# Patient Record
Sex: FEMALE | Race: BLACK OR AFRICAN AMERICAN | Employment: UNEMPLOYED | URBAN - METROPOLITAN AREA
[De-identification: names, ages, dates, MRNs, and addresses within clinical notes are randomized per-mention and may not be internally consistent; named-entity substitution may affect disease eponyms.]

---

## 2018-05-08 ENCOUNTER — HOSPITAL ENCOUNTER (OUTPATIENT)
Dept: GENERAL RADIOLOGY | Age: 58
Discharge: HOME | End: 2018-05-10
Payer: MEDICAID

## 2018-05-08 ENCOUNTER — HOSPITAL ENCOUNTER (OUTPATIENT)
Age: 58
Discharge: HOME | End: 2018-05-10
Payer: MEDICAID

## 2018-05-08 DIAGNOSIS — R52 PAIN: ICD-10-CM

## 2018-05-08 PROCEDURE — 72110 X-RAY EXAM L-2 SPINE 4/>VWS: CPT

## 2018-06-08 ENCOUNTER — HOSPITAL ENCOUNTER (OUTPATIENT)
Age: 58
Discharge: HOME | End: 2018-06-08
Payer: MEDICAID

## 2018-06-08 LAB
BUN BLDV-MCNC: 13 MG/DL (ref 6–20)
CREAT SERPL-MCNC: 0.5 MG/DL (ref 0.5–1)
GFR AFRICAN AMERICAN: >60
GFR NON-AFRICAN AMERICAN: >60 ML/MIN/1.73

## 2018-06-08 PROCEDURE — 36415 COLL VENOUS BLD VENIPUNCTURE: CPT

## 2018-06-08 PROCEDURE — 84520 ASSAY OF UREA NITROGEN: CPT

## 2018-06-08 PROCEDURE — 82565 ASSAY OF CREATININE: CPT

## 2018-06-09 ENCOUNTER — HOSPITAL ENCOUNTER (OUTPATIENT)
Dept: MRI IMAGING | Age: 58
Discharge: HOME | End: 2018-06-11
Payer: MEDICAID

## 2018-06-09 DIAGNOSIS — Q76.49 SPINAL DEFORMITY: ICD-10-CM

## 2018-06-09 DIAGNOSIS — M54.40 ACUTE RIGHT-SIDED LOW BACK PAIN WITH SCIATICA, SCIATICA LATERALITY UNSPECIFIED: ICD-10-CM

## 2018-06-09 DIAGNOSIS — M53.2X6 SPINAL INSTABILITY OF LUMBAR REGION: ICD-10-CM

## 2018-06-09 DIAGNOSIS — M40.209 KYPHOSIS, UNSPECIFIED KYPHOSIS TYPE, UNSPECIFIED SPINAL REGION: ICD-10-CM

## 2018-06-09 DIAGNOSIS — M48.062 NEUROGENIC CLAUDICATION DUE TO LUMBAR SPINAL STENOSIS: ICD-10-CM

## 2018-06-09 PROCEDURE — 72158 MRI LUMBAR SPINE W/O & W/DYE: CPT

## 2018-06-09 PROCEDURE — A9579 GAD-BASE MR CONTRAST NOS,1ML: HCPCS | Performed by: RADIOLOGY

## 2018-06-09 PROCEDURE — 6360000004 HC RX CONTRAST MEDICATION: Performed by: RADIOLOGY

## 2018-06-09 RX ADMIN — GADOTERIDOL 15 ML: 279.3 INJECTION, SOLUTION INTRAVENOUS at 14:38

## 2018-08-27 ENCOUNTER — HOSPITAL ENCOUNTER (OUTPATIENT)
Age: 58
Discharge: HOME | End: 2018-08-27
Payer: MEDICAID

## 2018-08-27 LAB
ALBUMIN SERPL-MCNC: 4.7 G/DL (ref 3.5–5.2)
ALP BLD-CCNC: 91 U/L (ref 35–104)
ALT SERPL-CCNC: 50 U/L (ref 0–32)
AST SERPL-CCNC: 142 U/L (ref 0–31)
BILIRUB SERPL-MCNC: 1.4 MG/DL (ref 0–1.2)
BILIRUBIN DIRECT: 0.4 MG/DL (ref 0–0.3)
BILIRUBIN, INDIRECT: 1 MG/DL (ref 0–1)
TOTAL PROTEIN: 7.6 G/DL (ref 6.4–8.3)

## 2018-08-27 PROCEDURE — 80076 HEPATIC FUNCTION PANEL: CPT

## 2018-08-27 PROCEDURE — 36415 COLL VENOUS BLD VENIPUNCTURE: CPT

## 2018-11-15 ENCOUNTER — HOSPITAL ENCOUNTER (OUTPATIENT)
Dept: GENERAL RADIOLOGY | Age: 58
Discharge: HOME | End: 2018-11-17
Payer: MEDICAID

## 2018-11-15 ENCOUNTER — OFFICE VISIT (OUTPATIENT)
Dept: NEUROSURGERY | Age: 58
End: 2018-11-15
Payer: MEDICAID

## 2018-11-15 ENCOUNTER — HOSPITAL ENCOUNTER (OUTPATIENT)
Age: 58
Discharge: HOME | End: 2018-11-17
Payer: MEDICAID

## 2018-11-15 VITALS
BODY MASS INDEX: 22.63 KG/M2 | HEART RATE: 81 BPM | DIASTOLIC BLOOD PRESSURE: 83 MMHG | WEIGHT: 123 LBS | SYSTOLIC BLOOD PRESSURE: 109 MMHG | HEIGHT: 62 IN

## 2018-11-15 DIAGNOSIS — M51.26 LUMBAR DISC DISPLACEMENT WITHOUT MYELOPATHY: ICD-10-CM

## 2018-11-15 DIAGNOSIS — M51.26 LUMBAR DISC HERNIATION: Primary | ICD-10-CM

## 2018-11-15 PROCEDURE — G8420 CALC BMI NORM PARAMETERS: HCPCS | Performed by: PHYSICIAN ASSISTANT

## 2018-11-15 PROCEDURE — G8427 DOCREV CUR MEDS BY ELIG CLIN: HCPCS | Performed by: PHYSICIAN ASSISTANT

## 2018-11-15 PROCEDURE — 3017F COLORECTAL CA SCREEN DOC REV: CPT | Performed by: PHYSICIAN ASSISTANT

## 2018-11-15 PROCEDURE — 99212 OFFICE O/P EST SF 10 MIN: CPT | Performed by: PHYSICIAN ASSISTANT

## 2018-11-15 PROCEDURE — 72100 X-RAY EXAM L-S SPINE 2/3 VWS: CPT

## 2018-11-15 PROCEDURE — G8484 FLU IMMUNIZE NO ADMIN: HCPCS | Performed by: PHYSICIAN ASSISTANT

## 2018-11-15 PROCEDURE — 1036F TOBACCO NON-USER: CPT | Performed by: PHYSICIAN ASSISTANT

## 2018-11-29 ENCOUNTER — HOSPITAL ENCOUNTER (OUTPATIENT)
Dept: PHYSICAL THERAPY | Age: 58
Setting detail: THERAPIES SERIES
Discharge: HOME | End: 2018-11-29
Payer: MEDICAID

## 2018-11-29 PROCEDURE — G8982 BODY POS GOAL STATUS: HCPCS | Performed by: PHYSICAL THERAPIST

## 2018-11-29 PROCEDURE — 97161 PT EVAL LOW COMPLEX 20 MIN: CPT | Performed by: PHYSICAL THERAPIST

## 2018-11-29 PROCEDURE — G8981 BODY POS CURRENT STATUS: HCPCS | Performed by: PHYSICAL THERAPIST

## 2018-11-29 ASSESSMENT — PAIN DESCRIPTION - LOCATION: LOCATION: BACK

## 2018-11-29 ASSESSMENT — PAIN DESCRIPTION - PAIN TYPE: TYPE: CHRONIC PAIN

## 2018-11-29 ASSESSMENT — PAIN DESCRIPTION - ORIENTATION: ORIENTATION: RIGHT;LEFT

## 2018-11-29 ASSESSMENT — PAIN SCALES - GENERAL: PAINLEVEL_OUTOF10: 6

## 2018-11-29 ASSESSMENT — PAIN DESCRIPTION - DESCRIPTORS: DESCRIPTORS: ACHING;CONSTANT

## 2018-12-04 ENCOUNTER — HOSPITAL ENCOUNTER (OUTPATIENT)
Dept: PHYSICAL THERAPY | Age: 58
Setting detail: THERAPIES SERIES
Discharge: HOME | End: 2018-12-04
Payer: MEDICAID

## 2018-12-04 PROCEDURE — G0283 ELEC STIM OTHER THAN WOUND: HCPCS | Performed by: PHYSICAL THERAPIST

## 2018-12-04 PROCEDURE — 97110 THERAPEUTIC EXERCISES: CPT | Performed by: PHYSICAL THERAPIST

## 2018-12-07 ENCOUNTER — HOSPITAL ENCOUNTER (OUTPATIENT)
Dept: PHYSICAL THERAPY | Age: 58
Setting detail: THERAPIES SERIES
Discharge: HOME | End: 2018-12-07
Payer: MEDICAID

## 2018-12-10 ENCOUNTER — HOSPITAL ENCOUNTER (OUTPATIENT)
Dept: PHYSICAL THERAPY | Age: 58
Setting detail: THERAPIES SERIES
Discharge: HOME | End: 2018-12-10
Payer: MEDICAID

## 2018-12-10 PROCEDURE — G0283 ELEC STIM OTHER THAN WOUND: HCPCS | Performed by: PHYSICAL THERAPIST

## 2018-12-10 PROCEDURE — 97110 THERAPEUTIC EXERCISES: CPT | Performed by: PHYSICAL THERAPIST

## 2018-12-12 ENCOUNTER — HOSPITAL ENCOUNTER (OUTPATIENT)
Dept: PHYSICAL THERAPY | Age: 58
Setting detail: THERAPIES SERIES
Discharge: HOME | End: 2018-12-12
Payer: MEDICAID

## 2018-12-12 PROCEDURE — 97110 THERAPEUTIC EXERCISES: CPT | Performed by: PHYSICAL THERAPIST

## 2018-12-12 PROCEDURE — G0283 ELEC STIM OTHER THAN WOUND: HCPCS | Performed by: PHYSICAL THERAPIST

## 2018-12-17 ENCOUNTER — APPOINTMENT (OUTPATIENT)
Dept: GENERAL RADIOLOGY | Age: 58
End: 2018-12-17
Payer: MEDICAID

## 2018-12-17 ENCOUNTER — HOSPITAL ENCOUNTER (EMERGENCY)
Age: 58
Discharge: HOME | End: 2018-12-17
Payer: MEDICAID

## 2018-12-17 ENCOUNTER — HOSPITAL ENCOUNTER (OUTPATIENT)
Dept: PHYSICAL THERAPY | Age: 58
Setting detail: THERAPIES SERIES
Discharge: HOME | End: 2018-12-17
Payer: MEDICAID

## 2018-12-17 VITALS
HEIGHT: 62 IN | DIASTOLIC BLOOD PRESSURE: 78 MMHG | WEIGHT: 122 LBS | RESPIRATION RATE: 17 BRPM | OXYGEN SATURATION: 98 % | SYSTOLIC BLOOD PRESSURE: 105 MMHG | HEART RATE: 87 BPM | TEMPERATURE: 97.8 F | BODY MASS INDEX: 22.45 KG/M2

## 2018-12-17 DIAGNOSIS — S42.294A OTHER CLOSED NONDISPLACED FRACTURE OF PROXIMAL END OF RIGHT HUMERUS, INITIAL ENCOUNTER: Primary | ICD-10-CM

## 2018-12-17 PROCEDURE — 73060 X-RAY EXAM OF HUMERUS: CPT

## 2018-12-17 PROCEDURE — 6370000000 HC RX 637 (ALT 250 FOR IP): Performed by: PHYSICIAN ASSISTANT

## 2018-12-17 PROCEDURE — 99283 EMERGENCY DEPT VISIT LOW MDM: CPT

## 2018-12-17 RX ORDER — OXYCODONE HYDROCHLORIDE AND ACETAMINOPHEN 5; 325 MG/1; MG/1
1 TABLET ORAL ONCE
Status: COMPLETED | OUTPATIENT
Start: 2018-12-17 | End: 2018-12-17

## 2018-12-17 RX ADMIN — OXYCODONE AND ACETAMINOPHEN 1 TABLET: 5; 325 TABLET ORAL at 22:59

## 2018-12-17 ASSESSMENT — PAIN SCALES - GENERAL
PAINLEVEL_OUTOF10: 10
PAINLEVEL_OUTOF10: 8

## 2018-12-17 ASSESSMENT — PAIN DESCRIPTION - DESCRIPTORS: DESCRIPTORS: SHOOTING

## 2018-12-17 ASSESSMENT — PAIN DESCRIPTION - ORIENTATION: ORIENTATION: RIGHT

## 2018-12-17 ASSESSMENT — PAIN DESCRIPTION - PAIN TYPE: TYPE: ACUTE PAIN

## 2018-12-17 ASSESSMENT — PAIN DESCRIPTION - LOCATION: LOCATION: ARM

## 2018-12-18 ENCOUNTER — TELEPHONE (OUTPATIENT)
Dept: ORTHOPEDIC SURGERY | Age: 58
End: 2018-12-18

## 2018-12-19 ENCOUNTER — HOSPITAL ENCOUNTER (OUTPATIENT)
Dept: PHYSICAL THERAPY | Age: 58
Setting detail: THERAPIES SERIES
Discharge: HOME | End: 2018-12-19
Payer: MEDICAID

## 2018-12-19 ENCOUNTER — TELEPHONE (OUTPATIENT)
Dept: ORTHOPEDIC SURGERY | Age: 58
End: 2018-12-19

## 2018-12-20 ENCOUNTER — TELEPHONE (OUTPATIENT)
Dept: ORTHOPEDIC SURGERY | Age: 58
End: 2018-12-20

## 2018-12-20 DIAGNOSIS — S42.291A OTHER CLOSED DISPLACED FRACTURE OF PROXIMAL END OF RIGHT HUMERUS, INITIAL ENCOUNTER: Primary | ICD-10-CM

## 2018-12-31 NOTE — PROGRESS NOTES
[address] [facility]                Phone: [phone]   Fax: [phone]    To:  KOBI Suárez       From: Glenn Ortega      Patient: Deloris Stuart       : 1960  Diagnosis:       Date: 2018  Treatment Diagnosis:  chronic LBP     Physical Therapy Discharge Note    Total Visits to date:   4 Cancels/No-shows to date:  3 cancellations     Plan of Care/Treatment to date:  [x] Therapeutic Exercise    [x] Modalities:  [x] Therapeutic Activity     [] Ultrasound  [x] Electrical Stimulation  [] Gait Training      [] Cervical Traction   [] Lumbar Traction  [] Neuromuscular Re-education  [x] Cold/hotpack [] Iontophoresis  [x] Instruction in HEP      Other:  [] Manual Therapy       []    [] Aquatic Therapy       []                    ? Progress towards goals:  pt did n ot return for treatment after 18  so formal reassessment not possible. At last visit she reported the following:      *  pain across LB persisted with all prolonged activities, 6/10  *  AROM LB improved to grossly 75% WNL for all ranges  *  strength across B LE's grossly 5/5 for all planes   *  endurance for all prolonged activities was FAIR+/GOOD-  *  pt I with HEP for home management of condition        Goal Status:  [] Achieved [x] Partially Achieved  [] Not Achieved     Patient Status: [] Continue per initial plan of Care     [x] Patient now discharged     [] Additional visits requested, Please re-certify for additional visits:            Electronically signed by: CHRIS Kenny     If you have any questions or concerns, please don't hesitate to call.   Thank you for your referral.

## 2019-01-02 ENCOUNTER — HOSPITAL ENCOUNTER (OUTPATIENT)
Dept: GENERAL RADIOLOGY | Age: 59
Discharge: HOME | End: 2019-01-04
Payer: MEDICAID

## 2019-01-02 ENCOUNTER — OFFICE VISIT (OUTPATIENT)
Dept: ORTHOPEDIC SURGERY | Age: 59
End: 2019-01-02
Payer: MEDICAID

## 2019-01-02 VITALS
DIASTOLIC BLOOD PRESSURE: 73 MMHG | WEIGHT: 122 LBS | HEART RATE: 83 BPM | SYSTOLIC BLOOD PRESSURE: 99 MMHG | BODY MASS INDEX: 21.62 KG/M2 | HEIGHT: 63 IN

## 2019-01-02 DIAGNOSIS — S42.291A OTHER CLOSED DISPLACED FRACTURE OF PROXIMAL END OF RIGHT HUMERUS, INITIAL ENCOUNTER: ICD-10-CM

## 2019-01-02 DIAGNOSIS — G89.11 ACUTE SHOULDER PAIN DUE TO TRAUMA, RIGHT: Primary | ICD-10-CM

## 2019-01-02 DIAGNOSIS — M25.511 ACUTE SHOULDER PAIN DUE TO TRAUMA, RIGHT: Primary | ICD-10-CM

## 2019-01-02 PROCEDURE — 73060 X-RAY EXAM OF HUMERUS: CPT

## 2019-01-02 PROCEDURE — 3017F COLORECTAL CA SCREEN DOC REV: CPT | Performed by: ORTHOPAEDIC SURGERY

## 2019-01-02 PROCEDURE — 4004F PT TOBACCO SCREEN RCVD TLK: CPT | Performed by: ORTHOPAEDIC SURGERY

## 2019-01-02 PROCEDURE — 99203 OFFICE O/P NEW LOW 30 MIN: CPT | Performed by: ORTHOPAEDIC SURGERY

## 2019-01-02 PROCEDURE — G8484 FLU IMMUNIZE NO ADMIN: HCPCS | Performed by: ORTHOPAEDIC SURGERY

## 2019-01-02 PROCEDURE — G8427 DOCREV CUR MEDS BY ELIG CLIN: HCPCS | Performed by: ORTHOPAEDIC SURGERY

## 2019-01-02 PROCEDURE — G8420 CALC BMI NORM PARAMETERS: HCPCS | Performed by: ORTHOPAEDIC SURGERY

## 2019-01-02 PROCEDURE — 99212 OFFICE O/P EST SF 10 MIN: CPT | Performed by: ORTHOPAEDIC SURGERY

## 2019-01-11 ENCOUNTER — HOSPITAL ENCOUNTER (OUTPATIENT)
Dept: MRI IMAGING | Age: 59
Discharge: HOME | End: 2019-01-13
Payer: MEDICAID

## 2019-01-11 DIAGNOSIS — G89.11 ACUTE SHOULDER PAIN DUE TO TRAUMA, RIGHT: ICD-10-CM

## 2019-01-11 DIAGNOSIS — M25.511 ACUTE SHOULDER PAIN DUE TO TRAUMA, RIGHT: ICD-10-CM

## 2019-01-11 PROCEDURE — 73221 MRI JOINT UPR EXTREM W/O DYE: CPT

## 2019-01-14 ENCOUNTER — HOSPITAL ENCOUNTER (OUTPATIENT)
Age: 59
Discharge: HOME | End: 2019-01-14
Payer: MEDICAID

## 2019-01-14 LAB
ALBUMIN SERPL-MCNC: 4.6 G/DL (ref 3.5–5.2)
ALP BLD-CCNC: 107 U/L (ref 35–104)
ALT SERPL-CCNC: 20 U/L (ref 0–32)
AST SERPL-CCNC: 28 U/L (ref 0–31)
BILIRUB SERPL-MCNC: 0.6 MG/DL (ref 0–1.2)
BILIRUBIN DIRECT: <0.2 MG/DL (ref 0–0.3)
BILIRUBIN, INDIRECT: ABNORMAL MG/DL (ref 0–1)
TOTAL PROTEIN: 7.5 G/DL (ref 6.4–8.3)

## 2019-01-14 PROCEDURE — 80076 HEPATIC FUNCTION PANEL: CPT

## 2019-01-14 PROCEDURE — 36415 COLL VENOUS BLD VENIPUNCTURE: CPT

## 2019-01-22 ENCOUNTER — OFFICE VISIT (OUTPATIENT)
Dept: ORTHOPEDIC SURGERY | Age: 59
End: 2019-01-22
Payer: MEDICAID

## 2019-01-22 VITALS
BODY MASS INDEX: 21.44 KG/M2 | WEIGHT: 121 LBS | HEART RATE: 91 BPM | DIASTOLIC BLOOD PRESSURE: 77 MMHG | SYSTOLIC BLOOD PRESSURE: 106 MMHG | HEIGHT: 63 IN

## 2019-01-22 DIAGNOSIS — S42.254A NONDISPLACED FRACTURE OF GREATER TUBEROSITY OF RIGHT HUMERUS, INITIAL ENCOUNTER FOR CLOSED FRACTURE: Primary | ICD-10-CM

## 2019-01-22 PROCEDURE — G8427 DOCREV CUR MEDS BY ELIG CLIN: HCPCS | Performed by: ORTHOPAEDIC SURGERY

## 2019-01-22 PROCEDURE — G8484 FLU IMMUNIZE NO ADMIN: HCPCS | Performed by: ORTHOPAEDIC SURGERY

## 2019-01-22 PROCEDURE — 99213 OFFICE O/P EST LOW 20 MIN: CPT | Performed by: ORTHOPAEDIC SURGERY

## 2019-01-22 PROCEDURE — G8420 CALC BMI NORM PARAMETERS: HCPCS | Performed by: ORTHOPAEDIC SURGERY

## 2019-01-22 PROCEDURE — 99212 OFFICE O/P EST SF 10 MIN: CPT

## 2019-01-22 PROCEDURE — 23600 CLTX PROX HUMRL FX W/O MNPJ: CPT | Performed by: ORTHOPAEDIC SURGERY

## 2019-01-22 PROCEDURE — 4004F PT TOBACCO SCREEN RCVD TLK: CPT | Performed by: ORTHOPAEDIC SURGERY

## 2019-01-22 PROCEDURE — 3017F COLORECTAL CA SCREEN DOC REV: CPT | Performed by: ORTHOPAEDIC SURGERY

## 2019-01-28 ENCOUNTER — HOSPITAL ENCOUNTER (OUTPATIENT)
Dept: OCCUPATIONAL THERAPY | Age: 59
Setting detail: THERAPIES SERIES
Discharge: HOME | End: 2019-01-28
Payer: MEDICAID

## 2019-01-28 PROCEDURE — 97110 THERAPEUTIC EXERCISES: CPT | Performed by: OCCUPATIONAL THERAPIST

## 2019-01-28 PROCEDURE — 97165 OT EVAL LOW COMPLEX 30 MIN: CPT | Performed by: OCCUPATIONAL THERAPIST

## 2019-01-28 PROCEDURE — 97140 MANUAL THERAPY 1/> REGIONS: CPT | Performed by: OCCUPATIONAL THERAPIST

## 2019-02-04 ENCOUNTER — HOSPITAL ENCOUNTER (OUTPATIENT)
Dept: OCCUPATIONAL THERAPY | Age: 59
Setting detail: THERAPIES SERIES
Discharge: HOME | End: 2019-02-04
Payer: MEDICAID

## 2019-02-04 PROCEDURE — 97140 MANUAL THERAPY 1/> REGIONS: CPT

## 2019-02-04 PROCEDURE — 97110 THERAPEUTIC EXERCISES: CPT

## 2019-02-04 PROCEDURE — 97530 THERAPEUTIC ACTIVITIES: CPT

## 2019-02-06 ENCOUNTER — HOSPITAL ENCOUNTER (OUTPATIENT)
Dept: OCCUPATIONAL THERAPY | Age: 59
Setting detail: THERAPIES SERIES
Discharge: HOME | End: 2019-02-06
Payer: MEDICAID

## 2019-02-06 PROCEDURE — 97530 THERAPEUTIC ACTIVITIES: CPT

## 2019-02-06 PROCEDURE — 97110 THERAPEUTIC EXERCISES: CPT

## 2019-02-06 PROCEDURE — 97124 MASSAGE THERAPY: CPT

## 2019-02-11 ENCOUNTER — HOSPITAL ENCOUNTER (OUTPATIENT)
Dept: OCCUPATIONAL THERAPY | Age: 59
Setting detail: THERAPIES SERIES
Discharge: HOME | End: 2019-02-11
Payer: MEDICAID

## 2019-02-11 PROCEDURE — 97530 THERAPEUTIC ACTIVITIES: CPT

## 2019-02-11 PROCEDURE — 97110 THERAPEUTIC EXERCISES: CPT

## 2019-02-13 ENCOUNTER — OFFICE VISIT (OUTPATIENT)
Dept: ORTHOPEDIC SURGERY | Age: 59
End: 2019-02-13
Payer: MEDICAID

## 2019-02-13 ENCOUNTER — HOSPITAL ENCOUNTER (OUTPATIENT)
Dept: OCCUPATIONAL THERAPY | Age: 59
Setting detail: THERAPIES SERIES
Discharge: HOME | End: 2019-02-13
Payer: MEDICAID

## 2019-02-13 ENCOUNTER — HOSPITAL ENCOUNTER (OUTPATIENT)
Dept: GENERAL RADIOLOGY | Age: 59
Discharge: HOME | End: 2019-02-15
Payer: MEDICAID

## 2019-02-13 VITALS
DIASTOLIC BLOOD PRESSURE: 79 MMHG | BODY MASS INDEX: 21.44 KG/M2 | WEIGHT: 121 LBS | SYSTOLIC BLOOD PRESSURE: 111 MMHG | HEIGHT: 63 IN | HEART RATE: 97 BPM | RESPIRATION RATE: 16 BRPM

## 2019-02-13 DIAGNOSIS — S42.254A NONDISPLACED FRACTURE OF GREATER TUBEROSITY OF RIGHT HUMERUS, INITIAL ENCOUNTER FOR CLOSED FRACTURE: Primary | ICD-10-CM

## 2019-02-13 DIAGNOSIS — S42.254A NONDISPLACED FRACTURE OF GREATER TUBEROSITY OF RIGHT HUMERUS, INITIAL ENCOUNTER FOR CLOSED FRACTURE: ICD-10-CM

## 2019-02-13 PROCEDURE — 73030 X-RAY EXAM OF SHOULDER: CPT

## 2019-02-13 PROCEDURE — 97110 THERAPEUTIC EXERCISES: CPT

## 2019-02-13 PROCEDURE — 97530 THERAPEUTIC ACTIVITIES: CPT

## 2019-02-13 PROCEDURE — 99212 OFFICE O/P EST SF 10 MIN: CPT

## 2019-02-13 PROCEDURE — 97140 MANUAL THERAPY 1/> REGIONS: CPT

## 2019-02-13 PROCEDURE — 99024 POSTOP FOLLOW-UP VISIT: CPT | Performed by: ORTHOPAEDIC SURGERY

## 2019-02-18 ENCOUNTER — HOSPITAL ENCOUNTER (OUTPATIENT)
Dept: OCCUPATIONAL THERAPY | Age: 59
Setting detail: THERAPIES SERIES
Discharge: HOME | End: 2019-02-18
Payer: MEDICAID

## 2019-02-18 PROCEDURE — 97530 THERAPEUTIC ACTIVITIES: CPT

## 2019-02-18 PROCEDURE — 97110 THERAPEUTIC EXERCISES: CPT

## 2019-02-20 ENCOUNTER — HOSPITAL ENCOUNTER (OUTPATIENT)
Dept: OCCUPATIONAL THERAPY | Age: 59
Setting detail: THERAPIES SERIES
End: 2019-02-20
Payer: MEDICAID

## 2019-02-25 ENCOUNTER — HOSPITAL ENCOUNTER (OUTPATIENT)
Dept: OCCUPATIONAL THERAPY | Age: 59
Setting detail: THERAPIES SERIES
Discharge: HOME | End: 2019-02-25
Payer: MEDICAID

## 2019-02-25 ENCOUNTER — HOSPITAL ENCOUNTER (OUTPATIENT)
Dept: PHYSICAL THERAPY | Age: 59
Setting detail: THERAPIES SERIES
Discharge: HOME | End: 2019-02-25
Payer: MEDICAID

## 2019-02-25 PROCEDURE — 97161 PT EVAL LOW COMPLEX 20 MIN: CPT | Performed by: PHYSICAL THERAPIST

## 2019-02-25 PROCEDURE — 97530 THERAPEUTIC ACTIVITIES: CPT

## 2019-02-25 PROCEDURE — 97110 THERAPEUTIC EXERCISES: CPT

## 2019-02-25 ASSESSMENT — PAIN DESCRIPTION - LOCATION: LOCATION: BACK

## 2019-02-25 ASSESSMENT — PAIN SCALES - GENERAL: PAINLEVEL_OUTOF10: 4

## 2019-02-25 ASSESSMENT — PAIN DESCRIPTION - DESCRIPTORS: DESCRIPTORS: ACHING;CONSTANT

## 2019-02-25 ASSESSMENT — PAIN DESCRIPTION - ORIENTATION: ORIENTATION: RIGHT;LEFT

## 2019-02-25 ASSESSMENT — PAIN DESCRIPTION - PAIN TYPE: TYPE: CHRONIC PAIN

## 2019-02-27 ENCOUNTER — HOSPITAL ENCOUNTER (OUTPATIENT)
Dept: PHYSICAL THERAPY | Age: 59
Setting detail: THERAPIES SERIES
Discharge: HOME | End: 2019-02-27
Payer: MEDICAID

## 2019-02-27 ENCOUNTER — HOSPITAL ENCOUNTER (OUTPATIENT)
Dept: OCCUPATIONAL THERAPY | Age: 59
Setting detail: THERAPIES SERIES
Discharge: HOME | End: 2019-02-27
Payer: MEDICAID

## 2019-02-27 PROCEDURE — 97110 THERAPEUTIC EXERCISES: CPT

## 2019-02-27 PROCEDURE — 97110 THERAPEUTIC EXERCISES: CPT | Performed by: PHYSICAL THERAPIST

## 2019-02-27 PROCEDURE — 97530 THERAPEUTIC ACTIVITIES: CPT

## 2019-02-27 PROCEDURE — G0283 ELEC STIM OTHER THAN WOUND: HCPCS | Performed by: PHYSICAL THERAPIST

## 2019-03-04 ENCOUNTER — APPOINTMENT (OUTPATIENT)
Dept: CT IMAGING | Age: 59
End: 2019-03-04
Payer: MEDICAID

## 2019-03-04 ENCOUNTER — APPOINTMENT (OUTPATIENT)
Dept: ULTRASOUND IMAGING | Age: 59
End: 2019-03-04
Payer: MEDICAID

## 2019-03-04 ENCOUNTER — HOSPITAL ENCOUNTER (OUTPATIENT)
Dept: PHYSICAL THERAPY | Age: 59
Setting detail: THERAPIES SERIES
Discharge: HOME | End: 2019-03-04
Payer: MEDICAID

## 2019-03-04 ENCOUNTER — HOSPITAL ENCOUNTER (EMERGENCY)
Age: 59
Discharge: HOME | End: 2019-03-05
Attending: EMERGENCY MEDICINE
Payer: MEDICAID

## 2019-03-04 VITALS
OXYGEN SATURATION: 96 % | DIASTOLIC BLOOD PRESSURE: 68 MMHG | BODY MASS INDEX: 21.44 KG/M2 | HEART RATE: 99 BPM | RESPIRATION RATE: 16 BRPM | SYSTOLIC BLOOD PRESSURE: 103 MMHG | WEIGHT: 121 LBS | TEMPERATURE: 98.6 F | HEIGHT: 63 IN

## 2019-03-04 DIAGNOSIS — R05.9 COUGH: ICD-10-CM

## 2019-03-04 DIAGNOSIS — R10.9 RIGHT FLANK PAIN: Primary | ICD-10-CM

## 2019-03-04 LAB
ALBUMIN SERPL-MCNC: 4.3 G/DL (ref 3.5–5.2)
ALP BLD-CCNC: 102 U/L (ref 35–104)
ALT SERPL-CCNC: 36 U/L (ref 0–32)
ANION GAP SERPL CALCULATED.3IONS-SCNC: 20 MMOL/L (ref 7–16)
AST SERPL-CCNC: 54 U/L (ref 0–31)
BASOPHILS ABSOLUTE: 0.1 E9/L (ref 0–0.2)
BASOPHILS RELATIVE PERCENT: 1.1 % (ref 0–2)
BILIRUB SERPL-MCNC: 0.5 MG/DL (ref 0–1.2)
BUN BLDV-MCNC: 11 MG/DL (ref 6–20)
CALCIUM SERPL-MCNC: 9 MG/DL (ref 8.6–10.2)
CHLORIDE BLD-SCNC: 98 MMOL/L (ref 98–107)
CO2: 23 MMOL/L (ref 22–29)
CREAT SERPL-MCNC: 0.5 MG/DL (ref 0.5–1)
EOSINOPHILS ABSOLUTE: 0.04 E9/L (ref 0.05–0.5)
EOSINOPHILS RELATIVE PERCENT: 0.4 % (ref 0–6)
GFR AFRICAN AMERICAN: >60
GFR NON-AFRICAN AMERICAN: >60 ML/MIN/1.73
GLUCOSE BLD-MCNC: 79 MG/DL (ref 74–99)
HCT VFR BLD CALC: 38 % (ref 34–48)
HEMOGLOBIN: 13 G/DL (ref 11.5–15.5)
IMMATURE GRANULOCYTES #: 0.04 E9/L
IMMATURE GRANULOCYTES %: 0.4 % (ref 0–5)
LACTIC ACID: 4.1 MMOL/L (ref 0.5–2.2)
LACTIC ACID: 4.1 MMOL/L (ref 0.5–2.2)
LIPASE: 76 U/L (ref 13–60)
LYMPHOCYTES ABSOLUTE: 3.19 E9/L (ref 1.5–4)
LYMPHOCYTES RELATIVE PERCENT: 34.7 % (ref 20–42)
MCH RBC QN AUTO: 33.2 PG (ref 26–35)
MCHC RBC AUTO-ENTMCNC: 34.2 % (ref 32–34.5)
MCV RBC AUTO: 96.9 FL (ref 80–99.9)
MONOCYTES ABSOLUTE: 0.73 E9/L (ref 0.1–0.95)
MONOCYTES RELATIVE PERCENT: 7.9 % (ref 2–12)
NEUTROPHILS ABSOLUTE: 5.1 E9/L (ref 1.8–7.3)
NEUTROPHILS RELATIVE PERCENT: 55.5 % (ref 43–80)
PDW BLD-RTO: 13.3 FL (ref 11.5–15)
PLATELET # BLD: 167 E9/L (ref 130–450)
PMV BLD AUTO: 10.3 FL (ref 7–12)
POTASSIUM SERPL-SCNC: 3.9 MMOL/L (ref 3.5–5)
RBC # BLD: 3.92 E12/L (ref 3.5–5.5)
SODIUM BLD-SCNC: 141 MMOL/L (ref 132–146)
TOTAL PROTEIN: 7.1 G/DL (ref 6.4–8.3)
TROPONIN: <0.01 NG/ML (ref 0–0.03)
WBC # BLD: 9.2 E9/L (ref 4.5–11.5)

## 2019-03-04 PROCEDURE — 2580000003 HC RX 258: Performed by: RADIOLOGY

## 2019-03-04 PROCEDURE — 96374 THER/PROPH/DIAG INJ IV PUSH: CPT

## 2019-03-04 PROCEDURE — 6360000004 HC RX CONTRAST MEDICATION: Performed by: RADIOLOGY

## 2019-03-04 PROCEDURE — 6360000002 HC RX W HCPCS: Performed by: EMERGENCY MEDICINE

## 2019-03-04 PROCEDURE — 74177 CT ABD & PELVIS W/CONTRAST: CPT

## 2019-03-04 PROCEDURE — 85025 COMPLETE CBC W/AUTO DIFF WBC: CPT

## 2019-03-04 PROCEDURE — 6370000000 HC RX 637 (ALT 250 FOR IP): Performed by: EMERGENCY MEDICINE

## 2019-03-04 PROCEDURE — 76705 ECHO EXAM OF ABDOMEN: CPT

## 2019-03-04 PROCEDURE — 2580000003 HC RX 258: Performed by: EMERGENCY MEDICINE

## 2019-03-04 PROCEDURE — 83690 ASSAY OF LIPASE: CPT

## 2019-03-04 PROCEDURE — 71275 CT ANGIOGRAPHY CHEST: CPT

## 2019-03-04 PROCEDURE — 84484 ASSAY OF TROPONIN QUANT: CPT

## 2019-03-04 PROCEDURE — 93005 ELECTROCARDIOGRAM TRACING: CPT

## 2019-03-04 PROCEDURE — 80053 COMPREHEN METABOLIC PANEL: CPT

## 2019-03-04 PROCEDURE — 99284 EMERGENCY DEPT VISIT MOD MDM: CPT

## 2019-03-04 PROCEDURE — 36415 COLL VENOUS BLD VENIPUNCTURE: CPT

## 2019-03-04 PROCEDURE — 83605 ASSAY OF LACTIC ACID: CPT

## 2019-03-04 RX ORDER — OXYCODONE HYDROCHLORIDE AND ACETAMINOPHEN 5; 325 MG/1; MG/1
1 TABLET ORAL ONCE
Status: COMPLETED | OUTPATIENT
Start: 2019-03-04 | End: 2019-03-04

## 2019-03-04 RX ORDER — KETOROLAC TROMETHAMINE 30 MG/ML
15 INJECTION, SOLUTION INTRAMUSCULAR; INTRAVENOUS ONCE
Status: COMPLETED | OUTPATIENT
Start: 2019-03-04 | End: 2019-03-04

## 2019-03-04 RX ORDER — HYDROCODONE BITARTRATE AND ACETAMINOPHEN 5; 325 MG/1; MG/1
1 TABLET ORAL EVERY 6 HOURS PRN
Qty: 12 TABLET | Refills: 0 | Status: SHIPPED | OUTPATIENT
Start: 2019-03-04 | End: 2019-03-04 | Stop reason: ALTCHOICE

## 2019-03-04 RX ORDER — SODIUM CHLORIDE 0.9 % (FLUSH) 0.9 %
10 SYRINGE (ML) INJECTION ONCE
Status: COMPLETED | OUTPATIENT
Start: 2019-03-04 | End: 2019-03-04

## 2019-03-04 RX ORDER — 0.9 % SODIUM CHLORIDE 0.9 %
1000 INTRAVENOUS SOLUTION INTRAVENOUS ONCE
Status: COMPLETED | OUTPATIENT
Start: 2019-03-04 | End: 2019-03-05

## 2019-03-04 RX ORDER — IBUPROFEN 800 MG/1
800 TABLET ORAL EVERY 6 HOURS PRN
Qty: 21 TABLET | Refills: 0 | Status: ON HOLD | OUTPATIENT
Start: 2019-03-04 | End: 2019-07-19 | Stop reason: ALTCHOICE

## 2019-03-04 RX ADMIN — Medication 10 ML: at 20:56

## 2019-03-04 RX ADMIN — IOPAMIDOL 110 ML: 755 INJECTION, SOLUTION INTRAVENOUS at 20:55

## 2019-03-04 RX ADMIN — SODIUM CHLORIDE 1000 ML: 9 INJECTION, SOLUTION INTRAVENOUS at 18:40

## 2019-03-04 RX ADMIN — KETOROLAC TROMETHAMINE 15 MG: 30 INJECTION, SOLUTION INTRAMUSCULAR; INTRAVENOUS at 18:40

## 2019-03-04 RX ADMIN — OXYCODONE AND ACETAMINOPHEN 1 TABLET: 5; 325 TABLET ORAL at 23:17

## 2019-03-04 ASSESSMENT — PAIN DESCRIPTION - PAIN TYPE: TYPE: ACUTE PAIN

## 2019-03-04 ASSESSMENT — ENCOUNTER SYMPTOMS
COLOR CHANGE: 0
SINUS PRESSURE: 0
SHORTNESS OF BREATH: 1
STRIDOR: 0
VOMITING: 0
EYE ITCHING: 0
EYE REDNESS: 0
ABDOMINAL PAIN: 1
DIARRHEA: 0
NAUSEA: 0
RHINORRHEA: 0
ORTHOPNEA: 0
COUGH: 1
CHEST TIGHTNESS: 0
CHOKING: 0
EYE DISCHARGE: 0
WHEEZING: 0
VOICE CHANGE: 0
BACK PAIN: 0
SORE THROAT: 0
ABDOMINAL DISTENTION: 0

## 2019-03-04 ASSESSMENT — PAIN DESCRIPTION - LOCATION: LOCATION: ABDOMEN

## 2019-03-04 ASSESSMENT — PAIN DESCRIPTION - ORIENTATION: ORIENTATION: RIGHT;UPPER

## 2019-03-04 ASSESSMENT — PAIN SCALES - GENERAL
PAINLEVEL_OUTOF10: 8
PAINLEVEL_OUTOF10: 8
PAINLEVEL_OUTOF10: 7

## 2019-03-06 ENCOUNTER — HOSPITAL ENCOUNTER (OUTPATIENT)
Dept: OCCUPATIONAL THERAPY | Age: 59
Setting detail: THERAPIES SERIES
Discharge: HOME | End: 2019-03-06
Payer: MEDICAID

## 2019-03-06 ENCOUNTER — HOSPITAL ENCOUNTER (OUTPATIENT)
Dept: PHYSICAL THERAPY | Age: 59
Setting detail: THERAPIES SERIES
Discharge: HOME | End: 2019-03-06
Payer: MEDICAID

## 2019-03-06 PROCEDURE — 97140 MANUAL THERAPY 1/> REGIONS: CPT

## 2019-03-06 PROCEDURE — 97110 THERAPEUTIC EXERCISES: CPT

## 2019-03-06 PROCEDURE — 97530 THERAPEUTIC ACTIVITIES: CPT

## 2019-03-08 LAB
EKG ATRIAL RATE: 87 BPM
EKG P AXIS: 65 DEGREES
EKG P-R INTERVAL: 148 MS
EKG Q-T INTERVAL: 382 MS
EKG QRS DURATION: 80 MS
EKG QTC CALCULATION (BAZETT): 459 MS
EKG R AXIS: 61 DEGREES
EKG T AXIS: 62 DEGREES
EKG VENTRICULAR RATE: 87 BPM

## 2019-03-09 ENCOUNTER — HOSPITAL ENCOUNTER (EMERGENCY)
Age: 59
Discharge: HOME | End: 2019-03-09
Attending: EMERGENCY MEDICINE
Payer: MEDICAID

## 2019-03-09 ENCOUNTER — APPOINTMENT (OUTPATIENT)
Dept: GENERAL RADIOLOGY | Age: 59
End: 2019-03-09
Payer: MEDICAID

## 2019-03-09 VITALS
BODY MASS INDEX: 23.53 KG/M2 | HEIGHT: 62 IN | DIASTOLIC BLOOD PRESSURE: 78 MMHG | WEIGHT: 127.9 LBS | OXYGEN SATURATION: 98 % | RESPIRATION RATE: 18 BRPM | HEART RATE: 82 BPM | SYSTOLIC BLOOD PRESSURE: 110 MMHG | TEMPERATURE: 97.5 F
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
## 2019-03-09 DIAGNOSIS — R07.9 RIGHT-SIDED CHEST PAIN: Primary | ICD-10-CM

## 2019-03-09 LAB
ACETAMINOPHEN LEVEL: <5 MCG/ML (ref 10–30)
ALBUMIN SERPL-MCNC: 3.9 G/DL (ref 3.5–5.2)
ALP BLD-CCNC: 76 U/L (ref 35–104)
ALT SERPL-CCNC: 23 U/L (ref 0–32)
AMPHETAMINE SCREEN, URINE: NOT DETECTED
ANION GAP SERPL CALCULATED.3IONS-SCNC: 16 MMOL/L (ref 7–16)
AST SERPL-CCNC: 29 U/L (ref 0–31)
BARBITURATE SCREEN URINE: NOT DETECTED
BENZODIAZEPINE SCREEN, URINE: NOT DETECTED
BILIRUB SERPL-MCNC: 0.2 MG/DL (ref 0–1.2)
BUN BLDV-MCNC: 7 MG/DL (ref 6–20)
CALCIUM SERPL-MCNC: 8.9 MG/DL (ref 8.6–10.2)
CANNABINOID SCREEN URINE: NOT DETECTED
CHLORIDE BLD-SCNC: 102 MMOL/L (ref 98–107)
CO2: 25 MMOL/L (ref 22–29)
COCAINE METABOLITE SCREEN URINE: NOT DETECTED
CREAT SERPL-MCNC: 0.5 MG/DL (ref 0.5–1)
EKG ATRIAL RATE: 80 BPM
EKG P AXIS: 60 DEGREES
EKG P-R INTERVAL: 160 MS
EKG Q-T INTERVAL: 410 MS
EKG QRS DURATION: 82 MS
EKG QTC CALCULATION (BAZETT): 472 MS
EKG R AXIS: 48 DEGREES
EKG T AXIS: 64 DEGREES
EKG VENTRICULAR RATE: 80 BPM
ETHANOL: 365 MG/DL (ref 0–0.08)
GFR AFRICAN AMERICAN: >60
GFR NON-AFRICAN AMERICAN: >60 ML/MIN/1.73
GLUCOSE BLD-MCNC: 75 MG/DL (ref 74–99)
HCT VFR BLD CALC: 41.8 % (ref 34–48)
HEMOGLOBIN: 14.1 G/DL (ref 11.5–15.5)
INR BLD: 1
LIPASE: 39 U/L (ref 13–60)
MCH RBC QN AUTO: 33.7 PG (ref 26–35)
MCHC RBC AUTO-ENTMCNC: 33.7 % (ref 32–34.5)
MCV RBC AUTO: 99.8 FL (ref 80–99.9)
METHADONE SCREEN, URINE: NOT DETECTED
OPIATE SCREEN URINE: NOT DETECTED
PDW BLD-RTO: 13.7 FL (ref 11.5–15)
PHENCYCLIDINE SCREEN URINE: NOT DETECTED
PLATELET # BLD: 199 E9/L (ref 130–450)
PMV BLD AUTO: 10.6 FL (ref 7–12)
POTASSIUM SERPL-SCNC: 3.1 MMOL/L (ref 3.5–5)
PROPOXYPHENE SCREEN: NOT DETECTED
PROTHROMBIN TIME: 10.8 SEC (ref 9.3–12.4)
RBC # BLD: 4.19 E12/L (ref 3.5–5.5)
SALICYLATE, SERUM: 1.4 MG/DL (ref 0–30)
SODIUM BLD-SCNC: 143 MMOL/L (ref 132–146)
TOTAL PROTEIN: 6.3 G/DL (ref 6.4–8.3)
TRICYCLIC ANTIDEPRESSANTS SCREEN SERUM: NEGATIVE NG/ML
TROPONIN: <0.01 NG/ML (ref 0–0.03)
WBC # BLD: 6.3 E9/L (ref 4.5–11.5)

## 2019-03-09 PROCEDURE — 71045 X-RAY EXAM CHEST 1 VIEW: CPT

## 2019-03-09 PROCEDURE — 6370000000 HC RX 637 (ALT 250 FOR IP): Performed by: EMERGENCY MEDICINE

## 2019-03-09 PROCEDURE — 85610 PROTHROMBIN TIME: CPT

## 2019-03-09 PROCEDURE — 96375 TX/PRO/DX INJ NEW DRUG ADDON: CPT

## 2019-03-09 PROCEDURE — 6360000002 HC RX W HCPCS: Performed by: EMERGENCY MEDICINE

## 2019-03-09 PROCEDURE — 99284 EMERGENCY DEPT VISIT MOD MDM: CPT

## 2019-03-09 PROCEDURE — 84484 ASSAY OF TROPONIN QUANT: CPT

## 2019-03-09 PROCEDURE — 83690 ASSAY OF LIPASE: CPT

## 2019-03-09 PROCEDURE — 80053 COMPREHEN METABOLIC PANEL: CPT

## 2019-03-09 PROCEDURE — 80307 DRUG TEST PRSMV CHEM ANLYZR: CPT

## 2019-03-09 PROCEDURE — G0480 DRUG TEST DEF 1-7 CLASSES: HCPCS

## 2019-03-09 PROCEDURE — 93005 ELECTROCARDIOGRAM TRACING: CPT | Performed by: EMERGENCY MEDICINE

## 2019-03-09 PROCEDURE — 96372 THER/PROPH/DIAG INJ SC/IM: CPT

## 2019-03-09 PROCEDURE — 36415 COLL VENOUS BLD VENIPUNCTURE: CPT

## 2019-03-09 PROCEDURE — 85027 COMPLETE CBC AUTOMATED: CPT

## 2019-03-09 PROCEDURE — 96374 THER/PROPH/DIAG INJ IV PUSH: CPT

## 2019-03-09 RX ORDER — ORPHENADRINE CITRATE 30 MG/ML
60 INJECTION INTRAMUSCULAR; INTRAVENOUS ONCE
Status: COMPLETED | OUTPATIENT
Start: 2019-03-09 | End: 2019-03-09

## 2019-03-09 RX ORDER — FENTANYL CITRATE 50 UG/ML
25 INJECTION, SOLUTION INTRAMUSCULAR; INTRAVENOUS ONCE
Status: COMPLETED | OUTPATIENT
Start: 2019-03-09 | End: 2019-03-09

## 2019-03-09 RX ORDER — NAPROXEN 500 MG/1
500 TABLET ORAL 2 TIMES DAILY
Qty: 14 TABLET | Refills: 0 | Status: ON HOLD | OUTPATIENT
Start: 2019-03-09 | End: 2019-07-19 | Stop reason: ALTCHOICE

## 2019-03-09 RX ORDER — KETOROLAC TROMETHAMINE 30 MG/ML
30 INJECTION, SOLUTION INTRAMUSCULAR; INTRAVENOUS ONCE
Status: COMPLETED | OUTPATIENT
Start: 2019-03-09 | End: 2019-03-09

## 2019-03-09 RX ORDER — POTASSIUM CHLORIDE 20 MEQ/1
40 TABLET, EXTENDED RELEASE ORAL ONCE
Status: COMPLETED | OUTPATIENT
Start: 2019-03-09 | End: 2019-03-09

## 2019-03-09 RX ADMIN — FENTANYL CITRATE 25 MCG: 50 INJECTION INTRAMUSCULAR; INTRAVENOUS at 10:14

## 2019-03-09 RX ADMIN — POTASSIUM CHLORIDE 40 MEQ: 20 TABLET, EXTENDED RELEASE ORAL at 09:53

## 2019-03-09 RX ADMIN — KETOROLAC TROMETHAMINE 30 MG: 30 INJECTION, SOLUTION INTRAMUSCULAR; INTRAVENOUS at 06:58

## 2019-03-09 RX ADMIN — ORPHENADRINE CITRATE 60 MG: 30 INJECTION INTRAMUSCULAR; INTRAVENOUS at 07:17

## 2019-03-09 ASSESSMENT — PAIN DESCRIPTION - LOCATION: LOCATION: CHEST

## 2019-03-09 ASSESSMENT — PAIN SCALES - GENERAL
PAINLEVEL_OUTOF10: 5
PAINLEVEL_OUTOF10: 8
PAINLEVEL_OUTOF10: 10

## 2019-03-09 ASSESSMENT — PAIN DESCRIPTION - PAIN TYPE: TYPE: ACUTE PAIN

## 2019-03-09 ASSESSMENT — PAIN DESCRIPTION - DESCRIPTORS: DESCRIPTORS: SHOOTING

## 2019-03-09 ASSESSMENT — PAIN DESCRIPTION - ORIENTATION: ORIENTATION: LOWER;RIGHT

## 2019-03-11 ENCOUNTER — HOSPITAL ENCOUNTER (OUTPATIENT)
Dept: PHYSICAL THERAPY | Age: 59
Setting detail: THERAPIES SERIES
Discharge: HOME | End: 2019-03-11
Payer: MEDICAID

## 2019-03-11 ENCOUNTER — HOSPITAL ENCOUNTER (OUTPATIENT)
Dept: OCCUPATIONAL THERAPY | Age: 59
Setting detail: THERAPIES SERIES
Discharge: HOME | End: 2019-03-11
Payer: MEDICAID

## 2019-03-11 PROCEDURE — G0283 ELEC STIM OTHER THAN WOUND: HCPCS | Performed by: PHYSICAL THERAPIST

## 2019-03-11 PROCEDURE — 97110 THERAPEUTIC EXERCISES: CPT

## 2019-03-11 PROCEDURE — 97110 THERAPEUTIC EXERCISES: CPT | Performed by: PHYSICAL THERAPIST

## 2019-03-13 ENCOUNTER — HOSPITAL ENCOUNTER (OUTPATIENT)
Dept: PHYSICAL THERAPY | Age: 59
Setting detail: THERAPIES SERIES
Discharge: HOME | End: 2019-03-13
Payer: MEDICAID

## 2019-03-13 ENCOUNTER — HOSPITAL ENCOUNTER (OUTPATIENT)
Dept: OCCUPATIONAL THERAPY | Age: 59
Setting detail: THERAPIES SERIES
Discharge: HOME | End: 2019-03-13
Payer: MEDICAID

## 2019-03-13 PROCEDURE — G0283 ELEC STIM OTHER THAN WOUND: HCPCS | Performed by: PHYSICAL THERAPIST

## 2019-03-13 PROCEDURE — 97110 THERAPEUTIC EXERCISES: CPT | Performed by: PHYSICAL THERAPIST

## 2019-03-18 ENCOUNTER — HOSPITAL ENCOUNTER (OUTPATIENT)
Dept: PHYSICAL THERAPY | Age: 59
Setting detail: THERAPIES SERIES
Discharge: HOME | End: 2019-03-18
Payer: MEDICAID

## 2019-03-18 ENCOUNTER — HOSPITAL ENCOUNTER (OUTPATIENT)
Dept: OCCUPATIONAL THERAPY | Age: 59
Setting detail: THERAPIES SERIES
Discharge: HOME | End: 2019-03-18
Payer: MEDICAID

## 2019-03-18 PROCEDURE — 97530 THERAPEUTIC ACTIVITIES: CPT

## 2019-03-18 PROCEDURE — 97110 THERAPEUTIC EXERCISES: CPT

## 2019-03-18 PROCEDURE — 97110 THERAPEUTIC EXERCISES: CPT | Performed by: PHYSICAL THERAPIST

## 2019-03-18 PROCEDURE — G0283 ELEC STIM OTHER THAN WOUND: HCPCS | Performed by: PHYSICAL THERAPIST

## 2019-03-20 ENCOUNTER — HOSPITAL ENCOUNTER (OUTPATIENT)
Dept: OCCUPATIONAL THERAPY | Age: 59
Setting detail: THERAPIES SERIES
Discharge: HOME | End: 2019-03-20
Payer: MEDICAID

## 2019-03-20 ENCOUNTER — HOSPITAL ENCOUNTER (OUTPATIENT)
Dept: PHYSICAL THERAPY | Age: 59
Setting detail: THERAPIES SERIES
Discharge: HOME | End: 2019-03-20
Payer: MEDICAID

## 2019-03-20 PROCEDURE — G0283 ELEC STIM OTHER THAN WOUND: HCPCS | Performed by: PHYSICAL THERAPIST

## 2019-03-20 PROCEDURE — 97110 THERAPEUTIC EXERCISES: CPT

## 2019-03-20 PROCEDURE — 97530 THERAPEUTIC ACTIVITIES: CPT

## 2019-03-20 PROCEDURE — 97110 THERAPEUTIC EXERCISES: CPT | Performed by: PHYSICAL THERAPIST

## 2019-03-27 ENCOUNTER — HOSPITAL ENCOUNTER (OUTPATIENT)
Dept: GENERAL RADIOLOGY | Age: 59
Discharge: HOME | End: 2019-03-29
Payer: MEDICAID

## 2019-03-27 ENCOUNTER — OFFICE VISIT (OUTPATIENT)
Dept: ORTHOPEDIC SURGERY | Age: 59
End: 2019-03-27
Payer: MEDICAID

## 2019-03-27 VITALS
BODY MASS INDEX: 21.97 KG/M2 | HEART RATE: 76 BPM | SYSTOLIC BLOOD PRESSURE: 98 MMHG | HEIGHT: 63 IN | WEIGHT: 124 LBS | DIASTOLIC BLOOD PRESSURE: 71 MMHG

## 2019-03-27 DIAGNOSIS — S42.254A NONDISPLACED FRACTURE OF GREATER TUBEROSITY OF RIGHT HUMERUS, INITIAL ENCOUNTER FOR CLOSED FRACTURE: ICD-10-CM

## 2019-03-27 DIAGNOSIS — S42.254D CLOSED NONDISPLACED FRACTURE OF GREATER TUBEROSITY OF RIGHT HUMERUS WITH ROUTINE HEALING: Primary | ICD-10-CM

## 2019-03-27 PROCEDURE — 73030 X-RAY EXAM OF SHOULDER: CPT

## 2019-03-27 PROCEDURE — 99212 OFFICE O/P EST SF 10 MIN: CPT

## 2019-03-27 PROCEDURE — 99024 POSTOP FOLLOW-UP VISIT: CPT | Performed by: PHYSICIAN ASSISTANT

## 2019-03-27 RX ORDER — HYDROCODONE BITARTRATE AND ACETAMINOPHEN 7.5; 325 MG/1; MG/1
TABLET ORAL
Refills: 0 | COMMUNITY
Start: 2019-01-15 | End: 2019-05-24

## 2019-04-01 ENCOUNTER — HOSPITAL ENCOUNTER (OUTPATIENT)
Dept: OCCUPATIONAL THERAPY | Age: 59
Setting detail: THERAPIES SERIES
End: 2019-04-01
Payer: MEDICAID

## 2019-04-03 ENCOUNTER — HOSPITAL ENCOUNTER (OUTPATIENT)
Dept: OCCUPATIONAL THERAPY | Age: 59
Setting detail: THERAPIES SERIES
Discharge: HOME | End: 2019-04-03
Payer: MEDICAID

## 2019-04-03 PROCEDURE — 97110 THERAPEUTIC EXERCISES: CPT

## 2019-04-03 PROCEDURE — 97530 THERAPEUTIC ACTIVITIES: CPT

## 2019-04-03 NOTE — PROGRESS NOTES
OCCUPATIONAL THERAPY PROGRESS NOTE    [facility] [address]   Phone: [phone]   Fax: [phone]     Date:  4/3/2019  Initial Evaluation Date:  2019    Patient Name:  Anna Garcia    :  1960  Restrictions/Precautions:  Per protocol, to begin at 3 week pierre form order date 19/ low fall week   NWB of ARM    Diagnosis:  Non displaced fx greater tuberosity of R humerous                                                        Insurance/Certification information:  [address] [facility]  Referring Physician:  Dr. Kathi Jorge  Date of Surgery/Injury: 19, 6 weeks from injury date. Plan of care signed (Y/N):  no  Visit# / total visits:     Pain Level: min, 1-3/10 weakness, tightness in upper shoulder. Subjective:  Seen 1 /  2 scheduled visits.  on Monday. CC is weakness and pain in scap area now. Focus will be strengthening with overhead movements. HEP upgraded for wall washes and overhead strengthening tasks. Objective:  Engaged patient in the following with focus on ROM for flexion, adb, IR/Er >20' . Strengthening in seated and standing now incorporated with good results. Progressing as tolerated.   INTERVENTION: COMPLETED REPS/TIME: SPECIFICS/COMMENTS:   Modality:      Fluidotherapy      Paraffin      MHP  10 min With shoulder/scapula depression and holds in supine   Estim      Manual Therapy:      Scar Massage      Soft Tissue:  10 min    Therapeutic Ex/Activities:            Pendulums  7 min          Wall Ladder x 10 reps each Forward, horiz abd   Wall Jose  5 min Warmup and stretch forward flexion, horiz abd/add, scapular retraction, deltoid    UBE x 7 min Warm up and reconditioning   Tbands x 20 x 3 Mod/heavy band  Scapular retraction/depression   Wall Wash x 3# arm wt 10x 2         Free Wt x 1#/2#  10 x 3 Overhead flexion, horiz adb/add, extension, kickbacks   Gripper x 35#    flexbar x red 30 x 2 both directions Other:                    Assessment: Patient shows potential to progress with stated goals and will be educated on HEP and provided written handouts as needed throughout their care. Pt is making Fair progress toward stated plan of care. -Rehab Potential: Good  -Requires OT Follow Up: Yes  -Time In: 3  -Time Out: 4   Timed Treatment Minutes: 60 Minutes  Goals: 18 sessions  1) Patient will demonstrate good understanding of home program with good accuracy. Progressing  2) Patient will demonstrate increased active/passive range of motion of their right shoulder to [facility] for ADL/IADL completion. Initial: Shoulder: full passive ext rotation, shoulder flex/abd to 130 degrees without tightness. 3) Patient will demonstrate increased /pinch strength of at least 10 / 5 pinch pounds of their R hand. Initial:  :  Right: 20#   Pinch:    Right= 10#   4) Patient to report decreased pain in their affected R upper extremity from 5-7/10 to 0-2/10 or less with resistive functional use. Progressing:  3-4/10  5)Increase R shoulder strength to G/G- range. Progressing   6) Patient will report ADL functions same as prior to diagnosis of shouder fx. Progressing     Plan:   [x]  Continues Plan of care: Treatment delivered based on POC and graduated to patient's progress. Patient education continues at each visit to obtain maximum benefit from skilled OT intervention.   []  Alter Plan of care:   []  Discharge:      HUMBERTO Sosa, [id number]

## 2019-04-05 ENCOUNTER — APPOINTMENT (OUTPATIENT)
Dept: OCCUPATIONAL THERAPY | Age: 59
End: 2019-04-05
Payer: MEDICAID

## 2019-04-08 ENCOUNTER — HOSPITAL ENCOUNTER (OUTPATIENT)
Dept: PHYSICAL THERAPY | Age: 59
Setting detail: THERAPIES SERIES
Discharge: HOME | End: 2019-04-08
Payer: MEDICAID

## 2019-04-08 ENCOUNTER — HOSPITAL ENCOUNTER (OUTPATIENT)
Dept: OCCUPATIONAL THERAPY | Age: 59
Setting detail: THERAPIES SERIES
Discharge: HOME | End: 2019-04-08
Payer: MEDICAID

## 2019-04-08 PROCEDURE — 97110 THERAPEUTIC EXERCISES: CPT | Performed by: PHYSICAL THERAPIST

## 2019-04-08 PROCEDURE — 97110 THERAPEUTIC EXERCISES: CPT

## 2019-04-08 PROCEDURE — 97530 THERAPEUTIC ACTIVITIES: CPT

## 2019-04-08 PROCEDURE — G0283 ELEC STIM OTHER THAN WOUND: HCPCS | Performed by: PHYSICAL THERAPIST

## 2019-04-08 NOTE — PROGRESS NOTES
[address] [facility]                Phone: [phone]   Fax: [phone]    Physical Therapy Daily Treatment Note  Date:  2019    Patient Name:  Bernarda Currie    :  1960  MRN: [id number]    Evaluating therapist:  CHRIS Kaminski            (19)  Restrictions/Precautions:    Diagnosis:  lumbar disk displacement    Treatment Diagnosis:    Insurance/Certification information:  [address]   Referring Practitioner:  Sharee Feldman. NP-ANA  Plan of care signed (Y/N):    Visit# / total visits:    Pain level: 10   Time In:  1500  Time Out:  1544    Subjective:      Exercises:  Exercise/Equipment Resistance/Repetitions Other comments   StepOne  10 min            tball flex/rot  10x10s           rot st 3x20s    SKTC 3x20s    piriformis st 3x20s           pelvic tilts 15x3s              bridges  15x3s                                                                      Other Therapeutic Activities:      Home Exercise Program:  provided 19  Manual Treatments:      Modalities:  IFC/MH to LB x 15 min     Timed Code Treatment Minutes: Total Treatment Minutes:      Treatment/Activity Tolerance:  [] Patient tolerated treatment well [] Patient limited by fatique  [] Patient limited by pain  [] Patient limited by other medical complications  [] Other:     Prognosis: [] Good [] Fair  [] Poor    Patient Requires Follow-up: [] Yes  [] No    Plan:   [] Continue per plan of care [] Alter current plan (see comments)  [] Plan of care initiated [] Hold pending MD visit [] Discharge  Plan for Next Session:      See Weekly Progress Note: []  Yes  []  No  Next due:        Electronically signed by:   Miky Ko

## 2019-04-08 NOTE — PROGRESS NOTES
OCCUPATIONAL THERAPY PROGRESS NOTE    [facility] [address]   Phone: [phone]   Fax: [phone]     Date:  2019  Initial Evaluation Date:  2019    Patient Name:  Michael Valdes    :  1960  Restrictions/Precautions:  Per protocol, to begin at 3 week pierre form order date 19/ low fall week   NWB of ARM    Diagnosis:  Non displaced fx greater tuberosity of R humerous                                                        Insurance/Certification information:  [address] [facility]  Referring Physician:  Dr. Iza Mcclain  Date of Surgery/Injury: 19, 6 weeks from injury date. Plan of care signed (Y/N):  no  Visit# / total visits:     Pain Level: min, 1-3/10 weakness, tightness in upper shoulder. Subjective:  Seen 1 /  2 scheduled visits. No new complaints. Objective:  Engaged patient in the following with focus on ROM for flexion, adb, IR/Er >20' . Strengthening in seated and standing now incorporated with good results. Progressing as tolerated.   INTERVENTION: COMPLETED REPS/TIME: SPECIFICS/COMMENTS:   Modality:      Fluidotherapy      Paraffin      MHP  10 min With shoulder/scapula depression and holds in supine   Estim      Manual Therapy:      Scar Massage      Soft Tissue:  10 min    Therapeutic Ex/Activities:            Pendulums  7 min          Wall Ladder x 10 reps each Forward, horiz abd   Wall Jose  5 min Warmup and stretch forward flexion, horiz abd/add, scapular retraction, deltoid    UBE x 7 min Warm up and reconditioning   Tbands x 20 x 3 Mod/heavy band  Scapular retraction/depression   Wall Wash x 3# arm wt 10x 2         Free Wt x 1#/2#  10 x 3 Overhead flexion, horiz adb/add, extension, kickbacks   Gripper  35#    flexbar x red 30 x 2 both directions   Free Wt x 2# Forearm  Flexion/exten  15 x 3               Other:                    Assessment: Patient shows potential to progress with stated goals and will be

## 2019-04-10 ENCOUNTER — HOSPITAL ENCOUNTER (OUTPATIENT)
Dept: OCCUPATIONAL THERAPY | Age: 59
Setting detail: THERAPIES SERIES
Discharge: HOME | End: 2019-04-10
Payer: MEDICAID

## 2019-04-10 ENCOUNTER — HOSPITAL ENCOUNTER (OUTPATIENT)
Dept: PHYSICAL THERAPY | Age: 59
Setting detail: THERAPIES SERIES
Discharge: HOME | End: 2019-04-10
Payer: MEDICAID

## 2019-04-10 PROCEDURE — 97110 THERAPEUTIC EXERCISES: CPT

## 2019-04-10 PROCEDURE — 97530 THERAPEUTIC ACTIVITIES: CPT

## 2019-04-10 NOTE — PROGRESS NOTES
OCCUPATIONAL THERAPY PROGRESS NOTE    [facility] [address]   Phone: [phone]   Fax: [phone]     Date:  4/10/2019  Initial Evaluation Date:  2019    Patient Name:  Anna Garcia    :  1960  Restrictions/Precautions:  Per protocol, to begin at 3 week pierre form order date 19/ low fall week   NWB of ARM    Diagnosis:  Non displaced fx greater tuberosity of R humerous                                                        Insurance/Certification information:  [address] [facility]  Referring Physician:  Dr. Kathi Jorge  Date of Surgery/Injury: 19, 6 weeks from injury date. Plan of care signed (Y/N):  no  Visit# / total visits:     Pain Level: min, 1-3/10 weakness, tightness in upper shoulder. Subjective:  Seen 1 /  2 scheduled visits. Reporting weakness when hanging clothing. Objective:  Engaged patient in the following with focus on ROM for flexion, adb, IR/Er >20' . Strengthening in seated and standing now incorporated with good results. Progressing as tolerated.  Focus on horiz abd this date for functional strengthening   INTERVENTION: COMPLETED REPS/TIME: SPECIFICS/COMMENTS:   Modality:      Fluidotherapy      Paraffin      MHP  10 min With shoulder/scapula depression and holds in supine   Estim      Manual Therapy:      Scar Massage      Soft Tissue:  10 min    Therapeutic Ex/Activities:            Pendulums  7 min          Wall Ladder x 10 reps each Forward, horiz abd   Wall Jose  5 min Warmup and stretch forward flexion, horiz abd/add, scapular retraction, deltoid    UBE x 7 min Warm up and reconditioning   Tbands x 20 x 3 Mod/heavy band  Scapular retraction/depression   Wall Wash/side x 3# arm wt 10x 4         Free Wt x 3# / 2#  15 x 3 Overhead flexion, horiz adb/add, extension, kickbacks   Gripper  35#    flexbar x red 30 x 2 both directions   Free Wt x 3# Forearm  Flexion/exten  15 x 3               Other: Assessment: Patient shows potential to progress with stated goals and will be educated on HEP and provided written handouts as needed throughout their care. Pt is making Fair progress toward stated plan of care. -Rehab Potential: Good  -Requires OT Follow Up: Yes  -Time In: 3  -Time Out: 4   Timed Treatment Minutes: 60 Minutes  Goals: 18 sessions  1) Patient will demonstrate good understanding of home program with good accuracy. Progressing  2) Patient will demonstrate increased active/passive range of motion of their right shoulder to [facility] for ADL/IADL completion. Initial: Shoulder: full passive ext rotation, shoulder flex/abd to 130 degrees without tightness. 3) Patient will demonstrate increased /pinch strength of at least 10 / 5 pinch pounds of their R hand. Initial:  :  Right: 20#   Pinch:    Right= 10#   4) Patient to report decreased pain in their affected R upper extremity from 5-7/10 to 0-2/10 or less with resistive functional use. Progressing:  3-4/10  5)Increase R shoulder strength to G/G- range. Progressing   6) Patient will report ADL functions same as prior to diagnosis of shouder fx. Progressing     Plan:   [x]  Continues Plan of care: Treatment delivered based on POC and graduated to patient's progress. Patient education continues at each visit to obtain maximum benefit from skilled OT intervention.   []  Alter Plan of care:   []  Discharge:      HUMBERTO Whiting, [id number]

## 2019-04-15 ENCOUNTER — HOSPITAL ENCOUNTER (OUTPATIENT)
Dept: OCCUPATIONAL THERAPY | Age: 59
Setting detail: THERAPIES SERIES
Discharge: HOME | End: 2019-04-15
Payer: MEDICAID

## 2019-04-15 PROCEDURE — 97530 THERAPEUTIC ACTIVITIES: CPT

## 2019-04-15 PROCEDURE — 97110 THERAPEUTIC EXERCISES: CPT

## 2019-04-15 NOTE — PROGRESS NOTES
OCCUPATIONAL THERAPY PROGRESS NOTE    [facility] [address]   Phone: [phone]   Fax: [phone]     Date:  4/15/2019  Initial Evaluation Date:  2019    Patient Name:  Praneeth Fowler    :  1960  Restrictions/Precautions:  Per protocol, to begin at 3 week pierre form order date 19/ low fall week   NWB of ARM    Diagnosis:  Non displaced fx greater tuberosity of R humerous                                                        Insurance/Certification information:  [address] [facility]  Referring Physician:  Dr. Roque Orozco  Date of Surgery/Injury: 19, 6 weeks from injury date. Plan of care signed (Y/N):  no  Visit# / total visits: 15 / 18    Pain Level: min, 1-3/10 weakness, tightness in upper shoulder. Subjective:  Seen 1 /  2 scheduled visits. No new complaints. HEP reeducation completed for continued involvement after discharge. Objective:  Engaged patient in the following with focus on ROM for flexion, adb, IR/Er >20' . Strengthening in seated and standing now incorporated with good results. Progressing as tolerated.  Focus on horiz abd this date for functional strengthening   INTERVENTION: COMPLETED REPS/TIME: SPECIFICS/COMMENTS:   Modality:      Fluidotherapy      Paraffin      MHP  10 min With shoulder/scapula depression and holds in supine   Estim      Manual Therapy:      Scar Massage      Soft Tissue:  10 min    Therapeutic Ex/Activities:            Pendulums  7 min          Wall Ladder x 10 reps each Forward, horiz abd 2# wrist wt   Wall Pulley  5 min Warmup and stretch forward flexion, horiz abd/add, scapular retraction, deltoid    UBE x 7 min Warm up and reconditioning   Tbands x 20 x 3 Mod/heavy band  Scapular retraction/depression   Wall Wash/side x 3# arm wt 15 x 3         Free Wt x 3# / 2#  15 x 3 Overhead flexion, horiz adb/add, extension, kickbacks   Gripper  35#    flexbar x red 30 x 2 both directions   Free Wt x 3#/2# Forearm  Flexion/exten  15 x 3               Other:                    Assessment: Patient shows potential to progress with stated goals and will be educated on HEP and provided written handouts as needed throughout their care. Pt is making Fair progress toward stated plan of care. -Rehab Potential: Good  -Requires OT Follow Up: Yes  -Time In: 3  -Time Out: 4   Timed Treatment Minutes: 60 Minutes  Goals: 18 sessions  1) Patient will demonstrate good understanding of home program with good accuracy. Progressing  2) Patient will demonstrate increased active/passive range of motion of their right shoulder to [facility] for ADL/IADL completion. Initial: Shoulder: full passive ext rotation, shoulder flex/abd to 130 degrees without tightness. 3) Patient will demonstrate increased /pinch strength of at least 10 / 5 pinch pounds of their R hand. Initial:  :  Right: 20#   Pinch:    Right= 10#   4) Patient to report decreased pain in their affected R upper extremity from 5-7/10 to 0-2/10 or less with resistive functional use. Progressing:  3-4/10  5)Increase R shoulder strength to G/G- range. Progressing   6) Patient will report ADL functions same as prior to diagnosis of shouder fx. Progressing     Plan:   [x]  Continues Plan of care: Treatment delivered based on POC and graduated to patient's progress. Patient education continues at each visit to obtain maximum benefit from skilled OT intervention.   []  Alter Plan of care:   []  Discharge:      HUMBERTO Ferreira, [id number]

## 2019-04-17 ENCOUNTER — HOSPITAL ENCOUNTER (OUTPATIENT)
Dept: OCCUPATIONAL THERAPY | Age: 59
Setting detail: THERAPIES SERIES
Discharge: HOME | End: 2019-04-17
Payer: MEDICAID

## 2019-04-17 PROCEDURE — 97530 THERAPEUTIC ACTIVITIES: CPT

## 2019-04-17 PROCEDURE — 97110 THERAPEUTIC EXERCISES: CPT

## 2019-04-17 NOTE — PROGRESS NOTES
OCCUPATIONAL THERAPY PROGRESS NOTE    [facility] [facility] [address]   Phone: [phone]   Fax: [phone]     Date:  2019  Initial Evaluation Date:  2019    Patient Name:  Jayme Daniels    :  1960  Restrictions/Precautions:  Per protocol, to begin at 3 week pierre form order date 19/ low fall week   NWB of ARM    Diagnosis:  Non displaced fx greater tuberosity of R humerous                                                        Insurance/Certification information:  [address] [facility]  Referring Physician:  Dr. Maira Andre  Date of Surgery/Injury: 19, 6 weeks from injury date. Plan of care signed (Y/N):  no  Visit# / total visits:     Pain Level: min, 1-3/10 weakness, tightness in upper shoulder. Subjective:  Seen 2 /  2 scheduled visits. No new complaints. HEP reeducation completed for continued involvement after discharge. Reassessment at next visit for possible discharge. Objective:  Engaged patient in the following with focus on ROM for flexion, adb, IR/Er >20' . Strengthening in seated and standing now incorporated with good results. Progressing as tolerated.  Focus on horiz abd this date for functional strengthening   INTERVENTION: COMPLETED REPS/TIME: SPECIFICS/COMMENTS:   Modality:      Fluidotherapy      Paraffin      MHP  10 min With shoulder/scapula depression and holds in supine   Estim      Manual Therapy:      Scar Massage      Soft Tissue:  10 min    Therapeutic Ex/Activities:            Pendulums  7 min          Wall Ladder x 10 reps each Forward, horiz abd 2# wrist wt   Wall Pulley  5 min Warmup and stretch forward flexion, horiz abd/add, scapular retraction, deltoid    UBE x 7 min Warm up and reconditioning   Tbands x 20 x 3 Mod/heavy band  Scapular retraction/depression  Provided for HEP inclusion   Wall Wash/side x 3# arm wt 15 x 3         Free Wt x 3# / 2#  15 x 3 Overhead flexion, horiz adb/add, extension, kickbacks   Gripper  35#    flexbar x red 30 x 2 both directions   Free Wt x 3#/2# Forearm  Flexion/exten  15 x 3               Other:                    Assessment: Patient shows potential to progress with stated goals and will be educated on HEP and provided written handouts as needed throughout their care. Pt is making Fair progress toward stated plan of care. -Rehab Potential: Good  -Requires OT Follow Up: Yes  -Time In: 3  -Time Out: 4   Timed Treatment Minutes: 60 Minutes  Goals: 18 sessions  1) Patient will demonstrate good understanding of home program with good accuracy. Progressing  2) Patient will demonstrate increased active/passive range of motion of their right shoulder to [facility] for ADL/IADL completion. Initial: Shoulder: full passive ext rotation, shoulder flex/abd to 130 degrees without tightness. 3) Patient will demonstrate increased /pinch strength of at least 10 / 5 pinch pounds of their R hand. Initial:  :  Right: 20#   Pinch:    Right= 10#   4) Patient to report decreased pain in their affected R upper extremity from 5-7/10 to 0-2/10 or less with resistive functional use. Progressing:  3-4/10  5)Increase R shoulder strength to G/G- range. Progressing   6) Patient will report ADL functions same as prior to diagnosis of shouder fx. Progressing     Plan:   [x]  Continues Plan of care: Treatment delivered based on POC and graduated to patient's progress. Patient education continues at each visit to obtain maximum benefit from skilled OT intervention.   []  Alter Plan of care:   []  Discharge:      HUMBERTO Morales, [id number]

## 2019-04-19 ENCOUNTER — APPOINTMENT (OUTPATIENT)
Dept: CT IMAGING | Age: 59
End: 2019-04-19
Payer: MEDICAID

## 2019-04-19 ENCOUNTER — HOSPITAL ENCOUNTER (EMERGENCY)
Age: 59
Discharge: HOME | End: 2019-04-20
Attending: EMERGENCY MEDICINE
Payer: MEDICAID

## 2019-04-19 DIAGNOSIS — K62.5 RECTAL BLEEDING: Primary | ICD-10-CM

## 2019-04-19 LAB
ABO/RH: NORMAL
ALBUMIN SERPL-MCNC: 5 G/DL (ref 3.5–5.2)
ALP BLD-CCNC: 101 U/L (ref 35–104)
ALT SERPL-CCNC: 34 U/L (ref 0–32)
ANION GAP SERPL CALCULATED.3IONS-SCNC: 15 MMOL/L (ref 7–16)
ANTIBODY SCREEN: NORMAL
APTT: 30 SEC (ref 24.5–35.1)
AST SERPL-CCNC: 56 U/L (ref 0–31)
BASOPHILS ABSOLUTE: 0.09 E9/L (ref 0–0.2)
BASOPHILS RELATIVE PERCENT: 0.6 % (ref 0–2)
BILIRUB SERPL-MCNC: 0.6 MG/DL (ref 0–1.2)
BUN BLDV-MCNC: 6 MG/DL (ref 6–20)
CALCIUM SERPL-MCNC: 9.9 MG/DL (ref 8.6–10.2)
CHLORIDE BLD-SCNC: 100 MMOL/L (ref 98–107)
CO2: 25 MMOL/L (ref 22–29)
CREAT SERPL-MCNC: 0.5 MG/DL (ref 0.5–1)
EOSINOPHILS ABSOLUTE: 0.05 E9/L (ref 0.05–0.5)
EOSINOPHILS RELATIVE PERCENT: 0.3 % (ref 0–6)
GFR AFRICAN AMERICAN: >60
GFR NON-AFRICAN AMERICAN: >60 ML/MIN/1.73
GLUCOSE BLD-MCNC: 93 MG/DL (ref 74–99)
HCT VFR BLD CALC: 42.6 % (ref 34–48)
HEMOGLOBIN: 14.3 G/DL (ref 11.5–15.5)
IMMATURE GRANULOCYTES #: 0.06 E9/L
IMMATURE GRANULOCYTES %: 0.4 % (ref 0–5)
INR BLD: 1
LACTIC ACID: 3.4 MMOL/L (ref 0.5–2.2)
LIPASE: 22 U/L (ref 13–60)
LYMPHOCYTES ABSOLUTE: 2.37 E9/L (ref 1.5–4)
LYMPHOCYTES RELATIVE PERCENT: 16.5 % (ref 20–42)
MCH RBC QN AUTO: 33.6 PG (ref 26–35)
MCHC RBC AUTO-ENTMCNC: 33.6 % (ref 32–34.5)
MCV RBC AUTO: 100 FL (ref 80–99.9)
MONOCYTES ABSOLUTE: 0.8 E9/L (ref 0.1–0.95)
MONOCYTES RELATIVE PERCENT: 5.6 % (ref 2–12)
NEUTROPHILS ABSOLUTE: 10.96 E9/L (ref 1.8–7.3)
NEUTROPHILS RELATIVE PERCENT: 76.6 % (ref 43–80)
PDW BLD-RTO: 13.2 FL (ref 11.5–15)
PLATELET # BLD: 201 E9/L (ref 130–450)
PMV BLD AUTO: 10.3 FL (ref 7–12)
POTASSIUM SERPL-SCNC: 3.9 MMOL/L (ref 3.5–5)
PROTHROMBIN TIME: 11.8 SEC (ref 9.3–12.4)
RBC # BLD: 4.26 E12/L (ref 3.5–5.5)
SODIUM BLD-SCNC: 140 MMOL/L (ref 132–146)
TOTAL PROTEIN: 8.3 G/DL (ref 6.4–8.3)
TROPONIN: <0.01 NG/ML (ref 0–0.03)
WBC # BLD: 14.3 E9/L (ref 4.5–11.5)

## 2019-04-19 PROCEDURE — 86901 BLOOD TYPING SEROLOGIC RH(D): CPT

## 2019-04-19 PROCEDURE — 86850 RBC ANTIBODY SCREEN: CPT

## 2019-04-19 PROCEDURE — 74177 CT ABD & PELVIS W/CONTRAST: CPT

## 2019-04-19 PROCEDURE — 96374 THER/PROPH/DIAG INJ IV PUSH: CPT

## 2019-04-19 PROCEDURE — 36415 COLL VENOUS BLD VENIPUNCTURE: CPT

## 2019-04-19 PROCEDURE — 6360000002 HC RX W HCPCS: Performed by: EMERGENCY MEDICINE

## 2019-04-19 PROCEDURE — 84484 ASSAY OF TROPONIN QUANT: CPT

## 2019-04-19 PROCEDURE — 86900 BLOOD TYPING SEROLOGIC ABO: CPT

## 2019-04-19 PROCEDURE — 80053 COMPREHEN METABOLIC PANEL: CPT

## 2019-04-19 PROCEDURE — 85025 COMPLETE CBC W/AUTO DIFF WBC: CPT

## 2019-04-19 PROCEDURE — 85610 PROTHROMBIN TIME: CPT

## 2019-04-19 PROCEDURE — 83605 ASSAY OF LACTIC ACID: CPT

## 2019-04-19 PROCEDURE — 6360000004 HC RX CONTRAST MEDICATION: Performed by: RADIOLOGY

## 2019-04-19 PROCEDURE — 83690 ASSAY OF LIPASE: CPT

## 2019-04-19 PROCEDURE — 99284 EMERGENCY DEPT VISIT MOD MDM: CPT

## 2019-04-19 PROCEDURE — 85730 THROMBOPLASTIN TIME PARTIAL: CPT

## 2019-04-19 PROCEDURE — 81001 URINALYSIS AUTO W/SCOPE: CPT

## 2019-04-19 RX ORDER — 0.9 % SODIUM CHLORIDE 0.9 %
1000 INTRAVENOUS SOLUTION INTRAVENOUS ONCE
Status: COMPLETED | OUTPATIENT
Start: 2019-04-19 | End: 2019-04-20

## 2019-04-19 RX ORDER — FENTANYL CITRATE 50 UG/ML
100 INJECTION, SOLUTION INTRAMUSCULAR; INTRAVENOUS ONCE
Status: COMPLETED | OUTPATIENT
Start: 2019-04-19 | End: 2019-04-19

## 2019-04-19 RX ADMIN — IOPAMIDOL 110 ML: 755 INJECTION, SOLUTION INTRAVENOUS at 23:11

## 2019-04-19 RX ADMIN — FENTANYL CITRATE 100 MCG: 50 INJECTION, SOLUTION INTRAMUSCULAR; INTRAVENOUS at 22:34

## 2019-04-19 ASSESSMENT — PAIN DESCRIPTION - FREQUENCY: FREQUENCY: INTERMITTENT

## 2019-04-19 ASSESSMENT — PAIN SCALES - GENERAL
PAINLEVEL_OUTOF10: 8
PAINLEVEL_OUTOF10: 6

## 2019-04-19 ASSESSMENT — PAIN DESCRIPTION - LOCATION: LOCATION: ABDOMEN

## 2019-04-20 VITALS
HEART RATE: 83 BPM | DIASTOLIC BLOOD PRESSURE: 81 MMHG | RESPIRATION RATE: 18 BRPM | WEIGHT: 124 LBS | SYSTOLIC BLOOD PRESSURE: 122 MMHG | TEMPERATURE: 98.8 F | HEIGHT: 62 IN | BODY MASS INDEX: 22.82 KG/M2 | OXYGEN SATURATION: 100 %

## 2019-04-20 LAB
BACTERIA: ABNORMAL /HPF
BILIRUBIN URINE: NEGATIVE
BLOOD, URINE: ABNORMAL
CLARITY: CLEAR
COLOR: YELLOW
EPITHELIAL CELLS, UA: ABNORMAL /HPF
GLUCOSE URINE: NEGATIVE MG/DL
KETONES, URINE: NEGATIVE MG/DL
LEUKOCYTE ESTERASE, URINE: ABNORMAL
NITRITE, URINE: NEGATIVE
PH UA: 5.5 (ref 5–9)
PROTEIN UA: NEGATIVE MG/DL
RBC UA: ABNORMAL /HPF (ref 0–2)
SPECIFIC GRAVITY UA: <=1.005 (ref 1–1.03)
UROBILINOGEN, URINE: 0.2 E.U./DL
WBC UA: ABNORMAL /HPF (ref 0–5)

## 2019-04-20 PROCEDURE — 6360000002 HC RX W HCPCS: Performed by: EMERGENCY MEDICINE

## 2019-04-20 PROCEDURE — 2580000003 HC RX 258: Performed by: EMERGENCY MEDICINE

## 2019-04-20 PROCEDURE — 96376 TX/PRO/DX INJ SAME DRUG ADON: CPT

## 2019-04-20 RX ORDER — FENTANYL CITRATE 50 UG/ML
100 INJECTION, SOLUTION INTRAMUSCULAR; INTRAVENOUS ONCE
Status: COMPLETED | OUTPATIENT
Start: 2019-04-20 | End: 2019-04-20

## 2019-04-20 RX ADMIN — FENTANYL CITRATE 100 MCG: 50 INJECTION, SOLUTION INTRAMUSCULAR; INTRAVENOUS at 00:41

## 2019-04-20 RX ADMIN — SODIUM CHLORIDE 1000 ML: 9 INJECTION, SOLUTION INTRAVENOUS at 00:00

## 2019-04-20 ASSESSMENT — PAIN SCALES - GENERAL
PAINLEVEL_OUTOF10: 0
PAINLEVEL_OUTOF10: 8
PAINLEVEL_OUTOF10: 0
PAINLEVEL_OUTOF10: 5

## 2019-04-20 ASSESSMENT — PAIN DESCRIPTION - PAIN TYPE: TYPE: ACUTE PAIN

## 2019-04-20 ASSESSMENT — PAIN DESCRIPTION - LOCATION: LOCATION: ABDOMEN

## 2019-04-20 NOTE — CONSULTS
GENERAL SURGERY  CONSULT NOTE  4/20/2019    Physician Consulted: Dr. Rebekah Guerra  Reason for Consult: RUQ Pain  Referring Physician: Dr. Kiersten Babcock    HPI  Nehemias Ling is a 62 y.o. female who presents for evaluation of BRBPR and chronic RUQ pain. States had small amount of blood in stool earlier today. No hx of rectal bleeding. Denies any nausea, vomiting, fever, chills, diarrhea. Has been tolerating a diet at home. Has been seen multiple times recently for RUQ, RUQ US negative last month. Pain has been unchanged. Patient has hx of EtOH use and drank prior to arrival today. Scheduled to have Colonscopy in 3 weeks - unable to tell me who it is scheduled with, other than it is at [facility]. Afebrile. Hgb is 14. LFTs mildly elevated AST > ALT. Past Medical History:   Diagnosis Date    Asthma     CAD (coronary artery disease)     Degeneration of lumbar or lumbosacral intervertebral disc     Fall against sharp object 1960    chest tube in hospital    History of blood transfusion 1997    after vaginal delivery of baby hemmorhage    Hypertension     Hypokalemia     Insomnia     Kidney stone     Nondisplaced fracture of greater tuberosity of right humerus, initial encounter for closed fracture 1/22/2019    Orthostatic hypotension     Severe back pain 2/3/2014       Past Surgical History:   Procedure Laterality Date    BUNIONECTOMY      Left foot    CHEST TUBE INSERTION  1990    several    COLONOSCOPY  3/26/09    LITHOTRIPSY  2012    LUMBAR FUSION  11/09/2017    L3-L4 ,L4-L5  interbody fusion with Dajuan Tahmina Limb     OTHER SURGICAL HISTORY      electro ablation for rectal and sigmoid polyps    TONSILLECTOMY      UPPER GASTROINTESTINAL ENDOSCOPY  3/30/15    UPPER GASTROINTESTINAL ENDOSCOPY  4/21/08       Medications Prior to Admission    Prior to Admission medications    Medication Sig Start Date End Date Taking?  Authorizing Provider   HYDROcodone-acetaminophen (NORCO) 7.5-325 MG per tablet  1/15/19 Every Day Smoker     Packs/day: 0.50     Years: 2.00     Pack years: 1.00    Smokeless tobacco: Never Used    Tobacco comment: stop sept 19 2017   Substance Use Topics    Alcohol use: Yes     Alcohol/week: 3.6 oz     Types: 6 Cans of beer per week     Comment: weekends    Drug use: No     Comment: quit 2000 cocaine in past         Review of Systems: pertinent ROS listed in HPI, all others negative       PHYSICAL EXAM:    Vitals:    04/20/19 0150   BP: 122/81   Pulse: 83   Resp: 18   Temp:    SpO2: 100%       GENERAL:  NAD. A&Ox3. EYES:   No scleral icterus. LUNGS:  No increased work of breathing. CARDIOVASCULAR: RR  ABDOMEN:  Soft, non-distended, minimally tender RUQ. No guarding, rigidity, rebound. RECTAL: No hemorrhoids. Trace FOBT +. LABS:    CBC  Recent Labs     04/19/19 2141   WBC 14.3*   HGB 14.3   HCT 42.6        BMP  Recent Labs     04/19/19 2141      K 3.9      CO2 25   BUN 6   CREATININE 0.5   CALCIUM 9.9     Liver Function  Recent Labs     04/19/19 2141   LIPASE 22   BILITOT 0.6   AST 56*   ALT 34*   ALKPHOS 101   PROT 8.3   LABALBU 5.0     No results for input(s): LACTATE in the last 72 hours. Recent Labs     04/19/19 2141   INR 1.0       RADIOLOGY:    Ct Abdomen Pelvis W Iv Contrast Additional Contrast? None    Result Date: 4/19/2019  EXAM:  CT Abdomen and Pelvis With Contrast EXAM DATE/TIME:  4/19/2019 10:30 PM CLINICAL HISTORY:  62years old, female; Abdominal pain; Localized; Left TECHNIQUE:  Imaging protocol: Axial computed tomography images of the abdomen and pelvis with intravenous contrast. Coronal and sagittal reformatted images were created and reviewed. Radiation optimization: All CT scans at this facility use at least one of these dose optimization techniques: automated exposure control; mA and/or kV adjustment per patient size (includes targeted exams where dose is matched to clinical indication); or iterative reconstruction.   Contrast material: Cee Katos 370; Contrast volume: 110 ml; Contrast route: IV; COMPARISON:  CT ABDOMEN PELVIS W CONTRAST 3/4/2019 8:45 PM. Report of the prior study is not available for review. FINDINGS:    Lower thorax:  Heart size is normal. There is atelectasis and scarring at the lung bases. ABDOMEN:  Liver: There is fatty infiltration of the liver. Gallbladder and bile ducts: Gallbladder is distended. There is mild gallbladder wall prominence. Common duct is prominent  Pancreas: Pancreas is mildly atrophic. Spleen: Spleen is unremarkable. Adrenals: Adrenals are unremarkable. Kidneys and ureters: Kidneys and ureters are unremarkable. Stomach and bowel: Stomach is incompletely distended. Rotation is normal. There is fluid and air throughout the small bowel. Appendix and terminal ileum are unremarkable. Colon is incompletely distended which limits evaluation. There is descending and proximal sigmoid colon wall thickening and enhancement. No field  Appendix: See above PELVIS:  Bladder: Bladder is distended. Reproductive: Uterus and adnexal structures are unremarkable. ABDOMEN and PELVIS:  Intraperitoneal space: There is no free air. There is no free fluid. Bones/joints: There are postsurgical changes of spinal fusion L3/L4/L5. Compression deformities of the L4 and L5 vertebral bodies are unchanged. Inferior endplate deformity L3 is unchanged. Soft tissues: There is a small fat containing umbilical hernia. There is a small fat containing ventral hernia. Vasculature: There are vascular calcifications. Lymph nodes: There is no pathologic adenopathy. Colitis; fatty liver, no acute solid visceral abnormality; distended gallbladder with prominent common duct; prior spinal fusion Additional nonemergent findings as described above.  This report has been electronically signed by Rubi Russell MD.        ASSESSMENT/PLAN:  62 y.o. female with chronic RUQ pain, doubt acute cholecystitis    CT shows dilated GB - not unchanged from previous studies  Can have outpatient workup of this chronic RUQ pain - HIDA, etc  Keep scheduled appointment with Surgeon/GI for Colonoscopy 5/3    Plan discussed with Dr. Ze Arora.     Benito Mobley DO  Resident, PGY-1  4/20/2019  3:05 AM

## 2019-04-20 NOTE — ED PROVIDER NOTES
Department of Emergency Medicine   ED  Provider Note  Admit Date/RoomTime: 4/19/2019  9:25 PM  ED Room: 23/23      History of Present Illness:  4/19/19, Time: 10:17 PM         Selam Tidwell is a 62 y.o. female presenting to the ED for rectal bleeding, beginning this morning. The complaint has been persistent, mild in severity, and worsened by nothing. Pt with history of CAD, HTN, and asthma presents with 3 episodes of hematochezia that began this morning. She states the blood is bright red. She also complains of abdominal cramps, nausea, and diarrhea. Pt takes ASA. She has a colonoscopy scheduled on May 3rd. Pt denies fever, lightheadedness, vomiting, diarrhea, dysuria, constipation, vaginal bleeding or discharge, fever, headache, cough. Review of Systems:   Pertinent positives and negatives are stated within HPI, all other systems reviewed and are negative.    --------------------------------------------- PAST HISTORY ---------------------------------------------  Past Medical History:  has a past medical history of Asthma, CAD (coronary artery disease), Degeneration of lumbar or lumbosacral intervertebral disc, Fall against sharp object, History of blood transfusion, Hypertension, Hypokalemia, Insomnia, Kidney stone, Nondisplaced fracture of greater tuberosity of right humerus, initial encounter for closed fracture, Orthostatic hypotension, and Severe back pain. Past Surgical History:  has a past surgical history that includes Tonsillectomy; Bunionectomy; chest tube insertion (1990); Lithotripsy (2012); Colonoscopy (3/26/09); other surgical history; Upper gastrointestinal endoscopy (3/30/15); Upper gastrointestinal endoscopy (4/21/08); and lumbar fusion (11/09/2017). Social History:  reports that she has been smoking. She has a 1.00 pack-year smoking history. She has never used smokeless tobacco. She reports that she drinks about 3.6 oz of alcohol per week.  She reports that she does not use drugs. Family History: family history includes Cancer in her brother; Diabetes in her mother; Heart Disease in her maternal grandfather, maternal grandmother, and sister; Hypertension in her mother; Stroke in her sister. The patients home medications have been reviewed. Allergies: Morphine    ---------------------------------------------------PHYSICAL EXAM--------------------------------------    Constitutional/General: Alert and oriented x3, well appearing, non toxic in NAD  Head: Normocephalic and atraumatic  Eyes: PERRL, EOMI, conjunctiva normal, sclera non icteric  Mouth: Oropharynx clear  Neck: Supple  Respiratory: Lungs clear to auscultation bilaterally, no wheezes, rales, or rhonchi. Not in respiratory distress  Cardiovascular:  Regular rate. Regular rhythm. No murmurs, gallops, or rubs. 2+ distal pulses  Chest: No chest wall tenderness  GI:  Abdomen Soft, Diffuse abdominal tenderness. Non distended. +BS. No rebound, guarding, or rigidity. No pulsatile masses. Rectal exam: Positive hemoccult stool. No mass, lesions or tenderness. Musculoskeletal: Moves all extremities x 4. Warm and well perfused, no clubbing, cyanosis, or edema. Integument: Skin warm and dry. No rashes. Neurologic: GCS 15, no focal deficits, symmetric strength 5/5 in the upper and lower extremities bilaterally  Psychiatric: Normal Affect    -------------------------------------------------- RESULTS -------------------------------------------------  I have personally reviewed all laboratory and imaging results for this patient. Results are listed below.      LABS:  Results for orders placed or performed during the hospital encounter of 04/19/19   Comprehensive Metabolic Panel   Result Value Ref Range    Sodium 140 132 - 146 mmol/L    Potassium 3.9 3.5 - 5.0 mmol/L    Chloride 100 98 - 107 mmol/L    CO2 25 22 - 29 mmol/L    Anion Gap 15 7 - 16 mmol/L    Glucose 93 74 - 99 mg/dL    BUN 6 6 - 20 mg/dL    CREATININE 0.5 0.5 - 1.0 100 mcg (100 mcg Intravenous Given 4/20/19 0041)       Medical Decision Making: Jose D Campos presents for rectal bleeding. Stool is Hemoccult positive. Vital signs are normal. H&H is normal with no anemia. Initial lactate elevated at 3.4 for which the patient received IV fluids. CT the abdomen and pelvis shows colitis as well as a fatty liver with no acute solid visceral abnormality. There was a distended gallbladder with prominent, bile duct. Given the patient's mild leukocytosis and mild elevation of her LFTs, surgery was consulted for possible cholecystitis. Formal ultrasound imaging not available at this time. General surgery , came to the emergency department and an evaluated the patient. They note that this is unlikely to be acute cholecystitis at this time and attributed her symptoms likely to her EtOH use. Patient is scheduled to have a colonoscopy in 3 weeks at [facility]. We both encouraged the patient to follow up with this appointment as she will likely benefit from endoscopy evaluation given her painless rectal bleeding. All questions answered. Return indications and follow up discussed. Patient and SO agreeable with the plan and discharge. Tex Esparza  4:22 AM         This patient's ED course included: a personal history and physicial examination, re-evaluation prior to disposition and multiple bedside re-evaluations    This patient has remained hemodynamically stable and been closely monitored during their ED course. Re-Evaluations:           Re-evaluation. Patients symptoms show no change    Consultations:            General surgery. Counseling: The emergency provider has spoken with the patient and discussed todays results, in addition to providing specific details for the plan of care and counseling regarding the diagnosis and prognosis.   Questions are answered at this time and they are agreeable with the plan.     --------------------------------- IMPRESSION AND DISPOSITION ---------------------------------    IMPRESSION  1. Rectal bleeding        DISPOSITION  Disposition: Discharge to home  Patient condition is fair    4/19/19, 10:17 PM.    This note is prepared by Birt Goldberg, acting as Scribe for Sruthi Zarco MD.    Sruthi Zarco MD:  The scribe's documentation has been prepared under my direction and personally reviewed by me in its entirety. I confirm that the note above accurately reflects all work, treatment, procedures, and medical decision making performed by me.         Clarissa Luque MD  04/20/19 1551

## 2019-04-22 ENCOUNTER — HOSPITAL ENCOUNTER (OUTPATIENT)
Dept: OCCUPATIONAL THERAPY | Age: 59
Setting detail: THERAPIES SERIES
Discharge: HOME | End: 2019-04-22
Payer: MEDICAID

## 2019-04-22 PROCEDURE — 97110 THERAPEUTIC EXERCISES: CPT

## 2019-04-22 PROCEDURE — 97530 THERAPEUTIC ACTIVITIES: CPT

## 2019-04-22 NOTE — PROGRESS NOTES
OCCUPATIONAL THERAPY PROGRESS NOTE  DISCHARGE SUMMARY    [facility]   Phone: [phone]   Fax: [phone]     Date:  2019  Initial Evaluation Date:  2019    Patient Name:  Kenny Miranda    :  1960  Restrictions/Precautions:  Per protocol, to begin at 3 week pierre form order date 19/ low fall week   NWB of ARM    Diagnosis:  Non displaced fx greater tuberosity of R humerous                                                        Insurance/Certification information:  [id number] [facility]  Referring Physician:  Dr. Jessica Keyes  Date of Surgery/Injury: 19, 6 weeks from injury date. Plan of care signed (Y/N):  no  Visit# / total visits:     Pain Level: min, 1-3/10 weakness, tightness in upper shoulder. Subjective:  Seen for final visits. No new complaints. HEP reeducation completed for continued involvement after discharge. Reassessment completed. Goal MET. Objective:  Engaged patient in the following with focus on ROM for flexion, adb, IR/Er >20' . Strengthening in seated and standing now incorporated with good results. Progressing as tolerated.  Focus on horiz abd this date for functional strengthening   INTERVENTION: COMPLETED REPS/TIME: SPECIFICS/COMMENTS:   Modality:      Fluidotherapy      Paraffin      MHP  10 min With shoulder/scapula depression and holds in supine   Estim      Manual Therapy:      Scar Massage      Soft Tissue:  10 min    Therapeutic Ex/Activities:            Pendulums  7 min          Wall Ladder x 10 reps each Forward, horiz abd 2# wrist wt   Wall Pulley  5 min Warmup and stretch forward flexion, horiz abd/add, scapular retraction, deltoid    UBE x 7 min Warm up and reconditioning   Tbands x 20 x 3 Mod/heavy band  Scapular retraction/depression  Provided for HEP inclusion   Wall Wash/side x 3# arm wt 15 x 3         Free Wt x 3# / 2#  15 x 3 Overhead flexion, horiz adb/add, extension, kickbacks   Gripper  35#    flexbar x red 30 x 2 both directions   Free Wt x 3#/2# Forearm  Flexion/exten  15 x 3               Other:                    Assessment: Patient shows potential to progress with stated goals and will be educated on HEP and provided written handouts as needed throughout their care. Pt is making Fair progress toward stated plan of care. -Rehab Potential: Good  -Requires OT Follow Up: Yes  -Time In: 3  -Time Out: 4   Timed Treatment Minutes: 60 Minutes    Goals: 18 sessions  1) Patient will demonstrate good understanding of home program with good accuracy. Goal Met  2) Patient will demonstrate increased active/passive range of motion of their right shoulder to [facility] for ADL/IADL completion. Initial: Shoulder: full passive ext rotation, shoulder flex/abd to 130 degrees without tightness. Goal Met:  WFL  3) Patient will demonstrate increased /pinch strength of at least 10 / 5 pinch pounds of their R hand. Initial:  :  Right: 20# to 46#   Pinch: 12#        Goal Met  4) Patient to report decreased pain in their affected R upper extremity from 5-7/10 to 0-2/10 or less with resistive functional use. Goal Met   1-2/10  5)Increase R shoulder strength to G/G- range. Goal Met  6) Patient will report ADL functions same as prior to diagnosis of shouder fx. Goal Met     Plan:   []  Continues Plan of care: Treatment delivered based on POC and graduated to patient's progress. Patient education continues at each visit to obtain maximum benefit from skilled OT intervention.   []  Alter Plan of care:   [x]  Discharge:      HUMBERTO Hebert, [id number]

## 2019-04-27 LAB
EKG ATRIAL RATE: 85 BPM
EKG P AXIS: 53 DEGREES
EKG P-R INTERVAL: 152 MS
EKG Q-T INTERVAL: 394 MS
EKG QRS DURATION: 78 MS
EKG QTC CALCULATION (BAZETT): 468 MS
EKG R AXIS: 19 DEGREES
EKG T AXIS: 52 DEGREES
EKG VENTRICULAR RATE: 85 BPM

## 2019-05-21 DIAGNOSIS — S42.254D CLOSED NONDISPLACED FRACTURE OF GREATER TUBEROSITY OF RIGHT HUMERUS WITH ROUTINE HEALING: Primary | ICD-10-CM

## 2019-05-22 ENCOUNTER — HOSPITAL ENCOUNTER (OUTPATIENT)
Dept: GENERAL RADIOLOGY | Age: 59
Discharge: HOME | End: 2019-05-24
Admitting: ORTHOPAEDIC SURGERY
Payer: MEDICAID

## 2019-05-22 ENCOUNTER — OFFICE VISIT (OUTPATIENT)
Dept: ORTHOPEDIC SURGERY | Age: 59
End: 2019-05-22
Payer: MEDICAID

## 2019-05-22 VITALS
SYSTOLIC BLOOD PRESSURE: 123 MMHG | BODY MASS INDEX: 21.97 KG/M2 | HEART RATE: 89 BPM | WEIGHT: 124 LBS | HEIGHT: 63 IN | DIASTOLIC BLOOD PRESSURE: 86 MMHG

## 2019-05-22 DIAGNOSIS — S42.254D CLOSED NONDISPLACED FRACTURE OF GREATER TUBEROSITY OF RIGHT HUMERUS WITH ROUTINE HEALING: ICD-10-CM

## 2019-05-22 PROCEDURE — 3017F COLORECTAL CA SCREEN DOC REV: CPT | Performed by: PHYSICIAN ASSISTANT

## 2019-05-22 PROCEDURE — G8427 DOCREV CUR MEDS BY ELIG CLIN: HCPCS | Performed by: PHYSICIAN ASSISTANT

## 2019-05-22 PROCEDURE — 99212 OFFICE O/P EST SF 10 MIN: CPT | Performed by: PHYSICIAN ASSISTANT

## 2019-05-22 PROCEDURE — G8420 CALC BMI NORM PARAMETERS: HCPCS | Performed by: PHYSICIAN ASSISTANT

## 2019-05-22 PROCEDURE — 73030 X-RAY EXAM OF SHOULDER: CPT

## 2019-05-22 PROCEDURE — 4004F PT TOBACCO SCREEN RCVD TLK: CPT | Performed by: PHYSICIAN ASSISTANT

## 2019-05-22 NOTE — PATIENT INSTRUCTIONS
Follow-up as needed  Call if any issues or concerns  Continue to work on home exercises    Call patient regarding x-rays taken marked right shoulder films, were taken of the left shoulder, will not charge the patient for these x-rays were obtained today were negative for any acute fracture, pathology, or bony deformity. I did review her x-rays from March at the time 12 weeks out from injury at the time, reviewed at her visit today which demonstrated a well-healed proximal humerus fracture, no change from previous x-rays from previous films. Discussed this with the patient do not feel she needs additional films today of the right shoulder. Has no complaints on exam, full range of motion of the right shoulder. Discussed that if she has further issues with the shoulder or new complaints of pain that she is to follow-up with us and call right away. She is in agreement with this treatment plan.

## 2019-05-24 ENCOUNTER — APPOINTMENT (OUTPATIENT)
Dept: CT IMAGING | Age: 59
End: 2019-05-24
Payer: MEDICAID

## 2019-05-24 ENCOUNTER — APPOINTMENT (OUTPATIENT)
Dept: GENERAL RADIOLOGY | Age: 59
End: 2019-05-24
Payer: MEDICAID

## 2019-05-24 ENCOUNTER — HOSPITAL ENCOUNTER (EMERGENCY)
Age: 59
Discharge: HOME | End: 2019-05-24
Attending: EMERGENCY MEDICINE
Payer: MEDICAID

## 2019-05-24 VITALS
HEART RATE: 88 BPM | RESPIRATION RATE: 16 BRPM | BODY MASS INDEX: 21.79 KG/M2 | SYSTOLIC BLOOD PRESSURE: 106 MMHG | WEIGHT: 123 LBS | TEMPERATURE: 97.8 F | OXYGEN SATURATION: 95 % | HEIGHT: 63 IN | DIASTOLIC BLOOD PRESSURE: 72 MMHG

## 2019-05-24 DIAGNOSIS — S42.202A CLOSED FRACTURE OF PROXIMAL END OF LEFT HUMERUS, UNSPECIFIED FRACTURE MORPHOLOGY, INITIAL ENCOUNTER: Primary | ICD-10-CM

## 2019-05-24 DIAGNOSIS — F10.920 ACUTE ALCOHOLIC INTOXICATION WITHOUT COMPLICATION (HCC): ICD-10-CM

## 2019-05-24 LAB
ACETAMINOPHEN LEVEL: <5 MCG/ML (ref 10–30)
ALBUMIN SERPL-MCNC: 4.5 G/DL (ref 3.5–5.2)
ALP BLD-CCNC: 83 U/L (ref 35–104)
ALT SERPL-CCNC: 28 U/L (ref 0–32)
ANION GAP SERPL CALCULATED.3IONS-SCNC: 16 MMOL/L (ref 7–16)
AST SERPL-CCNC: 42 U/L (ref 0–31)
BASOPHILS ABSOLUTE: 0.06 E9/L (ref 0–0.2)
BASOPHILS RELATIVE PERCENT: 1.5 % (ref 0–2)
BILIRUB SERPL-MCNC: 0.5 MG/DL (ref 0–1.2)
BUN BLDV-MCNC: 5 MG/DL (ref 6–20)
CALCIUM SERPL-MCNC: 9.4 MG/DL (ref 8.6–10.2)
CHLORIDE BLD-SCNC: 102 MMOL/L (ref 98–107)
CO2: 23 MMOL/L (ref 22–29)
CREAT SERPL-MCNC: 0.6 MG/DL (ref 0.5–1)
EKG ATRIAL RATE: 72 BPM
EKG P AXIS: 50 DEGREES
EKG P-R INTERVAL: 154 MS
EKG Q-T INTERVAL: 438 MS
EKG QRS DURATION: 80 MS
EKG QTC CALCULATION (BAZETT): 479 MS
EKG R AXIS: 38 DEGREES
EKG T AXIS: 62 DEGREES
EKG VENTRICULAR RATE: 72 BPM
EOSINOPHILS ABSOLUTE: 0.07 E9/L (ref 0.05–0.5)
EOSINOPHILS RELATIVE PERCENT: 1.7 % (ref 0–6)
ETHANOL: 234 MG/DL (ref 0–0.08)
GFR AFRICAN AMERICAN: >60
GFR NON-AFRICAN AMERICAN: >60 ML/MIN/1.73
GLUCOSE BLD-MCNC: 109 MG/DL (ref 74–99)
HCT VFR BLD CALC: 37.2 % (ref 34–48)
HEMOGLOBIN: 12.5 G/DL (ref 11.5–15.5)
IMMATURE GRANULOCYTES #: 0.02 E9/L
IMMATURE GRANULOCYTES %: 0.5 % (ref 0–5)
LYMPHOCYTES ABSOLUTE: 1.85 E9/L (ref 1.5–4)
LYMPHOCYTES RELATIVE PERCENT: 44.9 % (ref 20–42)
MCH RBC QN AUTO: 32.4 PG (ref 26–35)
MCHC RBC AUTO-ENTMCNC: 33.6 % (ref 32–34.5)
MCV RBC AUTO: 96.4 FL (ref 80–99.9)
MONOCYTES ABSOLUTE: 0.46 E9/L (ref 0.1–0.95)
MONOCYTES RELATIVE PERCENT: 11.2 % (ref 2–12)
NEUTROPHILS ABSOLUTE: 1.66 E9/L (ref 1.8–7.3)
NEUTROPHILS RELATIVE PERCENT: 40.2 % (ref 43–80)
PDW BLD-RTO: 12.9 FL (ref 11.5–15)
PLATELET # BLD: 161 E9/L (ref 130–450)
PMV BLD AUTO: 10.6 FL (ref 7–12)
POTASSIUM SERPL-SCNC: 3.4 MMOL/L (ref 3.5–5)
RBC # BLD: 3.86 E12/L (ref 3.5–5.5)
SALICYLATE, SERUM: <0.3 MG/DL (ref 0–30)
SODIUM BLD-SCNC: 141 MMOL/L (ref 132–146)
TOTAL PROTEIN: 7 G/DL (ref 6.4–8.3)
TRICYCLIC ANTIDEPRESSANTS SCREEN SERUM: POSITIVE NG/ML
TROPONIN: <0.01 NG/ML (ref 0–0.03)
WBC # BLD: 4.1 E9/L (ref 4.5–11.5)

## 2019-05-24 PROCEDURE — 85025 COMPLETE CBC W/AUTO DIFF WBC: CPT

## 2019-05-24 PROCEDURE — 36415 COLL VENOUS BLD VENIPUNCTURE: CPT

## 2019-05-24 PROCEDURE — 73060 X-RAY EXAM OF HUMERUS: CPT

## 2019-05-24 PROCEDURE — 93005 ELECTROCARDIOGRAM TRACING: CPT | Performed by: EMERGENCY MEDICINE

## 2019-05-24 PROCEDURE — 96374 THER/PROPH/DIAG INJ IV PUSH: CPT

## 2019-05-24 PROCEDURE — 99285 EMERGENCY DEPT VISIT HI MDM: CPT

## 2019-05-24 PROCEDURE — 72125 CT NECK SPINE W/O DYE: CPT

## 2019-05-24 PROCEDURE — 96376 TX/PRO/DX INJ SAME DRUG ADON: CPT

## 2019-05-24 PROCEDURE — 80307 DRUG TEST PRSMV CHEM ANLYZR: CPT

## 2019-05-24 PROCEDURE — 80053 COMPREHEN METABOLIC PANEL: CPT

## 2019-05-24 PROCEDURE — 71101 X-RAY EXAM UNILAT RIBS/CHEST: CPT

## 2019-05-24 PROCEDURE — 93010 ELECTROCARDIOGRAM REPORT: CPT | Performed by: INTERNAL MEDICINE

## 2019-05-24 PROCEDURE — 84484 ASSAY OF TROPONIN QUANT: CPT

## 2019-05-24 PROCEDURE — G0480 DRUG TEST DEF 1-7 CLASSES: HCPCS

## 2019-05-24 PROCEDURE — 73030 X-RAY EXAM OF SHOULDER: CPT

## 2019-05-24 PROCEDURE — 6360000002 HC RX W HCPCS

## 2019-05-24 PROCEDURE — 70450 CT HEAD/BRAIN W/O DYE: CPT

## 2019-05-24 PROCEDURE — 6360000002 HC RX W HCPCS: Performed by: EMERGENCY MEDICINE

## 2019-05-24 PROCEDURE — 73070 X-RAY EXAM OF ELBOW: CPT

## 2019-05-24 RX ORDER — FENTANYL CITRATE 50 UG/ML
50 INJECTION, SOLUTION INTRAMUSCULAR; INTRAVENOUS ONCE
Status: COMPLETED | OUTPATIENT
Start: 2019-05-24 | End: 2019-05-24

## 2019-05-24 RX ORDER — FENTANYL CITRATE 50 UG/ML
25 INJECTION, SOLUTION INTRAMUSCULAR; INTRAVENOUS ONCE
Status: COMPLETED | OUTPATIENT
Start: 2019-05-24 | End: 2019-05-24

## 2019-05-24 RX ORDER — FENTANYL CITRATE 50 UG/ML
INJECTION, SOLUTION INTRAMUSCULAR; INTRAVENOUS
Status: COMPLETED
Start: 2019-05-24 | End: 2019-05-24

## 2019-05-24 RX ORDER — HYDROCODONE BITARTRATE AND ACETAMINOPHEN 5; 325 MG/1; MG/1
1 TABLET ORAL EVERY 6 HOURS PRN
Qty: 12 TABLET | Refills: 0 | Status: SHIPPED | OUTPATIENT
Start: 2019-05-24 | End: 2019-05-27

## 2019-05-24 RX ADMIN — FENTANYL CITRATE 50 MCG: 50 INJECTION, SOLUTION INTRAMUSCULAR; INTRAVENOUS at 09:38

## 2019-05-24 RX ADMIN — FENTANYL CITRATE 25 MCG: 50 INJECTION, SOLUTION INTRAMUSCULAR; INTRAVENOUS at 07:00

## 2019-05-24 ASSESSMENT — PAIN SCALES - GENERAL: PAINLEVEL_OUTOF10: 10

## 2019-05-24 ASSESSMENT — ENCOUNTER SYMPTOMS
EYE REDNESS: 0
SHORTNESS OF BREATH: 0
EYE DISCHARGE: 0
BACK PAIN: 0
NAUSEA: 0
WHEEZING: 0
DIARRHEA: 0
SORE THROAT: 0
EYE PAIN: 0
ABDOMINAL DISTENTION: 0
VOMITING: 0
COUGH: 0
SINUS PRESSURE: 0

## 2019-05-24 ASSESSMENT — PAIN DESCRIPTION - LOCATION: LOCATION: SHOULDER

## 2019-05-24 ASSESSMENT — PAIN DESCRIPTION - DESCRIPTORS: DESCRIPTORS: DISCOMFORT

## 2019-05-24 ASSESSMENT — PAIN DESCRIPTION - ORIENTATION: ORIENTATION: LEFT

## 2019-05-24 ASSESSMENT — PAIN DESCRIPTION - PAIN TYPE: TYPE: ACUTE PAIN

## 2019-05-24 NOTE — ED PROVIDER NOTES
51-year-old female presenting with left arm shoulder and chest pain status post fall sustained just prior to arrival. Patient states she was going to the bathroom when she fell over and landed on her left side. She is unsure whether she passed out or not. She denies any drug or alcohol use, chart review does show a history of alcohol intoxication past. Chart review also shows significant cardiac dysrhythmias, including torsades and ventricular tachycardia. Patient denies any recent dizziness, lightheadedness, or syncopal events. Denies any recent illnesses. The history is provided by the patient. Fall   Pertinent negatives include no fever, no nausea, no vomiting and no headaches. Review of Systems   Constitutional: Negative for chills and fever. HENT: Negative for ear pain, sinus pressure and sore throat. Eyes: Negative for pain, discharge and redness. Respiratory: Negative for cough, shortness of breath and wheezing. Cardiovascular: Negative for chest pain. Gastrointestinal: Negative for abdominal distention, diarrhea, nausea and vomiting. Genitourinary: Negative for dysuria and frequency. Musculoskeletal: Negative for arthralgias and back pain. Left arm, shoulder, chest wall pain   Skin: Negative for rash and wound. Neurological: Negative for weakness and headaches. Unsure if syncopal   Hematological: Negative for adenopathy. All other systems reviewed and are negative. Physical Exam   Constitutional: She is oriented to person, place, and time. She appears well-developed and well-nourished. HENT:   Head: Normocephalic and atraumatic. Eyes: Conjunctivae are normal.   Neck: Normal range of motion. Neck supple. Cardiovascular: Normal rate, regular rhythm and normal heart sounds. No murmur heard. Pulmonary/Chest: Effort normal and breath sounds normal. No respiratory distress. She has no wheezes. She has no rales. Abdominal: Soft.  Bowel sounds are normal. There is no tenderness. There is no rebound and no guarding. Musculoskeletal: She exhibits tenderness and deformity. Tenderness to palpation along the left humeral neck with tactile edema. No obvious step-offs are noted on exam. No ecchymosis. Tenderness along the humeral shaft distally towards the elbow. Patient does have good passive range of motion of the forearm and wrist. No tenderness to palpation in these areas. Distal pulses 2+ cap refill less than 2 seconds on the left. Pulses equal bilaterally. Patient has tenderness to palpation along the left chest wall as well. No SubQ emphysema or crepitance is noted. Neurological: She is alert and oriented to person, place, and time. No cranial nerve deficit. Coordination normal.   Skin: Skin is warm and dry. Nursing note and vitals reviewed. Procedures    MDM  Number of Diagnoses or Management Options  Acute alcoholic intoxication without complication [facility]):   Closed fracture of proximal end of left humerus, unspecified fracture morphology, initial encounter:   Diagnosis management comments: 58-year-old female presenting with left arm pain after fall sustained just prior to arrival to the emergency department. She appeared clinically intoxicated and had point tenderness over the proximal humerus. X-rays were obtained and showed evidence of acute proximal humerus fracture as well as an elevated ethanol level consistent with clinical suspicion for alcohol intoxication. Patient's pain was controlled with IV medications and a sling was applied. Patient was instructed to follow up with orthopedic surgery within the next week and call PCP. She does have a sober  at bedside, who is agreeable to take patient home.  Patient remains symptomatically stable during her emergency department stay and will be discharged home in stable condition with recommendations to return if her symptoms worsen or she develops any new or concerning symptoms related to her acute proximal humerus fracture.        --------------------------------------------- PAST HISTORY ---------------------------------------------  Past Medical History:  has a past medical history of Asthma, CAD (coronary artery disease), Degeneration of lumbar or lumbosacral intervertebral disc, Fall against sharp object, History of blood transfusion, Hypertension, Hypokalemia, Insomnia, Kidney stone, Nondisplaced fracture of greater tuberosity of right humerus, initial encounter for closed fracture, Orthostatic hypotension, and Severe back pain. Past Surgical History:  has a past surgical history that includes Tonsillectomy; Bunionectomy; chest tube insertion (1990); Lithotripsy (2012); Colonoscopy (3/26/09); other surgical history; Upper gastrointestinal endoscopy (3/30/15); Upper gastrointestinal endoscopy (4/21/08); and lumbar fusion (11/09/2017). Social History:  reports that she has been smoking. She has a 1.00 pack-year smoking history. She has never used smokeless tobacco. She reports that she drinks about 3.6 oz of alcohol per week. She reports that she does not use drugs. Family History: family history includes Cancer in her brother; Diabetes in her mother; Heart Disease in her maternal grandfather, maternal grandmother, and sister; Hypertension in her mother; Stroke in her sister. The patients home medications have been reviewed.     Allergies: Morphine    -------------------------------------------------- RESULTS -------------------------------------------------  Labs:  Results for orders placed or performed during the hospital encounter of 05/24/19   CBC Auto Differential   Result Value Ref Range    WBC 4.1 (L) 4.5 - 11.5 E9/L    RBC 3.86 3.50 - 5.50 E12/L    Hemoglobin 12.5 11.5 - 15.5 g/dL    Hematocrit 37.2 34.0 - 48.0 %    MCV 96.4 80.0 - 99.9 fL    MCH 32.4 26.0 - 35.0 pg    MCHC 33.6 32.0 - 34.5 %    RDW 12.9 11.5 - 15.0 fL    Platelets 680 044 - 231 E9/L    MPV 10.6 7.0 - 12.0 fL Neutrophils % 40.2 (L) 43.0 - 80.0 %    Immature Granulocytes % 0.5 0.0 - 5.0 %    Lymphocytes % 44.9 (H) 20.0 - 42.0 %    Monocytes % 11.2 2.0 - 12.0 %    Eosinophils % 1.7 0.0 - 6.0 %    Basophils % 1.5 0.0 - 2.0 %    Neutrophils # 1.66 (L) 1.80 - 7.30 E9/L    Immature Granulocytes # 0.02 E9/L    Lymphocytes # 1.85 1.50 - 4.00 E9/L    Monocytes # 0.46 0.10 - 0.95 E9/L    Eosinophils # 0.07 0.05 - 0.50 E9/L    Basophils # 0.06 0.00 - 0.20 E9/L   Comprehensive Metabolic Panel   Result Value Ref Range    Sodium 141 132 - 146 mmol/L    Potassium 3.4 (L) 3.5 - 5.0 mmol/L    Chloride 102 98 - 107 mmol/L    CO2 23 22 - 29 mmol/L    Anion Gap 16 7 - 16 mmol/L    Glucose 109 (H) 74 - 99 mg/dL    BUN 5 (L) 6 - 20 mg/dL    CREATININE 0.6 0.5 - 1.0 mg/dL    GFR Non-African American >60 >=60 mL/min/1.73    GFR African American >60     Calcium 9.4 8.6 - 10.2 mg/dL    Total Protein 7.0 6.4 - 8.3 g/dL    Alb 4.5 3.5 - 5.2 g/dL    Total Bilirubin 0.5 0.0 - 1.2 mg/dL    Alkaline Phosphatase 83 35 - 104 U/L    ALT 28 0 - 32 U/L    AST 42 (H) 0 - 31 U/L   Troponin   Result Value Ref Range    Troponin <0.01 0.00 - 0.03 ng/mL   Serum Drug Screen   Result Value Ref Range    Ethanol Lvl 234 mg/dL    Acetaminophen Level <5.0 (L) 10.0 - 70.3 mcg/mL    Salicylate, Serum <4.9 0.0 - 30.0 mg/dL    TCA Scrn POSITIVE Cutoff:300 ng/mL   EKG 12 Lead   Result Value Ref Range    Ventricular Rate 72 BPM    Atrial Rate 72 BPM    P-R Interval 154 ms    QRS Duration 80 ms    Q-T Interval 438 ms    QTc Calculation (Bazett) 479 ms    P Axis 50 degrees    R Axis 38 degrees    T Axis 62 degrees       Radiology:  CT Head WO Contrast   Final Result   1. No CT evidence of acute intracranial abnormality, as questioned. If   there is clinical concern for potential underlying acute   cerebrovascular infarction/accident or other potential abnormalities   of underlying brain parenchyma, MRI of the brain would be recommended   for more detailed evaluation. .   2.. Vascular calcifications within the bilateral internal carotid   artery cavernous segments. .   3. Mild cerebral volume loss/atrophy. 4. Spinal dysraphism posterior arch C1      CT Cervical Spine WO Contrast   Final Result   No fractures are identified         XR SHOULDER LEFT (MIN 2 VIEWS)   Final Result   Fracture of the proximal humerus               XR RIBS LEFT INCLUDE CHEST (MIN 3 VIEWS)   Final Result   No acute intrathoracic process. No definite acute rib fractures on the left               XR ELBOW LEFT (2 VIEWS)   Final Result   No evidence of elbow fracture, joint effusion, or misalignment. Possible dorsal soft tissue swelling over the elbow, which could   indicate soft tissue trauma. XR HUMERUS LEFT (MIN 2 VIEWS)   Final Result   Nondisplaced anatomic humeral neck fracture on the left.          ------------------------- NURSING NOTES AND VITALS REVIEWED ---------------------------  Date / Time Roomed:  5/24/2019  6:05 AM  ED Bed Assignment:  NOBLE/NOBLE    The nursing notes within the ED encounter and vital signs as below have been reviewed. /72   Pulse 88   Temp 97.8 °F (36.6 °C)   Resp 16   Ht 5' 2.5\" (1.588 m)   Wt 123 lb (55.8 kg)   SpO2 95%   BMI 22.14 kg/m²   Oxygen Saturation Interpretation: Normal      ------------------------------------------ PROGRESS NOTES ------------------------------------------  ED Course as of May 24 1102   Fri May 24, 2019   0749 Toxicology results noted. Patient blood alcohol level is 234. CT scan of the head and neck are pending      [CS]      ED Course User Index  [CS] Karen Gallardo DO         11:02 AM  I have spoken with the patient and discussed todays results, in addition to providing specific details for the plan of care and counseling regarding the diagnosis and prognosis. Their questions are answered at this time and they are agreeable with the plan.  I discussed at length with them reasons for immediate return here for re evaluation. They will followup with their primary care physician and orthopedic physician by calling their office on Monday.      --------------------------------- ADDITIONAL PROVIDER NOTES ---------------------------------  At this time the patient is without objective evidence of an acute process requiring hospitalization or inpatient management. They have remained hemodynamically stable throughout their entire ED visit and are stable for discharge with outpatient follow-up. The plan has been discussed in detail and they are aware of the specific conditions for emergent return, as well as the importance of follow-up. Discharge Medication List as of 5/24/2019 10:39 AM      START taking these medications    Details   HYDROcodone-acetaminophen (NORCO) 5-325 MG per tablet Take 1 tablet by mouth every 6 hours as needed for Pain for up to 3 days. , Disp-12 tablet, R-0Print             Diagnosis:  1. Closed fracture of proximal end of left humerus, unspecified fracture morphology, initial encounter    2. [facility]        Disposition:  Patient's disposition: Discharge to home  Patient's condition is stable. NOTE: This report was transcribed using voice recognition software. Every effort was made to ensure accuracy; however, inadvertent computerized transcription errors may be present.          Christy Figueredo DO  Resident  05/24/19 8550

## 2019-05-24 NOTE — PROGRESS NOTES
DOI 12/17/18  Right Proximal Humerus Fracture- Nonop    Subjective:  Naima Stuart is approximately 6 months out from the above-mentioned. Denies any pain whatsoever of the right shoulder, feel she has full range motion. No limitations of the shoulder. Denies new complaints or paresthesias. Denies shortness breath, chest pain, or calf pain. Denies any constitutional symptoms. Review of Systems -    General ROS: negative for - chills, fatigue, fever or night sweats  Respiratory ROS: no cough, shortness of breath, or wheezing  Cardiovascular ROS: no chest pain or dyspnea on exertion  Gastrointestinal ROS: no abdominal pain, nausea, vomiting, diarrhea, constipation,or black or bloody stools  Genitourinary: no hematuria, dysuria, or incontinence   Musculoskeletal ROS: negative for -back or neck pain or stiffness, also see HPI  Neurological ROS: no TIA or stroke symptoms       Objective:    General: Alert and oriented X 3, normocephalic atraumatic, external ears and eye normal, sclera clear, no acute distress, respirations easy and unlabored with no audible wheezes, skin warm and dry, speech and dress appropriate for noted age, affect euthymic. Extremity:  Right Upper Extremity  Skin is clean dry and intact  No edema noted  Radial pulse palpable, fingers warm with BCR  Flex/extension intact to wrist, thumb and fingers  Finger opposition intact  Finger adduction/abduction intact  Finger crossover intact  Subjectively states sensation intact to radial/medial/ulnar distribution  No TTP over proximal humerus   Full range motion when compared to the contralateral side, no compensatory hiking of the shoulder. No pain with shoulder range of motion.       /86 (Site: Left Upper Arm, Position: Sitting, Cuff Size: Medium Adult)   Pulse 89   Ht 5' 3\" (1.6 m)   Wt 124 lb (56.2 kg)   BMI 21.97 kg/m²     XR:  X-rays were not obtained today reviewed from March demonstrating a well-healed proximal humerus

## 2019-05-24 NOTE — PROGRESS NOTES
Patient's jewelry removed and placed in an envelope. Envelope given to patient. Patient left CT department with jewelry.  1 necklace and charm

## 2019-05-24 NOTE — ED NOTES
Bed: 22  Expected date:   Expected time:   Means of arrival:   Comments:  ems     Tracey Mitchell RN  05/24/19 2624

## 2019-05-28 ENCOUNTER — HOSPITAL ENCOUNTER (EMERGENCY)
Age: 59
Discharge: HOME | End: 2019-05-28
Attending: EMERGENCY MEDICINE
Payer: MEDICAID

## 2019-05-28 VITALS
WEIGHT: 125 LBS | OXYGEN SATURATION: 96 % | BODY MASS INDEX: 23 KG/M2 | HEIGHT: 62 IN | HEART RATE: 88 BPM | RESPIRATION RATE: 16 BRPM | TEMPERATURE: 97 F | SYSTOLIC BLOOD PRESSURE: 106 MMHG | DIASTOLIC BLOOD PRESSURE: 76 MMHG

## 2019-05-28 DIAGNOSIS — S42.202A CLOSED FRACTURE OF PROXIMAL END OF LEFT HUMERUS, UNSPECIFIED FRACTURE MORPHOLOGY, INITIAL ENCOUNTER: Primary | ICD-10-CM

## 2019-05-28 PROCEDURE — 99282 EMERGENCY DEPT VISIT SF MDM: CPT

## 2019-05-28 RX ORDER — DICLOFENAC SODIUM 25 MG/1
25 TABLET, DELAYED RELEASE ORAL 2 TIMES DAILY
Qty: 10 TABLET | Refills: 0 | Status: ON HOLD | OUTPATIENT
Start: 2019-05-28 | End: 2019-07-19 | Stop reason: ALTCHOICE

## 2019-05-28 ASSESSMENT — PAIN DESCRIPTION - FREQUENCY: FREQUENCY: CONTINUOUS

## 2019-05-28 ASSESSMENT — PAIN DESCRIPTION - PAIN TYPE: TYPE: ACUTE PAIN

## 2019-05-28 ASSESSMENT — PAIN DESCRIPTION - LOCATION: LOCATION: ARM

## 2019-05-28 ASSESSMENT — PAIN DESCRIPTION - DESCRIPTORS: DESCRIPTORS: SHARP

## 2019-05-28 ASSESSMENT — PAIN DESCRIPTION - ORIENTATION: ORIENTATION: LEFT

## 2019-05-28 ASSESSMENT — PAIN SCALES - GENERAL: PAINLEVEL_OUTOF10: 8

## 2019-05-29 ENCOUNTER — TELEPHONE (OUTPATIENT)
Dept: ADMINISTRATIVE | Age: 59
End: 2019-05-29

## 2019-05-29 NOTE — ED PROVIDER NOTES
ED Attending  CC: Dian       Department of Emergency Medicine   ED  Provider Note  Admit Date/RoomTime: 5/28/2019  3:59 PM  ED Room: [address] [place] [address]  MRN: [id number]  Chief Complaint: Arm Injury (fell 3 days ago, seen here and in sling, swollen now)       History of Present Illness   Source of history provided by:  patient. History/Exam Limitations: none. Sarkis Paulson is a 62 y.o. female who has a past medical history of:   Past Medical History:   Diagnosis Date    Asthma     CAD (coronary artery disease)     Degeneration of lumbar or lumbosacral intervertebral disc     Fall against sharp object 1960    chest tube in hospital    History of blood transfusion 1997    after vaginal delivery of baby hemmorhage    Hypertension     Hypokalemia     Insomnia     Kidney stone     Nondisplaced fracture of greater tuberosity of right humerus, initial encounter for closed fracture 1/22/2019    Orthostatic hypotension     Severe back pain 2/3/2014    presents to the ED by private car and is alone for left arm pain, beginning several days ago and are unchanged since that time. The complaint has been persistent, moderate in severity. States she was diagnosed with humeral fracture. States she has been using the sling and taking pain mediation as prescribed. Denies any new injury or trauma. Has not followed with orthopedics due to holiday weekend. States pain, swelling and bruising to the area. Denies fever, chills, nausea, vomiting, chest pain or shortness of breath. ROS    Pertinent positives and negatives are stated within HPI, all other systems reviewed and are negative.     Past Surgical History:   Procedure Laterality Date    BUNIONECTOMY      Left foot    CHEST TUBE INSERTION  1990    several    COLONOSCOPY  3/26/09    LITHOTRIPSY  2012    LUMBAR FUSION  11/09/2017    L3-L4 ,L4-L5  interbody fusion with Dre Evonnie Siemens     OTHER SURGICAL HISTORY      electro ablation for rectal and sigmoid polyps    TONSILLECTOMY      UPPER GASTROINTESTINAL ENDOSCOPY  3/30/15    UPPER GASTROINTESTINAL ENDOSCOPY  4/21/08   Social History:  reports that she has been smoking. She has a 1.00 pack-year smoking history. She has never used smokeless tobacco. She reports that she drinks about 3.6 oz of alcohol per week. She reports that she does not use drugs. Family History: family history includes Cancer in her brother; Diabetes in her mother; Heart Disease in her maternal grandfather, maternal grandmother, and sister; Hypertension in her mother; Stroke in her sister. Allergies: Morphine    Physical Exam   Oxygen Saturation Interpretation: Normal.   ED Triage Vitals   BP Temp Temp src Pulse Resp SpO2 Height Weight   05/28/19 1555 05/28/19 1555 -- 05/28/19 1514 05/28/19 1514 05/28/19 1514 05/28/19 1555 05/28/19 1555   96/71 97 °F (36.1 °C)  100 16 96 % 5' 2\" (1.575 m) 125 lb (56.7 kg)       Physical Exam  · Constitutional/General: Alert and oriented x3, well appearing, non toxic  · HEENT:  NC/NT. PERRLA,  Airway patent. · Neck: Supple, full ROM, non tender to palpation in the midline, no stridor, no crepitus, no meningeal signs  · Respiratory: Lungs clear to auscultation bilaterally, no wheezes, rales, or rhonchi. Not in respiratory distress  · CV:  Regular rate. Regular rhythm. No murmurs, gallops, or rubs. 2+ distal pulses  · Musculoskeletal: Left mid-distal humeral tenderness. Localized swelling to the area. Compartments compressible. Distal pulses intact. In sling. Moves all extremities x 4. Warm and well perfused, no clubbing, cyanosis, or edema. Capillary refill <3 seconds  · Integument: skin warm and dry. No rashes.    · Lymphatic: no lymphadenopathy noted  · Neurologic: GCS 15, no focal deficits, symmetric strength 5/5 in the upper and lower extremities bilaterally  · Psychiatric: Normal Affect    Lab / Imaging Results   (All laboratory and radiology results have been personally reviewed by myself)  Labs:  No results

## 2019-05-30 ENCOUNTER — TELEPHONE (OUTPATIENT)
Dept: ORTHOPEDIC SURGERY | Age: 59
End: 2019-05-30

## 2019-05-30 NOTE — TELEPHONE ENCOUNTER
X-rays reviewed of the left shoulder demonstrating an acute minimally displaced proximal humerus fracture, was reviewed by Dr. Norma Wharton. We wish to see the patient back in a week and a half time for continued close observation of this fracture following her injury.   Please call patient make an appointment for 1 week and a half for xray and evaluation Electronically signed by Hanane Kothari PA-C on 5/30/2019 at 12:39 PM

## 2019-06-10 DIAGNOSIS — M25.512 ACUTE PAIN OF LEFT SHOULDER: Primary | ICD-10-CM

## 2019-06-11 ENCOUNTER — HOSPITAL ENCOUNTER (OUTPATIENT)
Dept: GENERAL RADIOLOGY | Age: 59
Discharge: HOME | End: 2019-06-13
Payer: MEDICAID

## 2019-06-11 ENCOUNTER — OFFICE VISIT (OUTPATIENT)
Dept: ORTHOPEDIC SURGERY | Age: 59
End: 2019-06-11
Payer: MEDICAID

## 2019-06-11 VITALS
WEIGHT: 124 LBS | BODY MASS INDEX: 22.82 KG/M2 | SYSTOLIC BLOOD PRESSURE: 122 MMHG | HEIGHT: 62 IN | DIASTOLIC BLOOD PRESSURE: 85 MMHG | HEART RATE: 81 BPM

## 2019-06-11 DIAGNOSIS — S42.202D CLOSED FRACTURE OF PROXIMAL END OF LEFT HUMERUS WITH ROUTINE HEALING, UNSPECIFIED FRACTURE MORPHOLOGY, SUBSEQUENT ENCOUNTER: ICD-10-CM

## 2019-06-11 DIAGNOSIS — M25.512 ACUTE PAIN OF LEFT SHOULDER: ICD-10-CM

## 2019-06-11 PROCEDURE — 99213 OFFICE O/P EST LOW 20 MIN: CPT | Performed by: ORTHOPAEDIC SURGERY

## 2019-06-11 PROCEDURE — 3017F COLORECTAL CA SCREEN DOC REV: CPT | Performed by: ORTHOPAEDIC SURGERY

## 2019-06-11 PROCEDURE — G8420 CALC BMI NORM PARAMETERS: HCPCS | Performed by: ORTHOPAEDIC SURGERY

## 2019-06-11 PROCEDURE — 99202 OFFICE O/P NEW SF 15 MIN: CPT

## 2019-06-11 PROCEDURE — G8427 DOCREV CUR MEDS BY ELIG CLIN: HCPCS | Performed by: ORTHOPAEDIC SURGERY

## 2019-06-11 PROCEDURE — 73030 X-RAY EXAM OF SHOULDER: CPT

## 2019-06-11 PROCEDURE — 4004F PT TOBACCO SCREEN RCVD TLK: CPT | Performed by: ORTHOPAEDIC SURGERY

## 2019-06-11 PROCEDURE — 23600 CLTX PROX HUMRL FX W/O MNPJ: CPT | Performed by: ORTHOPAEDIC SURGERY

## 2019-06-11 RX ORDER — HYDROCODONE BITARTRATE AND ACETAMINOPHEN 5; 325 MG/1; MG/1
1 TABLET ORAL EVERY 6 HOURS PRN
Status: ON HOLD | COMMUNITY
End: 2019-10-29 | Stop reason: HOSPADM

## 2019-06-11 NOTE — PROGRESS NOTES
30 tablet 0    amitriptyline (ELAVIL) 25 MG tablet Take 25 mg by mouth nightly as needed       cetirizine (ZYRTEC) 10 MG tablet Take 10 mg by mouth daily as needed for Allergies      hydrOXYzine (ATARAX) 25 MG tablet Take 25 mg by mouth 3 times daily as needed for Itching      omeprazole (PRILOSEC) 40 MG capsule Take 40 mg by mouth daily as needed       albuterol sulfate  (90 BASE) MCG/ACT inhaler Inhale 2 puffs into the lungs every 6 hours as needed for Wheezing      fluticasone (FLONASE) 50 MCG/ACT nasal spray 1 spray by Nasal route every evening (Patient taking differently: 1 spray by Nasal route nightly as needed ) 1 Bottle 0    diclofenac (VOLTAREN) 25 MG EC tablet Take 1 tablet by mouth 2 times daily for 5 days 10 tablet 0    naproxen (NAPROSYN) 500 MG tablet Take 1 tablet by mouth 2 times daily for 7 days 14 tablet 0     No current facility-administered medications for this visit.       Allergies: Morphine  Past Medical History:   Diagnosis Date    Asthma     CAD (coronary artery disease)     Closed fracture of proximal end of left humerus with routine healing 6/11/2019    Degeneration of lumbar or lumbosacral intervertebral disc     Fall against sharp object 1960    chest tube in hospital    History of blood transfusion 1997    after vaginal delivery of baby hemmorhage    Hypertension     Hypokalemia     Insomnia     Kidney stone     Nondisplaced fracture of greater tuberosity of right humerus, initial encounter for closed fracture 1/22/2019    Orthostatic hypotension     Severe back pain 2/3/2014     Past Surgical History:   Procedure Laterality Date    BUNIONECTOMY      Left foot   [id number]    several    COLONOSCOPY  3/26/09    LITHOTRIPSY  2012    LUMBAR FUSION  11/09/2017    L3-L4 ,L4-L5  interbody fusion with Dajuan Gilbert     OTHER SURGICAL HISTORY      electro ablation for rectal and sigmoid polyps    TONSILLECTOMY      UPPER GASTROINTESTINAL ENDOSCOPY  3/30/15    UPPER GASTROINTESTINAL ENDOSCOPY  4/21/08     Family History   Problem Relation Age of Onset    Diabetes Mother     Hypertension Mother     Heart Disease Sister     Stroke Sister     Heart Disease Maternal Grandmother     Heart Disease Maternal Grandfather     Cancer Brother      Social History     Tobacco Use    Smoking status: Current Every Day Smoker     Packs/day: 0.50     Years: 2.00     Pack years: 1.00    Smokeless tobacco: Never Used    Tobacco comment: stop sept 19 2017   Substance Use Topics    Alcohol use:  Yes     Alcohol/week: 3.6 oz     Types: 6 Cans of beer per week     Comment: weekends       Review of Systems     Review of Systems - General ROS: negative for - chills, fatigue, fever, malaise or night sweats  Ophthalmic ROS: negative for - blurry vision, decreased vision, double vision, dry eyes, excessive tearing, eye pain or loss of vision  ENT ROS: negative for - headaches, hearing change, nasal congestion, sinus pain, sore throat, tinnitus or vertigo  Allergy and Immunology ROS: negative for - itchy/watery eyes, nasal congestion, postnasal drip or seasonal allergies  Hematological and Lymphatic ROS: negative for - bleeding problems, blood clots, bruising, fatigue, jaundice, night sweats or swollen lymph nodes  Endocrine ROS: negative for - mood swings, palpitations, polydipsia/polyuria, skin changes or temperature intolerance  Respiratory ROS: no cough, shortness of breath, or wheezing  Cardiovascular ROS: no chest pain or dyspnea on exertion  Gastrointestinal ROS: no abdominal pain, change in bowel habits, or black or bloody stools  Genito-Urinary ROS: no dysuria, trouble voiding, or hematuria  Musculoskeletal ROS: Left shoulder pain  Neurological ROS: no TIA or stroke symptoms    Physical Examination:      Vitals:    06/11/19 1051   BP: 122/85   Pulse: 81       Physical Examination: General appearance - alert, well appearing, and in no distress, oriented to person, place, and time and overweight  Mental status - alert, oriented to person, place, and time, normal mood, behavior, speech, dress, motor activity, and thought processes  Musculoskeletal -   Left shoulder  Skin is intact  Mild swelling present  Ecchymosis present  Crepitus present on palpation  Tenderness palpation of the fracture site is present  Patient fires her biceps triceps wrist flexors and extensors. Patient has no paresthesias in her digits  Patient has 2/4 radial pulse  Cap refill less than 3 seconds        Extremities - peripheral pulses normal, no pedal edema, no clubbing or cyanosis, no pedal edema noted, no edema, redness or tenderness in the calves or thighs, Liz's sign negative bilaterally, no ulcers, gangrene or atrophic changes  Skin - normal coloration and turgor, no rashes, no suspicious skin lesions noted      XR: Minimally displaced proximal humerus fracture    DISCUSSION: The patient detail about nonoperative treatment. I explained she is to call our sling at least twice a day to work on range of motion of her elbow wrist and fingers. Placement that her pain should resolve over time. She does state that is gotten better since the original fracture. We will see her back in 2 weeks time for x-rays to be sure she is still lined up well we will have her work with occupational therapy in the office. She is out of therapy visits.     IMP: Left proximal humerus fracture      PLAN:    Out of the sling twice a day for range of motion elbow wrist and fingers  Nonoperative treatment  Follow-up in 2 weeks for x-rays  Call questions or concerns

## 2019-06-21 DIAGNOSIS — S42.254D CLOSED NONDISPLACED FRACTURE OF GREATER TUBEROSITY OF RIGHT HUMERUS WITH ROUTINE HEALING: Primary | ICD-10-CM

## 2019-06-25 ENCOUNTER — HOSPITAL ENCOUNTER (OUTPATIENT)
Dept: GENERAL RADIOLOGY | Age: 59
Discharge: HOME | End: 2019-06-27
Payer: MEDICAID

## 2019-06-25 ENCOUNTER — OFFICE VISIT (OUTPATIENT)
Dept: ORTHOPEDIC SURGERY | Age: 59
End: 2019-06-25
Payer: MEDICAID

## 2019-06-25 VITALS
SYSTOLIC BLOOD PRESSURE: 118 MMHG | WEIGHT: 124 LBS | HEART RATE: 82 BPM | HEIGHT: 63 IN | BODY MASS INDEX: 21.97 KG/M2 | DIASTOLIC BLOOD PRESSURE: 82 MMHG

## 2019-06-25 DIAGNOSIS — S42.254D CLOSED NONDISPLACED FRACTURE OF GREATER TUBEROSITY OF RIGHT HUMERUS WITH ROUTINE HEALING: ICD-10-CM

## 2019-06-25 DIAGNOSIS — S42.202D CLOSED FRACTURE OF PROXIMAL END OF LEFT HUMERUS WITH ROUTINE HEALING, UNSPECIFIED FRACTURE MORPHOLOGY, SUBSEQUENT ENCOUNTER: Primary | ICD-10-CM

## 2019-06-25 PROCEDURE — 99024 POSTOP FOLLOW-UP VISIT: CPT | Performed by: ORTHOPAEDIC SURGERY

## 2019-06-25 PROCEDURE — 99212 OFFICE O/P EST SF 10 MIN: CPT

## 2019-06-25 PROCEDURE — 73030 X-RAY EXAM OF SHOULDER: CPT

## 2019-07-08 ENCOUNTER — TELEPHONE (OUTPATIENT)
Dept: ORTHOPEDIC SURGERY | Age: 59
End: 2019-07-08

## 2019-07-09 ENCOUNTER — OFFICE VISIT (OUTPATIENT)
Dept: ORTHOPEDIC SURGERY | Age: 59
End: 2019-07-09
Payer: MEDICAID

## 2019-07-09 ENCOUNTER — HOSPITAL ENCOUNTER (OUTPATIENT)
Dept: GENERAL RADIOLOGY | Age: 59
Discharge: HOME | End: 2019-07-11
Payer: MEDICAID

## 2019-07-09 VITALS
DIASTOLIC BLOOD PRESSURE: 82 MMHG | HEART RATE: 82 BPM | SYSTOLIC BLOOD PRESSURE: 115 MMHG | BODY MASS INDEX: 21.97 KG/M2 | HEIGHT: 63 IN | WEIGHT: 124 LBS

## 2019-07-09 DIAGNOSIS — S42.202D CLOSED FRACTURE OF PROXIMAL END OF LEFT HUMERUS WITH ROUTINE HEALING, UNSPECIFIED FRACTURE MORPHOLOGY, SUBSEQUENT ENCOUNTER: ICD-10-CM

## 2019-07-09 DIAGNOSIS — S42.254D CLOSED NONDISPLACED FRACTURE OF GREATER TUBEROSITY OF RIGHT HUMERUS WITH ROUTINE HEALING: ICD-10-CM

## 2019-07-09 DIAGNOSIS — S42.202D CLOSED FRACTURE OF PROXIMAL END OF LEFT HUMERUS WITH ROUTINE HEALING, UNSPECIFIED FRACTURE MORPHOLOGY, SUBSEQUENT ENCOUNTER: Primary | ICD-10-CM

## 2019-07-09 PROCEDURE — 99024 POSTOP FOLLOW-UP VISIT: CPT | Performed by: NURSE PRACTITIONER

## 2019-07-09 PROCEDURE — 99212 OFFICE O/P EST SF 10 MIN: CPT | Performed by: NURSE PRACTITIONER

## 2019-07-09 PROCEDURE — 73030 X-RAY EXAM OF SHOULDER: CPT

## 2019-07-09 NOTE — PROGRESS NOTES
OP:      Subjective:  Melita Stuart is approximately 6 week follow-up from [facility] at the end of May. She fell at home and landed on her left side. Patient is NWB on that extremity. She ambulates with no assistive device, none. She is wearing her sling for comfort and is requesting a new one today since her current one is bulky and hot. She has noticed an increase in her pain and a decrease in her ROM over the last 4 days. While she was sleeping her left arm went above her head and has had symptoms since. Pain to extremity is moderate and is  taking pain medication, Norco from a pain management clinic. They denies numbness, tingling, weakness. Denies Calf pain. Patient continues to use/has finished DVT prophylaxis, Lovenox. Patient is not participating in therapy. Stating that she is out of visits until January. She would like to try home therapy if it's available. Patient is left hand dominant. Review of Systems -    General ROS: negative for - chills, fatigue, fever or night sweats  Respiratory ROS: no cough, shortness of breath, or wheezing  Cardiovascular ROS: no chest pain or dyspnea on exertion  Gastrointestinal ROS: no abdominal pain, nausea, vomiting, diarrhea, constipation,or black or bloody stools  Genitourinary: no hematuria, dysuria, or incontinence   Musculoskeletal ROS: negative for -back or neck pain or stiffness, also see HPI  Neurological ROS: no TIA or stroke symptoms     Objective:    General: Alert and oriented X 3, normocephalic atraumatic, external ears and eye normal, sclera clear, no acute distress, respirations easy and unlabored with no audible wheezes, skin warm and dry, speech and dress appropriate for noted age, affect euthymic.     Extremity:  Left Upper Extremity  Skin is clean dry and intact  No edema noted  Radial pulse palpable, fingers warm with BCR  Flex/extension intact to wrist, thumb and fingers  Finger opposition intact  Finger adduction/abduction intact  Finger crossover

## 2019-07-16 ENCOUNTER — TELEPHONE (OUTPATIENT)
Dept: ORTHOPEDIC SURGERY | Age: 59
End: 2019-07-16

## 2019-07-17 ENCOUNTER — HOSPITAL ENCOUNTER (OUTPATIENT)
Dept: GENERAL RADIOLOGY | Age: 59
Discharge: HOME | End: 2019-07-19
Payer: MEDICAID

## 2019-07-17 DIAGNOSIS — R11.2 NAUSEA AND VOMITING, INTRACTABILITY OF VOMITING NOT SPECIFIED, UNSPECIFIED VOMITING TYPE: ICD-10-CM

## 2019-07-17 PROCEDURE — 2500000003 HC RX 250 WO HCPCS: Performed by: PHYSICIAN ASSISTANT

## 2019-07-17 PROCEDURE — 74245 FL UGI W SMALL BOWEL: CPT

## 2019-07-17 PROCEDURE — 6370000000 HC RX 637 (ALT 250 FOR IP): Performed by: PHYSICIAN ASSISTANT

## 2019-07-17 RX ADMIN — BARIUM SULFATE 140 ML: 980 POWDER, FOR SUSPENSION ORAL at 11:17

## 2019-07-17 RX ADMIN — BARIUM SULFATE 176 G: 960 POWDER, FOR SUSPENSION ORAL at 11:16

## 2019-07-17 RX ADMIN — ANTACID/ANTIFLATULENT 1 EACH: 380; 550; 10; 10 GRANULE, EFFERVESCENT ORAL at 11:17

## 2019-07-18 DIAGNOSIS — S42.254D CLOSED NONDISPLACED FRACTURE OF GREATER TUBEROSITY OF RIGHT HUMERUS WITH ROUTINE HEALING: ICD-10-CM

## 2019-07-18 DIAGNOSIS — S42.202D CLOSED FRACTURE OF PROXIMAL END OF LEFT HUMERUS WITH ROUTINE HEALING, UNSPECIFIED FRACTURE MORPHOLOGY, SUBSEQUENT ENCOUNTER: Primary | ICD-10-CM

## 2019-07-19 ENCOUNTER — HOSPITAL ENCOUNTER (OUTPATIENT)
Age: 59
Setting detail: OBSERVATION
Discharge: OTHER INSTITUTION | End: 2019-07-20
Attending: EMERGENCY MEDICINE | Admitting: FAMILY MEDICINE
Payer: MEDICAID

## 2019-07-19 ENCOUNTER — APPOINTMENT (OUTPATIENT)
Dept: GENERAL RADIOLOGY | Age: 59
End: 2019-07-19
Payer: MEDICAID

## 2019-07-19 DIAGNOSIS — S92.302A CLOSED FRACTURE OF METATARSAL BONE OF LEFT FOOT, PHYSEAL INVOLVEMENT UNSPECIFIED, UNSPECIFIED METATARSAL, INITIAL ENCOUNTER: ICD-10-CM

## 2019-07-19 DIAGNOSIS — S92.302A MULTIPLE CLOSED FRACTURES OF METATARSAL BONE OF LEFT FOOT, INITIAL ENCOUNTER: Primary | ICD-10-CM

## 2019-07-19 PROBLEM — R26.9 GAIT ABNORMALITY: Status: ACTIVE | Noted: 2019-07-19

## 2019-07-19 PROCEDURE — 6360000002 HC RX W HCPCS: Performed by: FAMILY MEDICINE

## 2019-07-19 PROCEDURE — G0378 HOSPITAL OBSERVATION PER HR: HCPCS

## 2019-07-19 PROCEDURE — 99284 EMERGENCY DEPT VISIT MOD MDM: CPT

## 2019-07-19 PROCEDURE — 6370000000 HC RX 637 (ALT 250 FOR IP): Performed by: FAMILY MEDICINE

## 2019-07-19 PROCEDURE — 6370000000 HC RX 637 (ALT 250 FOR IP): Performed by: NURSE PRACTITIONER

## 2019-07-19 PROCEDURE — 6370000000 HC RX 637 (ALT 250 FOR IP): Performed by: PHYSICIAN ASSISTANT

## 2019-07-19 PROCEDURE — 29515 APPLICATION SHORT LEG SPLINT: CPT

## 2019-07-19 PROCEDURE — 73610 X-RAY EXAM OF ANKLE: CPT

## 2019-07-19 PROCEDURE — 73630 X-RAY EXAM OF FOOT: CPT

## 2019-07-19 PROCEDURE — 96372 THER/PROPH/DIAG INJ SC/IM: CPT

## 2019-07-19 RX ORDER — DIPHENHYDRAMINE HCL 25 MG
25 TABLET ORAL EVERY 6 HOURS PRN
Status: DISCONTINUED | OUTPATIENT
Start: 2019-07-19 | End: 2019-07-19 | Stop reason: SDUPTHER

## 2019-07-19 RX ORDER — PANTOPRAZOLE SODIUM 40 MG/1
40 TABLET, DELAYED RELEASE ORAL
Status: DISCONTINUED | OUTPATIENT
Start: 2019-07-20 | End: 2019-07-20 | Stop reason: HOSPADM

## 2019-07-19 RX ORDER — IBUPROFEN 400 MG/1
800 TABLET ORAL EVERY 6 HOURS PRN
Status: DISCONTINUED | OUTPATIENT
Start: 2019-07-19 | End: 2019-07-20 | Stop reason: HOSPADM

## 2019-07-19 RX ORDER — FLUTICASONE PROPIONATE 50 MCG
1 SPRAY, SUSPENSION (ML) NASAL NIGHTLY PRN
Status: DISCONTINUED | OUTPATIENT
Start: 2019-07-19 | End: 2019-07-20 | Stop reason: HOSPADM

## 2019-07-19 RX ORDER — CETIRIZINE HYDROCHLORIDE 10 MG/1
10 TABLET ORAL DAILY PRN
Status: DISCONTINUED | OUTPATIENT
Start: 2019-07-19 | End: 2019-07-19 | Stop reason: SDUPTHER

## 2019-07-19 RX ORDER — CETIRIZINE HYDROCHLORIDE 10 MG/1
10 TABLET ORAL DAILY PRN
Status: DISCONTINUED | OUTPATIENT
Start: 2019-07-19 | End: 2019-07-20 | Stop reason: HOSPADM

## 2019-07-19 RX ORDER — DIPHENHYDRAMINE HCL 25 MG
25 TABLET ORAL EVERY 6 HOURS PRN
Status: DISCONTINUED | OUTPATIENT
Start: 2019-07-19 | End: 2019-07-20 | Stop reason: HOSPADM

## 2019-07-19 RX ORDER — AMITRIPTYLINE HYDROCHLORIDE 10 MG/1
25 TABLET, FILM COATED ORAL NIGHTLY PRN
Status: DISCONTINUED | OUTPATIENT
Start: 2019-07-19 | End: 2019-07-19 | Stop reason: SDUPTHER

## 2019-07-19 RX ORDER — OXYCODONE HYDROCHLORIDE AND ACETAMINOPHEN 5; 325 MG/1; MG/1
1 TABLET ORAL ONCE
Status: COMPLETED | OUTPATIENT
Start: 2019-07-19 | End: 2019-07-19

## 2019-07-19 RX ORDER — OXYCODONE HYDROCHLORIDE 10 MG/1
10 TABLET ORAL
Status: DISCONTINUED | OUTPATIENT
Start: 2019-07-19 | End: 2019-07-20 | Stop reason: HOSPADM

## 2019-07-19 RX ORDER — ALBUTEROL SULFATE 90 UG/1
2 AEROSOL, METERED RESPIRATORY (INHALATION) EVERY 6 HOURS PRN
Status: DISCONTINUED | OUTPATIENT
Start: 2019-07-19 | End: 2019-07-19 | Stop reason: SDUPTHER

## 2019-07-19 RX ORDER — HYDROCODONE BITARTRATE AND ACETAMINOPHEN 5; 325 MG/1; MG/1
1 TABLET ORAL EVERY 6 HOURS PRN
Status: DISCONTINUED | OUTPATIENT
Start: 2019-07-19 | End: 2019-07-20 | Stop reason: HOSPADM

## 2019-07-19 RX ORDER — AMITRIPTYLINE HYDROCHLORIDE 10 MG/1
25 TABLET, FILM COATED ORAL NIGHTLY PRN
Status: DISCONTINUED | OUTPATIENT
Start: 2019-07-19 | End: 2019-07-20 | Stop reason: HOSPADM

## 2019-07-19 RX ORDER — PANTOPRAZOLE SODIUM 40 MG/1
40 TABLET, DELAYED RELEASE ORAL DAILY PRN
Status: DISCONTINUED | OUTPATIENT
Start: 2019-07-19 | End: 2019-07-20 | Stop reason: HOSPADM

## 2019-07-19 RX ORDER — ALBUTEROL SULFATE 90 UG/1
2 AEROSOL, METERED RESPIRATORY (INHALATION) EVERY 6 HOURS PRN
Status: DISCONTINUED | OUTPATIENT
Start: 2019-07-19 | End: 2019-07-20 | Stop reason: HOSPADM

## 2019-07-19 RX ORDER — BISACODYL 10 MG
10 SUPPOSITORY, RECTAL RECTAL DAILY PRN
Status: DISCONTINUED | OUTPATIENT
Start: 2019-07-19 | End: 2019-07-20 | Stop reason: HOSPADM

## 2019-07-19 RX ORDER — AMLODIPINE BESYLATE 10 MG/1
10 TABLET ORAL DAILY
Status: DISCONTINUED | OUTPATIENT
Start: 2019-07-19 | End: 2019-07-20 | Stop reason: HOSPADM

## 2019-07-19 RX ORDER — MAGNESIUM SULFATE 1 G/100ML
1 INJECTION INTRAVENOUS PRN
Status: DISCONTINUED | OUTPATIENT
Start: 2019-07-19 | End: 2019-07-20 | Stop reason: HOSPADM

## 2019-07-19 RX ORDER — AMLODIPINE BESYLATE 5 MG/1
5 TABLET ORAL DAILY
Status: DISCONTINUED | OUTPATIENT
Start: 2019-07-19 | End: 2019-07-19 | Stop reason: ALTCHOICE

## 2019-07-19 RX ORDER — HYDROXYZINE HYDROCHLORIDE 10 MG/1
25 TABLET, FILM COATED ORAL 3 TIMES DAILY PRN
Status: DISCONTINUED | OUTPATIENT
Start: 2019-07-19 | End: 2019-07-19 | Stop reason: SDUPTHER

## 2019-07-19 RX ORDER — FLUTICASONE PROPIONATE 50 MCG
1 SPRAY, SUSPENSION (ML) NASAL EVERY EVENING
Status: DISCONTINUED | OUTPATIENT
Start: 2019-07-19 | End: 2019-07-19 | Stop reason: SDUPTHER

## 2019-07-19 RX ORDER — HYDROXYZINE HYDROCHLORIDE 10 MG/1
25 TABLET, FILM COATED ORAL 3 TIMES DAILY PRN
Status: DISCONTINUED | OUTPATIENT
Start: 2019-07-19 | End: 2019-07-20 | Stop reason: HOSPADM

## 2019-07-19 RX ADMIN — OXYCODONE HYDROCHLORIDE 10 MG: 10 TABLET ORAL at 23:19

## 2019-07-19 RX ADMIN — DIPHENHYDRAMINE HCL 25 MG: 25 TABLET ORAL at 23:19

## 2019-07-19 RX ADMIN — AMLODIPINE BESYLATE 10 MG: 10 TABLET ORAL at 22:24

## 2019-07-19 RX ADMIN — ENOXAPARIN SODIUM 40 MG: 40 INJECTION SUBCUTANEOUS at 22:23

## 2019-07-19 RX ADMIN — OXYCODONE HYDROCHLORIDE AND ACETAMINOPHEN 1 TABLET: 5; 325 TABLET ORAL at 16:52

## 2019-07-19 RX ADMIN — HYDROCODONE BITARTRATE AND ACETAMINOPHEN 1 TABLET: 5; 325 TABLET ORAL at 20:23

## 2019-07-19 ASSESSMENT — PAIN SCALES - GENERAL
PAINLEVEL_OUTOF10: 7
PAINLEVEL_OUTOF10: 8
PAINLEVEL_OUTOF10: 3
PAINLEVEL_OUTOF10: 7
PAINLEVEL_OUTOF10: 5

## 2019-07-19 ASSESSMENT — PAIN DESCRIPTION - LOCATION
LOCATION: ANKLE;FOOT
LOCATION: FOOT;ANKLE
LOCATION: FOOT;ANKLE

## 2019-07-19 ASSESSMENT — PAIN DESCRIPTION - ORIENTATION
ORIENTATION: LEFT

## 2019-07-19 ASSESSMENT — PAIN DESCRIPTION - ONSET
ONSET: ON-GOING
ONSET: ON-GOING

## 2019-07-19 ASSESSMENT — PAIN DESCRIPTION - FREQUENCY
FREQUENCY: CONTINUOUS
FREQUENCY: CONTINUOUS

## 2019-07-19 ASSESSMENT — PAIN DESCRIPTION - PAIN TYPE
TYPE: ACUTE PAIN

## 2019-07-19 ASSESSMENT — PAIN DESCRIPTION - PROGRESSION
CLINICAL_PROGRESSION: NOT CHANGED
CLINICAL_PROGRESSION: NOT CHANGED

## 2019-07-19 ASSESSMENT — PAIN - FUNCTIONAL ASSESSMENT
PAIN_FUNCTIONAL_ASSESSMENT: PREVENTS OR INTERFERES SOME ACTIVE ACTIVITIES AND ADLS
PAIN_FUNCTIONAL_ASSESSMENT: PREVENTS OR INTERFERES SOME ACTIVE ACTIVITIES AND ADLS

## 2019-07-19 ASSESSMENT — PAIN DESCRIPTION - DESCRIPTORS
DESCRIPTORS: ACHING;CONSTANT;DISCOMFORT
DESCRIPTORS: ACHING;CONSTANT;SORE

## 2019-07-20 VITALS
HEIGHT: 63 IN | SYSTOLIC BLOOD PRESSURE: 110 MMHG | TEMPERATURE: 97.1 F | OXYGEN SATURATION: 92 % | HEART RATE: 78 BPM | BODY MASS INDEX: 21.97 KG/M2 | RESPIRATION RATE: 18 BRPM | DIASTOLIC BLOOD PRESSURE: 63 MMHG | WEIGHT: 124 LBS

## 2019-07-20 PROCEDURE — 97161 PT EVAL LOW COMPLEX 20 MIN: CPT | Performed by: PHYSICAL THERAPIST

## 2019-07-20 PROCEDURE — G0378 HOSPITAL OBSERVATION PER HR: HCPCS

## 2019-07-20 PROCEDURE — 6370000000 HC RX 637 (ALT 250 FOR IP): Performed by: PHYSICIAN ASSISTANT

## 2019-07-20 PROCEDURE — 96372 THER/PROPH/DIAG INJ SC/IM: CPT

## 2019-07-20 PROCEDURE — 6360000002 HC RX W HCPCS: Performed by: FAMILY MEDICINE

## 2019-07-20 PROCEDURE — 6370000000 HC RX 637 (ALT 250 FOR IP): Performed by: FAMILY MEDICINE

## 2019-07-20 RX ADMIN — OXYCODONE HYDROCHLORIDE 10 MG: 10 TABLET ORAL at 09:29

## 2019-07-20 RX ADMIN — ENOXAPARIN SODIUM 40 MG: 40 INJECTION SUBCUTANEOUS at 09:28

## 2019-07-20 RX ADMIN — DIPHENHYDRAMINE HCL 25 MG: 25 TABLET ORAL at 12:01

## 2019-07-20 RX ADMIN — OXYCODONE HYDROCHLORIDE 10 MG: 10 TABLET ORAL at 02:22

## 2019-07-20 RX ADMIN — AMLODIPINE BESYLATE 10 MG: 10 TABLET ORAL at 09:29

## 2019-07-20 RX ADMIN — DIPHENHYDRAMINE HCL 25 MG: 25 TABLET ORAL at 06:38

## 2019-07-20 RX ADMIN — OXYCODONE HYDROCHLORIDE 10 MG: 10 TABLET ORAL at 17:11

## 2019-07-20 RX ADMIN — DIPHENHYDRAMINE HCL 25 MG: 25 TABLET ORAL at 18:37

## 2019-07-20 RX ADMIN — OXYCODONE HYDROCHLORIDE 10 MG: 10 TABLET ORAL at 05:40

## 2019-07-20 RX ADMIN — PANTOPRAZOLE SODIUM 40 MG: 40 TABLET, DELAYED RELEASE ORAL at 05:40

## 2019-07-20 RX ADMIN — OXYCODONE HYDROCHLORIDE 10 MG: 10 TABLET ORAL at 20:17

## 2019-07-20 ASSESSMENT — PAIN DESCRIPTION - ORIENTATION
ORIENTATION: LEFT
ORIENTATION: LEFT

## 2019-07-20 ASSESSMENT — PAIN SCALES - GENERAL
PAINLEVEL_OUTOF10: 6
PAINLEVEL_OUTOF10: 10
PAINLEVEL_OUTOF10: 7
PAINLEVEL_OUTOF10: 3
PAINLEVEL_OUTOF10: 7
PAINLEVEL_OUTOF10: 8

## 2019-07-20 ASSESSMENT — PAIN DESCRIPTION - DESCRIPTORS
DESCRIPTORS: ACHING;CONSTANT;DISCOMFORT
DESCRIPTORS: ACHING;BURNING;CONSTANT

## 2019-07-20 ASSESSMENT — ENCOUNTER SYMPTOMS
ABDOMINAL PAIN: 0
EYE DISCHARGE: 0
BACK PAIN: 1
COLOR CHANGE: 0
SHORTNESS OF BREATH: 0

## 2019-07-20 ASSESSMENT — PAIN DESCRIPTION - ONSET
ONSET: ON-GOING
ONSET: ON-GOING

## 2019-07-20 ASSESSMENT — PAIN DESCRIPTION - FREQUENCY
FREQUENCY: CONTINUOUS
FREQUENCY: CONTINUOUS

## 2019-07-20 ASSESSMENT — PAIN DESCRIPTION - LOCATION
LOCATION: ANKLE;FOOT
LOCATION: FOOT;ANKLE

## 2019-07-20 ASSESSMENT — PAIN DESCRIPTION - PAIN TYPE
TYPE: ACUTE PAIN
TYPE: ACUTE PAIN

## 2019-07-20 ASSESSMENT — PAIN DESCRIPTION - PROGRESSION
CLINICAL_PROGRESSION: NOT CHANGED
CLINICAL_PROGRESSION: GRADUALLY IMPROVING

## 2019-07-20 NOTE — DISCHARGE SUMMARY
cetirizine (ZYRTEC) 10 MG tablet Comments:   Reason for Stopping:             Activity: activity as tolerated  Diet: cardiac diet  Wound Care: as directed    Follow-up with PCP and Ortho in 1-2 weeks    Signed:  Maye Chen MD  7/20/2019  2:36 PM

## 2019-07-20 NOTE — DISCHARGE INSTR - COC
Syncope and collapse R55    Prolonged QT interval R94.31    Torsades de pointes ([facility]) I47.2    Hypokalemia E87.6    Hypomagnesemia E83.42    V-tach ([facility]) I47.2    Spondylolisthesis of lumbar region, L4 on L5 M43.16    Acute alcoholic intoxication ([facility] [address] F10.929    Ileus ([facility]) K56.7    Delirium tremens ([facility]) F10.231    Closed nondisplaced fracture of greater tuberosity of right humerus with routine healing S42.254D    Closed fracture of proximal end of left humerus with routine healing S42.202D    Multiple closed fractures of metatarsal bone of left foot S92.302A    Gait abnormality R26.9       Isolation/Infection:   Isolation          No Isolation            Nurse Assessment:  Last Vital Signs: /63   Pulse 78   Temp 97.1 °F (36.2 °C) (Temporal)   Resp 18   Ht 5' 3\" (1.6 m)   Wt 124 lb (56.2 kg)   SpO2 92%   BMI 21.97 kg/m²     Last documented pain score (0-10 scale): Pain Level: 8  Last Weight:   Wt Readings from Last 1 Encounters:   07/19/19 124 lb (56.2 kg)     Mental Status:  alert    IV Access:  - None    Nursing Mobility/ADLs:  Walking   Independent  Transfer  Independent  Bathing  Independent  Dressing  Independent  Toileting  Assisted  Feeding  Assisted  Med Admin  Assisted  Med Delivery   none    Wound Care Documentation and Therapy:  Incision 11/09/17 Back Mid;Lower (Active)   Number of days: 618        Elimination:  Continence:   · Bowel: No  · Bladder: No  Urinary Catheter: None   Colostomy/Ileostomy/Ileal Conduit: No       Date of Last BM: ***    Intake/Output Summary (Last 24 hours) at 7/20/2019 1712  Last data filed at 7/20/2019 0955  Gross per 24 hour   Intake 980 ml   Output --   Net 980 ml     I/O last 3 completed shifts: In: 200 [P.O.:980]  Out: -     Safety Concerns:      At Risk for Falls    Impairments/Disabilities:      None    Nutrition Therapy:  Current Nutrition Therapy:   - Oral Diet:  General    Routes of Feeding: Oral  Liquids: No Restrictions  Daily Fluid

## 2019-07-20 NOTE — CONSULTS
Department of Orthopedic Surgery  Resident Consult Note          Reason for Consult: Left foot pain       HISTORY OF PRESENT ILLNESS:       Patient is a 61 y.o. female who presents with left foot pain after a fall in her kitchen this afternoon. She states she does not recall how she landed, but when she attempted to stand and start walking, she was unable to bear weight on her left foot due to pain. As a result, she decided to call for an ambulance. She denies previous injury to the left foot. Patient however has a recent history of a left proximal humerus fracture for which she is wearing a sling, being treated by Dr. Dirk Puckett. Currently, the patient admits to pain on the dorsum of her foot, only with movement. Denies numbness/tingling/paresthesias. Denies any other orthopedic complaints at this time.       Past Medical History:        Diagnosis Date    Asthma     CAD (coronary artery disease)     Closed fracture of proximal end of left humerus with routine healing 6/11/2019    Degeneration of lumbar or lumbosacral intervertebral disc     Fall against sharp object 1960    chest tube in hospital    History of blood transfusion 1997    after vaginal delivery of baby hemmorhage    Hypertension     Hypokalemia     Insomnia     Kidney stone     Nondisplaced fracture of greater tuberosity of right humerus, initial encounter for closed fracture 1/22/2019    Orthostatic hypotension     Severe back pain 2/3/2014     Past Surgical History:        Procedure Laterality Date    BUNIONECTOMY      Left foot   [address]    several    COLONOSCOPY  3/26/09    LITHOTRIPSY  2012    LUMBAR FUSION  11/09/2017    L3-L4 ,L4-L5  interbody fusion with Dajuan Elaine Pryor     OTHER SURGICAL HISTORY      electro ablation for rectal and sigmoid polyps    TONSILLECTOMY      UPPER GASTROINTESTINAL ENDOSCOPY  3/30/15    UPPER GASTROINTESTINAL ENDOSCOPY  4/21/08     Current Medications:   Current

## 2019-07-20 NOTE — CARE COORDINATION
Patient approved for a bedside commode. They will deliver to room prior to discharge, Deven Cline RN notified.

## 2019-07-20 NOTE — PROGRESS NOTES
stated    Patient and or family understand(s) diagnosis, prognosis, and plan of care. PLAN  PT care will be provided in accordance with the objectives noted above. Whenever appropriate, clear delegation orders will be provided for nursing staff. Exercises and functional mobility practice will be used as well as appropriate assistive devices or modalities to obtain goals. Patient and family education will also be administered as needed. Frequency of treatments will be 2-5x/week x 3-5 days.     Time in: 1009  Time out: [address] number:  PT [id number]

## 2019-07-22 ENCOUNTER — TELEPHONE (OUTPATIENT)
Dept: ADMINISTRATIVE | Age: 59
End: 2019-07-22

## 2019-07-25 NOTE — TELEPHONE ENCOUNTER
Ret pts call and left message letting her know that she can f/u on Wednesday 7/31 as scheduled with  and he will check her foot at that time also

## 2019-07-26 DIAGNOSIS — S42.202D CLOSED FRACTURE OF PROXIMAL END OF LEFT HUMERUS WITH ROUTINE HEALING, UNSPECIFIED FRACTURE MORPHOLOGY, SUBSEQUENT ENCOUNTER: Primary | ICD-10-CM

## 2019-07-26 DIAGNOSIS — S92.302D MULTIPLE CLOSED FRACTURES OF METATARSAL BONE OF LEFT FOOT WITH ROUTINE HEALING, SUBSEQUENT ENCOUNTER: ICD-10-CM

## 2019-07-31 ENCOUNTER — HOSPITAL ENCOUNTER (OUTPATIENT)
Dept: GENERAL RADIOLOGY | Age: 59
Discharge: HOME | End: 2019-08-02
Payer: MEDICAID

## 2019-07-31 ENCOUNTER — OFFICE VISIT (OUTPATIENT)
Dept: ORTHOPEDIC SURGERY | Age: 59
End: 2019-07-31
Payer: MEDICAID

## 2019-07-31 VITALS — BODY MASS INDEX: 22.08 KG/M2 | OXYGEN SATURATION: 96 % | HEART RATE: 74 BPM | WEIGHT: 120 LBS | HEIGHT: 62 IN

## 2019-07-31 DIAGNOSIS — S92.302D MULTIPLE CLOSED FRACTURES OF METATARSAL BONE OF LEFT FOOT WITH ROUTINE HEALING, SUBSEQUENT ENCOUNTER: ICD-10-CM

## 2019-07-31 DIAGNOSIS — S42.272D CLOSED TORUS FRACTURE OF PROXIMAL END OF LEFT HUMERUS WITH ROUTINE HEALING, SUBSEQUENT ENCOUNTER: ICD-10-CM

## 2019-07-31 DIAGNOSIS — S92.302D MULTIPLE CLOSED FRACTURES OF METATARSAL BONE OF LEFT FOOT WITH ROUTINE HEALING, SUBSEQUENT ENCOUNTER: Primary | ICD-10-CM

## 2019-07-31 DIAGNOSIS — S42.202D CLOSED FRACTURE OF PROXIMAL END OF LEFT HUMERUS WITH ROUTINE HEALING, UNSPECIFIED FRACTURE MORPHOLOGY, SUBSEQUENT ENCOUNTER: ICD-10-CM

## 2019-07-31 PROCEDURE — 73030 X-RAY EXAM OF SHOULDER: CPT

## 2019-07-31 PROCEDURE — 28470 CLTX METATARSAL FX WO MNP EA: CPT | Performed by: ORTHOPAEDIC SURGERY

## 2019-07-31 PROCEDURE — G8427 DOCREV CUR MEDS BY ELIG CLIN: HCPCS | Performed by: ORTHOPAEDIC SURGERY

## 2019-07-31 PROCEDURE — 3017F COLORECTAL CA SCREEN DOC REV: CPT | Performed by: ORTHOPAEDIC SURGERY

## 2019-07-31 PROCEDURE — 4004F PT TOBACCO SCREEN RCVD TLK: CPT | Performed by: ORTHOPAEDIC SURGERY

## 2019-07-31 PROCEDURE — 73630 X-RAY EXAM OF FOOT: CPT

## 2019-07-31 PROCEDURE — G8420 CALC BMI NORM PARAMETERS: HCPCS | Performed by: ORTHOPAEDIC SURGERY

## 2019-07-31 PROCEDURE — 99024 POSTOP FOLLOW-UP VISIT: CPT | Performed by: ORTHOPAEDIC SURGERY

## 2019-07-31 PROCEDURE — 99214 OFFICE O/P EST MOD 30 MIN: CPT

## 2019-07-31 PROCEDURE — 99213 OFFICE O/P EST LOW 20 MIN: CPT | Performed by: ORTHOPAEDIC SURGERY

## 2019-08-08 ENCOUNTER — TELEPHONE (OUTPATIENT)
Dept: ORTHOPEDIC SURGERY | Age: 59
End: 2019-08-08

## 2019-08-16 ENCOUNTER — HOSPITAL ENCOUNTER (OUTPATIENT)
Dept: PHYSICAL THERAPY | Age: 59
Setting detail: THERAPIES SERIES
Discharge: HOME | End: 2019-08-16
Payer: MEDICAID

## 2019-08-16 DIAGNOSIS — S92.302D MULTIPLE CLOSED FRACTURES OF METATARSAL BONE OF LEFT FOOT WITH ROUTINE HEALING, SUBSEQUENT ENCOUNTER: Primary | ICD-10-CM

## 2019-08-16 PROCEDURE — 97161 PT EVAL LOW COMPLEX 20 MIN: CPT | Performed by: PHYSICAL THERAPIST

## 2019-08-16 ASSESSMENT — PAIN SCALES - GENERAL: PAINLEVEL_OUTOF10: 6

## 2019-08-16 ASSESSMENT — PAIN DESCRIPTION - PAIN TYPE: TYPE: ACUTE PAIN

## 2019-08-16 ASSESSMENT — PAIN DESCRIPTION - LOCATION: LOCATION: SHOULDER

## 2019-08-16 ASSESSMENT — PAIN - FUNCTIONAL ASSESSMENT: PAIN_FUNCTIONAL_ASSESSMENT: PREVENTS OR INTERFERES SOME ACTIVE ACTIVITIES AND ADLS

## 2019-08-16 ASSESSMENT — PAIN DESCRIPTION - DESCRIPTORS: DESCRIPTORS: ACHING;CONSTANT

## 2019-08-16 ASSESSMENT — PAIN DESCRIPTION - ORIENTATION: ORIENTATION: LEFT

## 2019-08-19 ENCOUNTER — HOSPITAL ENCOUNTER (OUTPATIENT)
Dept: PHYSICAL THERAPY | Age: 59
Setting detail: THERAPIES SERIES
Discharge: HOME | End: 2019-08-19
Payer: MEDICAID

## 2019-08-19 PROCEDURE — 97110 THERAPEUTIC EXERCISES: CPT | Performed by: PHYSICAL THERAPIST

## 2019-08-19 PROCEDURE — G0283 ELEC STIM OTHER THAN WOUND: HCPCS | Performed by: PHYSICAL THERAPIST

## 2019-08-23 ENCOUNTER — HOSPITAL ENCOUNTER (OUTPATIENT)
Dept: PHYSICAL THERAPY | Age: 59
Setting detail: THERAPIES SERIES
Discharge: HOME | End: 2019-08-23
Payer: MEDICAID

## 2019-08-23 PROCEDURE — 97110 THERAPEUTIC EXERCISES: CPT | Performed by: PHYSICAL THERAPIST

## 2019-08-23 PROCEDURE — G0283 ELEC STIM OTHER THAN WOUND: HCPCS | Performed by: PHYSICAL THERAPIST

## 2019-08-23 PROCEDURE — 97161 PT EVAL LOW COMPLEX 20 MIN: CPT | Performed by: PHYSICAL THERAPIST

## 2019-08-23 ASSESSMENT — PAIN DESCRIPTION - ORIENTATION: ORIENTATION: LEFT

## 2019-08-23 ASSESSMENT — PAIN SCALES - GENERAL: PAINLEVEL_OUTOF10: 6

## 2019-08-23 ASSESSMENT — PAIN DESCRIPTION - LOCATION: LOCATION: FOOT

## 2019-08-23 ASSESSMENT — PAIN - FUNCTIONAL ASSESSMENT: PAIN_FUNCTIONAL_ASSESSMENT: PREVENTS OR INTERFERES SOME ACTIVE ACTIVITIES AND ADLS

## 2019-08-23 ASSESSMENT — PAIN DESCRIPTION - PAIN TYPE: TYPE: ACUTE PAIN

## 2019-08-23 ASSESSMENT — PAIN DESCRIPTION - DESCRIPTORS: DESCRIPTORS: ACHING;CONSTANT

## 2019-08-23 NOTE — PROGRESS NOTES
S:  pt presents to therapy for two of two scheduled visits this week; at week's end she reports that her shoulder feels about the same; pain level given as /10; sleep is hampered at times; no c/o clicking or popping in her shoulder; HEP going well per pt     O:  performed the exercises/treatments as written in the flowsheet for the week ending 8/23/19; initiated HEP for home management of condition; AROM L shoulder WNL for all ranges; strength across L shoulder grossly 3/5 for all ranges    A:  alfonso tx well; pt able to perform all requested tasks with good form and pacing noted; strength/AROM across L shoulder grossly stable since eval; movement guarded with aching noted at all endranges; endurance for all prolonged activities is FAIR+/GOOD-    P:  cont with POC of stretching/strengthening for L shoulder with modalities as needed
2x/week/4-6 weeks   Current Treatment Recommendations: ROM, Strengthening, Balance Training, Gait Training, Endurance Training, Modalities, Home Exercise Program    Goals  Time Frame for  goals: 4-6 weeks   goal 1: decrease pain across L ankler with all prolonged activities, 0-3/10  goal 2: increase strength across L ankle to grossly, 4, 4+/5 for all ranges, improving static/dynamic balance to GOOD/GOOD+   goal 3: restore normal/independent gait mechanics across L LE   goal 4: improve endurance for all prolonged activities to GOOD/GOOD+  goal 5: assure I with HEP for home management of condition        Kiki Le, PT

## 2019-08-27 ENCOUNTER — HOSPITAL ENCOUNTER (OUTPATIENT)
Dept: PHYSICAL THERAPY | Age: 59
Setting detail: THERAPIES SERIES
Discharge: HOME | End: 2019-08-27
Payer: MEDICAID

## 2019-08-27 PROCEDURE — 97110 THERAPEUTIC EXERCISES: CPT | Performed by: PHYSICAL THERAPIST

## 2019-08-27 PROCEDURE — 97530 THERAPEUTIC ACTIVITIES: CPT | Performed by: PHYSICAL THERAPIST

## 2019-08-28 ENCOUNTER — HOSPITAL ENCOUNTER (OUTPATIENT)
Dept: PHYSICAL THERAPY | Age: 59
Setting detail: THERAPIES SERIES
Discharge: HOME | End: 2019-08-28
Payer: MEDICAID

## 2019-08-28 PROCEDURE — 97530 THERAPEUTIC ACTIVITIES: CPT | Performed by: PHYSICAL THERAPIST

## 2019-08-28 PROCEDURE — 97110 THERAPEUTIC EXERCISES: CPT | Performed by: PHYSICAL THERAPIST

## 2019-08-28 NOTE — PROGRESS NOTES
Physical Therapy  Initial Assessment  Date: 2019  Patient Name: Paco Ceja  MRN: [id number]  : 1960     Subjective   General  Chart Reviewed: Yes  Referring Practitioner: Eric De Jesus Referral Date : 19  Diagnosis: s/p L humeral fx  PT Visit Information  Onset Date: 19  PT Insurance Information: [address]  Total # of Visits Approved: 12  Total # of Visits to Date: 1  Subjective  Subjective: pt presents to therapy with c/o L shoulder weakness/aching since falling on her L side and fx her L arm and foot; no sx needed; no PMH for L UE; pt presents with no sling; no c/o N/T or clicking in the shoulder with movement; MEDS help; sleep is hampered due to pain; RTD for follow-up 19  Pain Screening  Patient Currently in Pain: Yes  Pain Assessment  Pain Assessment: 0-10  Pain Level: 6  Pain Type: Acute pain  Pain Location: Shoulder  Pain Orientation: Left  Pain Descriptors: Aching;Constant  Functional Pain Assessment: Prevents or interferes some active activities and ADLs  Vital Signs  Patient Currently in Pain: Yes     Objective  AROM RLE (degrees)  RLE AROM: WNL  AROM LLE (degrees)  LLE AROM : WNL  AROM RUE (degrees)  RUE AROM : WFL  PROM LUE (degrees)  LUE PROM: WNL  AROM LUE (degrees)  LUE General AROM: WFL for all ranges L shoulder;  WNL for all ranges L elbow/wrist  Spine  Cervical: WNL for all ranges with no c/o radiculopathy noted      Strength Other  Other: grossly 3-, 3/5 for all ranges L shoulder/RTC; 5/5 across L elbow/wrist  Additional Measures  Other: endurance for all prolonged activities is FAIR/FAIR+  Sensation  Overall Sensation Status: WNL  Assessment   Conditions Requiring Skilled Therapeutic Intervention  Body structures, Functions, Activity limitations: Decreased strength;Decreased endurance  Assessment: pain noted across L shoulder/UE with all prolonged activities, 6/10   Prognosis: Fair;Good  Decision Making: Low Complexity  Activity Tolerance  Activity Tolerance: Patient Tolerated treatment well       Plan   This eval/plan of care is being sent to inform you that your pt will need new authorization under the new [address] regulations in order to continue treatment after 9/1/19. As per the new regulation, the referring physician needs to send in a new request for authorization to continue treatment. We will not be able to provide services until this authorization is received by our office. If treatment is provided after 9/1/19 without new authorization, the entire claim will/may be denied.   You may fax the new authorization to us at [phone] and  we will contact your pt to set up further treatment.          Zach Estrada, MPT

## 2019-09-11 ENCOUNTER — HOSPITAL ENCOUNTER (OUTPATIENT)
Dept: GENERAL RADIOLOGY | Age: 59
Discharge: HOME | End: 2019-09-13
Payer: MEDICAID

## 2019-09-11 ENCOUNTER — OFFICE VISIT (OUTPATIENT)
Dept: ORTHOPEDIC SURGERY | Age: 59
End: 2019-09-11
Payer: MEDICAID

## 2019-09-11 VITALS
SYSTOLIC BLOOD PRESSURE: 135 MMHG | HEART RATE: 74 BPM | DIASTOLIC BLOOD PRESSURE: 92 MMHG | WEIGHT: 132 LBS | BODY MASS INDEX: 24.14 KG/M2

## 2019-09-11 DIAGNOSIS — S92.302D MULTIPLE CLOSED FRACTURES OF METATARSAL BONE OF LEFT FOOT WITH ROUTINE HEALING, SUBSEQUENT ENCOUNTER: ICD-10-CM

## 2019-09-11 DIAGNOSIS — S92.302D MULTIPLE CLOSED FRACTURES OF METATARSAL BONE OF LEFT FOOT WITH ROUTINE HEALING, SUBSEQUENT ENCOUNTER: Primary | ICD-10-CM

## 2019-09-11 DIAGNOSIS — S42.272D CLOSED TORUS FRACTURE OF PROXIMAL END OF LEFT HUMERUS WITH ROUTINE HEALING, SUBSEQUENT ENCOUNTER: ICD-10-CM

## 2019-09-11 DIAGNOSIS — R29.6 MULTIPLE FALLS: ICD-10-CM

## 2019-09-11 PROCEDURE — 99212 OFFICE O/P EST SF 10 MIN: CPT

## 2019-09-11 PROCEDURE — 73630 X-RAY EXAM OF FOOT: CPT

## 2019-09-11 PROCEDURE — 99024 POSTOP FOLLOW-UP VISIT: CPT | Performed by: PHYSICIAN ASSISTANT

## 2019-09-11 NOTE — PROGRESS NOTES
wound.   Neurological: Negative for dizziness, tremors, seizures, weakness, light-headedness, no TIA or stroke symptoms. No numbness and headaches. Psychiatric/Behavioral: Negative. OBJECTIVE:      Physical Examination:   General appearance: alert, well appearing, and in no distress,  normal appearing weight. No visible signs of trauma   Mental status: alert, oriented to person, place, and time, normal mood, behavior, speech, dress, motor activity, and thought processes  Abdomen: soft, nondistended  Resp:   resp easy and unlabored, no audible wheezes note, normal symmetrical expansion of both hemithoraces  Cardiac: distal pulses palpable, skin and extremities well perfused  Neurological: alert, oriented X3, normal speech, no focal findings or movement disorder noted, motor and sensory grossly normal bilaterally, normal muscle tone, no tremors, strength 5/5, normal gait and station  HEENT: normochephalic atraumatic, external ears and eyes normal, sclera normal, neck supple, no nasal discharge. Extremities:   peripheral pulses normal, no edema, redness or tenderness in the calves   Skin: normal coloration, no rashes or open wounds, no suspicious skin lesions noted  Psych: Affect euthymic   Musculoskeletal:   Extremity:  Left Lower Extremity  Skin clean dry and intact, without signs of infection  Mild edema noted to the dorsum of the left foot. TTP to the 3rd and 4th metatarsals distally, very mild, no other TTP  No palpable creptius  Compartments supple throughout thigh and leg  Calf supple and nontender  Demonstrates active knee flexion/extension, ankle plantar/dorsiflexion/great toe extension. States sensation intact to touch in sural/deep peroneal/superficial peroneal/saphenous/posterior tibial nerve distributions to foot/ankle. Palpable dorsalis pedis and posterior tibialis pulses, cap refill brisk in toes, foot warm/perfused.      Left Upper Extremity  Skin is clean dry and intact  No edema

## 2019-09-16 ENCOUNTER — HOSPITAL ENCOUNTER (OUTPATIENT)
Dept: PHYSICAL THERAPY | Age: 59
Setting detail: THERAPIES SERIES
Discharge: HOME | End: 2019-09-16
Payer: MEDICAID

## 2019-09-16 PROCEDURE — 97530 THERAPEUTIC ACTIVITIES: CPT | Performed by: PHYSICAL THERAPIST

## 2019-09-16 PROCEDURE — 97110 THERAPEUTIC EXERCISES: CPT | Performed by: PHYSICAL THERAPIST

## 2019-09-16 PROCEDURE — G0283 ELEC STIM OTHER THAN WOUND: HCPCS | Performed by: PHYSICAL THERAPIST

## 2019-09-20 ENCOUNTER — HOSPITAL ENCOUNTER (OUTPATIENT)
Dept: PHYSICAL THERAPY | Age: 59
Setting detail: THERAPIES SERIES
Discharge: HOME | End: 2019-09-20
Payer: MEDICAID

## 2019-09-20 PROCEDURE — 97530 THERAPEUTIC ACTIVITIES: CPT | Performed by: PHYSICAL THERAPIST

## 2019-09-20 PROCEDURE — 97110 THERAPEUTIC EXERCISES: CPT | Performed by: PHYSICAL THERAPIST

## 2019-09-20 NOTE — PROGRESS NOTES
[facility]                Phone: [phone]   Fax: [phone]    Physical Therapy Daily Treatment Note  Date:  2019    Patient Name:  Dorcas Landis    :  1960  MRN: [id number]    Evaluating therapist:  CHRIS Reagan                        (19)  Restrictions/Precautions:    Diagnosis:  s/p L humeral fx  Treatment Diagnosis:    Insurance/Certification information: [address]   Referring Physician:  Sofy Aranda of care signed (Y/N):    Visit# / total visits:    Pain level: 6/10   Time In:  1305   Time Out:  1423    Subjective:      Exercises:  Exercise/Equipment Resistance/Repetitions Other comments   UBE   3 min F/B           shrugs [id number]    scap ret [id number]           H/M pulls 15xgr    rows   15xgr    hor abd 15xyellow           standing rings [id number]                   L shoulder:  flex= WNL 19                        abd= WNL                          IR= WNL                          ER= WNL                            Other Therapeutic Activities:      Home Exercise Program:  provided 19     Manual Treatments:      Modalities:  IFC/MH to L shoulder x 15 min     Timed Code Treatment Minutes: Total Treatment Minutes:      Treatment/Activity Tolerance:  [] Patient tolerated treatment well [] Patient limited by fatique  [] Patient limited by pain  [] Patient limited by other medical complications  [] Other:     Prognosis: [] Good [] Fair  [] Poor    Patient Requires Follow-up: [] Yes  [] No    Plan:   [] Continue per plan of care [] Alter current plan (see comments)  [] Plan of care initiated [] Hold pending MD visit [] Discharge  Plan for Next Session:      See Weekly Progress Note: []  Yes  []  No  Next due:        Electronically signed by:   Vaishali Lindo

## 2019-09-30 ENCOUNTER — HOSPITAL ENCOUNTER (OUTPATIENT)
Dept: PHYSICAL THERAPY | Age: 59
Setting detail: THERAPIES SERIES
Discharge: HOME | End: 2019-09-30
Payer: MEDICAID

## 2019-09-30 PROCEDURE — G0283 ELEC STIM OTHER THAN WOUND: HCPCS | Performed by: PHYSICAL THERAPIST

## 2019-09-30 PROCEDURE — 97110 THERAPEUTIC EXERCISES: CPT | Performed by: PHYSICAL THERAPIST

## 2019-09-30 PROCEDURE — 97530 THERAPEUTIC ACTIVITIES: CPT | Performed by: PHYSICAL THERAPIST

## 2019-10-03 ENCOUNTER — HOSPITAL ENCOUNTER (OUTPATIENT)
Dept: PHYSICAL THERAPY | Age: 59
Setting detail: THERAPIES SERIES
Discharge: HOME | End: 2019-10-03
Payer: MEDICAID

## 2019-10-04 ENCOUNTER — HOSPITAL ENCOUNTER (OUTPATIENT)
Dept: PHYSICAL THERAPY | Age: 59
Setting detail: THERAPIES SERIES
Discharge: HOME | End: 2019-10-04
Payer: MEDICAID

## 2019-10-04 PROCEDURE — 97530 THERAPEUTIC ACTIVITIES: CPT | Performed by: PHYSICAL THERAPIST

## 2019-10-04 PROCEDURE — G0283 ELEC STIM OTHER THAN WOUND: HCPCS | Performed by: PHYSICAL THERAPIST

## 2019-10-04 PROCEDURE — 97110 THERAPEUTIC EXERCISES: CPT | Performed by: PHYSICAL THERAPIST

## 2019-10-07 ENCOUNTER — HOSPITAL ENCOUNTER (OUTPATIENT)
Dept: PHYSICAL THERAPY | Age: 59
Setting detail: THERAPIES SERIES
Discharge: HOME | End: 2019-10-07
Payer: MEDICAID

## 2019-10-07 PROCEDURE — G0283 ELEC STIM OTHER THAN WOUND: HCPCS | Performed by: PHYSICAL THERAPIST

## 2019-10-07 PROCEDURE — 97110 THERAPEUTIC EXERCISES: CPT | Performed by: PHYSICAL THERAPIST

## 2019-10-07 PROCEDURE — 97530 THERAPEUTIC ACTIVITIES: CPT | Performed by: PHYSICAL THERAPIST

## 2019-10-10 ENCOUNTER — HOSPITAL ENCOUNTER (OUTPATIENT)
Dept: PHYSICAL THERAPY | Age: 59
Setting detail: THERAPIES SERIES
Discharge: HOME | End: 2019-10-10
Payer: MEDICAID

## 2019-10-10 PROCEDURE — G0283 ELEC STIM OTHER THAN WOUND: HCPCS | Performed by: PHYSICAL THERAPIST

## 2019-10-10 PROCEDURE — 97530 THERAPEUTIC ACTIVITIES: CPT | Performed by: PHYSICAL THERAPIST

## 2019-10-10 PROCEDURE — 97110 THERAPEUTIC EXERCISES: CPT | Performed by: PHYSICAL THERAPIST

## 2019-10-14 ENCOUNTER — HOSPITAL ENCOUNTER (OUTPATIENT)
Dept: PHYSICAL THERAPY | Age: 59
Setting detail: THERAPIES SERIES
Discharge: HOME | End: 2019-10-14
Payer: MEDICAID

## 2019-10-15 ENCOUNTER — HOSPITAL ENCOUNTER (OUTPATIENT)
Dept: PHYSICAL THERAPY | Age: 59
Setting detail: THERAPIES SERIES
Discharge: HOME | End: 2019-10-15
Payer: MEDICAID

## 2019-10-15 PROCEDURE — G0283 ELEC STIM OTHER THAN WOUND: HCPCS | Performed by: PHYSICAL THERAPIST

## 2019-10-15 PROCEDURE — 97530 THERAPEUTIC ACTIVITIES: CPT | Performed by: PHYSICAL THERAPIST

## 2019-10-15 PROCEDURE — 97110 THERAPEUTIC EXERCISES: CPT | Performed by: PHYSICAL THERAPIST

## 2019-10-17 ENCOUNTER — HOSPITAL ENCOUNTER (OUTPATIENT)
Dept: PHYSICAL THERAPY | Age: 59
Setting detail: THERAPIES SERIES
Discharge: HOME | End: 2019-10-17
Payer: MEDICAID

## 2019-10-17 PROCEDURE — 97530 THERAPEUTIC ACTIVITIES: CPT | Performed by: PHYSICAL THERAPIST

## 2019-10-17 PROCEDURE — G0283 ELEC STIM OTHER THAN WOUND: HCPCS | Performed by: PHYSICAL THERAPIST

## 2019-10-17 PROCEDURE — 97110 THERAPEUTIC EXERCISES: CPT | Performed by: PHYSICAL THERAPIST

## 2019-10-27 ENCOUNTER — HOSPITAL ENCOUNTER (OUTPATIENT)
Age: 59
Setting detail: OBSERVATION
Discharge: HOME | End: 2019-10-29
Attending: EMERGENCY MEDICINE | Admitting: INTERNAL MEDICINE
Payer: MEDICAID

## 2019-10-27 ENCOUNTER — APPOINTMENT (OUTPATIENT)
Dept: CT IMAGING | Age: 59
End: 2019-10-27
Payer: MEDICAID

## 2019-10-27 DIAGNOSIS — S39.012A STRAIN OF LUMBAR REGION, INITIAL ENCOUNTER: ICD-10-CM

## 2019-10-27 DIAGNOSIS — S32.010A COMPRESSION FRACTURE OF L1 VERTEBRA, INITIAL ENCOUNTER (HCC): Primary | ICD-10-CM

## 2019-10-27 PROCEDURE — 99285 EMERGENCY DEPT VISIT HI MDM: CPT

## 2019-10-27 PROCEDURE — 93005 ELECTROCARDIOGRAM TRACING: CPT | Performed by: NURSE PRACTITIONER

## 2019-10-27 PROCEDURE — 96374 THER/PROPH/DIAG INJ IV PUSH: CPT

## 2019-10-27 PROCEDURE — 6360000002 HC RX W HCPCS: Performed by: NURSE PRACTITIONER

## 2019-10-27 PROCEDURE — 72131 CT LUMBAR SPINE W/O DYE: CPT

## 2019-10-27 PROCEDURE — 6370000000 HC RX 637 (ALT 250 FOR IP): Performed by: NURSE PRACTITIONER

## 2019-10-27 RX ORDER — METHYLPREDNISOLONE 4 MG/1
TABLET ORAL
Qty: 1 KIT | Refills: 0 | Status: ON HOLD | OUTPATIENT
Start: 2019-10-27 | End: 2019-10-28 | Stop reason: ALTCHOICE

## 2019-10-27 RX ORDER — HYDROCODONE BITARTRATE AND ACETAMINOPHEN 5; 325 MG/1; MG/1
1 TABLET ORAL ONCE
Status: COMPLETED | OUTPATIENT
Start: 2019-10-27 | End: 2019-10-27

## 2019-10-27 RX ORDER — FENTANYL CITRATE 50 UG/ML
25 INJECTION, SOLUTION INTRAMUSCULAR; INTRAVENOUS ONCE
Status: COMPLETED | OUTPATIENT
Start: 2019-10-27 | End: 2019-10-28

## 2019-10-27 RX ORDER — ORPHENADRINE CITRATE 30 MG/ML
60 INJECTION INTRAMUSCULAR; INTRAVENOUS ONCE
Status: COMPLETED | OUTPATIENT
Start: 2019-10-27 | End: 2019-10-27

## 2019-10-27 RX ORDER — ORPHENADRINE CITRATE 100 MG/1
100 TABLET, EXTENDED RELEASE ORAL 2 TIMES DAILY
Qty: 20 TABLET | Refills: 0 | Status: ON HOLD | OUTPATIENT
Start: 2019-10-27 | End: 2019-10-28 | Stop reason: ALTCHOICE

## 2019-10-27 RX ADMIN — HYDROCODONE BITARTRATE AND ACETAMINOPHEN 1 TABLET: 5; 325 TABLET ORAL at 19:26

## 2019-10-27 RX ADMIN — ORPHENADRINE CITRATE 60 MG: 30 INJECTION INTRAMUSCULAR; INTRAVENOUS at 18:35

## 2019-10-27 ASSESSMENT — PAIN SCALES - GENERAL
PAINLEVEL_OUTOF10: 9
PAINLEVEL_OUTOF10: 9

## 2019-10-28 ENCOUNTER — APPOINTMENT (OUTPATIENT)
Dept: GENERAL RADIOLOGY | Age: 59
End: 2019-10-28
Payer: MEDICAID

## 2019-10-28 ENCOUNTER — ANESTHESIA EVENT (OUTPATIENT)
Dept: OPERATING ROOM | Age: 59
End: 2019-10-28
Payer: MEDICAID

## 2019-10-28 ENCOUNTER — ANESTHESIA (OUTPATIENT)
Dept: OPERATING ROOM | Age: 59
End: 2019-10-28
Payer: MEDICAID

## 2019-10-28 ENCOUNTER — APPOINTMENT (OUTPATIENT)
Dept: MRI IMAGING | Age: 59
End: 2019-10-28
Payer: MEDICAID

## 2019-10-28 VITALS — TEMPERATURE: 96.6 F | DIASTOLIC BLOOD PRESSURE: 78 MMHG | OXYGEN SATURATION: 89 % | SYSTOLIC BLOOD PRESSURE: 105 MMHG

## 2019-10-28 PROBLEM — S32.000A LUMBAR COMPRESSION FRACTURE, CLOSED, INITIAL ENCOUNTER (HCC): Status: ACTIVE | Noted: 2019-10-28

## 2019-10-28 LAB
ABO/RH: NORMAL
ALBUMIN SERPL-MCNC: 5.1 G/DL (ref 3.5–5.2)
ALP BLD-CCNC: 134 U/L (ref 35–104)
ALT SERPL-CCNC: 36 U/L (ref 0–32)
ANION GAP SERPL CALCULATED.3IONS-SCNC: 18 MMOL/L (ref 7–16)
ANTIBODY SCREEN: NORMAL
APTT: 31.9 SEC (ref 24.5–35.1)
AST SERPL-CCNC: 76 U/L (ref 0–31)
BASOPHILS ABSOLUTE: 0.08 E9/L (ref 0–0.2)
BASOPHILS RELATIVE PERCENT: 1.1 % (ref 0–2)
BILIRUB SERPL-MCNC: 1 MG/DL (ref 0–1.2)
BUN BLDV-MCNC: 13 MG/DL (ref 6–20)
CALCIUM SERPL-MCNC: 10 MG/DL (ref 8.6–10.2)
CHLORIDE BLD-SCNC: 96 MMOL/L (ref 98–107)
CO2: 23 MMOL/L (ref 22–29)
CREAT SERPL-MCNC: 0.6 MG/DL (ref 0.5–1)
EKG ATRIAL RATE: 70 BPM
EKG P AXIS: 69 DEGREES
EKG P-R INTERVAL: 158 MS
EKG Q-T INTERVAL: 460 MS
EKG QRS DURATION: 82 MS
EKG QTC CALCULATION (BAZETT): 496 MS
EKG R AXIS: 52 DEGREES
EKG T AXIS: 66 DEGREES
EKG VENTRICULAR RATE: 70 BPM
EOSINOPHILS ABSOLUTE: 0.06 E9/L (ref 0.05–0.5)
EOSINOPHILS RELATIVE PERCENT: 0.9 % (ref 0–6)
GFR AFRICAN AMERICAN: >60
GFR NON-AFRICAN AMERICAN: >60 ML/MIN/1.73
GLUCOSE BLD-MCNC: 72 MG/DL (ref 74–99)
HCT VFR BLD CALC: 44.5 % (ref 34–48)
HEMOGLOBIN: 14.8 G/DL (ref 11.5–15.5)
IMMATURE GRANULOCYTES #: 0.03 E9/L
IMMATURE GRANULOCYTES %: 0.4 % (ref 0–5)
INR BLD: 0.9
LYMPHOCYTES ABSOLUTE: 2.6 E9/L (ref 1.5–4)
LYMPHOCYTES RELATIVE PERCENT: 37.1 % (ref 20–42)
MCH RBC QN AUTO: 32.7 PG (ref 26–35)
MCHC RBC AUTO-ENTMCNC: 33.3 % (ref 32–34.5)
MCV RBC AUTO: 98.2 FL (ref 80–99.9)
MONOCYTES ABSOLUTE: 0.56 E9/L (ref 0.1–0.95)
MONOCYTES RELATIVE PERCENT: 8 % (ref 2–12)
NEUTROPHILS ABSOLUTE: 3.67 E9/L (ref 1.8–7.3)
NEUTROPHILS RELATIVE PERCENT: 52.5 % (ref 43–80)
PDW BLD-RTO: 14.6 FL (ref 11.5–15)
PLATELET # BLD: 145 E9/L (ref 130–450)
PMV BLD AUTO: 10.4 FL (ref 7–12)
POTASSIUM SERPL-SCNC: 4.1 MMOL/L (ref 3.5–5)
POTASSIUM SERPL-SCNC: 6 MMOL/L (ref 3.5–5)
PROTHROMBIN TIME: 10.3 SEC (ref 9.3–12.4)
RBC # BLD: 4.53 E12/L (ref 3.5–5.5)
SODIUM BLD-SCNC: 137 MMOL/L (ref 132–146)
TOTAL PROTEIN: 8.8 G/DL (ref 6.4–8.3)
WBC # BLD: 7 E9/L (ref 4.5–11.5)

## 2019-10-28 PROCEDURE — 80053 COMPREHEN METABOLIC PANEL: CPT

## 2019-10-28 PROCEDURE — 86901 BLOOD TYPING SEROLOGIC RH(D): CPT

## 2019-10-28 PROCEDURE — 96375 TX/PRO/DX INJ NEW DRUG ADDON: CPT

## 2019-10-28 PROCEDURE — 6370000000 HC RX 637 (ALT 250 FOR IP): Performed by: INTERNAL MEDICINE

## 2019-10-28 PROCEDURE — 7100000001 HC PACU RECOVERY - ADDTL 15 MIN: Performed by: NEUROLOGICAL SURGERY

## 2019-10-28 PROCEDURE — 99218 PR INITIAL OBSERVATION CARE/DAY 30 MINUTES: CPT | Performed by: NEUROLOGICAL SURGERY

## 2019-10-28 PROCEDURE — 85610 PROTHROMBIN TIME: CPT

## 2019-10-28 PROCEDURE — 88307 TISSUE EXAM BY PATHOLOGIST: CPT

## 2019-10-28 PROCEDURE — 3600000014 HC SURGERY LEVEL 4 ADDTL 15MIN: Performed by: NEUROLOGICAL SURGERY

## 2019-10-28 PROCEDURE — 2580000003 HC RX 258: Performed by: INTERNAL MEDICINE

## 2019-10-28 PROCEDURE — G0378 HOSPITAL OBSERVATION PER HR: HCPCS

## 2019-10-28 PROCEDURE — 3700000000 HC ANESTHESIA ATTENDED CARE: Performed by: NEUROLOGICAL SURGERY

## 2019-10-28 PROCEDURE — 3700000001 HC ADD 15 MINUTES (ANESTHESIA): Performed by: NEUROLOGICAL SURGERY

## 2019-10-28 PROCEDURE — 3600000004 HC SURGERY LEVEL 4 BASE: Performed by: NEUROLOGICAL SURGERY

## 2019-10-28 PROCEDURE — 2500000003 HC RX 250 WO HCPCS: Performed by: NEUROLOGICAL SURGERY

## 2019-10-28 PROCEDURE — 7100000000 HC PACU RECOVERY - FIRST 15 MIN: Performed by: NEUROLOGICAL SURGERY

## 2019-10-28 PROCEDURE — 84132 ASSAY OF SERUM POTASSIUM: CPT

## 2019-10-28 PROCEDURE — 93010 ELECTROCARDIOGRAM REPORT: CPT | Performed by: INTERNAL MEDICINE

## 2019-10-28 PROCEDURE — 6360000002 HC RX W HCPCS: Performed by: INTERNAL MEDICINE

## 2019-10-28 PROCEDURE — 86850 RBC ANTIBODY SCREEN: CPT

## 2019-10-28 PROCEDURE — 85025 COMPLETE CBC W/AUTO DIFF WBC: CPT

## 2019-10-28 PROCEDURE — 2709999900 HC NON-CHARGEABLE SUPPLY: Performed by: NEUROLOGICAL SURGERY

## 2019-10-28 PROCEDURE — 2500000003 HC RX 250 WO HCPCS

## 2019-10-28 PROCEDURE — C1894 INTRO/SHEATH, NON-LASER: HCPCS | Performed by: NEUROLOGICAL SURGERY

## 2019-10-28 PROCEDURE — 2500000003 HC RX 250 WO HCPCS: Performed by: EMERGENCY MEDICINE

## 2019-10-28 PROCEDURE — 36415 COLL VENOUS BLD VENIPUNCTURE: CPT

## 2019-10-28 PROCEDURE — 3209999900 FLUORO FOR SURGICAL PROCEDURES

## 2019-10-28 PROCEDURE — 86900 BLOOD TYPING SEROLOGIC ABO: CPT

## 2019-10-28 PROCEDURE — 6370000000 HC RX 637 (ALT 250 FOR IP): Performed by: PHYSICIAN ASSISTANT

## 2019-10-28 PROCEDURE — 85730 THROMBOPLASTIN TIME PARTIAL: CPT

## 2019-10-28 PROCEDURE — 6360000002 HC RX W HCPCS: Performed by: NURSE PRACTITIONER

## 2019-10-28 PROCEDURE — 6360000002 HC RX W HCPCS

## 2019-10-28 PROCEDURE — 72148 MRI LUMBAR SPINE W/O DYE: CPT

## 2019-10-28 PROCEDURE — 2580000003 HC RX 258

## 2019-10-28 RX ORDER — HYDRALAZINE HYDROCHLORIDE 20 MG/ML
5 INJECTION INTRAMUSCULAR; INTRAVENOUS EVERY 10 MIN PRN
Status: DISCONTINUED | OUTPATIENT
Start: 2019-10-28 | End: 2019-10-28 | Stop reason: HOSPADM

## 2019-10-28 RX ORDER — PROPOFOL 10 MG/ML
INJECTION, EMULSION INTRAVENOUS PRN
Status: DISCONTINUED | OUTPATIENT
Start: 2019-10-28 | End: 2019-10-28 | Stop reason: SDUPTHER

## 2019-10-28 RX ORDER — DEXAMETHASONE SODIUM PHOSPHATE 10 MG/ML
INJECTION INTRAMUSCULAR; INTRAVENOUS PRN
Status: DISCONTINUED | OUTPATIENT
Start: 2019-10-28 | End: 2019-10-28 | Stop reason: SDUPTHER

## 2019-10-28 RX ORDER — FENTANYL CITRATE 50 UG/ML
25 INJECTION, SOLUTION INTRAMUSCULAR; INTRAVENOUS EVERY 5 MIN PRN
Status: DISCONTINUED | OUTPATIENT
Start: 2019-10-28 | End: 2019-10-28 | Stop reason: HOSPADM

## 2019-10-28 RX ORDER — SODIUM CHLORIDE 0.9 % (FLUSH) 0.9 %
10 SYRINGE (ML) INJECTION EVERY 12 HOURS SCHEDULED
Status: DISCONTINUED | OUTPATIENT
Start: 2019-10-28 | End: 2019-10-29 | Stop reason: HOSPADM

## 2019-10-28 RX ORDER — PHENYLEPHRINE HYDROCHLORIDE 10 MG/ML
INJECTION INTRAVENOUS PRN
Status: DISCONTINUED | OUTPATIENT
Start: 2019-10-28 | End: 2019-10-28 | Stop reason: SDUPTHER

## 2019-10-28 RX ORDER — ONDANSETRON 2 MG/ML
4 INJECTION INTRAMUSCULAR; INTRAVENOUS EVERY 6 HOURS PRN
Status: DISCONTINUED | OUTPATIENT
Start: 2019-10-28 | End: 2019-10-29 | Stop reason: HOSPADM

## 2019-10-28 RX ORDER — PROMETHAZINE HYDROCHLORIDE 25 MG/ML
6.25 INJECTION, SOLUTION INTRAMUSCULAR; INTRAVENOUS
Status: DISCONTINUED | OUTPATIENT
Start: 2019-10-28 | End: 2019-10-28 | Stop reason: HOSPADM

## 2019-10-28 RX ORDER — FENTANYL CITRATE 50 UG/ML
INJECTION, SOLUTION INTRAMUSCULAR; INTRAVENOUS PRN
Status: DISCONTINUED | OUTPATIENT
Start: 2019-10-28 | End: 2019-10-28 | Stop reason: SDUPTHER

## 2019-10-28 RX ORDER — LIDOCAINE HYDROCHLORIDE AND EPINEPHRINE 5; 5 MG/ML; UG/ML
INJECTION, SOLUTION INFILTRATION; PERINEURAL PRN
Status: DISCONTINUED | OUTPATIENT
Start: 2019-10-28 | End: 2019-10-28 | Stop reason: HOSPADM

## 2019-10-28 RX ORDER — SODIUM CHLORIDE 0.9 % (FLUSH) 0.9 %
10 SYRINGE (ML) INJECTION PRN
Status: DISCONTINUED | OUTPATIENT
Start: 2019-10-28 | End: 2019-10-29 | Stop reason: HOSPADM

## 2019-10-28 RX ORDER — MIDAZOLAM HYDROCHLORIDE 1 MG/ML
INJECTION INTRAMUSCULAR; INTRAVENOUS PRN
Status: DISCONTINUED | OUTPATIENT
Start: 2019-10-28 | End: 2019-10-28 | Stop reason: SDUPTHER

## 2019-10-28 RX ORDER — LIDOCAINE HYDROCHLORIDE 20 MG/ML
INJECTION, SOLUTION INTRAVENOUS PRN
Status: DISCONTINUED | OUTPATIENT
Start: 2019-10-28 | End: 2019-10-28 | Stop reason: SDUPTHER

## 2019-10-28 RX ORDER — LABETALOL HYDROCHLORIDE 5 MG/ML
5 INJECTION, SOLUTION INTRAVENOUS ONCE
Status: COMPLETED | OUTPATIENT
Start: 2019-10-28 | End: 2019-10-28

## 2019-10-28 RX ORDER — DIPHENHYDRAMINE HYDROCHLORIDE 50 MG/ML
25 INJECTION INTRAMUSCULAR; INTRAVENOUS EVERY 6 HOURS PRN
Status: DISCONTINUED | OUTPATIENT
Start: 2019-10-28 | End: 2019-10-29 | Stop reason: HOSPADM

## 2019-10-28 RX ORDER — DIPHENHYDRAMINE HCL 25 MG
25 TABLET ORAL ONCE
Status: COMPLETED | OUTPATIENT
Start: 2019-10-28 | End: 2019-10-28

## 2019-10-28 RX ORDER — HYDROCODONE BITARTRATE AND ACETAMINOPHEN 5; 325 MG/1; MG/1
1 TABLET ORAL EVERY 6 HOURS PRN
Status: DISCONTINUED | OUTPATIENT
Start: 2019-10-28 | End: 2019-10-28

## 2019-10-28 RX ORDER — FENTANYL CITRATE 50 UG/ML
50 INJECTION, SOLUTION INTRAMUSCULAR; INTRAVENOUS EVERY 5 MIN PRN
Status: DISCONTINUED | OUTPATIENT
Start: 2019-10-28 | End: 2019-10-28 | Stop reason: HOSPADM

## 2019-10-28 RX ORDER — SODIUM CHLORIDE 9 MG/ML
INJECTION, SOLUTION INTRAVENOUS CONTINUOUS PRN
Status: DISCONTINUED | OUTPATIENT
Start: 2019-10-28 | End: 2019-10-28 | Stop reason: SDUPTHER

## 2019-10-28 RX ORDER — CYCLOBENZAPRINE HCL 10 MG
10 TABLET ORAL 3 TIMES DAILY PRN
Status: DISCONTINUED | OUTPATIENT
Start: 2019-10-28 | End: 2019-10-29 | Stop reason: HOSPADM

## 2019-10-28 RX ORDER — GLYCOPYRROLATE 1 MG/5 ML
SYRINGE (ML) INTRAVENOUS PRN
Status: DISCONTINUED | OUTPATIENT
Start: 2019-10-28 | End: 2019-10-28 | Stop reason: SDUPTHER

## 2019-10-28 RX ORDER — PANTOPRAZOLE SODIUM 40 MG/1
40 TABLET, DELAYED RELEASE ORAL
Status: DISCONTINUED | OUTPATIENT
Start: 2019-10-28 | End: 2019-10-29 | Stop reason: HOSPADM

## 2019-10-28 RX ORDER — ROCURONIUM BROMIDE 10 MG/ML
INJECTION, SOLUTION INTRAVENOUS PRN
Status: DISCONTINUED | OUTPATIENT
Start: 2019-10-28 | End: 2019-10-28 | Stop reason: SDUPTHER

## 2019-10-28 RX ORDER — NEOSTIGMINE METHYLSULFATE 1 MG/ML
INJECTION, SOLUTION INTRAVENOUS PRN
Status: DISCONTINUED | OUTPATIENT
Start: 2019-10-28 | End: 2019-10-28 | Stop reason: SDUPTHER

## 2019-10-28 RX ORDER — LABETALOL HYDROCHLORIDE 5 MG/ML
5 INJECTION, SOLUTION INTRAVENOUS EVERY 10 MIN PRN
Status: DISCONTINUED | OUTPATIENT
Start: 2019-10-28 | End: 2019-10-28 | Stop reason: HOSPADM

## 2019-10-28 RX ORDER — HYDROCODONE BITARTRATE AND ACETAMINOPHEN 5; 325 MG/1; MG/1
2 TABLET ORAL EVERY 6 HOURS PRN
Status: DISCONTINUED | OUTPATIENT
Start: 2019-10-28 | End: 2019-10-29 | Stop reason: HOSPADM

## 2019-10-28 RX ORDER — MEPERIDINE HYDROCHLORIDE 50 MG/ML
12.5 INJECTION INTRAMUSCULAR; INTRAVENOUS; SUBCUTANEOUS EVERY 5 MIN PRN
Status: DISCONTINUED | OUTPATIENT
Start: 2019-10-28 | End: 2019-10-28 | Stop reason: HOSPADM

## 2019-10-28 RX ORDER — CEFAZOLIN SODIUM 1 G/3ML
INJECTION, POWDER, FOR SOLUTION INTRAMUSCULAR; INTRAVENOUS PRN
Status: DISCONTINUED | OUTPATIENT
Start: 2019-10-28 | End: 2019-10-28 | Stop reason: SDUPTHER

## 2019-10-28 RX ORDER — ONDANSETRON 2 MG/ML
INJECTION INTRAMUSCULAR; INTRAVENOUS PRN
Status: DISCONTINUED | OUTPATIENT
Start: 2019-10-28 | End: 2019-10-28 | Stop reason: SDUPTHER

## 2019-10-28 RX ORDER — ALBUTEROL SULFATE 90 UG/1
2 AEROSOL, METERED RESPIRATORY (INHALATION) EVERY 6 HOURS PRN
Status: DISCONTINUED | OUTPATIENT
Start: 2019-10-28 | End: 2019-10-29 | Stop reason: HOSPADM

## 2019-10-28 RX ADMIN — MIDAZOLAM 2 MG: 1 INJECTION INTRAMUSCULAR; INTRAVENOUS at 16:40

## 2019-10-28 RX ADMIN — SODIUM CHLORIDE: 0.9 INJECTION, SOLUTION INTRAVENOUS at 16:40

## 2019-10-28 RX ADMIN — Medication 10 ML: at 19:55

## 2019-10-28 RX ADMIN — HYDROMORPHONE HYDROCHLORIDE 0.5 MG: 1 INJECTION, SOLUTION INTRAMUSCULAR; INTRAVENOUS; SUBCUTANEOUS at 02:22

## 2019-10-28 RX ADMIN — Medication 10 ML: at 09:04

## 2019-10-28 RX ADMIN — Medication 10 ML: at 02:22

## 2019-10-28 RX ADMIN — Medication 3 MG: at 17:34

## 2019-10-28 RX ADMIN — DIPHENHYDRAMINE HYDROCHLORIDE 25 MG: 50 INJECTION, SOLUTION INTRAMUSCULAR; INTRAVENOUS at 13:50

## 2019-10-28 RX ADMIN — DEXAMETHASONE SODIUM PHOSPHATE 10 MG: 10 INJECTION INTRAMUSCULAR; INTRAVENOUS at 16:52

## 2019-10-28 RX ADMIN — HYDROCODONE BITARTRATE AND ACETAMINOPHEN 1 TABLET: 5; 325 TABLET ORAL at 05:47

## 2019-10-28 RX ADMIN — ROCURONIUM BROMIDE 40 MG: 10 INJECTION, SOLUTION INTRAVENOUS at 16:46

## 2019-10-28 RX ADMIN — HYDROCODONE BITARTRATE AND ACETAMINOPHEN 2 TABLET: 5; 325 TABLET ORAL at 19:08

## 2019-10-28 RX ADMIN — PANTOPRAZOLE SODIUM 40 MG: 40 TABLET, DELAYED RELEASE ORAL at 05:47

## 2019-10-28 RX ADMIN — HYDROCODONE BITARTRATE AND ACETAMINOPHEN 2 TABLET: 5; 325 TABLET ORAL at 13:08

## 2019-10-28 RX ADMIN — PROPOFOL 150 MG: 10 INJECTION, EMULSION INTRAVENOUS at 16:46

## 2019-10-28 RX ADMIN — FENTANYL CITRATE 100 MCG: 50 INJECTION, SOLUTION INTRAMUSCULAR; INTRAVENOUS at 16:46

## 2019-10-28 RX ADMIN — ONDANSETRON HYDROCHLORIDE 4 MG: 2 INJECTION, SOLUTION INTRAMUSCULAR; INTRAVENOUS at 17:28

## 2019-10-28 RX ADMIN — CEFAZOLIN 1000 MG: 1 INJECTION, POWDER, FOR SOLUTION INTRAMUSCULAR; INTRAVENOUS at 16:51

## 2019-10-28 RX ADMIN — CYCLOBENZAPRINE 10 MG: 10 TABLET, FILM COATED ORAL at 18:59

## 2019-10-28 RX ADMIN — LIDOCAINE HYDROCHLORIDE 100 MG: 20 INJECTION, SOLUTION INTRAVENOUS at 16:46

## 2019-10-28 RX ADMIN — FENTANYL CITRATE 50 MCG: 50 INJECTION, SOLUTION INTRAMUSCULAR; INTRAVENOUS at 17:19

## 2019-10-28 RX ADMIN — DIPHENHYDRAMINE HYDROCHLORIDE 25 MG: 50 INJECTION, SOLUTION INTRAMUSCULAR; INTRAVENOUS at 19:55

## 2019-10-28 RX ADMIN — DIPHENHYDRAMINE HCL 25 MG: 25 TABLET ORAL at 03:44

## 2019-10-28 RX ADMIN — LABETALOL HYDROCHLORIDE 5 MG: 5 INJECTION INTRAVENOUS at 00:39

## 2019-10-28 RX ADMIN — Medication 0.6 MG: at 17:34

## 2019-10-28 RX ADMIN — FENTANYL CITRATE 25 MCG: 50 INJECTION, SOLUTION INTRAMUSCULAR; INTRAVENOUS at 00:10

## 2019-10-28 RX ADMIN — CYCLOBENZAPRINE 10 MG: 10 TABLET, FILM COATED ORAL at 11:14

## 2019-10-28 RX ADMIN — PHENYLEPHRINE HYDROCHLORIDE 100 MCG: 10 INJECTION INTRAVENOUS at 16:51

## 2019-10-28 ASSESSMENT — PULMONARY FUNCTION TESTS
PIF_VALUE: 19
PIF_VALUE: 0
PIF_VALUE: 0
PIF_VALUE: 22
PIF_VALUE: 0
PIF_VALUE: 20
PIF_VALUE: 21
PIF_VALUE: 22
PIF_VALUE: 4
PIF_VALUE: 1
PIF_VALUE: 19
PIF_VALUE: 22
PIF_VALUE: 20
PIF_VALUE: 21
PIF_VALUE: 22
PIF_VALUE: 21
PIF_VALUE: 20
PIF_VALUE: 23
PIF_VALUE: 21
PIF_VALUE: 22
PIF_VALUE: 21
PIF_VALUE: 21
PIF_VALUE: 4
PIF_VALUE: 21
PIF_VALUE: 1
PIF_VALUE: 11
PIF_VALUE: 21
PIF_VALUE: 3
PIF_VALUE: 15
PIF_VALUE: 20
PIF_VALUE: 21
PIF_VALUE: 20
PIF_VALUE: 5
PIF_VALUE: 21
PIF_VALUE: 19
PIF_VALUE: 34
PIF_VALUE: 22
PIF_VALUE: 19
PIF_VALUE: 21
PIF_VALUE: 23
PIF_VALUE: 20
PIF_VALUE: 22
PIF_VALUE: 22
PIF_VALUE: 23
PIF_VALUE: 15
PIF_VALUE: 20
PIF_VALUE: 20
PIF_VALUE: 21
PIF_VALUE: 21
PIF_VALUE: 1
PIF_VALUE: 0
PIF_VALUE: 20
PIF_VALUE: 21
PIF_VALUE: 20
PIF_VALUE: 0
PIF_VALUE: 19

## 2019-10-28 ASSESSMENT — PAIN SCALES - GENERAL
PAINLEVEL_OUTOF10: 10
PAINLEVEL_OUTOF10: 6
PAINLEVEL_OUTOF10: 4
PAINLEVEL_OUTOF10: 3
PAINLEVEL_OUTOF10: 0
PAINLEVEL_OUTOF10: 10
PAINLEVEL_OUTOF10: 5
PAINLEVEL_OUTOF10: 0
PAINLEVEL_OUTOF10: 8
PAINLEVEL_OUTOF10: 8
PAINLEVEL_OUTOF10: 10
PAINLEVEL_OUTOF10: 0
PAINLEVEL_OUTOF10: 6
PAINLEVEL_OUTOF10: 0

## 2019-10-28 ASSESSMENT — PAIN DESCRIPTION - FREQUENCY
FREQUENCY: CONTINUOUS

## 2019-10-28 ASSESSMENT — ENCOUNTER SYMPTOMS
SHORTNESS OF BREATH: 0
PHOTOPHOBIA: 0
ABDOMINAL PAIN: 0
DYSPNEA ACTIVITY LEVEL: AFTER AMBULATING 1 FLIGHT OF STAIRS
BACK PAIN: 1
TROUBLE SWALLOWING: 0

## 2019-10-28 ASSESSMENT — PAIN DESCRIPTION - ORIENTATION
ORIENTATION: LOWER

## 2019-10-28 ASSESSMENT — PAIN DESCRIPTION - DESCRIPTORS
DESCRIPTORS: CONSTANT;DISCOMFORT;SHOOTING
DESCRIPTORS: CONSTANT;SHARP;SHOOTING;SPASM
DESCRIPTORS: CONSTANT;DISCOMFORT;SHOOTING
DESCRIPTORS: CONSTANT;DISCOMFORT;SHOOTING
DESCRIPTORS: CONSTANT;DISCOMFORT;SHARP;SHOOTING

## 2019-10-28 ASSESSMENT — PAIN DESCRIPTION - ONSET
ONSET: ON-GOING

## 2019-10-28 ASSESSMENT — LIFESTYLE VARIABLES: SMOKING_STATUS: 1

## 2019-10-28 ASSESSMENT — PAIN DESCRIPTION - LOCATION
LOCATION: BACK

## 2019-10-28 ASSESSMENT — PAIN DESCRIPTION - PROGRESSION
CLINICAL_PROGRESSION: GRADUALLY IMPROVING
CLINICAL_PROGRESSION: NOT CHANGED

## 2019-10-28 ASSESSMENT — PAIN DESCRIPTION - PAIN TYPE
TYPE: ACUTE PAIN

## 2019-10-29 VITALS
HEIGHT: 62 IN | RESPIRATION RATE: 16 BRPM | SYSTOLIC BLOOD PRESSURE: 143 MMHG | BODY MASS INDEX: 22.26 KG/M2 | OXYGEN SATURATION: 93 % | WEIGHT: 121 LBS | TEMPERATURE: 98.4 F | HEART RATE: 78 BPM | DIASTOLIC BLOOD PRESSURE: 87 MMHG

## 2019-10-29 PROCEDURE — 6360000002 HC RX W HCPCS: Performed by: INTERNAL MEDICINE

## 2019-10-29 PROCEDURE — 2580000003 HC RX 258: Performed by: INTERNAL MEDICINE

## 2019-10-29 PROCEDURE — 97535 SELF CARE MNGMENT TRAINING: CPT

## 2019-10-29 PROCEDURE — 6370000000 HC RX 637 (ALT 250 FOR IP): Performed by: INTERNAL MEDICINE

## 2019-10-29 PROCEDURE — 97161 PT EVAL LOW COMPLEX 20 MIN: CPT

## 2019-10-29 PROCEDURE — 97530 THERAPEUTIC ACTIVITIES: CPT

## 2019-10-29 PROCEDURE — G0378 HOSPITAL OBSERVATION PER HR: HCPCS

## 2019-10-29 PROCEDURE — 6370000000 HC RX 637 (ALT 250 FOR IP): Performed by: PHYSICIAN ASSISTANT

## 2019-10-29 PROCEDURE — 96375 TX/PRO/DX INJ NEW DRUG ADDON: CPT

## 2019-10-29 PROCEDURE — 96376 TX/PRO/DX INJ SAME DRUG ADON: CPT

## 2019-10-29 PROCEDURE — 97165 OT EVAL LOW COMPLEX 30 MIN: CPT

## 2019-10-29 RX ORDER — HYDROCODONE BITARTRATE AND ACETAMINOPHEN 5; 325 MG/1; MG/1
1 TABLET ORAL EVERY 4 HOURS PRN
Qty: 42 TABLET | Refills: 0 | Status: SHIPPED | OUTPATIENT
Start: 2019-10-29 | End: 2019-11-05

## 2019-10-29 RX ORDER — DOCUSATE SODIUM 100 MG/1
100 CAPSULE, LIQUID FILLED ORAL 2 TIMES DAILY PRN
Qty: 60 CAPSULE | Refills: 0 | Status: SHIPPED | OUTPATIENT
Start: 2019-10-29 | End: 2019-11-28

## 2019-10-29 RX ADMIN — PANTOPRAZOLE SODIUM 40 MG: 40 TABLET, DELAYED RELEASE ORAL at 04:38

## 2019-10-29 RX ADMIN — DIPHENHYDRAMINE HYDROCHLORIDE 25 MG: 50 INJECTION, SOLUTION INTRAMUSCULAR; INTRAVENOUS at 08:06

## 2019-10-29 RX ADMIN — Medication 10 ML: at 01:47

## 2019-10-29 RX ADMIN — HYDROCODONE BITARTRATE AND ACETAMINOPHEN 2 TABLET: 5; 325 TABLET ORAL at 01:46

## 2019-10-29 RX ADMIN — DIPHENHYDRAMINE HYDROCHLORIDE 25 MG: 50 INJECTION, SOLUTION INTRAMUSCULAR; INTRAVENOUS at 01:47

## 2019-10-29 RX ADMIN — CYCLOBENZAPRINE 10 MG: 10 TABLET, FILM COATED ORAL at 01:46

## 2019-10-29 RX ADMIN — HYDROCODONE BITARTRATE AND ACETAMINOPHEN 2 TABLET: 5; 325 TABLET ORAL at 14:18

## 2019-10-29 RX ADMIN — HYDROCODONE BITARTRATE AND ACETAMINOPHEN 2 TABLET: 5; 325 TABLET ORAL at 08:15

## 2019-10-29 RX ADMIN — DIPHENHYDRAMINE HYDROCHLORIDE 25 MG: 50 INJECTION, SOLUTION INTRAMUSCULAR; INTRAVENOUS at 14:27

## 2019-10-29 RX ADMIN — Medication 10 ML: at 08:07

## 2019-10-29 ASSESSMENT — PAIN SCALES - GENERAL
PAINLEVEL_OUTOF10: 0
PAINLEVEL_OUTOF10: 6
PAINLEVEL_OUTOF10: 7
PAINLEVEL_OUTOF10: 7
PAINLEVEL_OUTOF10: 6

## 2019-10-29 ASSESSMENT — PAIN DESCRIPTION - DESCRIPTORS
DESCRIPTORS: ACHING;DISCOMFORT;SORE
DESCRIPTORS: SHARP;SORE;STABBING

## 2019-10-29 ASSESSMENT — PAIN DESCRIPTION - ORIENTATION
ORIENTATION: LOWER
ORIENTATION: LOWER

## 2019-10-29 ASSESSMENT — PAIN DESCRIPTION - PAIN TYPE
TYPE: SURGICAL PAIN
TYPE: SURGICAL PAIN

## 2019-10-29 ASSESSMENT — PAIN DESCRIPTION - LOCATION
LOCATION: BACK
LOCATION: BACK

## 2019-10-29 ASSESSMENT — PAIN DESCRIPTION - ONSET
ONSET: ON-GOING
ONSET: ON-GOING

## 2019-10-29 ASSESSMENT — PAIN DESCRIPTION - FREQUENCY
FREQUENCY: CONTINUOUS
FREQUENCY: CONTINUOUS

## 2019-10-29 ASSESSMENT — PAIN DESCRIPTION - PROGRESSION: CLINICAL_PROGRESSION: NOT CHANGED

## 2019-10-29 ASSESSMENT — PAIN - FUNCTIONAL ASSESSMENT: PAIN_FUNCTIONAL_ASSESSMENT: PREVENTS OR INTERFERES SOME ACTIVE ACTIVITIES AND ADLS

## 2019-10-30 ENCOUNTER — TELEPHONE (OUTPATIENT)
Dept: ADMINISTRATIVE | Age: 59
End: 2019-10-30

## 2019-11-04 ENCOUNTER — HOSPITAL ENCOUNTER (OUTPATIENT)
Dept: GENERAL RADIOLOGY | Age: 59
Discharge: HOME | End: 2019-11-06
Payer: MEDICAID

## 2019-11-04 ENCOUNTER — OFFICE VISIT (OUTPATIENT)
Dept: ORTHOPEDIC SURGERY | Age: 59
End: 2019-11-04
Payer: MEDICAID

## 2019-11-04 ENCOUNTER — HOSPITAL ENCOUNTER (OUTPATIENT)
Age: 59
Discharge: HOME | End: 2019-11-04
Payer: MEDICAID

## 2019-11-04 VITALS
SYSTOLIC BLOOD PRESSURE: 98 MMHG | HEART RATE: 80 BPM | HEIGHT: 63 IN | WEIGHT: 120 LBS | DIASTOLIC BLOOD PRESSURE: 73 MMHG | BODY MASS INDEX: 21.26 KG/M2

## 2019-11-04 DIAGNOSIS — S92.302D MULTIPLE CLOSED FRACTURES OF METATARSAL BONE OF LEFT FOOT WITH ROUTINE HEALING, SUBSEQUENT ENCOUNTER: ICD-10-CM

## 2019-11-04 DIAGNOSIS — S92.302D MULTIPLE CLOSED FRACTURES OF METATARSAL BONE OF LEFT FOOT WITH ROUTINE HEALING, SUBSEQUENT ENCOUNTER: Primary | ICD-10-CM

## 2019-11-04 LAB
SEDIMENTATION RATE, ERYTHROCYTE: 10 MM/HR (ref 0–20)
TSH SERPL DL<=0.05 MIU/L-ACNC: 0.6 UIU/ML (ref 0.27–4.2)

## 2019-11-04 PROCEDURE — 84443 ASSAY THYROID STIM HORMONE: CPT

## 2019-11-04 PROCEDURE — 85651 RBC SED RATE NONAUTOMATED: CPT

## 2019-11-04 PROCEDURE — 82785 ASSAY OF IGE: CPT

## 2019-11-04 PROCEDURE — 3017F COLORECTAL CA SCREEN DOC REV: CPT | Performed by: PHYSICIAN ASSISTANT

## 2019-11-04 PROCEDURE — 84165 PROTEIN E-PHORESIS SERUM: CPT

## 2019-11-04 PROCEDURE — G8420 CALC BMI NORM PARAMETERS: HCPCS | Performed by: PHYSICIAN ASSISTANT

## 2019-11-04 PROCEDURE — 86038 ANTINUCLEAR ANTIBODIES: CPT

## 2019-11-04 PROCEDURE — 4004F PT TOBACCO SCREEN RCVD TLK: CPT | Performed by: PHYSICIAN ASSISTANT

## 2019-11-04 PROCEDURE — 99212 OFFICE O/P EST SF 10 MIN: CPT | Performed by: PHYSICIAN ASSISTANT

## 2019-11-04 PROCEDURE — G8484 FLU IMMUNIZE NO ADMIN: HCPCS | Performed by: PHYSICIAN ASSISTANT

## 2019-11-04 PROCEDURE — G8427 DOCREV CUR MEDS BY ELIG CLIN: HCPCS | Performed by: PHYSICIAN ASSISTANT

## 2019-11-04 PROCEDURE — 36415 COLL VENOUS BLD VENIPUNCTURE: CPT

## 2019-11-04 PROCEDURE — 86334 IMMUNOFIX E-PHORESIS SERUM: CPT

## 2019-11-04 PROCEDURE — 73630 X-RAY EXAM OF FOOT: CPT

## 2019-11-05 LAB — ANTI-NUCLEAR ANTIBODY (ANA): NEGATIVE

## 2019-11-07 LAB
ALBUMIN SERPL-MCNC: 4.1 G/DL (ref 3.5–4.7)
ALPHA-1-GLOBULIN: 0.4 G/DL (ref 0.2–0.4)
ALPHA-2-GLOBULIN: 0.9 G/FL (ref 0.5–1)
BETA GLOBULIN: 1.3 G/DL (ref 0.8–1.3)
ELECTROPHORESIS: NORMAL
GAMMA GLOBULIN: 1.1 G/DL (ref 0.7–1.6)
IGE: 283 KU/L
IMMUNOFIXATION RESULT, SERUM: NORMAL
TOTAL PROTEIN: 7.7 G/DL (ref 6.4–8.3)

## 2019-11-19 DIAGNOSIS — R29.6 MULTIPLE FALLS: ICD-10-CM

## 2019-11-19 DIAGNOSIS — S92.302D MULTIPLE CLOSED FRACTURES OF METATARSAL BONE OF LEFT FOOT WITH ROUTINE HEALING, SUBSEQUENT ENCOUNTER: Primary | ICD-10-CM

## 2019-11-23 ENCOUNTER — HOSPITAL ENCOUNTER (EMERGENCY)
Age: 59
Discharge: HOME | End: 2019-11-23
Payer: MEDICAID

## 2019-11-23 ENCOUNTER — APPOINTMENT (OUTPATIENT)
Dept: CT IMAGING | Age: 59
End: 2019-11-23
Payer: MEDICAID

## 2019-11-23 VITALS
HEIGHT: 63 IN | HEART RATE: 84 BPM | BODY MASS INDEX: 21.44 KG/M2 | OXYGEN SATURATION: 98 % | DIASTOLIC BLOOD PRESSURE: 96 MMHG | TEMPERATURE: 97.3 F | SYSTOLIC BLOOD PRESSURE: 120 MMHG | WEIGHT: 121 LBS

## 2019-11-23 DIAGNOSIS — G89.18 POST-OP PAIN: Primary | ICD-10-CM

## 2019-11-23 PROCEDURE — 6360000002 HC RX W HCPCS: Performed by: EMERGENCY MEDICINE

## 2019-11-23 PROCEDURE — 6360000002 HC RX W HCPCS: Performed by: NURSE PRACTITIONER

## 2019-11-23 PROCEDURE — 96372 THER/PROPH/DIAG INJ SC/IM: CPT

## 2019-11-23 PROCEDURE — 6370000000 HC RX 637 (ALT 250 FOR IP): Performed by: NURSE PRACTITIONER

## 2019-11-23 PROCEDURE — 72131 CT LUMBAR SPINE W/O DYE: CPT

## 2019-11-23 PROCEDURE — 99283 EMERGENCY DEPT VISIT LOW MDM: CPT

## 2019-11-23 RX ORDER — FENTANYL CITRATE 50 UG/ML
25 INJECTION, SOLUTION INTRAMUSCULAR; INTRAVENOUS ONCE
Status: COMPLETED | OUTPATIENT
Start: 2019-11-23 | End: 2019-11-23

## 2019-11-23 RX ORDER — CYCLOBENZAPRINE HCL 10 MG
10 TABLET ORAL ONCE
Status: COMPLETED | OUTPATIENT
Start: 2019-11-23 | End: 2019-11-23

## 2019-11-23 RX ADMIN — CYCLOBENZAPRINE 10 MG: 10 TABLET, FILM COATED ORAL at 02:58

## 2019-11-23 RX ADMIN — FENTANYL CITRATE 25 MCG: 50 INJECTION, SOLUTION INTRAMUSCULAR; INTRAVENOUS at 05:02

## 2019-11-23 RX ADMIN — FENTANYL CITRATE 25 MCG: 50 INJECTION, SOLUTION INTRAMUSCULAR; INTRAVENOUS at 02:59

## 2019-11-23 ASSESSMENT — PAIN SCALES - GENERAL
PAINLEVEL_OUTOF10: 9
PAINLEVEL_OUTOF10: 6

## 2019-11-23 ASSESSMENT — PAIN DESCRIPTION - LOCATION: LOCATION: BACK

## 2019-11-25 ENCOUNTER — OFFICE VISIT (OUTPATIENT)
Dept: NEUROSURGERY | Age: 59
End: 2019-11-25

## 2019-11-25 VITALS
SYSTOLIC BLOOD PRESSURE: 129 MMHG | HEART RATE: 87 BPM | DIASTOLIC BLOOD PRESSURE: 94 MMHG | HEIGHT: 63 IN | WEIGHT: 127 LBS | BODY MASS INDEX: 22.5 KG/M2

## 2019-11-25 DIAGNOSIS — S32.010A CLOSED COMPRESSION FRACTURE OF BODY OF L1 VERTEBRA (HCC): Primary | ICD-10-CM

## 2019-11-25 DIAGNOSIS — Z98.890 S/P KYPHOPLASTY: ICD-10-CM

## 2019-11-25 PROCEDURE — 99024 POSTOP FOLLOW-UP VISIT: CPT | Performed by: PHYSICIAN ASSISTANT

## 2019-11-25 RX ORDER — HYDROCODONE BITARTRATE AND ACETAMINOPHEN 5; 325 MG/1; MG/1
1 TABLET ORAL EVERY 6 HOURS PRN
COMMUNITY
End: 2021-04-13 | Stop reason: ALTCHOICE

## 2020-01-04 ENCOUNTER — HOSPITAL ENCOUNTER (EMERGENCY)
Age: 60
Discharge: HOME | End: 2020-01-04
Attending: EMERGENCY MEDICINE
Payer: MEDICAID

## 2020-01-04 ENCOUNTER — APPOINTMENT (OUTPATIENT)
Dept: GENERAL RADIOLOGY | Age: 60
End: 2020-01-04
Payer: MEDICAID

## 2020-01-04 VITALS
WEIGHT: 120 LBS | SYSTOLIC BLOOD PRESSURE: 120 MMHG | DIASTOLIC BLOOD PRESSURE: 90 MMHG | HEIGHT: 63 IN | BODY MASS INDEX: 21.26 KG/M2 | HEART RATE: 74 BPM | RESPIRATION RATE: 16 BRPM | TEMPERATURE: 97.9 F | OXYGEN SATURATION: 99 %

## 2020-01-04 PROCEDURE — 6360000002 HC RX W HCPCS: Performed by: EMERGENCY MEDICINE

## 2020-01-04 PROCEDURE — 96372 THER/PROPH/DIAG INJ SC/IM: CPT

## 2020-01-04 PROCEDURE — 72100 X-RAY EXAM L-S SPINE 2/3 VWS: CPT

## 2020-01-04 PROCEDURE — 99283 EMERGENCY DEPT VISIT LOW MDM: CPT

## 2020-01-04 PROCEDURE — 73502 X-RAY EXAM HIP UNI 2-3 VIEWS: CPT

## 2020-01-04 PROCEDURE — 6370000000 HC RX 637 (ALT 250 FOR IP): Performed by: EMERGENCY MEDICINE

## 2020-01-04 RX ORDER — KETOROLAC TROMETHAMINE 30 MG/ML
15 INJECTION, SOLUTION INTRAMUSCULAR; INTRAVENOUS ONCE
Status: COMPLETED | OUTPATIENT
Start: 2020-01-04 | End: 2020-01-04

## 2020-01-04 RX ORDER — DIAZEPAM 5 MG/1
5 TABLET ORAL ONCE
Status: COMPLETED | OUTPATIENT
Start: 2020-01-04 | End: 2020-01-04

## 2020-01-04 RX ORDER — KETOROLAC TROMETHAMINE 30 MG/ML
15 INJECTION, SOLUTION INTRAMUSCULAR; INTRAVENOUS ONCE
Status: DISCONTINUED | OUTPATIENT
Start: 2020-01-04 | End: 2020-01-04

## 2020-01-04 RX ORDER — NAPROXEN 500 MG/1
500 TABLET ORAL 2 TIMES DAILY
Qty: 10 TABLET | Refills: 0 | Status: SHIPPED | OUTPATIENT
Start: 2020-01-04 | End: 2020-01-06

## 2020-01-04 RX ORDER — DIAZEPAM 5 MG/1
5 TABLET ORAL EVERY 6 HOURS PRN
Qty: 10 TABLET | Refills: 0 | Status: SHIPPED | OUTPATIENT
Start: 2020-01-04 | End: 2020-01-14

## 2020-01-04 RX ADMIN — KETOROLAC TROMETHAMINE 15 MG: 30 INJECTION, SOLUTION INTRAMUSCULAR at 04:32

## 2020-01-04 RX ADMIN — DIAZEPAM 5 MG: 5 TABLET ORAL at 04:32

## 2020-01-04 ASSESSMENT — PAIN SCALES - GENERAL
PAINLEVEL_OUTOF10: 7
PAINLEVEL_OUTOF10: 7

## 2020-01-04 ASSESSMENT — PAIN DESCRIPTION - LOCATION: LOCATION: HIP

## 2020-01-04 ASSESSMENT — PAIN DESCRIPTION - DESCRIPTORS: DESCRIPTORS: STABBING

## 2020-01-04 ASSESSMENT — PAIN DESCRIPTION - ORIENTATION: ORIENTATION: RIGHT

## 2020-01-04 NOTE — ED PROVIDER NOTES
HPI:  1/4/20,   Time: 4:09 AM       Марина Underwood is a 61 y.o. female presenting to the ED for back pain and hip pain, beginning 2 days ago. The complaint has been persistent, mild in severity, and worsened by nothing. Patient states that she fell approximately 5 days ago forward. She states that starting 2 days ago she has had increasing lower back pain radiating to her right hip and down her leg. She denies any incontinence. Denies any urinary retention. No numbness or tingling. Review of Systems:   Pertinent positives and negatives are stated within HPI, all other systems reviewed and are negative.          --------------------------------------------- PAST HISTORY ---------------------------------------------  Past Medical History:  has a past medical history of Asthma, CAD (coronary artery disease), Closed fracture of proximal end of left humerus with routine healing, Degeneration of lumbar or lumbosacral intervertebral disc, Fall against sharp object, History of blood transfusion, Hypertension, Hypokalemia, Insomnia, Kidney stone, Nondisplaced fracture of greater tuberosity of right humerus, initial encounter for closed fracture, Orthostatic hypotension, and Severe back pain. Past Surgical History:  has a past surgical history that includes Tonsillectomy; Bunionectomy; chest tube insertion (1990); Lithotripsy (2012); Colonoscopy (3/26/09); other surgical history; Upper gastrointestinal endoscopy (3/30/15); Upper gastrointestinal endoscopy (4/21/08); lumbar fusion (11/09/2017); and Kyphosis surgery (N/A, 10/28/2019). Social History:  reports that she has been smoking. She has a 1.00 pack-year smoking history. She has never used smokeless tobacco. She reports current alcohol use of about 6.0 standard drinks of alcohol per week. She reports that she does not use drugs.     Family History: family history includes Cancer in her brother; Diabetes in her mother; Heart Disease in her maternal (Results Pending)         ------------------------- NURSING NOTES AND VITALS REVIEWED ---------------------------   The nursing notes within the ED encounter and vital signs as below have been reviewed by myself. BP (!) 120/90   Pulse 74   Temp 97.9 °F (36.6 °C)   Resp 16   Ht 5' 3\" (1.6 m)   Wt 120 lb (54.4 kg)   SpO2 99%   BMI 21.26 kg/m²   Oxygen Saturation Interpretation: Normal    The patients available past medical records and past encounters were reviewed. ------------------------------ ED COURSE/MEDICAL DECISION MAKING----------------------  Medications   diazepam (VALIUM) tablet 5 mg (5 mg Oral Given 1/4/20 0432)   ketorolac (TORADOL) injection 15 mg (15 mg Intramuscular Given 1/4/20 0432)         ED COURSE:       Medical Decision Making: This is a 68-year-old female presented to the ED for lower back pain and hip pain. He underwent imaging of her right hip which revealed no acute fracture dislocation. Patient also had imaging of her lower lumbar spine. Chronic hardware noted, kyphoplasty noted, chronic osteoarthritis noted, no obvious acute injuries. Patient was given Toradol and Valium with improvement of her pain. Patient ambulates without difficulty. Patient has no signs of cauda equina at this time. Patient is afebrile with no history of IV drug use therefore less likely to have an epidural abscess. The patient be treated conservatively for her acute on chronic back pain. She will follow-up with her back surgeon in 1 week. Return precautions given. Patient agrees with plan. This patient's ED course included: a personal history and physicial examination and re-evaluation prior to disposition    This patient has remained hemodynamically stable during their ED course. Re-Evaluations:             Re-evaluation. Patients symptoms are improving        Counseling:    The emergency provider has spoken with the patient and discussed todays results, in addition to providing specific details for the plan of care and counseling regarding the diagnosis and prognosis. Questions are answered at this time and they are agreeable with the plan.       --------------------------------- IMPRESSION AND DISPOSITION ---------------------------------    IMPRESSION  1. Right hip pain    2. Acute right-sided low back pain with sciatica, sciatica laterality unspecified        DISPOSITION  Disposition: Discharge to home  Patient condition is stable    NOTE: This report was transcribed using voice recognition software.  Every effort was made to ensure accuracy; however, inadvertent computerized transcription errors may be present        Tess Emerson DO  01/04/20 3142

## 2020-01-04 NOTE — ED NOTES
Bed: 24  Expected date:   Expected time:   Means of arrival:   Comments:  triage     Alisa Degroot RN  01/04/20 4644

## 2020-01-06 ENCOUNTER — HOSPITAL ENCOUNTER (OUTPATIENT)
Dept: GENERAL RADIOLOGY | Age: 60
Discharge: HOME | End: 2020-01-08
Payer: MEDICAID

## 2020-01-06 ENCOUNTER — OFFICE VISIT (OUTPATIENT)
Dept: ORTHOPEDIC SURGERY | Age: 60
End: 2020-01-06
Payer: MEDICAID

## 2020-01-06 VITALS
BODY MASS INDEX: 22.08 KG/M2 | HEART RATE: 75 BPM | SYSTOLIC BLOOD PRESSURE: 107 MMHG | HEIGHT: 62 IN | RESPIRATION RATE: 18 BRPM | TEMPERATURE: 96.8 F | DIASTOLIC BLOOD PRESSURE: 83 MMHG | WEIGHT: 120 LBS

## 2020-01-06 PROCEDURE — G8427 DOCREV CUR MEDS BY ELIG CLIN: HCPCS | Performed by: PHYSICIAN ASSISTANT

## 2020-01-06 PROCEDURE — G8484 FLU IMMUNIZE NO ADMIN: HCPCS | Performed by: PHYSICIAN ASSISTANT

## 2020-01-06 PROCEDURE — 4004F PT TOBACCO SCREEN RCVD TLK: CPT | Performed by: PHYSICIAN ASSISTANT

## 2020-01-06 PROCEDURE — G8420 CALC BMI NORM PARAMETERS: HCPCS | Performed by: PHYSICIAN ASSISTANT

## 2020-01-06 PROCEDURE — 3017F COLORECTAL CA SCREEN DOC REV: CPT | Performed by: PHYSICIAN ASSISTANT

## 2020-01-06 PROCEDURE — 99213 OFFICE O/P EST LOW 20 MIN: CPT | Performed by: PHYSICIAN ASSISTANT

## 2020-01-06 PROCEDURE — 73630 X-RAY EXAM OF FOOT: CPT

## 2020-01-06 NOTE — PROGRESS NOTES
exam.    Patient reports tenderness to palpation over the plantar aspect of 1st and 2nd MTP with TTP over the sesamoid bones. Demonstrates active knee flexion/extension, ankle plantar/dorsiflexion/great toe extension. Sensation intact to light touch in sural/deep peroneal/superficial peroneal/saphenous/posterior tibial nerve distributions to foot/ankle. Palpable dorsalis pedis and posterior tibialis pulses, cap refill brisk in toes, foot warm/perfused. Compartments supple throughout thigh and leg. Calves soft non tender  Ambulating without assistive devices    XR: 1/6/20 3V of the left foot are obtained, demonstrated healed fractures at the distal aspects of the 3rd and 4th metatarsals. Previous fracture at the base of the 3rd metatarsal not well visualized. No other acute osseous abnormality noted    /83   Pulse 75   Temp 96.8 °F (36 °C)   Resp 18   Ht 5' 2\" (1.575 m)   Wt 120 lb (54.4 kg)   BMI 21.95 kg/m²     ASSESSMENT:     Diagnosis Orders   1. Multiple closed fractures of metatarsal bone of left foot with routine healing, subsequent encounter  Compression Stocking       PLAN:  Order sent to [address] [place] [address] for an orthotist consult. Order for compression stockings placed at patient's request  Recommend following up with Dr. Beckie Edge regarding recent kyphoplasty and reported fall  Patient can follow up with orthopedics on an as needed basis    Electronically signed by Abby Beyer PA-C on 1/6/2020 at 4:42 PM  Note: This report was completed using [employer] voiced recognition software. Every effort has been made to ensure accuracy; however, inadvertent computerized transcription errors may be present.

## 2020-01-10 ENCOUNTER — OFFICE VISIT (OUTPATIENT)
Dept: NEUROSURGERY | Age: 60
End: 2020-01-10
Payer: MEDICAID

## 2020-01-10 VITALS
HEART RATE: 74 BPM | DIASTOLIC BLOOD PRESSURE: 89 MMHG | SYSTOLIC BLOOD PRESSURE: 121 MMHG | BODY MASS INDEX: 23.19 KG/M2 | HEIGHT: 62 IN | WEIGHT: 126 LBS

## 2020-01-10 PROCEDURE — 4004F PT TOBACCO SCREEN RCVD TLK: CPT | Performed by: NEUROLOGICAL SURGERY

## 2020-01-10 PROCEDURE — G8420 CALC BMI NORM PARAMETERS: HCPCS | Performed by: NEUROLOGICAL SURGERY

## 2020-01-10 PROCEDURE — G8427 DOCREV CUR MEDS BY ELIG CLIN: HCPCS | Performed by: NEUROLOGICAL SURGERY

## 2020-01-10 PROCEDURE — G8484 FLU IMMUNIZE NO ADMIN: HCPCS | Performed by: NEUROLOGICAL SURGERY

## 2020-01-10 PROCEDURE — 99213 OFFICE O/P EST LOW 20 MIN: CPT | Performed by: NEUROLOGICAL SURGERY

## 2020-01-10 PROCEDURE — 3017F COLORECTAL CA SCREEN DOC REV: CPT | Performed by: NEUROLOGICAL SURGERY

## 2020-01-10 NOTE — PROGRESS NOTES
Pan Tucker is 61years old. She has history of hypertension, coronary artery disease, asthma, and history of  previous L3 through L5 posterior instrumentation and decompression. This was performed on 11/09/2017. He presented with lower back pain in October 2019 after a fall. She was found to have an acute L1 compression fracture. She underwent L1 kyphoplasty on October 28, 2019. This helped to reduce her back pain. She has chronic back pain. She has occasional pain rating down her legs. She has numbness in her feet. She has been working with pain management. She had follow-up lumbar spine x-rays on January 4, 2020. The alignment is stable. The hardware is stable. There is no evidence of any new fracture. Examination:  Face symmetric  Motor strength full in the upper extremities  Lower extremity strength 4/5  Baseline decreased sensation light touch in the feet. Otherwise sensation intact light touch    Assessment:  History of L3-5 posterior spinal instrumentation and decompression on November 9, 2017  History of L1 kyphoplasty on October 28, 2019  Chronic back pain    Plan:  -No additional surgical intervention needed at this time.  -Continue with pain management and physical therapy.  -I have placed a new referral for physical therapy.  -She will follow-up as needed in neurosurgery clinic. I spent 15 minutes with Vadim Mcmullen of which greater than 50% of the time was spent counseling her.      Electronically signed by Henny Sanches MD on 1/10/2020 at 1:44 PM

## 2020-01-24 ENCOUNTER — HOSPITAL ENCOUNTER (OUTPATIENT)
Dept: MRI IMAGING | Age: 60
Discharge: HOME | End: 2020-01-26
Payer: MEDICAID

## 2020-01-24 PROCEDURE — 72158 MRI LUMBAR SPINE W/O & W/DYE: CPT

## 2020-01-24 PROCEDURE — A9579 GAD-BASE MR CONTRAST NOS,1ML: HCPCS | Performed by: RADIOLOGY

## 2020-01-24 PROCEDURE — 6360000004 HC RX CONTRAST MEDICATION: Performed by: RADIOLOGY

## 2020-01-24 RX ADMIN — GADOTERIDOL 11 ML: 279.3 INJECTION, SOLUTION INTRAVENOUS at 16:59

## 2020-02-05 ENCOUNTER — APPOINTMENT (OUTPATIENT)
Dept: CT IMAGING | Age: 60
End: 2020-02-05
Payer: MEDICAID

## 2020-02-05 ENCOUNTER — APPOINTMENT (OUTPATIENT)
Dept: GENERAL RADIOLOGY | Age: 60
End: 2020-02-05
Payer: MEDICAID

## 2020-02-05 ENCOUNTER — HOSPITAL ENCOUNTER (EMERGENCY)
Age: 60
Discharge: HOME | End: 2020-02-05
Attending: EMERGENCY MEDICINE
Payer: MEDICAID

## 2020-02-05 VITALS
DIASTOLIC BLOOD PRESSURE: 83 MMHG | SYSTOLIC BLOOD PRESSURE: 116 MMHG | TEMPERATURE: 97.3 F | WEIGHT: 122 LBS | RESPIRATION RATE: 16 BRPM | HEIGHT: 63 IN | OXYGEN SATURATION: 97 % | HEART RATE: 76 BPM | BODY MASS INDEX: 21.62 KG/M2

## 2020-02-05 LAB
ANION GAP SERPL CALCULATED.3IONS-SCNC: 21 MMOL/L (ref 7–16)
BASOPHILS ABSOLUTE: 0.11 E9/L (ref 0–0.2)
BASOPHILS RELATIVE PERCENT: 1.9 % (ref 0–2)
BUN BLDV-MCNC: 9 MG/DL (ref 6–20)
CALCIUM SERPL-MCNC: 9.4 MG/DL (ref 8.6–10.2)
CHLORIDE BLD-SCNC: 104 MMOL/L (ref 98–107)
CO2: 19 MMOL/L (ref 22–29)
CREAT SERPL-MCNC: 0.6 MG/DL (ref 0.5–1)
EKG ATRIAL RATE: 63 BPM
EKG P AXIS: 63 DEGREES
EKG P-R INTERVAL: 148 MS
EKG Q-T INTERVAL: 460 MS
EKG QRS DURATION: 80 MS
EKG QTC CALCULATION (BAZETT): 470 MS
EKG R AXIS: 42 DEGREES
EKG T AXIS: 55 DEGREES
EKG VENTRICULAR RATE: 63 BPM
EOSINOPHILS ABSOLUTE: 0.09 E9/L (ref 0.05–0.5)
EOSINOPHILS RELATIVE PERCENT: 1.5 % (ref 0–6)
GFR AFRICAN AMERICAN: >60
GFR NON-AFRICAN AMERICAN: >60 ML/MIN/1.73
GLUCOSE BLD-MCNC: 85 MG/DL (ref 74–99)
HCT VFR BLD CALC: 41.4 % (ref 34–48)
HEMOGLOBIN: 13.5 G/DL (ref 11.5–15.5)
IMMATURE GRANULOCYTES #: 0.01 E9/L
IMMATURE GRANULOCYTES %: 0.2 % (ref 0–5)
LYMPHOCYTES ABSOLUTE: 3.7 E9/L (ref 1.5–4)
LYMPHOCYTES RELATIVE PERCENT: 62.4 % (ref 20–42)
MCH RBC QN AUTO: 32.4 PG (ref 26–35)
MCHC RBC AUTO-ENTMCNC: 32.6 % (ref 32–34.5)
MCV RBC AUTO: 99.3 FL (ref 80–99.9)
MONOCYTES ABSOLUTE: 0.37 E9/L (ref 0.1–0.95)
MONOCYTES RELATIVE PERCENT: 6.2 % (ref 2–12)
NEUTROPHILS ABSOLUTE: 1.65 E9/L (ref 1.8–7.3)
NEUTROPHILS RELATIVE PERCENT: 27.8 % (ref 43–80)
PDW BLD-RTO: 14.2 FL (ref 11.5–15)
PLATELET # BLD: 144 E9/L (ref 130–450)
PMV BLD AUTO: 11.4 FL (ref 7–12)
POTASSIUM REFLEX MAGNESIUM: 4 MMOL/L (ref 3.5–5)
RBC # BLD: 4.17 E12/L (ref 3.5–5.5)
SODIUM BLD-SCNC: 144 MMOL/L (ref 132–146)
TROPONIN: <0.01 NG/ML (ref 0–0.03)
WBC # BLD: 5.9 E9/L (ref 4.5–11.5)

## 2020-02-05 PROCEDURE — 36415 COLL VENOUS BLD VENIPUNCTURE: CPT

## 2020-02-05 PROCEDURE — 96374 THER/PROPH/DIAG INJ IV PUSH: CPT

## 2020-02-05 PROCEDURE — 80048 BASIC METABOLIC PNL TOTAL CA: CPT

## 2020-02-05 PROCEDURE — 73562 X-RAY EXAM OF KNEE 3: CPT

## 2020-02-05 PROCEDURE — 99284 EMERGENCY DEPT VISIT MOD MDM: CPT

## 2020-02-05 PROCEDURE — 85025 COMPLETE CBC W/AUTO DIFF WBC: CPT

## 2020-02-05 PROCEDURE — 2580000003 HC RX 258: Performed by: EMERGENCY MEDICINE

## 2020-02-05 PROCEDURE — 71045 X-RAY EXAM CHEST 1 VIEW: CPT

## 2020-02-05 PROCEDURE — 73700 CT LOWER EXTREMITY W/O DYE: CPT

## 2020-02-05 PROCEDURE — 93010 ELECTROCARDIOGRAM REPORT: CPT | Performed by: INTERNAL MEDICINE

## 2020-02-05 PROCEDURE — 93005 ELECTROCARDIOGRAM TRACING: CPT | Performed by: EMERGENCY MEDICINE

## 2020-02-05 PROCEDURE — 84484 ASSAY OF TROPONIN QUANT: CPT

## 2020-02-05 PROCEDURE — 6360000002 HC RX W HCPCS: Performed by: EMERGENCY MEDICINE

## 2020-02-05 RX ORDER — KETOROLAC TROMETHAMINE 30 MG/ML
15 INJECTION, SOLUTION INTRAMUSCULAR; INTRAVENOUS ONCE
Status: COMPLETED | OUTPATIENT
Start: 2020-02-05 | End: 2020-02-05

## 2020-02-05 RX ORDER — 0.9 % SODIUM CHLORIDE 0.9 %
1000 INTRAVENOUS SOLUTION INTRAVENOUS ONCE
Status: COMPLETED | OUTPATIENT
Start: 2020-02-05 | End: 2020-02-05

## 2020-02-05 RX ADMIN — KETOROLAC TROMETHAMINE 15 MG: 30 INJECTION, SOLUTION INTRAMUSCULAR at 02:49

## 2020-02-05 RX ADMIN — SODIUM CHLORIDE 1000 ML: 9 INJECTION, SOLUTION INTRAVENOUS at 02:44

## 2020-02-05 ASSESSMENT — PAIN DESCRIPTION - PAIN TYPE
TYPE: ACUTE PAIN
TYPE: ACUTE PAIN

## 2020-02-05 ASSESSMENT — ENCOUNTER SYMPTOMS
EYE DISCHARGE: 0
EYE PAIN: 0
SORE THROAT: 0
SHORTNESS OF BREATH: 0
VOMITING: 0
WHEEZING: 0
SINUS PRESSURE: 0
DIARRHEA: 0
ABDOMINAL DISTENTION: 0
NAUSEA: 0
BACK PAIN: 0
COUGH: 0
EYE REDNESS: 0

## 2020-02-05 ASSESSMENT — PAIN DESCRIPTION - DESCRIPTORS
DESCRIPTORS: ACHING
DESCRIPTORS: STABBING;SHOOTING

## 2020-02-05 ASSESSMENT — PAIN SCALES - GENERAL
PAINLEVEL_OUTOF10: 7
PAINLEVEL_OUTOF10: 8
PAINLEVEL_OUTOF10: 8

## 2020-02-05 ASSESSMENT — PAIN DESCRIPTION - ORIENTATION
ORIENTATION: RIGHT
ORIENTATION: RIGHT

## 2020-02-05 ASSESSMENT — PAIN DESCRIPTION - ONSET
ONSET: ON-GOING
ONSET: ON-GOING

## 2020-02-05 ASSESSMENT — PAIN DESCRIPTION - LOCATION
LOCATION: KNEE
LOCATION: LEG

## 2020-02-05 ASSESSMENT — PAIN DESCRIPTION - FREQUENCY
FREQUENCY: INTERMITTENT
FREQUENCY: CONTINUOUS

## 2020-02-05 ASSESSMENT — PAIN DESCRIPTION - PROGRESSION
CLINICAL_PROGRESSION: NOT CHANGED
CLINICAL_PROGRESSION: NOT CHANGED

## 2020-02-05 NOTE — ED NOTES
Patient states \"My pain was fine until he came in here and tried to make me get up\". Patient reports she is unable to bear weight. Patient updated on plan for CT.       Manny Jurado RN  02/05/20 1934

## 2020-02-05 NOTE — ED PROVIDER NOTES
Chief Complaint   Patient presents with    Loss of Consciousness     pt was standing in the kitchen and had syncopal episode. pt fell into the refridgerator and onto floor. Eleonora Torres is a 78-year-old female presents to with a syncopal episode. Patient states earlier this evening around 11:00 she was sitting down, stood up to go check something in the other room, and after she took a couple steps she started to get very lightheaded and dizzy. She states she lost consciousness, and fell. Patient states she believes she was only out for a short period of time, before coming back to her normal baseline. She called her  to come help her up. When she stood up she she had pain in her right knee. Patient states has had symptoms like this before in the past, for which she has been evaluated before. Review of Systems   Constitutional: Negative for chills and fever. HENT: Negative for ear pain, sinus pressure and sore throat. Eyes: Negative for pain, discharge and redness. Respiratory: Negative for cough, shortness of breath and wheezing. Cardiovascular: Negative for chest pain, palpitations and leg swelling. Gastrointestinal: Negative for abdominal distention, diarrhea, nausea and vomiting. Genitourinary: Negative for dysuria and frequency. Musculoskeletal: Positive for arthralgias (right knee pain). Negative for back pain. Skin: Negative for rash and wound. Neurological: Positive for syncope. Negative for dizziness, seizures, weakness, light-headedness, numbness and headaches. Hematological: Negative for adenopathy. Psychiatric/Behavioral: Negative for behavioral problems and confusion. All other systems reviewed and are negative. Physical Exam  Vitals signs and nursing note reviewed. Constitutional:       Appearance: Normal appearance. She is well-developed and normal weight. She is not toxic-appearing or diaphoretic.    HENT:      Head: Normocephalic and atraumatic. Nose: Nose normal.      Mouth/Throat:      Mouth: Mucous membranes are moist.   Eyes:      Pupils: Pupils are equal, round, and reactive to light. Neck:      Musculoskeletal: Normal range of motion and neck supple. Cardiovascular:      Rate and Rhythm: Normal rate and regular rhythm. Pulses: Normal pulses. Heart sounds: Normal heart sounds. Pulmonary:      Effort: Pulmonary effort is normal. No respiratory distress. Breath sounds: Normal breath sounds. No wheezing or rales. Abdominal:      General: Bowel sounds are normal.      Palpations: Abdomen is soft. Tenderness: There is no abdominal tenderness. There is no guarding or rebound. Musculoskeletal:         General: Tenderness (right knee) present. No deformity. Right lower leg: No edema. Left lower leg: No edema. Skin:     General: Skin is warm and dry. Capillary Refill: Capillary refill takes less than 2 seconds. Findings: No lesion or rash. Neurological:      General: No focal deficit present. Mental Status: She is alert and oriented to person, place, and time. Cranial Nerves: No cranial nerve deficit. Coordination: Coordination normal.   Psychiatric:         Mood and Affect: Mood normal.         Behavior: Behavior normal.          Procedures     Labs Reviewed   CBC WITH AUTO DIFFERENTIAL - Abnormal; Notable for the following components:       Result Value    Neutrophils % 27.8 (*)     Lymphocytes % 62.4 (*)     Neutrophils Absolute 1.65 (*)     All other components within normal limits   BASIC METABOLIC PANEL W/ REFLEX TO MG FOR LOW K - Abnormal; Notable for the following components:    CO2 19 (*)     Anion Gap 21 (*)     All other components within normal limits   TROPONIN     CT KNEE RIGHT WO CONTRAST   Final Result   1. Small knee effusion. 2.  Tricompartmental degenerative changes right knee consistent with    osteoarthritis.       This report has been electronically signed by Liv Hensley MD.      XR CHEST PORTABLE    (Results Pending)   XR KNEE RIGHT (3 VIEWS)    (Results Pending)     EKG #1:  Interpreted by emergency department physician unless otherwise noted. Time:  0019    Rate: 63  Rhythm: Sinus. Interpretation: normal sinus rhythm, t inversion in V2, unchanged from previous      MDM  Number of Diagnoses or Management Options  Chronic pain of right knee:   Syncope and collapse:   Diagnosis management comments: Is a 51-year-old female who presents today after a syncopal episode and fall. Initial lab work and imaging was obtained. Lab work is within normal limits. Checks x-ray shows no acute process. Patient continued complaint of right knee pain, for which an x-ray was obtained. X-ray showed no evidence of fracture. When we attempted to ambulate the patient, she states she was unable to due to pain. For this reason CT of the right knee was obtained, which again shows no evidence of acute fracture. Patient was placed in a knee brace. Patient was instructed to take anti-inflammatories for pain. [place] syncope rule was used, which states patient is at low risk for negative outcomes. Patient will be discharged home, instructed follow-up with PCP. Return precautions were given. Patient understands and agrees with this plan.        Amount and/or Complexity of Data Reviewed  Decide to obtain previous medical records or to obtain history from someone other than the patient: yes                --------------------------------------------- PAST HISTORY ---------------------------------------------  Past Medical History:  has a past medical history of Asthma, CAD (coronary artery disease), Closed fracture of proximal end of left humerus with routine healing, Degeneration of lumbar or lumbosacral intervertebral disc, Fall against sharp object, History of blood transfusion, Hypertension, Hypokalemia, Insomnia, Kidney stone, Nondisplaced fracture of greater tuberosity of right humerus, initial encounter for closed fracture, Orthostatic hypotension, and Severe back pain. Past Surgical History:  has a past surgical history that includes Tonsillectomy; Bunionectomy; chest tube insertion (1990); Lithotripsy (2012); Colonoscopy (3/26/09); other surgical history; Upper gastrointestinal endoscopy (3/30/15); Upper gastrointestinal endoscopy (4/21/08); lumbar fusion (11/09/2017); and Kyphosis surgery (N/A, 10/28/2019). Social History:  reports that she has been smoking. She has a 1.00 pack-year smoking history. She has never used smokeless tobacco. She reports current alcohol use of about 6.0 standard drinks of alcohol per week. She reports that she does not use drugs. Family History: family history includes Cancer in her brother; Diabetes in her mother; Heart Disease in her maternal grandfather, maternal grandmother, and sister; Hypertension in her mother; Stroke in her sister. The patients home medications have been reviewed.     Allergies: Dilaudid [hydromorphone hcl] and Morphine    -------------------------------------------------- RESULTS -------------------------------------------------  Labs:  Results for orders placed or performed during the hospital encounter of 02/05/20   CBC Auto Differential   Result Value Ref Range    WBC 5.9 4.5 - 11.5 E9/L    RBC 4.17 3.50 - 5.50 E12/L    Hemoglobin 13.5 11.5 - 15.5 g/dL    Hematocrit 41.4 34.0 - 48.0 %    MCV 99.3 80.0 - 99.9 fL    MCH 32.4 26.0 - 35.0 pg    MCHC 32.6 32.0 - 34.5 %    RDW 14.2 11.5 - 15.0 fL    Platelets 061 936 - 199 E9/L    MPV 11.4 7.0 - 12.0 fL    Neutrophils % 27.8 (L) 43.0 - 80.0 %    Immature Granulocytes % 0.2 0.0 - 5.0 %    Lymphocytes % 62.4 (H) 20.0 - 42.0 %    Monocytes % 6.2 2.0 - 12.0 %    Eosinophils % 1.5 0.0 - 6.0 %    Basophils % 1.9 0.0 - 2.0 %    Neutrophils Absolute 1.65 (L) 1.80 - 7.30 E9/L    Immature Granulocytes # 0.01 E9/L    Lymphocytes Absolute 3.70 1.50 - 4.00 E9/L Monocytes Absolute 0.37 0.10 - 0.95 E9/L    Eosinophils Absolute 0.09 0.05 - 0.50 E9/L    Basophils Absolute 0.11 0.00 - 0.20 P9/J   Basic Metabolic Panel w/ Reflex to MG   Result Value Ref Range    Sodium 144 132 - 146 mmol/L    Potassium reflex Magnesium 4.0 3.5 - 5.0 mmol/L    Chloride 104 98 - 107 mmol/L    CO2 19 (L) 22 - 29 mmol/L    Anion Gap 21 (H) 7 - 16 mmol/L    Glucose 85 74 - 99 mg/dL    BUN 9 6 - 20 mg/dL    CREATININE 0.6 0.5 - 1.0 mg/dL    GFR Non-African American >60 >=60 mL/min/1.73    GFR African American >60     Calcium 9.4 8.6 - 10.2 mg/dL   Troponin   Result Value Ref Range    Troponin <0.01 0.00 - 0.03 ng/mL   EKG 12 Lead   Result Value Ref Range    Ventricular Rate 63 BPM    Atrial Rate 63 BPM    P-R Interval 148 ms    QRS Duration 80 ms    Q-T Interval 460 ms    QTc Calculation (Bazett) 470 ms    P Axis 63 degrees    R Axis 42 degrees    T Axis 55 degrees       Radiology:  CT KNEE RIGHT WO CONTRAST   Final Result   1. Small knee effusion. 2.  Tricompartmental degenerative changes right knee consistent with    osteoarthritis. This report has been electronically signed by Evelia Boas MD.      XR CHEST PORTABLE    (Results Pending)   XR KNEE RIGHT (3 VIEWS)    (Results Pending)       ------------------------- NURSING NOTES AND VITALS REVIEWED ---------------------------  Date / Time Roomed:  2/5/2020  1:48 AM  ED Bed Assignment:  01/01    The nursing notes within the ED encounter and vital signs as below have been reviewed. /69   Pulse 82   Temp 98.2 °F (36.8 °C)   Resp 16   Ht 5' 3\" (1.6 m)   Wt 122 lb (55.3 kg)   SpO2 98%   BMI 21.61 kg/m²   Oxygen Saturation Interpretation: Normal      ------------------------------------------ PROGRESS NOTES ------------------------------------------  I have spoken with the patient and discussed todays results, in addition to providing specific details for the plan of care and counseling regarding the diagnosis and prognosis.   Their

## 2020-04-02 ENCOUNTER — HOSPITAL ENCOUNTER (EMERGENCY)
Age: 60
Discharge: HOME | End: 2020-04-02
Payer: MEDICAID

## 2020-04-02 VITALS
HEIGHT: 63 IN | SYSTOLIC BLOOD PRESSURE: 126 MMHG | TEMPERATURE: 96.5 F | WEIGHT: 160 LBS | DIASTOLIC BLOOD PRESSURE: 88 MMHG | HEART RATE: 98 BPM | RESPIRATION RATE: 16 BRPM | OXYGEN SATURATION: 92 % | BODY MASS INDEX: 28.35 KG/M2

## 2020-04-02 PROCEDURE — 96372 THER/PROPH/DIAG INJ SC/IM: CPT

## 2020-04-02 PROCEDURE — 6370000000 HC RX 637 (ALT 250 FOR IP): Performed by: NURSE PRACTITIONER

## 2020-04-02 PROCEDURE — 6360000002 HC RX W HCPCS: Performed by: NURSE PRACTITIONER

## 2020-04-02 PROCEDURE — 99282 EMERGENCY DEPT VISIT SF MDM: CPT

## 2020-04-02 RX ORDER — KETOROLAC TROMETHAMINE 30 MG/ML
60 INJECTION, SOLUTION INTRAMUSCULAR; INTRAVENOUS ONCE
Status: COMPLETED | OUTPATIENT
Start: 2020-04-02 | End: 2020-04-02

## 2020-04-02 RX ORDER — CYCLOBENZAPRINE HCL 10 MG
10 TABLET ORAL ONCE
Status: COMPLETED | OUTPATIENT
Start: 2020-04-02 | End: 2020-04-02

## 2020-04-02 RX ADMIN — CYCLOBENZAPRINE HYDROCHLORIDE 10 MG: 10 TABLET, FILM COATED ORAL at 17:41

## 2020-04-02 RX ADMIN — KETOROLAC TROMETHAMINE 60 MG: 30 INJECTION, SOLUTION INTRAMUSCULAR at 17:41

## 2020-04-02 ASSESSMENT — PAIN SCALES - GENERAL
PAINLEVEL_OUTOF10: 9
PAINLEVEL_OUTOF10: 9

## 2020-04-02 ASSESSMENT — PAIN DESCRIPTION - LOCATION: LOCATION: BACK

## 2020-04-02 NOTE — ED PROVIDER NOTES
[facility]     Department of Emergency Medicine   ED  Provider Note  Admit Date/RoomTime: 4/2/2020  5:15 PM  ED Room: 31/31  Chief Complaint   Back Pain (chronic lower back pain, denies any new injury/trauma)    History of Present Illness   Source of history provided by:  patient. History/Exam Limitations: none. Joie Ruvalcaba is a 61 y.o. old female with a prior history of previous osteoarthritis of lumbar spine and previous herniated disc, presents to the emergency department by private vehicle, for acute-on-chronic, aching bilateral lower lumbar spine pain without radiation, for a few year(s) prior to arrival.  The original pain was caused by remote injury. There has been no history of recent injury. Since onset the symptoms have been constant and mild-moderate in severity. She denies any of the following: bladder incontinence, bladder urgency, bowel incontinence, bowel urgency or sacral numbness. Associated Signs & Symptoms: none. The pain is aggraveated by walking, movement and  and relieved by nothing. There has been no abdominal pain, cramping, vomiting, diarrhea, fever or chills. She is enrolled in pain management program and has been taking her medications. ROS    Pertinent positives and negatives are stated within HPI, all other systems reviewed and are negative. Past Surgical History:  has a past surgical history that includes Tonsillectomy; Bunionectomy; chest tube insertion (1990); Lithotripsy (2012); Colonoscopy (3/26/09); other surgical history; Upper gastrointestinal endoscopy (3/30/15); Upper gastrointestinal endoscopy (4/21/08); lumbar fusion (11/09/2017); and Kyphosis surgery (N/A, 10/28/2019). Social History:  reports that she has been smoking. She has a 1.00 pack-year smoking history. She has never used smokeless tobacco. She reports current alcohol use of about 6.0 standard drinks of alcohol per week. She reports that she does not use drugs.   Family History: family Course / Medical Decision Making     Medications   ketorolac (TORADOL) injection 60 mg (60 mg Intramuscular Given 4/2/20 1741)   cyclobenzaprine (FLEXERIL) tablet 10 mg (10 mg Oral Given 4/2/20 1741)        Re-examination:  4/2/20       Time: 1800 Patient able to ambulate without difficulty. Consult(s):   None    Procedure(s):   none    Medical Decision Making/Counseling:    *At this time the patient is without objective evidence of an acute process requiring inpatient management. Chronic pain, no new injury, no fever or neurovascular deficits. Plan is for symptom control and outpatient follow up with PCP or pain management. The emergency provider has spoken with the patient and discussed todays emergency visit, in addition to providing specific details for the plan of care and counseling regarding the diagnosis and prognosis. She was counseled on the role of the emergency department regarding prescribing medications for chronic conditions including Narcotic and other controlled substances. Based on the presenting complaint and nature of illness, the requested medications will not be provided today in prescription form and she is instructed to contact their provider for supplemental medications as soon as possible. Questions are answered at this time and they are agreeable with the plan. Assessment      1. Acute exacerbation of chronic low back pain      Plan   Discharge to home  Patient condition is good    New Medications     Discharge Medication List as of 4/2/2020  5:49 PM        Electronically signed by RONAN Ahuja CNP   DD: 4/2/20  **This report was transcribed using voice recognition software. Every effort was made to ensure accuracy; however, inadvertent computerized transcription errors may be present.   END OF ED PROVIDER NOTE      RONAN Boucher CNP  04/02/20 [id number]

## 2020-04-07 ENCOUNTER — OFFICE VISIT (OUTPATIENT)
Dept: NEUROSURGERY | Age: 60
End: 2020-04-07
Payer: MEDICAID

## 2020-04-07 VITALS — HEART RATE: 85 BPM | DIASTOLIC BLOOD PRESSURE: 119 MMHG | SYSTOLIC BLOOD PRESSURE: 164 MMHG | TEMPERATURE: 97.8 F

## 2020-04-07 PROCEDURE — 99213 OFFICE O/P EST LOW 20 MIN: CPT | Performed by: PHYSICIAN ASSISTANT

## 2020-04-07 PROCEDURE — G8427 DOCREV CUR MEDS BY ELIG CLIN: HCPCS | Performed by: PHYSICIAN ASSISTANT

## 2020-04-07 PROCEDURE — G8419 CALC BMI OUT NRM PARAM NOF/U: HCPCS | Performed by: PHYSICIAN ASSISTANT

## 2020-04-07 PROCEDURE — 4004F PT TOBACCO SCREEN RCVD TLK: CPT | Performed by: PHYSICIAN ASSISTANT

## 2020-04-07 PROCEDURE — 3017F COLORECTAL CA SCREEN DOC REV: CPT | Performed by: PHYSICIAN ASSISTANT

## 2020-04-07 RX ORDER — GABAPENTIN 300 MG/1
300 CAPSULE ORAL 3 TIMES DAILY
Qty: 90 CAPSULE | Refills: 3 | Status: SHIPPED
Start: 2020-04-07 | End: 2021-04-13 | Stop reason: ALTCHOICE

## 2020-04-07 ASSESSMENT — ENCOUNTER SYMPTOMS
ALLERGIC/IMMUNOLOGIC NEGATIVE: 1
GASTROINTESTINAL NEGATIVE: 1
RESPIRATORY NEGATIVE: 1
EYES NEGATIVE: 1
BACK PAIN: 1

## 2020-04-07 NOTE — PROGRESS NOTES
Subjective:      Patient ID: La Mcgowan is a 61 y.o. female. Back Pain   This is a chronic problem. The current episode started more than 1 year ago. The problem occurs constantly. The problem has been gradually worsening since onset. The pain is present in the lumbar spine (and left thigh pain to the knee). The quality of the pain is described as aching. The pain is severe. The symptoms are aggravated by twisting, standing, sitting and bending. Treatments tried: prior L3-5 fusion in 2017. prior L1 kyphoplasty in 2019. .  norco. The treatment provided mild relief. Review of Systems   Constitutional: Negative. HENT: Negative. Eyes: Negative. Respiratory: Negative. Cardiovascular: Negative. Gastrointestinal: Negative. Endocrine: Negative. Genitourinary: Negative. Musculoskeletal: Positive for back pain. Skin: Negative. Allergic/Immunologic: Negative. Neurological: Negative. Hematological: Negative. Psychiatric/Behavioral: Negative. Objective:   Physical Exam  Constitutional:       Appearance: Normal appearance. HENT:      Head: Normocephalic and atraumatic. Eyes:      Pupils: Pupils are equal, round, and reactive to light. Pulmonary:      Effort: Pulmonary effort is normal.      Breath sounds: Normal breath sounds. Abdominal:      General: There is no distension. Musculoskeletal:      Comments: Pain with ROM and palpation of the lumbar spine   Skin:     General: Skin is warm and dry. Neurological:      Mental Status: She is alert. GCS: GCS eye subscore is 4. GCS verbal subscore is 5. GCS motor subscore is 6. Cranial Nerves: Cranial nerves are intact. Sensory: Sensation is intact. Motor: Motor function is intact. Coordination: Coordination is intact. Gait: Gait abnormal.      Deep Tendon Reflexes:      Reflex Scores:       Patellar reflexes are 1+ on the right side and 1+ on the left side.        Achilles reflexes are 1+ on the right side and 1+ on the left side. Psychiatric:         Mood and Affect: Mood normal.         Assessment:      61year old female with acute exacerbation of chronic low back and right thigh pain. History of prior L3-5 fusion in 2017 and L1 kyphoplasty in 2019. Lumbar MRI and CT reveals haloing of L3 pedicle screws and probable L3-4 pseudoarthrosis. Mild L2-3 central stenosis. Plan: We will order lumbar x-rays to evaluate for new fracture. If no fracture, we will refer for COBY. Gabapentin script was given.         CRISTI Overton

## 2020-04-08 ENCOUNTER — HOSPITAL ENCOUNTER (OUTPATIENT)
Dept: GENERAL RADIOLOGY | Age: 60
Discharge: HOME | End: 2020-04-10
Payer: MEDICAID

## 2020-04-08 ENCOUNTER — HOSPITAL ENCOUNTER (OUTPATIENT)
Age: 60
Discharge: HOME | End: 2020-04-10
Payer: MEDICAID

## 2020-04-08 PROCEDURE — 72100 X-RAY EXAM L-S SPINE 2/3 VWS: CPT

## 2020-04-08 PROCEDURE — 72120 X-RAY BEND ONLY L-S SPINE: CPT

## 2020-04-09 ENCOUNTER — TELEPHONE (OUTPATIENT)
Dept: NEUROSURGERY | Age: 60
End: 2020-04-09

## 2020-04-13 ENCOUNTER — TELEPHONE (OUTPATIENT)
Dept: NEUROSURGERY | Age: 60
End: 2020-04-13

## 2020-04-13 NOTE — TELEPHONE ENCOUNTER
Called patient back. MRI L spine pending and scheduling put on hold due to COVID-19.  Order for brace faxed to [facility]

## 2020-04-27 ENCOUNTER — TELEPHONE (OUTPATIENT)
Dept: NEUROSURGERY | Age: 60
End: 2020-04-27

## 2020-04-27 NOTE — TELEPHONE ENCOUNTER
Called patient and discussed MRI. Subacute compression fracture at L2. She has been wearing her brace and would like to continue. Made an appointment in 4 weeks with x-rays for follow up.

## 2020-05-17 ENCOUNTER — HOSPITAL ENCOUNTER (OUTPATIENT)
Dept: CT IMAGING | Age: 60
Discharge: HOME | End: 2020-05-19
Payer: MEDICAID

## 2020-05-17 ENCOUNTER — HOSPITAL ENCOUNTER (OUTPATIENT)
Dept: ULTRASOUND IMAGING | Age: 60
Discharge: HOME | End: 2020-05-19
Payer: MEDICAID

## 2020-05-17 PROCEDURE — 70470 CT HEAD/BRAIN W/O & W/DYE: CPT

## 2020-05-17 PROCEDURE — 6360000004 HC RX CONTRAST MEDICATION: Performed by: RADIOLOGY

## 2020-05-17 PROCEDURE — 2580000003 HC RX 258: Performed by: RADIOLOGY

## 2020-05-17 PROCEDURE — 93880 EXTRACRANIAL BILAT STUDY: CPT

## 2020-05-17 RX ORDER — SODIUM CHLORIDE 0.9 % (FLUSH) 0.9 %
10 SYRINGE (ML) INJECTION PRN
Status: DISCONTINUED | OUTPATIENT
Start: 2020-05-17 | End: 2020-05-20 | Stop reason: HOSPADM

## 2020-05-17 RX ADMIN — Medication 10 ML: at 08:51

## 2020-05-17 RX ADMIN — IOPAMIDOL 90 ML: 755 INJECTION, SOLUTION INTRAVENOUS at 08:51

## 2020-06-02 ENCOUNTER — HOSPITAL ENCOUNTER (INPATIENT)
Age: 60
LOS: 1 days | Discharge: HOME | End: 2020-06-03
Attending: EMERGENCY MEDICINE | Admitting: FAMILY MEDICINE
Payer: MEDICAID

## 2020-06-02 ENCOUNTER — APPOINTMENT (OUTPATIENT)
Dept: CT IMAGING | Age: 60
End: 2020-06-02
Payer: MEDICAID

## 2020-06-02 LAB
ACETAMINOPHEN LEVEL: <5 MCG/ML (ref 10–30)
ALBUMIN SERPL-MCNC: 4.9 G/DL (ref 3.5–5.2)
ALP BLD-CCNC: 81 U/L (ref 35–104)
ALT SERPL-CCNC: 30 U/L (ref 0–32)
ANION GAP SERPL CALCULATED.3IONS-SCNC: 17 MMOL/L (ref 7–16)
AST SERPL-CCNC: 57 U/L (ref 0–31)
BASOPHILS ABSOLUTE: 0.07 E9/L (ref 0–0.2)
BASOPHILS RELATIVE PERCENT: 1.2 % (ref 0–2)
BILIRUB SERPL-MCNC: 1.2 MG/DL (ref 0–1.2)
BUN BLDV-MCNC: 16 MG/DL (ref 6–20)
CALCIUM SERPL-MCNC: 10.1 MG/DL (ref 8.6–10.2)
CHLORIDE BLD-SCNC: 98 MMOL/L (ref 98–107)
CO2: 22 MMOL/L (ref 22–29)
CREAT SERPL-MCNC: 0.7 MG/DL (ref 0.5–1)
EOSINOPHILS ABSOLUTE: 0.07 E9/L (ref 0.05–0.5)
EOSINOPHILS RELATIVE PERCENT: 1.2 % (ref 0–6)
ETHANOL: <10 MG/DL (ref 0–0.08)
GFR AFRICAN AMERICAN: >60
GFR NON-AFRICAN AMERICAN: >60 ML/MIN/1.73
GLUCOSE BLD-MCNC: 111 MG/DL (ref 74–99)
HCT VFR BLD CALC: 36.7 % (ref 34–48)
HEMOGLOBIN: 12.3 G/DL (ref 11.5–15.5)
IMMATURE GRANULOCYTES #: 0.01 E9/L
IMMATURE GRANULOCYTES %: 0.2 % (ref 0–5)
LYMPHOCYTES ABSOLUTE: 2.15 E9/L (ref 1.5–4)
LYMPHOCYTES RELATIVE PERCENT: 37.9 % (ref 20–42)
MCH RBC QN AUTO: 33.5 PG (ref 26–35)
MCHC RBC AUTO-ENTMCNC: 33.5 % (ref 32–34.5)
MCV RBC AUTO: 100 FL (ref 80–99.9)
MONOCYTES ABSOLUTE: 1.14 E9/L (ref 0.1–0.95)
MONOCYTES RELATIVE PERCENT: 20.1 % (ref 2–12)
NEUTROPHILS ABSOLUTE: 2.23 E9/L (ref 1.8–7.3)
NEUTROPHILS RELATIVE PERCENT: 39.4 % (ref 43–80)
PDW BLD-RTO: 12.8 FL (ref 11.5–15)
PLATELET # BLD: 124 E9/L (ref 130–450)
PMV BLD AUTO: 11.3 FL (ref 7–12)
POTASSIUM SERPL-SCNC: 3.8 MMOL/L (ref 3.5–5)
RBC # BLD: 3.67 E12/L (ref 3.5–5.5)
SALICYLATE, SERUM: <0.3 MG/DL (ref 0–30)
SODIUM BLD-SCNC: 137 MMOL/L (ref 132–146)
TOTAL PROTEIN: 7.8 G/DL (ref 6.4–8.3)
TRICYCLIC ANTIDEPRESSANTS SCREEN SERUM: NEGATIVE NG/ML
WBC # BLD: 5.7 E9/L (ref 4.5–11.5)

## 2020-06-02 PROCEDURE — 80307 DRUG TEST PRSMV CHEM ANLYZR: CPT

## 2020-06-02 PROCEDURE — 99285 EMERGENCY DEPT VISIT HI MDM: CPT

## 2020-06-02 PROCEDURE — 96374 THER/PROPH/DIAG INJ IV PUSH: CPT

## 2020-06-02 PROCEDURE — 70450 CT HEAD/BRAIN W/O DYE: CPT

## 2020-06-02 PROCEDURE — 93005 ELECTROCARDIOGRAM TRACING: CPT | Performed by: EMERGENCY MEDICINE

## 2020-06-02 PROCEDURE — 2580000003 HC RX 258: Performed by: EMERGENCY MEDICINE

## 2020-06-02 PROCEDURE — 6360000002 HC RX W HCPCS: Performed by: EMERGENCY MEDICINE

## 2020-06-02 PROCEDURE — 85025 COMPLETE CBC W/AUTO DIFF WBC: CPT

## 2020-06-02 PROCEDURE — G0480 DRUG TEST DEF 1-7 CLASSES: HCPCS

## 2020-06-02 PROCEDURE — 2500000003 HC RX 250 WO HCPCS: Performed by: EMERGENCY MEDICINE

## 2020-06-02 PROCEDURE — 80053 COMPREHEN METABOLIC PANEL: CPT

## 2020-06-02 RX ORDER — LORAZEPAM 1 MG/1
1 TABLET ORAL
Status: DISCONTINUED | OUTPATIENT
Start: 2020-06-02 | End: 2020-06-03 | Stop reason: HOSPADM

## 2020-06-02 RX ORDER — LORAZEPAM 1 MG/1
4 TABLET ORAL
Status: DISCONTINUED | OUTPATIENT
Start: 2020-06-02 | End: 2020-06-03 | Stop reason: HOSPADM

## 2020-06-02 RX ORDER — LORAZEPAM 2 MG/ML
4 INJECTION INTRAMUSCULAR
Status: DISCONTINUED | OUTPATIENT
Start: 2020-06-02 | End: 2020-06-03 | Stop reason: HOSPADM

## 2020-06-02 RX ORDER — LORAZEPAM 2 MG/ML
1 INJECTION INTRAMUSCULAR
Status: DISCONTINUED | OUTPATIENT
Start: 2020-06-02 | End: 2020-06-03 | Stop reason: HOSPADM

## 2020-06-02 RX ORDER — LORAZEPAM 1 MG/1
2 TABLET ORAL
Status: DISCONTINUED | OUTPATIENT
Start: 2020-06-02 | End: 2020-06-03 | Stop reason: HOSPADM

## 2020-06-02 RX ORDER — LORAZEPAM 2 MG/ML
3 INJECTION INTRAMUSCULAR
Status: DISCONTINUED | OUTPATIENT
Start: 2020-06-02 | End: 2020-06-03 | Stop reason: HOSPADM

## 2020-06-02 RX ORDER — LORAZEPAM 2 MG/ML
2 INJECTION INTRAMUSCULAR
Status: DISCONTINUED | OUTPATIENT
Start: 2020-06-02 | End: 2020-06-03 | Stop reason: HOSPADM

## 2020-06-02 RX ORDER — LORAZEPAM 1 MG/1
3 TABLET ORAL
Status: DISCONTINUED | OUTPATIENT
Start: 2020-06-02 | End: 2020-06-03 | Stop reason: HOSPADM

## 2020-06-02 RX ADMIN — THIAMINE HYDROCHLORIDE: 100 INJECTION, SOLUTION INTRAMUSCULAR; INTRAVENOUS at 23:34

## 2020-06-02 ASSESSMENT — PAIN SCALES - GENERAL: PAINLEVEL_OUTOF10: 7

## 2020-06-02 ASSESSMENT — PAIN DESCRIPTION - LOCATION: LOCATION: BACK

## 2020-06-03 VITALS
WEIGHT: 124 LBS | RESPIRATION RATE: 14 BRPM | HEART RATE: 63 BPM | SYSTOLIC BLOOD PRESSURE: 126 MMHG | DIASTOLIC BLOOD PRESSURE: 81 MMHG | TEMPERATURE: 96.8 F | HEIGHT: 63 IN | BODY MASS INDEX: 21.97 KG/M2 | OXYGEN SATURATION: 98 %

## 2020-06-03 PROBLEM — F10.931 ALCOHOL WITHDRAWAL DELIRIUM (HCC): Status: ACTIVE | Noted: 2020-06-03

## 2020-06-03 PROBLEM — F10.932 ALCOHOL WITHDRAWAL WITH PERCEPTUAL DISTURBANCES (HCC): Status: ACTIVE | Noted: 2020-06-03

## 2020-06-03 LAB
EKG ATRIAL RATE: 71 BPM
EKG P AXIS: 26 DEGREES
EKG P-R INTERVAL: 120 MS
EKG Q-T INTERVAL: 448 MS
EKG QRS DURATION: 72 MS
EKG QTC CALCULATION (BAZETT): 486 MS
EKG R AXIS: 30 DEGREES
EKG T AXIS: 45 DEGREES
EKG VENTRICULAR RATE: 71 BPM

## 2020-06-03 PROCEDURE — 96372 THER/PROPH/DIAG INJ SC/IM: CPT

## 2020-06-03 PROCEDURE — 2060000000 HC ICU INTERMEDIATE R&B

## 2020-06-03 PROCEDURE — 6370000000 HC RX 637 (ALT 250 FOR IP): Performed by: EMERGENCY MEDICINE

## 2020-06-03 PROCEDURE — 6360000002 HC RX W HCPCS: Performed by: EMERGENCY MEDICINE

## 2020-06-03 PROCEDURE — 93010 ELECTROCARDIOGRAM REPORT: CPT | Performed by: INTERNAL MEDICINE

## 2020-06-03 PROCEDURE — 6370000000 HC RX 637 (ALT 250 FOR IP): Performed by: FAMILY MEDICINE

## 2020-06-03 PROCEDURE — 2700000000 HC OXYGEN THERAPY PER DAY

## 2020-06-03 PROCEDURE — G0378 HOSPITAL OBSERVATION PER HR: HCPCS

## 2020-06-03 PROCEDURE — 96376 TX/PRO/DX INJ SAME DRUG ADON: CPT

## 2020-06-03 PROCEDURE — 2500000003 HC RX 250 WO HCPCS: Performed by: EMERGENCY MEDICINE

## 2020-06-03 PROCEDURE — 2580000003 HC RX 258: Performed by: EMERGENCY MEDICINE

## 2020-06-03 PROCEDURE — 6360000002 HC RX W HCPCS: Performed by: FAMILY MEDICINE

## 2020-06-03 RX ORDER — DIPHENHYDRAMINE HCL 25 MG
50 TABLET ORAL ONCE
Status: COMPLETED | OUTPATIENT
Start: 2020-06-03 | End: 2020-06-03

## 2020-06-03 RX ORDER — IPRATROPIUM BROMIDE AND ALBUTEROL SULFATE 2.5; .5 MG/3ML; MG/3ML
1 SOLUTION RESPIRATORY (INHALATION) EVERY 4 HOURS PRN
Status: DISCONTINUED | OUTPATIENT
Start: 2020-06-03 | End: 2020-06-03 | Stop reason: HOSPADM

## 2020-06-03 RX ORDER — PANTOPRAZOLE SODIUM 40 MG/1
40 TABLET, DELAYED RELEASE ORAL
Status: DISCONTINUED | OUTPATIENT
Start: 2020-06-03 | End: 2020-06-03 | Stop reason: HOSPADM

## 2020-06-03 RX ORDER — GABAPENTIN 300 MG/1
300 CAPSULE ORAL 3 TIMES DAILY
Status: DISCONTINUED | OUTPATIENT
Start: 2020-06-03 | End: 2020-06-03 | Stop reason: HOSPADM

## 2020-06-03 RX ORDER — CHLORDIAZEPOXIDE HYDROCHLORIDE 25 MG/1
50 CAPSULE, GELATIN COATED ORAL ONCE
Status: COMPLETED | OUTPATIENT
Start: 2020-06-03 | End: 2020-06-03

## 2020-06-03 RX ORDER — HYDROCODONE BITARTRATE AND ACETAMINOPHEN 5; 325 MG/1; MG/1
1 TABLET ORAL EVERY 6 HOURS PRN
Status: DISCONTINUED | OUTPATIENT
Start: 2020-06-03 | End: 2020-06-03 | Stop reason: HOSPADM

## 2020-06-03 RX ADMIN — DIPHENHYDRAMINE HYDROCHLORIDE 50 MG: 25 TABLET ORAL at 01:07

## 2020-06-03 RX ADMIN — THIAMINE HYDROCHLORIDE: 100 INJECTION, SOLUTION INTRAMUSCULAR; INTRAVENOUS at 08:07

## 2020-06-03 RX ADMIN — GABAPENTIN 300 MG: 300 CAPSULE ORAL at 08:07

## 2020-06-03 RX ADMIN — LORAZEPAM 1 MG: 1 TABLET ORAL at 01:07

## 2020-06-03 RX ADMIN — PANTOPRAZOLE SODIUM 40 MG: 40 TABLET, DELAYED RELEASE ORAL at 08:07

## 2020-06-03 RX ADMIN — CHLORDIAZEPOXIDE HYDROCHLORIDE 50 MG: 25 CAPSULE ORAL at 00:28

## 2020-06-03 RX ADMIN — GABAPENTIN 300 MG: 300 CAPSULE ORAL at 14:42

## 2020-06-03 RX ADMIN — ENOXAPARIN SODIUM 40 MG: 40 INJECTION SUBCUTANEOUS at 08:07

## 2020-06-03 ASSESSMENT — PAIN SCALES - GENERAL
PAINLEVEL_OUTOF10: 0
PAINLEVEL_OUTOF10: 0

## 2020-06-03 NOTE — DISCHARGE SUMMARY
Physician Discharge Summary     Patient ID:  Elliott Davalos  [id number]  61 y.o.  1960    Admit date: 6/2/2020    Discharge date and time: 6/3/20    Admitting Physician: Maia King MD     Discharge Physician: Shilpa Reyes MD    Admission Diagnoses: Alcohol withdrawal with perceptual disturbances ([facility] [address] [F10.232]  Alcohol withdrawal with perceptual disturbances ([address] [F10.232]  Alcohol withdrawal delirium ([facility] [address] [F10.231]    Discharge Diagnoses: SAME    Admission Condition: fair    Discharged Condition: stable    Indication for Admission: Alcohol withdrawal    Hospital Course:Patient placed on [address] Remained stable. Social work gave info for rehab.     Consults: none    Significant Diagnostic Studies: none    Treatments: as above    Discharge Exam:  /81   Pulse 63   Temp 96.8 °F (36 °C) (Temporal)   Resp 14   Ht 5' 3\" (1.6 m)   Wt 124 lb (56.2 kg)   SpO2 98%   BMI 21.97 kg/m²     General Appearance:    Alert, cooperative, no distress, appears stated age   Head:    Normocephalic, without obvious abnormality, atraumatic   Eyes:    PERRL, conjunctiva/corneas clear, EOM's intact, fundi     benign, both eyes   Ears:    Normal TM's and external ear canals, both ears   Nose:   Nares normal, septum midline, mucosa normal, no drainage    or sinus tenderness   Throat:   Lips, mucosa, and tongue normal; teeth and gums normal   Neck:   Supple, symmetrical, trachea midline, no adenopathy;     thyroid:  no enlargement/tenderness/nodules; no carotid    bruit or JVD   Back:     Symmetric, no curvature, ROM normal, no CVA tenderness   Lungs:     Clear to auscultation bilaterally, respirations unlabored   Chest Wall:    No tenderness or deformity    Heart:    Regular rate and rhythm, S1 and S2 normal, no murmur, rub   or gallop   Breast Exam:    No tenderness, masses, or nipple abnormality   Abdomen:     Soft, non-tender, bowel sounds active all four quadrants,     no masses, no organomegaly   Genitalia: Normal female without lesion, discharge or tenderness   Rectal:    Normal tone ;guaiac negative stool   Extremities:   Extremities normal, atraumatic, no cyanosis or edema   Pulses:   2+ and symmetric all extremities   Skin:   Skin color, texture, turgor normal, no rashes or lesions   Lymph nodes:   Cervical, supraclavicular, and axillary nodes normal   Neurologic:   CNII-XII intact, normal strength, sensation and reflexes     throughout       Disposition: home    In process/preliminary results:  Outstanding Order Results     Date and Time Order Name Status Description    6/2/2020 2219 EKG 12 Lead Preliminary           Patient Instructions:   Current Discharge Medication List      CONTINUE these medications which have NOT CHANGED    Details   gabapentin (NEURONTIN) 300 MG capsule Take 1 capsule by mouth 3 times daily for 30 days. Qty: 90 capsule, Refills: 3      HYDROcodone-acetaminophen (NORCO) 5-325 MG per tablet Take 1 tablet by mouth every 6 hours as needed for Pain. Compression Stockings MISC by Does not apply route  Qty: 1 each, Refills: 0    Associated Diagnoses: Multiple closed fractures of metatarsal bone of left foot with routine healing, subsequent encounter; Multiple falls      hydrOXYzine (ATARAX) 25 MG tablet Take 25 mg by mouth 3 times daily as needed for Itching      omeprazole (PRILOSEC) 40 MG capsule Take 40 mg by mouth daily as needed       albuterol sulfate  (90 BASE) MCG/ACT inhaler Inhale 2 puffs into the lungs every 6 hours as needed for Wheezing         STOP taking these medications       amitriptyline (ELAVIL) 25 MG tablet Comments:   Reason for Stopping:             Activity: activity as tolerated  Diet: regular diet  Wound Care: none needed    Follow-up with PCP in 1-2 weeks.     Signed:  Rhett Runner MD  6/3/2020  2:59 PM

## 2020-06-03 NOTE — H&P
Brandon Melton is an 61 y.o.  female. Elizabeth came to the ER because she says she stopped drinking yesterday cold turkey and needs help. She reportedly had some confusion. She is unable to wake up at this time. Past Medical History:   Diagnosis Date    Asthma     CAD (coronary artery disease)     Closed fracture of proximal end of left humerus with routine healing 6/11/2019    Degeneration of lumbar or lumbosacral intervertebral disc     Fall against sharp object 1960    chest tube in hospital    History of blood transfusion 1997    after vaginal delivery of baby hemmorhage    Hypertension     Hypokalemia     Insomnia     Kidney stone     Nondisplaced fracture of greater tuberosity of right humerus, initial encounter for closed fracture 1/22/2019    Orthostatic hypotension     Severe back pain 2/3/2014     Past Surgical History:   Procedure Laterality Date    BUNIONECTOMY      Left foot    CHEST TUBE INSERTION  1990    several    COLONOSCOPY  3/26/09    KYPHOSIS SURGERY N/A 10/28/2019    KYPHOPLASTY L1 WITH VERTEBRAL BONE BIOPSY performed by Oli Lara MD at [place] [address] LITHOTRIPSY  2012   [address] [facility]  11/09/2017    L3-L4 ,L4-L5  interbody fusion with Dajuan Valerilie Searing     OTHER SURGICAL HISTORY      electro ablation for rectal and sigmoid polyps    TONSILLECTOMY      UPPER GASTROINTESTINAL ENDOSCOPY  3/30/15    UPPER GASTROINTESTINAL ENDOSCOPY  4/21/08       Family History   Problem Relation Age of Onset    Diabetes Mother     Hypertension Mother     Heart Disease Sister     Stroke Sister     Heart Disease Maternal Grandmother     Heart Disease Maternal Grandfather     Cancer Brother        Social History     Tobacco Use    Smoking status: Former Smoker     Packs/day: 0.50     Years: 2.00     Pack years: 1.00    Smokeless tobacco: Never Used   Substance Use Topics    Alcohol use:  Yes     Alcohol/week: 6.0 standard drinks     Types: 6 Cans of beer per

## 2020-06-03 NOTE — ED PROVIDER NOTES
Department of Emergency Medicine   ED  Provider Note  Admit Date/RoomTime: 6/2/2020  9:54 PM  ED Room: 7403/7403-A   Chief Complaint   Drug / Alcohol Assessment (alcohol withdrawal, last drink was yesterday, emesis, hallucinations, tremors, weakness)    History of Present Illness   Source of history provided by:  patient and relative(s). History/Exam Limitations: none. Elliott Davalos is a 61 y.o. old female with a past medical history of:   Past Medical History:   Diagnosis Date    Asthma     CAD (coronary artery disease)     Closed fracture of proximal end of left humerus with routine healing 6/11/2019    Degeneration of lumbar or lumbosacral intervertebral disc     Fall against sharp object 1960    chest tube in hospital    History of blood transfusion 1997    after vaginal delivery of baby hemmorhage    Hypertension     Hypokalemia     Insomnia     Kidney stone     Nondisplaced fracture of greater tuberosity of right humerus, initial encounter for closed fracture 1/22/2019    Orthostatic hypotension     Severe back pain 2/3/2014     Patient presents for evaluation of alcohol withdrawal symptoms. She states that she normally drinks several beers and a few shots per day. She last drank yesterday and states that she tried to quit cold turkey. She states that she feels very shaky and anxious. She reportedly has had some hallucinations but currently is completely awake, alert and oriented here in the ED. She does not appear tremulous and has no auditory or visual hallucinations currently. He denies any suicidal or homicidal ideation. She did have a recent fall last week and was diagnosed with a left fifth finger fracture. No head injury since that time. Current Substance(s) of Abuse:    ETOH:   yes. Daily. Amphetamines:   no. Never. Benzodiazepines:   no. Never. Barbiturates:   no. Never. Cocaine:   no. Never. Marijuana:   no. Never. Opiates:   no. Never. her for further management. Counseling:   I have spoken with the patient and discussed todays results, in addition to providing specific details for the plan of care and counseling regarding the diagnosis and prognosis and are agreeable with the plan. Assessment      1. Alcohol withdrawal syndrome with perceptual disturbance [facility])      This patient's ED course included: a personal history and physicial examination    This patient has remained hemodynamically stable during their ED course. Plan   Admit to telemetry. Patient condition is stable    New Medications     Discharge Medication List as of 6/3/2020  3:07 PM        Electronically signed by Corwin Little DO   DD: 6/2/20  **This report was transcribed using voice recognition software. Every effort was made to ensure accuracy; however, inadvertent computerized transcription errors may be present.   END OF PROVIDER NOTE        Corwin Little DO  06/04/20 1531

## 2020-06-03 NOTE — CARE COORDINATION
Met with pt who stated she met with Christian with [facility], she would like outpatient rehab. Christian to bring information for pt. Pt lives with her boyfriend, independent prior to admission. Plan is home at discharge, boyfriend to transport home. Dr. Mimi Sarkar is family doctor and [facility] is pharmacy. [place] [facility]

## 2020-06-05 NOTE — CARE COORDINATION
The following forms have been acknowledged and consented to by this client:     [x] Verbally    [] In person     [x] Consent for telehealth services   [x] Consent for treatment, payment, and health care operations   [x] DORA - Receive/disclosure information authorization    - Insurance Agency = [facility]   - Emergency Contact = [facility]   - Referral Source =     Medication Assisted Treatment (if applicable)  [] Agreement for treatment with buprenorphine/naloxone  [] Consent for treatment with buprenorphine/naloxone  [] Medication assisted treatment process       6/5/20 - Luis Varela and she verbally consented to TeleHealth, Registration and ROIs for insurance and emergency contact.   JOSH

## 2020-06-10 ENCOUNTER — HOSPITAL ENCOUNTER (OUTPATIENT)
Dept: PSYCHIATRY | Age: 60
Setting detail: THERAPIES SERIES
Discharge: HOME | End: 2020-06-10
Payer: MEDICAID

## 2020-06-10 PROCEDURE — H0001 ALCOHOL AND/OR DRUG ASSESS: HCPCS | Performed by: COUNSELOR

## 2020-06-10 ASSESSMENT — LIFESTYLE VARIABLES: HISTORY_ALCOHOL_USE: YES

## 2020-06-10 NOTE — PLAN OF CARE
cope [x] Recovery environment is not supportive  but with structure and support, the client can cope [] Recovery environment is not supportive, but with structure and support and relief from the home environment, client can cope [] Environment    is dangerous, but recovery is achievable if Level 3.1 24-hour structure is available  [] Environment is dangerous and  client needs 24-hour structure to learn to cope [] Environment is dangerous, and the client lacks skills to cope outside of a  highly structured 24-hour setting   [] Environment is dangerous, and the client lacks skills to cope outside of a  highly structured 24-hour setting [] Problems in this dimension do not qualify the client for Level 4 services    [] If the client's only severity is in Dimension 4,5, and/or 6 without high severity in Dimensions 1,2, and/or 3, then client is not qualified for Level 4   COMMENTS:               Staff: Electronically signed by Nidia Eason LPC on 6/10/2020 at 10:42 AM

## 2020-06-12 ENCOUNTER — HOSPITAL ENCOUNTER (OUTPATIENT)
Dept: PSYCHIATRY | Age: 60
Setting detail: THERAPIES SERIES
Discharge: HOME | End: 2020-06-12
Payer: MEDICAID

## 2020-06-12 PROCEDURE — 90832 PSYTX W PT 30 MINUTES: CPT | Performed by: COUNSELOR

## 2020-06-12 PROCEDURE — 99213 OFFICE O/P EST LOW 20 MIN: CPT | Performed by: PSYCHIATRY & NEUROLOGY

## 2020-06-13 NOTE — H&P
of right humerus, initial encounter for closed fracture 1/22/2019    Orthostatic hypotension     Severe back pain 2/3/2014     ALLERGIES: Dilaudid [hydromorphone hcl] and Morphine    FAMILY HISTORY: Noncontributory. SOCIAL HISTORY: Patient lives alone and has a fiance that stays over. She is apparently on SSDI although cannot tell me what for. She has been  twice and has five children with three different men. She reports she used to work in . MENTAL STATUS EXAM: Cooperative and mostly forthcoming. Mood euthymic. Speech clear. Thoughts organized. Content future-oriented. No SI, HI, paranoia, delusions, hallucinations. Orientation, concentration, recent and remote memory are grossly intact. Fund of knowledge fair. Language use fair. Insight and judgment questionable. MEDICATIONS:  None     ASSESSMENT:  Alcohol Use Disorder moderate     Adjustment Disorder     PLAN: Admit to [facility]. Treatment will include group therapy, relapse prevention, case management and pharmacotherapy. Patient inquired about medication to help with alcohol cravings - discussed she needs to be sober first and we will discuss this again next week. Discuss with treatment team. Speak with patient next week.     Total time spent 13 min    Signed:  Electronically signed by Yocasta Cotton MD on 6/13/2020 at 11:59 AM

## 2020-06-16 ENCOUNTER — HOSPITAL ENCOUNTER (OUTPATIENT)
Dept: GENERAL RADIOLOGY | Age: 60
Discharge: HOME | End: 2020-06-18
Payer: MEDICAID

## 2020-06-16 ENCOUNTER — HOSPITAL ENCOUNTER (OUTPATIENT)
Age: 60
Discharge: HOME | End: 2020-06-18
Payer: MEDICAID

## 2020-06-16 PROCEDURE — 72100 X-RAY EXAM L-S SPINE 2/3 VWS: CPT

## 2020-06-17 ENCOUNTER — OFFICE VISIT (OUTPATIENT)
Dept: NEUROSURGERY | Age: 60
End: 2020-06-17
Payer: MEDICAID

## 2020-06-17 VITALS
BODY MASS INDEX: 21.26 KG/M2 | TEMPERATURE: 98.1 F | WEIGHT: 120 LBS | HEIGHT: 63 IN | DIASTOLIC BLOOD PRESSURE: 96 MMHG | HEART RATE: 81 BPM | SYSTOLIC BLOOD PRESSURE: 133 MMHG

## 2020-06-17 PROCEDURE — 3017F COLORECTAL CA SCREEN DOC REV: CPT | Performed by: PHYSICIAN ASSISTANT

## 2020-06-17 PROCEDURE — G8427 DOCREV CUR MEDS BY ELIG CLIN: HCPCS | Performed by: PHYSICIAN ASSISTANT

## 2020-06-17 PROCEDURE — 1111F DSCHRG MED/CURRENT MED MERGE: CPT | Performed by: PHYSICIAN ASSISTANT

## 2020-06-17 PROCEDURE — 99213 OFFICE O/P EST LOW 20 MIN: CPT | Performed by: PHYSICIAN ASSISTANT

## 2020-06-17 PROCEDURE — G8420 CALC BMI NORM PARAMETERS: HCPCS | Performed by: PHYSICIAN ASSISTANT

## 2020-06-17 PROCEDURE — 1036F TOBACCO NON-USER: CPT | Performed by: PHYSICIAN ASSISTANT

## 2020-06-18 ENCOUNTER — HOSPITAL ENCOUNTER (OUTPATIENT)
Dept: PSYCHIATRY | Age: 60
Setting detail: THERAPIES SERIES
Discharge: HOME | End: 2020-06-18
Payer: MEDICAID

## 2020-06-18 PROCEDURE — 90832 PSYTX W PT 30 MINUTES: CPT | Performed by: COUNSELOR

## 2020-06-19 ENCOUNTER — HOSPITAL ENCOUNTER (OUTPATIENT)
Dept: PSYCHIATRY | Age: 60
Setting detail: THERAPIES SERIES
Discharge: HOME | End: 2020-06-19
Payer: MEDICAID

## 2020-06-19 PROCEDURE — 99212 OFFICE O/P EST SF 10 MIN: CPT | Performed by: PSYCHIATRY & NEUROLOGY

## 2020-06-25 ENCOUNTER — HOSPITAL ENCOUNTER (OUTPATIENT)
Dept: PSYCHIATRY | Age: 60
Setting detail: THERAPIES SERIES
End: 2020-06-25
Payer: MEDICAID

## 2020-06-25 ENCOUNTER — HOSPITAL ENCOUNTER (OUTPATIENT)
Dept: PSYCHIATRY | Age: 60
Setting detail: THERAPIES SERIES
Discharge: HOME | End: 2020-06-25
Payer: MEDICAID

## 2020-06-25 PROCEDURE — H2012 BEHAV HLTH DAY TREAT, PER HR: HCPCS | Performed by: COUNSELOR

## 2020-06-25 NOTE — VIRTUAL HEALTH
Consults  Patient Location: at her home    Provider Location ([facility]/State): [facility]  [facility]                                                                        Group Therapy Note    Date: 6/25/2020  Start Time: 8:30 AM  End Time:  9:00 AM  Number of Participants: 3    Type of Group: Psychotherapy    Patient's Goal:  \"Get this under control. \"    Notes:  Pt reports she is \"sick\" during check-in. Pt reports that today is her birthday and she is hoping her stomach virus passes. Pt was unable to remain in group the full time and asked to reschedule her individual session which was scheduled later today. Status After Intervention:  Decompensated - felt sick and needed to disconnect from group    Participation Level: Active Listener and Interactive    Participation Quality: Appropriate and Attentive      Speech:  normal      Thought Process/Content: Logical      Affective Functioning: Flat      Mood: euthymic      Level of consciousness:  Alert, Oriented x4 and Preoccupied      Response to Learning: Able to retain information      Endings: None Reported    Modes of Intervention: Education, Support, Socialization, Exploration, Clarifying and Problem-solving      Discipline Responsible: /Counselor      Signature:  Electronically signed by Abeba North LPC on 6/25/2020 at 10:12 AM        This virtual visit was conducted via interactive/real-time audio/video.

## 2020-06-26 ENCOUNTER — HOSPITAL ENCOUNTER (OUTPATIENT)
Dept: PSYCHIATRY | Age: 60
Setting detail: THERAPIES SERIES
Discharge: HOME | End: 2020-06-26
Payer: MEDICAID

## 2020-06-29 ENCOUNTER — HOSPITAL ENCOUNTER (OUTPATIENT)
Dept: PSYCHIATRY | Age: 60
Setting detail: THERAPIES SERIES
Discharge: HOME | End: 2020-06-29
Payer: MEDICAID

## 2020-06-29 PROCEDURE — 99212 OFFICE O/P EST SF 10 MIN: CPT | Performed by: PSYCHIATRY & NEUROLOGY

## 2020-06-29 NOTE — BH NOTE
PSYCHIATRY ATTENDING NOTE    CC: \"I'm pretty good. \"    S: Patient being seen at [facility] in follow-up for alcohol use disorder. Spoke with patient on phone due to coronavirus precautions which she consented to. Patient location was home. Provider location was office. Cleo Roa reports she has been slowly cutting back her alcohol consumption. At this point she is drinking 2-3 beers a day and avoiding liquor. She denies cravings at this point but we discussed there are medications to help with this should it become an issue as she continues to taper down. Reports mother called her this morning to tell her that Diane's brother is being taken to the hospital for an overdose which patient seems to be handling fine. No issues otherwise. MSE: Cooperative and mostly forthcoming. Mood euthymic. Speech clear. Thoughts organized. Content future-oriented. No SI, HI, paranoia, delusions, hallucinations. Orientation, concentration, recent and remote memory are grossly intact. Fund of knowledge fair. Language use fair. Insight and judgment questionable. MEDICATIONS:  None    ASSESSMENT:  Alcohol Use Disorder moderate                                      Adjustment Disorder     PLAN: Continue [facility]. Discuss with team. Speak with patient one week.     Total time spent 8 min                          Electronically signed by Maricarmen Izquierdo MD on 6/29/2020 at 11:36 AM

## 2020-07-06 ENCOUNTER — HOSPITAL ENCOUNTER (OUTPATIENT)
Dept: PSYCHIATRY | Age: 60
Setting detail: THERAPIES SERIES
Discharge: HOME | End: 2020-07-06
Payer: MEDICAID

## 2020-07-06 PROCEDURE — 99212 OFFICE O/P EST SF 10 MIN: CPT | Performed by: PSYCHIATRY & NEUROLOGY

## 2020-07-08 ENCOUNTER — HOSPITAL ENCOUNTER (OUTPATIENT)
Dept: PSYCHIATRY | Age: 60
Setting detail: THERAPIES SERIES
Discharge: HOME | End: 2020-07-08
Payer: MEDICAID

## 2020-07-08 PROCEDURE — 90832 PSYTX W PT 30 MINUTES: CPT | Performed by: COUNSELOR

## 2020-07-08 NOTE — VIRTUAL HEALTH
/Counselor    This virtual visit was conducted via interactive/real-time audio/video.   Electronically signed by Selwyn Burrell LPC on 7/8/2020 at 11:28 AM

## 2020-07-09 ENCOUNTER — HOSPITAL ENCOUNTER (EMERGENCY)
Age: 60
Discharge: HOME | End: 2020-07-09
Payer: MEDICAID

## 2020-07-09 ENCOUNTER — APPOINTMENT (OUTPATIENT)
Dept: GENERAL RADIOLOGY | Age: 60
End: 2020-07-09
Payer: MEDICAID

## 2020-07-09 VITALS
WEIGHT: 120 LBS | HEART RATE: 97 BPM | BODY MASS INDEX: 21.26 KG/M2 | HEIGHT: 63 IN | TEMPERATURE: 97.9 F | DIASTOLIC BLOOD PRESSURE: 78 MMHG | SYSTOLIC BLOOD PRESSURE: 103 MMHG | RESPIRATION RATE: 18 BRPM | OXYGEN SATURATION: 98 %

## 2020-07-09 PROCEDURE — 6360000002 HC RX W HCPCS: Performed by: PHYSICIAN ASSISTANT

## 2020-07-09 PROCEDURE — 99283 EMERGENCY DEPT VISIT LOW MDM: CPT

## 2020-07-09 PROCEDURE — 6370000000 HC RX 637 (ALT 250 FOR IP): Performed by: PHYSICIAN ASSISTANT

## 2020-07-09 PROCEDURE — 73630 X-RAY EXAM OF FOOT: CPT

## 2020-07-09 RX ORDER — HYDROXYZINE PAMOATE 25 MG/1
50 CAPSULE ORAL ONCE
Status: COMPLETED | OUTPATIENT
Start: 2020-07-09 | End: 2020-07-09

## 2020-07-09 RX ORDER — FAMOTIDINE 20 MG/1
20 TABLET, FILM COATED ORAL 2 TIMES DAILY
Qty: 14 TABLET | Refills: 0 | Status: SHIPPED | OUTPATIENT
Start: 2020-07-09 | End: 2021-04-13 | Stop reason: ALTCHOICE

## 2020-07-09 RX ORDER — HYDROXYZINE PAMOATE 25 MG/1
25 CAPSULE ORAL 3 TIMES DAILY PRN
Qty: 21 CAPSULE | Refills: 0 | Status: SHIPPED | OUTPATIENT
Start: 2020-07-09 | End: 2020-07-16

## 2020-07-09 RX ORDER — PREDNISONE 20 MG/1
TABLET ORAL
Qty: 18 TABLET | Refills: 0 | Status: SHIPPED | OUTPATIENT
Start: 2020-07-09 | End: 2020-07-19

## 2020-07-09 RX ORDER — DEXAMETHASONE SODIUM PHOSPHATE 10 MG/ML
10 INJECTION INTRAMUSCULAR; INTRAVENOUS ONCE
Status: COMPLETED | OUTPATIENT
Start: 2020-07-09 | End: 2020-07-09

## 2020-07-09 RX ORDER — FAMOTIDINE 20 MG/1
20 TABLET, FILM COATED ORAL ONCE
Status: COMPLETED | OUTPATIENT
Start: 2020-07-09 | End: 2020-07-09

## 2020-07-09 RX ADMIN — DEXAMETHASONE SODIUM PHOSPHATE 10 MG: 10 INJECTION INTRAMUSCULAR; INTRAVENOUS at 16:19

## 2020-07-09 RX ADMIN — HYDROXYZINE PAMOATE 50 MG: 25 CAPSULE ORAL at 16:19

## 2020-07-09 RX ADMIN — FAMOTIDINE 20 MG: 20 TABLET, FILM COATED ORAL at 16:19

## 2020-07-09 NOTE — ED PROVIDER NOTES
[facility]     Department of Emergency Medicine   ED  Provider Note  Admit Date/RoomTime: 7/9/2020  3:38 PM  ED Room: [address] [place] [address]  Chief Complaint   Pruritis (per pt feels like itching everywehere. Ongoing since Tuesday. Says skin is \"getting dark in colors\" Pt says took benedryl but symptoms still persist.) and Foot Pain (Patient now c/o left foot pain and wants an xray )    History of Present Illness   Source of history provided by:  patient. History/Exam Limitations: none. Nadia Dodd is a 61 y.o. old female with has a past medical history of:   Past Medical History:   Diagnosis Date    Asthma     CAD (coronary artery disease)     Closed fracture of proximal end of left humerus with routine healing 6/11/2019    Degeneration of lumbar or lumbosacral intervertebral disc     Fall against sharp object 1960    chest tube in hospital    History of blood transfusion 1997    after vaginal delivery of baby hemmorhage    Hypertension     Hypokalemia     Insomnia     Kidney stone     Nondisplaced fracture of greater tuberosity of right humerus, initial encounter for closed fracture 1/22/2019    Orthostatic hypotension     Severe back pain 2/3/2014      presents to the emergency department by private vehicle, for complaint of gradual onset, still present, constant itchy, flat and \"dark\" area on entire body which began 2 day(s) prior to arrival.  The symptoms were caused by unknown cause. Since onset the symptoms have been persistent and stable. Prior history of similar episodes: No.   Her symptoms are associated with darkening of her skin and relieved by nothing. She denies any difficulty breathing, difficulty swallowing, wheezing, throat tightness, hoarseness, stridor, lightheadedness, dizziness, facial swelling, lip swelling or tongue swelling. ROS    Pertinent positives and negatives are stated within HPI, all other systems reviewed and are negative.     Past Surgical History:   Procedure nontender, +BS. Integument:  Skin turgor: Normal.              Possible hyperpigmentation noted body wide, although this is extremely difficult to observe. There is no raised areas or other rash. Neurological:  Orientation age-appropriate unless noted elseware. Motor functions intact. Lab / Imaging Results   (All laboratory and radiology results have been personally reviewed by myself)  Labs:  No results found for this visit on 07/09/20. Imaging: All Radiology results interpreted by Radiologist unless otherwise noted. XR FOOT LEFT (MIN 3 VIEWS)   Final Result      No acute fracture is identified. ED Course / Medical Decision Making     Medications   dexamethasone (DECADRON) injection 10 mg (10 mg Oral Given 7/9/20 1619)   hydrOXYzine (VISTARIL) capsule 50 mg (50 mg Oral Given 7/9/20 1619)   famotidine (PEPCID) tablet 20 mg (20 mg Oral Given 7/9/20 1619)        Consults:   None    Procedures:   none    MDM:   Patient presents to the emergency department for a rash. Original plan was to give the patient's medications above and prescriptions below and have her follow-up with dermatology, however approximately 1 hour into her stay she wanted to have x-rays done of her foot. Gait normal, physical exam is completely normal with the exception of possibly some minimal hyperpigmentation noted body wide. Again, the patient should follow-up with dermatology. Vital signs stable. She is very well-appearing appropriate discharge and outpatient follow-up. Specific conditions for emergent return have been discussed and the patient verbalized understanding to return immediately for new or worsening symptoms. Counseling: The emergency provider has spoken with the patient and discussed todays results, in addition to providing specific details for the plan of care and counseling regarding the diagnosis and prognosis. Questions are answered at this time and they are agreeable with the plan.   Assessment 1. Pruritic disorder    2. Left foot pain      Plan   Discharge to home  Patient condition is good    New Medications     New Prescriptions    FAMOTIDINE (PEPCID) 20 MG TABLET    Take 1 tablet by mouth 2 times daily for 7 days    HYDROXYZINE (VISTARIL) 25 MG CAPSULE    Take 1 capsule by mouth 3 times daily as needed for Itching    PREDNISONE (DELTASONE) 20 MG TABLET    Sig.: Take 60mg  Po qd x 3 days, then 40mg po qd x3 days, then 20mg po qd x 3 days. QS x 9 days     Electronically signed by Eva Damon PA-C   DD: 7/9/20  **This report was transcribed using voice recognition software. Every effort was made to ensure accuracy; however, inadvertent computerized transcription errors may be present.   END OF ED PROVIDER NOTE         Eva Damon PA-C  07/09/20 4422

## 2020-07-14 ENCOUNTER — HOSPITAL ENCOUNTER (OUTPATIENT)
Age: 60
Discharge: HOME | End: 2020-07-16
Payer: MEDICAID

## 2020-07-14 ENCOUNTER — HOSPITAL ENCOUNTER (OUTPATIENT)
Dept: GENERAL RADIOLOGY | Age: 60
Discharge: HOME | End: 2020-07-16
Payer: MEDICAID

## 2020-07-14 PROCEDURE — 72100 X-RAY EXAM L-S SPINE 2/3 VWS: CPT

## 2020-07-15 ENCOUNTER — OFFICE VISIT (OUTPATIENT)
Dept: NEUROSURGERY | Age: 60
End: 2020-07-15
Payer: MEDICAID

## 2020-07-15 VITALS
DIASTOLIC BLOOD PRESSURE: 107 MMHG | HEIGHT: 63 IN | WEIGHT: 120 LBS | HEART RATE: 68 BPM | BODY MASS INDEX: 21.26 KG/M2 | SYSTOLIC BLOOD PRESSURE: 146 MMHG | TEMPERATURE: 98.8 F

## 2020-07-15 VITALS
HEIGHT: 62 IN | WEIGHT: 120 LBS | TEMPERATURE: 95.4 F | BODY MASS INDEX: 22.08 KG/M2 | OXYGEN SATURATION: 97 % | SYSTOLIC BLOOD PRESSURE: 149 MMHG | DIASTOLIC BLOOD PRESSURE: 83 MMHG | HEART RATE: 86 BPM | RESPIRATION RATE: 18 BRPM

## 2020-07-15 PROCEDURE — G8420 CALC BMI NORM PARAMETERS: HCPCS | Performed by: PHYSICIAN ASSISTANT

## 2020-07-15 PROCEDURE — 99213 OFFICE O/P EST LOW 20 MIN: CPT | Performed by: PHYSICIAN ASSISTANT

## 2020-07-15 PROCEDURE — 1036F TOBACCO NON-USER: CPT | Performed by: PHYSICIAN ASSISTANT

## 2020-07-15 PROCEDURE — 3017F COLORECTAL CA SCREEN DOC REV: CPT | Performed by: PHYSICIAN ASSISTANT

## 2020-07-15 PROCEDURE — G8427 DOCREV CUR MEDS BY ELIG CLIN: HCPCS | Performed by: PHYSICIAN ASSISTANT

## 2020-07-16 ENCOUNTER — APPOINTMENT (OUTPATIENT)
Dept: GENERAL RADIOLOGY | Age: 60
End: 2020-07-16
Payer: MEDICAID

## 2020-07-16 ENCOUNTER — HOSPITAL ENCOUNTER (EMERGENCY)
Age: 60
Discharge: HOME | End: 2020-07-16
Payer: MEDICAID

## 2020-07-16 ENCOUNTER — APPOINTMENT (OUTPATIENT)
Dept: CT IMAGING | Age: 60
End: 2020-07-16
Payer: MEDICAID

## 2020-07-16 PROCEDURE — 73630 X-RAY EXAM OF FOOT: CPT

## 2020-07-16 PROCEDURE — 99284 EMERGENCY DEPT VISIT MOD MDM: CPT

## 2020-07-16 PROCEDURE — 70450 CT HEAD/BRAIN W/O DYE: CPT

## 2020-07-16 PROCEDURE — 72125 CT NECK SPINE W/O DYE: CPT

## 2020-07-16 PROCEDURE — 72131 CT LUMBAR SPINE W/O DYE: CPT

## 2020-07-16 NOTE — ED NOTES
Pt has multiple complaints. But c/o bilateral foot pain, worse on the right. Pt states she had fallen and has seen her doctor who told her to come to the ED for xrays. Ice pack provided. Pt awaiting xray/CT. Visitor x 1 at bedside.       Sophia Argueta RN  07/16/20 2063

## 2020-07-16 NOTE — ED NOTES
Bed: 14B-14  Expected date:   Expected time:   Means of arrival:   Comments:  triage     Sidney Lopez RN  07/16/20 1911

## 2020-07-16 NOTE — ED PROVIDER NOTES
HPI:independant     7/16/20, Time: 12:47 AM EDT         Dominga  is a 61 y.o. female presenting to the ED for fall that occurred 3 to 4 days ago. Patient reports that she fell landing primarily on her butt but also somehow striking the top of her feet bilaterally. She also reports that she did strike her head. She denies loss of consciousness with this. States that she has been able to ambulate but does express increasing pain primarily to her lower back as well as right foot. Patient does have notable soft tissue swelling to the dorsum of the right foot with bruising noted to the lateral aspect. She denies any pain to her upper extremities, chest or abdomen. She also denies any shortness of breath. States that she is taking her Norco that she is prescribed but is not getting any relief. Patient is not on any anticoagulant therapy she denied loss of consciousness with this. She reports that she did notify her primary care physician who advised her to come here for x-rays. Review of Systems:   Pertinent positives and negatives are stated within HPI, all other systems reviewed and are negative.          --------------------------------------------- PAST HISTORY ---------------------------------------------  Past Medical History:  has a past medical history of Asthma, CAD (coronary artery disease), Closed fracture of proximal end of left humerus with routine healing, Degeneration of lumbar or lumbosacral intervertebral disc, Fall against sharp object, History of blood transfusion, Hypertension, Hypokalemia, Insomnia, Kidney stone, Nondisplaced fracture of greater tuberosity of right humerus, initial encounter for closed fracture, Orthostatic hypotension, and Severe back pain. Past Surgical History:  has a past surgical history that includes Tonsillectomy; Bunionectomy; chest tube insertion (1990); Lithotripsy (2012); Colonoscopy (3/26/09); other surgical history;  Upper gastrointestinal endoscopy (3/30/15); Upper gastrointestinal endoscopy (4/21/08); lumbar fusion (11/09/2017); and Kyphosis surgery (N/A, 10/28/2019). Social History:  reports that she has quit smoking. She has a 1.00 pack-year smoking history. She has never used smokeless tobacco. She reports current alcohol use of about 6.0 standard drinks of alcohol per week. She reports that she does not use drugs. Family History: family history includes Cancer in her brother; Diabetes in her mother; Heart Disease in her maternal grandfather, maternal grandmother, and sister; Hypertension in her mother; Stroke in her sister. The patients home medications have been reviewed. Allergies: Dilaudid [hydromorphone hcl] and Morphine    -------------------------------------------------- RESULTS -------------------------------------------------  All laboratory and radiology results have been personally reviewed by myself   LABS:  No results found for this visit on 07/16/20. RADIOLOGY:  Interpreted by Radiologist.  CT Head WO Contrast   Final Result   1. No acute intracranial hemorrhage identified. 2. Additional nonacute/incidental findings as described above. This report has been electronically signed by Saumya Williamson MD.      CT Lumbar Spine WO Contrast   Final Result   Mild compression fracture of the superior endplate of L2 is new when compared    to prior CT scan. If indicated clinically recommend correlation with MRI in the    morning. Old compression fractures of L1, L4 and L5 again noted. No spinal canal stenosis. This report has been electronically signed by Kya Mitchell MD.      CT Cervical Spine WO Contrast   Final Result   1. No acute fracture of the cervical spine. 2. Additional nonacute/incidental findings as described above. This report has been electronically signed by Saumya Williamson MD.      XR FOOT LEFT (MIN 3 VIEWS)   Final Result      1.  Healed fractures are seen involving the distal aspect of third and   fourth metatarsal bones. 2. No evidence of acute fracture involving the left foot. XR FOOT RIGHT (MIN 3 VIEWS)   Final Result      Fractures are seen involving distal aspect of third and fourth   metatarsal bones with moderate displacement.          ------------------------- NURSING NOTES AND VITALS REVIEWED ---------------------------   The nursing notes within the ED encounter and vital signs as below have been reviewed. BP (!) 149/83   Pulse 86   Temp 95.4 °F (35.2 °C)   Resp 18   Ht 5' 2\" (1.575 m)   Wt 120 lb (54.4 kg)   SpO2 97%   BMI 21.95 kg/m²   Oxygen Saturation Interpretation: Normal      ---------------------------------------------------PHYSICAL EXAM--------------------------------------      Constitutional/General: Alert and oriented x3, well appearing, non toxic in NAD  Head: Normocephalic and atraumatic  Eyes: PERRL, EOMI  Mouth: Oropharynx clear, handling secretions, no trismus  Neck: Supple, full ROM,   Pulmonary: Lungs clear to auscultation bilaterally, no wheezes, rales, or rhonchi. Not in respiratory distress  Cardiovascular:  Regular rate and rhythm, no murmurs, gallops, or rubs. 2+ distal pulses  Abdomen: Soft, non tender, non distended,   Extremities: Moves all extremities x 4. Warm and well perfused, patient with point tenderness to dorsum of right foot primarily the third and fourth metatarsal bones. Patient does have soft tissue swelling as well as ecchymosis across the entire right foot. No point tenderness to the dorsal aspect of the left foot, 2+ dorsal pedal pulses. Sensations are intact bilaterally, compartments are all soft. No point tenderness noted to the ankles knees or hips bilaterally. Full range of motion noted. Cervical, thoracic, lumbar sacral spine are midline. Patient expressed pain at the right lower lumbar aspect more laterally does have bruising and expresses pain to the buttock. No pain midline.   Strength 5 out of 5  Skin: warm and dry without rash  Neurologic: GCS 15, cranial nerves II through XII grossly intact. No acute neurovascular deficit noted. Speech clear and coherent strength strong and equal bilaterally  Psych: Normal Affect      ------------------------------ ED COURSE/MEDICAL DECISION MAKING----------------------  Medications - No data to display      ED COURSE:       Medical Decision Making: Plan will be for imaging, patient reports not needing anything for pain relief as she took her night time dose of Norco.  Imaging resulted CT scan of the brain unremarkable, CT cervical spine negative for any acute fractures or dislocations. CT lumbar spine did show a mild compression fracture of the superior endplate of L2 which appears to be new when compared to prior CAT scans. Patient on reevaluation does not have any point tenderness to that area. She does have old compression fractures noted to L1, L4 and L5. She has also had a previous fusion by Dr. Robb Turner. Patient was provided with referral to new neurosurgeon she was made aware of these results. X-rays also obtained and x-ray of the left foot showing healed fractures involving the distal aspect of the third and fourth metatarsal bones no evidence of any acute fracture, x-ray of the right foot showing fractures involving the distal aspect of the third and fourth metatarsal bones with moderate displacement. I did consult orthopedic surgery, orthopedic resident was in to see patient and splint was applied by him, please refer to his note. Patient was provided with prescription for a walker, she was educated on nonweightbearing status and following up with both her orthopedic physician as well as neurosurgeon. She has been able to walk on this since the fall.   Patient will not be sent home with any additional pain meds that she is already on Norco.  Patient expressed understanding of when to return back to the emergency department and the importance of good follow-up care. Patient overall nontoxic. Patient neurovascular intact. Patient expressed understanding and safely discharged home       Counseling: The emergency provider has spoken with the patient and discussed todays results, in addition to providing specific details for the plan of care and counseling regarding the diagnosis and prognosis. Questions are answered at this time and they are agreeable with the plan.      --------------------------------- IMPRESSION AND DISPOSITION ---------------------------------    IMPRESSION  1. Fall, initial encounter    2. Foot fracture, right, closed, initial encounter    3. Compression fracture of L2 vertebra, initial encounter ([facility] [address]        DISPOSITION  Disposition: Discharge to home  Patient condition is good      NOTE: This report was transcribed using voice recognition software.  Every effort was made to ensure accuracy; however, inadvertent computerized transcription errors may be present     RONAN Segura - CNP  07/16/20 0418    ATTENDING PROVIDER ATTESTATION:     Supervising Physician, on-site, available for consultation, non-participatory in the evaluation or care of this patient         Allie Lanza MD  07/16/20 0343

## 2020-07-17 ENCOUNTER — HOSPITAL ENCOUNTER (OUTPATIENT)
Dept: PSYCHIATRY | Age: 60
Setting detail: THERAPIES SERIES
Discharge: HOME | End: 2020-07-17
Payer: MEDICAID

## 2020-07-17 PROCEDURE — 90832 PSYTX W PT 30 MINUTES: CPT | Performed by: COUNSELOR

## 2020-07-17 NOTE — VIRTUAL HEALTH
Consults  Patient Location: pt is at home    Provider Location ([facility]): [address] [facility] [address] [place]    Individual Therapy note    Date: 07/17/2020  Start time: 1:00 PM  End time: 1:20 PM    Patient's goal: \"Get this under control. \"    Notes: Pt reports that she is doing \"eh, so-so\" at check in. Pt reports that she has really had a hard time having broken her foot earlier this week. Pt states that with her on-going back issues and broken finger, she feels very overwhelmed and in a lot of discomfort. Pt processed her feelings with LPC. Pt states she has been trying to cut back on her drinking, as suggested by Dr. Hernan Ceballos, but has not been successful so far. Pt stated with everything going on, she \"can't even reason\" with herself and is drinking before she realizes. Pt reminded to turn to her coping skills such as reading the Bible and to try to stay busy and keep her mind occupied as best she can given her injuries, to prevent urges to drink. Pt reports family supplies her with alcohol and they bring it whether she wants it or not. Status After Intervention:  Improved    Participation Level: Active Listener and Interactive    Participation Quality: Appropriate, Attentive and Sharing      Speech:  normal      Thought Process/Content: Logical      Affective Functioning: Congruent      Mood: anxious      Level of consciousness:  Alert, Oriented x4 and Attentive      Response to Learning: Able to verbalize current knowledge/experience      Endings: None Reported    Discipline Responsible: /Counselor      Electronically signed by Fidel Charles LPC on 7/17/2020 at 1:28 PM    This virtual visit was conducted via interactive/real-time audio/video.

## 2020-07-20 ENCOUNTER — HOSPITAL ENCOUNTER (OUTPATIENT)
Dept: PSYCHIATRY | Age: 60
Setting detail: THERAPIES SERIES
Discharge: HOME | End: 2020-07-20
Payer: MEDICAID

## 2020-07-20 PROCEDURE — 99212 OFFICE O/P EST SF 10 MIN: CPT | Performed by: PSYCHIATRY & NEUROLOGY

## 2020-07-21 ENCOUNTER — HOSPITAL ENCOUNTER (OUTPATIENT)
Dept: PSYCHIATRY | Age: 60
Setting detail: THERAPIES SERIES
Discharge: HOME | End: 2020-07-21
Payer: MEDICAID

## 2020-07-21 ENCOUNTER — HOSPITAL ENCOUNTER (EMERGENCY)
Age: 60
Discharge: HOME | End: 2020-07-21
Payer: MEDICAID

## 2020-07-21 ENCOUNTER — APPOINTMENT (OUTPATIENT)
Dept: GENERAL RADIOLOGY | Age: 60
End: 2020-07-21
Payer: MEDICAID

## 2020-07-21 VITALS
RESPIRATION RATE: 16 BRPM | OXYGEN SATURATION: 100 % | HEART RATE: 92 BPM | SYSTOLIC BLOOD PRESSURE: 142 MMHG | DIASTOLIC BLOOD PRESSURE: 98 MMHG | TEMPERATURE: 97.5 F

## 2020-07-21 PROCEDURE — 96372 THER/PROPH/DIAG INJ SC/IM: CPT

## 2020-07-21 PROCEDURE — 99283 EMERGENCY DEPT VISIT LOW MDM: CPT

## 2020-07-21 PROCEDURE — 6360000002 HC RX W HCPCS: Performed by: PHYSICIAN ASSISTANT

## 2020-07-21 PROCEDURE — 72040 X-RAY EXAM NECK SPINE 2-3 VW: CPT

## 2020-07-21 PROCEDURE — H2012 BEHAV HLTH DAY TREAT, PER HR: HCPCS | Performed by: COUNSELOR

## 2020-07-21 RX ORDER — ORPHENADRINE CITRATE 30 MG/ML
60 INJECTION INTRAMUSCULAR; INTRAVENOUS ONCE
Status: COMPLETED | OUTPATIENT
Start: 2020-07-21 | End: 2020-07-21

## 2020-07-21 RX ORDER — KETOROLAC TROMETHAMINE 30 MG/ML
30 INJECTION, SOLUTION INTRAMUSCULAR; INTRAVENOUS ONCE
Status: COMPLETED | OUTPATIENT
Start: 2020-07-21 | End: 2020-07-21

## 2020-07-21 RX ORDER — DEXAMETHASONE SODIUM PHOSPHATE 10 MG/ML
10 INJECTION INTRAMUSCULAR; INTRAVENOUS ONCE
Status: COMPLETED | OUTPATIENT
Start: 2020-07-21 | End: 2020-07-21

## 2020-07-21 RX ORDER — NAPROXEN 500 MG/1
500 TABLET ORAL 2 TIMES DAILY
Qty: 20 TABLET | Refills: 0 | Status: ON HOLD | OUTPATIENT
Start: 2020-07-21 | End: 2020-08-30 | Stop reason: HOSPADM

## 2020-07-21 RX ORDER — METHYLPREDNISOLONE 4 MG/1
TABLET ORAL
Qty: 1 KIT | Refills: 0 | Status: SHIPPED | OUTPATIENT
Start: 2020-07-21 | End: 2020-07-27

## 2020-07-21 RX ORDER — CYCLOBENZAPRINE HCL 10 MG
10 TABLET ORAL NIGHTLY PRN
Qty: 10 TABLET | Refills: 0 | Status: SHIPPED | OUTPATIENT
Start: 2020-07-21 | End: 2020-07-31

## 2020-07-21 RX ADMIN — KETOROLAC TROMETHAMINE 30 MG: 30 INJECTION, SOLUTION INTRAMUSCULAR at 12:23

## 2020-07-21 RX ADMIN — DEXAMETHASONE SODIUM PHOSPHATE 10 MG: 10 INJECTION INTRAMUSCULAR; INTRAVENOUS at 12:23

## 2020-07-21 RX ADMIN — ORPHENADRINE CITRATE 60 MG: 30 INJECTION INTRAMUSCULAR; INTRAVENOUS at 12:23

## 2020-07-21 ASSESSMENT — PAIN SCALES - GENERAL: PAINLEVEL_OUTOF10: 9

## 2020-07-21 NOTE — ED PROVIDER NOTES
Spinal Tenderness Present? []   Altered Level of Consciousness Present? []   Intoxication Present? []   Distracting Injury Present? ROS    Pertinent positives and negatives are stated within HPI, all other systems reviewed and are negative. Past Surgical History:  has a past surgical history that includes Tonsillectomy; Bunionectomy; chest tube insertion (1990); Lithotripsy (2012); Colonoscopy (3/26/09); other surgical history; Upper gastrointestinal endoscopy (3/30/15); Upper gastrointestinal endoscopy (4/21/08); lumbar fusion (11/09/2017); and Kyphosis surgery (N/A, 10/28/2019). Social History:  reports that she has quit smoking. She has a 1.00 pack-year smoking history. She has never used smokeless tobacco. She reports current alcohol use of about 6.0 standard drinks of alcohol per week. She reports that she does not use drugs. Family History: family history includes Cancer in her brother; Diabetes in her mother; Heart Disease in her maternal grandfather, maternal grandmother, and sister; Hypertension in her mother; Stroke in her sister. Allergies: Dilaudid [hydromorphone hcl] and Morphine    Physical Exam            ED Triage Vitals   BP Temp Temp src Pulse Resp SpO2 Height Weight   07/21/20 1133 07/21/20 1124 -- 07/21/20 1124 07/21/20 1132 07/21/20 1124 -- --   (!) 142/98 97.5 °F (36.4 °C)  110 16 95 %        Oxygen Saturation Interpretation: Normal.    Constitutional:  Alert, development consistent with age. HEENT:  NC/NT. Airway patent. Neck:  In collar: No.          Bony tenderness:  none. Paraspinal tenderness:  none bilateral.        Trapezius Tenderness:  moderate to the left. Crepitance: none. Step off: No.        Tracheal deviation: none. JVD: none. ROM: diminished range with pain. Skin:  no erythema, rash or wounds noted. Chest:  Symmetrical without visible rash or tenderness.   Respiratory:  Clear to auscultation and breath sounds equal.  CV:  Regular rate and rhythm, normal heart sounds, without pathological murmurs. GI:  Abdomen Soft, nontender. No abrasions, ecchymoses, or lacerations. Back:  No costovertebral, paravertebral, intervertebral, or vertebral tenderness or spasm. Pelvis: non tender and stable to palpation. Integument:  No abrasions, ecchymoses, or lacerations unless noted elsewhere. Musculoskeletal:  No extremity tenderness or swelling. Normal, painless range of motion of extremities. No neurovascular deficit. Lymphatics: No lymphangitis or adenopathy noted. Neurological:  Orientation age-appropriate. Motor functions intact. Lab / Imaging Results   (All laboratory and radiology results have been personally reviewed by myself)  Labs:  No results found for this visit on 07/21/20. Imaging: All Radiology results interpreted by Radiologist unless otherwise noted. XR CERVICAL SPINE (2-3 VIEWS)   Final Result   Degenerative changes of the cervical spine with a mild   right lateral flexion actually beginning in the upper thoracic spine. There is no individual vertebral displacement or disruption, and no   evidence of a rotary component at the cervical/cranial junction to   confirm torticollis. There appears to be depression of the upper articular endplate of T7,   which is incompletely characterized at the lower margin of the study. ED Course / Medical Decision Making     Medications   dexamethasone (DECADRON) injection 10 mg (10 mg Oral Given 7/21/20 1223)   orphenadrine (NORFLEX) injection 60 mg (60 mg Intramuscular Given 7/21/20 1223)   ketorolac (TORADOL) injection 30 mg (30 mg Intramuscular Given 7/21/20 1223)       Consult(s):   none. Procedure(s):   none. MDM:   Patient is a 72-year-old female that presented to the emergency department with a left trap into the left cervical area pain after waking up. Patient denies any trauma or acute falls.   In examination patient has some difficulty

## 2020-07-21 NOTE — VIRTUAL HEALTH
Consults  Patient Location: pt is at home    Provider Location ([facility]/State): [facility] [address]  [place] [address] [place]                                                                        Group Therapy Note    Date: 7/21/2020  Start Time: 8:30 AM  End Time:  10:05 AM  Number of Participants: 5    Type of Group: Psychotherapy    Patient's Goal:  To increase socialization and improve interpersonal relationships while addressing personal mental health and addiction concerns. Notes: Today was the pt's first time participating in the group session. Pt reported that she is doing [place] but uncomfortable,\" referring to recent injuries, at check-in. Pt was attentive and responsive when engaged, but was otherwise quiet during this session. Pt's goal for today is to \"get out and about and do something. \"    Status After Intervention:  Unchanged    Participation Level: Active Listener and Minimal    Participation Quality: Appropriate and Attentive      Speech:  normal      Thought Process/Content: Logical      Affective Functioning: Congruent      Mood: euthymic      Level of consciousness:  Alert, Oriented x4 and Attentive      Response to Learning: Able to verbalize current knowledge/experience      Endings: None Reported    Modes of Intervention: Education, Support, Socialization, Exploration and Clarifying      Discipline Responsible: /Counselor      Electronically signed by Bowen Merrill LPC on 7/22/2020 at 11:26 AM        This virtual visit was conducted via interactive/real-time audio/video.

## 2020-07-27 ENCOUNTER — APPOINTMENT (OUTPATIENT)
Dept: PSYCHIATRY | Age: 60
End: 2020-07-27
Payer: MEDICAID

## 2020-07-28 ENCOUNTER — APPOINTMENT (OUTPATIENT)
Dept: PSYCHIATRY | Age: 60
End: 2020-07-28
Payer: MEDICAID

## 2020-07-30 ENCOUNTER — APPOINTMENT (OUTPATIENT)
Dept: PSYCHIATRY | Age: 60
End: 2020-07-30
Payer: MEDICAID

## 2020-08-20 ENCOUNTER — HOSPITAL ENCOUNTER (INPATIENT)
Age: 60
LOS: 9 days | Discharge: HOME | DRG: 201 | End: 2020-08-30
Attending: EMERGENCY MEDICINE | Admitting: FAMILY MEDICINE
Payer: MEDICAID

## 2020-08-20 LAB
BASOPHILS ABSOLUTE: 0.03 E9/L (ref 0–0.2)
BASOPHILS RELATIVE PERCENT: 0.4 % (ref 0–2)
EOSINOPHILS ABSOLUTE: 0.02 E9/L (ref 0.05–0.5)
EOSINOPHILS RELATIVE PERCENT: 0.3 % (ref 0–6)
HCT VFR BLD CALC: 44.1 % (ref 34–48)
HEMOGLOBIN: 15.2 G/DL (ref 11.5–15.5)
IMMATURE GRANULOCYTES #: 0.05 E9/L
IMMATURE GRANULOCYTES %: 0.7 % (ref 0–5)
LYMPHOCYTES ABSOLUTE: 1.81 E9/L (ref 1.5–4)
LYMPHOCYTES RELATIVE PERCENT: 25.9 % (ref 20–42)
MCH RBC QN AUTO: 33.8 PG (ref 26–35)
MCHC RBC AUTO-ENTMCNC: 34.5 % (ref 32–34.5)
MCV RBC AUTO: 98 FL (ref 80–99.9)
MONOCYTES ABSOLUTE: 0.63 E9/L (ref 0.1–0.95)
MONOCYTES RELATIVE PERCENT: 9 % (ref 2–12)
NEUTROPHILS ABSOLUTE: 4.45 E9/L (ref 1.8–7.3)
NEUTROPHILS RELATIVE PERCENT: 63.7 % (ref 43–80)
PDW BLD-RTO: 13.2 FL (ref 11.5–15)
PLATELET # BLD: 190 E9/L (ref 130–450)
PMV BLD AUTO: 11.4 FL (ref 7–12)
RBC # BLD: 4.5 E12/L (ref 3.5–5.5)
WBC # BLD: 7 E9/L (ref 4.5–11.5)

## 2020-08-20 PROCEDURE — 83605 ASSAY OF LACTIC ACID: CPT

## 2020-08-20 PROCEDURE — 93005 ELECTROCARDIOGRAM TRACING: CPT | Performed by: EMERGENCY MEDICINE

## 2020-08-20 PROCEDURE — 80307 DRUG TEST PRSMV CHEM ANLYZR: CPT

## 2020-08-20 PROCEDURE — G0480 DRUG TEST DEF 1-7 CLASSES: HCPCS

## 2020-08-20 PROCEDURE — 2580000003 HC RX 258: Performed by: EMERGENCY MEDICINE

## 2020-08-20 PROCEDURE — 85025 COMPLETE CBC W/AUTO DIFF WBC: CPT

## 2020-08-20 PROCEDURE — 99284 EMERGENCY DEPT VISIT MOD MDM: CPT

## 2020-08-20 PROCEDURE — 99285 EMERGENCY DEPT VISIT HI MDM: CPT

## 2020-08-20 PROCEDURE — 96365 THER/PROPH/DIAG IV INF INIT: CPT

## 2020-08-20 RX ORDER — SODIUM CHLORIDE 9 MG/ML
INJECTION, SOLUTION INTRAVENOUS CONTINUOUS
Status: DISCONTINUED | OUTPATIENT
Start: 2020-08-20 | End: 2020-08-22

## 2020-08-20 RX ADMIN — SODIUM CHLORIDE: 9 INJECTION, SOLUTION INTRAVENOUS at 23:35

## 2020-08-21 ENCOUNTER — APPOINTMENT (OUTPATIENT)
Dept: CT IMAGING | Age: 60
DRG: 201 | End: 2020-08-21
Payer: MEDICAID

## 2020-08-21 ENCOUNTER — APPOINTMENT (OUTPATIENT)
Dept: GENERAL RADIOLOGY | Age: 60
DRG: 201 | End: 2020-08-21
Payer: MEDICAID

## 2020-08-21 LAB
ACETAMINOPHEN LEVEL: <5 MCG/ML (ref 10–30)
ALBUMIN SERPL-MCNC: 3.9 G/DL (ref 3.5–5.2)
ALP BLD-CCNC: 86 U/L (ref 35–104)
ALT SERPL-CCNC: 25 U/L (ref 0–32)
AMPHETAMINE SCREEN, URINE: NOT DETECTED
ANION GAP SERPL CALCULATED.3IONS-SCNC: 19 MMOL/L (ref 7–16)
ANION GAP SERPL CALCULATED.3IONS-SCNC: 20 MMOL/L (ref 7–16)
AST SERPL-CCNC: 58 U/L (ref 0–31)
BARBITURATE SCREEN URINE: NOT DETECTED
BENZODIAZEPINE SCREEN, URINE: NOT DETECTED
BILIRUB SERPL-MCNC: 1.9 MG/DL (ref 0–1.2)
BUN BLDV-MCNC: 4 MG/DL (ref 8–23)
BUN BLDV-MCNC: 5 MG/DL (ref 8–23)
CALCIUM SERPL-MCNC: 7.7 MG/DL (ref 8.6–10.2)
CALCIUM SERPL-MCNC: 8.3 MG/DL (ref 8.6–10.2)
CANNABINOID SCREEN URINE: NOT DETECTED
CHLORIDE BLD-SCNC: 95 MMOL/L (ref 98–107)
CHLORIDE BLD-SCNC: 97 MMOL/L (ref 98–107)
CO2: 21 MMOL/L (ref 22–29)
CO2: 23 MMOL/L (ref 22–29)
COCAINE METABOLITE SCREEN URINE: NOT DETECTED
CREAT SERPL-MCNC: 0.5 MG/DL (ref 0.5–1)
CREAT SERPL-MCNC: 0.6 MG/DL (ref 0.5–1)
EKG ATRIAL RATE: 80 BPM
EKG ATRIAL RATE: 84 BPM
EKG P AXIS: 51 DEGREES
EKG P AXIS: 64 DEGREES
EKG P-R INTERVAL: 132 MS
EKG P-R INTERVAL: 132 MS
EKG Q-T INTERVAL: 528 MS
EKG Q-T INTERVAL: 636 MS
EKG QRS DURATION: 80 MS
EKG QRS DURATION: 82 MS
EKG QTC CALCULATION (BAZETT): 623 MS
EKG QTC CALCULATION (BAZETT): 769 MS
EKG R AXIS: 19 DEGREES
EKG R AXIS: 44 DEGREES
EKG T AXIS: 58 DEGREES
EKG T AXIS: 9 DEGREES
EKG VENTRICULAR RATE: 84 BPM
EKG VENTRICULAR RATE: 88 BPM
ETHANOL: <10 MG/DL (ref 0–0.08)
FENTANYL SCREEN, URINE: NOT DETECTED
GFR AFRICAN AMERICAN: >60
GFR AFRICAN AMERICAN: >60
GFR NON-AFRICAN AMERICAN: >60 ML/MIN/1.73
GFR NON-AFRICAN AMERICAN: >60 ML/MIN/1.73
GLUCOSE BLD-MCNC: 76 MG/DL (ref 74–99)
GLUCOSE BLD-MCNC: 90 MG/DL (ref 74–99)
HCT VFR BLD CALC: 39.5 % (ref 34–48)
HEMOGLOBIN: 13.5 G/DL (ref 11.5–15.5)
LACTIC ACID, SEPSIS: 3.2 MMOL/L (ref 0.5–1.9)
LACTIC ACID, SEPSIS: 5 MMOL/L (ref 0.5–1.9)
LACTIC ACID: 6.8 MMOL/L (ref 0.5–2.2)
Lab: NORMAL
MAGNESIUM: 0.7 MG/DL (ref 1.6–2.6)
MAGNESIUM: 2.4 MG/DL (ref 1.6–2.6)
MCH RBC QN AUTO: 33.9 PG (ref 26–35)
MCHC RBC AUTO-ENTMCNC: 34.2 % (ref 32–34.5)
MCV RBC AUTO: 99.2 FL (ref 80–99.9)
METHADONE SCREEN, URINE: NOT DETECTED
OPIATE SCREEN URINE: NOT DETECTED
OXYCODONE URINE: NOT DETECTED
PDW BLD-RTO: 13.2 FL (ref 11.5–15)
PHENCYCLIDINE SCREEN URINE: NOT DETECTED
PLATELET # BLD: 159 E9/L (ref 130–450)
PMV BLD AUTO: 11.8 FL (ref 7–12)
POTASSIUM SERPL-SCNC: 2.9 MMOL/L (ref 3.5–5)
POTASSIUM SERPL-SCNC: 3.8 MMOL/L (ref 3.5–5)
RBC # BLD: 3.98 E12/L (ref 3.5–5.5)
REASON FOR REJECTION: NORMAL
REJECTED TEST: NORMAL
SALICYLATE, SERUM: <0.3 MG/DL (ref 0–30)
SODIUM BLD-SCNC: 136 MMOL/L (ref 132–146)
SODIUM BLD-SCNC: 139 MMOL/L (ref 132–146)
TOTAL PROTEIN: 6.2 G/DL (ref 6.4–8.3)
TRICYCLIC ANTIDEPRESSANTS SCREEN SERUM: NEGATIVE NG/ML
TROPONIN: <0.01 NG/ML (ref 0–0.03)
WBC # BLD: 7.6 E9/L (ref 4.5–11.5)

## 2020-08-21 PROCEDURE — 6370000000 HC RX 637 (ALT 250 FOR IP): Performed by: NURSE PRACTITIONER

## 2020-08-21 PROCEDURE — APPSS60 APP SPLIT SHARED TIME 46-60 MINUTES: Performed by: CLINICAL NURSE SPECIALIST

## 2020-08-21 PROCEDURE — 6370000000 HC RX 637 (ALT 250 FOR IP): Performed by: FAMILY MEDICINE

## 2020-08-21 PROCEDURE — 93010 ELECTROCARDIOGRAM REPORT: CPT | Performed by: INTERNAL MEDICINE

## 2020-08-21 PROCEDURE — 2140000000 HC CCU INTERMEDIATE R&B

## 2020-08-21 PROCEDURE — 80048 BASIC METABOLIC PNL TOTAL CA: CPT

## 2020-08-21 PROCEDURE — 36415 COLL VENOUS BLD VENIPUNCTURE: CPT

## 2020-08-21 PROCEDURE — 2580000003 HC RX 258: Performed by: EMERGENCY MEDICINE

## 2020-08-21 PROCEDURE — 94640 AIRWAY INHALATION TREATMENT: CPT

## 2020-08-21 PROCEDURE — 83605 ASSAY OF LACTIC ACID: CPT

## 2020-08-21 PROCEDURE — 85027 COMPLETE CBC AUTOMATED: CPT

## 2020-08-21 PROCEDURE — 6370000000 HC RX 637 (ALT 250 FOR IP): Performed by: INTERNAL MEDICINE

## 2020-08-21 PROCEDURE — 80053 COMPREHEN METABOLIC PANEL: CPT

## 2020-08-21 PROCEDURE — 93005 ELECTROCARDIOGRAM TRACING: CPT | Performed by: NURSE PRACTITIONER

## 2020-08-21 PROCEDURE — 71045 X-RAY EXAM CHEST 1 VIEW: CPT

## 2020-08-21 PROCEDURE — 80307 DRUG TEST PRSMV CHEM ANLYZR: CPT

## 2020-08-21 PROCEDURE — 6360000002 HC RX W HCPCS

## 2020-08-21 PROCEDURE — 99222 1ST HOSP IP/OBS MODERATE 55: CPT | Performed by: INTERNAL MEDICINE

## 2020-08-21 PROCEDURE — 83735 ASSAY OF MAGNESIUM: CPT

## 2020-08-21 PROCEDURE — 84484 ASSAY OF TROPONIN QUANT: CPT

## 2020-08-21 PROCEDURE — 6360000002 HC RX W HCPCS: Performed by: EMERGENCY MEDICINE

## 2020-08-21 PROCEDURE — 6360000002 HC RX W HCPCS: Performed by: FAMILY MEDICINE

## 2020-08-21 PROCEDURE — 70450 CT HEAD/BRAIN W/O DYE: CPT

## 2020-08-21 PROCEDURE — 6370000000 HC RX 637 (ALT 250 FOR IP): Performed by: EMERGENCY MEDICINE

## 2020-08-21 RX ORDER — HYDROCODONE BITARTRATE AND ACETAMINOPHEN 5; 325 MG/1; MG/1
1 TABLET ORAL EVERY 6 HOURS PRN
Status: DISCONTINUED | OUTPATIENT
Start: 2020-08-21 | End: 2020-08-30 | Stop reason: HOSPADM

## 2020-08-21 RX ORDER — IPRATROPIUM BROMIDE AND ALBUTEROL SULFATE 2.5; .5 MG/3ML; MG/3ML
1 SOLUTION RESPIRATORY (INHALATION)
Status: DISCONTINUED | OUTPATIENT
Start: 2020-08-21 | End: 2020-08-30 | Stop reason: HOSPADM

## 2020-08-21 RX ORDER — NITROGLYCERIN 0.4 MG/1
0.4 TABLET SUBLINGUAL EVERY 5 MIN PRN
Status: DISCONTINUED | OUTPATIENT
Start: 2020-08-21 | End: 2020-08-30 | Stop reason: HOSPADM

## 2020-08-21 RX ORDER — MAGNESIUM SULFATE IN WATER 40 MG/ML
2 INJECTION, SOLUTION INTRAVENOUS ONCE
Status: COMPLETED | OUTPATIENT
Start: 2020-08-21 | End: 2020-08-21

## 2020-08-21 RX ORDER — PANTOPRAZOLE SODIUM 40 MG/1
40 TABLET, DELAYED RELEASE ORAL
Status: DISCONTINUED | OUTPATIENT
Start: 2020-08-21 | End: 2020-08-21

## 2020-08-21 RX ORDER — METOPROLOL SUCCINATE 25 MG/1
25 TABLET, EXTENDED RELEASE ORAL 2 TIMES DAILY
Status: DISCONTINUED | OUTPATIENT
Start: 2020-08-21 | End: 2020-08-22

## 2020-08-21 RX ORDER — POTASSIUM CHLORIDE 7.45 MG/ML
INJECTION INTRAVENOUS
Status: COMPLETED
Start: 2020-08-21 | End: 2020-08-21

## 2020-08-21 RX ORDER — POTASSIUM CHLORIDE 7.45 MG/ML
10 INJECTION INTRAVENOUS ONCE
Status: COMPLETED | OUTPATIENT
Start: 2020-08-21 | End: 2020-08-21

## 2020-08-21 RX ORDER — SODIUM CHLORIDE 9 MG/ML
INJECTION, SOLUTION INTRAVENOUS CONTINUOUS
Status: DISCONTINUED | OUTPATIENT
Start: 2020-08-21 | End: 2020-08-23

## 2020-08-21 RX ORDER — DIPHENHYDRAMINE HCL 25 MG
25 TABLET ORAL EVERY 6 HOURS PRN
Status: DISCONTINUED | OUTPATIENT
Start: 2020-08-21 | End: 2020-08-21

## 2020-08-21 RX ORDER — NAPROXEN 250 MG/1
250 TABLET ORAL 2 TIMES DAILY WITH MEALS
Status: ON HOLD | COMMUNITY
End: 2020-08-30 | Stop reason: HOSPADM

## 2020-08-21 RX ORDER — ACETAMINOPHEN 325 MG/1
650 TABLET ORAL EVERY 4 HOURS PRN
Status: DISCONTINUED | OUTPATIENT
Start: 2020-08-21 | End: 2020-08-30 | Stop reason: HOSPADM

## 2020-08-21 RX ORDER — POTASSIUM CHLORIDE 20 MEQ/1
40 TABLET, EXTENDED RELEASE ORAL ONCE
Status: COMPLETED | OUTPATIENT
Start: 2020-08-21 | End: 2020-08-21

## 2020-08-21 RX ADMIN — HYDROCODONE BITARTRATE AND ACETAMINOPHEN 1 TABLET: 5; 325 TABLET ORAL at 06:38

## 2020-08-21 RX ADMIN — SODIUM CHLORIDE: 9 INJECTION, SOLUTION INTRAVENOUS at 05:07

## 2020-08-21 RX ADMIN — IPRATROPIUM BROMIDE AND ALBUTEROL SULFATE 1 AMPULE: .5; 3 SOLUTION RESPIRATORY (INHALATION) at 16:34

## 2020-08-21 RX ADMIN — DIPHENHYDRAMINE HYDROCHLORIDE 25 MG: 25 TABLET ORAL at 06:38

## 2020-08-21 RX ADMIN — IPRATROPIUM BROMIDE AND ALBUTEROL SULFATE 1 AMPULE: .5; 3 SOLUTION RESPIRATORY (INHALATION) at 09:28

## 2020-08-21 RX ADMIN — ENOXAPARIN SODIUM 40 MG: 40 INJECTION SUBCUTANEOUS at 09:57

## 2020-08-21 RX ADMIN — NITROGLYCERIN 0.4 MG: 0.4 TABLET, ORALLY DISINTEGRATING SUBLINGUAL at 17:22

## 2020-08-21 RX ADMIN — IPRATROPIUM BROMIDE AND ALBUTEROL SULFATE 1 AMPULE: .5; 3 SOLUTION RESPIRATORY (INHALATION) at 12:57

## 2020-08-21 RX ADMIN — IPRATROPIUM BROMIDE AND ALBUTEROL SULFATE 1 AMPULE: .5; 3 SOLUTION RESPIRATORY (INHALATION) at 20:44

## 2020-08-21 RX ADMIN — MAGNESIUM SULFATE 2 G: 2 INJECTION INTRAVENOUS at 01:42

## 2020-08-21 RX ADMIN — POTASSIUM CHLORIDE 40 MEQ: 1500 TABLET, EXTENDED RELEASE ORAL at 01:35

## 2020-08-21 RX ADMIN — PANTOPRAZOLE SODIUM 40 MG: 40 TABLET, DELAYED RELEASE ORAL at 06:53

## 2020-08-21 RX ADMIN — POTASSIUM CHLORIDE 10 MEQ: 7.45 INJECTION INTRAVENOUS at 01:38

## 2020-08-21 RX ADMIN — ACETAMINOPHEN 650 MG: 325 TABLET, FILM COATED ORAL at 20:41

## 2020-08-21 RX ADMIN — POTASSIUM CHLORIDE 10 MEQ: 10 INJECTION, SOLUTION INTRAVENOUS at 01:38

## 2020-08-21 RX ADMIN — MAGNESIUM SULFATE 2 G: 2 INJECTION INTRAVENOUS at 00:59

## 2020-08-21 RX ADMIN — METOPROLOL SUCCINATE 25 MG: 25 TABLET, EXTENDED RELEASE ORAL at 20:41

## 2020-08-21 ASSESSMENT — PAIN SCALES - GENERAL
PAINLEVEL_OUTOF10: 8
PAINLEVEL_OUTOF10: 7
PAINLEVEL_OUTOF10: 0
PAINLEVEL_OUTOF10: 7
PAINLEVEL_OUTOF10: 8

## 2020-08-21 ASSESSMENT — PAIN DESCRIPTION - FREQUENCY: FREQUENCY: CONTINUOUS

## 2020-08-21 ASSESSMENT — PAIN DESCRIPTION - LOCATION: LOCATION: CHEST

## 2020-08-21 ASSESSMENT — PAIN DESCRIPTION - PROGRESSION: CLINICAL_PROGRESSION: NOT CHANGED

## 2020-08-21 ASSESSMENT — ENCOUNTER SYMPTOMS
COLOR CHANGE: 0
ABDOMINAL PAIN: 0
SHORTNESS OF BREATH: 0

## 2020-08-21 ASSESSMENT — PAIN DESCRIPTION - DESCRIPTORS: DESCRIPTORS: SHARP;SHOOTING

## 2020-08-21 ASSESSMENT — PAIN DESCRIPTION - PAIN TYPE: TYPE: ACUTE PAIN

## 2020-08-21 ASSESSMENT — PAIN DESCRIPTION - ONSET: ONSET: ON-GOING

## 2020-08-21 ASSESSMENT — PAIN DESCRIPTION - ORIENTATION: ORIENTATION: RIGHT;LEFT;MID

## 2020-08-21 NOTE — PROGRESS NOTES
Dr. Georges Habermann notified via perfect serve that pt has arrived to unit and med rec rec is complete. Also notified that pt was having Vtach.  Will await orders

## 2020-08-21 NOTE — ED PROVIDER NOTES
that includes Tonsillectomy; Bunionectomy; chest tube insertion (1990); Lithotripsy (2012); Colonoscopy (3/26/09); other surgical history; Upper gastrointestinal endoscopy (3/30/15); Upper gastrointestinal endoscopy (4/21/08); lumbar fusion (11/09/2017); and Kyphosis surgery (N/A, 10/28/2019). Social History:  reports that she has quit smoking. She has a 1.00 pack-year smoking history. She has never used smokeless tobacco. She reports current alcohol use of about 6.0 standard drinks of alcohol per week. She reports that she does not use drugs. Family History: family history includes Cancer in her brother; Diabetes in her mother; Heart Disease in her maternal grandfather, maternal grandmother, and sister; Hypertension in her mother; Stroke in her sister. . Unless otherwise noted, family history is non contributory    The patients home medications have been reviewed. Allergies: Dilaudid [hydromorphone hcl] and Morphine        ---------------------------------------------------PHYSICAL EXAM--------------------------------------    Constitutional/General: Alert and oriented x3  Head: Normocephalic and atraumatic  Eyes: PERRL, EOMI, sclera non icteric  Mouth: Oropharynx clear, handling secretions, no trismus, no asymmetry of the posterior oropharynx or uvular edema  Neck: Supple, full ROM, no stridor, no meningeal signs  Respiratory: Lungs clear to auscultation bilaterally, no wheezes, rales, or rhonchi. Not in respiratory distress  Cardiovascular:  Regular rate. Regular rhythm. 2+ distal pulses. Equal extremity pulses. Chest: No chest wall tenderness  GI:  Abdomen Soft, Non tender, Non distended. No rebound, guarding, or rigidity. No pulsatile masses. Musculoskeletal: Moves all extremities x 4. Warm and well perfused, no clubbing, cyanosis, or edema. Capillary refill <3 seconds  Integument: skin warm and dry. No rashes.    Neurologic: GCS 15, no focal deficits, symmetric strength 5/5 in the upper and lower American >60 >=60 mL/min/1.73    GFR African American >60     Calcium 7.7 (L) 8.6 - 10.2 mg/dL    Total Protein 6.2 (L) 6.4 - 8.3 g/dL    Alb 3.9 3.5 - 5.2 g/dL    Total Bilirubin 1.9 (H) 0.0 - 1.2 mg/dL    Alkaline Phosphatase 86 35 - 104 U/L    ALT 25 0 - 32 U/L    AST 58 (H) 0 - 31 U/L   Troponin   Result Value Ref Range    Troponin <0.01 0.00 - 0.03 ng/mL   Magnesium   Result Value Ref Range    Magnesium 0.7 (LL) 1.6 - 2.6 mg/dL   EKG 12 Lead   Result Value Ref Range    Ventricular Rate 84 BPM    Atrial Rate 84 BPM    P-R Interval 132 ms    QRS Duration 82 ms    Q-T Interval 528 ms    QTc Calculation (Bazett) 623 ms    P Axis 64 degrees    R Axis 44 degrees    T Axis 58 degrees   ,       RADIOLOGY:  Interpreted by Radiologist unless otherwise specified  XR CHEST PORTABLE   Final Result   No acute cardiopulmonary process. CT HEAD WO CONTRAST   Final Result   No indication for an acute intracranial process. EKG Interpretation  Interpreted by emergency department physician, Dr. Dmitriy Steinberg     Sinus rhythm, rate 84, QTc 623, no ischemic change        ------------------------- NURSING NOTES AND VITALS REVIEWED ---------------------------   The nursing notes within the ED encounter and vital signs as below have been reviewed by myself  /77   Pulse 112   Temp 97.3 °F (36.3 °C)   Resp 20   Ht 5' 2\" (1.575 m)   Wt 135 lb (61.2 kg)   SpO2 97%   BMI 24.69 kg/m²     Oxygen Saturation Interpretation: Normal    The patients available past medical records and past encounters were reviewed.         ------------------------------ ED COURSE/MEDICAL DECISION MAKING----------------------  Medications   0.9 % sodium chloride infusion ( Intravenous New Bag 8/20/20 5822)   magnesium sulfate 2 g in 50 mL IVPB premix (2 g Intravenous New Bag 8/21/20 0142)   magnesium sulfate 2 g in 50 mL IVPB premix (0 g Intravenous Stopped 8/21/20 0141)   potassium chloride (KLOR-CON M) extended release tablet 40 mEq (40 mEq Oral Given 8/21/20 0135)   potassium chloride 10 mEq/100 mL IVPB (Peripheral Line) (10 mEq Intravenous New Bag 8/21/20 0138)           The cardiac monitor revealed sinus with a heart rate in the 80s as interpreted by me. The cardiac monitor was ordered secondary to the patient's syncopal and to monitor the patient for dysrhythmia. CPT [id number]         Medical Decision Making: On arrival, patient's EKG showed a prolonged QT interval.  Chart review shows she does have history of alcoholism and torsades in the past.  She was given magnesium. Labs and imaging reviewed. Patient also received additional potassium along with another round of magnesium supplementation as her magnesium is critically low. Reevaluation, she is resting comfortably. No symptoms or complaints. Rhythm strip improving. Discussed with the hospitalist, patient will be admitted. Critical care time: 33 minutes    Counseling: The emergency provider has spoken with the patient and discussed todays results, in addition to providing specific details for the plan of care and counseling regarding the diagnosis and prognosis. Questions are answered at this time and they are agreeable with the plan.       --------------------------------- IMPRESSION AND DISPOSITION ---------------------------------    IMPRESSION  1. Syncope and collapse    2. Hypokalemia    3. Hypomagnesemia    4. Prolonged Q-T interval on ECG    5. Lactic acidosis        DISPOSITION  Disposition: Admit to telemetry  Patient condition is stable        NOTE: This report was transcribed using voice recognition software.  Every effort was made to ensure accuracy; however, inadvertent computerized transcription errors may be present       Kimberly Miller MD  08/21/20 6744

## 2020-08-21 NOTE — H&P
Marguerite Resendiz is an 61 y.o.  female. Patient was reported to have passed out on the couch. No seizure activity was noted. She says she has some mild intermittent chest pain at times, no shortness of breath, fevers, or chills. Past Medical History:   Diagnosis Date    Asthma     CAD (coronary artery disease)     Closed fracture of proximal end of left humerus with routine healing 6/11/2019    Degeneration of lumbar or lumbosacral intervertebral disc     Fall against sharp object 1960    chest tube in hospital    History of blood transfusion 1997    after vaginal delivery of baby hemmorhage    Hypertension     Hypokalemia     Insomnia     Kidney stone     Nondisplaced fracture of greater tuberosity of right humerus, initial encounter for closed fracture 1/22/2019    Orthostatic hypotension     Severe back pain 2/3/2014     Past Surgical History:   Procedure Laterality Date    BUNIONECTOMY      Left foot    CHEST TUBE INSERTION  1990    several    COLONOSCOPY  3/26/09    KYPHOSIS SURGERY N/A 10/28/2019    KYPHOPLASTY L1 WITH VERTEBRAL BONE BIOPSY performed by Flor Liu MD at [facility] LITHOTRIPSY  2012   [address] [facility]  11/09/2017    L3-L4 ,L4-L5  interbody fusion with Dajuan Mcguire     OTHER SURGICAL HISTORY      electro ablation for rectal and sigmoid polyps    TONSILLECTOMY      UPPER GASTROINTESTINAL ENDOSCOPY  3/30/15    UPPER GASTROINTESTINAL ENDOSCOPY  4/21/08       Family History   Problem Relation Age of Onset    Diabetes Mother     Hypertension Mother     Heart Disease Sister     Stroke Sister     Heart Disease Maternal Grandmother     Heart Disease Maternal Grandfather     Cancer Brother        Social History     Tobacco Use    Smoking status: Former Smoker     Packs/day: 0.50     Years: 2.00     Pack years: 1.00    Smokeless tobacco: Never Used   Substance Use Topics    Alcohol use:  Yes     Alcohol/week: 6.0 standard drinks     Types: 6 Cans of beer per week     Comment: daily    Drug use: No     Comment: quit 2000 cocaine in past       Current Facility-Administered Medications   Medication Dose Route Frequency Provider Last Rate Last Dose    ipratropium-albuterol (DUONEB) nebulizer solution 1 ampule  1 ampule Inhalation Q4H WA Jose Feliciano MD        HYDROcodone-acetaminophen [facility]) 5-325 MG per tablet 1 tablet  1 tablet Oral Q6H PRN Jose Feliciano MD        pantoprazole (PROTONIX) tablet 40 mg  40 mg Oral QAM AC Jose Feliciano MD        0.9 % sodium chloride infusion   Intravenous Continuous Jose Feliciano MD        enoxaparin (LOVENOX) injection 40 mg  40 mg Subcutaneous Daily Jose Feliciano MD        diphenhydrAMINE (BENADRYL) tablet 25 mg  25 mg Oral Q6H PRN Jose Feliciano MD        0.9 % sodium chloride infusion   Intravenous Continuous Waqar Merrill  mL/hr at 08/21/20 0507         Allergies: Allergies   Allergen Reactions    Dilaudid [Hydromorphone Hcl] Itching    Morphine Other (See Comments)     Hallucinations per daughter       Active Problems:    Syncope and collapse  Resolved Problems:    * No resolved hospital problems. *    Blood pressure 128/89, pulse 81, temperature 98.7 °F (37.1 °C), temperature source Oral, resp. rate 18, height 5' 2\" (1.575 m), weight 113 lb 12.8 oz (51.6 kg), SpO2 98 %, not currently breastfeeding. Review of Systems   Constitutional: Positive for activity change. HENT: Negative for congestion. Respiratory: Negative for shortness of breath. Cardiovascular: Positive for chest pain. Gastrointestinal: Negative for abdominal pain. Genitourinary: Negative for difficulty urinating. Musculoskeletal: Negative for arthralgias. Skin: Negative for color change. Neurological: Negative for dizziness. Hematological: Negative for adenopathy. Psychiatric/Behavioral: Negative for agitation.        Physical Exam  HENT:      Mouth/Throat: Mouth: Mucous membranes are moist.   Eyes:      Pupils: Pupils are equal, round, and reactive to light. Cardiovascular:      Rate and Rhythm: Normal rate and regular rhythm. Pulses: Normal pulses. Heart sounds: Normal heart sounds. Pulmonary:      Effort: Pulmonary effort is normal. No respiratory distress. Breath sounds: Normal breath sounds. No wheezing. Abdominal:      General: Abdomen is flat. Bowel sounds are normal. There is no distension. Tenderness: There is no abdominal tenderness. Skin:     General: Skin is warm and dry. Capillary Refill: Capillary refill takes less than 2 seconds. Neurological:      General: No focal deficit present. Mental Status: She is alert and oriented to person, place, and time. Psychiatric:         Mood and Affect: Mood normal.         Behavior: Behavior normal.         Assessment:    Syncope  Ventricular tachycardia  Lactic acidosis  Hypokalemia  Hypomagnesemia    Plan:  IV fluids. Cardiology to see. Continue meds.   Monitor labs and exam.    Kapil Harding MD  8/21/2020

## 2020-08-21 NOTE — CONSULTS
Inpatient Cardiology Consultation      Reason for Consult: Syncope, ventricular tachycardia    Consulting Physician: Dr. Carlton Méndez    Requesting Physician:  Dr. Elaine Dickinson    Date of Consultation: 8/21/2020    History of Present Illness:    Ms. Yu Rosa is a 61year old lady with a history of syncope and torsades de pointe secondary to QT prolongation in the setting of severe hypokalemia and hypomagnesemia. She was seen in hospital consultation for this by Dr. Rafita Boyce in 2016, and later evaluated by Dr. Nathan Sapp both during that admission and later as an outpatient. She is somewhat of a vague historian, but reports having loss of appetite for the past few weeks, as well as at least one or two days of vomiting. She is amnesic to yesterday's events, but does remember lying on her couch, and then waking in an ambulance. Her friend who was at her home reported having found the patient on the couch, unconscious and shaking \"like she was having a seizure\"; when she regained consciousness she was confused and disoriented. On arrival to the ER, lactic acid was 6.8, magnesium 0.7, and potassium 2.9. She was treated with 40 meq of Kdur, 10 meq of IV potassium, and 4 grams of magnesium sulfate. EKG showed SR with prolonged QT interval, and she had runs of PMVT on telemetry. She has been admitted to telemetry and electrolytes corrected, but she continues to have PVCs including bigeminy. She does report taking Benadryl most days due to itching caused by her pain medication, and also takes [place] stomach pill\" at home. Past Medical History:     1. Hospitalized in 2016 with syncope and PMVT in setting of hypokalemia and hypomagnesemia, and prolonged QTc interval    2. History of hypertension per EMR: she denies this   3. Asthma    4. Chronic lower back pain with history of kyphoplasty and lumbar fusion    5. History of GERD   6. Alcohol abuse   7. Tobacco abuse    8. Left foot and humerus fracture    9.     Right third and fourth metatarsal neck fracture  10. Osteoarthritis   11. History of tonsillectomy  12. History of left bunionectomy  13. History of kidney stones, status post lithotripsy    Previous Cardiac Testin. Jess Kinds nuclear stress test 2016:   Normal examination.  In particular, there is no evidence of left ventricular myocardium at risk for stress-induced ischemia. EF 70%. 2. TTE 2016:   Summary   Normal left ventricle size and systolic function   Stage I diastolic dysfunction   Trace mitral regurgitation   Trace aortic regurgitation      Medications Prior to Admit:  Prior to Admission medications    Medication Sig Start Date End Date Taking? Authorizing Provider   naproxen (NAPROSYN) 250 MG tablet Take 250 mg by mouth 2 times daily (with meals) Pt unsure of dose   Yes Historical Provider, MD   HYDROcodone-acetaminophen (NORCO) 5-325 MG per tablet Take 1 tablet by mouth every 6 hours as needed for Pain. Yes Historical Provider, MD   naproxen (NAPROSYN) 500 MG tablet Take 1 tablet by mouth 2 times daily for 10 days 20  Neela Pearl PA-C   Misc. Devices [facility]) MISC 1 each by Does not apply route daily 20   RONAN Singletary CNP   famotidine (PEPCID) 20 MG tablet Take 1 tablet by mouth 2 times daily for 7 days 20  Erendira Hernandez PA-C   gabapentin (NEURONTIN) 300 MG capsule Take 1 capsule by mouth 3 times daily for 30 days.  20  CRISTI Luna   Compression Stockings MISC by Does not apply route 19   RONAN Regan CNP   omeprazole (PRILOSEC) 40 MG capsule Take 40 mg by mouth daily as needed     Historical Provider, MD   albuterol sulfate  (90 BASE) MCG/ACT inhaler Inhale 2 puffs into the lungs every 6 hours as needed for Wheezing    Historical Provider, MD       Current Medications:    Current Facility-Administered Medications: ipratropium-albuterol (DUONEB) nebulizer solution 1 ampule, 1 ampule, Inhalation, Q4H WA  HYDROcodone-acetaminophen (NORCO) 5-325 MG per tablet 1 tablet, 1 tablet, Oral, Q6H PRN  0.9 % sodium chloride infusion, , Intravenous, Continuous  enoxaparin (LOVENOX) injection 40 mg, 40 mg, Subcutaneous, Daily  perflutren lipid microspheres (DEFINITY) injection 1.65 mg, 1.5 mL, Intravenous, ONCE PRN  0.9 % sodium chloride infusion, , Intravenous, Continuous    Allergies:  Dilaudid [hydromorphone hcl] and Morphine    Social History: She has been smoking \"a few\" cigarettes daily, but used to smoke up to 1/2 PPD for many years. She has been in treatment for alcohol abuse, and states she doesn't drink every day, but did have a beer yesterday. She denies illicit drug use. Family History:  One sister who  at 35 and had heart surgery, although she does not know the specifics   No other family history of sudden death  She is unaware of family history of arrhythmia     Review of Systems:     · Constitutional: Positive for fatigue and night sweats. She has lost about 25 lbs within the last month, which she attributes to poor appetite. · ENT: Denies headaches, bleeding gums, or epistaxis   · Cardiovascular: She reports chest pain which occurs with movement to the  left side or when she tries to sit up; this began after admission. She denies recent exertional chest pain, palpitations, or lower extremity swelling. · Respiratory: Denies dyspnea, cough, orthopnea or PND. · Gastrointestinal: Positive for nausea, vomiting, and anorexia. Denies vomiting or dark/bloody stools. · Genitourinary: Denies urgency, dysuria or hematuria. · Musculoskeletal: Denies gait disturbance or falls. Positive for neck and back pain, and recent right foot pain due to fracture. · Integumentary: Denies rash, hives or ulcerations. Positive for pruritis which occurs when she takes Norco   · Neurological: Syncope as above. She is unsure of the last time she had a syncopal event.  Denies dizziness, numbness or 05/08/2017    HDL 78 05/08/2017    LDLCALC 33 05/08/2017    TRIG 131 05/08/2017     LIVER PROFILE:  Recent Labs     08/21/20  0047   AST 58*   ALT 25   LABALBU 3.9       CXR 8/21/2020: Impression:    No acute cardiopulmonary process    CT head without contrast 8/21/2020: Impression:  No indication for an acute intracranial process. Assessment:  1. Ventricular ectopy: NSVT and multifocal PVCs  · In setting of significant electrolyte abnormalities: however she continues to have some PVCs even after correction of potassium and magnesium  · No history of structural heart disease     2. Syncope: ? Secondary to #1    3. Unintentional weight loss with anorexia, nausea, vomiting     4. Chronic pain     Recommendations:  1. Supplement electrolytes: monitor BMP and magnesium level     2. Monitor EKG and telemetry    3. Echocardiogram to assess for structural heart disease     4. Consider ambulatory monitor for further assessment of arrhythmia. 5. Pending results of echo and her clinical course, may also consider nuclear stress to evaluate for ischemia. The above assessment and treatment plan was made in collaboration with Dr. Andrew Easton, and further recommendations will follow by him. Electronically signed by RONAN Oscar on 8/21/2020 at 9:05 AM     ____________________________________________________________________________________  I independently interviewed and examined the patient. I have reviewed the above documentation completed by the JOSE. Please see my additional contributions to the HPI, physical exam, and assessment / medical decision making. HPI, ROS, PMH, PSH, [address], SH, and medications independently reviewed (agree; see above documentation)    History of Present Illness:  Currently with no chest pain, respiratory distress, palpitations. SR with PVC's on telemetry.     Review of Systems:   Cardiac: As per HPI  General: No fever, chills  Pulmonary: As per HPI  HEENT: No visual

## 2020-08-21 NOTE — ED NOTES
Bed: 08  Expected date: 8/20/20  Expected time:   Means of arrival: [facility]  Comments:  BURTON Guy RN  08/20/20 [id number]

## 2020-08-21 NOTE — CONSULTS
Department of Psychiatry  Behavioral Health Consult    REASON FOR CONSULT: \"Grief- loss of of significant other\"    CONSULTING PHYSICIAN: Hans Callahan MD    History obtained from: Scott Gosselin, Medical Faculty    HISTORY OF PRESENT ILLNESS:      The patient is a 61 y.o. -American female, previously  and now , has 5 kids, lives with a friend with no significant past psychiatric history who presents with loss of consciousness to the ED. The friend called EMS when she awoke confused and disoriented, EMS then brought her to the ED. In the ED, her urine drug screen was for all. She was sent to the medical floor for further treatment and evaluation. Psychiatry was consulted due to the patient's recent grief and assessment of any needed psychiatric treatment. Upon assessment today, patient reports that she is \"hurting\" physically in her heart and neck. She repeated that she was \"hurting\" multiple times throughout the interview with no clear explanation of \"hurting\" when asked each time she stated the word. States that she does not remember the events of last night, 8/20/20. She does not remember getting in the ambulance to the ED. She has no psychiatric history, no psychiatric medications, no family psychiatry history, and no suicidal history with her or her family. Substance abuse history consists of cocaine abuse which stopped after attending a treatment program decades ago. Cigarette use started in 30s, about 2 cigarettes per day, and smoked one cigarette yesterday, 8/20/20. Alcohol use started in 20s, about 2 beers per day, and one beer yesterday. Was born and raised in [facility]. Her support system is her family. 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 Previously  but currently . Met her significant other 6 years ago. Significant other passed away on August 1st. He passed away from a heart attack in front of her.  Family and friend have been caring for her. She has not been able to sleep or eat at a normal capacity for about a month. Negative for other major depressive symptoms. She goes about her daily life such as cleaning the house and seeing friends and family. She has no suicidal idea, intention, or plan. She also has no homicidal idea, intention, or plan. Denies paranoia, visions, and hallucinations. Thought content and process is fair. Was able to perform serial 7s and repeat 3/3 words after three minutes. Mood was \" hurting\". She was stable but had a flat affect. Acute grief reaction was observed.              Psychiatric Review of Systems       Depression: no     Shani or Hypomania:  no     Panic Attacks:  no     Phobias:  no     Obsessions and Compulsions:  no     PTSD : no     Hallucinations:  no     Delusions:  no      Substance Abuse History:  Previous history of cocaine abuse, alcohol, and tobacco.       Past Psychiatric History:  No previous history    Past Medical History:        Diagnosis Date    Asthma     CAD (coronary artery disease)     Closed fracture of proximal end of left humerus with routine healing 6/11/2019    Degeneration of lumbar or lumbosacral intervertebral disc     Fall against sharp object 1960    chest tube in hospital    History of blood transfusion 1997    after vaginal delivery of baby hemmorhage    Hypertension     Hypokalemia     Insomnia     Kidney stone     Nondisplaced fracture of greater tuberosity of right humerus, initial encounter for closed fracture 1/22/2019    Orthostatic hypotension     Severe back pain 2/3/2014       Past Surgical History:        Procedure Laterality Date    BUNIONECTOMY      Left foot    CHEST TUBE INSERTION  1990    several    COLONOSCOPY  3/26/09    KYPHOSIS SURGERY N/A 10/28/2019    KYPHOPLASTY L1 WITH VERTEBRAL BONE BIOPSY performed by Flor Liu MD at [place] [address] LITHOTRIPSY  2012   [address] [place] [address]  11/09/2017    L3-L4 ,L4-L5  interbody 2.00     Pack years: 1.00    Smokeless tobacco: Never Used   Substance and Sexual Activity    Alcohol use: Yes     Alcohol/week: 6.0 standard drinks     Types: 6 Cans of beer per week     Comment: daily    Drug use: No     Comment: quit 2000 cocaine in past    Sexual activity: Yes   Lifestyle    Physical activity     Days per week: Not on file     Minutes per session: Not on file    Stress: Not on file   Relationships    Social connections     Talks on phone: Not on file     Gets together: Not on file     Attends Christian service: Not on file     Active member of club or organization: Not on file     Attends meetings of clubs or organizations: Not on file     Relationship status: Not on file    Intimate partner violence     Fear of current or ex partner: Not on file     Emotionally abused: Not on file     Physically abused: Not on file     Forced sexual activity: Not on file   Other Topics Concern    Not on file   Social History Narrative    Not on file       REVIEW OF SYSTEMS    Constitutional: [] fever  [] chills  [] weight loss  []weakness [] Other:  Eyes:  [] photophobia  [] discharge [] acuity change   [] Diplopia   [] Other:  HENT:  [] sore throat  [] ear pain [] Tinnitus   [] Other  Respiratory:  [] Cough  [] Shortness of breath   [] Sputum   [] Other:   Cardiac: []Chest pain   []Palpitations []Edema  []PND  [] Other:  GI:  []Abdominal pain   []Nausea  []Vomiting  []Diarrhea  [] Other:  :  [] Dysuria   []Frequency  []Hematuria  []Discharge  [] Other:  Possible Pregnancy: []Yes   []No   LMP:   Musculoskeletal:  []Back pain  []Neck pain  []Recent Injury   Skin:  []Rash  [] Itching  [] Other:  Neurologic:  [] Headache  [] Focal weakness  [] Sensory changes []Other:  Endocrine:  [] Polyuria  [] Polydipsia  [] Hair Loss  [] Other:  Lymphatic:   [] Swollen glands   Psychiatric:  As per HPI      All other systems negative except as marked or mentioned/indicated in the HPI. Thais Aguilar      PHYSICAL EXAM:  Vitals:  BP (!) 112/90   Pulse 75   Temp 97.3 °F (36.3 °C)   Resp 16   Ht 5' 2\" (1.575 m)   Wt 113 lb 12.8 oz (51.6 kg)   SpO2 100%   BMI 20.81 kg/m²      Neuro Exam:   Muscle Strength & Tone: full ROM  Gait: normal gait   Involuntary Movements: No    Mental Status Examination:    Level of consciousness:  within normal limits   Appearance:  hospital attire, with appropriate hygiene and grooming  Behavior/Motor: No psychomotor agitation or retardation noted  Attitude toward examiner: Calm cooperative   Speech: coherent, normal rate rhythm, and tone  Mood: \"Hurting\"   Affect:  Flat, mood congruent  Thought processes:  coherent, linear, goal-directed  Thought content: denies paranoia, visions, or hallucinations  Cognition:  oriented to person, place, and time   Concentration poor  Memory intact  Insight fair   Judgement fair   Fund of Knowledge adequate      DIAGNOSIS:    Acute grief reaction        RISK ASSESSMENT:  Suicide: low  Homicide: low  Agitation/violence: low   Elopement: low      LABS: REVIEWED TODAY:  Recent Labs     08/20/20  2332 08/21/20  0651   WBC 7.0 7.6   HGB 15.2 13.5    159     Recent Labs     08/21/20  0047 08/21/20  0651    136   K 2.9* 3.8   CL 97* 95*   CO2 23 21*   BUN 4* 5*   CREATININE 0.6 0.5   GLUCOSE 90 76     Recent Labs     08/21/20  0047   BILITOT 1.9*   ALKPHOS 86   AST 58*   ALT 25     Lab Results   Component Value Date    LABAMPH NOT DETECTED 08/21/2020    BARBSCNU NOT DETECTED 08/21/2020    LABBENZ NOT DETECTED 08/21/2020    LABMETH NOT DETECTED 08/21/2020    OPIATESCREENURINE NOT DETECTED 08/21/2020    PHENCYCLIDINESCREENURINE NOT DETECTED 08/21/2020    PPXUR NEG 05/06/2015    ETOH <10 08/20/2020     Lab Results   Component Value Date    TSH 0.600 11/04/2019     No results found for: LITHIUM  No results found for: VALPROATE, CBMZ  No results found for: LITHIUM, VALPROATE    FURTHER LABS ORDERED :      Radiology   Ct Head Wo Contrast    Result Date: 2020  Patient MRN:  [id number] : 1960 Age: 61 years Gender: Female Order Date:  2020 12:08 AM TECHNIQUE/NUMBER OF IMAGES/COMPARISON/CLINICAL HISTORY: CT brain Axial images obtained sagittal and coronal reconstructions 141 images Comparison  54-year-old female patient with a history of altered mental status. FINDINGS: No interval change is observed since the previous examination. There are unremarkable appearance for peripheral CSF space and ventricular system. There is no focal mass effect or midline shift. There is no evidence for a sizable area of an acute or recent insult in progression to the brain parenchyma. Midline structures have unremarkable appearance. Images with bone window settings demonstrate no significant findings. There is no indication for an acute intracranial hemorrhagic event. No indication for an acute intracranial process. Xr Chest Portable    Result Date: 2020  Patient MRN:  [id number] : 1960 Age: 61 years Gender: Female Order Date:  2020 12:24 AM TECHNIQUE/NUMBER OF IMAGES/COMPARISON/CLINICAL HISTORY: Chest AP 1 image one view Comparison 2020. The History difficult breathing. FINDINGS: Lungs are clear. No infiltrates, consolidations or pleural effusions. Heart has normal size, mediastinum unremarkable. There is no perihilar vascular congestion. No acute cardiopulmonary process. EKG: TRACING REVIEWED    RECOMMENDATIONS: Patient should have referral by the  for outpatient counseling services who specializes in grief. The patient would highly benefit from grief counseling and if later determined by her outpatient provider that she needed psychotropic medication then they could initiate medication at their discretion. The patient is not suicidal homicidal.  The patient does not appear to be overtly or covertly psychotic. The patient is not going through any acute psychiatric issue or process at this time.   The patient does not pose an acute risk to self or others. The patient does not meet the criteria for inpatient psychiatric hospitalization. Risk Management:  routine:  no special precautions necessary    Medications: No medications are required currently. Discussed with the treating physician/ team about the patient and treatment plan  Reviewed the chart    Discussed with the patient risk, benefit, alternative and common side effects for the  proposed medication treatment. Patient is consenting to the treatment. Thanks for the consult. Please call me if needed. Electronically signed by Filemon Rangel on 8/21/2020 at 1:04 PM     Patient personally seen and examined by me. Mental status examination performed. I agree it with assessment performed by the medical student. Note: This report was completed using computerAlminder voiced recognition software. Every effort has been made to ensure accuracy; however, inadvertent computerized transcription errors may be present.

## 2020-08-21 NOTE — CARE COORDINATION
Transition of care: Met with pt in room. Pt lives alone in a 2 story home. Pt stated her girlfriend has been staying with her since the loss of her significant other. Independent with ADLs and drives. DME- [employer], cane and a BSC. Pt said her significant other  last month right in front of her of a heart attack. I provided her with a list of outpatient counseling services and list of grief counselors. Psych has been consulted to see pt. Pt stated she is set up to see a new pcp next month, but is unsure of the dr's name. She said she has physician's name written down at home. She said it is a doctor that took over for Dr Arminda Amezquita. I called Dr. Rafia Torres office and had to leave voicemail requesting return call. Pharmacy is [facility] [place]. Discharge plan is to remain home. Voiced no needs for home at present. Sw/cm will follow     1300  Per Naty Mckay at Dr. Rafia Torres office, pt is set up to see Dr. Jarret Goode on  at [facility]. Pt notified.

## 2020-08-22 LAB
ANION GAP SERPL CALCULATED.3IONS-SCNC: 16 MMOL/L (ref 7–16)
BUN BLDV-MCNC: 3 MG/DL (ref 8–23)
CALCIUM SERPL-MCNC: 8.7 MG/DL (ref 8.6–10.2)
CHLORIDE BLD-SCNC: 98 MMOL/L (ref 98–107)
CO2: 24 MMOL/L (ref 22–29)
CREAT SERPL-MCNC: 0.5 MG/DL (ref 0.5–1)
EKG ATRIAL RATE: 57 BPM
EKG P AXIS: 59 DEGREES
EKG P-R INTERVAL: 140 MS
EKG Q-T INTERVAL: 498 MS
EKG QRS DURATION: 78 MS
EKG QTC CALCULATION (BAZETT): 484 MS
EKG R AXIS: 26 DEGREES
EKG T AXIS: 39 DEGREES
EKG VENTRICULAR RATE: 57 BPM
GFR AFRICAN AMERICAN: >60
GFR NON-AFRICAN AMERICAN: >60 ML/MIN/1.73
GLUCOSE BLD-MCNC: 67 MG/DL (ref 74–99)
HCT VFR BLD CALC: 39 % (ref 34–48)
HEMOGLOBIN: 13 G/DL (ref 11.5–15.5)
LV EF: 60 %
LVEF MODALITY: NORMAL
MAGNESIUM: 1.3 MG/DL (ref 1.6–2.6)
MCH RBC QN AUTO: 33.9 PG (ref 26–35)
MCHC RBC AUTO-ENTMCNC: 33.3 % (ref 32–34.5)
MCV RBC AUTO: 101.6 FL (ref 80–99.9)
PDW BLD-RTO: 13.3 FL (ref 11.5–15)
PLATELET # BLD: 129 E9/L (ref 130–450)
PMV BLD AUTO: 12.2 FL (ref 7–12)
POTASSIUM SERPL-SCNC: 3.2 MMOL/L (ref 3.5–5)
RBC # BLD: 3.84 E12/L (ref 3.5–5.5)
SODIUM BLD-SCNC: 138 MMOL/L (ref 132–146)
WBC # BLD: 7.2 E9/L (ref 4.5–11.5)

## 2020-08-22 PROCEDURE — 6370000000 HC RX 637 (ALT 250 FOR IP): Performed by: INTERNAL MEDICINE

## 2020-08-22 PROCEDURE — 6370000000 HC RX 637 (ALT 250 FOR IP): Performed by: FAMILY MEDICINE

## 2020-08-22 PROCEDURE — 94640 AIRWAY INHALATION TREATMENT: CPT

## 2020-08-22 PROCEDURE — 83735 ASSAY OF MAGNESIUM: CPT

## 2020-08-22 PROCEDURE — 36415 COLL VENOUS BLD VENIPUNCTURE: CPT

## 2020-08-22 PROCEDURE — B246ZZ4 ULTRASONOGRAPHY OF RIGHT AND LEFT HEART, TRANSESOPHAGEAL: ICD-10-PCS | Performed by: INTERNAL MEDICINE

## 2020-08-22 PROCEDURE — 99233 SBSQ HOSP IP/OBS HIGH 50: CPT | Performed by: INTERNAL MEDICINE

## 2020-08-22 PROCEDURE — 85027 COMPLETE CBC AUTOMATED: CPT

## 2020-08-22 PROCEDURE — 93005 ELECTROCARDIOGRAM TRACING: CPT | Performed by: INTERNAL MEDICINE

## 2020-08-22 PROCEDURE — 6360000002 HC RX W HCPCS: Performed by: INTERNAL MEDICINE

## 2020-08-22 PROCEDURE — 2580000003 HC RX 258: Performed by: FAMILY MEDICINE

## 2020-08-22 PROCEDURE — 6360000002 HC RX W HCPCS: Performed by: FAMILY MEDICINE

## 2020-08-22 PROCEDURE — 2140000000 HC CCU INTERMEDIATE R&B

## 2020-08-22 PROCEDURE — 2580000003 HC RX 258: Performed by: EMERGENCY MEDICINE

## 2020-08-22 PROCEDURE — 80048 BASIC METABOLIC PNL TOTAL CA: CPT

## 2020-08-22 PROCEDURE — 93306 TTE W/DOPPLER COMPLETE: CPT

## 2020-08-22 PROCEDURE — 93010 ELECTROCARDIOGRAM REPORT: CPT | Performed by: INTERNAL MEDICINE

## 2020-08-22 RX ORDER — LORAZEPAM 2 MG/ML
2 INJECTION INTRAMUSCULAR
Status: DISCONTINUED | OUTPATIENT
Start: 2020-08-22 | End: 2020-08-30 | Stop reason: HOSPADM

## 2020-08-22 RX ORDER — LORAZEPAM 1 MG/1
2 TABLET ORAL
Status: DISCONTINUED | OUTPATIENT
Start: 2020-08-22 | End: 2020-08-30 | Stop reason: HOSPADM

## 2020-08-22 RX ORDER — HALOPERIDOL 5 MG/ML
5 INJECTION INTRAMUSCULAR ONCE
Status: COMPLETED | OUTPATIENT
Start: 2020-08-22 | End: 2020-08-22

## 2020-08-22 RX ORDER — LORAZEPAM 2 MG/ML
4 INJECTION INTRAMUSCULAR
Status: DISCONTINUED | OUTPATIENT
Start: 2020-08-22 | End: 2020-08-22 | Stop reason: SDUPTHER

## 2020-08-22 RX ORDER — LORAZEPAM 1 MG/1
2 TABLET ORAL
Status: DISCONTINUED | OUTPATIENT
Start: 2020-08-22 | End: 2020-08-22 | Stop reason: SDUPTHER

## 2020-08-22 RX ORDER — LORAZEPAM 1 MG/1
3 TABLET ORAL
Status: DISCONTINUED | OUTPATIENT
Start: 2020-08-22 | End: 2020-08-30 | Stop reason: HOSPADM

## 2020-08-22 RX ORDER — LORAZEPAM 2 MG/ML
1 INJECTION INTRAMUSCULAR
Status: DISCONTINUED | OUTPATIENT
Start: 2020-08-22 | End: 2020-08-30 | Stop reason: HOSPADM

## 2020-08-22 RX ORDER — POTASSIUM CHLORIDE 750 MG/1
40 TABLET, EXTENDED RELEASE ORAL ONCE
Status: DISCONTINUED | OUTPATIENT
Start: 2020-08-22 | End: 2020-08-22

## 2020-08-22 RX ORDER — LORAZEPAM 1 MG/1
1 TABLET ORAL
Status: DISCONTINUED | OUTPATIENT
Start: 2020-08-22 | End: 2020-08-30 | Stop reason: HOSPADM

## 2020-08-22 RX ORDER — LORAZEPAM 2 MG/ML
4 INJECTION INTRAMUSCULAR
Status: DISCONTINUED | OUTPATIENT
Start: 2020-08-22 | End: 2020-08-30 | Stop reason: HOSPADM

## 2020-08-22 RX ORDER — LORAZEPAM 1 MG/1
4 TABLET ORAL
Status: DISCONTINUED | OUTPATIENT
Start: 2020-08-22 | End: 2020-08-30 | Stop reason: HOSPADM

## 2020-08-22 RX ORDER — MAGNESIUM SULFATE IN WATER 40 MG/ML
2 INJECTION, SOLUTION INTRAVENOUS ONCE
Status: DISCONTINUED | OUTPATIENT
Start: 2020-08-22 | End: 2020-08-22

## 2020-08-22 RX ORDER — LORAZEPAM 2 MG/ML
2 INJECTION INTRAMUSCULAR
Status: DISCONTINUED | OUTPATIENT
Start: 2020-08-22 | End: 2020-08-22 | Stop reason: SDUPTHER

## 2020-08-22 RX ORDER — LORAZEPAM 1 MG/1
3 TABLET ORAL
Status: DISCONTINUED | OUTPATIENT
Start: 2020-08-22 | End: 2020-08-22 | Stop reason: SDUPTHER

## 2020-08-22 RX ORDER — POTASSIUM CHLORIDE 20 MEQ/1
40 TABLET, EXTENDED RELEASE ORAL 2 TIMES DAILY
Status: DISCONTINUED | OUTPATIENT
Start: 2020-08-22 | End: 2020-08-29

## 2020-08-22 RX ORDER — LORAZEPAM 2 MG/ML
3 INJECTION INTRAMUSCULAR
Status: DISCONTINUED | OUTPATIENT
Start: 2020-08-22 | End: 2020-08-22 | Stop reason: SDUPTHER

## 2020-08-22 RX ORDER — LORAZEPAM 1 MG/1
4 TABLET ORAL
Status: DISCONTINUED | OUTPATIENT
Start: 2020-08-22 | End: 2020-08-22 | Stop reason: SDUPTHER

## 2020-08-22 RX ORDER — LORAZEPAM 1 MG/1
1 TABLET ORAL
Status: DISCONTINUED | OUTPATIENT
Start: 2020-08-22 | End: 2020-08-22 | Stop reason: SDUPTHER

## 2020-08-22 RX ORDER — MAGNESIUM SULFATE IN WATER 40 MG/ML
4 INJECTION, SOLUTION INTRAVENOUS ONCE
Status: COMPLETED | OUTPATIENT
Start: 2020-08-22 | End: 2020-08-22

## 2020-08-22 RX ORDER — LORAZEPAM 2 MG/ML
3 INJECTION INTRAMUSCULAR
Status: DISCONTINUED | OUTPATIENT
Start: 2020-08-22 | End: 2020-08-30 | Stop reason: HOSPADM

## 2020-08-22 RX ORDER — LORAZEPAM 2 MG/ML
1 INJECTION INTRAMUSCULAR
Status: DISCONTINUED | OUTPATIENT
Start: 2020-08-22 | End: 2020-08-22 | Stop reason: SDUPTHER

## 2020-08-22 RX ORDER — HALOPERIDOL 5 MG/ML
1 INJECTION INTRAMUSCULAR ONCE
Status: COMPLETED | OUTPATIENT
Start: 2020-08-22 | End: 2020-08-22

## 2020-08-22 RX ADMIN — HALOPERIDOL LACTATE 5 MG: 5 INJECTION, SOLUTION INTRAMUSCULAR at 15:24

## 2020-08-22 RX ADMIN — SODIUM CHLORIDE: 9 INJECTION, SOLUTION INTRAVENOUS at 21:11

## 2020-08-22 RX ADMIN — ACETAMINOPHEN 650 MG: 325 TABLET, FILM COATED ORAL at 09:13

## 2020-08-22 RX ADMIN — POTASSIUM CHLORIDE 40 MEQ: 20 TABLET, EXTENDED RELEASE ORAL at 21:10

## 2020-08-22 RX ADMIN — HALOPERIDOL LACTATE 1 MG: 5 INJECTION, SOLUTION INTRAMUSCULAR at 13:48

## 2020-08-22 RX ADMIN — LORAZEPAM 2 MG: 2 INJECTION INTRAMUSCULAR; INTRAVENOUS at 17:58

## 2020-08-22 RX ADMIN — METOPROLOL SUCCINATE 25 MG: 25 TABLET, EXTENDED RELEASE ORAL at 09:13

## 2020-08-22 RX ADMIN — LORAZEPAM 4 MG: 2 INJECTION INTRAMUSCULAR; INTRAVENOUS at 21:10

## 2020-08-22 RX ADMIN — MAGNESIUM SULFATE HEPTAHYDRATE 4 G: 40 INJECTION, SOLUTION INTRAVENOUS at 12:13

## 2020-08-22 RX ADMIN — ACETAMINOPHEN 650 MG: 325 TABLET, FILM COATED ORAL at 00:47

## 2020-08-22 RX ADMIN — IPRATROPIUM BROMIDE AND ALBUTEROL SULFATE 1 AMPULE: .5; 3 SOLUTION RESPIRATORY (INHALATION) at 09:25

## 2020-08-22 RX ADMIN — POTASSIUM CHLORIDE 40 MEQ: 20 TABLET, EXTENDED RELEASE ORAL at 12:13

## 2020-08-22 RX ADMIN — SODIUM CHLORIDE: 9 INJECTION, SOLUTION INTRAVENOUS at 00:51

## 2020-08-22 RX ADMIN — ENOXAPARIN SODIUM 40 MG: 40 INJECTION SUBCUTANEOUS at 09:13

## 2020-08-22 RX ADMIN — NITROGLYCERIN 0.4 MG: 0.4 TABLET, ORALLY DISINTEGRATING SUBLINGUAL at 00:47

## 2020-08-22 ASSESSMENT — PAIN DESCRIPTION - PROGRESSION: CLINICAL_PROGRESSION: NOT CHANGED

## 2020-08-22 ASSESSMENT — PAIN DESCRIPTION - PAIN TYPE: TYPE: ACUTE PAIN

## 2020-08-22 ASSESSMENT — PAIN DESCRIPTION - LOCATION: LOCATION: SHOULDER

## 2020-08-22 ASSESSMENT — PAIN DESCRIPTION - FREQUENCY: FREQUENCY: CONTINUOUS

## 2020-08-22 ASSESSMENT — PAIN SCALES - GENERAL
PAINLEVEL_OUTOF10: 8
PAINLEVEL_OUTOF10: 0
PAINLEVEL_OUTOF10: 0
PAINLEVEL_OUTOF10: 4
PAINLEVEL_OUTOF10: 0
PAINLEVEL_OUTOF10: 0

## 2020-08-22 ASSESSMENT — PAIN DESCRIPTION - ORIENTATION: ORIENTATION: RIGHT

## 2020-08-22 ASSESSMENT — PAIN DESCRIPTION - ONSET: ONSET: ON-GOING

## 2020-08-22 ASSESSMENT — PAIN - FUNCTIONAL ASSESSMENT: PAIN_FUNCTIONAL_ASSESSMENT: ACTIVITIES ARE NOT PREVENTED

## 2020-08-22 ASSESSMENT — ENCOUNTER SYMPTOMS: SHORTNESS OF BREATH: 0

## 2020-08-22 ASSESSMENT — PAIN DESCRIPTION - DESCRIPTORS: DESCRIPTORS: SHARP;ACHING;DISCOMFORT

## 2020-08-22 NOTE — PROGRESS NOTES
Updated daughter Theron Maecdo ([phone]) on patient. Patient's daughter also advised this RN that: patient was in the hospital a couple months ago for surgery and she went through withdraw. She also advised that patient typically drinks from the time she gets up in the morning; \"several small bottles of whiskey a day\".

## 2020-08-22 NOTE — PROGRESS NOTES
Dr Aravind Jo on for Dr Anastasia Barnard, updated via perfect serve re: pt with continued to asymptomatic VT throughout the night, with the longest being 61bts. Will await return call or new orders. Ivonne Yoder RN.

## 2020-08-22 NOTE — PROGRESS NOTES
Assisted patient with eating dinner. Patient having hallucinations and stated she sees her daughter in the room with us. I redirected patient but no evidence of learning. Will continue to monitor.

## 2020-08-22 NOTE — PROGRESS NOTES
INPATIENT CARDIOLOGY FOLLOW-UP    Name: Edna Hoff    Age: 61 y.o. Date of Admission: 8/20/2020 10:48 PM    Date of Service: 8/22/2020    Chief Complaint: Follow-up for VT    Interim History:  No new overnight cardiac complaints per patient (suboptimal historian). Currently with no complaints of CP, SOB, or palpitations. SR with PVC's / multiple runs of VT on telemetry. Review of Systems:   Cardiac: As per HPI  General: No fever, chills  Pulmonary: As per HPI  HEENT: No visual disturbances, difficult swallowing  GI: No nausea, vomiting  Musculoskeletal: QUINTANILLA x 4, no focal motor deficits  Skin: Intact, no rashes  Neuro/Psych: No headache or seizures    Problem List:  Patient Active Problem List   Diagnosis    Osteoarthritis of left knee    Osteoarthritis of right knee    Low back pain    Lumbar disc displacement at L3-4, L4-5    Acute sinusitis    Syncope and collapse    Prolonged QT interval    Torsades de pointes (HCC)    Hypokalemia    Hypomagnesemia    NSVT (nonsustained ventricular tachycardia) (HCC)    Spondylolisthesis of lumbar region, L4 on L5    Acute alcoholic intoxication ([address]    Ileus ([address]    Delirium tremens ([address]    Closed nondisplaced fracture of greater tuberosity of right humerus with routine healing    Closed fracture of proximal end of left humerus with routine healing    Multiple closed fractures of metatarsal bone of left foot    Gait abnormality    Lumbar compression fracture, closed, initial encounter ([address]    Alcohol withdrawal with perceptual disturbances ([address]    Alcohol withdrawal delirium ([address]       Allergies:   Allergies   Allergen Reactions    Dilaudid [Hydromorphone Hcl] Itching    Morphine Other (See Comments)     Hallucinations per daughter       Current Medications:  Current Facility-Administered Medications   Medication Dose Route Frequency Provider Last Rate Last Dose    potassium chloride (KLOR-CON M) extended release tablet 40 mEq  40 mEq lb (53.1 kg)   SpO2 92%   BMI 21.40 kg/m²   Wt Readings from Last 3 Encounters:   08/22/20 117 lb (53.1 kg)   07/15/20 120 lb (54.4 kg)   07/15/20 120 lb (54.4 kg)     Appearance: Awake, alert, no acute respiratory distress  Skin: Intact, no rash  Head: Normocephalic, atraumatic  Eyes: EOMI, no conjunctival erythema  ENMT: No pharyngeal erythema, MMM, no rhinorrhea  Neck: Supple, no elevated JVP, no carotid bruits  Lungs: Clear to auscultation bilaterally. No wheezes, rales, or rhonchi. Cardiac: Regular rate and rhythm, +S1S2, no murmurs apparent  Abdomen: Soft, nontender, +bowel sounds  Extremities: Moves all extremities x 4, no lower extremity edema  Neurologic: No focal motor deficits apparent, normal mood and affect    Intake/Output:    Intake/Output Summary (Last 24 hours) at 8/22/2020 1651  Last data filed at 8/22/2020 1447  Gross per 24 hour   Intake 2817.91 ml   Output 550 ml   Net 2267.91 ml     No intake/output data recorded.     Laboratory Tests:  Recent Labs     08/21/20  0047 08/21/20  0651 08/22/20  0737    136 138   K 2.9* 3.8 3.2*   CL 97* 95* 98   CO2 23 21* 24   BUN 4* 5* 3*   CREATININE 0.6 0.5 0.5   GLUCOSE 90 76 67*   CALCIUM 7.7* 8.3* 8.7     Lab Results   Component Value Date    MG 1.3 08/22/2020     Recent Labs     08/21/20  0047   ALKPHOS 86   ALT 25   AST 58*   PROT 6.2*   BILITOT 1.9*   LABALBU 3.9     Recent Labs     08/20/20  2332 08/21/20  0651 08/22/20  0737   WBC 7.0 7.6 7.2   RBC 4.50 3.98 3.84   HGB 15.2 13.5 13.0   HCT 44.1 39.5 39.0   MCV 98.0 99.2 101.6*   MCH 33.8 33.9 33.9   MCHC 34.5 34.2 33.3   RDW 13.2 13.2 13.3    159 129*   MPV 11.4 11.8 12.2*     Lab Results   Component Value Date    CKTOTAL 50 05/07/2017    CKMB 1.5 05/08/2017    TROPONINI <0.01 08/21/2020    TROPONINI <0.01 02/05/2020    TROPONINI <0.01 05/24/2019     Lab Results   Component Value Date    INR 0.9 10/28/2019    INR 1.0 04/19/2019    INR 1.0 03/09/2019    PROTIME 10.3 10/28/2019    PROTIME 11.8 04/19/2019    PROTIME 10.8 03/09/2019     Lab Results   Component Value Date    TSH 0.600 11/04/2019     No results found for: LABA1C  No results found for: EAG  Lab Results   Component Value Date    CHOL 137 05/08/2017    CHOL 185 11/28/2011    CHOL 164 11/15/2010     Lab Results   Component Value Date    TRIG 131 05/08/2017    TRIG 139 11/28/2011    TRIG 527 (H) 11/15/2010     Lab Results   Component Value Date    HDL 78 05/08/2017    HDL 64.0 11/28/2011    .0 11/15/2010     Lab Results   Component Value Date    LDLCALC 33 05/08/2017    LDLCALC 93 11/28/2011    LDLCALC -  (AA) 11/15/2010     Lab Results   Component Value Date    LABVLDL 26 05/08/2017     No results found for: CHOLHDLRATIO  No results for input(s): PROBNP in the last 72 hours. Cardiac Tests:  Telemetry findings reviewed: SR, multiple runs of VT    Echocardiogram: 8/22/2020 ([place])   Normal left ventricular systolic function. Ejection fraction is visually estimated at 60%. Normal right ventricular size and function (TAPSE 2.5 cm). There is doppler evidence of stage I diastolic dysfunction. Mild mitral regurgitation. The aortic valve is trileaflet. Mild aortic regurgitation. Moderate tricuspid regurgitation. PASP is estimated at 53 mmHg. ASSESSMENT / PLAN:  1. Ventricular ectopy: PMVT, prolonged QTc  · In setting of significant electrolyte abnormalities --> brief NSVT on admission --> multiple episodes of VT last night  · Normal EF this admission  · Similar presentation in 6/2016 (electrolyte abnormalities) / negative stress test and normal at that time (was evaluated by EP)     2. Syncope: ? Secondary to #1     3. Unintentional weight loss     4. Chronic pain    5.  Mild/moderate valve disease / PHTN / diastolic dysfunction    - Monitor telemetry  - Echocardiogram results outlined above  - Will replace both Mg and K again (K 3.2 and Mg 1.3)  - Avoid QT prolonging agents  - EKG in AM  - Reassess for ischemic work-up    Jennifer Perry MD  [facility] ([facility]) Cardiology

## 2020-08-22 NOTE — PLAN OF CARE
Problem: Pain:  Description: Pain management should include both nonpharmacologic and pharmacologic interventions.   Goal: Pain level will decrease  Description: Pain level will decrease  8/22/2020 1848 by Branden James RN  Outcome: Met This Shift  8/22/2020 1646 by Branden James RN  Outcome: Met This Shift  8/22/2020 0631 by Kehinde Xiong RN  Outcome: Met This Shift  Goal: Control of acute pain  Description: Control of acute pain  8/22/2020 1848 by Branden James RN  Outcome: Met This Shift  8/22/2020 1646 by Branden James RN  Outcome: Met This Shift  Goal: Control of chronic pain  Description: Control of chronic pain  8/22/2020 1848 by Branden James RN  Outcome: Met This Shift  8/22/2020 1646 by Branden James RN  Outcome: Ongoing     Problem: Skin Integrity:  Goal: Will show no infection signs and symptoms  Description: Will show no infection signs and symptoms  8/22/2020 1848 by Branden James RN  Outcome: Met This Shift  8/22/2020 1646 by Branden James RN  Outcome: Met This Shift  8/22/2020 0631 by Kehinde Xiong RN  Outcome: Met This Shift  Goal: Absence of new skin breakdown  Description: Absence of new skin breakdown  8/22/2020 1848 by Branden James RN  Outcome: Met This Shift  8/22/2020 1646 by Branden James RN  Outcome: Met This Shift     Problem: Falls - Risk of:  Goal: Will remain free from falls  Description: Will remain free from falls  8/22/2020 1848 by Branden James RN  Outcome: Met This Shift  8/22/2020 1646 by Branden James RN  Outcome: Met This Shift  Goal: Absence of physical injury  Description: Absence of physical injury  8/22/2020 1848 by Branden James RN  Outcome: Met This Shift  8/22/2020 1646 by Branden James RN  Outcome: Met This Shift     Problem: Restraint Use - Nonviolent/Non-Self-Destructive Behavior:  Goal: Absence of restraint-related injury  Description: Absence of restraint-related

## 2020-08-22 NOTE — PROGRESS NOTES
Rudy Vanegas is a 61 y.o. female patient. Patient says she feels improved today. Current Facility-Administered Medications   Medication Dose Route Frequency Provider Last Rate Last Dose    ipratropium-albuterol (DUONEB) nebulizer solution 1 ampule  1 ampule Inhalation Q4H MULUGETA Reyes MD   1 ampule at 08/21/20 2044    HYDROcodone-acetaminophen (NORCO) 5-325 MG per tablet 1 tablet  1 tablet Oral Q6H PRN Alexandrea Reyes MD   1 tablet at 08/21/20 9684    0.9 % sodium chloride infusion   Intravenous Continuous Alexandrea Reyes MD        enoxaparin (LOVENOX) injection 40 mg  40 mg Subcutaneous Daily Alexandrea Reyes MD   40 mg at 08/21/20 0957    perflutren lipid microspheres (DEFINITY) injection 1.65 mg  1.5 mL Intravenous ONCE PRN RONAN Tubbs - CNS        nitroGLYCERIN (NITROSTAT) SL tablet 0.4 mg  0.4 mg Sublingual Q5 Min PRN RONAN Negrete - CNP   0.4 mg at 08/22/20 0047    acetaminophen (TYLENOL) tablet 650 mg  650 mg Oral Q4H PRN Alexandrea Reyes MD   650 mg at 08/22/20 0047    metoprolol succinate (TOPROL XL) extended release tablet 25 mg  25 mg Oral BID Rachel Duran MD   25 mg at 08/21/20 2041    0.9 % sodium chloride infusion   Intravenous Continuous Hiro Caballero  mL/hr at 08/22/20 0051       Allergies   Allergen Reactions    Dilaudid [Hydromorphone Hcl] Itching    Morphine Other (See Comments)     Hallucinations per daughter     Active Problems:    Syncope and collapse    NSVT (nonsustained ventricular tachycardia) ([facility] [address]  Resolved Problems:    * No resolved hospital problems. *    Blood pressure 137/87, pulse 67, temperature 98.8 °F (37.1 °C), temperature source Temporal, resp. rate 16, height 5' 2\" (1.575 m), weight 117 lb (53.1 kg), SpO2 99 %, not currently breastfeeding. Subjective:  Symptoms:  Improved. No shortness of breath or chest pain. Diet:  Adequate intake.     Activity level: Normal.    Pain:  She reports no pain.      Objective:  General Appearance:  Comfortable. Vital signs: (most recent): Blood pressure 137/87, pulse 67, temperature 98.8 °F (37.1 °C), temperature source Temporal, resp. rate 16, height 5' 2\" (1.575 m), weight 117 lb (53.1 kg), SpO2 99 %, not currently breastfeeding. No fever. Lungs:  Normal effort and normal respiratory rate. Breath sounds clear to auscultation. Heart: Normal rate. Regular rhythm. S1 normal and S2 normal.      Assessment:  (Syncope  Ventricular tachycardia  Lactic acidosis  Hypokalemia  Hypomagnesemia   ). Plan:   (Cardiology to do ECHO. Considering stress test.  Continue meds. Monitor exam.).        Kapil Harding MD  8/22/2020

## 2020-08-22 NOTE — PROGRESS NOTES
This RN was told by Dietary personnel that she just [phone] fellow RN Sanjana Glass. We followed patient up to 8th floor where patient became verbally aggressive regarding trying to get her to return to PCCU. After finally coercing her to ride the elevator back to the 6th floor, we were met by Dr. Anastasia Barnard who was waiting to see the patient. This RN then received a return phone call from patient's sister Sharee Enriquez, ([phone]) who was calling from work. This RN updated sister on patient's behavior and asked whether this was common? She said that \"the patient's boyfriend (who recently ) had kind of babysat\" the patient keeping her safe. She also said, Guillaume Trejo is why her friend has been staying with her at her residence. \" The sister the responded with \"did the psych doctor see her? We thought this was a psych thing? \" This RN responded with \"yes they did but their recommendations were for grief counseling. \" The sister was surprised that they didn't recommend anything else like rehab.

## 2020-08-22 NOTE — PROGRESS NOTES
Upon entering room blood droplets covering floor from white board all the way into bathroom where patient found standing in bathroom covered in blood and poop (which also was on the floor by the bed) asking if Yvonne Pean? \"is out in the hallway\" and \"no I don't have any other towels here. \" This RN continues to try and orient patient while holding pressure on the bleeding IV site. Patient was becoming increasingly agitated and began screaming at all the staff. She was refusing to come out of the bathroom, nor open the door of the bathroom. We then called a code violent. Once the police arrived they assisted in getting patient in bed and applying 4 point restraints.

## 2020-08-22 NOTE — PROGRESS NOTES
alarming. Upon entry into the room patient was walking around the room with her IV pole/tubing wrapped around her. Patient verbally aggressive with staff. Myself and another RN attempted to assist the patient back into bed and she swung at me. Patient educated on safety and sat back in bed. Patient's bed alarm reset. ATILIO at bedside. Bedside RN updated.

## 2020-08-23 PROBLEM — E43 SEVERE PROTEIN-CALORIE MALNUTRITION (HCC): Status: ACTIVE | Noted: 2020-08-23

## 2020-08-23 LAB
ANION GAP SERPL CALCULATED.3IONS-SCNC: 17 MMOL/L (ref 7–16)
BUN BLDV-MCNC: 2 MG/DL (ref 8–23)
CALCIUM SERPL-MCNC: 8.8 MG/DL (ref 8.6–10.2)
CHLORIDE BLD-SCNC: 101 MMOL/L (ref 98–107)
CO2: 22 MMOL/L (ref 22–29)
CREAT SERPL-MCNC: 0.4 MG/DL (ref 0.5–1)
EKG ATRIAL RATE: 80 BPM
EKG P AXIS: 58 DEGREES
EKG P-R INTERVAL: 132 MS
EKG Q-T INTERVAL: 420 MS
EKG QRS DURATION: 78 MS
EKG QTC CALCULATION (BAZETT): 484 MS
EKG R AXIS: 22 DEGREES
EKG T AXIS: 37 DEGREES
EKG VENTRICULAR RATE: 80 BPM
GFR AFRICAN AMERICAN: >60
GFR NON-AFRICAN AMERICAN: >60 ML/MIN/1.73
GLUCOSE BLD-MCNC: 81 MG/DL (ref 74–99)
HCT VFR BLD CALC: 37.6 % (ref 34–48)
HEMOGLOBIN: 12.7 G/DL (ref 11.5–15.5)
LACTIC ACID: 2.1 MMOL/L (ref 0.5–2.2)
MAGNESIUM: 1.4 MG/DL (ref 1.6–2.6)
MCH RBC QN AUTO: 33.8 PG (ref 26–35)
MCHC RBC AUTO-ENTMCNC: 33.8 % (ref 32–34.5)
MCV RBC AUTO: 100 FL (ref 80–99.9)
PDW BLD-RTO: 13.3 FL (ref 11.5–15)
PLATELET # BLD: 112 E9/L (ref 130–450)
PMV BLD AUTO: 12.6 FL (ref 7–12)
POTASSIUM SERPL-SCNC: 3.3 MMOL/L (ref 3.5–5)
RBC # BLD: 3.76 E12/L (ref 3.5–5.5)
SODIUM BLD-SCNC: 140 MMOL/L (ref 132–146)
WBC # BLD: 8.2 E9/L (ref 4.5–11.5)

## 2020-08-23 PROCEDURE — 93010 ELECTROCARDIOGRAM REPORT: CPT | Performed by: INTERNAL MEDICINE

## 2020-08-23 PROCEDURE — 6370000000 HC RX 637 (ALT 250 FOR IP): Performed by: FAMILY MEDICINE

## 2020-08-23 PROCEDURE — 83605 ASSAY OF LACTIC ACID: CPT

## 2020-08-23 PROCEDURE — 85027 COMPLETE CBC AUTOMATED: CPT

## 2020-08-23 PROCEDURE — 83735 ASSAY OF MAGNESIUM: CPT

## 2020-08-23 PROCEDURE — 80048 BASIC METABOLIC PNL TOTAL CA: CPT

## 2020-08-23 PROCEDURE — 36415 COLL VENOUS BLD VENIPUNCTURE: CPT

## 2020-08-23 PROCEDURE — 99233 SBSQ HOSP IP/OBS HIGH 50: CPT | Performed by: INTERNAL MEDICINE

## 2020-08-23 PROCEDURE — 2580000003 HC RX 258: Performed by: FAMILY MEDICINE

## 2020-08-23 PROCEDURE — 93005 ELECTROCARDIOGRAM TRACING: CPT | Performed by: INTERNAL MEDICINE

## 2020-08-23 PROCEDURE — 94640 AIRWAY INHALATION TREATMENT: CPT

## 2020-08-23 PROCEDURE — 2140000000 HC CCU INTERMEDIATE R&B

## 2020-08-23 PROCEDURE — 6360000002 HC RX W HCPCS: Performed by: FAMILY MEDICINE

## 2020-08-23 RX ORDER — MAGNESIUM SULFATE IN WATER 40 MG/ML
2 INJECTION, SOLUTION INTRAVENOUS ONCE
Status: COMPLETED | OUTPATIENT
Start: 2020-08-23 | End: 2020-08-23

## 2020-08-23 RX ORDER — HYDRALAZINE HYDROCHLORIDE 20 MG/ML
10 INJECTION INTRAMUSCULAR; INTRAVENOUS EVERY 4 HOURS PRN
Status: DISCONTINUED | OUTPATIENT
Start: 2020-08-23 | End: 2020-08-30 | Stop reason: HOSPADM

## 2020-08-23 RX ORDER — POTASSIUM CHLORIDE 7.45 MG/ML
10 INJECTION INTRAVENOUS
Status: COMPLETED | OUTPATIENT
Start: 2020-08-23 | End: 2020-08-23

## 2020-08-23 RX ORDER — SODIUM CHLORIDE 0.9 % (FLUSH) 0.9 %
10 SYRINGE (ML) INJECTION 2 TIMES DAILY
Status: DISCONTINUED | OUTPATIENT
Start: 2020-08-23 | End: 2020-08-30 | Stop reason: HOSPADM

## 2020-08-23 RX ADMIN — LORAZEPAM 4 MG: 2 INJECTION INTRAMUSCULAR; INTRAVENOUS at 14:22

## 2020-08-23 RX ADMIN — POTASSIUM CHLORIDE 10 MEQ: 10 INJECTION, SOLUTION INTRAVENOUS at 16:57

## 2020-08-23 RX ADMIN — LORAZEPAM 3 MG: 2 INJECTION INTRAMUSCULAR; INTRAVENOUS at 16:57

## 2020-08-23 RX ADMIN — POTASSIUM CHLORIDE 10 MEQ: 10 INJECTION, SOLUTION INTRAVENOUS at 19:51

## 2020-08-23 RX ADMIN — POTASSIUM CHLORIDE 10 MEQ: 10 INJECTION, SOLUTION INTRAVENOUS at 17:41

## 2020-08-23 RX ADMIN — HYDRALAZINE HYDROCHLORIDE 10 MG: 20 INJECTION INTRAMUSCULAR; INTRAVENOUS at 08:53

## 2020-08-23 RX ADMIN — SODIUM CHLORIDE: 9 INJECTION, SOLUTION INTRAVENOUS at 09:14

## 2020-08-23 RX ADMIN — MAGNESIUM SULFATE HEPTAHYDRATE 2 G: 40 INJECTION, SOLUTION INTRAVENOUS at 17:41

## 2020-08-23 RX ADMIN — LORAZEPAM 2 MG: 2 INJECTION INTRAMUSCULAR; INTRAVENOUS at 12:38

## 2020-08-23 RX ADMIN — LORAZEPAM 3 MG: 2 INJECTION INTRAMUSCULAR; INTRAVENOUS at 08:55

## 2020-08-23 RX ADMIN — LORAZEPAM 2 MG: 2 INJECTION INTRAMUSCULAR; INTRAVENOUS at 23:07

## 2020-08-23 RX ADMIN — LORAZEPAM 2 MG: 2 INJECTION INTRAMUSCULAR; INTRAVENOUS at 19:55

## 2020-08-23 RX ADMIN — LORAZEPAM 4 MG: 2 INJECTION INTRAMUSCULAR; INTRAVENOUS at 02:42

## 2020-08-23 RX ADMIN — LORAZEPAM 4 MG: 2 INJECTION INTRAMUSCULAR; INTRAVENOUS at 15:35

## 2020-08-23 RX ADMIN — ENOXAPARIN SODIUM 40 MG: 40 INJECTION SUBCUTANEOUS at 08:55

## 2020-08-23 RX ADMIN — LORAZEPAM 3 MG: 2 INJECTION INTRAMUSCULAR; INTRAVENOUS at 00:06

## 2020-08-23 RX ADMIN — POTASSIUM CHLORIDE 10 MEQ: 10 INJECTION, SOLUTION INTRAVENOUS at 21:18

## 2020-08-23 RX ADMIN — IPRATROPIUM BROMIDE AND ALBUTEROL SULFATE 1 AMPULE: .5; 3 SOLUTION RESPIRATORY (INHALATION) at 13:12

## 2020-08-23 ASSESSMENT — PAIN SCALES - GENERAL
PAINLEVEL_OUTOF10: 0

## 2020-08-23 ASSESSMENT — PAIN DESCRIPTION - PROGRESSION: CLINICAL_PROGRESSION: NOT CHANGED

## 2020-08-23 NOTE — PROGRESS NOTES
Dr Durand Amend notified via [employer] that patient's K and Mag are low, and of need for IV replacement, as patient is refusing oral meds at this time.

## 2020-08-23 NOTE — CONSULTS
Comprehensive Nutrition Assessment    Type and Reason for Visit:  Initial, Positive Nutrition Screen, Consult    Nutrition Recommendations/Plan: Continue current diet, Start Ensure TID    Nutrition Assessment:  Pt severely malnourished w/ 8.9% wt loss x2 months and poor PO intake 2/2 ETOH abuse compounded w/ recent death of significant other. Pt s/p syncope & collapse. Will provide ONS and monitor    Malnutrition Assessment:  Malnutrition Status:  Severe malnutrition    Context:  Chronic Illness     Findings of the 6 clinical characteristics of malnutrition:  Energy Intake:  7 - 75% or less estimated energy requirements for 1 month or longer  Weight Loss:  7 - 7.5% over 3 months(8.9% wt loss x2 months)     Body Fat Loss:  Unable to assess(d/t lack of proper PPE)     Muscle Mass Loss:  Unable to assess    Fluid Accumulation:  No significant fluid accumulation     Strength:  Not Performed    Estimated Daily Nutrient Needs:  Energy (kcal):  ; Weight Used for Energy Requirements:  Admission     Protein (g):  65-75; Weight Used for Protein Requirements:  Admission(1.3-1.5)        Fluid (ml/day):  ; Weight Used for Fluid Requirements:  Admission      Nutrition Related Findings:  pt disoriented/agitated in restraints, abd WDL, no noted edema, +I/Os, ETOH abuse      Wounds:  None       Current Nutrition Therapies:    DIET CARDIAC; Anthropometric Measures:  · Height: 5' 2\" (157.5 cm)  · Current Body Weight: 113 lb (51.3 kg)(standing scale 8/21, noted standing wt 117# 8/22 w/ +fluid balance at this time)   · Admission Body Weight: 113 lb (51.3 kg)(standing scale 8/21)    · Usual Body Weight: 124 lb (56.2 kg)(actual per EMR 06/2020)     · Ideal Body Weight: 110 lbs; % Ideal Body Weight 102.7 %   · BMI: 20.7  · BMI Categories: Normal Weight (BMI 18.5-24. 9)       Nutrition Diagnosis:   · Severe malnutrition, In context of chronic illness related to inadequate protein-energy intake(2/2 ETOH abuse) as evidenced by intake 0-25%, poor intake prior to admission, weight loss(8.9% wt loss x2 months)      Nutrition Interventions:   Food and/or Nutrient Delivery:  Continue Current Diet, Start Oral Nutrition Supplement(Ensure TID)  Nutrition Education/Counseling:  Education not indicated   Coordination of Nutrition Care:  Continued Inpatient Monitoring    Goals:  Consume >50% meals/ONS       Nutrition Monitoring and Evaluation:   Food/Nutrient Intake Outcomes:  Food and Nutrient Intake, Supplement Intake  Physical Signs/Symptoms Outcomes:  Biochemical Data, GI Status, Fluid Status or Edema, Nutrition Focused Physical Findings, Skin, Weight     Discharge Planning:     Too soon to determine     Electronically signed by Bryan Cuevas MS, RD, LD on 8/23/20 at 12:54 PM EDT    Contact: [phone]

## 2020-08-23 NOTE — PROGRESS NOTES
Patient continues to need restraints. Not redirectable, trying to pull IV out, still attempts to get out of bed with restraints. poor judgement refusing all oral meds and nutrition.

## 2020-08-23 NOTE — PLAN OF CARE
Problem: Pain:  Goal: Pain level will decrease  Description: Pain level will decrease  8/23/2020 0806 by Alexandre Gallegos RN  Outcome: Met This Shift     Problem: Pain:  Goal: Control of acute pain  Description: Control of acute pain  8/23/2020 0806 by Alexandre Gallegos RN  Outcome: Met This Shift     Problem: Skin Integrity:  Goal: Will show no infection signs and symptoms  Description: Will show no infection signs and symptoms  8/23/2020 0806 by Alexandre Gallegos RN  Outcome: Met This Shift     Problem: Skin Integrity:  Goal: Absence of new skin breakdown  Description: Absence of new skin breakdown  8/23/2020 0806 by Alexandre Gallegos RN  Outcome: Met This Shift     Problem: Falls - Risk of:  Goal: Will remain free from falls  Description: Will remain free from falls  8/23/2020 0806 by Alexandre Gallegos RN  Outcome: Met This Shift     Problem: Falls - Risk of:  Goal: Absence of physical injury  Description: Absence of physical injury  8/23/2020 0806 by Alexandre Gallegos RN  Outcome: Met This Shift     Problem: Restraint Use - Nonviolent/Non-Self-Destructive Behavior:  Goal: Absence of restraint-related injury  Description: Absence of restraint-related injury  8/23/2020 0806 by Alexandre Gallegos RN  Outcome: Met This Shift     Problem: Restraint Use - Nonviolent/Non-Self-Destructive Behavior:  Goal: Absence of restraint indications  Description: Absence of restraint indications  8/23/2020 0806 by Alexandre Gallegos RN  Outcome: Not Met This Shift

## 2020-08-23 NOTE — PROGRESS NOTES
[employer] message sent to Dr. Warden Man regarding patient being hypertensive over nights. Also requested a new order for restraints as need for 4 point restraints continues to maintain patient safety.

## 2020-08-23 NOTE — PROGRESS NOTES
Patient continues to display aggressive, uncooperative, and agitated behavior. Patient is disoriented and tries to pull at lines. Continued need of 4 point soft restraints is necessary to keep the patient safe.

## 2020-08-23 NOTE — PROGRESS NOTES
Patient confused and disoriented. Oriented only to person. Patient calling and speaking with people who are not present. Patient stated \"I'm going to the store\" when asked how she was feeling. Patient displayed agitated an restless behavior. Restraints continued, need for restraints still present. Will continue to assess and monitor.

## 2020-08-23 NOTE — PLAN OF CARE
Problem: Restraint Use - Nonviolent/Non-Self-Destructive Behavior:  Goal: Absence of restraint indications  Description: Absence of restraint indications  8/23/2020 9147 by Daphne Galan RN  Outcome: Met This Shift  8/23/2020 4227 by Daphne Galan RN  Outcome: Ongoing  8/23/2020 0806 by Danita Eller RN  Outcome: Not Met This Shift     Problem: Restraint Use - Nonviolent/Non-Self-Destructive Behavior:  Goal: Absence of restraint-related injury  Description: Absence of restraint-related injury  8/23/2020 4232 by Daphne Galan RN  Outcome: Met This Shift  8/23/2020 5029 by Daphne Galan RN  Outcome: Ongoing  8/23/2020 0806 by Danita Eller RN  Outcome: Met This Shift     Problem: Malnutrition  (NI-5.2)  Goal: Food and/or Nutrient Delivery  Description: Individualized approach for food/nutrient provision.   8/23/2020 1254 by Bryan Cuevas, MS, RD, LD  Outcome: Met This Shift

## 2020-08-23 NOTE — PLAN OF CARE
Problem: Restraint Use - Nonviolent/Non-Self-Destructive Behavior:  Goal: Absence of restraint indications  Description: Absence of restraint indications  8/23/2020 6336 by Loretta Jauregui RN  Outcome: Ongoing  8/23/2020 0806 by Brittaney Hurst RN  Outcome: Not Met This Shift  Goal: Absence of restraint-related injury  Description: Absence of restraint-related injury  8/23/2020 9143 by Loretta Jauregui RN  Outcome: Ongoing  8/23/2020 0806 by Brittaney Hurst RN  Outcome: Met This Shift     Problem: Falls - Risk of:  Goal: Will remain free from falls  Description: Will remain free from falls  8/23/2020 0806 by Brittaney Hurst RN  Outcome: Met This Shift     Problem: Falls - Risk of:  Goal: Absence of physical injury  Description: Absence of physical injury  8/23/2020 0806 by Brittaney Hurst RN  Outcome: Met This Shift

## 2020-08-23 NOTE — PROGRESS NOTES
INPATIENT CARDIOLOGY FOLLOW-UP    Name: Rosalino Harris    Age: 61 y.o. Date of Admission: 8/20/2020 10:48 PM    Date of Service: 8/23/2020    Chief Complaint: Follow-up for VT    Interim History:  No new overnight cardiac complaints per patient (suboptimal historian). Currently with ETOH withdrawal. Currently with no apparent CP, SOB, or palpitations. SR with occasional PVC on telemetry (multiple runs of VT on telemetry earlier this admission). Review of Systems:   Cardiac: As per HPI  General: No fever, chills  Pulmonary: As per HPI  HEENT: No visual disturbances, difficult swallowing  GI: No nausea, vomiting  Musculoskeletal: QUINTANILLA x 4, no focal motor deficits  Skin: Intact, no rashes  Neuro/Psych: No headache or seizures    Problem List:  Patient Active Problem List   Diagnosis    Osteoarthritis of left knee    Osteoarthritis of right knee    Low back pain    Lumbar disc displacement at L3-4, L4-5    Acute sinusitis    Syncope and collapse    Prolonged QT interval    Torsades de pointes (HCC)    Hypokalemia    Hypomagnesemia    NSVT (nonsustained ventricular tachycardia) (HCC)    Spondylolisthesis of lumbar region, L4 on L5    Acute alcoholic intoxication ([address]    Ileus ([address]    Delirium tremens ([address]    Closed nondisplaced fracture of greater tuberosity of right humerus with routine healing    Closed fracture of proximal end of left humerus with routine healing    Multiple closed fractures of metatarsal bone of left foot    Gait abnormality    Lumbar compression fracture, closed, initial encounter ([address]    Alcohol withdrawal with perceptual disturbances ([address]    Alcohol withdrawal delirium ([address]    Severe protein-calorie malnutrition ([address]       Allergies:   Allergies   Allergen Reactions    Dilaudid [Hydromorphone Hcl] Itching    Morphine Other (See Comments)     Hallucinations per daughter       Current Medications:  Current Facility-Administered Medications   Medication Dose Route Frequency Provider Last Rate Last Dose    hydrALAZINE (APRESOLINE) injection 10 mg  10 mg Intravenous Q4H PRN Latoya Abebe MD   10 mg at 08/23/20 8335    potassium chloride (KLOR-CON M) extended release tablet 40 mEq  40 mEq Oral BID Juan Carlos Borden MD   40 mEq at 08/22/20 2110    LORazepam (ATIVAN) tablet 1 mg  1 mg Oral Q1H PRN Latoya Abebe MD        Or    LORazepam (ATIVAN) injection 1 mg  1 mg Intravenous Q1H PRN Latoya Abebe MD        Or    LORazepam (ATIVAN) tablet 2 mg  2 mg Oral Q1H PRN Latoya Abebe MD        Or    LORazepam (ATIVAN) injection 2 mg  2 mg Intravenous Q1H PRN Latoya Abebe MD   2 mg at 08/23/20 1238    Or    LORazepam (ATIVAN) tablet 3 mg  3 mg Oral Q1H PRN Latoya Abebe MD        Or    LORazepam (ATIVAN) injection 3 mg  3 mg Intravenous Q1H PRN Latoya Abebe MD   3 mg at 08/23/20 5866    Or    LORazepam (ATIVAN) tablet 4 mg  4 mg Oral Q1H PRN Latoya Abebe MD        Or    LORazepam (ATIVAN) injection 4 mg  4 mg Intravenous Q1H PRN Latoya Abebe MD   4 mg at 08/23/20 0242    ipratropium-albuterol (DUONEB) nebulizer solution 1 ampule  1 ampule Inhalation Q4H WA Latoya Abebe MD   1 ampule at 08/23/20 1312    HYDROcodone-acetaminophen (NORCO) 5-325 MG per tablet 1 tablet  1 tablet Oral Q6H PRN Latoya Abebe MD   1 tablet at 08/21/20 8330    0.9 % sodium chloride infusion   Intravenous Continuous Latoya Abebe  mL/hr at 08/23/20 0914      enoxaparin (LOVENOX) injection 40 mg  40 mg Subcutaneous Daily Latoya Abebe MD   40 mg at 08/23/20 9350    perflutren lipid microspheres (DEFINITY) injection 1.65 mg  1.5 mL Intravenous ONCE PRN RONAN Dillard - CNS        nitroGLYCERIN (NITROSTAT) SL tablet 0.4 mg  0.4 mg Sublingual Q5 Min PRN RONAN Brar - CNP   0.4 mg at 08/22/20 0047    acetaminophen (TYLENOL) tablet 650 mg  650 mg Oral Q4H ARIADNAN Nikki Ayers MD   650 mg at 08/22/20 0913      sodium chloride 100 mL/hr at 08/23/20 8551       Physical Exam:  BP (!) 152/100   Pulse 87   Temp 98.2 °F (36.8 °C) (Temporal)   Resp 18   Ht 5' 2\" (1.575 m)   Wt 117 lb (53.1 kg)   SpO2 97%   BMI 21.40 kg/m²   Wt Readings from Last 3 Encounters:   08/22/20 117 lb (53.1 kg)   07/15/20 120 lb (54.4 kg)   07/15/20 120 lb (54.4 kg)     Appearance: Awake, alert, no acute respiratory distress  Skin: Intact, no rash  Head: Normocephalic, atraumatic  Eyes: EOMI, no conjunctival erythema  ENMT: No pharyngeal erythema, MMM, no rhinorrhea  Neck: Supple, no elevated JVP, no carotid bruits  Lungs: Clear to auscultation bilaterally. No wheezes, rales, or rhonchi. Cardiac: Regular rate and rhythm, +S1S2, no murmurs apparent  Abdomen: Soft, nontender, +bowel sounds  Extremities: Moves all extremities x 4, no lower extremity edema  Neurologic: No focal motor deficits apparent, normal mood and affect    Intake/Output:    Intake/Output Summary (Last 24 hours) at 8/23/2020 1346  Last data filed at 8/23/2020 0650  Gross per 24 hour   Intake 360 ml   Output 480 ml   Net -120 ml     No intake/output data recorded.     Laboratory Tests:  Recent Labs     08/21/20  0047 08/21/20  0651 08/22/20  0737    136 138   K 2.9* 3.8 3.2*   CL 97* 95* 98   CO2 23 21* 24   BUN 4* 5* 3*   CREATININE 0.6 0.5 0.5   GLUCOSE 90 76 67*   CALCIUM 7.7* 8.3* 8.7     Lab Results   Component Value Date    MG 1.3 08/22/2020     Recent Labs     08/21/20  0047   ALKPHOS 86   ALT 25   AST 58*   PROT 6.2*   BILITOT 1.9*   LABALBU 3.9     Recent Labs     08/20/20  2332 08/21/20  0651 08/22/20  0737   WBC 7.0 7.6 7.2   RBC 4.50 3.98 3.84   HGB 15.2 13.5 13.0   HCT 44.1 39.5 39.0   MCV 98.0 99.2 101.6*   MCH 33.8 33.9 33.9   MCHC 34.5 34.2 33.3   RDW 13.2 13.2 13.3    159 129*   MPV 11.4 11.8 12.2*     Lab Results   Component Value Date    CKTOTAL 50 05/07/2017    CKMB 1.5 05/08/2017 TROPONINI <0.01 08/21/2020    TROPONINI <0.01 02/05/2020    TROPONINI <0.01 05/24/2019     Lab Results   Component Value Date    INR 0.9 10/28/2019    INR 1.0 04/19/2019    INR 1.0 03/09/2019    PROTIME 10.3 10/28/2019    PROTIME 11.8 04/19/2019    PROTIME 10.8 03/09/2019     Lab Results   Component Value Date    TSH 0.600 11/04/2019     No results found for: LABA1C  No results found for: EAG  Lab Results   Component Value Date    CHOL 137 05/08/2017    CHOL 185 11/28/2011    CHOL 164 11/15/2010     Lab Results   Component Value Date    TRIG 131 05/08/2017    TRIG 139 11/28/2011    TRIG 527 (H) 11/15/2010     Lab Results   Component Value Date    HDL 78 05/08/2017    HDL 64.0 11/28/2011    .0 11/15/2010     Lab Results   Component Value Date    LDLCALC 33 05/08/2017    LDLCALC 93 11/28/2011    LDLCALC -  (AA) 11/15/2010     Lab Results   Component Value Date    LABVLDL 26 05/08/2017     No results found for: CHOLHDLRATIO  No results for input(s): PROBNP in the last 72 hours. Cardiac Tests:  Telemetry findings reviewed: SR, multiple runs of VT    Echocardiogram: 8/22/2020 ([place])   Normal left ventricular systolic function. Ejection fraction is visually estimated at 60%. Normal right ventricular size and function (TAPSE 2.5 cm). There is doppler evidence of stage I diastolic dysfunction. Mild mitral regurgitation. The aortic valve is trileaflet. Mild aortic regurgitation. Moderate tricuspid regurgitation. PASP is estimated at 53 mmHg. ASSESSMENT / PLAN:  1. Ventricular ectopy: PMVT, prolonged QTc  · In setting of significant electrolyte abnormalities --> brief NSVT on admission --> multiple episodes of VT on 8/22/2020 (+hypokalemia and low magnesium at that time)  · Normal EF this admission  · Similar presentation in 6/2016 (electrolyte abnormalities) / negative stress test and normal at that time (was evaluated by EP)     2. Syncope: ? Secondary to #1     3.  Unintentional weight loss     4. Chronic pain    5.  Mild/moderate valve disease / PHTN / diastolic dysfunction    - Monitor telemetry  - Echocardiogram results outlined above  - Replaced both Mg and K again on 8/22/2020 (K 3.2 and Mg 1.3) --> repeat labs pending --> keep K > 4 and Mg > 2  - Avoid QT prolonging agents  - Continue with serial EKG's  - Reassess for repeat ischemic work-up pending clinical course  - Treatment of ETOH withdrawal per primary service    Savannah Yates MD  [facility]) Cardiology

## 2020-08-23 NOTE — PROGRESS NOTES
Continued use of restraints needed. Patient continues to try to pull out IV, not redirectable. (0) independent

## 2020-08-23 NOTE — PROGRESS NOTES
Roaslino Harris is a 61 y.o. female patient. Patient confused this morning.     Current Facility-Administered Medications   Medication Dose Route Frequency Provider Last Rate Last Dose    potassium chloride (KLOR-CON M) extended release tablet 40 mEq  40 mEq Oral BID Yanelis Doe MD   40 mEq at 08/22/20 2110    LORazepam (ATIVAN) tablet 1 mg  1 mg Oral Q1H PRN Jose Feliciano MD        Or    LORazepam (ATIVAN) injection 1 mg  1 mg Intravenous Q1H PRN Jose Feliciano MD        Or    LORazepam (ATIVAN) tablet 2 mg  2 mg Oral Q1H PRN Jose Feliciano MD        Or    LORazepam (ATIVAN) injection 2 mg  2 mg Intravenous Q1H PRN Jose Feliciano MD   2 mg at 08/22/20 1758    Or    LORazepam (ATIVAN) tablet 3 mg  3 mg Oral Q1H PRN Jose Feliciano MD        Or    LORazepam (ATIVAN) injection 3 mg  3 mg Intravenous Q1H PRN Jose Feliciano MD   3 mg at 08/23/20 0006    Or    LORazepam (ATIVAN) tablet 4 mg  4 mg Oral Q1H PRN Jose Feliciano MD        Or    LORazepam (ATIVAN) injection 4 mg  4 mg Intravenous Q1H PRN Jose Feliciano MD   4 mg at 08/23/20 0242    ipratropium-albuterol (DUONEB) nebulizer solution 1 ampule  1 ampule Inhalation Q4H WA Jose Feliciano MD   1 ampule at 08/22/20 0925    HYDROcodone-acetaminophen (NORCO) 5-325 MG per tablet 1 tablet  1 tablet Oral Q6H PRN Jose Feliciano MD   1 tablet at 08/21/20 3984    0.9 % sodium chloride infusion   Intravenous Continuous Jose Feliciano  mL/hr at 08/22/20 2111      enoxaparin (LOVENOX) injection 40 mg  40 mg Subcutaneous Daily Jose Feliciano MD   40 mg at 08/22/20 0913    perflutren lipid microspheres (DEFINITY) injection 1.65 mg  1.5 mL Intravenous ONCE PRN RONAN Alas - CNS        nitroGLYCERIN (NITROSTAT) SL tablet 0.4 mg  0.4 mg Sublingual Q5 Min PRN RONAN Gutierrez - CNP   0.4 mg at 08/22/20 0047    acetaminophen (TYLENOL) tablet 650 mg 650 mg Oral Q4H PRN Angelo Braxton MD   650 mg at 08/22/20 1374     Allergies   Allergen Reactions    Dilaudid [Hydromorphone Hcl] Itching    Morphine Other (See Comments)     Hallucinations per daughter     Active Problems:    Syncope and collapse    NSVT (nonsustained ventricular tachycardia) ([facility])  Resolved Problems:    * No resolved hospital problems. *    Blood pressure (!) 168/87, pulse 80, temperature 98 °F (36.7 °C), temperature source Temporal, resp. rate 18, height 5' 2\" (1.575 m), weight 117 lb (53.1 kg), SpO2 99 %, not currently breastfeeding. Subjective:  Symptoms:  Improved. No shortness of breath or chest pain. Diet:  Adequate intake. Activity level: Normal.    Pain:  She reports no pain. Objective:  General Appearance:  Comfortable. Vital signs: (most recent): Blood pressure 137/87, pulse 67, temperature 98.8 °F (37.1 °C), temperature source Temporal, resp. rate 16, height 5' 2\" (1.575 m), weight 117 lb (53.1 kg), SpO2 99 %, not currently breastfeeding. No fever. Lungs:  Normal effort and normal respiratory rate. Breath sounds clear to auscultation. Heart: Normal rate. Regular rhythm. S1 normal and S2 normal.      Assessment:  (Syncope  Ventricular tachycardia  Lactic acidosis  Hypokalemia  Hypomagnesemia  Alcohol withdrawal    Plan:   CIWA  Psych following  Continue meds. Monitor exam.).        Angelo Braxton MD  8/23/2020

## 2020-08-24 LAB
ANION GAP SERPL CALCULATED.3IONS-SCNC: 18 MMOL/L (ref 7–16)
BUN BLDV-MCNC: 2 MG/DL (ref 8–23)
CALCIUM SERPL-MCNC: 8.8 MG/DL (ref 8.6–10.2)
CHLORIDE BLD-SCNC: 105 MMOL/L (ref 98–107)
CO2: 17 MMOL/L (ref 22–29)
CREAT SERPL-MCNC: 0.5 MG/DL (ref 0.5–1)
GFR AFRICAN AMERICAN: >60
GFR NON-AFRICAN AMERICAN: >60 ML/MIN/1.73
GLUCOSE BLD-MCNC: 82 MG/DL (ref 74–99)
HCT VFR BLD CALC: 34.7 % (ref 34–48)
HEMOGLOBIN: 12 G/DL (ref 11.5–15.5)
MAGNESIUM: 1.7 MG/DL (ref 1.6–2.6)
MCH RBC QN AUTO: 34.2 PG (ref 26–35)
MCHC RBC AUTO-ENTMCNC: 34.6 % (ref 32–34.5)
MCV RBC AUTO: 98.9 FL (ref 80–99.9)
PDW BLD-RTO: 13.3 FL (ref 11.5–15)
PLATELET # BLD: 111 E9/L (ref 130–450)
PMV BLD AUTO: 12.6 FL (ref 7–12)
POTASSIUM SERPL-SCNC: 4.3 MMOL/L (ref 3.5–5)
RBC # BLD: 3.51 E12/L (ref 3.5–5.5)
SODIUM BLD-SCNC: 140 MMOL/L (ref 132–146)
WBC # BLD: 8.3 E9/L (ref 4.5–11.5)

## 2020-08-24 PROCEDURE — 80048 BASIC METABOLIC PNL TOTAL CA: CPT

## 2020-08-24 PROCEDURE — 6360000002 HC RX W HCPCS: Performed by: NURSE PRACTITIONER

## 2020-08-24 PROCEDURE — 85027 COMPLETE CBC AUTOMATED: CPT

## 2020-08-24 PROCEDURE — 2580000003 HC RX 258: Performed by: FAMILY MEDICINE

## 2020-08-24 PROCEDURE — 6370000000 HC RX 637 (ALT 250 FOR IP): Performed by: INTERNAL MEDICINE

## 2020-08-24 PROCEDURE — 2140000000 HC CCU INTERMEDIATE R&B

## 2020-08-24 PROCEDURE — 83735 ASSAY OF MAGNESIUM: CPT

## 2020-08-24 PROCEDURE — APPSS60 APP SPLIT SHARED TIME 46-60 MINUTES: Performed by: NURSE PRACTITIONER

## 2020-08-24 PROCEDURE — 6360000002 HC RX W HCPCS: Performed by: FAMILY MEDICINE

## 2020-08-24 PROCEDURE — 36415 COLL VENOUS BLD VENIPUNCTURE: CPT

## 2020-08-24 PROCEDURE — 99232 SBSQ HOSP IP/OBS MODERATE 35: CPT | Performed by: INTERNAL MEDICINE

## 2020-08-24 RX ORDER — MAGNESIUM SULFATE IN WATER 40 MG/ML
2 INJECTION, SOLUTION INTRAVENOUS ONCE
Status: COMPLETED | OUTPATIENT
Start: 2020-08-24 | End: 2020-08-24

## 2020-08-24 RX ORDER — SODIUM CHLORIDE 9 MG/ML
INJECTION, SOLUTION INTRAVENOUS CONTINUOUS
Status: DISCONTINUED | OUTPATIENT
Start: 2020-08-24 | End: 2020-08-30 | Stop reason: HOSPADM

## 2020-08-24 RX ADMIN — LORAZEPAM 2 MG: 2 INJECTION INTRAMUSCULAR; INTRAVENOUS at 00:06

## 2020-08-24 RX ADMIN — LORAZEPAM 4 MG: 2 INJECTION INTRAMUSCULAR; INTRAVENOUS at 15:58

## 2020-08-24 RX ADMIN — SODIUM CHLORIDE: 9 INJECTION, SOLUTION INTRAVENOUS at 06:08

## 2020-08-24 RX ADMIN — POTASSIUM CHLORIDE 40 MEQ: 20 TABLET, EXTENDED RELEASE ORAL at 09:16

## 2020-08-24 RX ADMIN — MAGNESIUM SULFATE HEPTAHYDRATE 2 G: 40 INJECTION, SOLUTION INTRAVENOUS at 14:42

## 2020-08-24 RX ADMIN — ENOXAPARIN SODIUM 40 MG: 40 INJECTION SUBCUTANEOUS at 09:15

## 2020-08-24 RX ADMIN — LORAZEPAM 4 MG: 2 INJECTION INTRAMUSCULAR; INTRAVENOUS at 17:17

## 2020-08-24 RX ADMIN — LORAZEPAM 4 MG: 2 INJECTION INTRAMUSCULAR; INTRAVENOUS at 09:16

## 2020-08-24 RX ADMIN — LORAZEPAM 2 MG: 2 INJECTION INTRAMUSCULAR; INTRAVENOUS at 05:48

## 2020-08-24 ASSESSMENT — PAIN SCALES - GENERAL
PAINLEVEL_OUTOF10: 0

## 2020-08-24 NOTE — CARE COORDINATION
SOCIAL WORK/CASEMANAGEMENT TRANSITION OF CARE [id number] [place], [address], [place], [phone]): went in to see pt this a.m. but she is confused having hallucinations and in 4 point restraints. Pt was provided by [facility] grief counseling information on Friday. Psych saying for pt to follow up with outpatient counseling for grief. Sw/horacio to follow.  Brittanie Asnari  8/24/2020

## 2020-08-24 NOTE — PLAN OF CARE
Problem: Pain:  Goal: Pain level will decrease  Description: Pain level will decrease  Outcome: Met This Shift     Problem: Skin Integrity:  Goal: Will show no infection signs and symptoms  Description: Will show no infection signs and symptoms  8/24/2020 1140 by Ilda Cornejo RN  Outcome: Met This Shift     Problem: Falls - Risk of:  Goal: Will remain free from falls  Description: Will remain free from falls  8/24/2020 1140 by Ilda Cornejo RN  Outcome: Met This Shift

## 2020-08-24 NOTE — PROGRESS NOTES
Cardiology Progress Note  Dr. Shazia Dubois      Referring Physician: Contreras Herrera MD       Consulted for: Syncope, VT    Known to Dr. Martel/Dr. Antonieta Martinez. CHIEF COMPLAINT:   Chief Complaint   Patient presents with    Loss of Consciousness     witnessed syncopal episode while sitting on the couch. EMS called because patient woke up confused and disoriented. Improving over time as patient is now A&O x3. Family reports no seizure activity. HISTORY OF PRESENT ILLNESS:   Ms. Favio Ryan is a 61year old female. Hx of Syncope and torsades de pointe secondary to QT prolongation in the setting of severe hypokalemia / hypomagnesemia. [facility] 8/20/2020 with complaints of syncope. Vague historian. Patient is laying in bed in restraints. Disoriented. Hallucinations and appears agitated. Denies CP and SOB.      Past Medical History:   Diagnosis Date    Asthma     CAD (coronary artery disease)     Closed fracture of proximal end of left humerus with routine healing 6/11/2019    Degeneration of lumbar or lumbosacral intervertebral disc     Fall against sharp object 1960    chest tube in hospital    History of blood transfusion 1997    after vaginal delivery of baby hemmorhage    Hypertension     Hypokalemia     Insomnia     Kidney stone     Nondisplaced fracture of greater tuberosity of right humerus, initial encounter for closed fracture 1/22/2019    Orthostatic hypotension     Severe back pain 2/3/2014         Past Surgical History:   Procedure Laterality Date    BUNIONECTOMY      Left foot    CHEST TUBE INSERTION  1990    several    COLONOSCOPY  3/26/09    KYPHOSIS SURGERY N/A 10/28/2019    KYPHOPLASTY L1 WITH VERTEBRAL BONE BIOPSY performed by Gisella Pichardo MD at [facility] LITHOTRIPSY  2012   [facility]  11/09/2017    L3-L4 ,L4-L5  interbody fusion with Dajuan Bernard     OTHER SURGICAL HISTORY      electro ablation for rectal and sigmoid polyps    TONSILLECTOMY      UPPER GASTROINTESTINAL ENDOSCOPY  3/30/15    UPPER GASTROINTESTINAL ENDOSCOPY  4/21/08         Current Facility-Administered Medications   Medication Dose Route Frequency Provider Last Rate Last Dose    0.9 % sodium chloride infusion   Intravenous Continuous Neymar Carey  mL/hr at 08/24/20 7324      hydrALAZINE (APRESOLINE) injection 10 mg  10 mg Intravenous Q4H PRN Neymar Carey MD   10 mg at 08/23/20 0815    sodium chloride flush 0.9 % injection 10 mL  10 mL Intravenous BID Neymar Carey MD        potassium chloride Electa Mitts M) extended release tablet 40 mEq  40 mEq Oral BID Horacio Persaud MD   40 mEq at 08/24/20 8147    LORazepam (ATIVAN) tablet 1 mg  1 mg Oral Q1H PRESTEBAN Carey MD        Or    LORazepam (ATIVAN) injection 1 mg  1 mg Intravenous Q1H PRESTEBAN Carey MD        Or    LORazepam (ATIVAN) tablet 2 mg  2 mg Oral Q1H PRESTEBAN Carey MD        Or    LORazepam (ATIVAN) injection 2 mg  2 mg Intravenous Q1H PRESTEBAN Carey MD   2 mg at 08/24/20 9037    Or    LORazepam (ATIVAN) tablet 3 mg  3 mg Oral Q1H PRESTEBAN Carey MD        Or    LORazepam (ATIVAN) injection 3 mg  3 mg Intravenous Q1H PRESTEBAN Carey MD   3 mg at 08/23/20 1657    Or    LORazepam (ATIVAN) tablet 4 mg  4 mg Oral Q1H PRESTEBAN Carey MD        Or    LORazepam (ATIVAN) injection 4 mg  4 mg Intravenous Q1H WAYNE Carey MD   4 mg at 08/24/20 0916    ipratropium-albuterol (DUONEB) nebulizer solution 1 ampule  1 ampule Inhalation Q4H WA Neymar Carey MD   1 ampule at 08/23/20 1312    HYDROcodone-acetaminophen (NORCO) 5-325 MG per tablet 1 tablet  1 tablet Oral Q6H PRN Neymar Carey MD   1 tablet at 08/21/20 4021    enoxaparin (LOVENOX) injection 40 mg  40 mg Subcutaneous Daily Neymar Carey MD   40 mg at 08/24/20 0915    perflutren lipid microspheres (DEFINITY) injection 1.65 mg 1.5 mL Intravenous ONCE PRN RONAN Joyce - CNS        nitroGLYCERIN (NITROSTAT) SL tablet 0.4 mg  0.4 mg Sublingual Q5 Min PRN RONAN Mckenna - CNP   0.4 mg at 08/22/20 0047    acetaminophen (TYLENOL) tablet 650 mg  650 mg Oral Q4H PRN Fidel Singh MD   650 mg at 08/22/20 0913         Allergies as of 08/20/2020 - Review Complete 08/20/2020   Allergen Reaction Noted    Dilaudid [hydromorphone hcl] Itching 10/28/2019    Morphine Other (See Comments) 11/17/2017       Social History     Socioeconomic History    Marital status: Legally      Spouse name: Not on file    Number of children: Not on file    Years of education: Not on file    Highest education level: Not on file   Occupational History    Not on file   Social Needs    Financial resource strain: Not on file    Food insecurity     Worry: Not on file     Inability: Not on file   Ruby Ribbon needs     Medical: Not on file     Non-medical: Not on file   Tobacco Use    Smoking status: Former Smoker     Packs/day: 0.50     Years: 2.00     Pack years: 1.00    Smokeless tobacco: Never Used   Substance and Sexual Activity    Alcohol use:  Yes     Alcohol/week: 6.0 standard drinks     Types: 6 Cans of beer per week     Comment: daily    Drug use: No     Comment: quit 2000 cocaine in past    Sexual activity: Yes   Lifestyle    Physical activity     Days per week: Not on file     Minutes per session: Not on file    Stress: Not on file   Relationships    Social connections     Talks on phone: Not on file     Gets together: Not on file     Attends [employer] service: Not on file     Active member of club or organization: Not on file     Attends meetings of clubs or organizations: Not on file     Relationship status: Not on file    Intimate partner violence     Fear of current or ex partner: Not on file     Emotionally abused: Not on file     Physically abused: Not on file     Forced sexual activity: Not on file Other Topics Concern    Not on file   Social History Narrative    Not on file       Family History   Problem Relation Age of Onset    Diabetes Mother     Hypertension Mother     Heart Disease Sister     Stroke Sister     Heart Disease Maternal Grandmother     Heart Disease Maternal Grandfather     Cancer Brother        REVIEW OF SYSTEMS:     CONSTITUTIONAL:  negative for  fevers, chills, sweats and fatigue  HEENT:  negative for  tinnitus, earaches, nasal congestion and epistaxis  RESPIRATORY:  negative for  dry cough, cough with sputum, dyspnea, wheezing and hemoptysis  GASTROINTESTINAL:  negative for nausea, vomiting, diarrhea, constipation, pruritus and jaundice  HEMATOLOGIC/LYMPHATIC:  negative for easy bruising, bleeding, lymphadenopathy and petechiae  ENDOCRINE:  negative for heat intolerance, cold intolerance, tremor, hair loss and diabetic symptoms including neither polyuria nor polydipsia nor blurred vision  MUSCULOSKELETAL:  negative for  myalgias, arthralgias, joint swelling, stiff joints and decreased range of motion  NEUROLOGICAL:  negative for memory problems, speech problems, visual disturbance, dysphagia, weakness and numbness      PHYSICAL EXAM:   CONSTITUTIONAL:  awake, appears disoriented. Uncooperative, no apparent distress, and appears stated age  HEAD:  normocepalic, without obvious abnormality, atraumatic, pink, moist mucous membranes. NECK:  Supple, symmetrical, trachea midline, no adenopathy, thyroid symmetric, not enlarged and no tenderness, skin normal  LUNGS:  No increased work of breathing, good air exchange, clear to auscultation bilaterally, no crackles or wheezing  CARDIOVASCULAR:  Normal apical impulse, regular rate and rhythm, normal S1 and S2, no S3 or S4, and no murmur noted and no JVD, no carotid bruit, no pedal edema, good carotid upstroke bilaterally.   ABDOMEN:  Soft, nontender, no masses, no hepatomegaly or splenomegaly, BS+  CHEST: nontender to palpation, expands acute intracranial process. I have personally reviewed the laboratory, cardiac diagnostic and radiographic testing as outlined above:    CXR: 8/21/2020   No acute cardiopulmonary process. Cookpeter Leech nuclear stress test 6/2016:   Normal examination.  In particular, there is no evidence of left ventricular myocardium at risk for stress-induced ischemia. EF 70%. TTE: 8/22/2020 (Dr. Bravo Espinal):    Normal left ventricular systolic function. Ejection fraction is visually estimated at 60%. Normal right ventricular size and function (TAPSE 2.5 cm). There is doppler evidence of stage I diastolic dysfunction. Mild mitral regurgitation. The aortic valve is trileaflet. Mild aortic regurgitation. Moderate tricuspid regurgitation. PASP is estimated at 53 mmHg. IMPRESSION:  1. Ventricular ectopy: NSVT and multifocal PVCs  · In setting of significant electrolyte abnormalities: however she continues to have some PVCs even after correction of potassium and magnesium  · Normal LVEF 60% on TTE (8/22/2020). 2. Syncope: ? Secondary to #1  3. VHD: Mod TR, Mild MR  4. Unintentional weight loss with anorexia, nausea, vomiting   5. Chronic pain     RECOMMENDATIONS:   1. Avoid QT prolonging agents. 2. ECHO results (8/22/2020) reviewed. 3. Treatment of ETOH withdrawal as per primary. 4. Depending clinical course; consider ischemia evaluation prior to discharge. 5. Check Mg++; K+ stable today. 6. Will follow     I have reviewed my findings and recommendations with patient    Will discuss with Dr. Griffin Brush. Electronically signed by RONAN Walsh CNP on 8/24/2020 at 10:09 AM     NOTE: This report was transcribed using voice recognition software.  Every effort was made to ensure accuracy; however, inadvertent computerized transcription errors may be present    [id number] [facility] Cardiology progress note  Samra Rodrigues     I have personally participated in a face-to-face and personally obtained history and performed physical exam on the date of service. I reviewed chart, vitals, labs and radiologic studies. I also participated in medical decision making with RONAN Jones CNP on the date of service All of the assessments and recommendations are from me and I agree with all of the pertinent clinical information, assessment and treatment plan. I have reviewed and edited the note above based on my findings during my history, exam, and decision making. Please see my additional contributions to the history, physical exam, assessment, and recommendations below. Patient is seen in follow-up for syncope, VT    Subjective:     Ms. Shelby Abebe disoriented, hallucinating    Review of systems:   Not obtained due to patient's altered mental status      Scheduled Meds: Reviewed, as above  Continuous Infusions: Reviewed, as above. PRN Meds: Reviewed, as above. Objective:      Physical Exam:   BP (!) 184/102   Pulse 95   Temp 97 °F (36.1 °C) (Temporal)   Resp 18   Ht 5' 2\" (1.575 m)   Wt 117 lb (53.1 kg)   SpO2 100%   BMI 21.40 kg/m²   CONSTITUTIONAL: Disoriented, appears stated age  HEAD:  normocepalic, without obvious abnormality, atraumatic  NECK:  Supple, symmetrical, trachea midline, no adenopathy, thyroid symmetric, not enlarged and no tenderness, skin normal  LUNGS:  No increased work of breathing, good air exchange, clear to auscultation bilaterally, no crackles or wheezing  CARDIOVASCULAR:  Normal apical impulse, regular rate and rhythm, normal S1 and S2, no S3 or S4, 3/6 systolic murmur at the apex, 3/6 systolic murmur at the left lower sternal border, no edema, no JVD, no carotid bruit. ABDOMEN:  Soft, nontender, no masses, no hepatomegaly, no splenomegaly, BS+  MUSCULOSKELETAL:  No clubbing no cyanosis. there is no redness, warmth, or swelling of the joints  full range of motion noted  NEUROLOGIC:  Alert, awake,oriented x3  SKIN:  no bruising or bleeding, normal skin color, texture, turgor and no redness, warmth, or swelling      Cardiographics  I personally reviewed the telemetry monitor strips with the following interpretation: Sinus rhythm  Echocardiogram: Reviewed, as above. Imaging  Reviewed, as above. Reviewed, as above. Lab Review   Lab Results   Component Value Date     08/24/2020    K 4.3 08/24/2020    K 4.0 02/05/2020     08/24/2020    CO2 17 08/24/2020    BUN 2 08/24/2020    CREATININE 0.5 08/24/2020    GLUCOSE 82 08/24/2020    GLUCOSE 84 11/28/2011    CALCIUM 8.8 08/24/2020     Lab Results   Component Value Date    WBC 8.3 08/24/2020    HGB 12.0 08/24/2020    HCT 34.7 08/24/2020    MCV 98.9 08/24/2020     08/24/2020     Reviewed, as above. I have personally reviewed the laboratory, cardiac diagnostic and radiographic testing as outlined above:    Assessment:     1. PVCs with nonsustained ventricular tachycardia: Suspect secondary to electrolyte imbalance, significantly better after correcting hypokalemia and hypomagnesemia  2. Aortic valve regurgitation: Mild  3. Tricuspid valve regurgitation: Moderate  4. Mitral valve regurgitation: Mild  5. Other pulmonary hypertension      Recommendations:     1.  will keep potassium between 4-5, will maintain magnesium more than 2  2. Ischemic evaluation prior to discharge and when mental status improved  3. Will continue telemetry  4. Basic metabolic panel and magnesium level in a.m.  5.  Further cardiac recommendations will be forthcoming pending her clinical course and diagnostic test findings    No family at bedside  Electronically signed by Wali Dean MD on 8/24/2020 at 11:36 PM  NOTE: This report was transcribed using voice recognition software.  Every effort was made to ensure accuracy; however, inadvertent computerized transcription errors may be present

## 2020-08-24 NOTE — PLAN OF CARE
Problem: Skin Integrity:  Goal: Will show no infection signs and symptoms  Outcome: Met This Shift  Goal: Absence of new skin breakdown  8/24/2020 0014 by Max Shaw RN  Outcome: Met This Shift  8/23/2020 4836 by Ria Junior RN  Outcome: Met This Shift     Problem: Falls - Risk of:  Goal: Will remain free from falls  Outcome: Met This Shift  Goal: Absence of physical injury  Outcome: Met This Shift     Problem: Restraint Use - Nonviolent/Non-Self-Destructive Behavior:  Goal: Absence of restraint indications  8/24/2020 0014 by Max Shaw RN  Outcome: Met This Shift  8/23/2020 6898 by Ria Junior RN  Outcome: Met This Shift  8/23/2020 6475 by Ria Junior RN  Outcome: Ongoing  Goal: Absence of restraint-related injury  8/24/2020 0014 by Max Shaw RN  Outcome: Met This Shift  8/23/2020 1994 by Ria Junior RN  Outcome: Met This Shift  8/23/2020 3259 by Ria Junior RN  Outcome: Ongoing     Problem: Malnutrition  (NI-5.2)  Goal: Food and/or Nutrient Delivery  8/23/2020 1254 by Maia Mehta, MS, RD, LD  Outcome: Met This Shift

## 2020-08-24 NOTE — PROGRESS NOTES
Patient continues to hallucinate and yell. Unable to reorient. Patient attempts to get out bed, pull at IV tubing, and heart monitor. Restraints continued for patient safety. Will continue to attempt to educate.

## 2020-08-24 NOTE — PROGRESS NOTES
Renny Clark is a 61 y.o. female patient. Patient confused this morning.     Current Facility-Administered Medications   Medication Dose Route Frequency Provider Last Rate Last Dose    0.9 % sodium chloride infusion   Intravenous Continuous Madalyn Arambula  mL/hr at 08/24/20 0608      hydrALAZINE (APRESOLINE) injection 10 mg  10 mg Intravenous Q4H PRN Madalyn Arambula MD   10 mg at 08/23/20 3080    sodium chloride flush 0.9 % injection 10 mL  10 mL Intravenous BID Madalyn Arambula MD        potassium chloride Charol Epp M) extended release tablet 40 mEq  40 mEq Oral BID Rosie Joseph MD   40 mEq at 08/22/20 2110    LORazepam (ATIVAN) tablet 1 mg  1 mg Oral Q1H PRESTEBAN Arambula MD        Or    LORazepam (ATIVAN) injection 1 mg  1 mg Intravenous Q1H PRESTBEAN Arambula MD        Or    LORazepam (ATIVAN) tablet 2 mg  2 mg Oral Q1H PRN Madalyn Arambula MD        Or    LORazepam (ATIVAN) injection 2 mg  2 mg Intravenous Q1H PRN Madalyn Arambula MD   2 mg at 08/24/20 7266    Or    LORazepam (ATIVAN) tablet 3 mg  3 mg Oral Q1H PRESTEBAN Arambula MD        Or    LORazepam (ATIVAN) injection 3 mg  3 mg Intravenous Q1H PRESTEBAN Arambula MD   3 mg at 08/23/20 1657    Or    LORazepam (ATIVAN) tablet 4 mg  4 mg Oral Q1H PRESTEBAN Arambula MD        Or    LORazepam (ATIVAN) injection 4 mg  4 mg Intravenous Q1H PRN Madalyn Arambula MD   4 mg at 08/23/20 1535    ipratropium-albuterol (DUONEB) nebulizer solution 1 ampule  1 ampule Inhalation Q4H WA Madalyn Arambula MD   1 ampule at 08/23/20 1312    HYDROcodone-acetaminophen (NORCO) 5-325 MG per tablet 1 tablet  1 tablet Oral Q6H PRN Madalyn Arambula MD   1 tablet at 08/21/20 1930    enoxaparin (LOVENOX) injection 40 mg  40 mg Subcutaneous Daily Madalyn Arambula MD   40 mg at 08/23/20 0855    perflutren lipid microspheres (DEFINITY) injection 1.65 mg  1.5 mL Intravenous ONCE PRN RONAN Castano - CNS        nitroGLYCERIN (NITROSTAT) SL tablet 0.4 mg  0.4 mg Sublingual Q5 Min PRN RONAN Wilburn - CNP   0.4 mg at 08/22/20 0047    acetaminophen (TYLENOL) tablet 650 mg  650 mg Oral Q4H PRN Jessica Bautista MD   650 mg at 08/22/20 4388     Allergies   Allergen Reactions    Dilaudid [Hydromorphone Hcl] Itching    Morphine Other (See Comments)     Hallucinations per daughter     Active Problems:    Syncope and collapse    NSVT (nonsustained ventricular tachycardia) ([facility])    Severe protein-calorie malnutrition ([address]  Resolved Problems:    * No resolved hospital problems. *    Blood pressure 124/80, pulse 95, temperature 97.7 °F (36.5 °C), temperature source Temporal, resp. rate 18, height 5' 2\" (1.575 m), weight 117 lb (53.1 kg), SpO2 100 %, not currently breastfeeding. Subjective:  Symptoms:  Improved. No shortness of breath or chest pain. Diet:  Adequate intake. Activity level: Normal.    Pain:  She reports no pain. Objective:  General Appearance:  Comfortable. Vital signs: (most recent): Blood pressure 137/87, pulse 67, temperature 98.8 °F (37.1 °C), temperature source Temporal, resp. rate 16, height 5' 2\" (1.575 m), weight 117 lb (53.1 kg), SpO2 99 %, not currently breastfeeding. No fever. Lungs:  Normal effort and normal respiratory rate. Breath sounds clear to auscultation. Heart: Normal rate. Regular rhythm. S1 normal and S2 normal.      Assessment:  (Syncope  Ventricular tachycardia  Lactic acidosis  Hypokalemia  Hypomagnesemia  Alcohol withdrawal    Plan:   CIWA  Psych following  Continue meds. Monitor exam.). No plans from Cardiology at this time for intervention.       Jessica Bautista MD  8/24/2020

## 2020-08-24 NOTE — PROGRESS NOTES
Remains highly agitated and confused with visual and auditory hallucinations. Continually attempts to climb out of bed and pull at telemetry/IV. Bilateral soft wrist and ankle restraints remain.

## 2020-08-25 LAB
ANION GAP SERPL CALCULATED.3IONS-SCNC: 16 MMOL/L (ref 7–16)
BUN BLDV-MCNC: 2 MG/DL (ref 8–23)
CALCIUM SERPL-MCNC: 9.6 MG/DL (ref 8.6–10.2)
CHLORIDE BLD-SCNC: 103 MMOL/L (ref 98–107)
CO2: 16 MMOL/L (ref 22–29)
CREAT SERPL-MCNC: 0.4 MG/DL (ref 0.5–1)
GFR AFRICAN AMERICAN: >60
GFR NON-AFRICAN AMERICAN: >60 ML/MIN/1.73
GLUCOSE BLD-MCNC: 123 MG/DL (ref 74–99)
HCT VFR BLD CALC: 38.8 % (ref 34–48)
HEMOGLOBIN: 12.9 G/DL (ref 11.5–15.5)
MAGNESIUM: 1.7 MG/DL (ref 1.6–2.6)
MCH RBC QN AUTO: 33.1 PG (ref 26–35)
MCHC RBC AUTO-ENTMCNC: 33.2 % (ref 32–34.5)
MCV RBC AUTO: 99.5 FL (ref 80–99.9)
PDW BLD-RTO: 13.6 FL (ref 11.5–15)
PLATELET # BLD: 137 E9/L (ref 130–450)
PMV BLD AUTO: 11.4 FL (ref 7–12)
POTASSIUM SERPL-SCNC: 4 MMOL/L (ref 3.5–5)
RBC # BLD: 3.9 E12/L (ref 3.5–5.5)
REASON FOR REJECTION: NORMAL
REJECTED TEST: NORMAL
SODIUM BLD-SCNC: 135 MMOL/L (ref 132–146)
WBC # BLD: 5.8 E9/L (ref 4.5–11.5)

## 2020-08-25 PROCEDURE — 76937 US GUIDE VASCULAR ACCESS: CPT

## 2020-08-25 PROCEDURE — 83735 ASSAY OF MAGNESIUM: CPT

## 2020-08-25 PROCEDURE — C1751 CATH, INF, PER/CENT/MIDLINE: HCPCS

## 2020-08-25 PROCEDURE — 2580000003 HC RX 258: Performed by: FAMILY MEDICINE

## 2020-08-25 PROCEDURE — 85027 COMPLETE CBC AUTOMATED: CPT

## 2020-08-25 PROCEDURE — 99232 SBSQ HOSP IP/OBS MODERATE 35: CPT | Performed by: INTERNAL MEDICINE

## 2020-08-25 PROCEDURE — 2140000000 HC CCU INTERMEDIATE R&B

## 2020-08-25 PROCEDURE — 36410 VNPNXR 3YR/> PHY/QHP DX/THER: CPT

## 2020-08-25 PROCEDURE — 80048 BASIC METABOLIC PNL TOTAL CA: CPT

## 2020-08-25 PROCEDURE — 6360000002 HC RX W HCPCS: Performed by: NURSE PRACTITIONER

## 2020-08-25 PROCEDURE — 6370000000 HC RX 637 (ALT 250 FOR IP): Performed by: FAMILY MEDICINE

## 2020-08-25 PROCEDURE — 6360000002 HC RX W HCPCS: Performed by: FAMILY MEDICINE

## 2020-08-25 PROCEDURE — 36415 COLL VENOUS BLD VENIPUNCTURE: CPT

## 2020-08-25 PROCEDURE — 6370000000 HC RX 637 (ALT 250 FOR IP): Performed by: INTERNAL MEDICINE

## 2020-08-25 RX ORDER — SODIUM CHLORIDE 0.9 % (FLUSH) 0.9 %
10 SYRINGE (ML) INJECTION PRN
Status: DISCONTINUED | OUTPATIENT
Start: 2020-08-25 | End: 2020-08-30 | Stop reason: HOSPADM

## 2020-08-25 RX ORDER — MAGNESIUM SULFATE IN WATER 40 MG/ML
2 INJECTION, SOLUTION INTRAVENOUS ONCE
Status: COMPLETED | OUTPATIENT
Start: 2020-08-25 | End: 2020-08-25

## 2020-08-25 RX ORDER — HEPARIN SODIUM (PORCINE) LOCK FLUSH IV SOLN 100 UNIT/ML 100 UNIT/ML
1 SOLUTION INTRAVENOUS EVERY 12 HOURS SCHEDULED
Status: DISCONTINUED | OUTPATIENT
Start: 2020-08-25 | End: 2020-08-30 | Stop reason: HOSPADM

## 2020-08-25 RX ORDER — HEPARIN SODIUM (PORCINE) LOCK FLUSH IV SOLN 100 UNIT/ML 100 UNIT/ML
1 SOLUTION INTRAVENOUS PRN
Status: DISCONTINUED | OUTPATIENT
Start: 2020-08-25 | End: 2020-08-30 | Stop reason: HOSPADM

## 2020-08-25 RX ADMIN — MAGNESIUM SULFATE 2 G: 2 INJECTION INTRAVENOUS at 15:17

## 2020-08-25 RX ADMIN — HYDROCODONE BITARTRATE AND ACETAMINOPHEN 1 TABLET: 5; 325 TABLET ORAL at 13:49

## 2020-08-25 RX ADMIN — LORAZEPAM 3 MG: 2 INJECTION INTRAMUSCULAR; INTRAVENOUS at 11:48

## 2020-08-25 RX ADMIN — POTASSIUM CHLORIDE 40 MEQ: 20 TABLET, EXTENDED RELEASE ORAL at 20:09

## 2020-08-25 RX ADMIN — LORAZEPAM 2 MG: 2 INJECTION INTRAMUSCULAR; INTRAVENOUS at 10:22

## 2020-08-25 RX ADMIN — POTASSIUM CHLORIDE 40 MEQ: 20 TABLET, EXTENDED RELEASE ORAL at 09:05

## 2020-08-25 RX ADMIN — SODIUM CHLORIDE: 9 INJECTION, SOLUTION INTRAVENOUS at 20:06

## 2020-08-25 RX ADMIN — ENOXAPARIN SODIUM 40 MG: 40 INJECTION SUBCUTANEOUS at 09:05

## 2020-08-25 ASSESSMENT — PAIN DESCRIPTION - LOCATION: LOCATION: LEG

## 2020-08-25 ASSESSMENT — PAIN DESCRIPTION - ORIENTATION: ORIENTATION: RIGHT

## 2020-08-25 ASSESSMENT — PAIN DESCRIPTION - DESCRIPTORS: DESCRIPTORS: ACHING;DISCOMFORT;SORE

## 2020-08-25 ASSESSMENT — PAIN SCALES - GENERAL
PAINLEVEL_OUTOF10: 0
PAINLEVEL_OUTOF10: 0
PAINLEVEL_OUTOF10: 3
PAINLEVEL_OUTOF10: 0
PAINLEVEL_OUTOF10: 0
PAINLEVEL_OUTOF10: 9

## 2020-08-25 ASSESSMENT — PAIN DESCRIPTION - PAIN TYPE: TYPE: ACUTE PAIN

## 2020-08-25 NOTE — PROGRESS NOTES
[facility] Cardiology progress note  Len Theodore    Patient is seen in follow-up for syncope, VT    Subjective:     Ms. Bernard Graft denies any chest pain or shortness of breath    Is more alert today,    Review of systems:  CONSTITUTIONAL:  negative for  fevers, chills  HEENT:  negative for earaches, nasal congestion and epistaxis  RESPIRATORY:  negative for  dry cough, cough with sputum,wheezing and hemoptysis  GASTROINTESTINAL:  negative for nausea, vomiting  MUSCULOSKELETAL:  negative for  myalgias, arthralgias  NEUROLOGICAL:  negative for visual disturbance, dysphagia      Scheduled Meds: Reviewed  Continuous Infusions: Reviewed  PRN Meds: Reviewed      Objective:      Physical Exam:   /80   Pulse 86   Temp 97.6 °F (36.4 °C)   Resp 16   Ht 5' 2\" (1.575 m)   Wt 117 lb (53.1 kg)   SpO2 96%   BMI 21.40 kg/m²   CONSTITUTIONAL: Alert awake, appears stated age  HEAD:  normocepalic, without obvious abnormality, atraumatic  NECK:  Supple, symmetrical, trachea midline, no adenopathy, thyroid symmetric, not enlarged and no tenderness, skin normal  LUNGS:  No increased work of breathing, good air exchange, clear to auscultation bilaterally, no crackles or wheezing  CARDIOVASCULAR:  Normal apical impulse, regular rate and rhythm, normal S1 and S2, no S3 or S4, 3/6 systolic murmur at the apex, 3/6 systolic murmur at the left lower sternal border, no edema, no JVD, no carotid bruit. ABDOMEN:  Soft, nontender, no masses, no hepatomegaly, no splenomegaly, BS+  MUSCULOSKELETAL:  No clubbing no cyanosis. there is no redness, warmth, or swelling of the joints  full range of motion noted  NEUROLOGIC:  Alert, awake  SKIN:  no bruising or bleeding, normal skin color, texture, turgor and no redness, warmth, or swelling      Cardiographics  I personally reviewed the telemetry monitor strips with the following interpretation: Sinus rhythm    Echocardiogram:    8/22/2020, Normal left ventricular systolic function.    Ejection fraction is visually estimated at 60%. Normal right ventricular size and function (TAPSE 2.5 cm). There is doppler evidence of stage I diastolic dysfunction. Mild mitral regurgitation. The aortic valve is trileaflet. Mild aortic regurgitation. Moderate tricuspid regurgitation. PASP is estimated at 53 mmHg. Imaging  Reviewed    Lab Review   Lab Results   Component Value Date     08/25/2020    K 4.0 08/25/2020    K 4.0 02/05/2020     08/25/2020    CO2 16 08/25/2020    BUN 2 08/25/2020    CREATININE 0.4 08/25/2020    GLUCOSE 123 08/25/2020    GLUCOSE 84 11/28/2011    CALCIUM 9.6 08/25/2020     Lab Results   Component Value Date    WBC 5.8 08/25/2020    HGB 12.9 08/25/2020    HCT 38.8 08/25/2020    MCV 99.5 08/25/2020     08/25/2020     Reviewed, as above. I have personally reviewed the laboratory, cardiac diagnostic and radiographic testing as outlined above:    Assessment:     1. PVCs with nonsustained ventricular tachycardia: Suspect secondary to electrolyte imbalance, significantly better after correcting hypokalemia and hypomagnesemia  2. Aortic valve regurgitation: Mild  3. Tricuspid valve regurgitation: Moderate  4. Mitral valve regurgitation: Mild  5. Other pulmonary hypertension    Recommendations:     1.  will keep potassium between 4-5, will maintain magnesium more than 2  2. Lexiscan stress test tomorrow  3. Will continue telemetry  4. Basic metabolic panel and magnesium level in a.m.  5.  Further cardiac recommendations will be forthcoming pending her clinical course and diagnostic test findings    Discussed with patient  No family at bedside  Electronically signed by Johny Murphy MD on 8/25/2020 at 4:12 PM  NOTE: This report was transcribed using voice recognition software.  Every effort was made to ensure accuracy; however, inadvertent computerized transcription errors may be present

## 2020-08-25 NOTE — PROGRESS NOTES
Patient agitated, pulling at IV tubing, trying to get out of bed. Patient is unable to follow commands, restraints remain in place. Will continue to monitor.

## 2020-08-25 NOTE — PLAN OF CARE
Problem: Pain:  Goal: Pain level will decrease  Description: Pain level will decrease  8/25/2020 0035 by Ana Mckeon RN  Outcome: Met This Shift  8/24/2020 1140 by Uvaldo Jurado RN  Outcome: Met This Shift  Goal: Control of acute pain  Description: Control of acute pain  Outcome: Met This Shift  Goal: Control of chronic pain  Description: Control of chronic pain  Outcome: Met This Shift     Problem: Skin Integrity:  Goal: Will show no infection signs and symptoms  Description: Will show no infection signs and symptoms  8/25/2020 0035 by Ana Mckeon RN  Outcome: Met This Shift  8/24/2020 1140 by Uvaldo Jurado RN  Outcome: Met This Shift  Goal: Absence of new skin breakdown  Description: Absence of new skin breakdown  Outcome: Met This Shift     Problem: Falls - Risk of:  Goal: Will remain free from falls  Description: Will remain free from falls  8/25/2020 0035 by Ana Mckeon RN  Outcome: Met This Shift  8/24/2020 1140 by Uvaldo Jurado RN  Outcome: Met This Shift  Goal: Absence of physical injury  Description: Absence of physical injury  Outcome: Met This Shift     Problem: Restraint Use - Nonviolent/Non-Self-Destructive Behavior:  Goal: Absence of restraint indications  Description: Absence of restraint indications  Outcome: Met This Shift  Goal: Absence of restraint-related injury  Description: Absence of restraint-related injury  Outcome: Met This Shift

## 2020-08-25 NOTE — PLAN OF CARE
Problem: Restraint Use - Nonviolent/Non-Self-Destructive Behavior:  Goal: Absence of restraint indications  Description: Absence of restraint indications  8/25/2020 1422 by Marisol Benavides RN  Outcome: Ongoing     Problem: Restraint Use - Nonviolent/Non-Self-Destructive Behavior:  Goal: Absence of restraint-related injury  Description: Absence of restraint-related injury  8/25/2020 1422 by Marisol Benavides RN  Outcome: Ongoing

## 2020-08-25 NOTE — CARE COORDINATION
SOCIAL WORK/CASEMANAGEMENT TRANSITION OF CARE [id number]anny [address], [address] [facility], [phone]): pt remains in 4 point restraints. Will need therapy to see pt when out of restraints to see if the plan remains home. Pt had midline for iv access placed this a.m. I called and left  for sister, Arturo Remy, to call me back to answer some questions. sw/cm to follow.  Camelia Alejo  8/25/2020

## 2020-08-25 NOTE — PROGRESS NOTES
CH MIDLINE Placement 8/25/2020    Product number: [id number]   Lot Number: [id number]      Ultrasound: yes   R Brachial                Upper Arm Circumference: 28cm    Size: 4.5f    Exposed Length: 0    Internal Length: 15cm   Cut: 0   Vein Measurement: .4    Ruperto King  8/25/2020  10:14 AM

## 2020-08-25 NOTE — PROGRESS NOTES
Yeison Aly is a 61 y.o. female patient. Patient confused this morning.     Current Facility-Administered Medications   Medication Dose Route Frequency Provider Last Rate Last Dose    0.9 % sodium chloride infusion   Intravenous Continuous Sher Puentes  mL/hr at 08/24/20 6832      hydrALAZINE (APRESOLINE) injection 10 mg  10 mg Intravenous Q4H PRN Sher Puentes MD   10 mg at 08/23/20 5373    sodium chloride flush 0.9 % injection 10 mL  10 mL Intravenous BID Sher Puentes MD        potassium chloride Will Bennie M) extended release tablet 40 mEq  40 mEq Oral BID Obed Lo MD   40 mEq at 08/24/20 3028    LORazepam (ATIVAN) tablet 1 mg  1 mg Oral Q1H PRN Sher Puentes MD        Or    LORazepam (ATIVAN) injection 1 mg  1 mg Intravenous Q1H PRN Sher Puentes MD        Or    LORazepam (ATIVAN) tablet 2 mg  2 mg Oral Q1H PRN Sher Puentes MD        Or    LORazepam (ATIVAN) injection 2 mg  2 mg Intravenous Q1H PRN Sher Puentes MD   2 mg at 08/24/20 4062    Or    LORazepam (ATIVAN) tablet 3 mg  3 mg Oral Q1H PRN Sher Puentes MD        Or    LORazepam (ATIVAN) injection 3 mg  3 mg Intravenous Q1H PRN Sher Puentes MD   3 mg at 08/23/20 1657    Or    LORazepam (ATIVAN) tablet 4 mg  4 mg Oral Q1H PRN Sher Puentes MD        Or    LORazepam (ATIVAN) injection 4 mg  4 mg Intravenous Q1H PRN Sher Puentes MD   4 mg at 08/24/20 1717    ipratropium-albuterol (DUONEB) nebulizer solution 1 ampule  1 ampule Inhalation Q4H WA Sher Puentes MD   1 ampule at 08/23/20 1312    HYDROcodone-acetaminophen (NORCO) 5-325 MG per tablet 1 tablet  1 tablet Oral Q6H PRN Sher Puentes MD   1 tablet at 08/21/20 6795    enoxaparin (LOVENOX) injection 40 mg  40 mg Subcutaneous Daily Sher Puentes MD   40 mg at 08/24/20 0915    perflutren lipid microspheres (DEFINITY) injection 1.65 mg  1.5 mL Intravenous ONCE PRN RONAN Irene - CNS        nitroGLYCERIN (NITROSTAT) SL tablet 0.4 mg  0.4 mg Sublingual Q5 Min PRN RONAN Tarango - CNP   0.4 mg at 08/22/20 0047    acetaminophen (TYLENOL) tablet 650 mg  650 mg Oral Q4H PRN Nikki Ayers MD   650 mg at 08/22/20 2500     Allergies   Allergen Reactions    Dilaudid [Hydromorphone Hcl] Itching    Morphine Other (See Comments)     Hallucinations per daughter     Active Problems:    Syncope and collapse    NSVT (nonsustained ventricular tachycardia) ([facility])    Severe protein-calorie malnutrition ([facility] [address]  Resolved Problems:    * No resolved hospital problems. *    Blood pressure 127/84, pulse 76, temperature 97.2 °F (36.2 °C), temperature source Temporal, resp. rate 18, height 5' 2\" (1.575 m), weight 117 lb (53.1 kg), SpO2 100 %, not currently breastfeeding. Subjective:  Symptoms:  Improved. No shortness of breath or chest pain. Diet:  Adequate intake. Activity level: Normal.    Pain:  She reports no pain. Objective:  General Appearance:  Comfortable. Vital signs: (most recent): Blood pressure 137/87, pulse 67, temperature 98.8 °F (37.1 °C), temperature source Temporal, resp. rate 16, height 5' 2\" (1.575 m), weight 117 lb (53.1 kg), SpO2 99 %, not currently breastfeeding. No fever. Lungs:  Normal effort and normal respiratory rate. Breath sounds clear to auscultation. Heart: Normal rate. Regular rhythm. S1 normal and S2 normal.      Assessment:  (Syncope  Ventricular tachycardia  Lactic acidosis  Hypokalemia  Hypomagnesemia  Alcohol withdrawal    Plan:   CIWA  Psych following  Continue meds. Monitor exam.). No plans from Cardiology at this time for intervention. Possible ischemic eval when more stable.       Nikki Ayers MD  8/25/2020

## 2020-08-25 NOTE — PROGRESS NOTES
Dr. Nikole Riley notified via perfect serve that restraint order expires at 0615 and that pt needs new order

## 2020-08-26 LAB
ANION GAP SERPL CALCULATED.3IONS-SCNC: 11 MMOL/L (ref 7–16)
BUN BLDV-MCNC: 3 MG/DL (ref 8–23)
CALCIUM SERPL-MCNC: 8 MG/DL (ref 8.6–10.2)
CHLORIDE BLD-SCNC: 110 MMOL/L (ref 98–107)
CO2: 19 MMOL/L (ref 22–29)
CREAT SERPL-MCNC: 0.4 MG/DL (ref 0.5–1)
GFR AFRICAN AMERICAN: >60
GFR NON-AFRICAN AMERICAN: >60 ML/MIN/1.73
GLUCOSE BLD-MCNC: 85 MG/DL (ref 74–99)
HCT VFR BLD CALC: 35.7 % (ref 34–48)
HEMOGLOBIN: 11.8 G/DL (ref 11.5–15.5)
MAGNESIUM: 1.3 MG/DL (ref 1.6–2.6)
MCH RBC QN AUTO: 33.4 PG (ref 26–35)
MCHC RBC AUTO-ENTMCNC: 33.1 % (ref 32–34.5)
MCV RBC AUTO: 101.1 FL (ref 80–99.9)
PDW BLD-RTO: 13.5 FL (ref 11.5–15)
PLATELET # BLD: 143 E9/L (ref 130–450)
PMV BLD AUTO: 11.1 FL (ref 7–12)
POTASSIUM SERPL-SCNC: 4.2 MMOL/L (ref 3.5–5)
RBC # BLD: 3.53 E12/L (ref 3.5–5.5)
SODIUM BLD-SCNC: 140 MMOL/L (ref 132–146)
WBC # BLD: 5.6 E9/L (ref 4.5–11.5)

## 2020-08-26 PROCEDURE — 85027 COMPLETE CBC AUTOMATED: CPT

## 2020-08-26 PROCEDURE — 6360000002 HC RX W HCPCS: Performed by: FAMILY MEDICINE

## 2020-08-26 PROCEDURE — 6370000000 HC RX 637 (ALT 250 FOR IP): Performed by: FAMILY MEDICINE

## 2020-08-26 PROCEDURE — 80048 BASIC METABOLIC PNL TOTAL CA: CPT

## 2020-08-26 PROCEDURE — 83735 ASSAY OF MAGNESIUM: CPT

## 2020-08-26 PROCEDURE — 2580000003 HC RX 258: Performed by: FAMILY MEDICINE

## 2020-08-26 PROCEDURE — 6370000000 HC RX 637 (ALT 250 FOR IP): Performed by: INTERNAL MEDICINE

## 2020-08-26 PROCEDURE — 99232 SBSQ HOSP IP/OBS MODERATE 35: CPT | Performed by: INTERNAL MEDICINE

## 2020-08-26 PROCEDURE — 2140000000 HC CCU INTERMEDIATE R&B

## 2020-08-26 PROCEDURE — 36415 COLL VENOUS BLD VENIPUNCTURE: CPT

## 2020-08-26 RX ADMIN — SODIUM CHLORIDE: 9 INJECTION, SOLUTION INTRAVENOUS at 05:53

## 2020-08-26 RX ADMIN — POTASSIUM CHLORIDE 40 MEQ: 20 TABLET, EXTENDED RELEASE ORAL at 09:45

## 2020-08-26 RX ADMIN — SODIUM CHLORIDE: 9 INJECTION, SOLUTION INTRAVENOUS at 20:29

## 2020-08-26 RX ADMIN — SODIUM CHLORIDE, PRESERVATIVE FREE 100 UNITS: 5 INJECTION INTRAVENOUS at 09:45

## 2020-08-26 RX ADMIN — SODIUM CHLORIDE, PRESERVATIVE FREE 10 ML: 5 INJECTION INTRAVENOUS at 20:29

## 2020-08-26 RX ADMIN — ENOXAPARIN SODIUM 40 MG: 40 INJECTION SUBCUTANEOUS at 09:45

## 2020-08-26 RX ADMIN — SODIUM CHLORIDE, PRESERVATIVE FREE 100 UNITS: 5 INJECTION INTRAVENOUS at 20:30

## 2020-08-26 RX ADMIN — POTASSIUM CHLORIDE 40 MEQ: 20 TABLET, EXTENDED RELEASE ORAL at 20:30

## 2020-08-26 RX ADMIN — LORAZEPAM 2 MG: 1 TABLET ORAL at 23:56

## 2020-08-26 ASSESSMENT — PAIN SCALES - GENERAL
PAINLEVEL_OUTOF10: 0

## 2020-08-26 NOTE — PROGRESS NOTES
Dani Mcguire is a 61 y.o. female patient. Patient more alert this morning.     Current Facility-Administered Medications   Medication Dose Route Frequency Provider Last Rate Last Dose    lidocaine 1 % injection 5 mL  5 mL Intradermal Once Negrito Browning MD        sodium chloride flush 0.9 % injection 10 mL  10 mL Intravenous PRN Negrito Browning MD        heparin flush 100 UNIT/ML injection 100 Units  1 mL Intravenous 2 times per day Negrito Browning MD        heparin flush 100 UNIT/ML injection 100 Units  1 mL Intracatheter PRN Negrito Browning MD        regadenoson [facility] [place] injection 0.4 mg  0.4 mg Intravenous ONCE PRN Yana Cruz MD        0.9 % sodium chloride infusion   Intravenous Continuous Negrito Browning  mL/hr at 08/26/20 0553      hydrALAZINE (APRESOLINE) injection 10 mg  10 mg Intravenous Q4H PRN Negrito Browning MD   10 mg at 08/23/20 2563    sodium chloride flush 0.9 % injection 10 mL  10 mL Intravenous BID Negrito Browning MD        potassium chloride Jessica CONNOR) extended release tablet 40 mEq  40 mEq Oral BID Eligio Yao MD   40 mEq at 08/25/20 2009    LORazepam (ATIVAN) tablet 1 mg  1 mg Oral Q1H PRN Negrito Browning MD        Or    LORazepam (ATIVAN) injection 1 mg  1 mg Intravenous Q1H PRN Negrito Browning MD        Or    LORazepam (ATIVAN) tablet 2 mg  2 mg Oral Q1H PRN Negrito Browning MD        Or    LORazepam (ATIVAN) injection 2 mg  2 mg Intravenous Q1H PRN Negrito Browning MD   2 mg at 08/25/20 1022    Or    LORazepam (ATIVAN) tablet 3 mg  3 mg Oral Q1H PRN Negrito Browning MD        Or    LORazepam (ATIVAN) injection 3 mg  3 mg Intravenous Q1H PRN Negrito Browning MD   3 mg at 08/25/20 1148    Or    LORazepam (ATIVAN) tablet 4 mg  4 mg Oral Q1H PRN Negrito Browning MD        Or    LORazepam (ATIVAN) injection 4 mg  4 mg Intravenous Q1H PRN Negrito Browning MD   4 mg at 08/24/20 1717    ipratropium-albuterol (DUONEB) nebulizer solution 1 ampule  1 ampule Inhalation Q4H WA Tao Douglas MD   1 ampule at 08/23/20 1312    HYDROcodone-acetaminophen (NORCO) 5-325 MG per tablet 1 tablet  1 tablet Oral Q6H PRN Tao Douglas MD   1 tablet at 08/25/20 1349    enoxaparin (LOVENOX) injection 40 mg  40 mg Subcutaneous Daily Tao Douglas MD   40 mg at 08/25/20 9221    perflutren lipid microspheres (DEFINITY) injection 1.65 mg  1.5 mL Intravenous ONCE PRN Tina Bare, APRN - CNS        nitroGLYCERIN (NITROSTAT) SL tablet 0.4 mg  0.4 mg Sublingual Q5 Min PRN Glena Mater, APRN - CNP   0.4 mg at 08/22/20 0047    acetaminophen (TYLENOL) tablet 650 mg  650 mg Oral Q4H PRN Tao Douglas MD   650 mg at 08/22/20 9900     Allergies   Allergen Reactions    Dilaudid [Hydromorphone Hcl] Itching    Morphine Other (See Comments)     Hallucinations per daughter     Active Problems:    Syncope and collapse    NSVT (nonsustained ventricular tachycardia) (HCC)    Severe protein-calorie malnutrition ([address]  Resolved Problems:    * No resolved hospital problems. *    Blood pressure (!) 159/103, pulse 80, temperature 97.6 °F (36.4 °C), temperature source Temporal, resp. rate 18, height 5' 2\" (1.575 m), weight 117 lb (53.1 kg), SpO2 99 %, not currently breastfeeding. Subjective:  Symptoms:  Improved. No shortness of breath or chest pain. Diet:  Adequate intake. Activity level: Normal.    Pain:  She reports no pain. Objective:  General Appearance:  Comfortable. Vital signs: (most recent): Blood pressure 137/87, pulse 67, temperature 98.8 °F (37.1 °C), temperature source Temporal, resp. rate 16, height 5' 2\" (1.575 m), weight 117 lb (53.1 kg), SpO2 99 %, not currently breastfeeding. No fever. Lungs:  Normal effort and normal respiratory rate. Breath sounds clear to auscultation. Heart: Normal rate. Regular rhythm.   S1 normal and S2 normal.      Assessment:  (Syncope  Ventricular tachycardia  Lactic acidosis  Hypokalemia  Hypomagnesemia  Alcohol withdrawal    Plan:   CIWA  Psych following  Continue meds. Monitor exam.).    Cardiology considering stress test.      Madalyn Arambula MD  8/26/2020

## 2020-08-26 NOTE — PLAN OF CARE
Problem: Restraint Use - Nonviolent/Non-Self-Destructive Behavior:  Goal: Absence of restraint-related injury  Description: Absence of restraint-related injury  Outcome: Ongoing  Note: Restraints removed for ROM performance. Skin WDI.  No evidence of injury

## 2020-08-26 NOTE — PROGRESS NOTES
[facility] Cardiology progress note  Chay Torres    Patient is seen in follow-up for syncope, VT    Subjective:     Ms. Mai Salazar denies any chest pain or shortness of breath    Disoriented    Review of systems:   Not obtained due to altered mental status      Scheduled Meds: Reviewed  Continuous Infusions: Reviewed  PRN Meds: Reviewed      Objective:      Physical Exam:   BP (!) 137/95   Pulse 71   Temp 97.8 °F (36.6 °C) (Oral)   Resp 18   Ht 5' 2\" (1.575 m)   Wt 116 lb 12.8 oz (53 kg)   SpO2 98%   BMI 21.36 kg/m²   CONSTITUTIONAL: Alert awake, appears stated age  HEAD:  normocepalic, without obvious abnormality, atraumatic  NECK:  Supple, symmetrical, trachea midline, no adenopathy, thyroid symmetric, not enlarged and no tenderness, skin normal  LUNGS:  No increased work of breathing, good air exchange, clear to auscultation bilaterally, no crackles or wheezing  CARDIOVASCULAR:  Normal apical impulse, regular rate and rhythm, normal S1 and S2, no S3 or S4, 3/6 systolic murmur at the apex, 3/6 systolic murmur at the left lower sternal border, no edema, no JVD, no carotid bruit. ABDOMEN:  Soft, nontender, no masses, no hepatomegaly, no splenomegaly, BS+  MUSCULOSKELETAL:  No clubbing no cyanosis. there is no redness, warmth, or swelling of the joints  full range of motion noted  NEUROLOGIC:  Alert, awake  SKIN:  no bruising or bleeding, normal skin color, texture, turgor and no redness, warmth, or swelling      Cardiographics  I personally reviewed the telemetry monitor strips with the following interpretation: Sinus rhythm with a short run of nonsustained ventricular tachycardia    Echocardiogram:    8/22/2020, Normal left ventricular systolic function. Ejection fraction is visually estimated at 60%. Normal right ventricular size and function (TAPSE 2.5 cm). There is doppler evidence of stage I diastolic dysfunction. Mild mitral regurgitation. The aortic valve is trileaflet.    Mild aortic regurgitation. Moderate tricuspid regurgitation. PASP is estimated at 53 mmHg. Imaging  Reviewed    Lab Review   Lab Results   Component Value Date     08/26/2020    K 4.2 08/26/2020    K 4.0 02/05/2020     08/26/2020    CO2 19 08/26/2020    BUN 3 08/26/2020    CREATININE 0.4 08/26/2020    GLUCOSE 85 08/26/2020    GLUCOSE 84 11/28/2011    CALCIUM 8.0 08/26/2020     Lab Results   Component Value Date    WBC 5.6 08/26/2020    HGB 11.8 08/26/2020    HCT 35.7 08/26/2020    .1 08/26/2020     08/26/2020     Reviewed, as above. I have personally reviewed the laboratory, cardiac diagnostic and radiographic testing as outlined above:    Assessment:     1. PVCs with nonsustained ventricular tachycardia: Suspect secondary to electrolyte imbalance, significantly better after correcting hypokalemia and hypomagnesemia  2. Aortic valve regurgitation: Mild  3. Tricuspid valve regurgitation: Moderate  4. Mitral valve regurgitation: Mild  5. Other pulmonary hypertension    Recommendations:     1.  will keep potassium between 4-5, will maintain magnesium more than 2  2. Lexiscan stress test when medical status improved  3. Will continue telemetry  4. Basic metabolic panel and magnesium level in a.m.  5.  Metoprolol 25 mg by mouth twice daily  6. Further cardiac recommendations will be forthcoming pending her clinical course and diagnostic test findings    Discussed with patient  No family at bedside  Electronically signed by David Rivera MD on 8/26/2020 at 9:36 AM  NOTE: This report was transcribed using voice recognition software.  Every effort was made to ensure accuracy; however, inadvertent computerized transcription errors may be present

## 2020-08-26 NOTE — PROGRESS NOTES
Assisted patient with eating dinner. Patient having episodes of intermittent confusion. Spoke with patient regarding RN's desire to remove restraints, however patient not able to fully grasp where she is and why she is here. Multiple attempts made by RN to re-orient. Patient continues to pull at midline and hallucinate.

## 2020-08-27 LAB
ANION GAP SERPL CALCULATED.3IONS-SCNC: 16 MMOL/L (ref 7–16)
BUN BLDV-MCNC: 6 MG/DL (ref 8–23)
CALCIUM SERPL-MCNC: 8.7 MG/DL (ref 8.6–10.2)
CHLORIDE BLD-SCNC: 105 MMOL/L (ref 98–107)
CO2: 20 MMOL/L (ref 22–29)
CREAT SERPL-MCNC: 0.5 MG/DL (ref 0.5–1)
GFR AFRICAN AMERICAN: >60
GFR NON-AFRICAN AMERICAN: >60 ML/MIN/1.73
GLUCOSE BLD-MCNC: 90 MG/DL (ref 74–99)
HCT VFR BLD CALC: 34.3 % (ref 34–48)
HEMOGLOBIN: 11.3 G/DL (ref 11.5–15.5)
MAGNESIUM: 1.4 MG/DL (ref 1.6–2.6)
MCH RBC QN AUTO: 33.4 PG (ref 26–35)
MCHC RBC AUTO-ENTMCNC: 32.9 % (ref 32–34.5)
MCV RBC AUTO: 101.5 FL (ref 80–99.9)
PDW BLD-RTO: 13.6 FL (ref 11.5–15)
PLATELET # BLD: 166 E9/L (ref 130–450)
PMV BLD AUTO: 11.1 FL (ref 7–12)
POTASSIUM SERPL-SCNC: 4.7 MMOL/L (ref 3.5–5)
RBC # BLD: 3.38 E12/L (ref 3.5–5.5)
SODIUM BLD-SCNC: 141 MMOL/L (ref 132–146)
WBC # BLD: 5.4 E9/L (ref 4.5–11.5)

## 2020-08-27 PROCEDURE — 80048 BASIC METABOLIC PNL TOTAL CA: CPT

## 2020-08-27 PROCEDURE — 6370000000 HC RX 637 (ALT 250 FOR IP): Performed by: INTERNAL MEDICINE

## 2020-08-27 PROCEDURE — 36415 COLL VENOUS BLD VENIPUNCTURE: CPT

## 2020-08-27 PROCEDURE — 2140000000 HC CCU INTERMEDIATE R&B

## 2020-08-27 PROCEDURE — 85027 COMPLETE CBC AUTOMATED: CPT

## 2020-08-27 PROCEDURE — 36592 COLLECT BLOOD FROM PICC: CPT

## 2020-08-27 PROCEDURE — 83735 ASSAY OF MAGNESIUM: CPT

## 2020-08-27 PROCEDURE — 6360000002 HC RX W HCPCS: Performed by: FAMILY MEDICINE

## 2020-08-27 PROCEDURE — 6370000000 HC RX 637 (ALT 250 FOR IP): Performed by: FAMILY MEDICINE

## 2020-08-27 PROCEDURE — 2580000003 HC RX 258: Performed by: FAMILY MEDICINE

## 2020-08-27 RX ADMIN — SODIUM CHLORIDE: 9 INJECTION, SOLUTION INTRAVENOUS at 21:21

## 2020-08-27 RX ADMIN — POTASSIUM CHLORIDE 40 MEQ: 20 TABLET, EXTENDED RELEASE ORAL at 22:11

## 2020-08-27 RX ADMIN — SODIUM CHLORIDE, PRESERVATIVE FREE 10 ML: 5 INJECTION INTRAVENOUS at 22:14

## 2020-08-27 RX ADMIN — Medication 5 ML: at 12:26

## 2020-08-27 RX ADMIN — SODIUM CHLORIDE: 9 INJECTION, SOLUTION INTRAVENOUS at 08:13

## 2020-08-27 RX ADMIN — ENOXAPARIN SODIUM 40 MG: 40 INJECTION SUBCUTANEOUS at 08:14

## 2020-08-27 RX ADMIN — LORAZEPAM 1 MG: 1 TABLET ORAL at 02:13

## 2020-08-27 RX ADMIN — SODIUM CHLORIDE 100 ML: 9 INJECTION, SOLUTION INTRAVENOUS at 22:11

## 2020-08-27 RX ADMIN — LORAZEPAM 3 MG: 1 TABLET ORAL at 04:50

## 2020-08-27 RX ADMIN — LORAZEPAM 1 MG: 1 TABLET ORAL at 10:10

## 2020-08-27 RX ADMIN — SODIUM CHLORIDE, PRESERVATIVE FREE 100 UNITS: 5 INJECTION INTRAVENOUS at 08:14

## 2020-08-27 RX ADMIN — POTASSIUM CHLORIDE 40 MEQ: 20 TABLET, EXTENDED RELEASE ORAL at 08:14

## 2020-08-27 RX ADMIN — SODIUM CHLORIDE, PRESERVATIVE FREE 10 ML: 5 INJECTION INTRAVENOUS at 08:15

## 2020-08-27 RX ADMIN — SODIUM CHLORIDE, PRESERVATIVE FREE 100 UNITS: 5 INJECTION INTRAVENOUS at 22:13

## 2020-08-27 RX ADMIN — LORAZEPAM 2 MG: 1 TABLET ORAL at 08:22

## 2020-08-27 ASSESSMENT — PAIN SCALES - GENERAL
PAINLEVEL_OUTOF10: 0

## 2020-08-27 NOTE — PROGRESS NOTES
Jesus Wyatt is a 61 y.o. female patient. Patient more alert this morning.     Current Facility-Administered Medications   Medication Dose Route Frequency Provider Last Rate Last Dose    lidocaine 1 % injection 5 mL  5 mL Intradermal Once Tao Douglas MD        sodium chloride flush 0.9 % injection 10 mL  10 mL Intravenous PRN Tao Douglas MD        heparin flush 100 UNIT/ML injection 100 Units  1 mL Intravenous 2 times per day Tao Douglas MD   100 Units at 08/26/20 2030    heparin flush 100 UNIT/ML injection 100 Units  1 mL Intracatheter PRN Tao Douglas MD        [facility]oson [facility]) injection 0.4 mg  0.4 mg Intravenous ONCE PRN Carlos Bazan MD        0.9 % sodium chloride infusion   Intravenous Continuous Tao Douglas  mL/hr at 08/26/20 2029      hydrALAZINE (APRESOLINE) injection 10 mg  10 mg Intravenous Q4H PRN Tao Douglas MD   10 mg at 08/23/20 5257    sodium chloride flush 0.9 % injection 10 mL  10 mL Intravenous BID Tao Douglas MD   10 mL at 08/26/20 2029    potassium chloride (KLOR-CON M) extended release tablet 40 mEq  40 mEq Oral BID Lennie Bain MD   40 mEq at 08/26/20 2030    LORazepam (ATIVAN) tablet 1 mg  1 mg Oral Q1H PRN Tao Douglas MD   1 mg at 08/27/20 1997    Or    LORazepam (ATIVAN) injection 1 mg  1 mg Intravenous Q1H PRN Tao Douglas MD        Or    LORazepam (ATIVAN) tablet 2 mg  2 mg Oral Q1H PRN Tao Douglas MD   2 mg at 08/26/20 2356    Or    LORazepam (ATIVAN) injection 2 mg  2 mg Intravenous Q1H PRN Tao Douglas MD   2 mg at 08/25/20 1022    Or    LORazepam (ATIVAN) tablet 3 mg  3 mg Oral Q1H PRN Tao Douglas MD   3 mg at 08/27/20 0450    Or    LORazepam (ATIVAN) injection 3 mg  3 mg Intravenous Q1H PRN Tao Douglas MD   3 mg at 08/25/20 1148    Or    LORazepam (ATIVAN) tablet 4 mg  4 mg Oral Q1H PRN Tao Douglas MD Or    LORazepam (ATIVAN) injection 4 mg  4 mg Intravenous Q1H PRN Ina White MD   4 mg at 08/24/20 1717    ipratropium-albuterol (DUONEB) nebulizer solution 1 ampule  1 ampule Inhalation Q4H WA Ina White MD   1 ampule at 08/23/20 1312    HYDROcodone-acetaminophen (NORCO) 5-325 MG per tablet 1 tablet  1 tablet Oral Q6H PRN Ina White MD   1 tablet at 08/25/20 1349    enoxaparin (LOVENOX) injection 40 mg  40 mg Subcutaneous Daily Ina White MD   40 mg at 08/26/20 0945    perflutren lipid microspheres (DEFINITY) injection 1.65 mg  1.5 mL Intravenous ONCE PRN Thurmon Anna APRN - CNS        nitroGLYCERIN (NITROSTAT) SL tablet 0.4 mg  0.4 mg Sublingual Q5 Min PRN Anshu Martineser, APRN - CNP   0.4 mg at 08/22/20 0047    acetaminophen (TYLENOL) tablet 650 mg  650 mg Oral Q4H PRN Ina White MD   650 mg at 08/22/20 0979     Allergies   Allergen Reactions    Dilaudid [Hydromorphone Hcl] Itching    Morphine Other (See Comments)     Hallucinations per daughter     Active Problems:    Syncope and collapse    NSVT (nonsustained ventricular tachycardia) (HCC)    Severe protein-calorie malnutrition ([address]  Resolved Problems:    * No resolved hospital problems. *    Blood pressure 109/68, pulse 80, temperature 98 °F (36.7 °C), temperature source Temporal, resp. rate 18, height 5' 2\" (1.575 m), weight 116 lb 12.8 oz (53 kg), SpO2 100 %, not currently breastfeeding. Subjective:  Symptoms:  Improved. No shortness of breath or chest pain. Diet:  Adequate intake. Activity level: Normal.    Pain:  She reports no pain. Objective:  General Appearance:  Comfortable. Vital signs: (most recent): Blood pressure 137/87, pulse 67, temperature 98.8 °F (37.1 °C), temperature source Temporal, resp. rate 16, height 5' 2\" (1.575 m), weight 117 lb (53.1 kg), SpO2 99 %, not currently breastfeeding. No fever.     Lungs:  Normal effort and normal respiratory rate.  Breath sounds clear to auscultation. Heart: Normal rate. Regular rhythm. S1 normal and S2 normal.      Assessment:  (Syncope  Ventricular tachycardia  Lactic acidosis  Hypokalemia  Hypomagnesemia  Alcohol withdrawal    Plan:   CIWA  Psych following  Continue meds. Monitor exam.).    Cardiology considering stress test.      Keyona Rowley MD  8/27/2020

## 2020-08-27 NOTE — PLAN OF CARE
Problem: Falls - Risk of:  Goal: Will remain free from falls  Description: Will remain free from falls  Outcome: Met This Shift     Problem: Restraint Use - Nonviolent/Non-Self-Destructive Behavior:  Goal: Absence of restraint indications  Description: Absence of restraint indications  Outcome: Not Met This Shift  Goal: Absence of restraint-related injury  Description: Absence of restraint-related injury  Outcome: Met This Shift     Problem: Malnutrition  (NI-5.2)  Goal: Food and/or Nutrient Delivery  Description: Individualized approach for food/nutrient provision.   8/27/2020 1214 by Estell Gaucher, RD, LD  Outcome: Met This Shift

## 2020-08-27 NOTE — CARE COORDINATION
SOCIAL WORK/CASEMANAGEMENT TRANSITION OF CARE [id number], [address], [place], [phone]): pt now in 2 point restraints of wrist. I met with pt this a.m. and she is alert and oriented to person, place and time. She is aware she will need a stress test. I told her we will have PT and OT see her when out of restraints to make sure she is able to return home with her friend, mustapha, who will be staying with her. Alessandro/horacio to follow.  Marta Santo  8/27/2020

## 2020-08-27 NOTE — PLAN OF CARE
Problem: Pain:  Goal: Pain level will decrease  Description: Pain level will decrease  Outcome: Met This Shift  Goal: Control of acute pain  Description: Control of acute pain  Outcome: Met This Shift  Goal: Control of chronic pain  Description: Control of chronic pain  Outcome: Met This Shift     Problem: Skin Integrity:  Goal: Will show no infection signs and symptoms  Description: Will show no infection signs and symptoms  Outcome: Met This Shift  Goal: Absence of new skin breakdown  Description: Absence of new skin breakdown  Outcome: Met This Shift     Problem: Falls - Risk of:  Goal: Will remain free from falls  Description: Will remain free from falls  Outcome: Met This Shift  Goal: Absence of physical injury  Description: Absence of physical injury  Outcome: Met This Shift     Problem: Restraint Use - Nonviolent/Non-Self-Destructive Behavior:  Goal: Absence of restraint indications  Description: Absence of restraint indications  Outcome: Not Met This Shift  Goal: Absence of restraint-related injury  Description: Absence of restraint-related injury  Outcome: Met This Shift

## 2020-08-27 NOTE — PROGRESS NOTES
Pt continues to have intermittent confusion and attempts to get up on her own. Will occasionally pull at line also. Need for restraints remains. Will continue to monitor.

## 2020-08-27 NOTE — PROGRESS NOTES
Patient c/o sores to tongue and painful to drink.  Notified Dr. Cristin Mario and ordered magic mouthwash

## 2020-08-27 NOTE — PROGRESS NOTES
Comprehensive Nutrition Assessment    Type and Reason for Visit:  Reassess    Nutrition Recommendations/Plan: Continue Current Diet, Continue Oral Nutrition Supplement    Nutrition Assessment:  Pt status remains unchanged w/ ongoing severe malnutrition 2/2 ETOH abuse/ recent death of significant other. PO intake slowly improving average 25-50%. Continue ONS. Malnutrition Assessment:  Malnutrition Status:  Severe malnutrition    Context:  Chronic Illness     Findings of the 6 clinical characteristics of malnutrition:  Energy Intake:   75% or less estimated energy requirements for 1 month or longer  Weight Loss:  7.5% over 3 months(8.9% wt loss x2 months)     Body Fat Loss:  Unable to assess(d/t lack of proper PPE)   Muscle Mass Loss:  Unable to assess  Fluid Accumulation:  No significant fluid accumulation   Strength:  Not Performed    Estimated Daily Nutrient Needs:  Energy (kcal):  0122-5662; Weight Used for Energy Requirements:  Admission     Protein (g):  65-75; Weight Used for Protein Requirements:  Admission(1.3-1.5)        Fluid (ml/day):  9443-7441; Weight Used for Fluid Requirements:  Admission      Nutrition Related Findings:  intermittent confusion, +I/O's, no edema, active BS, noted tongue sores      Wounds:  None       Current Nutrition Therapies:    Dietary Nutrition Supplements: Standard High Calorie Oral Supplement  DIET CARDIAC; Anthropometric Measures:  · Height: 5' 2\" (157.5 cm)  · Current Body Weight: 113 lb (51.3 kg)(admit wt as CBW elevated d/t +fluids)   · Admission Body Weight: 113 lb (51.3 kg)(standing scale 8/21)    · Usual Body Weight: 124 lb (56.2 kg)(actual per EMR 06/2020)     · Ideal Body Weight: 110 lbs; % Ideal Body Weight 102.7 %   · BMI: 20.7 BMI Categories: Normal Weight (BMI 18.5-24. 9)       Nutrition Diagnosis:   · Severe malnutrition, In context of chronic illness related to inadequate protein-energy intake(2/2 ETOH abuse) as evidenced by intake 0-25%, poor intake prior to admission, weight loss(8.9% wt loss x2 months)    Nutrition Interventions:   Nutrition Education/Counseling:  Education not indicated   Coordination of Nutrition Care:  Continued Inpatient Monitoring    Goals:  Pt to consume >50% meals/ONS       Nutrition Monitoring and Evaluation:   Food/Nutrient Intake Outcomes:  Food and Nutrient Intake, Supplement Intake  Physical Signs/Symptoms Outcomes:  Biochemical Data, Nutrition Focused Physical Findings, Skin, Weight, GI Status, Fluid Status or Edema     Discharge Planning:     Too soon to determine     Electronically signed by Jacquelyn Mcdonald RD, LD on 8/27/20 at 12:16 PM EDT    Contact: Ext [phone]

## 2020-08-27 NOTE — PROGRESS NOTES
Patient remains confused at times and trying to get out of bed. Patient requires restraints and will continue to monitor.

## 2020-08-28 ENCOUNTER — APPOINTMENT (OUTPATIENT)
Dept: NUCLEAR MEDICINE | Age: 60
DRG: 201 | End: 2020-08-28
Payer: MEDICAID

## 2020-08-28 ENCOUNTER — APPOINTMENT (OUTPATIENT)
Dept: NON INVASIVE DIAGNOSTICS | Age: 60
DRG: 201 | End: 2020-08-28
Payer: MEDICAID

## 2020-08-28 LAB
ANION GAP SERPL CALCULATED.3IONS-SCNC: 14 MMOL/L (ref 7–16)
BUN BLDV-MCNC: 5 MG/DL (ref 8–23)
CALCIUM SERPL-MCNC: 9.3 MG/DL (ref 8.6–10.2)
CHLORIDE BLD-SCNC: 104 MMOL/L (ref 98–107)
CO2: 21 MMOL/L (ref 22–29)
CREAT SERPL-MCNC: 0.5 MG/DL (ref 0.5–1)
GFR AFRICAN AMERICAN: >60
GFR NON-AFRICAN AMERICAN: >60 ML/MIN/1.73
GLUCOSE BLD-MCNC: 92 MG/DL (ref 74–99)
HCT VFR BLD CALC: 35.3 % (ref 34–48)
HEMOGLOBIN: 11.5 G/DL (ref 11.5–15.5)
LV EF: 70 %
LVEF MODALITY: NORMAL
MAGNESIUM: 1.5 MG/DL (ref 1.6–2.6)
MCH RBC QN AUTO: 33.2 PG (ref 26–35)
MCHC RBC AUTO-ENTMCNC: 32.6 % (ref 32–34.5)
MCV RBC AUTO: 102 FL (ref 80–99.9)
PDW BLD-RTO: 13.6 FL (ref 11.5–15)
PLATELET # BLD: 208 E9/L (ref 130–450)
PMV BLD AUTO: 10.8 FL (ref 7–12)
POTASSIUM SERPL-SCNC: 4.8 MMOL/L (ref 3.5–5)
RBC # BLD: 3.46 E12/L (ref 3.5–5.5)
SODIUM BLD-SCNC: 139 MMOL/L (ref 132–146)
WBC # BLD: 5.7 E9/L (ref 4.5–11.5)

## 2020-08-28 PROCEDURE — 2140000000 HC CCU INTERMEDIATE R&B

## 2020-08-28 PROCEDURE — 94640 AIRWAY INHALATION TREATMENT: CPT

## 2020-08-28 PROCEDURE — 3430000000 HC RX DIAGNOSTIC RADIOPHARMACEUTICAL: Performed by: RADIOLOGY

## 2020-08-28 PROCEDURE — 6360000002 HC RX W HCPCS: Performed by: NURSE PRACTITIONER

## 2020-08-28 PROCEDURE — 80048 BASIC METABOLIC PNL TOTAL CA: CPT

## 2020-08-28 PROCEDURE — 36415 COLL VENOUS BLD VENIPUNCTURE: CPT

## 2020-08-28 PROCEDURE — 6360000002 HC RX W HCPCS: Performed by: FAMILY MEDICINE

## 2020-08-28 PROCEDURE — 6370000000 HC RX 637 (ALT 250 FOR IP): Performed by: INTERNAL MEDICINE

## 2020-08-28 PROCEDURE — 2580000003 HC RX 258: Performed by: FAMILY MEDICINE

## 2020-08-28 PROCEDURE — 93018 CV STRESS TEST I&R ONLY: CPT | Performed by: STUDENT IN AN ORGANIZED HEALTH CARE EDUCATION/TRAINING PROGRAM

## 2020-08-28 PROCEDURE — 6370000000 HC RX 637 (ALT 250 FOR IP): Performed by: FAMILY MEDICINE

## 2020-08-28 PROCEDURE — 93017 CV STRESS TEST TRACING ONLY: CPT

## 2020-08-28 PROCEDURE — 83735 ASSAY OF MAGNESIUM: CPT

## 2020-08-28 PROCEDURE — 93016 CV STRESS TEST SUPVJ ONLY: CPT | Performed by: STUDENT IN AN ORGANIZED HEALTH CARE EDUCATION/TRAINING PROGRAM

## 2020-08-28 PROCEDURE — 85027 COMPLETE CBC AUTOMATED: CPT

## 2020-08-28 PROCEDURE — 78452 HT MUSCLE IMAGE SPECT MULT: CPT

## 2020-08-28 PROCEDURE — 99232 SBSQ HOSP IP/OBS MODERATE 35: CPT | Performed by: INTERNAL MEDICINE

## 2020-08-28 PROCEDURE — A9500 TC99M SESTAMIBI: HCPCS | Performed by: RADIOLOGY

## 2020-08-28 RX ORDER — MAGNESIUM SULFATE IN WATER 40 MG/ML
2 INJECTION, SOLUTION INTRAVENOUS ONCE
Status: COMPLETED | OUTPATIENT
Start: 2020-08-28 | End: 2020-08-28

## 2020-08-28 RX ADMIN — Medication 10 MILLICURIE: at 12:34

## 2020-08-28 RX ADMIN — SODIUM CHLORIDE, PRESERVATIVE FREE 100 UNITS: 5 INJECTION INTRAVENOUS at 08:00

## 2020-08-28 RX ADMIN — Medication 29 MILLICURIE: at 15:05

## 2020-08-28 RX ADMIN — SODIUM CHLORIDE, PRESERVATIVE FREE 10 ML: 5 INJECTION INTRAVENOUS at 05:08

## 2020-08-28 RX ADMIN — IPRATROPIUM BROMIDE AND ALBUTEROL SULFATE 1 AMPULE: .5; 3 SOLUTION RESPIRATORY (INHALATION) at 11:38

## 2020-08-28 RX ADMIN — SODIUM CHLORIDE, PRESERVATIVE FREE 10 ML: 5 INJECTION INTRAVENOUS at 05:07

## 2020-08-28 RX ADMIN — LORAZEPAM 2 MG: 1 TABLET ORAL at 08:00

## 2020-08-28 RX ADMIN — IPRATROPIUM BROMIDE AND ALBUTEROL SULFATE 1 AMPULE: .5; 3 SOLUTION RESPIRATORY (INHALATION) at 21:00

## 2020-08-28 RX ADMIN — SODIUM CHLORIDE: 9 INJECTION, SOLUTION INTRAVENOUS at 07:58

## 2020-08-28 RX ADMIN — SODIUM CHLORIDE, PRESERVATIVE FREE 100 UNITS: 5 INJECTION INTRAVENOUS at 20:05

## 2020-08-28 RX ADMIN — MAGNESIUM SULFATE HEPTAHYDRATE 2 G: 40 INJECTION, SOLUTION INTRAVENOUS at 08:02

## 2020-08-28 RX ADMIN — SODIUM CHLORIDE, PRESERVATIVE FREE 10 ML: 5 INJECTION INTRAVENOUS at 08:02

## 2020-08-28 RX ADMIN — IPRATROPIUM BROMIDE AND ALBUTEROL SULFATE 1 AMPULE: .5; 3 SOLUTION RESPIRATORY (INHALATION) at 08:13

## 2020-08-28 RX ADMIN — POTASSIUM CHLORIDE 40 MEQ: 20 TABLET, EXTENDED RELEASE ORAL at 08:00

## 2020-08-28 RX ADMIN — ENOXAPARIN SODIUM 40 MG: 40 INJECTION SUBCUTANEOUS at 08:00

## 2020-08-28 RX ADMIN — POTASSIUM CHLORIDE 40 MEQ: 20 TABLET, EXTENDED RELEASE ORAL at 20:04

## 2020-08-28 RX ADMIN — LORAZEPAM 1 MG: 1 TABLET ORAL at 13:18

## 2020-08-28 RX ADMIN — SODIUM CHLORIDE, PRESERVATIVE FREE 10 ML: 5 INJECTION INTRAVENOUS at 05:04

## 2020-08-28 RX ADMIN — SODIUM CHLORIDE, PRESERVATIVE FREE 10 ML: 5 INJECTION INTRAVENOUS at 20:04

## 2020-08-28 RX ADMIN — LORAZEPAM 1 MG: 1 TABLET ORAL at 20:04

## 2020-08-28 RX ADMIN — REGADENOSON 0.4 MG: 0.08 INJECTION, SOLUTION INTRAVENOUS at 15:04

## 2020-08-28 ASSESSMENT — PAIN SCALES - GENERAL
PAINLEVEL_OUTOF10: 0

## 2020-08-28 NOTE — PLAN OF CARE
Problem: Falls - Risk of:  Goal: Will remain free from falls  Description: Will remain free from falls  Outcome: Met This Shift  Goal: Absence of physical injury  Description: Absence of physical injury  Outcome: Met This Shift     Problem: Restraint Use - Nonviolent/Non-Self-Destructive Behavior:  Goal: Absence of restraint indications  Description: Absence of restraint indications  Outcome: Met This Shift  Goal: Absence of restraint-related injury  Description: Absence of restraint-related injury  Outcome: Met This Shift

## 2020-08-28 NOTE — PROGRESS NOTES
Bilateral soft wrist restraints remain in place. Patient is confused at times, and continues to try to get out of bed and pulls at [facility] SCREVEN and IV. Will monitor.

## 2020-08-28 NOTE — PROGRESS NOTES
Message sent to [facility] cardiology regarding Mg result and if okay for stress test today.

## 2020-08-28 NOTE — CARE COORDINATION
IVF at 100ml/hr. CIWA- ativan po 2mg x 1 this am. Sitter at bedside. Nursing checking with cardio re:stress test. Will have PT/OT see pt.  Sw/cm will follow

## 2020-08-28 NOTE — PROCEDURES
[place] Nuclear Stress Test:    Cardiologist: Dr. Dylan Avendaño    Date: 8/28/2020    Indications for study: Chest pain    1. No chest pain  2. No new arrhythmias  3. No EKG changes suggestive of stress induced ischemia  4.  Nuclear images pending    Dylan Avendaño MD  [address]  Cardiology

## 2020-08-29 LAB
ANION GAP SERPL CALCULATED.3IONS-SCNC: 11 MMOL/L (ref 7–16)
BUN BLDV-MCNC: 11 MG/DL (ref 8–23)
CALCIUM SERPL-MCNC: 9.8 MG/DL (ref 8.6–10.2)
CHLORIDE BLD-SCNC: 104 MMOL/L (ref 98–107)
CO2: 24 MMOL/L (ref 22–29)
CREAT SERPL-MCNC: 0.6 MG/DL (ref 0.5–1)
GFR AFRICAN AMERICAN: >60
GFR NON-AFRICAN AMERICAN: >60 ML/MIN/1.73
GLUCOSE BLD-MCNC: 102 MG/DL (ref 74–99)
HCT VFR BLD CALC: 33.1 % (ref 34–48)
HEMOGLOBIN: 10.7 G/DL (ref 11.5–15.5)
MAGNESIUM: 1.9 MG/DL (ref 1.6–2.6)
MCH RBC QN AUTO: 33.4 PG (ref 26–35)
MCHC RBC AUTO-ENTMCNC: 32.3 % (ref 32–34.5)
MCV RBC AUTO: 103.4 FL (ref 80–99.9)
PDW BLD-RTO: 13.6 FL (ref 11.5–15)
PLATELET # BLD: 232 E9/L (ref 130–450)
PMV BLD AUTO: 10.4 FL (ref 7–12)
POTASSIUM SERPL-SCNC: 5.4 MMOL/L (ref 3.5–5)
RBC # BLD: 3.2 E12/L (ref 3.5–5.5)
SODIUM BLD-SCNC: 139 MMOL/L (ref 132–146)
WBC # BLD: 6.5 E9/L (ref 4.5–11.5)

## 2020-08-29 PROCEDURE — 83735 ASSAY OF MAGNESIUM: CPT

## 2020-08-29 PROCEDURE — 6370000000 HC RX 637 (ALT 250 FOR IP): Performed by: FAMILY MEDICINE

## 2020-08-29 PROCEDURE — 80048 BASIC METABOLIC PNL TOTAL CA: CPT

## 2020-08-29 PROCEDURE — 99231 SBSQ HOSP IP/OBS SF/LOW 25: CPT | Performed by: INTERNAL MEDICINE

## 2020-08-29 PROCEDURE — 36415 COLL VENOUS BLD VENIPUNCTURE: CPT

## 2020-08-29 PROCEDURE — 2140000000 HC CCU INTERMEDIATE R&B

## 2020-08-29 PROCEDURE — 36592 COLLECT BLOOD FROM PICC: CPT

## 2020-08-29 PROCEDURE — 2580000003 HC RX 258: Performed by: FAMILY MEDICINE

## 2020-08-29 PROCEDURE — 6360000002 HC RX W HCPCS: Performed by: FAMILY MEDICINE

## 2020-08-29 PROCEDURE — 85027 COMPLETE CBC AUTOMATED: CPT

## 2020-08-29 PROCEDURE — 94640 AIRWAY INHALATION TREATMENT: CPT

## 2020-08-29 RX ORDER — POTASSIUM CHLORIDE 20 MEQ/1
40 TABLET, EXTENDED RELEASE ORAL DAILY
Status: DISCONTINUED | OUTPATIENT
Start: 2020-08-30 | End: 2020-08-30 | Stop reason: HOSPADM

## 2020-08-29 RX ADMIN — IPRATROPIUM BROMIDE AND ALBUTEROL SULFATE 1 AMPULE: .5; 3 SOLUTION RESPIRATORY (INHALATION) at 09:37

## 2020-08-29 RX ADMIN — SODIUM CHLORIDE: 9 INJECTION, SOLUTION INTRAVENOUS at 21:13

## 2020-08-29 RX ADMIN — LORAZEPAM 2 MG: 2 INJECTION INTRAMUSCULAR; INTRAVENOUS at 17:02

## 2020-08-29 RX ADMIN — LORAZEPAM 2 MG: 1 TABLET ORAL at 14:33

## 2020-08-29 RX ADMIN — IPRATROPIUM BROMIDE AND ALBUTEROL SULFATE 1 AMPULE: .5; 3 SOLUTION RESPIRATORY (INHALATION) at 16:15

## 2020-08-29 RX ADMIN — LORAZEPAM 2 MG: 1 TABLET ORAL at 02:14

## 2020-08-29 RX ADMIN — ENOXAPARIN SODIUM 40 MG: 40 INJECTION SUBCUTANEOUS at 08:41

## 2020-08-29 RX ADMIN — IPRATROPIUM BROMIDE AND ALBUTEROL SULFATE 1 AMPULE: .5; 3 SOLUTION RESPIRATORY (INHALATION) at 13:14

## 2020-08-29 RX ADMIN — LORAZEPAM 2 MG: 1 TABLET ORAL at 11:04

## 2020-08-29 ASSESSMENT — PAIN SCALES - GENERAL
PAINLEVEL_OUTOF10: 0
PAINLEVEL_OUTOF10: 0

## 2020-08-29 NOTE — PROGRESS NOTES
[facility] Cardiology progress note  Akilah Bailey    Patient is seen in follow-up for syncope, VT    Subjective:     Ms. Favio Ryan denies any chest pain or shortness of breath  More alert today    Review of systems:    CONSTITUTIONAL:  negative for  fevers, chills  HEENT:  negative for earaches, nasal congestion and epistaxis  RESPIRATORY:  negative for  dry cough, cough with sputum,wheezing and hemoptysis  GASTROINTESTINAL:  negative for nausea, vomiting  MUSCULOSKELETAL:  negative for  myalgias, arthralgias  NEUROLOGICAL:  negative for visual disturbance, dysphagia      Scheduled Meds: Reviewed  Continuous Infusions: Reviewed  PRN Meds: Reviewed      Objective:      Physical Exam:   BP 96/67   Pulse 84   Temp 97.3 °F (36.3 °C) (Temporal)   Resp 16   Ht 5' 2\" (1.575 m)   Wt 116 lb 12.8 oz (53 kg)   SpO2 98%   BMI 21.36 kg/m²   CONSTITUTIONAL: Alert awake, appears stated age  HEAD:  normocepalic, without obvious abnormality, atraumatic  NECK:  Supple, symmetrical, trachea midline, no adenopathy, thyroid symmetric, not enlarged and no tenderness, skin normal  LUNGS:  No increased work of breathing, good air exchange, clear to auscultation bilaterally, no crackles or wheezing  CARDIOVASCULAR:  Normal apical impulse, regular rate and rhythm, normal S1 and S2, no S3 or S4, 3/6 systolic murmur at the apex, 3/6 systolic murmur at the left lower sternal border, no edema, no JVD, no carotid bruit. ABDOMEN:  Soft, nontender, no masses, no hepatomegaly, no splenomegaly, BS+  MUSCULOSKELETAL:  No clubbing no cyanosis. there is no redness, warmth, or swelling of the joints  full range of motion noted  NEUROLOGIC:  Alert, awake  SKIN:  no bruising or bleeding, normal skin color, texture, turgor and no redness, warmth, or swelling      Cardiographics  I personally reviewed the telemetry monitor strips with the following interpretation: Sinus rhythm     Echocardiogram:    8/22/2020, Normal left ventricular systolic function. Ejection fraction is visually estimated at 60%. Normal right ventricular size and function (TAPSE 2.5 cm). There is doppler evidence of stage I diastolic dysfunction. Mild mitral regurgitation. The aortic valve is trileaflet. Mild aortic regurgitation. Moderate tricuspid regurgitation. PASP is estimated at 53 mmHg. Imaging  Reviewed    Lab Review   Lab Results   Component Value Date     08/29/2020    K 5.4 08/29/2020    K 4.0 02/05/2020     08/29/2020    CO2 24 08/29/2020    BUN 11 08/29/2020    CREATININE 0.6 08/29/2020    GLUCOSE 102 08/29/2020    GLUCOSE 84 11/28/2011    CALCIUM 9.8 08/29/2020     Lab Results   Component Value Date    WBC 6.5 08/29/2020    HGB 10.7 08/29/2020    HCT 33.1 08/29/2020    .4 08/29/2020     08/29/2020     Reviewed, as above. I have personally reviewed the laboratory, cardiac diagnostic and radiographic testing as outlined above:    Assessment:     1. PVCs with nonsustained ventricular tachycardia:  significantly better after correcting hypokalemia and hypomagnesemia, negative Lexiscan stress test.  2.  Aortic valve regurgitation: Mild  3. Tricuspid valve regurgitation: Moderate  4. Mitral valve regurgitation: Mild  5. Other pulmonary hypertension    Recommendations:     1. Will decrease potassium supplement twice a day given her hyperkalemia  2.  will continue the rest of treatment   3. Increase ambulation as tolerated  4. Basic metabolic panel and magnesium level in a.m. No active cardiac problems, will see as needed, thank you    Discussed with patient  No family at bedside  Electronically signed by Edyta Waldrop MD on 8/29/2020 at 2:07 PM  NOTE: This report was transcribed using voice recognition software.  Every effort was made to ensure accuracy; however, inadvertent computerized transcription errors may be present

## 2020-08-29 NOTE — PROGRESS NOTES
Shaunna Mccloud is a 61 y.o. female patient. Patient more alert this morning.     Current Facility-Administered Medications   Medication Dose Route Frequency Provider Last Rate Last Dose    magic (miracle) mouthwash  5 mL Swish & Spit 4x Daily PRN Jeni Means MD   5 mL at 08/27/20 1226    lidocaine 1 % injection 5 mL  5 mL Intradermal Once Jeni Means MD        sodium chloride flush 0.9 % injection 10 mL  10 mL Intravenous PRN Jeni Means MD   10 mL at 08/28/20 0508    heparin flush 100 UNIT/ML injection 100 Units  1 mL Intravenous 2 times per day Jeni Means MD   100 Units at 08/28/20 2005    heparin flush 100 UNIT/ML injection 100 Units  1 mL Intracatheter PRN Jeni Means MD        0.9 % sodium chloride infusion   Intravenous Continuous Jeni Means  mL/hr at 08/28/20 0758      hydrALAZINE (APRESOLINE) injection 10 mg  10 mg Intravenous Q4H PRN Jeni Means MD   10 mg at 08/23/20 2011    sodium chloride flush 0.9 % injection 10 mL  10 mL Intravenous BID Jeni Means MD   10 mL at 08/28/20 2004    potassium chloride (KLOR-CON M) extended release tablet 40 mEq  40 mEq Oral BID Baljeet Raedr MD   40 mEq at 08/28/20 2004    LORazepam (ATIVAN) tablet 1 mg  1 mg Oral Q1H PRN Jeni Means MD   1 mg at 08/28/20 2004    Or    LORazepam (ATIVAN) injection 1 mg  1 mg Intravenous Q1H PRN Jeni Means MD        Or    LORazepam (ATIVAN) tablet 2 mg  2 mg Oral Q1H PRN Jeni Means MD   2 mg at 08/29/20 0214    Or    LORazepam (ATIVAN) injection 2 mg  2 mg Intravenous Q1H PRN Jeni Means MD   2 mg at 08/25/20 1022    Or    LORazepam (ATIVAN) tablet 3 mg  3 mg Oral Q1H PRN Jeni Means MD   3 mg at 08/27/20 0450    Or    LORazepam (ATIVAN) injection 3 mg  3 mg Intravenous Q1H PRN Jeni Means MD   3 mg at 08/25/20 1148    Or    LORazepam (ATIVAN) tablet 4 mg  4 mg Oral Q1H PRN Elisa De Los Santos MD        Or    LORazepam (ATIVAN) injection 4 mg  4 mg Intravenous Q1H PRN Elisa De Los Santos MD   4 mg at 08/24/20 1717    ipratropium-albuterol (DUONEB) nebulizer solution 1 ampule  1 ampule Inhalation Q4H WA Elisa De Los Santos MD   1 ampule at 08/28/20 2100    HYDROcodone-acetaminophen (NORCO) 5-325 MG per tablet 1 tablet  1 tablet Oral Q6H PRN Elisa De Los Santos MD   1 tablet at 08/25/20 1349    enoxaparin (LOVENOX) injection 40 mg  40 mg Subcutaneous Daily Elisa De Los Santos MD   40 mg at 08/28/20 0800    perflutren lipid microspheres (DEFINITY) injection 1.65 mg  1.5 mL Intravenous ONCE PRN RONAN Colon - CNS        nitroGLYCERIN (NITROSTAT) SL tablet 0.4 mg  0.4 mg Sublingual Q5 Min PRN RONAN Peace - CNP   0.4 mg at 08/22/20 0047    acetaminophen (TYLENOL) tablet 650 mg  650 mg Oral Q4H PRN Elisa De Los Santos MD   650 mg at 08/22/20 0710     Allergies   Allergen Reactions    Dilaudid [Hydromorphone Hcl] Itching    Morphine Other (See Comments)     Hallucinations per daughter     Active Problems:    Syncope and collapse    NSVT (nonsustained ventricular tachycardia) (HCC)    Severe protein-calorie malnutrition ([address]  Resolved Problems:    * No resolved hospital problems. *    Blood pressure 115/72, pulse 78, temperature 98.3 °F (36.8 °C), temperature source Oral, resp. rate 18, height 5' 2\" (1.575 m), weight 116 lb 12.8 oz (53 kg), SpO2 97 %, not currently breastfeeding. Subjective:  Symptoms:  Improved. No shortness of breath or chest pain. Diet:  Adequate intake. Activity level: Normal.    Pain:  She reports no pain. Objective:  General Appearance:  Comfortable. Vital signs: (most recent): Blood pressure 137/87, pulse 67, temperature 98.8 °F (37.1 °C), temperature source Temporal, resp. rate 16, height 5' 2\" (1.575 m), weight 117 lb (53.1 kg), SpO2 99 %, not currently breastfeeding. No fever.     Lungs: Normal effort and normal respiratory rate. Breath sounds clear to auscultation. Heart: Normal rate. Regular rhythm. S1 normal and S2 normal.      Assessment:  (Syncope  Ventricular tachycardia  Lactic acidosis  Hypokalemia  Hypomagnesemia  Alcohol withdrawal    Plan:   Continue meds. Monitor exam.). Stress test negative. Home soon.       Ted Rojas MD  8/29/2020

## 2020-08-30 VITALS
WEIGHT: 116.8 LBS | OXYGEN SATURATION: 96 % | RESPIRATION RATE: 16 BRPM | DIASTOLIC BLOOD PRESSURE: 83 MMHG | BODY MASS INDEX: 21.49 KG/M2 | HEART RATE: 81 BPM | TEMPERATURE: 97.2 F | HEIGHT: 62 IN | SYSTOLIC BLOOD PRESSURE: 122 MMHG

## 2020-08-30 LAB
ANION GAP SERPL CALCULATED.3IONS-SCNC: 11 MMOL/L (ref 7–16)
BUN BLDV-MCNC: 12 MG/DL (ref 8–23)
CALCIUM SERPL-MCNC: 9.9 MG/DL (ref 8.6–10.2)
CHLORIDE BLD-SCNC: 103 MMOL/L (ref 98–107)
CO2: 25 MMOL/L (ref 22–29)
CREAT SERPL-MCNC: 0.7 MG/DL (ref 0.5–1)
GFR AFRICAN AMERICAN: >60
GFR NON-AFRICAN AMERICAN: >60 ML/MIN/1.73
GLUCOSE BLD-MCNC: 94 MG/DL (ref 74–99)
HCT VFR BLD CALC: 34.2 % (ref 34–48)
HEMOGLOBIN: 11 G/DL (ref 11.5–15.5)
MAGNESIUM: 1.7 MG/DL (ref 1.6–2.6)
MCH RBC QN AUTO: 33.2 PG (ref 26–35)
MCHC RBC AUTO-ENTMCNC: 32.2 % (ref 32–34.5)
MCV RBC AUTO: 103.3 FL (ref 80–99.9)
PDW BLD-RTO: 13.4 FL (ref 11.5–15)
PLATELET # BLD: 280 E9/L (ref 130–450)
PMV BLD AUTO: 10.3 FL (ref 7–12)
POTASSIUM SERPL-SCNC: 4.7 MMOL/L (ref 3.5–5)
RBC # BLD: 3.31 E12/L (ref 3.5–5.5)
SODIUM BLD-SCNC: 139 MMOL/L (ref 132–146)
WBC # BLD: 6.4 E9/L (ref 4.5–11.5)

## 2020-08-30 PROCEDURE — 6360000002 HC RX W HCPCS: Performed by: FAMILY MEDICINE

## 2020-08-30 PROCEDURE — 6370000000 HC RX 637 (ALT 250 FOR IP): Performed by: FAMILY MEDICINE

## 2020-08-30 PROCEDURE — 83735 ASSAY OF MAGNESIUM: CPT

## 2020-08-30 PROCEDURE — 85027 COMPLETE CBC AUTOMATED: CPT

## 2020-08-30 PROCEDURE — 94640 AIRWAY INHALATION TREATMENT: CPT

## 2020-08-30 PROCEDURE — 6370000000 HC RX 637 (ALT 250 FOR IP): Performed by: INTERNAL MEDICINE

## 2020-08-30 PROCEDURE — 80048 BASIC METABOLIC PNL TOTAL CA: CPT

## 2020-08-30 PROCEDURE — 36415 COLL VENOUS BLD VENIPUNCTURE: CPT

## 2020-08-30 PROCEDURE — 2580000003 HC RX 258: Performed by: FAMILY MEDICINE

## 2020-08-30 RX ORDER — NITROGLYCERIN 0.4 MG/1
TABLET SUBLINGUAL
Qty: 25 TABLET | Refills: 3 | Status: SHIPPED | OUTPATIENT
Start: 2020-08-30 | End: 2021-04-13 | Stop reason: ALTCHOICE

## 2020-08-30 RX ORDER — POTASSIUM CHLORIDE 20 MEQ/1
40 TABLET, EXTENDED RELEASE ORAL DAILY
Qty: 60 TABLET | Refills: 3 | Status: SHIPPED | OUTPATIENT
Start: 2020-08-30 | End: 2021-04-13 | Stop reason: ALTCHOICE

## 2020-08-30 RX ADMIN — SODIUM CHLORIDE, PRESERVATIVE FREE 10 ML: 5 INJECTION INTRAVENOUS at 08:37

## 2020-08-30 RX ADMIN — SODIUM CHLORIDE, PRESERVATIVE FREE 10 ML: 5 INJECTION INTRAVENOUS at 04:54

## 2020-08-30 RX ADMIN — LORAZEPAM 3 MG: 1 TABLET ORAL at 00:20

## 2020-08-30 RX ADMIN — SODIUM CHLORIDE, PRESERVATIVE FREE 100 UNITS: 5 INJECTION INTRAVENOUS at 08:38

## 2020-08-30 RX ADMIN — POTASSIUM CHLORIDE 40 MEQ: 1500 TABLET, EXTENDED RELEASE ORAL at 08:34

## 2020-08-30 RX ADMIN — IPRATROPIUM BROMIDE AND ALBUTEROL SULFATE 1 AMPULE: .5; 3 SOLUTION RESPIRATORY (INHALATION) at 08:47

## 2020-08-30 RX ADMIN — SODIUM CHLORIDE, PRESERVATIVE FREE 10 ML: 5 INJECTION INTRAVENOUS at 04:55

## 2020-08-30 RX ADMIN — ENOXAPARIN SODIUM 40 MG: 40 INJECTION SUBCUTANEOUS at 08:37

## 2020-08-30 RX ADMIN — LORAZEPAM 2 MG: 2 INJECTION INTRAMUSCULAR; INTRAVENOUS at 04:55

## 2020-08-30 ASSESSMENT — PAIN SCALES - GENERAL
PAINLEVEL_OUTOF10: 0
PAINLEVEL_OUTOF10: 0

## 2020-08-30 NOTE — CARE COORDINATION
Discharge orders noted. Previous notes reviewed. Transition of care plan is to home with friend Eriberto Galindo, who has been staying with you. PT/OT evals not completed at this time, ordered 8/28. Patient out of restraints as of 8/28. Per Cardiology, potassium supplement decreased and continue rest of treatments as ordered. Anticipate discharge to home with Eriberto Galindo today. Please call with any additional needs.      Thais Barr.  P:  [phone]

## 2020-08-30 NOTE — PLAN OF CARE
Problem: Pain:  Goal: Pain level will decrease  Description: Pain level will decrease  Outcome: Met This Shift     Problem: Skin Integrity:  Goal: Will show no infection signs and symptoms  Description: Will show no infection signs and symptoms  Outcome: Met This Shift  Goal: Absence of new skin breakdown  Description: Absence of new skin breakdown  Outcome: Met This Shift

## 2020-08-30 NOTE — PROGRESS NOTES
Called sister Eliseo Macielchilla to ask about Brittaney/Good to  patient. She is calling daughter to come get her.

## 2020-08-30 NOTE — DISCHARGE SUMMARY
Physician Discharge Summary     Patient ID:  Alexey Rosa  [id number]  57 y.o.  1960    Admit date: 8/20/2020    Discharge date and time: 8/30/20     Admitting Physician: Елена Buckley MD     Discharge Physician: Kelvin Dugan MD    Admission Diagnoses: Syncope and collapse [R55]  Syncope and collapse [R55]    Discharge Diagnoses: Syncope, VT, alcohol withdrawal    Admission Condition: fair    Discharged Condition: stable    Indication for Admission: Syncope    Hospital Course: Patient admitted for syncope and VT. Cardiac evaluation was delayed as patient was withdrawing from alcohol. Stress test negative. Electrolytes corrected.     Consults: cardiology    Significant Diagnostic Studies: as above    Treatments: as above    Discharge Exam:  /78   Pulse 78   Temp 97.2 °F (36.2 °C) (Temporal)   Resp 16   Ht 5' 2\" (1.575 m)   Wt 116 lb 12.8 oz (53 kg)   SpO2 96%   BMI 21.36 kg/m²     General Appearance:    Alert, cooperative, no distress, appears stated age   Head:    Normocephalic, without obvious abnormality, atraumatic   Eyes:    PERRL, conjunctiva/corneas clear, EOM's intact, fundi     benign, both eyes   Ears:    Normal TM's and external ear canals, both ears   Nose:   Nares normal, septum midline, mucosa normal, no drainage    or sinus tenderness   Throat:   Lips, mucosa, and tongue normal; teeth and gums normal   Neck:   Supple, symmetrical, trachea midline, no adenopathy;     thyroid:  no enlargement/tenderness/nodules; no carotid    bruit or JVD   Back:     Symmetric, no curvature, ROM normal, no CVA tenderness   Lungs:     Clear to auscultation bilaterally, respirations unlabored   Chest Wall:    No tenderness or deformity    Heart:    Regular rate and rhythm, S1 and S2 normal, no murmur, rub   or gallop   Breast Exam:    No tenderness, masses, or nipple abnormality   Abdomen:     Soft, non-tender, bowel sounds active all four quadrants,     no masses, no organomegaly Genitalia:    Normal female without lesion, discharge or tenderness   Rectal:    Normal tone ;guaiac negative stool   Extremities:   Extremities normal, atraumatic, no cyanosis or edema   Pulses:   2+ and symmetric all extremities   Skin:   Skin color, texture, turgor normal, no rashes or lesions   Lymph nodes:   Cervical, supraclavicular, and axillary nodes normal   Neurologic:   CNII-XII intact, normal strength, sensation and reflexes     throughout       Disposition: home    In process/preliminary results:  Outstanding Order Results     No orders found from 7/22/2020 to 8/21/2020. Patient Instructions:   Current Discharge Medication List      START taking these medications    Details   nitroGLYCERIN (NITROSTAT) 0.4 MG SL tablet up to max of 3 total doses. If no relief after 1 dose, call 911. Qty: 25 tablet, Refills: 3      potassium chloride (KLOR-CON M) 20 MEQ extended release tablet Take 2 tablets by mouth daily  Qty: 60 tablet, Refills: 3         CONTINUE these medications which have NOT CHANGED    Details   HYDROcodone-acetaminophen (NORCO) 5-325 MG per tablet Take 1 tablet by mouth every 6 hours as needed for Pain. Misc. Devices (WALKER) MISC 1 each by Does not apply route daily  Qty: 1 each, Refills: 0      famotidine (PEPCID) 20 MG tablet Take 1 tablet by mouth 2 times daily for 7 days  Qty: 14 tablet, Refills: 0      gabapentin (NEURONTIN) 300 MG capsule Take 1 capsule by mouth 3 times daily for 30 days.   Qty: 90 capsule, Refills: 3      omeprazole (PRILOSEC) 40 MG capsule Take 40 mg by mouth daily as needed       albuterol sulfate  (90 BASE) MCG/ACT inhaler Inhale 2 puffs into the lungs every 6 hours as needed for Wheezing         STOP taking these medications       naproxen (NAPROSYN) 250 MG tablet Comments:   Reason for Stopping:         naproxen (NAPROSYN) 500 MG tablet Comments:   Reason for Stopping:         Compression Stockings MISC Comments:   Reason for Stopping: Activity: activity as tolerated  Diet: cardiac diet  Wound Care: as directed    Follow-up with PCP and Cardiology in 1-2 weeks.     Signed:  Pearl Ahmadi MD  8/30/2020  7:11 AM

## 2020-11-05 ENCOUNTER — HOSPITAL ENCOUNTER (OUTPATIENT)
Dept: CT IMAGING | Age: 60
Discharge: HOME | End: 2020-11-07
Payer: MEDICAID

## 2020-11-05 PROCEDURE — 70450 CT HEAD/BRAIN W/O DYE: CPT

## 2020-11-14 ENCOUNTER — HOSPITAL ENCOUNTER (EMERGENCY)
Age: 60
Discharge: HOME | End: 2020-11-14
Attending: EMERGENCY MEDICINE
Payer: COMMERCIAL

## 2020-11-14 ENCOUNTER — APPOINTMENT (OUTPATIENT)
Dept: GENERAL RADIOLOGY | Age: 60
End: 2020-11-14
Payer: COMMERCIAL

## 2020-11-14 ENCOUNTER — APPOINTMENT (OUTPATIENT)
Dept: CT IMAGING | Age: 60
End: 2020-11-14
Payer: COMMERCIAL

## 2020-11-14 VITALS
DIASTOLIC BLOOD PRESSURE: 76 MMHG | BODY MASS INDEX: 21.16 KG/M2 | RESPIRATION RATE: 18 BRPM | HEART RATE: 70 BPM | TEMPERATURE: 97 F | SYSTOLIC BLOOD PRESSURE: 116 MMHG | WEIGHT: 115 LBS | OXYGEN SATURATION: 95 % | HEIGHT: 62 IN

## 2020-11-14 LAB — ETHANOL: 344 MG/DL (ref 0–0.08)

## 2020-11-14 PROCEDURE — 73110 X-RAY EXAM OF WRIST: CPT

## 2020-11-14 PROCEDURE — 2500000003 HC RX 250 WO HCPCS: Performed by: STUDENT IN AN ORGANIZED HEALTH CARE EDUCATION/TRAINING PROGRAM

## 2020-11-14 PROCEDURE — G0480 DRUG TEST DEF 1-7 CLASSES: HCPCS

## 2020-11-14 PROCEDURE — 73090 X-RAY EXAM OF FOREARM: CPT

## 2020-11-14 PROCEDURE — 70450 CT HEAD/BRAIN W/O DYE: CPT

## 2020-11-14 PROCEDURE — 99285 EMERGENCY DEPT VISIT HI MDM: CPT

## 2020-11-14 PROCEDURE — 25605 CLTX DST RDL FX/EPHYS SEP W/: CPT

## 2020-11-14 PROCEDURE — 73130 X-RAY EXAM OF HAND: CPT

## 2020-11-14 PROCEDURE — 72131 CT LUMBAR SPINE W/O DYE: CPT

## 2020-11-14 PROCEDURE — 71045 X-RAY EXAM CHEST 1 VIEW: CPT

## 2020-11-14 RX ORDER — LIDOCAINE HYDROCHLORIDE AND EPINEPHRINE 10; 10 MG/ML; UG/ML
10 INJECTION, SOLUTION INFILTRATION; PERINEURAL ONCE
Status: COMPLETED | OUTPATIENT
Start: 2020-11-14 | End: 2020-11-14

## 2020-11-14 RX ADMIN — LIDOCAINE HYDROCHLORIDE AND EPINEPHRINE 10 ML: 10; 10 INJECTION, SOLUTION INFILTRATION; PERINEURAL at 22:00

## 2020-11-14 ASSESSMENT — PAIN SCALES - GENERAL
PAINLEVEL_OUTOF10: 6
PAINLEVEL_OUTOF10: 10

## 2020-11-14 ASSESSMENT — PAIN DESCRIPTION - LOCATION: LOCATION: HAND

## 2020-11-14 ASSESSMENT — PAIN DESCRIPTION - ORIENTATION: ORIENTATION: RIGHT

## 2020-11-14 NOTE — ED NOTES
Bed: 18B-18  Expected date:   Expected time:   Means of arrival:   Comments:  Jade Hazel RN  11/14/20 7930
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
## 2020-11-16 ENCOUNTER — TELEPHONE (OUTPATIENT)
Dept: ORTHOPEDIC SURGERY | Age: 60
End: 2020-11-16

## 2020-11-16 ASSESSMENT — ENCOUNTER SYMPTOMS
ABDOMINAL PAIN: 0
BACK PAIN: 1
SHORTNESS OF BREATH: 0

## 2020-11-16 NOTE — TELEPHONE ENCOUNTER
Agree with RTO 2 weeks per resident consult  Electronically signed by Iggy Bennett PA-C on 11/16/2020 at 1:29 PM

## 2020-11-16 NOTE — TELEPHONE ENCOUNTER
Pt called asking to schedule ed follow up from 11/14 visit for  Closed fracture of distal end of right radius, unspecified fracture morphology, initial encounter and Closed fracture of distal end of right ulna, unspecified fracture morphology, initial encounter   Please call her back to schedule.  Thanks
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
## 2020-11-17 NOTE — TELEPHONE ENCOUNTER
Call placed to pt to schedule f/u appt, no answer. VM left with callback number. Pt can be added to Dr. Hawk Channel schedule on 12/3 in early afternoon should she return call.

## 2020-11-30 ENCOUNTER — HOSPITAL ENCOUNTER (OUTPATIENT)
Age: 60
Discharge: HOME | End: 2020-11-30
Payer: MEDICAID

## 2020-11-30 LAB
FOLATE: 3.2 NG/ML (ref 4.8–24.2)
VITAMIN B-12: 739 PG/ML (ref 211–946)

## 2020-11-30 PROCEDURE — 82607 VITAMIN B-12: CPT

## 2020-11-30 PROCEDURE — 84630 ASSAY OF ZINC: CPT

## 2020-11-30 PROCEDURE — 36415 COLL VENOUS BLD VENIPUNCTURE: CPT

## 2020-11-30 PROCEDURE — 84207 ASSAY OF VITAMIN B-6: CPT

## 2020-11-30 PROCEDURE — 84252 ASSAY OF VITAMIN B-2: CPT

## 2020-11-30 PROCEDURE — 82746 ASSAY OF FOLIC ACID SERUM: CPT

## 2020-12-03 ENCOUNTER — OFFICE VISIT (OUTPATIENT)
Dept: ORTHOPEDIC SURGERY | Age: 60
End: 2020-12-03
Payer: MEDICAID

## 2020-12-03 ENCOUNTER — HOSPITAL ENCOUNTER (OUTPATIENT)
Dept: GENERAL RADIOLOGY | Age: 60
Discharge: HOME | End: 2020-12-05
Payer: MEDICAID

## 2020-12-03 VITALS — SYSTOLIC BLOOD PRESSURE: 140 MMHG | TEMPERATURE: 97.3 F | DIASTOLIC BLOOD PRESSURE: 93 MMHG | HEART RATE: 85 BPM

## 2020-12-03 PROCEDURE — 99202 OFFICE O/P NEW SF 15 MIN: CPT | Performed by: ORTHOPAEDIC SURGERY

## 2020-12-03 PROCEDURE — 3017F COLORECTAL CA SCREEN DOC REV: CPT | Performed by: PHYSICIAN ASSISTANT

## 2020-12-03 PROCEDURE — 29075 APPL CST ELBW FNGR SHORT ARM: CPT | Performed by: ORTHOPAEDIC SURGERY

## 2020-12-03 PROCEDURE — G8427 DOCREV CUR MEDS BY ELIG CLIN: HCPCS | Performed by: PHYSICIAN ASSISTANT

## 2020-12-03 PROCEDURE — 99203 OFFICE O/P NEW LOW 30 MIN: CPT | Performed by: PHYSICIAN ASSISTANT

## 2020-12-03 PROCEDURE — 1036F TOBACCO NON-USER: CPT | Performed by: PHYSICIAN ASSISTANT

## 2020-12-03 PROCEDURE — G8484 FLU IMMUNIZE NO ADMIN: HCPCS | Performed by: PHYSICIAN ASSISTANT

## 2020-12-03 PROCEDURE — 29075 APPL CST ELBW FNGR SHORT ARM: CPT | Performed by: PHYSICIAN ASSISTANT

## 2020-12-03 PROCEDURE — 73110 X-RAY EXAM OF WRIST: CPT

## 2020-12-03 PROCEDURE — G8420 CALC BMI NORM PARAMETERS: HCPCS | Performed by: PHYSICIAN ASSISTANT

## 2020-12-03 RX ORDER — ACETAMINOPHEN 500 MG
500 TABLET ORAL 4 TIMES DAILY PRN
Qty: 360 TABLET | Refills: 1 | Status: SHIPPED
Start: 2020-12-03 | End: 2021-04-13 | Stop reason: ALTCHOICE

## 2020-12-03 RX ORDER — AMITRIPTYLINE HYDROCHLORIDE 25 MG/1
25 TABLET, FILM COATED ORAL NIGHTLY
COMMUNITY
Start: 2020-11-10

## 2020-12-03 NOTE — PROGRESS NOTES
Assisted Ingris Brasher RN per verbal order from [id number] Balaji Ortiz PA-C with cast application. A short arm cast was applied to patient's  Right upper extremity. Neurovascular status was checked pre and post application. Patient was neurovascularly intact after the application process. The patient denied any issues with fit or comfort of the cast/splint. Reinforced patient instructions to keep cast/splint clean and dry, don't get wet, and NO activities that put them at risk for falling. Patient verbalized understanding. Denied further questions. Patient instructed to call our office if there are any issues with the cast/splint.      Electronically signed by Dewey Louie LPN on 66/8/9560 at 3:26 PM
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
## 2020-12-03 NOTE — LETTER
[address]  [address]  [address] [facility] [address]  Phone: [phone]  Fax: [phone] [place], [place]        December 3, 2020     Patient: Mk Stuart   YOB: 1960   Date of Visit: 12/3/2020       To Whom It May Concern:    Naima Gaston was evaluated in the emergency department 11/14/2020. She suffered a right distal radius fracture. She is currently under my care for this injury. If you have any questions or concerns, please don't hesitate to call or fax office.     Sincerely,          SHARKMARX, DO

## 2020-12-04 LAB
VITAMIN B6: 27.9 NMOL/L (ref 20–125)
ZINC: 87.9 UG/DL (ref 60–120)

## 2020-12-05 LAB — VITAMIN B2: 5 NMOL/L (ref 5–50)

## 2020-12-12 ENCOUNTER — APPOINTMENT (OUTPATIENT)
Dept: CT IMAGING | Age: 60
End: 2020-12-12
Payer: MEDICAID

## 2020-12-12 ENCOUNTER — HOSPITAL ENCOUNTER (EMERGENCY)
Age: 60
Discharge: HOME | End: 2020-12-12
Attending: EMERGENCY MEDICINE
Payer: MEDICAID

## 2020-12-12 VITALS
OXYGEN SATURATION: 99 % | HEART RATE: 62 BPM | BODY MASS INDEX: 20.61 KG/M2 | SYSTOLIC BLOOD PRESSURE: 110 MMHG | HEIGHT: 62 IN | DIASTOLIC BLOOD PRESSURE: 72 MMHG | TEMPERATURE: 98.2 F | WEIGHT: 112 LBS | RESPIRATION RATE: 16 BRPM

## 2020-12-12 LAB
ACETAMINOPHEN LEVEL: <5 MCG/ML (ref 10–30)
ETHANOL: 294 MG/DL (ref 0–0.08)
REASON FOR REJECTION: NORMAL
REJECTED TEST: NORMAL
SALICYLATE, SERUM: <0.3 MG/DL (ref 0–30)
TRICYCLIC ANTIDEPRESSANTS SCREEN SERUM: NEGATIVE NG/ML

## 2020-12-12 PROCEDURE — 6370000000 HC RX 637 (ALT 250 FOR IP): Performed by: EMERGENCY MEDICINE

## 2020-12-12 PROCEDURE — G0480 DRUG TEST DEF 1-7 CLASSES: HCPCS

## 2020-12-12 PROCEDURE — 80307 DRUG TEST PRSMV CHEM ANLYZR: CPT

## 2020-12-12 PROCEDURE — 99284 EMERGENCY DEPT VISIT MOD MDM: CPT

## 2020-12-12 PROCEDURE — 70450 CT HEAD/BRAIN W/O DYE: CPT

## 2020-12-12 PROCEDURE — 12013 RPR F/E/E/N/L/M 2.6-5.0 CM: CPT

## 2020-12-12 PROCEDURE — 72125 CT NECK SPINE W/O DYE: CPT

## 2020-12-12 RX ORDER — ACETAMINOPHEN 500 MG
1000 TABLET ORAL ONCE
Status: COMPLETED | OUTPATIENT
Start: 2020-12-12 | End: 2020-12-12

## 2020-12-12 RX ADMIN — ACETAMINOPHEN 1000 MG: 500 TABLET ORAL at 10:17

## 2020-12-12 ASSESSMENT — PAIN SCALES - GENERAL: PAINLEVEL_OUTOF10: 5

## 2020-12-12 NOTE — ED PROVIDER NOTES
HPI:  12/12/20,   Time: 9:03 AM VENKATESH Ndiaye is a 61 y.o. female presenting to the ED for 1 falls including one fall in which she struck her head on the dining room table, beginning a short time ago. The complaint has been intermittent, moderate in severity, and worsened by nothing. Patient appears intoxicated and has the aroma of alcohol on her breath    ROS:   Pertinent positives and negatives are stated within HPI, all other systems reviewed and are negative.  --------------------------------------------- PAST HISTORY ---------------------------------------------  Past Medical History:  has a past medical history of Asthma, CAD (coronary artery disease), Closed fracture of proximal end of left humerus with routine healing, Degeneration of lumbar or lumbosacral intervertebral disc, Fall against sharp object, History of blood transfusion, Hypertension, Hypokalemia, Insomnia, Kidney stone, Nondisplaced fracture of greater tuberosity of right humerus, initial encounter for closed fracture, Orthostatic hypotension, and Severe back pain. Past Surgical History:  has a past surgical history that includes Tonsillectomy; Bunionectomy; chest tube insertion (1990); Lithotripsy (2012); Colonoscopy (3/26/09); other surgical history; Upper gastrointestinal endoscopy (3/30/15); Upper gastrointestinal endoscopy (4/21/08); lumbar fusion (11/09/2017); and Kyphosis surgery (N/A, 10/28/2019). Social History:  reports that she has quit smoking. She has a 1.00 pack-year smoking history. She has never used smokeless tobacco. She reports current alcohol use of about 6.0 standard drinks of alcohol per week. She reports that she does not use drugs. Family History: family history includes Cancer in her brother; Diabetes in her mother; Heart Disease in her maternal grandfather, maternal grandmother, and sister; Hypertension in her mother; Stroke in her sister.      The patients home medications have been reviewed. Allergies: Dilaudid [hydromorphone hcl] and Morphine    -------------------------------------------------- RESULTS -------------------------------------------------  All laboratory and radiology results have been personally reviewed by myself   LABS:  Results for orders placed or performed during the hospital encounter of 12/12/20   SPECIMEN REJECTION   Result Value Ref Range    Rejected Test SDS2     Reason for Rejection see below    Serum Drug Screen   Result Value Ref Range    Ethanol Lvl 294 mg/dL    Acetaminophen Level <5.0 (L) 10.0 - 38.6 mcg/mL    Salicylate, Serum <7.5 0.0 - 30.0 mg/dL    TCA Scrn NEGATIVE Cutoff:300 ng/mL       RADIOLOGY:  Interpreted by Radiologist.  CT Head WO Contrast   Final Result   No acute intracranial abnormality. CT Cervical Spine WO Contrast   Final Result   1. There is no acute compression fracture or subluxation of the cervical   spine. 2. Multilevel degenerative disc and degenerative joint disease.          ------------------------- NURSING NOTES AND VITALS REVIEWED ---------------------------   The nursing notes within the ED encounter and vital signs as below have been reviewed. /85   Pulse 68   Temp 98.2 °F (36.8 °C)   Resp 17   Ht 5' 2\" (1.575 m)   Wt 112 lb (50.8 kg)   SpO2 99%   BMI 20.49 kg/m²   Oxygen Saturation Interpretation: Normal      ---------------------------------------------------PHYSICAL EXAM--------------------------------------      Constitutional/General: Alert and oriented x3, well appearing, non toxic in NAD  Head: NC/AT 3.5 cm vertical laceration left supraorbital region of the forehead  Eyes: PERRL, EOMI horizontal nystagmus with lateral gaze laterally  Mouth: Oropharynx clear, handling secretions, no trismus  Neck: Supple, full ROM, no meningeal signs  Pulmonary: Lungs clear to auscultation bilaterally, no wheezes, rales, or rhonchi.  Not in respiratory distress  Cardiovascular:  Regular rate and rhythm, no murmurs, gallops, or rubs. 2+ distal pulses  Abdomen: Soft, non tender, non distended,   Extremities: Moves all extremities x 4. Warm and well perfused  Skin: warm and dry without rash  Neurologic: GCS 15, cranial nerves intact 5+ strength in all extremities  Psych: Normal Affect      ------------------------------ ED COURSE/MEDICAL DECISION MAKING----------------------  Medications   acetaminophen (TYLENOL) tablet 1,000 mg (1,000 mg Oral Given 12/12/20 1017)         Medical Decision Making:    Was given Tylenol CAT scans were performed and her wound was fixed as noted below. Patient elected for tissue adhesive as opposed to sutures because they would be more uncomfortable than the tissue adhesive      LACERATION REPAIR  PROCEDURE NOTE:  Unless otherwise indicated, this procedure was done or directly supervised by the ED attending. Laceration #: 1. Location: Left forehead  Length: 3 cm. The wound area was cleansend with shur-clens and draped in a sterile fashion. The wound area was anesthetized with not required. WOUND COMPLEXITY:    Debridement: None. Undermining: None. Wound Margins Revised: None. Flaps Aligned: yes. The wound was explored with the following results no foreign body or tendon injury seen. The wound was closed with Dermabond. Dressing:  a sterile dressing was placed. Counseling: The emergency provider has spoken with the patient and discussed todays results, in addition to providing specific details for the plan of care and counseling regarding the diagnosis and prognosis. Questions are answered at this time and they are agreeable with the plan.      --------------------------------- IMPRESSION AND DISPOSITION ---------------------------------    IMPRESSION  1. Laceration of scalp, initial encounter    2. Injury of head, initial encounter    3.  [facility]        DISPOSITION  Disposition: Discharge to home  Patient condition is

## 2020-12-12 NOTE — ED NOTES
Bed: 18A-18  Expected date:   Expected time:   Means of arrival:   Comments:  BURTON Bell RN  12/12/20 0107

## 2020-12-22 ENCOUNTER — HOSPITAL ENCOUNTER (OUTPATIENT)
Dept: GENERAL RADIOLOGY | Age: 60
Discharge: HOME | End: 2020-12-24
Payer: MEDICAID

## 2020-12-22 ENCOUNTER — OFFICE VISIT (OUTPATIENT)
Dept: ORTHOPEDIC SURGERY | Age: 60
End: 2020-12-22
Payer: MEDICAID

## 2020-12-22 VITALS — TEMPERATURE: 98.6 F

## 2020-12-22 PROCEDURE — 1036F TOBACCO NON-USER: CPT | Performed by: PHYSICIAN ASSISTANT

## 2020-12-22 PROCEDURE — 99212 OFFICE O/P EST SF 10 MIN: CPT | Performed by: PHYSICIAN ASSISTANT

## 2020-12-22 PROCEDURE — G8484 FLU IMMUNIZE NO ADMIN: HCPCS | Performed by: PHYSICIAN ASSISTANT

## 2020-12-22 PROCEDURE — G8427 DOCREV CUR MEDS BY ELIG CLIN: HCPCS | Performed by: PHYSICIAN ASSISTANT

## 2020-12-22 PROCEDURE — 3017F COLORECTAL CA SCREEN DOC REV: CPT | Performed by: PHYSICIAN ASSISTANT

## 2020-12-22 PROCEDURE — 99213 OFFICE O/P EST LOW 20 MIN: CPT | Performed by: PHYSICIAN ASSISTANT

## 2020-12-22 PROCEDURE — 73110 X-RAY EXAM OF WRIST: CPT

## 2020-12-22 PROCEDURE — G8420 CALC BMI NORM PARAMETERS: HCPCS | Performed by: PHYSICIAN ASSISTANT

## 2020-12-22 PROCEDURE — L3809 WHFO W/O JOINTS PRE OTS: HCPCS | Performed by: PHYSICIAN ASSISTANT

## 2020-12-22 RX ORDER — METHOCARBAMOL 750 MG/1
750 TABLET, FILM COATED ORAL 3 TIMES DAILY
Qty: 90 TABLET | Refills: 0 | Status: SHIPPED | OUTPATIENT
Start: 2020-12-22 | End: 2021-01-21
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
## 2020-12-22 NOTE — PATIENT INSTRUCTIONS
Continue non-weight bearing to the right hand/wrist.  Wear velcro wrist brace at all times except for showering/hygiene. Robaxin (muscle relaxer) sent to pharmacy today. This may make you drowsy, so take at night to see if you can tolerate this medication. Do not operate heavy machinery after taking this medication. Use anjana pillow as needed for swelling   Continue gabapentin, robaxin, tylenol, and Norco (for breakthrough pain only). Elevate the arm as needed for swelling and apply ice bags as needed throughout the day for pain. You have a fracture to the end of your radius bone and ulna bone. There is some mild healing on today's xray compared to the xray at the last office visit. No significant displacement of the bone from the last xray. Follow up in the office in approximately 6 weeks. You will get new xrays in office at that time. Call the office if you would like therapy. In the meantime, continue moving your fingers, hand, wrist as you can tolerate to prevent stiffness. Wear the wrist brace at all times except showering.

## 2021-02-03 DIAGNOSIS — S52.591A OTHER CLOSED FRACTURE OF DISTAL END OF RIGHT RADIUS, INITIAL ENCOUNTER: Primary | ICD-10-CM

## 2021-02-04 ENCOUNTER — HOSPITAL ENCOUNTER (OUTPATIENT)
Dept: GENERAL RADIOLOGY | Age: 61
Discharge: HOME | End: 2021-02-06
Payer: MEDICAID

## 2021-02-04 ENCOUNTER — APPOINTMENT (OUTPATIENT)
Dept: GENERAL RADIOLOGY | Age: 61
End: 2021-02-04
Payer: MEDICAID

## 2021-02-04 ENCOUNTER — OFFICE VISIT (OUTPATIENT)
Dept: ORTHOPEDIC SURGERY | Age: 61
End: 2021-02-04
Payer: MEDICAID

## 2021-02-04 VITALS — TEMPERATURE: 98.8 F

## 2021-02-04 DIAGNOSIS — S52.601D CLOSED FRACTURE OF DISTAL END OF RIGHT ULNA WITH ROUTINE HEALING, UNSPECIFIED FRACTURE MORPHOLOGY, SUBSEQUENT ENCOUNTER: ICD-10-CM

## 2021-02-04 DIAGNOSIS — S52.591A OTHER CLOSED FRACTURE OF DISTAL END OF RIGHT RADIUS, INITIAL ENCOUNTER: ICD-10-CM

## 2021-02-04 DIAGNOSIS — S52.591A OTHER CLOSED FRACTURE OF DISTAL END OF RIGHT RADIUS, INITIAL ENCOUNTER: Primary | ICD-10-CM

## 2021-02-04 PROCEDURE — G8420 CALC BMI NORM PARAMETERS: HCPCS | Performed by: PHYSICIAN ASSISTANT

## 2021-02-04 PROCEDURE — G8484 FLU IMMUNIZE NO ADMIN: HCPCS | Performed by: PHYSICIAN ASSISTANT

## 2021-02-04 PROCEDURE — 99213 OFFICE O/P EST LOW 20 MIN: CPT | Performed by: PHYSICIAN ASSISTANT

## 2021-02-04 PROCEDURE — 99212 OFFICE O/P EST SF 10 MIN: CPT | Performed by: PHYSICIAN ASSISTANT

## 2021-02-04 PROCEDURE — G8427 DOCREV CUR MEDS BY ELIG CLIN: HCPCS | Performed by: PHYSICIAN ASSISTANT

## 2021-02-04 PROCEDURE — 3017F COLORECTAL CA SCREEN DOC REV: CPT | Performed by: PHYSICIAN ASSISTANT

## 2021-02-04 PROCEDURE — 1036F TOBACCO NON-USER: CPT | Performed by: PHYSICIAN ASSISTANT

## 2021-02-04 PROCEDURE — 73110 X-RAY EXAM OF WRIST: CPT

## 2021-02-04 NOTE — PROGRESS NOTES
Alicia Servin is a 61 y.o. female who presents for follow up of  Right dist rad fx      Date of Injury/Surgery: DOI 11-  Date last seen in office:  12- LOV    Symptoms: better  New complaints: States she fell about a week or so ago onto both knees. Denies increased pain in right wrist.    Cast/Splint, Brace, or Dressings: Disheveled and Velcro short wrist splint    Weightbearing: right upper Partial weight bearing      Assistive device Velcro splint  Participating in therapy (location if yes)?  no    Refills Needed: None  Order/Referral Needed: maybe OT per patient

## 2021-02-04 NOTE — PROGRESS NOTES
Chief Complaint   Patient presents with    Follow-up     DOI 11- Right dist rad fx JVG         Subjective:  Aaliyah Keys is almost 12 wks follow-up from the above DOI. Patient is NWB R UE wearing wrist brace at all times. Would like referral to OT at this time. Denies paresthesias. Pain controllable with acetaminophen. States she fell about 1 week ago on bilateral knees, but pain has improved and she is full WB without assistive devices. No increased pain to R wrist or any other orthopedic complaints. Review of Systems -    General ROS: negative for - chills, fatigue, fever or night sweats  Respiratory ROS: no cough, shortness of breath, or wheezing  Cardiovascular ROS: no chest pain or dyspnea on exertion  Gastrointestinal ROS: no abdominal pain, nausea, vomiting, diarrhea, constipation,or black or bloody stools  Genitourinary: no hematuria, dysuria, or incontinence   Musculoskeletal ROS: negative for -back or neck pain or stiffness, also see HPI  Neurological ROS: no TIA or stroke symptoms       Objective:    General: Alert and oriented X 3, normocephalic atraumatic, external ears and eye normal, sclera clear, no acute distress, respirations easy and unlabored with no audible wheezes, skin warm and dry, speech and dress appropriate for noted age, affect euthymic.     Extremity:  Right Upper Extremity  Skin is clean dry and intact  Mild edema noted about the wrist and hand  Mild TTP about the radial aspect of wrist, minimal TTP about ulnar aspect   Radial pulse palpable, fingers warm with BCR  Flex/extension intact to wrist, thumb and fingers - wrist ROM limited secondary to pain, but can achieve about 10 degrees extension and 20 degree flexion  Finger opposition intact  Finger adduction/abduction intact  Finger crossover intact  Subjectively states sensation intact to radial/medial/ulnar distribution  Can make full concentric fist      Temp 98.8 °F (37.1 °C) (Oral)     XR:   3 views of R wrist demonstrating distal radius and ulnar fracture with appreciable interval callous formation. Fracture lines still evident. No significant change in alignment. No acute fractures or dislocations or any other osseus abnormality identified. Assessment:   Diagnosis Orders   1. Other closed fracture of distal end of right radius, initial encounter  XR WRIST RIGHT (MIN 3 VIEWS)    Ambulatory referral to Occupational Therapy   2. Closed fracture of distal end of right ulna with routine healing, unspecified fracture morphology, subsequent encounter  XR WRIST RIGHT (MIN 3 VIEWS)    Ambulatory referral to Occupational Therapy       Plan:   Reviewed x-rays with patient today in office    OT referral to [facility] sent today   Continue otc tylenol, ice, elevation   Maintain wrist brace at all times, but can start to come out daily for ROM and for therapy   Minimal WB no more than 3-5 pounds R UE with wrist brace on     Follow up in 6-8 weeks with XR of the R wrist    Electronically signed by Anitra Mccormack PA-C on 2/4/2021 at 1:28 PM  Note: This report was completed using [employer] voiced recognition software. Every effort has been made to ensure accuracy; however, inadvertent computerized transcription errors may be present.

## 2021-03-11 ENCOUNTER — TELEPHONE (OUTPATIENT)
Dept: ADMINISTRATIVE | Age: 61
End: 2021-03-11

## 2021-03-11 NOTE — TELEPHONE ENCOUNTER
Patient is requesting to rs her f/u for fracture with Dr Milka Gates on 4/15. She is requesting to be seen in March for the followup. Please follow up with patient to get rescheduled.

## 2021-03-19 ENCOUNTER — HOSPITAL ENCOUNTER (OUTPATIENT)
Dept: OCCUPATIONAL THERAPY | Age: 61
Setting detail: THERAPIES SERIES
Discharge: HOME | End: 2021-03-19
Payer: MEDICAID

## 2021-03-19 PROCEDURE — 97530 THERAPEUTIC ACTIVITIES: CPT | Performed by: OCCUPATIONAL THERAPIST

## 2021-03-19 PROCEDURE — 97165 OT EVAL LOW COMPLEX 30 MIN: CPT | Performed by: OCCUPATIONAL THERAPIST

## 2021-03-19 PROCEDURE — 97110 THERAPEUTIC EXERCISES: CPT | Performed by: OCCUPATIONAL THERAPIST
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
## 2021-03-20 ENCOUNTER — HOSPITAL ENCOUNTER (EMERGENCY)
Age: 61
Discharge: HOME | End: 2021-03-20
Attending: EMERGENCY MEDICINE
Payer: MEDICAID

## 2021-03-20 ENCOUNTER — APPOINTMENT (OUTPATIENT)
Dept: GENERAL RADIOLOGY | Age: 61
End: 2021-03-20
Payer: MEDICAID

## 2021-03-20 ENCOUNTER — APPOINTMENT (OUTPATIENT)
Dept: CT IMAGING | Age: 61
End: 2021-03-20
Payer: MEDICAID

## 2021-03-20 VITALS
OXYGEN SATURATION: 99 % | SYSTOLIC BLOOD PRESSURE: 135 MMHG | TEMPERATURE: 97.1 F | RESPIRATION RATE: 16 BRPM | BODY MASS INDEX: 20.61 KG/M2 | WEIGHT: 112 LBS | HEIGHT: 62 IN | HEART RATE: 79 BPM | DIASTOLIC BLOOD PRESSURE: 89 MMHG

## 2021-03-20 DIAGNOSIS — R55 SYNCOPE AND COLLAPSE: Primary | ICD-10-CM

## 2021-03-20 LAB
ALBUMIN SERPL-MCNC: 4.8 G/DL (ref 3.5–5.2)
ALP BLD-CCNC: 64 U/L (ref 35–104)
ALT SERPL-CCNC: 16 U/L (ref 0–32)
ANION GAP SERPL CALCULATED.3IONS-SCNC: 16 MMOL/L (ref 7–16)
AST SERPL-CCNC: 29 U/L (ref 0–31)
BASOPHILS ABSOLUTE: 0.06 E9/L (ref 0–0.2)
BASOPHILS RELATIVE PERCENT: 1.2 % (ref 0–2)
BILIRUB SERPL-MCNC: 1.6 MG/DL (ref 0–1.2)
BILIRUBIN DIRECT: 0.5 MG/DL (ref 0–0.3)
BILIRUBIN, INDIRECT: 1.1 MG/DL (ref 0–1)
BUN BLDV-MCNC: 12 MG/DL (ref 8–23)
CALCIUM SERPL-MCNC: 9.8 MG/DL (ref 8.6–10.2)
CHLORIDE BLD-SCNC: 95 MMOL/L (ref 98–107)
CO2: 26 MMOL/L (ref 22–29)
CREAT SERPL-MCNC: 0.8 MG/DL (ref 0.5–1)
EOSINOPHILS ABSOLUTE: 0.07 E9/L (ref 0.05–0.5)
EOSINOPHILS RELATIVE PERCENT: 1.4 % (ref 0–6)
GFR AFRICAN AMERICAN: >60
GFR NON-AFRICAN AMERICAN: >60 ML/MIN/1.73
GLUCOSE BLD-MCNC: 68 MG/DL (ref 74–99)
HCT VFR BLD CALC: 35.8 % (ref 34–48)
HEMOGLOBIN: 12.2 G/DL (ref 11.5–15.5)
IMMATURE GRANULOCYTES #: 0.02 E9/L
IMMATURE GRANULOCYTES %: 0.4 % (ref 0–5)
LIPASE: 15 U/L (ref 13–60)
LYMPHOCYTES ABSOLUTE: 1.71 E9/L (ref 1.5–4)
LYMPHOCYTES RELATIVE PERCENT: 33.4 % (ref 20–42)
MAGNESIUM: 1.2 MG/DL (ref 1.6–2.6)
MCH RBC QN AUTO: 34.6 PG (ref 26–35)
MCHC RBC AUTO-ENTMCNC: 34.1 % (ref 32–34.5)
MCV RBC AUTO: 101.4 FL (ref 80–99.9)
MONOCYTES ABSOLUTE: 0.64 E9/L (ref 0.1–0.95)
MONOCYTES RELATIVE PERCENT: 12.5 % (ref 2–12)
NEUTROPHILS ABSOLUTE: 2.62 E9/L (ref 1.8–7.3)
NEUTROPHILS RELATIVE PERCENT: 51.1 % (ref 43–80)
PDW BLD-RTO: 13.3 FL (ref 11.5–15)
PLATELET # BLD: 105 E9/L (ref 130–450)
PMV BLD AUTO: 11 FL (ref 7–12)
POTASSIUM REFLEX MAGNESIUM: 3.3 MMOL/L (ref 3.5–5)
PRO-BNP: 41 PG/ML (ref 0–125)
RBC # BLD: 3.53 E12/L (ref 3.5–5.5)
SODIUM BLD-SCNC: 137 MMOL/L (ref 132–146)
TOTAL PROTEIN: 7.6 G/DL (ref 6.4–8.3)
TROPONIN: <0.01 NG/ML (ref 0–0.03)
WBC # BLD: 5.1 E9/L (ref 4.5–11.5)

## 2021-03-20 PROCEDURE — 6360000002 HC RX W HCPCS: Performed by: EMERGENCY MEDICINE

## 2021-03-20 PROCEDURE — 83735 ASSAY OF MAGNESIUM: CPT

## 2021-03-20 PROCEDURE — 85025 COMPLETE CBC W/AUTO DIFF WBC: CPT

## 2021-03-20 PROCEDURE — 84484 ASSAY OF TROPONIN QUANT: CPT

## 2021-03-20 PROCEDURE — 83880 ASSAY OF NATRIURETIC PEPTIDE: CPT

## 2021-03-20 PROCEDURE — 96361 HYDRATE IV INFUSION ADD-ON: CPT

## 2021-03-20 PROCEDURE — 2580000003 HC RX 258: Performed by: EMERGENCY MEDICINE

## 2021-03-20 PROCEDURE — 6370000000 HC RX 637 (ALT 250 FOR IP): Performed by: EMERGENCY MEDICINE

## 2021-03-20 PROCEDURE — 99285 EMERGENCY DEPT VISIT HI MDM: CPT

## 2021-03-20 PROCEDURE — 96365 THER/PROPH/DIAG IV INF INIT: CPT

## 2021-03-20 PROCEDURE — 96366 THER/PROPH/DIAG IV INF ADDON: CPT

## 2021-03-20 PROCEDURE — 96360 HYDRATION IV INFUSION INIT: CPT

## 2021-03-20 PROCEDURE — 80048 BASIC METABOLIC PNL TOTAL CA: CPT

## 2021-03-20 PROCEDURE — 71045 X-RAY EXAM CHEST 1 VIEW: CPT

## 2021-03-20 PROCEDURE — 80076 HEPATIC FUNCTION PANEL: CPT

## 2021-03-20 PROCEDURE — 83690 ASSAY OF LIPASE: CPT

## 2021-03-20 PROCEDURE — 70450 CT HEAD/BRAIN W/O DYE: CPT

## 2021-03-20 PROCEDURE — 93005 ELECTROCARDIOGRAM TRACING: CPT | Performed by: EMERGENCY MEDICINE

## 2021-03-20 RX ORDER — 0.9 % SODIUM CHLORIDE 0.9 %
1000 INTRAVENOUS SOLUTION INTRAVENOUS ONCE
Status: COMPLETED | OUTPATIENT
Start: 2021-03-20 | End: 2021-03-20

## 2021-03-20 RX ORDER — MAGNESIUM SULFATE IN WATER 40 MG/ML
2000 INJECTION, SOLUTION INTRAVENOUS ONCE
Status: COMPLETED | OUTPATIENT
Start: 2021-03-20 | End: 2021-03-20

## 2021-03-20 RX ORDER — POTASSIUM CHLORIDE 20 MEQ/1
40 TABLET, EXTENDED RELEASE ORAL ONCE
Status: COMPLETED | OUTPATIENT
Start: 2021-03-20 | End: 2021-03-20

## 2021-03-20 RX ADMIN — SODIUM CHLORIDE 1000 ML: 9 INJECTION, SOLUTION INTRAVENOUS at 16:20

## 2021-03-20 RX ADMIN — MAGNESIUM SULFATE HEPTAHYDRATE 2000 MG: 40 INJECTION, SOLUTION INTRAVENOUS at 19:20

## 2021-03-20 RX ADMIN — POTASSIUM CHLORIDE 40 MEQ: 1500 TABLET, EXTENDED RELEASE ORAL at 19:21

## 2021-03-20 NOTE — ED PROVIDER NOTES
Department of Emergency Medicine   ED  Provider Note  Admit Date/RoomTime: 3/20/2021  3:38 PM  ED Room: [address] [place] [address]          History of Present Illness:  3/20/21, Time: 3:42 PM EDT  Chief Complaint   Patient presents with    Loss of Consciousness     pt sates she has had several episodes of syncope over the last couple of days                Gib Bumpers is a 61 y.o. female presenting to the ED for syncopal episode, beginning yesterday. The complaint has been intermittent, mild in severity, and worsened by nothing. Patient states that since yesterday she has had 2 episodes where she would feel lightheaded and have a brief episode of passing out, witnessed by family. She states that both times she was standing up from the couch when she began to feel her symptoms. She denies having any chest pain or discomfort with these episodes. Patient states she would feel lightheaded and very warm. She denies any headaches. She denies any neck or back pain. No recent fevers or cough, no recent dyspnea on exertion or orthopnea. She states she has had some loose stools, denies any abdominal pain, no nausea or vomiting. Review of Systems:   Pertinent positives and negatives are stated within HPI, all other systems reviewed and are negative.        --------------------------------------------- PAST HISTORY ---------------------------------------------  Past Medical History:  has a past medical history of Asthma, CAD (coronary artery disease), Closed fracture of proximal end of left humerus with routine healing, Degeneration of lumbar or lumbosacral intervertebral disc, Fall against sharp object, History of blood transfusion, Hypertension, Hypokalemia, Insomnia, Kidney stone, Nondisplaced fracture of greater tuberosity of right humerus, initial encounter for closed fracture, Orthostatic hypotension, and Severe back pain.     Past Surgical History:  has a past surgical history that includes Tonsillectomy; Bunionectomy; chest tube insertion (1990); Lithotripsy (2012); Colonoscopy (3/26/09); other surgical history; Upper gastrointestinal endoscopy (3/30/15); Upper gastrointestinal endoscopy (4/21/08); lumbar fusion (11/09/2017); and Kyphosis surgery (N/A, 10/28/2019). Social History:  reports that she has quit smoking. She started smoking about 2 years ago. She has a 1.00 pack-year smoking history. She has never used smokeless tobacco. She reports current alcohol use of about 6.0 standard drinks of alcohol per week. She reports that she does not use drugs. Family History: family history includes Cancer in her brother; Diabetes in her mother; Heart Disease in her maternal grandfather, maternal grandmother, and sister; Hypertension in her mother; Stroke in her sister. . Unless otherwise noted, family history is non contributory    The patients home medications have been reviewed. Allergies: Dilaudid [hydromorphone hcl] and Morphine        ---------------------------------------------------PHYSICAL EXAM--------------------------------------    Constitutional/General: Alert and oriented x3  Head: Normocephalic and atraumatic  Eyes: PERRL, EOMI, sclera non icteric  Mouth: Oropharynx clear, handling secretions, no trismus, no asymmetry of the posterior oropharynx or uvular edema  Neck: Supple, full ROM, no stridor, no meningeal signs  Respiratory: Lungs clear to auscultation bilaterally, no wheezes, rales, or rhonchi. Not in respiratory distress  Cardiovascular:  Regular rate. Regular rhythm. No murmurs, no aortic murmurs, no gallops, or rubs. 2+ distal pulses. Equal extremity pulses. Chest: No chest wall tenderness  GI:  Abdomen Soft, Non tender, Non distended. No rebound, guarding, or rigidity. No pulsatile masses. Musculoskeletal: Moves all extremities x 4. Warm and well perfused, no clubbing, cyanosis, or edema. Capillary refill <3 seconds  Integument: skin warm and dry. No rashes.    Neurologic: GCS 15, no focal deficits, symmetric strength 5/5 in the upper and lower extremities bilaterally  Psychiatric: Normal Affect          -------------------------------------------------- RESULTS -------------------------------------------------  I have personally reviewed all laboratory and imaging results for this patient. Results are listed below.      LABS: (Lab results interpreted by me)  Results for orders placed or performed during the hospital encounter of 03/20/21   CBC Auto Differential   Result Value Ref Range    WBC 5.1 4.5 - 11.5 E9/L    RBC 3.53 3.50 - 5.50 E12/L    Hemoglobin 12.2 11.5 - 15.5 g/dL    Hematocrit 35.8 34.0 - 48.0 %    .4 (H) 80.0 - 99.9 fL    MCH 34.6 26.0 - 35.0 pg    MCHC 34.1 32.0 - 34.5 %    RDW 13.3 11.5 - 15.0 fL    Platelets 863 (L) 551 - 450 E9/L    MPV 11.0 7.0 - 12.0 fL    Neutrophils % 51.1 43.0 - 80.0 %    Immature Granulocytes % 0.4 0.0 - 5.0 %    Lymphocytes % 33.4 20.0 - 42.0 %    Monocytes % 12.5 (H) 2.0 - 12.0 %    Eosinophils % 1.4 0.0 - 6.0 %    Basophils % 1.2 0.0 - 2.0 %    Neutrophils Absolute 2.62 1.80 - 7.30 E9/L    Immature Granulocytes # 0.02 E9/L    Lymphocytes Absolute 1.71 1.50 - 4.00 E9/L    Monocytes Absolute 0.64 0.10 - 0.95 E9/L    Eosinophils Absolute 0.07 0.05 - 0.50 E9/L    Basophils Absolute 0.06 0.00 - 0.20 C2/C   Basic Metabolic Panel w/ Reflex to MG   Result Value Ref Range    Sodium 137 132 - 146 mmol/L    Potassium reflex Magnesium 3.3 (L) 3.5 - 5.0 mmol/L    Chloride 95 (L) 98 - 107 mmol/L    CO2 26 22 - 29 mmol/L    Anion Gap 16 7 - 16 mmol/L    Glucose 68 (L) 74 - 99 mg/dL    BUN 12 8 - 23 mg/dL    CREATININE 0.8 0.5 - 1.0 mg/dL    GFR Non-African American >60 >=60 mL/min/1.73    GFR African American >60     Calcium 9.8 8.6 - 10.2 mg/dL   Lipase   Result Value Ref Range    Lipase 15 13 - 60 U/L   Hepatic Function Panel   Result Value Ref Range    Total Protein 7.6 6.4 - 8.3 g/dL    Albumin 4.8 3.5 - 5.2 g/dL    Alkaline Phosphatase 64 35 - 104 U/L    ALT 16 0 - 32 U/L    AST 29 0 - 31 U/L    Total Bilirubin 1.6 (H) 0.0 - 1.2 mg/dL    Bilirubin, Direct 0.5 (H) 0.0 - 0.3 mg/dL    Bilirubin, Indirect 1.1 (H) 0.0 - 1.0 mg/dL   Brain Natriuretic Peptide   Result Value Ref Range    Pro-BNP 41 0 - 125 pg/mL   Troponin   Result Value Ref Range    Troponin <0.01 0.00 - 0.03 ng/mL   Magnesium   Result Value Ref Range    Magnesium 1.2 (L) 1.6 - 2.6 mg/dL   EKG 12 Lead   Result Value Ref Range    Ventricular Rate 82 BPM    Atrial Rate 82 BPM    P-R Interval 148 ms    QRS Duration 80 ms    Q-T Interval 426 ms    QTc Calculation (Bazett) 497 ms    P Axis 48 degrees    R Axis 9 degrees    T Axis 32 degrees   ,       RADIOLOGY:  Interpreted by Radiologist unless otherwise specified  CT Head WO Contrast   Final Result   Mild atrophy and mild chronic microischemic changes scattered in the deep   white matter which is unchanged with no acute abnormality seen. XR CHEST PORTABLE   Final Result   No acute process. EKG Interpretation  Interpreted by emergency department physician, Dr. Justo Blevins    Date of EKG: 3/20/21  Time: 1613    Rhythm: normal sinus   Rate: 82  Axis: normal  Conduction: normal  ST Segments: no acute change  T Waves: no acute change    Clinical Impression: No findings suggestive of acute ischemia or injury  Comparison to prior EKG: stable as compared to patient's most recent EKG      ------------------------- NURSING NOTES AND VITALS REVIEWED ---------------------------   The nursing notes within the ED encounter and vital signs as below have been reviewed by myself  /89   Pulse 79   Temp 97.1 °F (36.2 °C)   Resp 16   Ht 5' 2\" (1.575 m)   Wt 112 lb (50.8 kg)   SpO2 99%   BMI 20.49 kg/m²     Oxygen Saturation Interpretation: Normal    The patients available past medical records and past encounters were reviewed.         ------------------------------ ED COURSE/MEDICAL DECISION MAKING----------------------  Medications   0.9 % sodium chloride bolus ---------------------------------    IMPRESSION  1. Syncope and collapse        DISPOSITION  Disposition: Discharge to home  Patient condition is stable        NOTE: This report was transcribed using voice recognition software.  Every effort was made to ensure accuracy; however, inadvertent computerized transcription errors may be present        Earnest Hollis DO  03/20/21 2011

## 2021-03-20 NOTE — ED NOTES
Bed: 17B-17  Expected date:   Expected time:   Means of arrival:   Comments:  BURTON Hardwick Ma, RN  03/20/21 153

## 2021-03-22 LAB
EKG ATRIAL RATE: 82 BPM
EKG P AXIS: 48 DEGREES
EKG P-R INTERVAL: 148 MS
EKG Q-T INTERVAL: 426 MS
EKG QRS DURATION: 80 MS
EKG QTC CALCULATION (BAZETT): 497 MS
EKG R AXIS: 9 DEGREES
EKG T AXIS: 32 DEGREES
EKG VENTRICULAR RATE: 82 BPM

## 2021-03-22 PROCEDURE — 93010 ELECTROCARDIOGRAM REPORT: CPT | Performed by: INTERNAL MEDICINE

## 2021-03-23 ENCOUNTER — HOSPITAL ENCOUNTER (OUTPATIENT)
Dept: OCCUPATIONAL THERAPY | Age: 61
Setting detail: THERAPIES SERIES
Discharge: HOME | End: 2021-03-23
Payer: MEDICAID

## 2021-03-23 PROCEDURE — 97018 PARAFFIN BATH THERAPY: CPT

## 2021-03-23 PROCEDURE — 97530 THERAPEUTIC ACTIVITIES: CPT

## 2021-03-23 PROCEDURE — 97124 MASSAGE THERAPY: CPT

## 2021-03-23 PROCEDURE — 97110 THERAPEUTIC EXERCISES: CPT

## 2021-03-23 NOTE — PROGRESS NOTES
[facility] [place]     OCCUPATIONAL THERAPY PROGRESS NOTE    [facility]  [address] [facility], [address]   Phone: [phone]   Fax: [phone]     Date:  3/23/2021  Initial Evaluation Date: 3/19/2021 Evaluating Therapist: JOSE Cuevas    Patient Name:  William Garcai    :  1960  Restrictions/Precautions:  Per Distal Radius Fx Protocol at 12 wk pierre, PWB; low fall risk  Diagnosis:  R Distal Radius and Ulna Fx's, closed  S52.591A (ICD-10-CM) - Other closed fracture of distal end of right radius, initial encounter   S52.601D (ICD-10-CM) - Closed fracture of distal end of right ulna with routine healing, unspecified fracture morphology, subsequent encounter   Insurance/Certification information: [address] [place] [address],[facility] - ID #[id number]   Referring Practitioner: Ag Anderson PA-C  Date of Surgery/Injury: 2020 inj  Plan of care signed (Y/N):  N  Visit# / total visits: -18  Thru 2021    Pain Level: moderate, aching, shooting, throbbing, tight (pulling) and uncomfortable    SUBJECTIVE: Patient seen 1/2 scheduled visits. States; \"It shoots thru me. \"     OBJECTIVE:  Engaged patient in the following with focus on pain, ROM, functional strengthening, Patient education, HEP. Reassessment at or by 10th visit.    INTERVENTION: DONE  REPS/TIME: SPECIFICS/COMMENTS:   Modality:      Paraffin x RUE 15 min to affected UE to promote skin desensitization, reduce inflammation and edema, and decrease pain, increase flexibility/joint mobility      UltraSound      E-Stim      MHP      AROM/AAROM:      Wrist  x 10 reps  10 sec holds Prayer stretches   Wrist x 10 reps Wristosizer   PROM/Stretching:      Wrist x 10 min          Edema Control:  94805     Edema x 10 min Manual edema mob, MFR to affected area         Therapeutic Activity:      Towel Ex x 8 reps Functional activity tolerance and strengthening of forearm/digits/wrist.   Putty x 8 min Provided for HEP with instructions for /pinch   Strengthening:                  Other:      HEP x  Throughout Session with instructions and handouts as needed         ASSESSMENT: Patient shows potential to progress with stated goals and will be educated on HEP and provided written handouts as needed throughout their care. HEP to address strength at next visit. Pt is making Fair progress toward stated plan of care. -Rehab Potential: Good  -Requires OT Follow Up: Yes    GOAL STATUS: Goals for pt can be see on initial eval occurring on 3/19/2021    Pt. Education:  [x] Yes  [] No  [x] Reviewed Prior HEP/Ed  Method of Education: [x] Verbal  [x] Demo  [] Written  Comprehension of Education:  [x] Verbalizes understanding. [] Demonstrates understanding. [x] Needs review at next sesion  [] Demonstrates/verbalizes HEP/Ed previously given. PLAN:   [x]  Continues Plan of care: Treatment delivered based on POC and graduated to patient's progress. Patient education continues at each visit to obtain maximum benefit from skilled OT intervention.   []  Alter Plan of care:   []  Discharge:    Billing:  To 6/14/2021  TIME IN: 1230   TIME OUT: 130 TOTAL TREATMENT TIME: 55 TOTAL TIME: 60  CODE  TODAY MINUTES TODAY UNITS  Units USED Units  ALLOWED   -- --   0   88412 Manual     72   85226 Therapeutic Ex 15 1   72   73638 Therapeutic Activity 10 1   72   41403 ADL/COMP Tech Train     72   36867 Neuromuscular Re-Ed     72   66591 OrthoManagementTraining     72   87080 Modality: paraffin 15 1   72   96702 Other: Massage 15 1   72                              TOTAL  55 12[address] [facility] [address],  DESIREE/L, [id number]

## 2021-03-25 ENCOUNTER — OFFICE VISIT (OUTPATIENT)
Dept: ORTHOPEDIC SURGERY | Age: 61
End: 2021-03-25
Payer: MEDICAID

## 2021-03-25 ENCOUNTER — HOSPITAL ENCOUNTER (OUTPATIENT)
Dept: GENERAL RADIOLOGY | Age: 61
Discharge: HOME | End: 2021-03-27
Payer: MEDICAID

## 2021-03-25 ENCOUNTER — HOSPITAL ENCOUNTER (OUTPATIENT)
Dept: OCCUPATIONAL THERAPY | Age: 61
Setting detail: THERAPIES SERIES
Discharge: HOME | End: 2021-03-25
Payer: MEDICAID

## 2021-03-25 VITALS — TEMPERATURE: 98.4 F

## 2021-03-25 DIAGNOSIS — S52.591P: Primary | ICD-10-CM

## 2021-03-25 DIAGNOSIS — S52.591A OTHER CLOSED FRACTURE OF DISTAL END OF RIGHT RADIUS, INITIAL ENCOUNTER: ICD-10-CM

## 2021-03-25 DIAGNOSIS — S52.601D CLOSED FRACTURE OF DISTAL END OF RIGHT ULNA WITH ROUTINE HEALING, UNSPECIFIED FRACTURE MORPHOLOGY, SUBSEQUENT ENCOUNTER: ICD-10-CM

## 2021-03-25 DIAGNOSIS — S52.691D OTHER CLOSED FRACTURE OF DISTAL END OF RIGHT ULNA WITH ROUTINE HEALING, SUBSEQUENT ENCOUNTER: ICD-10-CM

## 2021-03-25 PROCEDURE — G8484 FLU IMMUNIZE NO ADMIN: HCPCS | Performed by: PHYSICIAN ASSISTANT

## 2021-03-25 PROCEDURE — 3017F COLORECTAL CA SCREEN DOC REV: CPT | Performed by: PHYSICIAN ASSISTANT

## 2021-03-25 PROCEDURE — 1036F TOBACCO NON-USER: CPT | Performed by: PHYSICIAN ASSISTANT

## 2021-03-25 PROCEDURE — G8427 DOCREV CUR MEDS BY ELIG CLIN: HCPCS | Performed by: PHYSICIAN ASSISTANT

## 2021-03-25 PROCEDURE — 97018 PARAFFIN BATH THERAPY: CPT

## 2021-03-25 PROCEDURE — G8420 CALC BMI NORM PARAMETERS: HCPCS | Performed by: PHYSICIAN ASSISTANT

## 2021-03-25 PROCEDURE — 99213 OFFICE O/P EST LOW 20 MIN: CPT | Performed by: PHYSICIAN ASSISTANT

## 2021-03-25 PROCEDURE — 97110 THERAPEUTIC EXERCISES: CPT

## 2021-03-25 PROCEDURE — 99212 OFFICE O/P EST SF 10 MIN: CPT | Performed by: PHYSICIAN ASSISTANT

## 2021-03-25 PROCEDURE — 97530 THERAPEUTIC ACTIVITIES: CPT

## 2021-03-25 PROCEDURE — 73130 X-RAY EXAM OF HAND: CPT

## 2021-03-25 PROCEDURE — 73110 X-RAY EXAM OF WRIST: CPT

## 2021-03-25 NOTE — PROGRESS NOTES
Chief Complaint   Patient presents with    Wrist Pain     Patient is here today in follow up of distal radius fracture. She states that she still has swelling over the wrist with intermittent pain. She has been wearing her brace regularly. SUBJECTIVE: Timbo Cunningham is an 61 y.o. female who is now approximately 4.5 months from her initial DOI on 11/14/2020 for a R distal radius and distal ulna shaft fractures. This was treated nonoperatively at patient's preference. Patient is only PWB on the right hand/wrist, states that she still has soreness and swelling to the wrist. Is participating in OT. States wearing wrist brace continuously unless at therapy. Patient states she has stiffness at the wrist and with current end range motion she has pain that \"shoots\" up her forearm. No interval change in sensation. No new fall or injury that is exacerbating symptoms. Review of Systems -   General ROS: negative for - chills, fatigue, fever or night sweats  Respiratory ROS: no cough, shortness of breath, or wheezing  Cardiovascular ROS: no chest pain or dyspnea on exertion  Gastrointestinal ROS: no abdominal pain, change in bowel habits, or black or bloody stools  Genitourinary: no hematuria, dysuria, or incontinence   Musculoskeletal ROS:see above  Neurological ROS: no TIA or stroke symptoms       OBJECTIVE:   Alert and oriented X 3, no acute distress, respirations easy and unlabored with no audible wheezes, skin warm and dry, speech and dress appropriate for noted age, affect euthymic.     Extremity:  Right Upper Extremity  Skin is clean dry and intact without signs of infection or breakdown  There is visible deformity at the distal radius  mild edema noted  Radial pulse palpable, fingers warm with BCR  Flex/extension intact to wrist, thumb and fingers   Patient has significant limitation with wrist flexion, reports pain up the posterior forearm  Finger opposition intact  Finger adduction/abduction

## 2021-03-25 NOTE — PATIENT INSTRUCTIONS
Progress to weightbearing as tolerated on right upper extremity  Recommend start weaning out of the brace altogether  Continue with occupational therapy  Okay to become more aggressive with wrist extension and flexion range of motion. Patient instructed she may use ice and heat, recommended to use ice particularly after increased activity  Patient will follow up with us in 6 weeks, to call sooner with any questions or concerns.

## 2021-03-25 NOTE — PROGRESS NOTES
[address] [facility],4[address] Floor     OCCUPATIONAL THERAPY PROGRESS NOTE    [facility]  [address]  [place]   Phone: [phone]   Fax: [phone]     Date:  3/25/2021  Initial Evaluation Date: 3/19/2021 Evaluating Therapist: JOSE Alba    Patient Name:  Alicia Servin    :  1960  Restrictions/Precautions:  Per Distal Radius Fx Protocol at 12 wk pierre, PWB; low fall risk  Diagnosis:  R Distal Radius and Ulna Fx's, closed  S52.591A (ICD-10-CM) - Other closed fracture of distal end of right radius, initial encounter   S52.601D (ICD-10-CM) - Closed fracture of distal end of right ulna with routine healing, unspecified fracture morphology, subsequent encounter   Insurance/Certification information: [address] [place] [address],[facility] - ID #[id number]   Referring Practitioner: Pamela Hutson PA-C  Date of Surgery/Injury: 2020 inj  Plan of care signed (Y/N):  N  Visit# / total visits: 3 / 12-18  Thru 2021    Pain Level: moderate, aching, shooting, throbbing, tight (pulling) and uncomfortable    SUBJECTIVE: Patient seen 2/2 scheduled visits. States: \"It feels better. Not a lot of pain. \"  Seen physician and is aware of new recommendation. States she had taken off the brace a long time ago because she couldn't stand it. OBJECTIVE:  Engaged patient in the following with focus on pain, ROM, functional strengthening, Patient education, HEP. Reassessment at or by 10th visit.    INTERVENTION: DONE  REPS/TIME: SPECIFICS/COMMENTS:   Modality:      Paraffin  RUE 15 min to affected UE to promote skin desensitization, reduce inflammation and edema, and decrease pain, increase flexibility/joint mobility      UltraSound      E-Stim      MHP      AROM/AAROM:      Wrist/forearm x 10 x 3  Pro/sup, wrist flex/ex in heated medium               Wrist  x 10 reps  10 sec holds Prayer stretches   Wrist x 10 reps Wristosizer   PROM/Stretching:      Wrist x 10 min Edema Control:  55066     Edema x 10 min Manual edema mob, MFR to affected area         Therapeutic Activity:      Towel Ex x 8 reps Functional activity tolerance and strengthening of forearm/digits/wrist.   Putty x 8 min Provided for HEP with instructions for /pinch   Strengthening:      Gripper x 25# 20 x 3   Wrist/forearm x 2# Freewts for flexion/ex radial/ulnar deviation, pro/sup 20 x 2   Other:      HEP x  Throughout Session with instructions and handouts as needed         ASSESSMENT: Patient shows potential to progress with stated goals and will be educated on HEP and provided written handouts as needed throughout their care. HEP to address strength at next visit. Patient needed redirection throughout the session. Would stop and close her eyes. Pt is making Fair progress toward stated plan of care. -Rehab Potential: Good  -Requires OT Follow Up: Yes    GOAL STATUS: Goals for pt can be see on initial eval occurring on 3/19/2021    Pt. Education:  [x] Yes  [] No  [x] Reviewed Prior HEP/Ed  Method of Education: [x] Verbal  [x] Demo  [] Written  Comprehension of Education:  [x] Verbalizes understanding. [] Demonstrates understanding. [x] Needs review at next sesion  [] Demonstrates/verbalizes HEP/Ed previously given. PLAN:   [x]  Continues Plan of care: Treatment delivered based on POC and graduated to patient's progress. Patient education continues at each visit to obtain maximum benefit from skilled OT intervention.   []  Alter Plan of care:   []  Discharge:    Billing:  To 6/14/2021  TIME IN: 1230   TIME OUT: 130 TOTAL TREATMENT TIME: 55 TOTAL TIME: 60  CODE  TODAY MINUTES TODAY UNITS  Units USED Units  ALLOWED   -- --   0   27060 Manual     72   01512 Therapeutic Ex 25 2  2 72   20074 Therapeutic Activity 15 1  1 72   89148 ADL/COMP Tech Train     72   84193 Neuromuscular Re-Ed     72   48576 OrthoManagementTraining     72   00210 Modality: paraffin 15 1  1 72   61966 Other: Massage    1 72                              TOTAL  55 [address] [facility] [address],  RAMÍREZ/L, [id number]

## 2021-03-30 ENCOUNTER — HOSPITAL ENCOUNTER (OUTPATIENT)
Dept: OCCUPATIONAL THERAPY | Age: 61
Setting detail: THERAPIES SERIES
Discharge: HOME | End: 2021-03-30
Payer: MEDICAID

## 2021-03-30 PROCEDURE — 97018 PARAFFIN BATH THERAPY: CPT

## 2021-03-30 PROCEDURE — 97530 THERAPEUTIC ACTIVITIES: CPT

## 2021-03-30 PROCEDURE — 97110 THERAPEUTIC EXERCISES: CPT

## 2021-03-30 NOTE — PROGRESS NOTES
[address] [facility],4Th Floor     OCCUPATIONAL THERAPY PROGRESS NOTE    [facility]  [address]  [place]   Phone: [phone]   Fax: [phone]     Date:  3/30/2021  Initial Evaluation Date: 3/19/2021 Evaluating Therapist: JOSE Batista    Patient Name:  Rajni Antony    :  1960  Restrictions/Precautions:  Per Distal Radius Fx Protocol at 12 wk pierre, PWB; low fall risk  Diagnosis:  R Distal Radius and Ulna Fx's, closed  S52.591A (ICD-10-CM) - Other closed fracture of distal end of right radius, initial encounter   S52.601D (ICD-10-CM) - Closed fracture of distal end of right ulna with routine healing, unspecified fracture morphology, subsequent encounter   Insurance/Certification information: [address] [place] [address],[facility] - ID #[id number]   Referring Practitioner: Jasmeet Mixon PA-C  Date of Surgery/Injury: 2020 inj  Plan of care signed (Y/N):  N  Visit# / total visits: -18  Thru 2021    Pain Level: 4/10, aching, shooting, throbbing, tight (pulling) and uncomfortable    SUBJECTIVE: Patient seen 1 / 2 scheduled visits. States: \"It feels better. Not a lot of pain. \"  Reporting she is out of the brace. OBJECTIVE:  Engaged patient in the following with focus on pain, ROM, functional strengthening, Patient education, HEP. Reassessment at or by 10th visit.    INTERVENTION: DONE  REPS/TIME: SPECIFICS/COMMENTS:   Modality:      Paraffin  RUE 15 min to affected UE to promote skin desensitization, reduce inflammation and edema, and decrease pain, increase flexibility/joint mobility      UltraSound      E-Stim      MHP      AROM/AAROM:      Wrist/forearm x 10 x 3  Pro/sup, wrist flex/ex in heated medium               Wrist  x 10 reps  10 sec holds Prayer stretches   Wrist x 10 reps Wristosizer   PROM/Stretching:      Wrist x 10 min          Edema Control:  97622     Edema x 10 min Manual edema mob, MFR to affected area Therapeutic Activity:      Towel Ex x 8 reps Functional activity tolerance and strengthening of forearm/digits/wrist.   Putty x 8 min Provided for HEP with instructions for /pinch   Strengthening:      Gripper x 25# 20 x 3   Wrist/forearm x 2# Freewts for flexion/ex radial/ulnar deviation, pro/sup 20 x 2   Other:      HEP x  Throughout Session with instructions and handouts as needed         ASSESSMENT: Patient shows potential to progress with stated goals and will be educated on HEP and provided written handouts as needed throughout their care. HEP to address strength at next visit. Will be attending a month long rehab for drugs and alcohol. Will be placed on hold after 4/6/2021. Pt is making Fair progress toward stated plan of care. -Rehab Potential: Good  -Requires OT Follow Up: Yes    GOAL STATUS: Goals for pt can be see on initial eval occurring on 3/19/2021    Pt. Education:  [x] Yes  [] No  [x] Reviewed Prior HEP/Ed  Method of Education: [x] Verbal  [x] Demo  [] Written  Comprehension of Education:  [x] Verbalizes understanding. [] Demonstrates understanding. [x] Needs review at next sesion  [] Demonstrates/verbalizes HEP/Ed previously given. PLAN:   [x]  Continues Plan of care: Treatment delivered based on POC and graduated to patient's progress. Patient education continues at each visit to obtain maximum benefit from skilled OT intervention.   []  Alter Plan of care:   []  Discharge:    Billing:  To 6/14/2021  TIME IN: 1230   TIME OUT: 130 TOTAL TREATMENT TIME: 55 TOTAL TIME: 60  CODE  TODAY MINUTES TODAY UNITS  Units USED Units  ALLOWED   -- --   0   60203 Manual     72   24613 Therapeutic Ex 25 2  4 72   13622 Therapeutic Activity 15 1  2 72   92446 ADL/COMP Tech Train     72   87749 Neuromuscular Re-Ed     72   22017 OrthoManagementTraining     72   71403 Modality: paraffin 15 1  2 72   99671 Other: Massage    1 72                              TOTAL  55 [address],  DESIREE/ARIANE, [id number]

## 2021-04-01 ENCOUNTER — HOSPITAL ENCOUNTER (OUTPATIENT)
Dept: OCCUPATIONAL THERAPY | Age: 61
Setting detail: THERAPIES SERIES
Discharge: HOME | End: 2021-04-01
Payer: MEDICAID

## 2021-04-06 ENCOUNTER — HOSPITAL ENCOUNTER (OUTPATIENT)
Dept: OCCUPATIONAL THERAPY | Age: 61
Setting detail: THERAPIES SERIES
Discharge: HOME | End: 2021-04-06
Payer: MEDICAID

## 2021-04-06 PROCEDURE — 97018 PARAFFIN BATH THERAPY: CPT

## 2021-04-06 PROCEDURE — 97530 THERAPEUTIC ACTIVITIES: CPT

## 2021-04-06 PROCEDURE — 97110 THERAPEUTIC EXERCISES: CPT

## 2021-04-06 NOTE — PROGRESS NOTES
[address] [facility],4Th Floor     OCCUPATIONAL THERAPY PROGRESS NOTE  REASSESSMENT    [facility] -  [facility]AMP[facility]  [address]  [place]   Phone: [phone]   Fax: [phone]     Date:  2021  Initial Evaluation Date: 3/19/2021 Evaluating Therapist: JOSE Javed    Patient Name:  Najma Gallo    :  1960  Restrictions/Precautions:  Per Distal Radius Fx Protocol at 12 wk pierre, PWB; low fall risk  Diagnosis:  R Distal Radius and Ulna Fx's, closed  S52.591A (ICD-10-CM) - Other closed fracture of distal end of right radius, initial encounter   S52.601D (ICD-10-CM) - Closed fracture of distal end of right ulna with routine healing, unspecified fracture morphology, subsequent encounter   Insurance/Certification information: [address] [place] [address],[facility] - ID #[id number]   Referring Practitioner: Jagdish Larkin PA-C  Date of Surgery/Injury: 2020 inj  Plan of care signed (Y/N):  N  Visit# / total visits: -18  Thru 2021    Pain Level: 4/10, aching, shooting, throbbing, tight (pulling) and uncomfortable    SUBJECTIVE: Patient seen 1 / 2 scheduled visits. States: \"It feels better. Not a lot of pain. \"  Reporting she will be in rehab for the next month. OBJECTIVE:  Engaged patient in the following with focus on pain, ROM, functional strengthening, Patient education, HEP. Reassessment at or by 10th visit.    INTERVENTION: DONE  REPS/TIME: SPECIFICS/COMMENTS:   Modality:      Paraffin  RUE 15 min to affected UE to promote skin desensitization, reduce inflammation and edema, and decrease pain, increase flexibility/joint mobility      UltraSound      E-Stim      MHP      AROM/AAROM:      Wrist/forearm x 10 x 3  Pro/sup, wrist flex/ex in heated medium               Wrist  x 10 reps  10 sec holds Prayer stretches   Wrist x 10 reps Wristosizer   PROM/Stretching:      Wrist x 10 min          Edema Control:  82674     Edema x 10 min Manual edema mob, MFR to affected area         Therapeutic Activity:      Towel Ex x 8 reps Functional activity tolerance and strengthening of forearm/digits/wrist.   Putty x 8 min Provided for HEP with instructions for /pinch   Strengthening:      Gripper x 25# 20 x 3   Wrist/forearm x 2# Freewts for flexion/ex radial/ulnar deviation, pro/sup 20 x 2   Other:      HEP x  Throughout Session with instructions and handouts as needed         ASSESSMENT: Patient shows potential to progress with stated goals and will be educated on HEP and provided written handouts as needed throughout their care. HEP to address strength at next visit. Will be attending a month long rehab for drugs and alcohol. Will be placed on hold after 4/6/2021. HEP provided for her to continue. Reassessment completed. See below. Pt is making Fair progress toward stated plan of care. -Rehab Potential: Good  -Requires OT Follow Up: Yes                3/19/21    4/6/21   Right Upper Extremity ROM /  KEY: Ext/Flex     AROM(PROM)   Forearm Pronation: 0/76-84* WFLs         Supination: 0/80* 0/22* 0/60         Wrist Flexion: 0/60-80* 0/55*  63       Extension: 0/60-75* 0/25*  43       Radial Deviation: 0/20-25* 0/16*  20       Ulnar Deviation: 0/30-45* 0/24*  30                4/6/21  Dynamometer (setting 2):                              Left: 33#                                   36#              Right: 24#                      27#  Pinch Meter:   Lateral:  Left= 11#,     12#    Right= 7#      9#     Palmar 3 point: Left= 7#,      9#    Right= 8#      8#  9 Hole Peg Test:              Left: 1 min 6 sec    37.3 seconds              Right: 48 sec     30.0 seconds     QuickDASH: 43.2%      34.9%      GOAL STATUS:   1) Patient will demonstrate good understanding of home program (exercises/activities/diagnosis/prognosis/goals) with good accuracy.     Progressing  2) Patient will demonstrate increased active/passive range of motion of their R wrist to [facility] for ADL/IADL completion. Progressing  3) Patient will demonstrate increased /pinch strength of at least 5 / 1-3 pinch pounds of their R hand. Progressing  4) Patient to report decreased pain in their affected R upper extremity from 6-7/10 to 2/10 or less with resistive functional use. Progressin-5/10  5) Increase in fine motor function as evidenced by decreased time to complete 9-hole peg test by at least 5-10 seconds. Progressing  6) Patient to report 100% compliance with their splint wear, care, and precautions if needed. Progressing  Has been weaned from splint at this point  7) Patient will be knowledgeable of edema control techniques as evident with decreases from moderate to mild/none. Progressing slowly  8) Patient will decrease QuickDASH score to 25%  or less for increased participation in daily functional activities. See above       Pt. Education:  [x] Yes  [] No  [x] Reviewed Prior HEP/Ed  Method of Education: [x] Verbal  [x] Demo  [] Written  Comprehension of Education:  [x] Verbalizes understanding. [] Demonstrates understanding. [x] Needs review at next sesion  [] Demonstrates/verbalizes HEP/Ed previously given. PLAN:   [x]  Continues Plan of care: Treatment delivered based on POC and graduated to patient's progress. Patient education continues at each visit to obtain maximum benefit from skilled OT intervention. [x]  Alter Plan of care: Patient placed on hold until further notice. She will contact us regarding continuing therapy when she is released from rehab.   We will hold case for 4 weeks then consider this the discharge if we have not heard from her  []  Discharge:    Billing:  To 2021  TIME IN: 1230   TIME OUT: 130 TOTAL TREATMENT TIME: 55 TOTAL TIME: 60  CODE  TODAY MINUTES TODAY UNITS  Units USED Units  ALLOWED   -- --   0   86159 Manual     72   00936 Therapeutic Ex 25 2  6 72   71966 Therapeutic Activity 15 1  3 72   86004 ADL/COMP [employer]

## 2021-04-08 ENCOUNTER — HOSPITAL ENCOUNTER (OUTPATIENT)
Dept: OCCUPATIONAL THERAPY | Age: 61
Setting detail: THERAPIES SERIES
End: 2021-04-08
Payer: MEDICAID

## 2021-04-13 ENCOUNTER — APPOINTMENT (OUTPATIENT)
Dept: CT IMAGING | Age: 61
End: 2021-04-13
Payer: MEDICAID

## 2021-04-13 ENCOUNTER — APPOINTMENT (OUTPATIENT)
Dept: GENERAL RADIOLOGY | Age: 61
End: 2021-04-13
Payer: MEDICAID

## 2021-04-13 ENCOUNTER — HOSPITAL ENCOUNTER (OUTPATIENT)
Age: 61
Setting detail: OBSERVATION
Discharge: OTHER INSTITUTION | End: 2021-04-16
Attending: EMERGENCY MEDICINE | Admitting: INTERNAL MEDICINE
Payer: MEDICAID

## 2021-04-13 ENCOUNTER — APPOINTMENT (OUTPATIENT)
Dept: OCCUPATIONAL THERAPY | Age: 61
End: 2021-04-13
Payer: MEDICAID

## 2021-04-13 DIAGNOSIS — E87.6 HYPOKALEMIA: ICD-10-CM

## 2021-04-13 DIAGNOSIS — G45.9 TIA (TRANSIENT ISCHEMIC ATTACK): Primary | ICD-10-CM

## 2021-04-13 DIAGNOSIS — R53.1 ACUTE LEFT-SIDED WEAKNESS: ICD-10-CM

## 2021-04-13 DIAGNOSIS — E83.42 HYPOMAGNESEMIA: ICD-10-CM

## 2021-04-13 LAB
ALBUMIN SERPL-MCNC: 4.2 G/DL (ref 3.5–5.2)
ALP BLD-CCNC: 79 U/L (ref 35–104)
ALT SERPL-CCNC: 35 U/L (ref 0–32)
ANION GAP SERPL CALCULATED.3IONS-SCNC: 12 MMOL/L (ref 7–16)
AST SERPL-CCNC: 45 U/L (ref 0–31)
BASOPHILS ABSOLUTE: 0.06 E9/L (ref 0–0.2)
BASOPHILS RELATIVE PERCENT: 0.9 % (ref 0–2)
BILIRUB SERPL-MCNC: 0.4 MG/DL (ref 0–1.2)
BUN BLDV-MCNC: 6 MG/DL (ref 8–23)
CALCIUM SERPL-MCNC: 9.6 MG/DL (ref 8.6–10.2)
CHLORIDE BLD-SCNC: 101 MMOL/L (ref 98–107)
CO2: 29 MMOL/L (ref 22–29)
CREAT SERPL-MCNC: 0.5 MG/DL (ref 0.5–1)
EOSINOPHILS ABSOLUTE: 0.05 E9/L (ref 0.05–0.5)
EOSINOPHILS RELATIVE PERCENT: 0.7 % (ref 0–6)
GFR AFRICAN AMERICAN: >60
GFR NON-AFRICAN AMERICAN: >60 ML/MIN/1.73
GLUCOSE BLD-MCNC: 117 MG/DL (ref 74–99)
HCT VFR BLD CALC: 36.4 % (ref 34–48)
HEMOGLOBIN: 11.7 G/DL (ref 11.5–15.5)
IMMATURE GRANULOCYTES #: 0.04 E9/L
IMMATURE GRANULOCYTES %: 0.6 % (ref 0–5)
LYMPHOCYTES ABSOLUTE: 1.47 E9/L (ref 1.5–4)
LYMPHOCYTES RELATIVE PERCENT: 21.4 % (ref 20–42)
MAGNESIUM: 1.1 MG/DL (ref 1.6–2.6)
MCH RBC QN AUTO: 33.8 PG (ref 26–35)
MCHC RBC AUTO-ENTMCNC: 32.1 % (ref 32–34.5)
MCV RBC AUTO: 105.2 FL (ref 80–99.9)
MONOCYTES ABSOLUTE: 0.9 E9/L (ref 0.1–0.95)
MONOCYTES RELATIVE PERCENT: 13.1 % (ref 2–12)
NEUTROPHILS ABSOLUTE: 4.36 E9/L (ref 1.8–7.3)
NEUTROPHILS RELATIVE PERCENT: 63.3 % (ref 43–80)
PDW BLD-RTO: 14 FL (ref 11.5–15)
PLATELET # BLD: 181 E9/L (ref 130–450)
PMV BLD AUTO: 11.4 FL (ref 7–12)
POTASSIUM REFLEX MAGNESIUM: 3 MMOL/L (ref 3.5–5)
RBC # BLD: 3.46 E12/L (ref 3.5–5.5)
SODIUM BLD-SCNC: 142 MMOL/L (ref 132–146)
TOTAL PROTEIN: 6.7 G/DL (ref 6.4–8.3)
TROPONIN: <0.01 NG/ML (ref 0–0.03)
WBC # BLD: 6.9 E9/L (ref 4.5–11.5)

## 2021-04-13 PROCEDURE — 99284 EMERGENCY DEPT VISIT MOD MDM: CPT

## 2021-04-13 PROCEDURE — 6360000002 HC RX W HCPCS: Performed by: STUDENT IN AN ORGANIZED HEALTH CARE EDUCATION/TRAINING PROGRAM

## 2021-04-13 PROCEDURE — 70450 CT HEAD/BRAIN W/O DYE: CPT

## 2021-04-13 PROCEDURE — 96372 THER/PROPH/DIAG INJ SC/IM: CPT

## 2021-04-13 PROCEDURE — G0378 HOSPITAL OBSERVATION PER HR: HCPCS

## 2021-04-13 PROCEDURE — 83735 ASSAY OF MAGNESIUM: CPT

## 2021-04-13 PROCEDURE — 73030 X-RAY EXAM OF SHOULDER: CPT

## 2021-04-13 PROCEDURE — 85025 COMPLETE CBC W/AUTO DIFF WBC: CPT

## 2021-04-13 PROCEDURE — 6370000000 HC RX 637 (ALT 250 FOR IP): Performed by: INTERNAL MEDICINE

## 2021-04-13 PROCEDURE — 6360000002 HC RX W HCPCS: Performed by: INTERNAL MEDICINE

## 2021-04-13 PROCEDURE — 99219 PR INITIAL OBSERVATION CARE/DAY 50 MINUTES: CPT | Performed by: INTERNAL MEDICINE

## 2021-04-13 PROCEDURE — 80053 COMPREHEN METABOLIC PANEL: CPT

## 2021-04-13 PROCEDURE — 73502 X-RAY EXAM HIP UNI 2-3 VIEWS: CPT

## 2021-04-13 PROCEDURE — 6370000000 HC RX 637 (ALT 250 FOR IP): Performed by: STUDENT IN AN ORGANIZED HEALTH CARE EDUCATION/TRAINING PROGRAM

## 2021-04-13 PROCEDURE — 96374 THER/PROPH/DIAG INJ IV PUSH: CPT

## 2021-04-13 PROCEDURE — 96365 THER/PROPH/DIAG IV INF INIT: CPT

## 2021-04-13 PROCEDURE — 2580000003 HC RX 258: Performed by: INTERNAL MEDICINE

## 2021-04-13 PROCEDURE — 93005 ELECTROCARDIOGRAM TRACING: CPT | Performed by: STUDENT IN AN ORGANIZED HEALTH CARE EDUCATION/TRAINING PROGRAM

## 2021-04-13 PROCEDURE — 96366 THER/PROPH/DIAG IV INF ADDON: CPT

## 2021-04-13 PROCEDURE — G0378 HOSPITAL OBSERVATION PER HR: HCPCS | Performed by: INTERNAL MEDICINE

## 2021-04-13 PROCEDURE — 71045 X-RAY EXAM CHEST 1 VIEW: CPT

## 2021-04-13 PROCEDURE — 84484 ASSAY OF TROPONIN QUANT: CPT

## 2021-04-13 RX ORDER — BACLOFEN 10 MG/1
10 TABLET ORAL EVERY 8 HOURS PRN
Status: ON HOLD | COMMUNITY
End: 2021-04-16 | Stop reason: HOSPADM

## 2021-04-13 RX ORDER — DIPHENHYDRAMINE HCL 50 MG
50 CAPSULE ORAL EVERY 6 HOURS PRN
Status: ON HOLD | COMMUNITY
End: 2021-04-16 | Stop reason: HOSPADM

## 2021-04-13 RX ORDER — M-VIT,TX,IRON,MINS/CALC/FOLIC 27MG-0.4MG
1 TABLET ORAL DAILY
COMMUNITY
End: 2022-07-21

## 2021-04-13 RX ORDER — HYDROXYZINE HYDROCHLORIDE 25 MG/1
25 TABLET, FILM COATED ORAL EVERY 8 HOURS PRN
COMMUNITY

## 2021-04-13 RX ORDER — CALCIUM CARBONATE 200(500)MG
2 TABLET,CHEWABLE ORAL PRN
COMMUNITY
End: 2022-07-21

## 2021-04-13 RX ORDER — MAGNESIUM HYDROXIDE/ALUMINUM HYDROXICE/SIMETHICONE 120; 1200; 1200 MG/30ML; MG/30ML; MG/30ML
10 SUSPENSION ORAL EVERY 4 HOURS PRN
COMMUNITY
End: 2022-07-21

## 2021-04-13 RX ORDER — POLYETHYLENE GLYCOL 3350 17 G/17G
17 POWDER, FOR SOLUTION ORAL DAILY PRN
Status: DISCONTINUED | OUTPATIENT
Start: 2021-04-13 | End: 2021-04-16 | Stop reason: HOSPADM

## 2021-04-13 RX ORDER — ACETAMINOPHEN 650 MG/1
650 SUPPOSITORY RECTAL EVERY 6 HOURS PRN
Status: DISCONTINUED | OUTPATIENT
Start: 2021-04-13 | End: 2021-04-16 | Stop reason: HOSPADM

## 2021-04-13 RX ORDER — ONDANSETRON 2 MG/ML
4 INJECTION INTRAMUSCULAR; INTRAVENOUS EVERY 6 HOURS PRN
Status: DISCONTINUED | OUTPATIENT
Start: 2021-04-13 | End: 2021-04-16 | Stop reason: HOSPADM

## 2021-04-13 RX ORDER — AMITRIPTYLINE HYDROCHLORIDE 25 MG/1
25 TABLET, FILM COATED ORAL DAILY
Status: DISCONTINUED | OUTPATIENT
Start: 2021-04-13 | End: 2021-04-16 | Stop reason: HOSPADM

## 2021-04-13 RX ORDER — ONDANSETRON 2 MG/ML
8 INJECTION INTRAMUSCULAR; INTRAVENOUS EVERY 6 HOURS PRN
Status: ON HOLD | COMMUNITY
End: 2021-04-16 | Stop reason: HOSPADM

## 2021-04-13 RX ORDER — SODIUM CHLORIDE 9 MG/ML
25 INJECTION, SOLUTION INTRAVENOUS PRN
Status: DISCONTINUED | OUTPATIENT
Start: 2021-04-13 | End: 2021-04-16 | Stop reason: HOSPADM

## 2021-04-13 RX ORDER — LORATADINE 10 MG/1
10 TABLET ORAL DAILY PRN
Status: ON HOLD | COMMUNITY
End: 2021-09-14

## 2021-04-13 RX ORDER — IPRATROPIUM BROMIDE AND ALBUTEROL SULFATE 2.5; .5 MG/3ML; MG/3ML
1 SOLUTION RESPIRATORY (INHALATION) EVERY 4 HOURS PRN
Status: DISCONTINUED | OUTPATIENT
Start: 2021-04-13 | End: 2021-04-16 | Stop reason: HOSPADM

## 2021-04-13 RX ORDER — SODIUM CHLORIDE 0.9 % (FLUSH) 0.9 %
5-40 SYRINGE (ML) INJECTION PRN
Status: DISCONTINUED | OUTPATIENT
Start: 2021-04-13 | End: 2021-04-16 | Stop reason: HOSPADM

## 2021-04-13 RX ORDER — HYDROCODONE BITARTRATE AND ACETAMINOPHEN 5; 325 MG/1; MG/1
1 TABLET ORAL EVERY 6 HOURS PRN
Status: DISCONTINUED | OUTPATIENT
Start: 2021-04-13 | End: 2021-04-16 | Stop reason: HOSPADM

## 2021-04-13 RX ORDER — POTASSIUM CHLORIDE 20 MEQ/1
40 TABLET, EXTENDED RELEASE ORAL ONCE
Status: COMPLETED | OUTPATIENT
Start: 2021-04-13 | End: 2021-04-13

## 2021-04-13 RX ORDER — CETIRIZINE HYDROCHLORIDE 10 MG/1
10 TABLET ORAL DAILY
Status: ON HOLD | COMMUNITY
End: 2022-05-21

## 2021-04-13 RX ORDER — ATORVASTATIN CALCIUM 40 MG/1
40 TABLET, FILM COATED ORAL NIGHTLY
Status: DISCONTINUED | OUTPATIENT
Start: 2021-04-13 | End: 2021-04-16 | Stop reason: HOSPADM

## 2021-04-13 RX ORDER — FAMOTIDINE 20 MG/1
20 TABLET, FILM COATED ORAL 2 TIMES DAILY
Status: DISCONTINUED | OUTPATIENT
Start: 2021-04-13 | End: 2021-04-16 | Stop reason: HOSPADM

## 2021-04-13 RX ORDER — MAGNESIUM SULFATE IN WATER 40 MG/ML
2000 INJECTION, SOLUTION INTRAVENOUS ONCE
Status: COMPLETED | OUTPATIENT
Start: 2021-04-13 | End: 2021-04-13

## 2021-04-13 RX ORDER — PROMETHAZINE HYDROCHLORIDE 25 MG/1
12.5 TABLET ORAL EVERY 6 HOURS PRN
Status: DISCONTINUED | OUTPATIENT
Start: 2021-04-13 | End: 2021-04-16 | Stop reason: HOSPADM

## 2021-04-13 RX ORDER — FOLIC ACID 1 MG/1
1 TABLET ORAL DAILY
COMMUNITY
End: 2022-07-21

## 2021-04-13 RX ORDER — LOPERAMIDE HYDROCHLORIDE 2 MG/1
2 CAPSULE ORAL PRN
Status: ON HOLD | COMMUNITY
End: 2022-05-24 | Stop reason: HOSPADM

## 2021-04-13 RX ORDER — ASPIRIN 81 MG/1
81 TABLET, CHEWABLE ORAL DAILY
Status: DISCONTINUED | OUTPATIENT
Start: 2021-04-13 | End: 2021-04-16 | Stop reason: HOSPADM

## 2021-04-13 RX ORDER — ONDANSETRON 4 MG/1
4 TABLET, FILM COATED ORAL EVERY 6 HOURS PRN
Status: ON HOLD | COMMUNITY
End: 2022-05-24 | Stop reason: HOSPADM

## 2021-04-13 RX ORDER — CLONIDINE HYDROCHLORIDE 0.1 MG/1
0.1 TABLET ORAL
Status: ON HOLD | COMMUNITY
End: 2021-04-16 | Stop reason: HOSPADM

## 2021-04-13 RX ORDER — IBUPROFEN 200 MG
400 TABLET ORAL EVERY 6 HOURS PRN
COMMUNITY
End: 2022-07-21

## 2021-04-13 RX ORDER — THIAMINE MONONITRATE (VIT B1) 100 MG
100 TABLET ORAL 3 TIMES DAILY
COMMUNITY
End: 2022-07-21

## 2021-04-13 RX ORDER — CHOLECALCIFEROL (VITAMIN D3) 125 MCG
10 CAPSULE ORAL NIGHTLY PRN
COMMUNITY
End: 2022-07-21

## 2021-04-13 RX ORDER — NICOTINE 21 MG/24HR
1 PATCH, TRANSDERMAL 24 HOURS TRANSDERMAL DAILY
Status: DISCONTINUED | OUTPATIENT
Start: 2021-04-13 | End: 2021-04-16 | Stop reason: HOSPADM

## 2021-04-13 RX ORDER — MIRTAZAPINE 15 MG/1
7.5 TABLET, FILM COATED ORAL NIGHTLY PRN
Status: ON HOLD | COMMUNITY
End: 2021-09-14

## 2021-04-13 RX ORDER — POTASSIUM CHLORIDE 20 MEQ/1
40 TABLET, EXTENDED RELEASE ORAL DAILY
Status: DISCONTINUED | OUTPATIENT
Start: 2021-04-13 | End: 2021-04-16 | Stop reason: HOSPADM

## 2021-04-13 RX ORDER — DOCUSATE SODIUM 100 MG/1
100 CAPSULE, LIQUID FILLED ORAL DAILY PRN
COMMUNITY
End: 2022-07-21

## 2021-04-13 RX ORDER — GABAPENTIN 300 MG/1
300 CAPSULE ORAL 3 TIMES DAILY
Status: DISCONTINUED | OUTPATIENT
Start: 2021-04-13 | End: 2021-04-16 | Stop reason: HOSPADM

## 2021-04-13 RX ORDER — PANTOPRAZOLE SODIUM 40 MG/1
40 TABLET, DELAYED RELEASE ORAL
Status: DISCONTINUED | OUTPATIENT
Start: 2021-04-14 | End: 2021-04-16 | Stop reason: HOSPADM

## 2021-04-13 RX ORDER — DICYCLOMINE HCL 20 MG
20 TABLET ORAL EVERY 6 HOURS PRN
Status: ON HOLD | COMMUNITY
End: 2021-04-16 | Stop reason: HOSPADM

## 2021-04-13 RX ORDER — SODIUM CHLORIDE 0.9 % (FLUSH) 0.9 %
5-40 SYRINGE (ML) INJECTION EVERY 12 HOURS SCHEDULED
Status: DISCONTINUED | OUTPATIENT
Start: 2021-04-13 | End: 2021-04-16 | Stop reason: HOSPADM

## 2021-04-13 RX ORDER — ACETAMINOPHEN 325 MG/1
650 TABLET ORAL EVERY 6 HOURS PRN
Status: DISCONTINUED | OUTPATIENT
Start: 2021-04-13 | End: 2021-04-16 | Stop reason: HOSPADM

## 2021-04-13 RX ADMIN — FAMOTIDINE 20 MG: 20 TABLET ORAL at 21:43

## 2021-04-13 RX ADMIN — Medication 10 ML: at 21:44

## 2021-04-13 RX ADMIN — MAGNESIUM SULFATE HEPTAHYDRATE 2000 MG: 40 INJECTION, SOLUTION INTRAVENOUS at 12:15

## 2021-04-13 RX ADMIN — POTASSIUM CHLORIDE 40 MEQ: 1500 TABLET, EXTENDED RELEASE ORAL at 12:13

## 2021-04-13 RX ADMIN — POTASSIUM CHLORIDE 40 MEQ: 1500 TABLET, EXTENDED RELEASE ORAL at 21:43

## 2021-04-13 RX ADMIN — GABAPENTIN 300 MG: 300 CAPSULE ORAL at 21:43

## 2021-04-13 RX ADMIN — ASPIRIN 81 MG: 81 TABLET, CHEWABLE ORAL at 21:43

## 2021-04-13 RX ADMIN — AMITRIPTYLINE HYDROCHLORIDE 25 MG: 25 TABLET, FILM COATED ORAL at 21:43

## 2021-04-13 RX ADMIN — ATORVASTATIN CALCIUM 40 MG: 40 TABLET, FILM COATED ORAL at 21:43

## 2021-04-13 RX ADMIN — ENOXAPARIN SODIUM 40 MG: 40 INJECTION SUBCUTANEOUS at 21:42

## 2021-04-13 ASSESSMENT — ENCOUNTER SYMPTOMS
DIARRHEA: 0
COUGH: 0
ABDOMINAL PAIN: 0
CONSTIPATION: 0
PHOTOPHOBIA: 0
RHINORRHEA: 0
VOMITING: 0
VOICE CHANGE: 0
BACK PAIN: 0
TROUBLE SWALLOWING: 0
SHORTNESS OF BREATH: 0
NAUSEA: 0

## 2021-04-13 ASSESSMENT — PAIN DESCRIPTION - PAIN TYPE: TYPE: ACUTE PAIN

## 2021-04-13 ASSESSMENT — PAIN DESCRIPTION - ORIENTATION: ORIENTATION: LEFT;LOWER

## 2021-04-13 ASSESSMENT — PAIN DESCRIPTION - LOCATION: LOCATION: BUTTOCKS;BACK

## 2021-04-13 NOTE — ED NOTES
Bed: 07  Expected date:   Expected time:   Means of arrival:   Comments:  [facility] - [facility]- feeling off     Lia Dillon, KYLEIGH  04/13/21 7985

## 2021-04-13 NOTE — ED NOTES
Spoke with receiving nurse, Aliyah Richmond, on 5S and updated her on patient.        Ling Jauregui RN  04/13/21 6648

## 2021-04-13 NOTE — H&P
[facility] History and Physical      CHIEF COMPLAINT:  Abnormal gait    History of Present Illness: 80-year-old female with history of hypertension, kidney stones, and alcohol abuse. Patient is currently in inpatient alcohol rehab. Patient is 7 days sober. She has been having no real complaints other than withdrawal from her alcohol when today she was spotted by a worker in the rehab unit walking abnormally. Patient herself seems to feel not like herself but is not very able to describe why. She did not notice that her gait was abnormal.  Apparently she was listing towards the left at that time. They felt that patient may be having a stroke so 911 was called. By the time paramedics arrived patient's neurologic exam was completely normal.    In the emergency room patient is again completely at baseline neurologically. She has had no fever or chills no change in vision no runny nose sore throat cough or shortness of breath. No chest pain or pressure no abdominal pain no nausea vomiting. She says she had no real shakes or withdrawal.  No urinary frequency or burning. She has never had a stroke before has never had neurologic changes before.       Informant(s) for H&P:Patient    REVIEW OF SYSTEMS:  A comprehensive review of systems was negative except for: what is in the HPI    PMH:  Past Medical History:   Diagnosis Date    Asthma     CAD (coronary artery disease)     Closed fracture of proximal end of left humerus with routine healing 6/11/2019    Degeneration of lumbar or lumbosacral intervertebral disc     Fall against sharp object 1960    chest tube in hospital    History of blood transfusion 1997    after vaginal delivery of baby hemmorhage    Hypertension     Hypokalemia     Insomnia     Kidney stone     Nondisplaced fracture of greater tuberosity of right humerus, initial encounter for closed fracture 1/22/2019    Orthostatic hypotension     Severe back pain 2/3/2014       Surgical History:  Past Surgical History:   Procedure Laterality Date    BUNIONECTOMY      Left foot    CHEST TUBE INSERTION  1990    several    COLONOSCOPY  3/26/09    KYPHOSIS SURGERY N/A 10/28/2019    KYPHOPLASTY L1 WITH VERTEBRAL BONE BIOPSY performed by Chay Gentile MD at [facility] 22 LITHOTRIPSY  2012   [address]  11/09/2017    L3-L4 ,L4-L5  interbody fusion with Dajuan Vijaya More     OTHER SURGICAL HISTORY      electro ablation for rectal and sigmoid polyps    TONSILLECTOMY      UPPER GASTROINTESTINAL ENDOSCOPY  3/30/15    UPPER GASTROINTESTINAL ENDOSCOPY  4/21/08       Medications Prior to Admission:    Prior to Admission medications    Medication Sig Start Date End Date Taking? Authorizing Provider   amitriptyline (ELAVIL) 25 MG tablet Take 25 mg by mouth daily 11/10/20   Historical Provider, MD   acetaminophen (TYLENOL) 500 MG tablet Take 1 tablet by mouth 4 times daily as needed for Pain 12/3/20   Neva Danielle PA-C   nitroGLYCERIN (NITROSTAT) 0.4 MG SL tablet up to max of 3 total doses. If no relief after 1 dose, call 911. 8/30/20   Walter Pressley MD   potassium chloride [facility] M) 20 MEQ extended release tablet Take 2 tablets by mouth daily 8/30/20   Walter Pressley MD   Misc. Devices [facility]) MISC 1 each by Does not apply route daily 7/16/20   RONAN Ding - CNP   famotidine (PEPCID) 20 MG tablet Take 1 tablet by mouth 2 times daily for 7 days 7/9/20 4/4/21  Fransisca Burciaga PA-C   gabapentin (NEURONTIN) 300 MG capsule Take 1 capsule by mouth 3 times daily for 30 days. 4/7/20 4/4/21  CRISTI Mitchell   HYDROcodone-acetaminophen (NORCO) 5-325 MG per tablet Take 1 tablet by mouth every 6 hours as needed for Pain.     Historical Provider, MD   omeprazole (PRILOSEC) 40 MG capsule Take 40 mg by mouth daily as needed     Historical Provider, MD   albuterol sulfate  (90 BASE) MCG/ACT inhaler Inhale 2 puffs into the lungs every 6 hours as needed for Wheezing    Historical Provider, MD       Allergies:    Dilaudid [hydromorphone hcl] and Morphine    Social History:    reports that she has quit smoking. She started smoking about 2 years ago. She has a 1.00 pack-year smoking history. She has never used smokeless tobacco. She reports current alcohol use of about 6.0 standard drinks of alcohol per week. She reports that she does not use drugs. Family History:   family history includes Cancer in her brother; Diabetes in her mother; Heart Disease in her maternal grandfather, maternal grandmother, and sister; Hypertension in her mother; Stroke in her sister. PHYSICAL EXAM:  Vitals:  /89   Pulse 66   Temp 97.9 °F (36.6 °C) (Oral)   Resp 16   Ht 5' 3\" (1.6 m)   Wt 112 lb (50.8 kg)   SpO2 95%   BMI 19.84 kg/m²   General Appearance: alert and oriented to person, place and time and in no acute distress  Skin: warm and dry  Head: normocephalic and atraumatic  Eyes: pupils equal, round, and reactive to light, extraocular eye movements intact, conjunctivae normal  Neck: neck supple and non tender without mass   Pulmonary/Chest: clear to auscultation bilaterally- no wheezes, rales or rhonchi, normal air movement, no respiratory distress  Cardiovascular: normal rate, normal S1 and S2 and no carotid bruits  Abdomen: soft, non-tender, non-distended, normal bowel sounds, no masses or organomegaly  Extremities: no cyanosis, no clubbing and no edema  Neurologic: CN appear normal, strength and sensation equal bilateral, did not walk patient as on tele in ER        LABS:  Recent Labs     04/13/21  1008      K 3.0*      CO2 29   BUN 6*   CREATININE 0.5   GLUCOSE 117*   CALCIUM 9.6       Recent Labs     04/13/21  1008   WBC 6.9   RBC 3.46*   HGB 11.7   HCT 36.4   .2*   MCH 33.8   MCHC 32.1   RDW 14.0      MPV 11.4       No results for input(s): POCGLU in the last 72 hours.     Radiology:   XR CHEST PORTABLE   Final Result Very mild oste[facility]. Unremarkable chest.         XR SHOULDER LEFT (MIN 2 VIEWS)   Final Result   Very mild osteoarthritis at the left [facility] joint. Unremarkable chest.         CT Head WO Contrast   Final Result   1. There is no acute intracranial abnormality. Specifically, there is no   intracranial hemorrhage. 2. Mild atrophy and periventricular leukomalacia,             EKG: Normal sinus rhythm, somewhat wandering baseline, no real acute ST-T changes. ASSESSMENT:      Active Problems:    TIA (transient ischemic attack)  Resolved Problems:    * No resolved hospital problems. *      PLAN:    1. Possible TIA    Patient with apparently some gait disorder at her rehab. Currently seems neurologically intact although I was not able to walk her. My neurologic exam is completely normal.  CT of the brain shows no acute intracranial abnormality. We will admit under observation and get an MRI of the brain. Neurology consult was also obtained. If patient has no further symptoms and MRI is negative can likely be discharged. We will start aspirin 81 mg daily as well as Lipitor 40 mg daily as primary prevention. Watch blood pressure in the hospital.    2.  Hyperkalemia and hypomagnesemia    Potassium magnesium were low in the emergency room but were repleted. Patient does take potassium daily. Will follow potassium and magnesium while in the hospital.    3.  History of alcohol abuse    Now 7 days sober with no symptoms of withdrawal that I can see. We will continue the Prilosec. She is also on Neurontin and Norco for chronic pain and will continue these. Code Status: Full  DVT prophylaxis: Lovenox      NOTE: This report was transcribed using voice recognition software. Every effort was made to ensure accuracy; however, inadvertent computerized transcription errors may be present.   Electronically signed by Shital Duron MD on 4/13/2021 at 1:32 PM

## 2021-04-13 NOTE — ED NOTES
Called patient's daughter, Rhonda Cohen, and notified her of patient's admission and patient's fall. She would like to be updated when patient is discharged back to [facility].      Zoe Ortega RN  04/13/21 [id number]

## 2021-04-13 NOTE — ED PROVIDER NOTES
Walking away from this argument she was noted by staff members to have a slouched to the left hand side. Patient with no other neuro deficits noted and patient had a steady gait. Patient does also endorse left shoulder and left hip pain that were both atraumatic. He has been ongoing for 2 days with pain on movement. She states the pain is sharp, constant, moderate in intensity, increases on movement, relieved by nothing, steady. 911 was called and on EMS arrival, EMS reports no neuro deficits. Blood sugar was normal.  Patient denies chest pain or shortness of breath, headache, nausea, vomiting, trauma, diarrhea, urinary or GI symptoms. Patient denies any blurred vision or double vision, dizziness. Patient has been eating well and drinking well. Patient denies any fever or chills. She states that she does recognize that she was leaning towards the left however she states that the symptoms were short lasting and resolved. Patient does have history of what she believes to be a stroke with no residual deficits. Patient denies facial droop, drooling, difficulty eating chewing or swallowing during this event today. Patient denies any hemiparesis or numbness, paresthesias to her body today. Review of Systems   Constitutional: Negative for appetite change, chills and fever. HENT: Negative for congestion, rhinorrhea, trouble swallowing and voice change. Eyes: Negative for photophobia and visual disturbance. Respiratory: Negative for cough and shortness of breath. Cardiovascular: Negative for chest pain, palpitations and leg swelling. Gastrointestinal: Negative for abdominal pain, constipation, diarrhea, nausea and vomiting. Endocrine: Negative for polyuria. Genitourinary: Negative for dysuria, flank pain, frequency, hematuria and urgency. Musculoskeletal: Positive for gait problem. Negative for arthralgias, back pain, myalgias and neck pain. Skin: Negative for rash and wound. (transient ischemic attack)  Diagnosis management comments: Patient is a 71-year-old female with a history of alcoholism who [facility] week [facility]. Patient presents due to noted neuro deficit that has since resolved. Patient states she may have a history of a stroke in the past but is uncertain. Patient presents awake, alert, following all commands, speaking in full sentences, no neuro deficits, neurologically intact, hemodynamically stable. Patient's symptoms that are described by EMS are not present. Patient physical exam is unremarkable. Patient work-up including labs shows no leukocytosis or anemia. Patient is noted hypokalemic and hypomagnesemic, both repleted. Troponin is negative, EKG shows sinus mechanism without ectopy or signs of ischemia. CT of the head is unremarkable for intracranial pathology. X-rays of the chest, shoulder and hips show no acute pathology, do show osteoarthritis. Patient with likely neurological symptoms that resolved within 24 hours, diagnosed with likely TIA. Plan for further evaluation and admission was discussed with patient, she is agreeable. Hospitalist was consulted and patient was discussed, accepted to their service. Patient was monitored in the emergency department that further change and was admitted in stable condition. Amount and/or Complexity of Data Reviewed  Clinical lab tests: ordered and reviewed  Tests in the radiology section of CPT®: ordered and reviewed       ED Course as of Apr 14 2121   Tue Apr 13, 2021   1051 EKG: This EKG is signed and interpreted by me.     Rate: 67  Rhythm: Sinus  Interpretation: Normal sinus rhythm, normal HI interval, normal QRS, normal QT interval, no acute ST or T wave changes, nonspecific T wave abnormalities appear similar when compared to prior EKG  Comparison: stable as compared to patient's most recent EKG        [JA]      ED Course User Index  [JA] Aldo Gross MD Vince      ED Course as of Apr 14 2121   Tue Apr 13, 2021   1051 EKG: This EKG is signed and interpreted by me. Rate: 67  Rhythm: Sinus  Interpretation: Normal sinus rhythm, normal CT interval, normal QRS, normal QT interval, no acute ST or T wave changes, nonspecific T wave abnormalities appear similar when compared to prior EKG  Comparison: stable as compared to patient's most recent EKG        [JA]      ED Course User Index  [JA] Shyanne Ceja MD       --------------------------------------------- PAST HISTORY ---------------------------------------------  Past Medical History:  has a past medical history of Asthma, CAD (coronary artery disease), Closed fracture of proximal end of left humerus with routine healing, Degeneration of lumbar or lumbosacral intervertebral disc, Fall against sharp object, History of blood transfusion, Hypertension, Hypokalemia, Insomnia, Kidney stone, Nondisplaced fracture of greater tuberosity of right humerus, initial encounter for closed fracture, Orthostatic hypotension, and Severe back pain. Past Surgical History:  has a past surgical history that includes Tonsillectomy; Bunionectomy; chest tube insertion (1990); Lithotripsy (2012); Colonoscopy (3/26/09); other surgical history; Upper gastrointestinal endoscopy (3/30/15); Upper gastrointestinal endoscopy (4/21/08); lumbar fusion (11/09/2017); and Kyphosis surgery (N/A, 10/28/2019). Social History:  reports that she has quit smoking. She started smoking about 2 years ago. She has a 1.00 pack-year smoking history. She has never used smokeless tobacco. She reports current alcohol use of about 6.0 standard drinks of alcohol per week. She reports that she does not use drugs. Family History: family history includes Cancer in her brother; Diabetes in her mother; Heart Disease in her maternal grandfather, maternal grandmother, and sister; Hypertension in her mother; Stroke in her sister.      The patients home medications have been reviewed.     Allergies: Dilaudid [hydromorphone hcl] and Morphine    -------------------------------------------------- RESULTS -------------------------------------------------    LABS:  Results for orders placed or performed during the hospital encounter of 04/13/21   CBC Auto Differential   Result Value Ref Range    WBC 6.9 4.5 - 11.5 E9/L    RBC 3.46 (L) 3.50 - 5.50 E12/L    Hemoglobin 11.7 11.5 - 15.5 g/dL    Hematocrit 36.4 34.0 - 48.0 %    .2 (H) 80.0 - 99.9 fL    MCH 33.8 26.0 - 35.0 pg    MCHC 32.1 32.0 - 34.5 %    RDW 14.0 11.5 - 15.0 fL    Platelets 851 890 - 394 E9/L    MPV 11.4 7.0 - 12.0 fL    Neutrophils % 63.3 43.0 - 80.0 %    Immature Granulocytes % 0.6 0.0 - 5.0 %    Lymphocytes % 21.4 20.0 - 42.0 %    Monocytes % 13.1 (H) 2.0 - 12.0 %    Eosinophils % 0.7 0.0 - 6.0 %    Basophils % 0.9 0.0 - 2.0 %    Neutrophils Absolute 4.36 1.80 - 7.30 E9/L    Immature Granulocytes # 0.04 E9/L    Lymphocytes Absolute 1.47 (L) 1.50 - 4.00 E9/L    Monocytes Absolute 0.90 0.10 - 0.95 E9/L    Eosinophils Absolute 0.05 0.05 - 0.50 E9/L    Basophils Absolute 0.06 0.00 - 0.20 E9/L   Comprehensive Metabolic Panel w/ Reflex to MG   Result Value Ref Range    Sodium 142 132 - 146 mmol/L    Potassium reflex Magnesium 3.0 (L) 3.5 - 5.0 mmol/L    Chloride 101 98 - 107 mmol/L    CO2 29 22 - 29 mmol/L    Anion Gap 12 7 - 16 mmol/L    Glucose 117 (H) 74 - 99 mg/dL    BUN 6 (L) 8 - 23 mg/dL    CREATININE 0.5 0.5 - 1.0 mg/dL    GFR Non-African American >60 >=60 mL/min/1.73    GFR African American >60     Calcium 9.6 8.6 - 10.2 mg/dL    Total Protein 6.7 6.4 - 8.3 g/dL    Albumin 4.2 3.5 - 5.2 g/dL    Total Bilirubin 0.4 0.0 - 1.2 mg/dL    Alkaline Phosphatase 79 35 - 104 U/L    ALT 35 (H) 0 - 32 U/L    AST 45 (H) 0 - 31 U/L   Troponin   Result Value Ref Range    Troponin <0.01 0.00 - 0.03 ng/mL   Magnesium   Result Value Ref Range    Magnesium 1.1 (LL) 1.6 - 2.6 mg/dL   Magnesium   Result Value Ref Range    Magnesium 1.6 1.6 - 2.6 mg/dL   Phosphorus   Result Value Ref Range    Phosphorus 3.9 2.5 - 4.5 mg/dL   EKG 12 Lead   Result Value Ref Range    Ventricular Rate 67 BPM    Atrial Rate 67 BPM    P-R Interval 148 ms    QRS Duration 80 ms    Q-T Interval 418 ms    QTc Calculation (Bazett) 441 ms    P Axis 57 degrees    R Axis 39 degrees    T Axis 63 degrees       RADIOLOGY:  MRI BRAIN WO CONTRAST   Final Result   Chronic microvascular disease without acute intracranial abnormality. XR HIP 2-3 VW W PELVIS LEFT   Final Result   No acute abnormality of the hip. XR CHEST PORTABLE   Final Result   Very mild osteoarthritis at the left [facility] joint. Unremarkable chest.         XR SHOULDER LEFT (MIN 2 VIEWS)   Final Result   Very mild osteoarthritis at the left [facility] joint. Unremarkable chest.         CT Head WO Contrast   Final Result   1. There is no acute intracranial abnormality. Specifically, there is no   intracranial hemorrhage. 2. Mild atrophy and periventricular leukomalacia,           EKG: This EKG is signed and interpreted by me. Rate: 67  Rhythm: Sinus   Interpretation: no acute changes  Comparison: stable as compared to patient's most recent EKG      ------------------------- NURSING NOTES AND VITALS REVIEWED ---------------------------  Date / Time Roomed:  4/13/2021  8:45 AM  ED Bed Assignment:  [id number]    The nursing notes within the ED encounter and vital signs as below have been reviewed.      Patient Vitals for the past 24 hrs:   BP Temp Temp src Pulse Resp SpO2   04/14/21 0816 116/88 98.3 °F (36.8 °C) Oral 80 20 100 %       Oxygen Saturation Interpretation: Normal    ------------------------------------------ PROGRESS NOTES ------------------------------------------  Re-evaluation(s):  Patients symptoms show no change  Repeat physical examination is not changed    Counseling:  I have spoken with the patient and discussed todays results, in addition to providing specific

## 2021-04-13 NOTE — ED NOTES
Called into room by  who states that pt fell. Found patient on floor. She stated she fell from standing. She states she saw her daughter in her room \"messing with her\". No family present today. Pt denies hitting head but c/o left hip pain. Able to help patient sit up and sit in chair x 1 assist.  Pt ambulated to bed x 2 standby assist, able to bear weight on bilateral legs. VS checked, Dr. Spears notified, CRISTI Jones evaluated patient, [facility] charge nurse and myself present in room during evaluation. Patient alert x 3 but confused on situation. Pt back in bed resting comfortably with bed alarm on.      Bang Callejas RN  04/13/21 2403

## 2021-04-14 ENCOUNTER — APPOINTMENT (OUTPATIENT)
Dept: MRI IMAGING | Age: 61
End: 2021-04-14
Payer: MEDICAID

## 2021-04-14 LAB
EKG ATRIAL RATE: 67 BPM
EKG P AXIS: 57 DEGREES
EKG P-R INTERVAL: 148 MS
EKG Q-T INTERVAL: 418 MS
EKG QRS DURATION: 80 MS
EKG QTC CALCULATION (BAZETT): 441 MS
EKG R AXIS: 39 DEGREES
EKG T AXIS: 63 DEGREES
EKG VENTRICULAR RATE: 67 BPM
MAGNESIUM: 1.6 MG/DL (ref 1.6–2.6)
PHOSPHORUS: 3.9 MG/DL (ref 2.5–4.5)

## 2021-04-14 PROCEDURE — 99225 PR SBSQ OBSERVATION CARE/DAY 25 MINUTES: CPT | Performed by: INTERNAL MEDICINE

## 2021-04-14 PROCEDURE — 83735 ASSAY OF MAGNESIUM: CPT

## 2021-04-14 PROCEDURE — 2580000003 HC RX 258: Performed by: INTERNAL MEDICINE

## 2021-04-14 PROCEDURE — 6360000002 HC RX W HCPCS: Performed by: INTERNAL MEDICINE

## 2021-04-14 PROCEDURE — 6370000000 HC RX 637 (ALT 250 FOR IP): Performed by: INTERNAL MEDICINE

## 2021-04-14 PROCEDURE — 96372 THER/PROPH/DIAG INJ SC/IM: CPT

## 2021-04-14 PROCEDURE — 36415 COLL VENOUS BLD VENIPUNCTURE: CPT

## 2021-04-14 PROCEDURE — G0378 HOSPITAL OBSERVATION PER HR: HCPCS

## 2021-04-14 PROCEDURE — 70551 MRI BRAIN STEM W/O DYE: CPT

## 2021-04-14 PROCEDURE — 99215 OFFICE O/P EST HI 40 MIN: CPT | Performed by: PSYCHIATRY & NEUROLOGY

## 2021-04-14 PROCEDURE — 84100 ASSAY OF PHOSPHORUS: CPT

## 2021-04-14 RX ORDER — BENZONATATE 100 MG/1
100 CAPSULE ORAL 3 TIMES DAILY PRN
Status: DISCONTINUED | OUTPATIENT
Start: 2021-04-14 | End: 2021-04-16 | Stop reason: HOSPADM

## 2021-04-14 RX ORDER — LANOLIN ALCOHOL/MO/W.PET/CERES
6 CREAM (GRAM) TOPICAL NIGHTLY PRN
Status: DISCONTINUED | OUTPATIENT
Start: 2021-04-15 | End: 2021-04-14

## 2021-04-14 RX ORDER — LANOLIN ALCOHOL/MO/W.PET/CERES
6 CREAM (GRAM) TOPICAL NIGHTLY PRN
Status: DISCONTINUED | OUTPATIENT
Start: 2021-04-14 | End: 2021-04-16 | Stop reason: HOSPADM

## 2021-04-14 RX ADMIN — FAMOTIDINE 20 MG: 20 TABLET ORAL at 21:29

## 2021-04-14 RX ADMIN — PANTOPRAZOLE SODIUM 40 MG: 40 TABLET, DELAYED RELEASE ORAL at 05:32

## 2021-04-14 RX ADMIN — ENOXAPARIN SODIUM 40 MG: 40 INJECTION SUBCUTANEOUS at 21:29

## 2021-04-14 RX ADMIN — ATORVASTATIN CALCIUM 40 MG: 40 TABLET, FILM COATED ORAL at 21:29

## 2021-04-14 RX ADMIN — HYDROCODONE BITARTRATE AND ACETAMINOPHEN 1 TABLET: 5; 325 TABLET ORAL at 23:12

## 2021-04-14 RX ADMIN — POTASSIUM CHLORIDE 40 MEQ: 1500 TABLET, EXTENDED RELEASE ORAL at 08:23

## 2021-04-14 RX ADMIN — GABAPENTIN 300 MG: 300 CAPSULE ORAL at 14:25

## 2021-04-14 RX ADMIN — Medication 10 ML: at 21:29

## 2021-04-14 RX ADMIN — FAMOTIDINE 20 MG: 20 TABLET ORAL at 08:23

## 2021-04-14 RX ADMIN — HYDROCODONE BITARTRATE AND ACETAMINOPHEN 1 TABLET: 5; 325 TABLET ORAL at 16:58

## 2021-04-14 RX ADMIN — Medication 6 MG: at 23:46

## 2021-04-14 RX ADMIN — GABAPENTIN 300 MG: 300 CAPSULE ORAL at 08:23

## 2021-04-14 RX ADMIN — AMITRIPTYLINE HYDROCHLORIDE 25 MG: 25 TABLET, FILM COATED ORAL at 21:31

## 2021-04-14 RX ADMIN — GABAPENTIN 300 MG: 300 CAPSULE ORAL at 21:29

## 2021-04-14 RX ADMIN — ASPIRIN 81 MG: 81 TABLET, CHEWABLE ORAL at 08:23

## 2021-04-14 RX ADMIN — BENZONATATE 100 MG: 100 CAPSULE ORAL at 23:46

## 2021-04-14 ASSESSMENT — PAIN SCALES - GENERAL
PAINLEVEL_OUTOF10: 8
PAINLEVEL_OUTOF10: 8
PAINLEVEL_OUTOF10: 0

## 2021-04-14 NOTE — CONSULTS
ablation for rectal and sigmoid polyps    TONSILLECTOMY      UPPER GASTROINTESTINAL ENDOSCOPY  3/30/15    UPPER GASTROINTESTINAL ENDOSCOPY  4/21/08       Social History:  Social History     Tobacco Use   Smoking Status Former Smoker    Packs/day: 0.50    Years: 2.00    Pack years: 1.00    Start date: 12/7/2018   Smokeless Tobacco Never Used     Social History     Substance and Sexual Activity   Alcohol Use Yes    Alcohol/week: 6.0 standard drinks    Types: 6 Cans of beer per week    Comment: daily     Social History     Substance and Sexual Activity   Drug Use No    Comment: quit 2000 cocaine in past     Legally    Presently in alcohol recovery and seems very motivated to continue with alcohol cessation    Family History:       Problem Relation Age of Onset    Diabetes Mother     Hypertension Mother     Heart Disease Sister     Stroke Sister     Heart Disease Maternal Grandmother     Heart Disease Maternal Grandfather     Cancer Brother        Review of Systems:  CONSTITUTIONAL:  negative for fever or recent illness  EYE:  No recent visual changes. No diplopia. Denies any history of strabismus. ENT:  negative for dysphagia  RESPIRATORY:  negative for dyspnea  CARDIOVASCULAR:  negative for chest pain  GASTROINTESTINAL:  negative for nausea  HEMATOLOGIC/LYMPHATIC:  negative for unusual bleeding  MUSCULOSKELETAL: Arthralgias particularly with acute and severe right knee pain  SKIN: negative for rash  GENITOURINARY: negative for dysuria  NEUROLOGIC:  see HPI    Allergies:     Allergies   Allergen Reactions    Dilaudid [Hydromorphone Hcl] Itching    Morphine Other (See Comments)     Hallucinations per daughter        Current Medications:   ipratropium-albuterol (DUONEB) nebulizer solution 1 ampule, Q4H PRN  amitriptyline (ELAVIL) tablet 25 mg, Daily  famotidine (PEPCID) tablet 20 mg, BID  gabapentin (NEURONTIN) capsule 300 mg, TID  HYDROcodone-acetaminophen (NORCO) 5-325 MG per tablet 1 daily  diphenhydrAMINE (BENADRYL) 50 MG capsule, Take 50 mg by mouth every 6 hours as needed for Allergies or Sleep  loratadine (CLARITIN) 10 MG tablet, Take 10 mg by mouth daily as needed (SINUS CONGESTION)  benzocaine-menthol (CEPACOL SORE THROAT) 15-3.6 MG lozenge, Take 1 lozenge by mouth every 2 hours as needed for Sore Throat  cloNIDine (CATAPRES) 0.1 MG tablet, Take 0.1 mg by mouth every 2 hours as needed (SWEATS/ANXIETY *DO NOT GIVE SBP < OR =100*)  hydrOXYzine (ATARAX) 50 MG tablet, Take 100 mg by mouth every 6 hours as needed for Anxiety  ondansetron (ZOFRAN) 4 MG tablet, Take 4 mg by mouth every 6 hours as needed for Nausea  ondansetron (ZOFRAN) 4 MG/2ML injection, Infuse 8 mg intravenously every 6 hours as needed for Nausea or Vomiting  loperamide (IMODIUM) 2 MG capsule, Take 2 mg by mouth as needed (AFTER EACH LOOSE STOOL *DO NOT EXCEED 4 DOSES IN 24 HOURS*)  docusate sodium (COLACE) 100 MG capsule, Take 100 mg by mouth daily as needed for Constipation  amitriptyline (ELAVIL) 25 MG tablet, Take 25 mg by mouth nightly   omeprazole (PRILOSEC) 40 MG capsule, Take 40 mg by mouth daily as needed     Physical Exam:  /88   Pulse 80   Temp 98.3 °F (36.8 °C) (Oral)   Resp 20   Ht 5' 3\" (1.6 m)   Wt 112 lb (50.8 kg)   SpO2 100%   BMI 19.84 kg/m²  I Body mass index is 19.84 kg/m². I   Wt Readings from Last 1 Encounters:   04/13/21 112 lb (50.8 kg)        GENERAL: she is in no apparent distress, and appears stated age   EYE:  Fundi not examined due to the Covid-19 outbreak  CARDIOVASCULAR:  Heart regular rate and rhythm. No carotid bruits detected. NEUROLOGIC:  Level of Alertness: alert  Orientation: oriented to person, place and time  Memory and Fund of Knowledge: Not formally assessed. She was able to name the president but stumbled over his name a little and was a little hesitant.   Attention/Concentration: normal  Language: no aphasia; had a little trouble following more complex commands  Cranial Nerves: pupils are equal; at times I have movements look conjugate but at other times her right eye appears to be deviated a little medially; facial strength and sensation are intact; hearing is intact to soft voice; the palate raises midline, and the tongue protrudes midline; shoulder shrug is symmetric  Motor Exam: Normal tone in all extremities. Strength is MRC grade 5 in all extremities bilaterally. Sensory: Sensory symmetric to light touch  Coordination: Cerebellar function is intact for the nose-finger-nose maneuver, and for rapid alternating movements  Deep Tendon Reflexes: Brisk and symmetric  Abnormal movements: none  Station and gait:  Did not make her get up due to complaints of right knee pain    Diagnostics:  CBC:   Lab Results   Component Value Date    WBC 6.9 04/13/2021    RBC 3.46 04/13/2021    HGB 11.7 04/13/2021    HCT 36.4 04/13/2021    .2 04/13/2021    MCH 33.8 04/13/2021    MCHC 32.1 04/13/2021    RDW 14.0 04/13/2021     04/13/2021    MPV 11.4 04/13/2021     CMP:    Lab Results   Component Value Date     04/13/2021    K 3.0 04/13/2021     04/13/2021    CO2 29 04/13/2021    BUN 6 04/13/2021    CREATININE 0.5 04/13/2021    GFRAA >60 04/13/2021    LABGLOM >60 04/13/2021    GLUCOSE 117 04/13/2021    GLUCOSE 84 11/28/2011    PROT 6.7 04/13/2021    LABALBU 4.2 04/13/2021    LABALBU 4.5 11/28/2011    CALCIUM 9.6 04/13/2021    BILITOT 0.4 04/13/2021    ALKPHOS 79 04/13/2021    AST 45 04/13/2021    ALT 35 04/13/2021     PT/INR:    Lab Results   Component Value Date    PROTIME 10.3 10/28/2019    INR 0.9 10/28/2019       MRI brain images reviewed: Minimal small vessel disease  Carotid ultrasound last year in May showed no significant stenosis    Impression:  Vague symptoms precipitated admission, with TIA a remote possibility. Musculoskeletal issues would be in the differential diagnosis. May have some chronic gait issues related to her alcoholism.   Severe, acute, right knee pain.  Her knee feels hot to the touch and this may need further evaluation. Episodes of syncope. The most recent occurred on arising, with a prodrome of lightheadedness. Rule out orthostatic hypotension. Her eyes are not not always aligned, but she has no diplopia and her MRI is negative. This might reflect an old strabismus. Differential diagnosis might include a chronic cranial nerve palsy. Recommendations :  I was torn on whether or not to put the patient an antiplatelet agent, due to her history of alcoholism, but she seems quite motivated to stay off the alcohol so, in case this was a TIA, it would be reasonable to treat her with aspirin 81 mg daily. Her right knee probably needs some further evaluation  For the syncope with a prodrome of lightheadedness, will request orthostatic blood pressures. I appreciate the opportunity to participate in this pleasant patient's care.     Electronically signed by Penny Madrigal DO on 4/14/2021 at 2:58 PM

## 2021-04-14 NOTE — PROGRESS NOTES
extraocular eye movements intact, conjunctivae normal  ENT: tympanic membrane, external ear and ear canal normal bilaterally, oropharynx clear and moist with normal mucous membranes  Neck: neck supple and non tender without mass, no thyromegaly or thyroid nodules, no cervical lymphadenopathy   Pulmonary/Chest: clear to auscultation bilaterally- no wheezes, rales or rhonchi, normal air movement, no respiratory distress  Cardiovascular: normal rate, normal S1 and S2, no gallops, intact distal pulses and no carotid bruits  Abdomen: soft, non-tender, non-distended, normal bowel sounds, no masses or organomegaly      Recent Labs     04/13/21  1008      K 3.0*      CO2 29   BUN 6*   CREATININE 0.5   GLUCOSE 117*   CALCIUM 9.6       Recent Labs     04/13/21  1008   WBC 6.9   RBC 3.46*   HGB 11.7   HCT 36.4   .2*   MCH 33.8   MCHC 32.1   RDW 14.0      MPV 11.4         Radiology:   XR HIP 2-3 VW W PELVIS LEFT   Final Result   No acute abnormality of the hip. XR CHEST PORTABLE   Final Result   Very mild osteoarthritis at the left [facility] joint. Unremarkable chest.         XR SHOULDER LEFT (MIN 2 VIEWS)   Final Result   Very mild osteoarthritis at the left [facility] joint. Unremarkable chest.         CT Head WO Contrast   Final Result   1. There is no acute intracranial abnormality. Specifically, there is no   intracranial hemorrhage. 2. Mild atrophy and periventricular leukomalacia,         MRI BRAIN WO CONTRAST    (Results Pending)       Assessment:    Active Problems:    TIA (transient ischemic attack)  Resolved Problems:    * No resolved hospital problems. *      Plan:  1. Suspected TIA, MRI negative, neurology input pending. 2. Electrolyte imbalance, corrected  3. Recheck a.m. labs  4.  Alcoholism in the past.        Electronically signed by Garth Francis MD on 4/14/2021 at 8:33 AM

## 2021-04-15 ENCOUNTER — APPOINTMENT (OUTPATIENT)
Dept: OCCUPATIONAL THERAPY | Age: 61
End: 2021-04-15
Payer: MEDICAID

## 2021-04-15 PROCEDURE — 6360000002 HC RX W HCPCS: Performed by: INTERNAL MEDICINE

## 2021-04-15 PROCEDURE — 97161 PT EVAL LOW COMPLEX 20 MIN: CPT

## 2021-04-15 PROCEDURE — 6370000000 HC RX 637 (ALT 250 FOR IP): Performed by: INTERNAL MEDICINE

## 2021-04-15 PROCEDURE — G0378 HOSPITAL OBSERVATION PER HR: HCPCS

## 2021-04-15 PROCEDURE — 97165 OT EVAL LOW COMPLEX 30 MIN: CPT

## 2021-04-15 PROCEDURE — 2580000003 HC RX 258: Performed by: INTERNAL MEDICINE

## 2021-04-15 PROCEDURE — 97530 THERAPEUTIC ACTIVITIES: CPT

## 2021-04-15 PROCEDURE — 99226 PR SBSQ OBSERVATION CARE/DAY 35 MINUTES: CPT | Performed by: INTERNAL MEDICINE

## 2021-04-15 PROCEDURE — 96372 THER/PROPH/DIAG INJ SC/IM: CPT

## 2021-04-15 RX ORDER — CETIRIZINE HYDROCHLORIDE 5 MG/1
5 TABLET ORAL DAILY PRN
Status: DISCONTINUED | OUTPATIENT
Start: 2021-04-15 | End: 2021-04-16 | Stop reason: HOSPADM

## 2021-04-15 RX ORDER — 0.9 % SODIUM CHLORIDE 0.9 %
500 INTRAVENOUS SOLUTION INTRAVENOUS ONCE
Status: COMPLETED | OUTPATIENT
Start: 2021-04-15 | End: 2021-04-15

## 2021-04-15 RX ADMIN — CETIRIZINE HYDROCHLORIDE 5 MG: 1 SOLUTION ORAL at 20:15

## 2021-04-15 RX ADMIN — ATORVASTATIN CALCIUM 40 MG: 40 TABLET, FILM COATED ORAL at 20:15

## 2021-04-15 RX ADMIN — ASPIRIN 81 MG: 81 TABLET, CHEWABLE ORAL at 08:32

## 2021-04-15 RX ADMIN — SODIUM CHLORIDE 500 ML: 9 INJECTION, SOLUTION INTRAVENOUS at 12:57

## 2021-04-15 RX ADMIN — HYDROCODONE BITARTRATE AND ACETAMINOPHEN 1 TABLET: 5; 325 TABLET ORAL at 15:43

## 2021-04-15 RX ADMIN — FAMOTIDINE 20 MG: 20 TABLET ORAL at 08:32

## 2021-04-15 RX ADMIN — HYDROCODONE BITARTRATE AND ACETAMINOPHEN 1 TABLET: 5; 325 TABLET ORAL at 08:32

## 2021-04-15 RX ADMIN — GABAPENTIN 300 MG: 300 CAPSULE ORAL at 13:41

## 2021-04-15 RX ADMIN — Medication 10 ML: at 20:15

## 2021-04-15 RX ADMIN — GABAPENTIN 300 MG: 300 CAPSULE ORAL at 20:15

## 2021-04-15 RX ADMIN — ENOXAPARIN SODIUM 40 MG: 40 INJECTION SUBCUTANEOUS at 20:15

## 2021-04-15 RX ADMIN — POTASSIUM CHLORIDE 40 MEQ: 1500 TABLET, EXTENDED RELEASE ORAL at 08:32

## 2021-04-15 RX ADMIN — Medication 10 ML: at 08:33

## 2021-04-15 RX ADMIN — FAMOTIDINE 20 MG: 20 TABLET ORAL at 20:15

## 2021-04-15 RX ADMIN — BENZONATATE 100 MG: 100 CAPSULE ORAL at 22:32

## 2021-04-15 RX ADMIN — AMITRIPTYLINE HYDROCHLORIDE 25 MG: 25 TABLET, FILM COATED ORAL at 20:15

## 2021-04-15 RX ADMIN — GABAPENTIN 300 MG: 300 CAPSULE ORAL at 08:32

## 2021-04-15 RX ADMIN — BENZONATATE 100 MG: 100 CAPSULE ORAL at 15:42

## 2021-04-15 RX ADMIN — HYDROCODONE BITARTRATE AND ACETAMINOPHEN 1 TABLET: 5; 325 TABLET ORAL at 22:25

## 2021-04-15 RX ADMIN — PANTOPRAZOLE SODIUM 40 MG: 40 TABLET, DELAYED RELEASE ORAL at 05:58

## 2021-04-15 RX ADMIN — BENZONATATE 100 MG: 100 CAPSULE ORAL at 08:32

## 2021-04-15 RX ADMIN — Medication 6 MG: at 22:25

## 2021-04-15 ASSESSMENT — PAIN DESCRIPTION - DESCRIPTORS: DESCRIPTORS: ACHING

## 2021-04-15 ASSESSMENT — PAIN DESCRIPTION - ONSET: ONSET: ON-GOING

## 2021-04-15 ASSESSMENT — PAIN DESCRIPTION - LOCATION: LOCATION: KNEE

## 2021-04-15 ASSESSMENT — PAIN SCALES - GENERAL
PAINLEVEL_OUTOF10: 7
PAINLEVEL_OUTOF10: 3
PAINLEVEL_OUTOF10: 7
PAINLEVEL_OUTOF10: 7

## 2021-04-15 ASSESSMENT — PAIN DESCRIPTION - ORIENTATION
ORIENTATION: LEFT
ORIENTATION: LEFT

## 2021-04-15 ASSESSMENT — PAIN DESCRIPTION - PAIN TYPE: TYPE: ACUTE PAIN

## 2021-04-15 ASSESSMENT — PAIN DESCRIPTION - FREQUENCY
FREQUENCY: INTERMITTENT
FREQUENCY: INTERMITTENT

## 2021-04-15 ASSESSMENT — PAIN DESCRIPTION - PROGRESSION: CLINICAL_PROGRESSION: NOT CHANGED

## 2021-04-15 ASSESSMENT — PAIN - FUNCTIONAL ASSESSMENT: PAIN_FUNCTIONAL_ASSESSMENT: ACTIVITIES ARE NOT PREVENTED

## 2021-04-15 NOTE — PROGRESS NOTES
Therapeutic activities 86571 10 minutes  [] Therapeutic exercises 91594 ** minutes  [] Neuromuscular reeducation 20677 ** minutes     Zeferino Moody., P.T.   License Number: [id number]

## 2021-04-15 NOTE — PROGRESS NOTES
Not assessed. Patient seated at EOB upon start and conclusion of this session. Functional Transfers Sit-to-Stand: CGA   from EOB  Independet   Functional Mobility CGA  (without device) within patient's room  Mod I / Independent with functional mobility (with device, as needed/appropriate) in order to maximize independence with ADLs/IADLs and other functional tasks. Balance Sitting: Good  (at EOB)  Standing: Fair-  (without device)  Fair+ dynamic standing balance during completion of ADLs/IADLs and other functional tasks. Activity Tolerance Limited secondary to R knee pain. Patient will demonstrate Good understanding and consistent implementation of energy conservation techniques and work simplification techniques into ADL/IADL routines. Visual/  Perceptual WFL grossly  Patient wears glasses consistently. Patient denied recent change(s) in her vision. N/A   B UE Strength L UE: 4-/5 grossly  R Shoulder: 3+/5  Distal R UE: 3/5 to 3+/5 grossly  Patient will demonstrate 4/5 B UE strength in order to maximize independence with ADLs/IADLs and functional transfers. Long-Term Goal: Patient will increase functional independence to PLOF in order to allow patient to live in least restrictive environment. ROM: Additional Information:    R UE  AROM WFL grossly 3+/5  strength   L UE WFL    Hand Dominance: Left    Hearing: WFL  Sensation: Patient denied experiencing numbness/tingling in B UEs. Tone: WFL  Edema: No    Comments: RN approved patient's participation in [address] [place] [address] activities. Upon arrival, patient seated at EOB. At end of session, patient seated at EOB with call light and phone within reach, bed alarm activated, and all lines and tubes intact. Patient would benefit from continued skilled OT to increase safety and independence with completion of ADL/IADL tasks for functional independence and quality of life.      Assessment of Current Deficits:   Functional Mobility [x]  ADLs [x] Strength care. Demonstrated Fair understanding. Eval Complexity: Low    Time In: 1200  Time Out: 1215  Total Treatment Time: 0 minutes      Minutes Units   OT Eval Low 27518 15 1   OT Eval Medium 35427     OT Eval High 96350     OT Re-Eval [id number]     Therapeutic Ex 19322     Therapeutic Activities 48081     ADL/Self Care 63949     Orthotic Management 47158     Neuro Re-Ed 11975     Non-Billable Time N/A ---     Evaluation time includes thorough review of current medical information, gathering information on past medical history/social history and prior level of function, completion of standardized testing/informal observation of tasks, assessment of data, and education on plan of care and goals. Franca Issa, OTR/L  License Number: [id number]

## 2021-04-15 NOTE — CARE COORDINATION
Social Work / Discharge Planning : SW met with patient. Patient goal is [facility] residential program for in patient care. Patient alert and oriented and looking well. Patient in agreement for help. SW called [facility] day and spoke to [facility]. . Patient was getting ready to transition into [facility] residential when sent to hospital. Due to being admitted, she was discharged from their services. In order to get her enrolled in residential they will need to review patient medical record. Once therapy completed, will fax over medical record to [facility]. SAKINA has updated both patient and daughter who are also aware on on board. SW to follow. Electronically signed by MICHELLE Cagle on 4/15/21 at 12:02 PM EDT      Addendum : SAKINA faxed over patient medical information to [facility] [phone] from [facility] residential program.. AWait acceptance/ denial. If patient is denied at [place] day, Wayne Lennox from [facility] along with this SW will follow up with patient for another in patient rehab. Patient aware. SW to follow.  Electronically signed by MICHELLE Cagle on 4/15/21 at 2:24 PM EDT

## 2021-04-15 NOTE — PROGRESS NOTES
[facility] Progress Note    Admitting Date and Time: 4/13/2021  8:45 AM  Admit Dx: TIA (transient ischemic attack) [G45.9]    Subjective:  Patient is being followed for TIA (transient ischemic attack) [G45.9]   Pt feels back to normal    ROS: denies fever, chills, cp, sob, n/v, HA unless stated above.      amitriptyline  25 mg Oral Daily    famotidine  20 mg Oral BID    gabapentin  300 mg Oral TID    pantoprazole  40 mg Oral QAM AC    potassium chloride  40 mEq Oral Daily    nicotine  1 patch Transdermal Daily    sodium chloride flush  5-40 mL Intravenous 2 times per day    enoxaparin  40 mg Subcutaneous Daily    aspirin  81 mg Oral Daily    atorvastatin  40 mg Oral Nightly     benzonatate, 100 mg, TID PRN  melatonin, 6 mg, Nightly PRN  ipratropium-albuterol, 1 ampule, Q4H PRN  HYDROcodone-acetaminophen, 1 tablet, Q6H PRN  sodium chloride flush, 5-40 mL, PRN  sodium chloride, 25 mL, PRN  promethazine, 12.5 mg, Q6H PRN    Or  ondansetron, 4 mg, Q6H PRN  polyethylene glycol, 17 g, Daily PRN  acetaminophen, 650 mg, Q6H PRN    Or  acetaminophen, 650 mg, Q6H PRN         Objective:    /73   Pulse 78   Temp 97.6 °F (36.4 °C) (Oral)   Resp 18   Ht 5' 3\" (1.6 m)   Wt 112 lb 5 oz (50.9 kg)   SpO2 99%   BMI 19.90 kg/m²     General Appearance: alert and oriented to person, place and time and in no acute distress  Skin: warm and dry  Head: normocephalic and atraumatic  Eyes: pupils equal, round, and reactive to light, extraocular eye movements intact, conjunctivae normal  Neck: neck supple and non tender without mass   Pulmonary/Chest: clear to auscultation bilaterally- no wheezes, rales or rhonchi, normal air movement, no respiratory distress  Cardiovascular: normal rate, normal S1 and S2 and no carotid bruits  Abdomen: soft, non-tender, non-distended, normal bowel sounds, no masses or organomegaly  Extremities: no cyanosis, no clubbing and no edema  Neurologic: no cranial nerve deficit and speech normal        Recent Labs     04/13/21  1008      K 3.0*      CO2 29   BUN 6*   CREATININE 0.5   GLUCOSE 117*   CALCIUM 9.6       Recent Labs     04/13/21  1008   WBC 6.9   RBC 3.46*   HGB 11.7   HCT 36.4   .2*   MCH 33.8   MCHC 32.1   RDW 14.0      MPV 11.4           Assessment:    Active Problems:    TIA (transient ischemic attack)  Resolved Problems:    * No resolved hospital problems. *      Plan:  1. Possible TIA still not able to rule it out, appreciate neurology input, MRI was negative, started patient on aspirin statin, patient might have agreed abnormalities due to alcoholism. 2.  Fall and syncope, orthostatics are positive, will give the patient a bolus of normal saline and monitor. 3.  Alcohol abuse, presented from detox center, trying ot verify the action plan from that facility        NOTE: This report was transcribed using voice recognition software. Every effort was made to ensure accuracy; however, inadvertent computerized transcription errors may be present.   Electronically signed by Lorenzo Locke MD on 4/15/2021 at 12:18 PM

## 2021-04-15 NOTE — PROGRESS NOTES
[facility] Progress Note    Admitting Date and Time: 4/13/2021  8:45 AM  Admit Dx: TIA (transient ischemic attack) [G45.9]    Subjective:  Patient is being followed for TIA (transient ischemic attack) [G45.9]       Pt sitting on edge of bed in no acute distress  Reporting she is not having any withdrawal symptoms  Feels ok  Biggest issue is right knee pain following fall  + orthostatic vitals  Reporting dizziness with standing at times              ROS: denies fever, chills, cp, sob, n/v, HA unless stated above.       amitriptyline  25 mg Oral Daily    famotidine  20 mg Oral BID    gabapentin  300 mg Oral TID    pantoprazole  40 mg Oral QAM AC    potassium chloride  40 mEq Oral Daily    nicotine  1 patch Transdermal Daily    sodium chloride flush  5-40 mL Intravenous 2 times per day    enoxaparin  40 mg Subcutaneous Daily    aspirin  81 mg Oral Daily    atorvastatin  40 mg Oral Nightly     benzonatate, 100 mg, TID PRN  melatonin, 6 mg, Nightly PRN  ipratropium-albuterol, 1 ampule, Q4H PRN  HYDROcodone-acetaminophen, 1 tablet, Q6H PRN  sodium chloride flush, 5-40 mL, PRN  sodium chloride, 25 mL, PRN  promethazine, 12.5 mg, Q6H PRN    Or  ondansetron, 4 mg, Q6H PRN  polyethylene glycol, 17 g, Daily PRN  acetaminophen, 650 mg, Q6H PRN    Or  acetaminophen, 650 mg, Q6H PRN         Objective:    /73   Pulse 78   Temp 97.6 °F (36.4 °C) (Oral)   Resp 18   Ht 5' 3\" (1.6 m)   Wt 112 lb 5 oz (50.9 kg)   SpO2 96%   BMI 19.90 kg/m²     General Appearance: alert and oriented to person, place and time and in no acute distress  Skin: warm and dry  Head: normocephalic and atraumatic  Neck: neck supple and non tender without mass   Pulmonary/Chest: clear to auscultation bilaterally  Cardiovascular: normal rate, normal S1 and S2 and no carotid bruits  Abdomen: soft, non-tender, non-distended, normal bowel sounds, no masses or organomegaly  Extremities: no cyanosis, no clubbing and no edema,

## 2021-04-16 VITALS
RESPIRATION RATE: 16 BRPM | BODY MASS INDEX: 20.13 KG/M2 | WEIGHT: 113.6 LBS | SYSTOLIC BLOOD PRESSURE: 122 MMHG | HEIGHT: 63 IN | TEMPERATURE: 96.6 F | DIASTOLIC BLOOD PRESSURE: 80 MMHG | HEART RATE: 85 BPM | OXYGEN SATURATION: 97 %

## 2021-04-16 PROCEDURE — 6370000000 HC RX 637 (ALT 250 FOR IP): Performed by: INTERNAL MEDICINE

## 2021-04-16 PROCEDURE — APPSS30 APP SPLIT SHARED TIME 16-30 MINUTES: Performed by: NURSE PRACTITIONER

## 2021-04-16 PROCEDURE — APPSS45 APP SPLIT SHARED TIME 31-45 MINUTES: Performed by: NURSE PRACTITIONER

## 2021-04-16 PROCEDURE — G0378 HOSPITAL OBSERVATION PER HR: HCPCS

## 2021-04-16 PROCEDURE — 2580000003 HC RX 258: Performed by: INTERNAL MEDICINE

## 2021-04-16 PROCEDURE — 97530 THERAPEUTIC ACTIVITIES: CPT

## 2021-04-16 PROCEDURE — 99217 PR OBSERVATION CARE DISCHARGE MANAGEMENT: CPT | Performed by: INTERNAL MEDICINE

## 2021-04-16 RX ORDER — IPRATROPIUM BROMIDE AND ALBUTEROL SULFATE 2.5; .5 MG/3ML; MG/3ML
3 SOLUTION RESPIRATORY (INHALATION) EVERY 4 HOURS PRN
Qty: 360 ML | Refills: 0
Start: 2021-04-16 | End: 2021-04-16 | Stop reason: HOSPADM

## 2021-04-16 RX ORDER — ATORVASTATIN CALCIUM 40 MG/1
40 TABLET, FILM COATED ORAL NIGHTLY
Qty: 30 TABLET | Refills: 3 | Status: ON HOLD | OUTPATIENT
Start: 2021-04-16 | End: 2022-05-21

## 2021-04-16 RX ORDER — NICOTINE 21 MG/24HR
1 PATCH, TRANSDERMAL 24 HOURS TRANSDERMAL DAILY
Qty: 30 PATCH | Refills: 3 | Status: ON HOLD | OUTPATIENT
Start: 2021-04-16 | End: 2021-09-14

## 2021-04-16 RX ORDER — ASPIRIN 81 MG/1
81 TABLET, CHEWABLE ORAL DAILY
Qty: 30 TABLET | Refills: 0 | Status: ON HOLD
Start: 2021-04-17 | End: 2021-09-14

## 2021-04-16 RX ORDER — GABAPENTIN 300 MG/1
300 CAPSULE ORAL 3 TIMES DAILY
Qty: 90 CAPSULE | Refills: 3 | Status: SHIPPED | OUTPATIENT
Start: 2021-04-16 | End: 2021-04-16 | Stop reason: HOSPADM

## 2021-04-16 RX ADMIN — Medication 10 ML: at 08:11

## 2021-04-16 RX ADMIN — POTASSIUM CHLORIDE 40 MEQ: 1500 TABLET, EXTENDED RELEASE ORAL at 08:11

## 2021-04-16 RX ADMIN — BENZONATATE 100 MG: 100 CAPSULE ORAL at 08:11

## 2021-04-16 RX ADMIN — HYDROCODONE BITARTRATE AND ACETAMINOPHEN 1 TABLET: 5; 325 TABLET ORAL at 14:10

## 2021-04-16 RX ADMIN — FAMOTIDINE 20 MG: 20 TABLET ORAL at 08:11

## 2021-04-16 RX ADMIN — HYDROCODONE BITARTRATE AND ACETAMINOPHEN 1 TABLET: 5; 325 TABLET ORAL at 08:11

## 2021-04-16 RX ADMIN — GABAPENTIN 300 MG: 300 CAPSULE ORAL at 08:11

## 2021-04-16 RX ADMIN — ASPIRIN 81 MG: 81 TABLET, CHEWABLE ORAL at 08:11

## 2021-04-16 RX ADMIN — BENZONATATE 100 MG: 100 CAPSULE ORAL at 14:03

## 2021-04-16 RX ADMIN — PANTOPRAZOLE SODIUM 40 MG: 40 TABLET, DELAYED RELEASE ORAL at 05:32

## 2021-04-16 ASSESSMENT — PAIN - FUNCTIONAL ASSESSMENT: PAIN_FUNCTIONAL_ASSESSMENT: ACTIVITIES ARE NOT PREVENTED

## 2021-04-16 ASSESSMENT — PAIN DESCRIPTION - PROGRESSION: CLINICAL_PROGRESSION: NOT CHANGED

## 2021-04-16 ASSESSMENT — PAIN DESCRIPTION - DESCRIPTORS: DESCRIPTORS: ACHING;DISCOMFORT

## 2021-04-16 ASSESSMENT — PAIN DESCRIPTION - PAIN TYPE: TYPE: ACUTE PAIN

## 2021-04-16 ASSESSMENT — PAIN SCALES - GENERAL: PAINLEVEL_OUTOF10: 8

## 2021-04-16 NOTE — PROGRESS NOTES
Physical Therapy    Facility/Department: [address] MED SURG/TELE  Treatment Note  NAME: Burke Dancer  : 1960  MRN: [id number]    Date of Service: 2021      Attending Provider:  Johnny Diaz MD    Evaluating PT:  Alysha Encinas P.T. Room #:  7661/0905-G  Diagnosis:  TIA  Imaging: X-rays were negative for L hip, pelvis, and shoulder fractures and CT and MRI were negative for acute intracranial abnormality  Precautions:  Falls, bed/chair alarm   Equipment Needs:  Wheeled walker    SUBJECTIVE:    Pt lives alone in a 2 story home with 3 stairs and 1 rail to enter. There are 10 steps and 1 rail to 2nd floor bed and bath. Pt ambulated with a cane PRN PTA. OBJECTIVE:   Initial Evaluation  Date: 4/15/21 Treatment Short Term/ Long Term   Goals   Was pt agreeable to Eval/treatment? yes yes    Does pt have pain? C/o R knee pain from her fall in the ER No c/o pain    Bed Mobility  NA, pt was found sitting on EOB. Rolling: Independent  Supine to sit: Independent  Sit to supine: Independent  Scooting: Independent Independent   Transfers Sit to stand: supervision  Stand to sit: supervision  Stand pivot: SBA with no AD and supervision with ww Sit to stand: Independent  Stand to sit: Independent  Stand pivot: Independent Independent    Ambulation   20 feet with no AD SBA and 250 feet with ww supervision 200 feet with no AD Independent  250 feet with ww Independent    Stair negotiation: ascended and descended NA 10 steps with 1 rail Independent  10 steps with 1 rail with SPC if needed Independent    AM-PAC 6 Clicks  79/08      BLE ROM is WFL. BLE strength is grossly 4/5 to 4+/5  Balance: Independent    ASSESSMENT:    Comments:  Pt is feeling better today, no c/o pain and walked with more steady gait. She initially used ww to walk down the augustine to the stairs because she did not have her cane. She ascended/descended stairs with 1 rail and no AD and was steady and safe.   Pt walked back to her room

## 2021-04-16 NOTE — DISCHARGE SUMMARY
[facility] Physician Discharge Summary     Transfer to Inpatient ETOH rehab          POLLO [facility]  [phone]            Activity level: As tolerated    Dispo: Rehab     Condition on discharge: Stable     Patient ID:  Linard Nageotte  [id number]  61 y.o.  1960    Admit date: 4/13/2021    Discharge date and time:  4/16/2021  1:46 PM    Admission Diagnoses: Active Problems:    TIA (transient ischemic attack)  Resolved Problems:    * No resolved hospital problems. *      Discharge Diagnoses: Active Problems:    TIA (transient ischemic attack)  Resolved Problems:    * No resolved hospital problems. *      Consults:  IP CONSULT TO NEUROLOGY  IP CONSULT TO IV TEAM    Procedures: none    Hospital Course:   Patient Linard Nageotte is a 61 y.o. presented with TIA (transient ischemic attack) [G45.9]   Patient was sent in from [facility] for altered mental status. Neurology was consulted. She was treated for;    1.  Possible TIA: pt currently enrolled in alcohol rehab. She was 7 days sober on day of admission. She was sent to ER for concern that she was leaning towards the left and \" did not feel like herself. \" Did report that she has arthralgias and had been having left hip and shoulder pain for a couple days prior. In ER it was noted that pt was back to her baseline. Asa/ statin started on admission. MRI brain reviewed showing chronic microvascular disease without acute intracranial abnormality. Neurology consulted- appreciate input. Continue asa/ statin.     2. S/p fall. ? Syncopal event: pt was found on floor in her room on 4/13. Denied hitting head. Orthostatic vitals checked and positive on 4/15. S/p IVF. Denies dizziness/ light headedness today. Orthostatic bp better today.     3. Hypomagnesemia/ hypokalemia: supplement. On admission K+ 3.0 and Mg 1.1. For repeat BMP/ MG/PHOS in 3 days. Script given.     4.  Alcohol abuse: pt presented from detox. Per pt plan was for her to transition into residential inpt program. Social work looking into. Inpt etoh rehab set up.     5. Deconditioning: PT/OT.      6. Asthma: stable. Not in exacerbation     7. Right knee pain ? Contusion vs sprain: trial of ice. pt/ ot. Pt reporting feeling better today.      8. Orthostatic hypotension: rehydrate. IVF bolus yesterday. BP better today. 9. Per pt she no longer takes neurontin. She has been receiving. Discussed with pharmacy- no need to wean off. Can stop per pharmacy,     Patient was accepted at [facility]. She was then set up for dc with the following medications, instructions and follow up.        Discharge Exam:  General Appearance: alert and oriented to person, place and time and in no acute distress  Skin: warm and dry  Head: normocephalic and atraumatic  Neck: neck supple and non tender without mass   Pulmonary/Chest: clear to auscultation bilaterally  Cardiovascular: normal rate, normal S1 and S2 and no carotid bruits  Abdomen: soft, non-tender, non-distended, normal bowel sounds, no masses or organomegaly  Extremities: no cyanosis, no clubbing and no edema, pain right knee lateral side- tender to palpation. Full rom.   Neurologic: sspeech normal         No intake/output data recorded. No intake/output data recorded. LABS:  No results for input(s): NA, K, CL, CO2, BUN, CREATININE, GLUCOSE, CALCIUM in the last 72 hours. No results for input(s): WBC, RBC, HGB, HCT, MCV, MCH, MCHC, RDW, PLT, MPV in the last 72 hours. No results for input(s): POCGLU in the last 72 hours.     Imaging:  Ct Head Wo Contrast    Result Date: 4/13/2021  EXAMINATION: CT OF THE HEAD WITHOUT CONTRAST  4/13/2021 11:15 am TECHNIQUE: CT of the head was performed without the administration of intravenous contrast. Dose modulation, iterative reconstruction, and/or weight based adjustment of the mA/kV was utilized to reduce the radiation dose to as low as reasonably achievable. COMPARISON: 03/20/2021 HISTORY: ORDERING SYSTEM PROVIDED HISTORY: ? tia TECHNOLOGIST PROVIDED HISTORY: Reason for exam:->? tia Has a \"code stroke\" or \"stroke alert\" been called? ->No Decision Support Exception->Emergency Medical Condition (MA) FINDINGS: BRAIN/VENTRICLES: There is no acute intracranial hemorrhage, mass effect or midline shift. No abnormal extra-axial fluid collection. The gray-white differentiation is maintained without evidence of an acute infarct. There is no evidence of hydrocephalus. The ventricles, cisterns and sulci are prominent consistent with atrophy. There is decreased attenuation within the periventricular white matter consistent with periventricular leukomalacia. ORBITS: The visualized portion of the orbits demonstrate no acute abnormality. SINUSES: There is mild mucosal thickening seen within the maxillary sinuses. SOFT TISSUES/SKULL:  No acute abnormality of the visualized skull or soft tissues. 1. There is no acute intracranial abnormality. Specifically, there is no intracranial hemorrhage. 2. Mild atrophy and periventricular leukomalacia,     Xr Shoulder Left (min 2 Views)    Result Date: 4/13/2021  EXAMINATION: ONE XRAY VIEW OF THE CHEST; THREE XRAY VIEWS OF THE LEFT SHOULDER 4/13/2021 10:22 am COMPARISON: Chest dated March 20, 2021 HISTORY: ORDERING SYSTEM PROVIDED HISTORY: cough TECHNOLOGIST PROVIDED HISTORY: Reason for exam:->cough; ORDERING SYSTEM PROVIDED HISTORY: pain TECHNOLOGIST PROVIDED HISTORY: Reason for exam:->pain FINDINGS: Normal heart and pulmonary vascularity. Lungs are clear. Neither costophrenic angle is blunted. Left shoulder: No acute fracture or dislocation. Mild osteoarthritis at the left [facility] joint. Normal soft tissues. Very mild osteoarthritis at the left [facility] joint.   Unremarkable chest.     Xr Chest Portable    Result Date: 4/13/2021  EXAMINATION: ONE XRAY VIEW OF THE CHEST; THREE XRAY VIEWS OF THE LEFT SHOULDER 4/13/2021 10:22 am COMPARISON: Chest dated March 20, 2021 HISTORY: ORDERING SYSTEM PROVIDED HISTORY: cough TECHNOLOGIST PROVIDED HISTORY: Reason for exam:->cough; ORDERING SYSTEM PROVIDED HISTORY: pain TECHNOLOGIST PROVIDED HISTORY: Reason for exam:->pain FINDINGS: Normal heart and pulmonary vascularity. Lungs are clear. Neither costophrenic angle is blunted. Left shoulder: No acute fracture or dislocation. Mild osteoarthritis at the left [facility] joint. Normal soft tissues. Very mild osteoarthritis at the left [facility] joint. Unremarkable chest.     Mri Brain Wo Contrast    Result Date: 4/14/2021  EXAMINATION: MRI OF THE BRAIN WITHOUT CONTRAST  4/14/2021 3:07 pm TECHNIQUE: Multiplanar multisequence MRI of the brain was performed without the administration of intravenous contrast. COMPARISON: CT brain performed 04/13/2021. HISTORY: ORDERING SYSTEM PROVIDED HISTORY: TIA TECHNOLOGIST PROVIDED HISTORY: Reason for exam:->TIA FINDINGS: INTRACRANIAL STRUCTURES/VENTRICLES: The sellar and suprasellar structures, optic chiasm, corpus callosum, pineal gland, tectum, and midline brainstem structures are unremarkable. The craniocervical junction is unremarkable. There is no acute hemorrhage, mass effect, or midline shift. There is satisfactory overall gray-white matter differentiation. There is chronic microvascular disease. The ventricular structures are symmetric and unremarkable. The infratentorial structures including the cerebellopontine angles and internal auditory canals are unremarkable. There is no abnormal restricted diffusion. There is no abnormal blooming artifact on susceptibility weighted imaging. ORBITS: The visualized portion of the orbits demonstrate no acute abnormality. SINUSES: There is chronic sinusitis most pronounced in the maxillary sinuses. There is a small amount of fluid in the right mastoid air cells.  BONES/SOFT TISSUES: The bone marrow signal intensity appears normal. The soft tissues demonstrate no acute abnormality. Chronic microvascular disease without acute intracranial abnormality. Xr Hip 2-3 Vw W Pelvis Left    Result Date: 4/13/2021  EXAMINATION: ONE XRAY VIEW OF THE PELVIS AND TWO XRAY VIEWS LEFT HIP 4/13/2021 6:10 pm COMPARISON: None. HISTORY: ORDERING SYSTEM PROVIDED HISTORY: fall out of bed TECHNOLOGIST PROVIDED HISTORY: Reason for exam:->fall out of bed FINDINGS: The hip demonstrates normal alignment. No evidence of acute fracture. No focal osseus lesion. Pelvis is intact. Surgical hardware noted in the lower lumbar spine. No acute abnormality of the hip. Patient Instructions:      Medication List      START taking these medications    aspirin 81 MG chewable tablet  Take 1 tablet by mouth daily  Start taking on: April 17, 2021     atorvastatin 40 MG tablet  Commonly known as: LIPITOR  Take 1 tablet by mouth nightly     nicotine 21 MG/24HR  Commonly known as: NICODERM CQ  Place 1 patch onto the skin daily        CHANGE how you take these medications    ondansetron 4 MG tablet  Commonly known as: Jayson Schlatter  What changed: Another medication with the same name was removed. Continue taking this medication, and follow the directions you see here.         CONTINUE taking these medications    aluminum & magnesium hydroxide-simethicone 200-200-20 MG/5ML Susp suspension  Commonly known as: MAALOX     amitriptyline 25 MG tablet  Commonly known as: ELAVIL     benzocaine-menthol 15-3.6 MG lozenge  Commonly known as: CEPACOL SORE THROAT     calcium carbonate 500 MG chewable tablet  Commonly known as: TUMS     cetirizine 10 MG tablet  Commonly known as: ZYRTEC     Claritin 10 MG tablet  Generic drug: loratadine     docusate sodium 100 MG capsule  Commonly known as: COLACE     folic acid 1 MG tablet  Commonly known as: FOLVITE     hydrOXYzine 50 MG tablet  Commonly known as: ATARAX     ibuprofen 200 MG tablet  Commonly known as: ADVIL;MOTRIN     loperamide 2 MG capsule  Commonly known as: IMODIUM magnesium hydroxide 400 MG/5ML suspension  Commonly known as: MILK OF MAGNESIA     melatonin 5 MG Tabs tablet     mirtazapine 15 MG tablet  Commonly known as: REMERON     omeprazole 40 MG delayed release capsule  Commonly known as: PRILOSEC     therapeutic multivitamin-minerals tablet     vitamin B-1 100 MG tablet  Commonly known as: THIAMINE        STOP taking these medications    albuterol sulfate  (90 Base) MCG/ACT inhaler     baclofen 10 MG tablet  Commonly known as: LIORESAL     cloNIDine 0.1 MG tablet  Commonly known as: CATAPRES     dicyclomine 20 MG tablet  Commonly known as: BENTYL     diphenhydrAMINE 50 MG capsule  Commonly known as: BENADRYL     gabapentin 300 MG capsule  Commonly known as: Neurontin           Where to Get Your Medications      You can get these medications from any pharmacy    Bring a paper prescription for each of these medications  · atorvastatin 40 MG tablet  · nicotine 21 MG/24HR     Information about where to get these medications is not yet available    Ask your nurse or doctor about these medications  · aspirin 81 MG chewable tablet           Note that more than 30 minutes was spent in preparing discharge papers, discussing discharge with patient, medication review, etc.    Signed:  Electronically signed by NORRIS Patterson on 4/16/2021 at 1:46 PM

## 2021-04-16 NOTE — PROGRESS NOTES
[facility] Progress Note    Admitting Date and Time: 4/13/2021  8:45 AM  Admit Dx: TIA (transient ischemic attack) [G45.9]    Subjective:  Patient is being followed for TIA (transient ischemic attack) [G45.9]     Pt resting in bed in no acute distress  Reporting feeling \" good\"  Just washed herself/ did am care  Denies dizziness/ light headedness  Received IVF bolus yesterday for + orthostatic hypotension  Reporting she has \" passed out at home in the past\"  Reporting when she was drinking etoh- she did not eat much  Right knee feels better  Pt has been walking with a walker           ROS: denies fever, chills, cp, sob, n/v, HA unless stated above.     amitriptyline  25 mg Oral Daily    famotidine  20 mg Oral BID    gabapentin  300 mg Oral TID    pantoprazole  40 mg Oral QAM AC    potassium chloride  40 mEq Oral Daily    nicotine  1 patch Transdermal Daily    sodium chloride flush  5-40 mL Intravenous 2 times per day    enoxaparin  40 mg Subcutaneous Daily    aspirin  81 mg Oral Daily    atorvastatin  40 mg Oral Nightly     cetirizine HCl, 5 mg, Daily PRN  benzonatate, 100 mg, TID PRN  melatonin, 6 mg, Nightly PRN  ipratropium-albuterol, 1 ampule, Q4H PRN  HYDROcodone-acetaminophen, 1 tablet, Q6H PRN  sodium chloride flush, 5-40 mL, PRN  sodium chloride, 25 mL, PRN  promethazine, 12.5 mg, Q6H PRN    Or  ondansetron, 4 mg, Q6H PRN  polyethylene glycol, 17 g, Daily PRN  acetaminophen, 650 mg, Q6H PRN    Or  acetaminophen, 650 mg, Q6H PRN         Objective:    /80   Pulse 85   Temp 96.6 °F (35.9 °C) (Axillary)   Resp 16   Ht 5' 3\" (1.6 m)   Wt 113 lb 9.6 oz (51.5 kg)   SpO2 97%   BMI 20.12 kg/m²   General Appearance: alert and oriented to person, place and time and in no acute distress  Skin: warm and dry  Head: normocephalic and atraumatic  Neck: neck supple and non tender without mass   Pulmonary/Chest: clear to auscultation bilaterally  Cardiovascular: normal rate, normal S1 and S2 and no carotid bruits  Abdomen: soft, non-tender, non-distended, normal bowel sounds, no masses or organomegaly  Extremities: no cyanosis, no clubbing and no edema, pain right knee lateral side- tender to palpation. Full rom. Neurologic: sspeech normal         Recent Labs     04/13/21  1008      K 3.0*      CO2 29   BUN 6*   CREATININE 0.5   GLUCOSE 117*   CALCIUM 9.6       Recent Labs     04/13/21  1008   WBC 6.9   RBC 3.46*   HGB 11.7   HCT 36.4   .2*   MCH 33.8   MCHC 32.1   RDW 14.0      MPV 11.4         Assessment:    Active Problems:    TIA (transient ischemic attack)  Resolved Problems:    * No resolved hospital problems. *      Plan:  1. Possible TIA: pt currently enrolled in alcohol rehab. She was 7 days sober on day of admission. She was sent to ER for concern that she was leaning towards the left and \" did not feel like herself. \" Did report that she has arthralgias and had been having left hip and shoulder pain for a couple days prior. In ER it was noted that pt was back to her baseline. Asa/ statin started on admission. MRI brain reviewed showing chronic microvascular disease without acute intracranial abnormality. Neurology consulted- appreciate input.      2. S/p fall. ? Syncopal event: pt was found on floor in her room on 4/13. Denied hitting head. Orthostatic vitals checked and positive on 4/15. S/p IVF. Recheck today.      3. Hypomagnesemia/ hypokalemia: supplement. On admission K+ 3.0 and Mg 1.1     4. Alcohol abuse: pt presented from detox. Per pt plan was for her to transition into residential inpt program. Social work looking into.     5. Deconditioning: PT/OT.      6. Asthma: stable. Not in exacerbation     7. Right knee pain ? Contusion vs sprain: trial of ice. pt/ ot. Pt reporting feeling better today.      8. Orthostatic hypotension: rehydrate. IVF bolus.  Recheck today.      Dispo: PT/OT-Discussed with social work- working on setting up [facility].         NOTE: This report was transcribed using voice recognition software. Every effort was made to ensure accuracy; however, inadvertent computerized transcription errors may be present.   Electronically signed by NORRIS Yarbrough on 4/16/2021 at 9:38 AM

## 2021-04-16 NOTE — CARE COORDINATION
Call placed to [facility] [phone]. Spoke to Julita Olmstead in admissions (ext [phone])- she requested that pt's information be sent via fax so that admissions and their NP can review patient to eval if she is appropriate for their services. (fax number [phone]). Requested information sent via fax at this time. Await response. Addendum: [id number]: Notified Jules Arriaga at [facility] that their services are no longer needed and that pt will be transitioning to [facility].

## 2021-04-16 NOTE — CARE COORDINATION
Social Work / Discharge Planning : SAKINA called [facility] residential program to follow up on paperwork faxed yesterday afternoon. [place] off and SAKINA spoke to [facility]. SHe will find out if they can/ cannot accept and update SW. Await response. SAKINA to follow. Electronically signed by MICHELLE Fenton on 4/16/21 at 8:52 AM EDT      Addendum : [facility] returned SW call. They cannot accept due to her PT needs outway what they can provide. Patient a,m/pac 20/24. SAKINA [facility] They are NOT in contract but Low Beltran reaffirmed what SAKINA mason knew that SNF is NOT an option due to patient does NOT qualify. SAKINA spoke to Raúl Doss at [facility] and she provided SW with a list of inpatient. SAKINA and CM calling list. SW left VM with Mike Dines at [address]. Await response and will fax off info to all in patient to see which ones can accept. Calling [employer] as well. SW to follow. .Electronically signed by MICHELLE Fenton on 4/16/21 at 10:13 AM EDT      Addendum : SW spoke to Jany Mandel at [facility] challenge they can accept which is great HOWEVER, she will be on second floor and they do NOT have elevators. Patient would HAVE to be able to do steps. SW reached out to therapy to see how she does today. This is a current issue is her MOBILITY. AWait updated therapy input. CM also currently faxing to [facility]. AWait response. SAKINA to follow. Electronically signed by MICHELLE Fenton on 4/16/21 at 10:28 AM EDT    Addendum : PT evaluated patient and she did excellent: Therapy am/pac 24/24. . SAKINA [facility] and they will revisit after reviewing updated PT notes. SAKINA faxed over noted to tammy at [facility]. Await response. SAKINA to follow. Electronically signed by MICHELLE Fenton on 4/16/21 at 11:24 AM EDT      Addendum : Nedra Noe from [id number] accepted. Release from Neurology faxed to [facility]. THey will transport patient at 2:00. Patient updated vifarzane[facility] and [facility].  SAKINA contacted patient daughter Diego Aly. Nursing aware. SW to follow.  Electronically signed by MICHELLE Jay on 4/16/21 at 1:37 PM EDT

## 2021-04-20 ENCOUNTER — APPOINTMENT (OUTPATIENT)
Dept: OCCUPATIONAL THERAPY | Age: 61
End: 2021-04-20
Payer: MEDICAID

## 2021-04-22 ENCOUNTER — APPOINTMENT (OUTPATIENT)
Dept: OCCUPATIONAL THERAPY | Age: 61
End: 2021-04-22
Payer: MEDICAID

## 2021-04-30 DIAGNOSIS — S52.591P: Primary | ICD-10-CM

## 2021-05-06 ENCOUNTER — HOSPITAL ENCOUNTER (OUTPATIENT)
Dept: GENERAL RADIOLOGY | Age: 61
Discharge: HOME | End: 2021-05-08
Payer: MEDICAID

## 2021-05-06 ENCOUNTER — OFFICE VISIT (OUTPATIENT)
Dept: ORTHOPEDIC SURGERY | Age: 61
End: 2021-05-06
Payer: MEDICAID

## 2021-05-06 VITALS — TEMPERATURE: 97.4 F

## 2021-05-06 DIAGNOSIS — S52.691D OTHER CLOSED FRACTURE OF DISTAL END OF RIGHT ULNA WITH ROUTINE HEALING, SUBSEQUENT ENCOUNTER: ICD-10-CM

## 2021-05-06 DIAGNOSIS — S52.591P: Primary | ICD-10-CM

## 2021-05-06 DIAGNOSIS — S52.591P: ICD-10-CM

## 2021-05-06 PROCEDURE — 99213 OFFICE O/P EST LOW 20 MIN: CPT | Performed by: PHYSICIAN ASSISTANT

## 2021-05-06 PROCEDURE — 73110 X-RAY EXAM OF WRIST: CPT

## 2021-05-06 PROCEDURE — G8420 CALC BMI NORM PARAMETERS: HCPCS | Performed by: PHYSICIAN ASSISTANT

## 2021-05-06 PROCEDURE — 3017F COLORECTAL CA SCREEN DOC REV: CPT | Performed by: PHYSICIAN ASSISTANT

## 2021-05-06 PROCEDURE — 1036F TOBACCO NON-USER: CPT | Performed by: PHYSICIAN ASSISTANT

## 2021-05-06 PROCEDURE — 99212 OFFICE O/P EST SF 10 MIN: CPT

## 2021-05-06 PROCEDURE — G8427 DOCREV CUR MEDS BY ELIG CLIN: HCPCS | Performed by: PHYSICIAN ASSISTANT

## 2021-05-06 NOTE — PROGRESS NOTES
Flaquito Espinosa is a 61 y.o. female who presents for follow up of fracture of right radius    SURGEON: Dr. Tan Pfeiffer,   Date of Injury/Surgery: 11-14-20  Date last seen in office: 3-25-21    Symptoms: worse  New complaints: increased pain with activity    Cast/Splint, Brace, or Dressings:  Wearing Right wrist splint as directed       Weightbearing: right hand as tolerated       Assistive device   brace  Participating in therapy (location if yes)? no  Presently in rehab for alcoholism and therapy has been put on hold.  Would like to resume  Refills Needed: None  Order/Referral Needed: no

## 2021-05-06 NOTE — PATIENT INSTRUCTIONS
[facility] Occupational Therapy   [address]  [place], [address]  [phone]

## 2021-05-06 NOTE — PROGRESS NOTES
Chief Complaint   Patient presents with    Fracture       Right radius       DOI 11/14/20  R distal radius fracture, non-operative management     Subjective:  Jason Bacon is approximately 6 months the above date of injury. She is partial weightbearing the right upper extremity using Velcro wrist brace for support. States that she has stopped OT and is [facility] in [facility]. History of TIA since her last office visit. No new orthopedic injuries or any other complaints. Still has mild to moderate pain about the right wrist mostly with weightbearing. No paresthesias. Would like to resume occupational therapy. Review of Systems -    General ROS: negative for - chills, fatigue, fever or night sweats  Respiratory ROS: no cough, shortness of breath, or wheezing  Cardiovascular ROS: no chest pain or dyspnea on exertion  Gastrointestinal ROS: no abdominal pain, nausea, vomiting, diarrhea, constipation,or black or bloody stools  Genitourinary: no hematuria, dysuria, or incontinence   Musculoskeletal ROS: negative for -back or neck pain or stiffness, also see HPI  Neurological ROS: no TIA or stroke symptoms       Objective:    General: Alert and oriented X 3, normocephalic atraumatic, external ears and eye normal, sclera clear, no acute distress, respirations easy and unlabored with no audible wheezes, skin warm and dry, speech and dress appropriate for noted age, affect euthymic.     Extremity:  Right Upper Extremity  Skin is clean dry and intact  Mild edema noted about the wrist with bulging wrist deformity secondary to malunion   Radial pulse palpable, fingers warm with BCR  Flex/extension intact to wrist, thumb and fingers  Finger opposition intact  Finger adduction/abduction intact  Finger crossover intact  Subjectively states sensation intact to radial/medial/ulnar distribution  Moderate TTP about the wrist, more so to radial aspect   Actively makes full fist   Minimal active wrist extension secondary

## 2021-05-25 ENCOUNTER — HOSPITAL ENCOUNTER (OUTPATIENT)
Dept: OCCUPATIONAL THERAPY | Age: 61
Setting detail: THERAPIES SERIES
Discharge: HOME | End: 2021-05-25
Payer: MEDICAID

## 2021-05-25 PROCEDURE — 97018 PARAFFIN BATH THERAPY: CPT

## 2021-05-25 PROCEDURE — 97530 THERAPEUTIC ACTIVITIES: CPT

## 2021-05-25 PROCEDURE — 97110 THERAPEUTIC EXERCISES: CPT

## 2021-05-25 PROCEDURE — 97530 THERAPEUTIC ACTIVITIES: CPT | Performed by: OCCUPATIONAL THERAPIST

## 2021-05-25 NOTE — PROGRESS NOTES
[facility] THERAPY PROGRESS NOTE  UPDATED POC    [facility]  [address]  Alirio Lamar   Phone: [phone]   Fax: [phone]     Date:  2021  Initial Evaluation Date: 3/19/2021 Evaluating Therapist: Sherren Carrel, OTR    Patient Name:  Rebekah Avilez    :  1960  Restrictions/Precautions:  Per Distal Radius Fx Protocol at 12 wk pierre, PWB; low fall risk  Diagnosis:  R Distal Radius and Ulna Fx's, closed  S52.591A (ICD-10-CM) - Other closed fracture of distal end of right radius, initial encounter   S52.601D (ICD-10-CM) - Closed fracture of distal end of right ulna with routine healing, unspecified fracture morphology, subsequent encounter   Insurance/Certification information: [facility] - ID #[id number]   Referring Practitioner: Ginger Dash PA-C  Date of Surgery/Injury: 2020 inj  Plan of care signed (Y/N): Y  Visit# / total visits: -18  Thru 2021    Pain Level: 4/10, aching, shooting, throbbing, tight (pulling) and uncomfortable    SUBJECTIVE: Patient seen 1 / 2 scheduled visits. States: \"It feels better. Not a lot of pain. \"  Reporting she has been in rehab for the last  month. She reports not doing much for her RUE. OBJECTIVE:  Engaged patient in the following with focus on pain, ROM, functional strengthening, Patient education, HEP. Reassessment completed today.    INTERVENTION: DONE  REPS/TIME: SPECIFICS/COMMENTS:   Modality:      Paraffin x RUE 15 min to affected UE to promote skin desensitization, reduce inflammation and edema, and decrease pain, increase flexibility/joint mobility      UltraSound      E-Stim      MHP      AROM/AAROM:      Wrist/forearm x 10 x 3  Pro/sup, wrist flex/ex in heated medium               Wrist  x 10 reps  10 sec holds Prayer stretches   Wrist x 10 reps Wristosizer   PROM/Stretching:      Wrist x 10 min          Edema Control:  66298 Edema x 10 min Manual edema mob, MFR to affected area         Therapeutic Activity:      Towel Ex x 8 reps Functional activity tolerance and strengthening of forearm/digits/wrist.   Putty x 8 min Provided for HEP with instructions for /pinch   Strengthening:                        Gripper x 25# 20 x 3   Wrist/forearm x 2# Theraband for flexion/ex radial/ulnar deviation, pro/sup 20 x 2   Other:      HEP x  Throughout Session with instructions and handouts as needed         ASSESSMENT: Patient shows potential to progress with stated goals and will be educated on HEP and provided written handouts as needed throughout their care. Pt arrives today following over a month of rehab, re-assessment has been completed for updated measurements/progress. Wrist ext and supination has progressed but wrist flexion has regressed. Limitations in strength and FM skills remain evident. Still reports come limitations with resistive tasks. HEP was addressed today - Added Putty and resistive bands to HEP today with handouts.  Patient required extensive instructions to process information properly today.      -Rehab Potential: Good  -Requires OT Follow Up: Yes    Re-Assessment: 5/25/2021            3/19/21    4/6/21  5/25/21  Right Upper Extremity ROM /  KEY: Ext/Flex     AROM(PROM)   Forearm Pronation: 0/76-84* WFLs         Supination: 0/80* 0/22* 0/60   0/65      Wrist Flexion: 0/60-80* 0/55*  63 50(67)      Extension: 0/60-75* 0/25*  43 60 (70)     Radial Deviation: 0/20-25* 0/16*  20  WNL     Ulnar Deviation: 0/30-45* 0/24*  30  WNL              4/6/21 5/25/21  Dynamometer (setting 2):                              Left: 33#                                   36# 41#              Right: 24#                      27# 25#  Pinch Meter:   Lateral:  Left= 11#,     12# 12#    Right= 7#      9#  8#     Palmar 3 point: Left= 7#,      9# TBA    Right= 8#      8# TBA  9 Hole Peg Test:              Left: 1 min 6 sec   37.3 seconds Right: 48 sec    30.0 seconds     QuickDASH: 43.2%      34.9%  TBA      GOAL STATUS:   1) Patient will demonstrate good understanding of home program (exercises/activities/diagnosis/prognosis/goals) with good accuracy. Progressing, in-depth review with new exercises with handouts addressed for distal UE strengthening. 2) Patient will demonstrate increased active/passive range of motion of their R wrist to [facility] for ADL/IADL completion. Progressing  3) Patient will demonstrate increased /pinch strength of at least 5 / 1-3 pinch pounds of their R hand. Progressing slowly  4) Patient to report decreased pain in their affected R upper extremity from 6-7/10 to 2/10 or less with resistive functional use. Progressing:  3-4/10  5) Increase in fine motor function as evidenced by decreased time to complete 9-hole peg test by at least 5-10 seconds. Progressing, 48 to 30 sec. 6) Patient to report 100% compliance with their splint wear, care, and precautions if needed. Progressing,  Has been weaned from splint at this point  7) Patient will be knowledgeable of edema control techniques as evident with decreases from moderate to mild/none. Goal Reached  8) Patient will decrease QuickDASH score to 25%  or less for increased participation in daily functional activities. Progressing, 43.2% to 34.9% disability. Not assessed 5/25.     PLAN:   [x]  Continues Plan of care: Continue 2x/wk for 4-6 weeks to continue addressing ROM, FM skills, pain, edema, and strengthening of the R UE to further enhance functional use. Treatment delivered based on POC and graduated to patient's progress. Patient education continues at each visit to obtain maximum benefit from skilled OT intervention.   []  Alter Plan of care:   []  Discharge:      Billing:  To 6/14/2021  TIME IN: 1230   TIME OUT: 130 TOTAL TREATMENT TIME: 55 TOTAL TIME: 60  CODE  TODAY MINUTES TODAY UNITS  Units USED Units  ALLOWED   -- --   0   90214 Manual 72   80941 Therapeutic Ex 25 2  8 72   66592 Therapeutic Activity 15 1  4 72   52717 ADL/COMP Tech Train     72   94779 Neuromuscular Re-Ed     72   41074 OrthoManagementTraining     72   04470 Modality: paraffin 15 1  4 72   13115 Other: Massage    1 72                              TOTAL  55 4         Access Code: [email or web address]  URL: [email or web address]  Date: 05/25/2021  Prepared by: Severa Plowman    Program Notes  Work to Fatigue not pain. You can do more than you think!!  Challenge yourself. Exercises  Seated Shoulder Horizontal Abduction with Resistance - 1 x daily - 5 x weekly - 3 sets - 10 reps  Seated Shoulder Flexion with Self-Anchored Resistance - 1 x daily - 10 reps - 3 sets  Seated Elbow Extension with Self-Anchored Resistance - 1 x daily - 3 sets - 15 reps  Seated Elbow Flexion with Self-Anchored Resistance - 1 x daily - 3 sets - 15 reps  Wrist Extension with Resistance - 3 x daily - 3 sets - 15 reps  Wrist Flexion with Resistance - 3 x daily - 3 sets - 15 reps  Seated Wrist Radial Deviation with Anchored Resistance - 3 x daily - 5 x weekly - 3 sets - 15 reps - 3 count at end range hold      Access Code: [email or web address]  URL: [email or web address]  Date: 05/25/2021  Prepared by: Severa Plowman    Exercises  Tip Pinch with Putty - 2 x daily - 5 x weekly - 3 sets - 20 reps - 3 count at end range hold  Putty Squeezes - 2 x daily - 5 x weekly - 3 sets - 20 reps - 3 count at end range hold                    TEREZA Whitehead,  RAMÍREZ/L, [address] [place] [address] [address], OTR/L #[id number]

## 2021-05-27 ENCOUNTER — HOSPITAL ENCOUNTER (OUTPATIENT)
Dept: OCCUPATIONAL THERAPY | Age: 61
Setting detail: THERAPIES SERIES
Discharge: HOME | End: 2021-05-27
Payer: MEDICAID

## 2021-05-27 PROCEDURE — 97110 THERAPEUTIC EXERCISES: CPT

## 2021-05-27 PROCEDURE — 97018 PARAFFIN BATH THERAPY: CPT

## 2021-05-27 PROCEDURE — 97530 THERAPEUTIC ACTIVITIES: CPT

## 2021-05-27 NOTE — PROGRESS NOTES
focus on extension stretches with patient education for HEP inclusion         Therapeutic Activity:      Towel Ex x 8 reps Functional activity tolerance and strengthening of forearm/digits/wrist.   Putty x 8 min Provided for HEP with instructions for /pinch   Strengthening:                        Gripper x 25# 20 x 3   Wrist/forearm x 2# Theraband for flexion/ex radial/ulnar deviation, pro/sup 20 x 2   Other:      HEP x  Throughout Session with instructions and handouts as needed         ASSESSMENT: Patient shows potential to progress with stated goals and will be educated on HEP and provided written handouts as needed throughout their care. HEP to address strength at next visit. Added Putty and resistive bands to HEP this week with handouts. Patient required extensive instructions to process information properly. Reeducation needed. Pt is making Fair progress toward stated plan of care. -Rehab Potential: Good  -Requires OT Follow Up:  Yes                3/19/21    4/6/21  5/25/21  Right Upper Extremity ROM /  KEY: Ext/Flex     AROM(PROM)   Forearm Pronation: 0/76-84* WFLs -  -       Supination: 0/80* 0/22* 0/60   0/65       Wrist Flexion: 0/60-80* 0/55*  63 50(67)       Extension: 0/60-75* 0/25*  43 60 (70)      Radial Deviation: 0/20-25* 0/16*  20  WNL      Ulnar Deviation: 0/30-45* 0/24*  30  WNL               4/6/21 5/25/21  Dynamometer (setting 2):                              Left: 33#                                   36# 41#              Right: 24#                      27# 25#  Pinch Meter:   Lateral:  Left= 11#,     12# 12#    Right= 7#      9#  8#     Palmar 3 point: Left= 7#,      9# TBA    Right= 8#      8# TBA  9 Hole Peg Test:              Left: 1 min 6 sec   37.3 seconds              Right: 48 sec    30.0 seconds     QuickDASH: 43.2%      34.9%  TBA      GOAL STATUS:   1) Patient will demonstrate good understanding of home program (exercises/activities/diagnosis/prognosis/goals) with good accuracy. Progressing  2) Patient will demonstrate increased active/passive range of motion of their R wrist to [facility] for ADL/IADL completion. Progressing  3) Patient will demonstrate increased /pinch strength of at least 5 / 1-3 pinch pounds of their R hand. Progressing slowly  4) Patient to report decreased pain in their affected R upper extremity from 6-7/10 to 2/10 or less with resistive functional use. Progressing:  3-4/10  5) Increase in fine motor function as evidenced by decreased time to complete 9-hole peg test by at least 5-10 seconds. Progressing  6) Patient to report 100% compliance with their splint wear, care, and precautions if needed. Progressing  Has been weaned from splint at this point  7) Patient will be knowledgeable of edema control techniques as evident with decreases from moderate to mild/none. Goal Reached  8) Patient will decrease QuickDASH score to 25%  or less for increased participation in daily functional activities. See above       Pt. Education:  [x] Yes  [] No  [x] Reviewed Prior HEP/Ed  Method of Education: [x] Verbal  [x] Demo  [] Written  Comprehension of Education:  [x] Verbalizes understanding. [] Demonstrates understanding. [x] Needs review at next sesion  [] Demonstrates/verbalizes HEP/Ed previously given. PLAN:   [x]  Continues Plan of care: Treatment delivered based on POC and graduated to patient's progress. Patient education continues at each visit to obtain maximum benefit from skilled OT intervention.   []  Alter Plan of care:   -  Discharge:      Billing:  To 6/14/2021  TIME IN: 1230   TIME OUT: 130 TOTAL TREATMENT TIME: 55 TOTAL TIME: 60  CODE  TODAY MINUTES TODAY UNITS  Units USED Units  ALLOWED   -- --   0   03441 Manual     72   76143 Therapeutic Ex 25 2  10 72   16184 Therapeutic Activity 15 1  5 72   52743 ADL/COMP Tech Train     72   72111 Neuromuscular Re-Ed     72   90464 OrthoManagementTraining     72   67132

## 2021-06-01 ENCOUNTER — HOSPITAL ENCOUNTER (OUTPATIENT)
Dept: OCCUPATIONAL THERAPY | Age: 61
Setting detail: THERAPIES SERIES
Discharge: HOME | End: 2021-06-01
Payer: MEDICAID

## 2021-06-01 NOTE — PROGRESS NOTES
[facility]    Outpatient Occupational Therapy    Phone: [phone]   Fax: [phone]     Cancellation/No-show Note    Date:  2021    Patient Name:  Era Russell    :  1960     PT ID: [id number]    Total missed visits including today: 2   Total number of no shows: 0    For today's appointment patient:    [x]  Cancelled & Rescheduled appointment  []  No-show     Reason given by patient:  []  Patient ill  [x]  Conflicting appointment  []  No transportation    []  Conflict with work  []  No reason given  []  Other:       Comments:  Ran too late.         Electronically signed by:  TEREZA Anthony COTA/ARIANE  [id number]

## 2021-06-03 ENCOUNTER — HOSPITAL ENCOUNTER (OUTPATIENT)
Dept: OCCUPATIONAL THERAPY | Age: 61
Setting detail: THERAPIES SERIES
Discharge: HOME | End: 2021-06-03
Payer: MEDICAID

## 2021-06-15 ENCOUNTER — HOSPITAL ENCOUNTER (OUTPATIENT)
Dept: OCCUPATIONAL THERAPY | Age: 61
Setting detail: THERAPIES SERIES
Discharge: HOME | End: 2021-06-15
Payer: MEDICAID

## 2021-06-15 PROCEDURE — 97110 THERAPEUTIC EXERCISES: CPT

## 2021-06-15 PROCEDURE — 97530 THERAPEUTIC ACTIVITIES: CPT

## 2021-06-15 PROCEDURE — 97018 PARAFFIN BATH THERAPY: CPT

## 2021-06-15 NOTE — PROGRESS NOTES
[address] [facility]     OCCUPATIONAL THERAPY PROGRESS NOTE      [facility]  [address], [address]   Phone: [phone]   Fax: [phone]     Date:  6/15/2021  Initial Evaluation Date: 3/19/2021 Evaluating Therapist: JOSE Bullard    Patient Name:  Sommer Mercado    :  1960  Restrictions/Precautions:  Per Distal Radius Fx Protocol at 12 wk pierre, PWB; low fall risk  Diagnosis:  R Distal Radius and Ulna Fx's, closed  S52.591A (ICD-10-CM) - Other closed fracture of distal end of right radius, initial encounter   S52.601D (ICD-10-CM) - Closed fracture of distal end of right ulna with routine healing, unspecified fracture morphology, subsequent encounter   Insurance/Certification information: [address] [place] [address],[facility] - ID #[id number]   Referring Practitioner: Mili Vogt PA-C  Date of Surgery/Injury: 2020 inj  Plan of care signed (Y/N):  N  Visit# / total visits: -18  Thru 2021    Pain Level: 2/10, aching, shooting, throbbing, tight (pulling) and uncomfortable    SUBJECTIVE: Patient seen 1 / 2 scheduled visits. Questions shape of her wrist today. Pain is less, strength improving. OBJECTIVE:  Engaged patient in the following with focus on pain, ROM, functional strengthening, Patient education, HEP. INTERVENTION: DONE  REPS/TIME: SPECIFICS/COMMENTS:   Modality:      Paraffin x RUE 15 min to affected UE to promote skin desensitization, reduce inflammation and edema, and decrease pain, increase flexibility/joint mobility      UltraSound      E-Stim      MHP      AROM/AAROM:      Wrist/forearm x 10 x 3  Pro/sup, wrist flex/ex in heated medium  Reeducation on proper stretch with end range holds. Wrist  x 10 reps  10 sec holds Prayer stretches   Reeducation on proper positioning and tech needed   Wrist x 10 reps Wristosizer   reinstructions required for completion of task today.    PROM/Stretching: Wrist x 10 min  focus on extension stretches with patient education for HEP inclusion         Therapeutic Activity:      Towel Ex x 8 reps Functional activity tolerance and strengthening of forearm/digits/wrist.   Putty x 8 min Provided for HEP with instructions for /pinch   Strengthening:                        Gripper x 25# 20 x 3   Wrist/forearm x 2# Theraband for flexion/ex radial/ulnar deviation, pro/sup 20 x 2   Other:      HEP x  Throughout Session with instructions and handouts as needed         ASSESSMENT: Patient shows potential to progress with stated goals and will be educated on HEP and provided written handouts as needed throughout their care. HEP to address strength at next visit. Discussed with patient the shape of her wrist and function. She is in verbal understanding that the function will not be affected. She is fearful to strengthening because she thought it would break again. Reenforced HEP need. Reassessment at next visit for d/c. Pt is making Fair progress toward stated plan of care. -Rehab Potential: Good  -Requires OT Follow Up: Yes              3/19/21    4/6/21  5/25/21    6.17.2021 D? C  Right Upper Extremity ROM /  KEY: Ext/Flex     AROM(PROM)   Forearm Pronation: 0/76-84* WFLs -  -       Supination: 0/80* 0/22* 0/60   0/65       Wrist Flexion: 0/60-80* 0/55*  63 50(67)       Extension: 0/60-75* 0/25*  43 60 (70)      Radial Deviation: 0/20-25* 0/16*  20  WNL      Ulnar Deviation: 0/30-45* 0/24*  30  WNL               4/6/21 5/25/21  Dynamometer (setting 2):                              Left: 33#                                   36# 41#              Right: 24#                      27# 25#  Pinch Meter:   Lateral:  Left= 11#,     12# 12#    Right= 7#      9#  8#     Palmar 3 point: Left= 7#,      9# TBA    Right= 8#      8# TBA  9 Hole Peg Test:              Left: 1 min 6 sec   37.3 seconds              Right: 48 sec    30.0 seconds     QuickDASH: 43.2%      34.9% TBA      GOAL STATUS:   1) Patient will demonstrate good understanding of home program (exercises/activities/diagnosis/prognosis/goals) with good accuracy. Progressing  2) Patient will demonstrate increased active/passive range of motion of their R wrist to [facility] for ADL/IADL completion. Progressing  3) Patient will demonstrate increased /pinch strength of at least 5 / 1-3 pinch pounds of their R hand. Progressing slowly  4) Patient to report decreased pain in their affected R upper extremity from 6-7/10 to 2/10 or less with resistive functional use. Progressing:  3-4/10  5) Increase in fine motor function as evidenced by decreased time to complete 9-hole peg test by at least 5-10 seconds. Progressing  6) Patient to report 100% compliance with their splint wear, care, and precautions if needed. Progressing  Has been weaned from splint at this point  7) Patient will be knowledgeable of edema control techniques as evident with decreases from moderate to mild/none. Goal Reached  8) Patient will decrease QuickDASH score to 25%  or less for increased participation in daily functional activities. See above       Pt. Education:  [x] Yes  [] No  [x] Reviewed Prior HEP/Ed  Method of Education: [x] Verbal  [x] Demo  [] Written  Comprehension of Education:  [x] Verbalizes understanding. [] Demonstrates understanding. [x] Needs review at next sesion  [] Demonstrates/verbalizes HEP/Ed previously given. PLAN:   [x]  Continues Plan of care: Treatment delivered based on POC and graduated to patient's progress. Patient education continues at each visit to obtain maximum benefit from skilled OT intervention.   []  Alter Plan of care:   -  Discharge:      Billing:  To 6/14/2021  TIME IN: 1230   TIME OUT: 130 TOTAL TREATMENT TIME: 55 TOTAL TIME: 60  CODE  TODAY MINUTES TODAY UNITS  Units USED Units  ALLOWED   -- --   0   88771 Manual     72   48295 Therapeutic Ex 25 2  12 72   20208 Therapeutic Activity 15 1 6 72   17094 ADL/COMP Tech Train     72   [id number] Neuromuscular Re-Ed     72   27360 OrthoManagementTraining     72   55377 Modality: paraffin 15 1  6 72   20348 Other: Massage    1 72                              TOTAL  55 4           Access Code: [email or web address]  URL: [email or web address]  Date: 05/25/2021  Prepared by: Abigail Curran  Program Notes   Work to Fatigue not pain. You can do more than you think!!  Challenge yourself. Exercises  Seated Shoulder Horizontal Abduction with Resistance - 1 x daily - 5 x weekly - 3 sets - 10 reps  Seated Shoulder Flexion with Self-Anchored Resistance - 1 x daily - 10 reps - 3 sets  Seated Elbow Extension with Self-Anchored Resistance - 1 x daily - 3 sets - 15 reps  Seated Elbow Flexion with Self-Anchored Resistance - 1 x daily - 3 sets - 15 reps  Wrist Extension with Resistance - 3 x daily - 3 sets - 15 reps  Wrist Flexion with Resistance - 3 x daily - 3 sets - 15 reps  Seated Wrist Radial Deviation with Anchored Resistance - 3 x daily - 5 x weekly - 3 sets - 15 reps - 3 count at end range hold      Access Code: [email or web address]  URL: [email or web address]  Date: 05/25/2021  Prepared by: Abigail Curran  Exercises  Tip Pinch with Putty - 2 x daily - 5 x weekly - 3 sets - 20 reps - 3 count at end range hold  Putty Squeezes - 2 x daily - 5 x weekly - 3 sets - 20 reps - 3 count at end range hold                TEREZA Whitehead COTA/L, [id number]

## 2021-06-16 ENCOUNTER — HOSPITAL ENCOUNTER (OUTPATIENT)
Dept: OCCUPATIONAL THERAPY | Age: 61
Setting detail: THERAPIES SERIES
Discharge: HOME | End: 2021-06-16
Payer: MEDICAID

## 2021-06-16 NOTE — PROGRESS NOTES
reinstructions required for completion of task today. PROM/Stretching:      Wrist  10 min  focus on extension stretches with patient education for HEP inclusion         Therapeutic Activity:      Towel Ex  8 reps Functional activity tolerance and strengthening of forearm/digits/wrist.   Putty x 15 min Provided for HEP with instructions for /pinch and wrist extension   Strengthening:                  Forearm x 30x Flex bar for pronation and supination individually with rest.   Gripper x 25# 20 x 3   Wrist/forearm x 2# Theraband for flexion/ex radial/ulnar deviation, pro/sup 20 x 2   Other:      HEP x  Throughout Session with instructions and handouts as needed         ASSESSMENT: Patient has reached maximum potential with stated goals. Continue HEP to maintain and/or progress functional status. Patient benefited from skilled OT intervention at this time. 3/19/21    4/6/21  5/25/21    6.17.2021 D/C  Right Upper Extremity ROM /  KEY: Ext/Flex     AROM(PROM)   Forearm Pronation: 0/76-84* WFLs -  -  -     Supination: 0/80* 0/22* 0/60   0/65 0/75      Wrist Flexion: 0/60-80* 0/55*  63 50(67)  67((70)     Extension: 0/60-75* 0/25*  43  45(48)     Radial Deviation: 0/20-25* 0/16*  20  WNL      Ulnar Deviation: 0/30-45* 0/24*  30  WNL               4/6/21 5/25/21 6/17/21 D/C  Dynamometer (setting 2):                              Left: 33#                                   36# 41#  47#              Right: 24#                      27# 25#  32#  Pinch Meter:   Lateral:  Left= 11#,     12# 12#  13#    Right= 7#      9#  8#  12#    9 Hole Peg Test:              RT: 1 min 6 sec   37.3 seconds   25 sec              Lt:            48 sec   30.0 seconds   22 sec     QuickDASH: 43.2%     34.9%  TBA   29.55%       GOAL STATUS:   1) Patient will demonstrate good understanding of home program (exercises/activities/diagnosis/prognosis/goals) with good accuracy.     Goal Reached  2) Patient will demonstrate increased active/passive range of motion of their R wrist to [facility] for ADL/IADL completion. Goal Reached  3) Patient will demonstrate increased /pinch strength of at least 5 / 1-3 pinch pounds of their R hand. Goal Reached  4) Patient to report decreased pain in their affected R upper extremity from 6-7/10 to 2/10 or less with resistive functional use. Goal Reached: 2-3/10  Increases with weather chanages  5) Increase in fine motor function as evidenced by decreased time to complete 9-hole peg test by at least 5-10 seconds. Goal Reached  6) Patient to report 100% compliance with their splint wear, care, and precautions if needed. Goal Reached:  Splint discharged  7) Patient will be knowledgeable of edema control techniques as evident with decreases from moderate to mild/none. Goal Reached  8) Patient will decrease QuickDASH score to 25%  or less for increased participation in daily functional activities. Goal at Maximum potential at this time. PLAN:   []  Continues Plan of care: Treatment delivered based on POC and graduated to patient's progress. Patient education continues at each visit to obtain maximum benefit from skilled OT intervention. []  Alter Plan of care:     [x]  Discharge: 7/8 goals met. HEP in place. Pt may call with questions/concerns PRN.       Billing:  To 6/18/2021  TIME IN: 1230   TIME OUT: 130 TOTAL TREATMENT TIME: 55 TOTAL TIME: 60  CODE  TODAY MINUTES TODAY UNITS  Units USED Units  ALLOWED   -- --   0   53038 Manual     72   48394 Therapeutic Ex 25 2  14 72   39491 Therapeutic Activity 15 1  7 72   35783 ADL/COMP Tech Train     72   08011 Neuromuscular Re-Ed     72   98264 OrthoManagementTraining     72   85566 Modality: paraffin 15 1  7 72   86448 Other: Massage    1 72                              TOTAL  55 1291 [facility], TEREZA,  RAMÍREZ/L,   [place], [place], OTR/L #[id number]            By co-signing this document you are in agreement with discharge. Patient seen for additional week and was discharged today. Goals reached.  New Certification Period: 6/14/21 to 6/18/21

## 2021-08-01 ENCOUNTER — HOSPITAL ENCOUNTER (EMERGENCY)
Age: 61
Discharge: HOME | End: 2021-08-01
Payer: MEDICAID

## 2021-08-01 VITALS
DIASTOLIC BLOOD PRESSURE: 84 MMHG | OXYGEN SATURATION: 96 % | WEIGHT: 113 LBS | TEMPERATURE: 97.5 F | SYSTOLIC BLOOD PRESSURE: 117 MMHG | HEIGHT: 65 IN | BODY MASS INDEX: 18.83 KG/M2 | HEART RATE: 78 BPM | RESPIRATION RATE: 14 BRPM

## 2021-08-01 DIAGNOSIS — R21 RASH AND OTHER NONSPECIFIC SKIN ERUPTION: Primary | ICD-10-CM

## 2021-08-01 PROCEDURE — 6360000002 HC RX W HCPCS

## 2021-08-01 PROCEDURE — 96372 THER/PROPH/DIAG INJ SC/IM: CPT

## 2021-08-01 PROCEDURE — 99284 EMERGENCY DEPT VISIT MOD MDM: CPT

## 2021-08-01 RX ORDER — TRIAMCINOLONE ACETONIDE 40 MG/ML
INJECTION, SUSPENSION INTRA-ARTICULAR; INTRAMUSCULAR
Status: COMPLETED
Start: 2021-08-01 | End: 2021-08-01

## 2021-08-01 RX ORDER — TRIAMCINOLONE ACETONIDE 40 MG/ML
40 INJECTION, SUSPENSION INTRA-ARTICULAR; INTRAMUSCULAR ONCE
Status: COMPLETED | OUTPATIENT
Start: 2021-08-01 | End: 2021-08-01

## 2021-08-01 RX ADMIN — TRIAMCINOLONE ACETONIDE 40 MG: 40 INJECTION, SUSPENSION INTRA-ARTICULAR; INTRAMUSCULAR at 20:26

## 2021-08-01 NOTE — LETTER
[facility]  [address] [place] [address]  Phone: [phone]               August 1, 2021    Patient: Caitie Stuart   YOB: 1960   Date of Visit: 8/1/2021       To Whom It May Concern:    Sergio Grady was seen and treated in our emergency department on 8/1/2021. She may return to work on 8/3/2021.       Sincerely,       Regulo Arriola RN         Signature:__________________________________

## 2021-08-02 NOTE — ED PROVIDER NOTES
[facility]  Department of Emergency Medicine   ED  Encounter Note  Admit Date/RoomTime: 2021  7:37 PM  ED Room:   NAME: Kimberly Stuart  : 1960  MRN: [id number]     Chief Complaint:  Urticaria (x 3 days)    HISTORY OF PRESENT ILLNESS        Brie Kahn is a 64 y.o. female who presents to the ED by private vehicle for itchy rash on her left arm and upper chest wall and bilateral legs, beginning 3 days ago. The complaint has been persistent and are mild in severity. She denies shortness of breath, itchy throat, facial or lip or tongue swelling, difficulty swallowing, pain , fever. She denies exposure to pets with fleas, out door activity other than grilling on her porch, new soaps or lotions. ROS   Pertinent positives and negatives are stated within HPI, all other systems reviewed and are negative. Past Medical History:  has a past medical history of Asthma, CAD (coronary artery disease), Closed fracture of proximal end of left humerus with routine healing, Degeneration of lumbar or lumbosacral intervertebral disc, Fall against sharp object, History of blood transfusion, Hypertension, Hypokalemia, Insomnia, Kidney stone, Nondisplaced fracture of greater tuberosity of right humerus, initial encounter for closed fracture, Orthostatic hypotension, and Severe back pain. Surgical History:  has a past surgical history that includes Tonsillectomy; Bunionectomy; chest tube insertion (); Lithotripsy (); Colonoscopy (3/26/09); other surgical history; Upper gastrointestinal endoscopy (3/30/15); Upper gastrointestinal endoscopy (08); lumbar fusion (2017); and Kyphosis surgery (N/A, 10/28/2019). Social History:  reports that she has quit smoking. She started smoking about 2 years ago. She has a 1.00 pack-year smoking history.  She has never used smokeless tobacco. She reports current alcohol use of about 6.0 standard drinks of alcohol per week. She reports that she does not use drugs. Family History: family history includes Cancer in her brother; Diabetes in her mother; Heart Disease in her maternal grandfather, maternal grandmother, and sister; Hypertension in her mother; Stroke in her sister. Allergies: Dilaudid [hydromorphone hcl] and Morphine    PHYSICAL EXAM   Oxygen Saturation Interpretation: Normal on room air analysis. ED Triage Vitals [08/01/21 1936]   BP Temp Temp src Pulse Resp SpO2 Height Weight   117/84 97.5 °F (36.4 °C) -- 83 16 93 % 5' 5\" (1.651 m) 113 lb (51.3 kg)           Physical Exam  Constitutional/General: Alert and oriented x3, well appearing, non toxic  HEENT:  NC/NT. PERRLA,  Airway patent. TM normal bilateral.  Neck: Supple, full ROM  Respiratory: Lungs clear to auscultation bilaterally, no wheezes, rales, or rhonchi. Not in respiratory distress  CV:  Regular rate. Regular rhythm. No murmurs  Musculoskeletal: Moves all extremities x 4. Warm and well perfused, no clubbing, cyanosis, or edema. Capillary refill <3 seconds  Integument: skin warm and dry. 10 or so single papules which have scabs on them from patient scratching them, a couple have small ulceration which I believe is because they were vesicles which she has scratched open. These lesions are all in the left anterior shoulder and upper arm. She reports lesions onher legs but there are none, nor on her back though she reports it itches. Lymphatic: no lymphadenopathy noted  Neurologic: GCS 15, no focal deficits, symmetric strength 5/5 in the upper and lower extremities bilaterally  Psychiatric: Normal Affect    Lab / Imaging Results   (All laboratory and radiology results have been personally reviewed by myself)  Labs:  No results found for this visit on 08/01/21. Imaging: All Radiology results interpreted by Radiologist unless otherwise noted.   No orders to display       ED Course / Medical Decision Making     Medications   triamcinolone acetonide (KENALOG-40) injection 40 mg (40 mg Intramuscular Given 8/1/21 2026)         Consult(s):   None    Procedure(s):   none    MDM:   Pt presents with itchy rash which is minimal. There are a few opened single vesicles on left upper arm and chest wall. Rash is likely contact allergic. Kenalog in ED. Follow up with [facility]. Plan of Care/Counseling:  Keenan Smith PA-C reviewed today's visit with the patient in addition to providing specific details for the plan of care and counseling regarding the diagnosis and prognosis. Questions are answered at this time and are agreeable with the plan. ASSESSMENT     1. Rash and other nonspecific skin eruption      PLAN   Discharged home. Patient condition is good    New Medications     Discharge Medication List as of 8/1/2021  8:44 PM        Electronically signed by Keenan Smith PA-C   DD: 8/2/21  **This report was transcribed using voice recognition software. Every effort was made to ensure accuracy; however, inadvertent computerized transcription errors may be present.   END OF ED PROVIDER NOTE       Keenan Smith PA-C  08/02/21 0124       Keenan Smith PA-C  08/02/21 [id number]

## 2021-08-13 NOTE — ED NOTES
Order in place by Dr Mandy Ureña for 100mcg of fentanyl IV at this time. This RN consulted with ED physician at this time and confirmed the correct dosage of fentanyl order in place at this time. Per Dr Mandy Ureña, ok to give patient 100 mcg of medication.       Saji Reyes RN  04/20/19 0056
Patient states she is feeling dizzy and oxygen saturation is reading 86% on room air. Patient placed on 2L of O2 via NC at this time. Will continue to monitor patient respiratory status at this time.       Noel Esparza RN  04/20/19 [id number]
Female

## 2021-09-13 ENCOUNTER — APPOINTMENT (OUTPATIENT)
Dept: GENERAL RADIOLOGY | Age: 61
DRG: 137 | End: 2021-09-13
Payer: MEDICAID

## 2021-09-13 ENCOUNTER — APPOINTMENT (OUTPATIENT)
Dept: CT IMAGING | Age: 61
DRG: 137 | End: 2021-09-13
Payer: MEDICAID

## 2021-09-13 ENCOUNTER — HOSPITAL ENCOUNTER (INPATIENT)
Age: 61
LOS: 8 days | Discharge: HOME | DRG: 137 | End: 2021-09-21
Attending: EMERGENCY MEDICINE | Admitting: FAMILY MEDICINE
Payer: MEDICAID

## 2021-09-13 DIAGNOSIS — U07.1 COVID-19: Primary | ICD-10-CM

## 2021-09-13 DIAGNOSIS — J96.01 ACUTE RESPIRATORY FAILURE WITH HYPOXIA (HCC): ICD-10-CM

## 2021-09-13 LAB
ALBUMIN SERPL-MCNC: 3.7 G/DL (ref 3.5–5.2)
ALP BLD-CCNC: 102 U/L (ref 35–104)
ALT SERPL-CCNC: 14 U/L (ref 0–32)
ANION GAP SERPL CALCULATED.3IONS-SCNC: 14 MMOL/L (ref 7–16)
ANISOCYTOSIS: ABNORMAL
AST SERPL-CCNC: 49 U/L (ref 0–31)
B.E.: -2.4 MMOL/L (ref -3–3)
BASOPHILS ABSOLUTE: 0 E9/L (ref 0–0.2)
BASOPHILS RELATIVE PERCENT: 0.2 % (ref 0–2)
BILIRUB SERPL-MCNC: 0.4 MG/DL (ref 0–1.2)
BUN BLDV-MCNC: 10 MG/DL (ref 6–23)
BURR CELLS: ABNORMAL
CALCIUM SERPL-MCNC: 9.3 MG/DL (ref 8.6–10.2)
CHLORIDE BLD-SCNC: 94 MMOL/L (ref 98–107)
CO2: 25 MMOL/L (ref 22–29)
COHB: 1 % (ref 0–1.5)
CREAT SERPL-MCNC: 0.7 MG/DL (ref 0.5–1)
CRITICAL: ABNORMAL
D DIMER: 688 NG/ML DDU
DATE ANALYZED: ABNORMAL
DATE OF COLLECTION: ABNORMAL
EKG ATRIAL RATE: 101 BPM
EKG P AXIS: 56 DEGREES
EKG P-R INTERVAL: 134 MS
EKG Q-T INTERVAL: 388 MS
EKG QRS DURATION: 80 MS
EKG QTC CALCULATION (BAZETT): 503 MS
EKG R AXIS: 25 DEGREES
EKG T AXIS: 64 DEGREES
EKG VENTRICULAR RATE: 101 BPM
EOSINOPHILS ABSOLUTE: 0 E9/L (ref 0.05–0.5)
EOSINOPHILS RELATIVE PERCENT: 0 % (ref 0–6)
GFR AFRICAN AMERICAN: >60
GFR NON-AFRICAN AMERICAN: >60 ML/MIN/1.73
GLUCOSE BLD-MCNC: 98 MG/DL (ref 74–99)
HCO3: 19.8 MMOL/L (ref 22–26)
HCT VFR BLD CALC: 43.8 % (ref 34–48)
HEMOGLOBIN: 14.7 G/DL (ref 11.5–15.5)
HHB: 7.4 % (ref 0–5)
LAB: ABNORMAL
LACTIC ACID, SEPSIS: 2.1 MMOL/L (ref 0.5–1.9)
LACTIC ACID, SEPSIS: 2.2 MMOL/L (ref 0.5–1.9)
LYMPHOCYTES ABSOLUTE: 1.2 E9/L (ref 1.5–4)
LYMPHOCYTES RELATIVE PERCENT: 13.8 % (ref 20–42)
Lab: ABNORMAL
MAGNESIUM: 2 MG/DL (ref 1.6–2.6)
MCH RBC QN AUTO: 30.7 PG (ref 26–35)
MCHC RBC AUTO-ENTMCNC: 33.6 % (ref 32–34.5)
MCV RBC AUTO: 91.4 FL (ref 80–99.9)
METHB: 0.1 % (ref 0–1.5)
MODE: ABNORMAL
MONOCYTES ABSOLUTE: 0.09 E9/L (ref 0.1–0.95)
MONOCYTES RELATIVE PERCENT: 0.9 % (ref 2–12)
NEUTROPHILS ABSOLUTE: 7.31 E9/L (ref 1.8–7.3)
NEUTROPHILS RELATIVE PERCENT: 85.3 % (ref 43–80)
O2 SATURATION: 92.5 % (ref 92–98.5)
O2HB: 91.5 % (ref 94–97)
OPERATOR ID: ABNORMAL
PATIENT TEMP: 37 C
PCO2: 28 MMHG (ref 35–45)
PDW BLD-RTO: 13 FL (ref 11.5–15)
PH BLOOD GAS: 7.47 (ref 7.35–7.45)
PLATELET # BLD: 289 E9/L (ref 130–450)
PMV BLD AUTO: 9.7 FL (ref 7–12)
PO2: 64.6 MMHG (ref 75–100)
POIKILOCYTES: ABNORMAL
POTASSIUM SERPL-SCNC: 4.4 MMOL/L (ref 3.5–5)
PRO-BNP: 58 PG/ML (ref 0–125)
RBC # BLD: 4.79 E12/L (ref 3.5–5.5)
SARS-COV-2, NAAT: DETECTED
SODIUM BLD-SCNC: 133 MMOL/L (ref 132–146)
SOURCE, BLOOD GAS: ABNORMAL
THB: 15.1 G/DL (ref 11.5–16.5)
TIME ANALYZED: 1447
TOTAL PROTEIN: 8.1 G/DL (ref 6.4–8.3)
TROPONIN, HIGH SENSITIVITY: 6 NG/L (ref 0–9)
VACUOLATED NEUTROPHILS: ABNORMAL
WBC # BLD: 8.6 E9/L (ref 4.5–11.5)

## 2021-09-13 PROCEDURE — 6370000000 HC RX 637 (ALT 250 FOR IP): Performed by: STUDENT IN AN ORGANIZED HEALTH CARE EDUCATION/TRAINING PROGRAM

## 2021-09-13 PROCEDURE — 36415 COLL VENOUS BLD VENIPUNCTURE: CPT

## 2021-09-13 PROCEDURE — 2500000003 HC RX 250 WO HCPCS: Performed by: EMERGENCY MEDICINE

## 2021-09-13 PROCEDURE — 83735 ASSAY OF MAGNESIUM: CPT

## 2021-09-13 PROCEDURE — 96374 THER/PROPH/DIAG INJ IV PUSH: CPT

## 2021-09-13 PROCEDURE — 82805 BLOOD GASES W/O2 SATURATION: CPT

## 2021-09-13 PROCEDURE — 6360000002 HC RX W HCPCS: Performed by: EMERGENCY MEDICINE

## 2021-09-13 PROCEDURE — 87040 BLOOD CULTURE FOR BACTERIA: CPT

## 2021-09-13 PROCEDURE — 36556 INSERT NON-TUNNEL CV CATH: CPT

## 2021-09-13 PROCEDURE — 87635 SARS-COV-2 COVID-19 AMP PRB: CPT

## 2021-09-13 PROCEDURE — 99285 EMERGENCY DEPT VISIT HI MDM: CPT

## 2021-09-13 PROCEDURE — 94664 DEMO&/EVAL PT USE INHALER: CPT

## 2021-09-13 PROCEDURE — 71045 X-RAY EXAM CHEST 1 VIEW: CPT

## 2021-09-13 PROCEDURE — 6370000000 HC RX 637 (ALT 250 FOR IP): Performed by: EMERGENCY MEDICINE

## 2021-09-13 PROCEDURE — 2580000003 HC RX 258: Performed by: EMERGENCY MEDICINE

## 2021-09-13 PROCEDURE — 84484 ASSAY OF TROPONIN QUANT: CPT

## 2021-09-13 PROCEDURE — 6360000004 HC RX CONTRAST MEDICATION: Performed by: RADIOLOGY

## 2021-09-13 PROCEDURE — 85378 FIBRIN DEGRADE SEMIQUANT: CPT

## 2021-09-13 PROCEDURE — 2580000003 HC RX 258: Performed by: STUDENT IN AN ORGANIZED HEALTH CARE EDUCATION/TRAINING PROGRAM

## 2021-09-13 PROCEDURE — 85025 COMPLETE CBC W/AUTO DIFF WBC: CPT

## 2021-09-13 PROCEDURE — 2140000000 HC CCU INTERMEDIATE R&B

## 2021-09-13 PROCEDURE — 80053 COMPREHEN METABOLIC PANEL: CPT

## 2021-09-13 PROCEDURE — 93005 ELECTROCARDIOGRAM TRACING: CPT | Performed by: EMERGENCY MEDICINE

## 2021-09-13 PROCEDURE — 93010 ELECTROCARDIOGRAM REPORT: CPT | Performed by: INTERNAL MEDICINE

## 2021-09-13 PROCEDURE — 83880 ASSAY OF NATRIURETIC PEPTIDE: CPT

## 2021-09-13 PROCEDURE — 83605 ASSAY OF LACTIC ACID: CPT

## 2021-09-13 PROCEDURE — 71275 CT ANGIOGRAPHY CHEST: CPT

## 2021-09-13 RX ORDER — IPRATROPIUM BROMIDE AND ALBUTEROL SULFATE 2.5; .5 MG/3ML; MG/3ML
1 SOLUTION RESPIRATORY (INHALATION) ONCE
Status: COMPLETED | OUTPATIENT
Start: 2021-09-13 | End: 2021-09-13

## 2021-09-13 RX ORDER — 0.9 % SODIUM CHLORIDE 0.9 %
1000 INTRAVENOUS SOLUTION INTRAVENOUS ONCE
Status: COMPLETED | OUTPATIENT
Start: 2021-09-13 | End: 2021-09-13

## 2021-09-13 RX ORDER — DEXAMETHASONE 4 MG/1
6 TABLET ORAL ONCE
Status: COMPLETED | OUTPATIENT
Start: 2021-09-13 | End: 2021-09-13

## 2021-09-13 RX ORDER — ACETAMINOPHEN 325 MG/1
650 TABLET ORAL ONCE
Status: COMPLETED | OUTPATIENT
Start: 2021-09-13 | End: 2021-09-13

## 2021-09-13 RX ADMIN — IPRATROPIUM BROMIDE AND ALBUTEROL SULFATE 1 AMPULE: .5; 3 SOLUTION RESPIRATORY (INHALATION) at 14:40

## 2021-09-13 RX ADMIN — DOXYCYCLINE 100 MG: 100 INJECTION, POWDER, LYOPHILIZED, FOR SOLUTION INTRAVENOUS at 16:22

## 2021-09-13 RX ADMIN — DEXAMETHASONE 6 MG: 4 TABLET ORAL at 14:40

## 2021-09-13 RX ADMIN — SODIUM CHLORIDE 1000 ML: 9 INJECTION, SOLUTION INTRAVENOUS at 14:38

## 2021-09-13 RX ADMIN — IOPAMIDOL 75 ML: 755 INJECTION, SOLUTION INTRAVENOUS at 19:55

## 2021-09-13 RX ADMIN — CEFTRIAXONE SODIUM 2000 MG: 2 INJECTION, POWDER, FOR SOLUTION INTRAMUSCULAR; INTRAVENOUS at 15:07

## 2021-09-13 RX ADMIN — ACETAMINOPHEN 650 MG: 325 TABLET ORAL at 14:40

## 2021-09-13 ASSESSMENT — ENCOUNTER SYMPTOMS
EYE REDNESS: 0
EYE PAIN: 0
SINUS PRESSURE: 0
VOMITING: 1
SORE THROAT: 0
SHORTNESS OF BREATH: 1
NAUSEA: 1
ABDOMINAL DISTENTION: 0
COUGH: 1
EYE DISCHARGE: 0
ABDOMINAL PAIN: 0
WHEEZING: 0
DIARRHEA: 0
BACK PAIN: 0

## 2021-09-13 ASSESSMENT — PAIN SCALES - GENERAL
PAINLEVEL_OUTOF10: 4
PAINLEVEL_OUTOF10: 6

## 2021-09-13 ASSESSMENT — PAIN DESCRIPTION - LOCATION: LOCATION: CHEST

## 2021-09-13 ASSESSMENT — PAIN DESCRIPTION - PAIN TYPE: TYPE: ACUTE PAIN

## 2021-09-13 NOTE — ED PROVIDER NOTES
Rhythm: Normal rate and regular rhythm. Pulses: Normal pulses. Heart sounds: Normal heart sounds. Pulmonary:      Effort: Pulmonary effort is normal. No respiratory distress. Breath sounds: Normal breath sounds. No wheezing or rales. Abdominal:      General: Bowel sounds are normal.      Palpations: Abdomen is soft. Tenderness: There is no abdominal tenderness. There is no guarding or rebound. Musculoskeletal:         General: No tenderness. Cervical back: Normal range of motion and neck supple. Right lower leg: No edema. Left lower leg: No edema. Skin:     General: Skin is warm and dry. Capillary Refill: Capillary refill takes less than 2 seconds. Neurological:      General: No focal deficit present. Mental Status: She is alert and oriented to person, place, and time. Cranial Nerves: No cranial nerve deficit. Coordination: Coordination normal.   Psychiatric:         Mood and Affect: Mood normal.          Procedures     Central Line Placement Procedure Note    Indication: poor peripheral access    Consent: The patient provided verbal consent for this procedure. Procedure: The patient was positioned appropriately and the skin over the right femoral vein was prepped with betadine and draped in a sterile fashion. Local anesthesia was obtained by infiltration using 1% Lidocaine without epinephrine. A large bore needle was used to identify the vein. A guide wire was then inserted into the vein through the needle. A triple lumen catheter was then inserted into the vessel over the guide wire using the Seldinger technique. All ports showed good, free flowing blood return and were flushed with saline solution. The catheter was then securely fastened to the skin with suture at 18 cm.   Two sutures were placed into the kit included tube clamp, proximal eyelets and a suture end from each of the securing sutures was extended around the catheter and tied to the proximal eyelets as an added measure to prevent dislodgement. An antibiotic disk was placed and the site was then covered with a sterile dressing. A post procedure X-ray was not indicated. The patient tolerated the procedure well. Complications: None          Labs Reviewed   COVID-19, RAPID - Abnormal; Notable for the following components:       Result Value    SARS-CoV-2, NAAT DETECTED (*)     All other components within normal limits   LACTATE, SEPSIS - Abnormal; Notable for the following components:    Lactic Acid, Sepsis 2.1 (*)     All other components within normal limits   LACTATE, SEPSIS - Abnormal; Notable for the following components:    Lactic Acid, Sepsis 2.2 (*)     All other components within normal limits   CBC WITH AUTO DIFFERENTIAL - Abnormal; Notable for the following components:    Neutrophils % 85.3 (*)     Lymphocytes % 13.8 (*)     Monocytes % 0.9 (*)     Neutrophils Absolute 7.31 (*)     Lymphocytes Absolute 1.20 (*)     Monocytes Absolute 0.09 (*)     Eosinophils Absolute 0.00 (*)     All other components within normal limits   COMPREHENSIVE METABOLIC PANEL - Abnormal; Notable for the following components:    Chloride 94 (*)     AST 49 (*)     All other components within normal limits   BLOOD GAS, ARTERIAL - Abnormal; Notable for the following components:    pH, Blood Gas 7.467 (*)     PCO2 28.0 (*)     PO2 64.6 (*)     HCO3 19.8 (*)     O2Hb 91.5 (*)     HHb 7.4 (*)     All other components within normal limits   CULTURE, BLOOD 1   CULTURE, BLOOD 2   TROPONIN   BRAIN NATRIURETIC PEPTIDE   MAGNESIUM   D-DIMER, QUANTITATIVE   URINALYSIS   ARTERIAL BLOOD GAS, RESPIRATORY ONLY     CTA PULMONARY W CONTRAST   Final Result   No evidence of pulmonary embolism. Multifocal bilateral COVID-19 pneumonia. XR CHEST PORTABLE   Final Result   Patchy infiltrates seen within the right upper lobe, right lower lobe and   left lower lobe more prominent within the right lower lobe. EKG #1:  I personally interpreted this EKG  Time:  1318    Rate: 101  Rhythm: Sinus. Interpretation: Sinus tachycardia, normal axis, no ST elevation or T wave inversion. MDM  Number of Diagnoses or Management Options  Diagnosis management comments: Patient is a 35-year-old female presents today for shortness of breath. Noted to be hypoxic on arrival O2 saturation of 84%. She was placed on nasal cannula. Patient was found to be Covid positive in the department. High-sensitivity troponin less than 6, D-dimer elevated 688. CTA was obtained does not show evidence of pulmonary embolism. There was difficulty with peripheral access, therefore right femoral CVC was placed as patient does have Covid and we did not want to attempt IJ or subclavian at this time. Patient was given Decadron. She will be admitted to the hospital.       Amount and/or Complexity of Data Reviewed  Clinical lab tests: reviewed  Tests in the radiology section of CPT®: reviewed             ED Course as of Sep 13 2031   Mon Sep 13, 2021   1524 Renee Gomez will admit    []      ED Course User Index  [] Alexia Dsouza,        --------------------------------------------- PAST HISTORY ---------------------------------------------  Past Medical History:  has a past medical history of Asthma, CAD (coronary artery disease), Closed fracture of proximal end of left humerus with routine healing, Degeneration of lumbar or lumbosacral intervertebral disc, Fall against sharp object, History of blood transfusion, Hypertension, Hypokalemia, Insomnia, Kidney stone, Nondisplaced fracture of greater tuberosity of right humerus, initial encounter for closed fracture, Orthostatic hypotension, and Severe back pain. Past Surgical History:  has a past surgical history that includes Tonsillectomy; Bunionectomy; chest tube insertion (1990); Lithotripsy (2012); Colonoscopy (3/26/09); other surgical history; Upper gastrointestinal endoscopy (3/30/15);  Upper RADIOLOGY:  CTA PULMONARY W CONTRAST   Final Result   No evidence of pulmonary embolism. Multifocal bilateral COVID-19 pneumonia. XR CHEST PORTABLE   Final Result   Patchy infiltrates seen within the right upper lobe, right lower lobe and   left lower lobe more prominent within the right lower lobe.                 ------------------------- NURSING NOTES AND VITALS REVIEWED ---------------------------  Date / Time Roomed:  9/13/2021  1:11 PM  ED Bed Assignment:  01/01    The nursing notes within the ED encounter and vital signs as below have been reviewed. Patient Vitals for the past 24 hrs:   BP Temp Temp src Pulse Resp SpO2 Height Weight   09/13/21 1603 111/83 -- -- 95 30 93 % -- --   09/13/21 1349 -- -- -- -- -- (!) 84 % -- --   09/13/21 1328 129/88 -- -- 102 (!) 37 94 % -- --   09/13/21 1312 113/73 100.2 °F (37.9 °C) Oral 100 20 92 % 5' 2.5\" (1.588 m) 145 lb (65.8 kg)       Oxygen Saturation Interpretation: Abnormal    ------------------------------------------ PROGRESS NOTES ------------------------------------------    Counseling:  I have spoken with the patient and discussed todays results, in addition to providing specific details for the plan of care and counseling regarding the diagnosis and prognosis. Their questions are answered at this time and they are agreeable with the plan of admission.    --------------------------------- ADDITIONAL PROVIDER NOTES ---------------------------------  Consultations:  Spoke with Dr. Jovita Mancilla. Discussed case. They will admit the patient. This patient's ED course included: a personal history and physicial examination    This patient has remained hemodynamically stable during their ED course.       Medications   acetaminophen (TYLENOL) tablet 650 mg (650 mg Oral Given 9/13/21 1440)   0.9 % sodium chloride bolus (1,000 mLs IntraVENous New Bag 9/13/21 1438)   dexamethasone (DECADRON) tablet 6 mg (6 mg Oral Given 9/13/21 1440)   ipratropium-albuterol (DUONEB) nebulizer solution 1 ampule (1 ampule Inhalation Given 9/13/21 1440)   cefTRIAXone (ROCEPHIN) 2,000 mg in sterile water 20 mL IV syringe (2,000 mg IntraVENous Given 9/13/21 1507)   doxycycline (VIBRAMYCIN) 100 mg in dextrose 5 % 100 mL IVPB (100 mg IntraVENous New Bag 9/13/21 1622)   iopamidol (ISOVUE-370) 76 % injection 75 mL (75 mLs IntraVENous Given 9/13/21 1955)         Diagnosis:  1. COVID-19    2. Acute respiratory failure with hypoxia (HCC)        Disposition:  Patient's disposition: Admit to telemetry  Patient's condition is stable.              Modesto Lawler, DO  Resident  09/13/21 2031

## 2021-09-13 NOTE — ACP (ADVANCE CARE PLANNING)
Advance Care Planning     Advance Care Planning Activator (Inpatient)  Conversation Note      Date of ACP Conversation: 9/13/2021     Conversation Conducted with: Patient with Decision Making Capacity    ACP Activator: Francisco Olivera RN    Health Care Decision Maker:     Current Designated Health Care Decision Maker:     Primary Decision Maker: Rob Romero - Child - [phone]    Secondary Decision Maker: Kendall Isabel - Parent - [phone]  Click here to complete Healthcare Decision Makers including section of the Healthcare Decision Maker Relationship (ie \"Primary\")  Today we documented Decision Maker(s) consistent with Legal Next of Kin hierarchy. Care Preferences    Ventilation: \"If you were in your present state of health and suddenly became very ill and were unable to breathe on your own, what would your preference be about the use of a ventilator (breathing machine) if it were available to you? \"      Would the patient desire the use of ventilator (breathing machine)?: yes    \"If your health worsens and it becomes clear that your chance of recovery is unlikely, what would your preference be about the use of a ventilator (breathing machine) if it were available to you? \"     Would the patient desire the use of ventilator (breathing machine)?: Yes      Resuscitation  \"CPR works best to restart the heart when there is a sudden event, like a heart attack, in someone who is otherwise healthy. Unfortunately, CPR does not typically restart the heart for people who have serious health conditions or who are very sick. \"    \"In the event your heart stopped as a result of an underlying serious health condition, would you want attempts to be made to restart your heart (answer \"yes\" for attempt to resuscitate) or would you prefer a natural death (answer \"no\" for do not attempt to resuscitate)? \" yes       [] Yes   [x] No   Educated Patient / Constancia Tabatha regarding differences between Advance Directives and portable DNR orders.     Length of ACP Conversation in minutes:  5    Conversation Outcomes:  [x] ACP discussion completed  [] Existing advance directive reviewed with patient; no changes to patient's previously recorded wishes  [] New Advance Directive completed  [] Portable Do Not Rescitate prepared for Provider review and signature  [] POLST/POST/MOLST/MOST prepared for Provider review and signature      Follow-up plan:    [] Schedule follow-up conversation to continue planning  [] Referred individual to Provider for additional questions/concerns   [] Advised patient/agent/surrogate to review completed ACP document and update if needed with changes in condition, patient preferences or care setting    [x] This note routed to one or more involved healthcare providers

## 2021-09-13 NOTE — ED NOTES
Patient found with oxygen removed and pulse ox off. Patient pulse ox placed and SPO2 was 84% on room air. Oxygen applied, patient instructed not to remove. Dr. Romario Wright at bedside to place central line.       Jt Rivera RN  09/13/21 9086

## 2021-09-14 LAB
ANION GAP SERPL CALCULATED.3IONS-SCNC: 16 MMOL/L (ref 7–16)
BACTERIA: ABNORMAL /HPF
BILIRUBIN URINE: NEGATIVE
BLOOD, URINE: ABNORMAL
BUN BLDV-MCNC: 11 MG/DL (ref 6–23)
C-REACTIVE PROTEIN: 21.7 MG/DL (ref 0–0.4)
CALCIUM SERPL-MCNC: 9 MG/DL (ref 8.6–10.2)
CHLORIDE BLD-SCNC: 99 MMOL/L (ref 98–107)
CLARITY: CLEAR
CO2: 21 MMOL/L (ref 22–29)
COLOR: YELLOW
CREAT SERPL-MCNC: 0.3 MG/DL (ref 0.5–1)
GFR AFRICAN AMERICAN: >60
GFR NON-AFRICAN AMERICAN: >60 ML/MIN/1.73
GLUCOSE BLD-MCNC: 143 MG/DL (ref 74–99)
GLUCOSE URINE: NEGATIVE MG/DL
HCT VFR BLD CALC: 40.1 % (ref 34–48)
HEMOGLOBIN: 13.5 G/DL (ref 11.5–15.5)
KETONES, URINE: 40 MG/DL
LEUKOCYTE ESTERASE, URINE: NEGATIVE
MCH RBC QN AUTO: 30.5 PG (ref 26–35)
MCHC RBC AUTO-ENTMCNC: 33.7 % (ref 32–34.5)
MCV RBC AUTO: 90.7 FL (ref 80–99.9)
NITRITE, URINE: NEGATIVE
PDW BLD-RTO: 13 FL (ref 11.5–15)
PH UA: 6 (ref 5–9)
PLATELET # BLD: 262 E9/L (ref 130–450)
PMV BLD AUTO: 9.6 FL (ref 7–12)
POTASSIUM SERPL-SCNC: 4 MMOL/L (ref 3.5–5)
PROCALCITONIN: 0.1 NG/ML (ref 0–0.08)
PROTEIN UA: 30 MG/DL
RBC # BLD: 4.42 E12/L (ref 3.5–5.5)
RBC UA: ABNORMAL /HPF (ref 0–2)
SODIUM BLD-SCNC: 136 MMOL/L (ref 132–146)
SPECIFIC GRAVITY UA: 1.01 (ref 1–1.03)
UROBILINOGEN, URINE: 0.2 E.U./DL
WBC # BLD: 8.6 E9/L (ref 4.5–11.5)
WBC UA: ABNORMAL /HPF (ref 0–5)

## 2021-09-14 PROCEDURE — XW033H5 INTRODUCTION OF TOCILIZUMAB INTO PERIPHERAL VEIN, PERCUTANEOUS APPROACH, NEW TECHNOLOGY GROUP 5: ICD-10-PCS | Performed by: INTERNAL MEDICINE

## 2021-09-14 PROCEDURE — 2580000003 HC RX 258: Performed by: INTERNAL MEDICINE

## 2021-09-14 PROCEDURE — 6360000002 HC RX W HCPCS: Performed by: INTERNAL MEDICINE

## 2021-09-14 PROCEDURE — 6370000000 HC RX 637 (ALT 250 FOR IP): Performed by: FAMILY MEDICINE

## 2021-09-14 PROCEDURE — 2700000000 HC OXYGEN THERAPY PER DAY

## 2021-09-14 PROCEDURE — 80048 BASIC METABOLIC PNL TOTAL CA: CPT

## 2021-09-14 PROCEDURE — 36415 COLL VENOUS BLD VENIPUNCTURE: CPT

## 2021-09-14 PROCEDURE — 81001 URINALYSIS AUTO W/SCOPE: CPT

## 2021-09-14 PROCEDURE — 2580000003 HC RX 258: Performed by: FAMILY MEDICINE

## 2021-09-14 PROCEDURE — 85027 COMPLETE CBC AUTOMATED: CPT

## 2021-09-14 PROCEDURE — 2140000000 HC CCU INTERMEDIATE R&B

## 2021-09-14 PROCEDURE — 6360000002 HC RX W HCPCS: Performed by: FAMILY MEDICINE

## 2021-09-14 PROCEDURE — 86140 C-REACTIVE PROTEIN: CPT

## 2021-09-14 PROCEDURE — 84145 PROCALCITONIN (PCT): CPT

## 2021-09-14 PROCEDURE — 6370000000 HC RX 637 (ALT 250 FOR IP): Performed by: INTERNAL MEDICINE

## 2021-09-14 PROCEDURE — 99255 IP/OBS CONSLTJ NEW/EST HI 80: CPT | Performed by: INTERNAL MEDICINE

## 2021-09-14 RX ORDER — NICOTINE 21 MG/24HR
1 PATCH, TRANSDERMAL 24 HOURS TRANSDERMAL DAILY
Status: DISCONTINUED | OUTPATIENT
Start: 2021-09-14 | End: 2021-09-21 | Stop reason: HOSPADM

## 2021-09-14 RX ORDER — ONDANSETRON 4 MG/1
4 TABLET, FILM COATED ORAL EVERY 6 HOURS PRN
Status: DISCONTINUED | OUTPATIENT
Start: 2021-09-14 | End: 2021-09-21 | Stop reason: HOSPADM

## 2021-09-14 RX ORDER — GAUZE BANDAGE 2" X 2"
100 BANDAGE TOPICAL 3 TIMES DAILY
Status: DISCONTINUED | OUTPATIENT
Start: 2021-09-14 | End: 2021-09-14 | Stop reason: DRUGHIGH

## 2021-09-14 RX ORDER — DEXAMETHASONE SODIUM PHOSPHATE 10 MG/ML
6 INJECTION INTRAMUSCULAR; INTRAVENOUS 2 TIMES DAILY
Status: DISCONTINUED | OUTPATIENT
Start: 2021-09-14 | End: 2021-09-14

## 2021-09-14 RX ORDER — CETIRIZINE HYDROCHLORIDE 10 MG/1
10 TABLET ORAL DAILY
Status: DISCONTINUED | OUTPATIENT
Start: 2021-09-14 | End: 2021-09-14 | Stop reason: SDUPTHER

## 2021-09-14 RX ORDER — PANTOPRAZOLE SODIUM 40 MG/1
40 TABLET, DELAYED RELEASE ORAL
Status: DISCONTINUED | OUTPATIENT
Start: 2021-09-14 | End: 2021-09-21 | Stop reason: HOSPADM

## 2021-09-14 RX ORDER — HYDROCODONE BITARTRATE AND ACETAMINOPHEN 7.5; 325 MG/1; MG/1
1 TABLET ORAL EVERY 8 HOURS PRN
COMMUNITY

## 2021-09-14 RX ORDER — ASCORBIC ACID 500 MG
500 TABLET ORAL DAILY
Status: DISCONTINUED | OUTPATIENT
Start: 2021-09-14 | End: 2021-09-19

## 2021-09-14 RX ORDER — DOCUSATE SODIUM 100 MG/1
100 CAPSULE, LIQUID FILLED ORAL DAILY PRN
Status: DISCONTINUED | OUTPATIENT
Start: 2021-09-14 | End: 2021-09-21 | Stop reason: HOSPADM

## 2021-09-14 RX ORDER — ATORVASTATIN CALCIUM 40 MG/1
40 TABLET, FILM COATED ORAL NIGHTLY
Status: DISCONTINUED | OUTPATIENT
Start: 2021-09-14 | End: 2021-09-21 | Stop reason: HOSPADM

## 2021-09-14 RX ORDER — MECOBALAMIN 5000 MCG
10 TABLET,DISINTEGRATING ORAL NIGHTLY PRN
Status: DISCONTINUED | OUTPATIENT
Start: 2021-09-14 | End: 2021-09-21 | Stop reason: HOSPADM

## 2021-09-14 RX ORDER — ZINC SULFATE 50(220)MG
50 CAPSULE ORAL DAILY
Status: DISCONTINUED | OUTPATIENT
Start: 2021-09-14 | End: 2021-09-19

## 2021-09-14 RX ORDER — MIRTAZAPINE 15 MG/1
7.5 TABLET, FILM COATED ORAL NIGHTLY PRN
Status: DISCONTINUED | OUTPATIENT
Start: 2021-09-14 | End: 2021-09-14

## 2021-09-14 RX ORDER — HYDROXYZINE PAMOATE 50 MG/1
100 CAPSULE ORAL EVERY 6 HOURS PRN
Status: DISCONTINUED | OUTPATIENT
Start: 2021-09-14 | End: 2021-09-21 | Stop reason: HOSPADM

## 2021-09-14 RX ORDER — M-VIT,TX,IRON,MINS/CALC/FOLIC 27MG-0.4MG
1 TABLET ORAL DAILY
Status: DISCONTINUED | OUTPATIENT
Start: 2021-09-14 | End: 2021-09-21 | Stop reason: HOSPADM

## 2021-09-14 RX ORDER — AMITRIPTYLINE HYDROCHLORIDE 25 MG/1
25 TABLET, FILM COATED ORAL NIGHTLY
Status: DISCONTINUED | OUTPATIENT
Start: 2021-09-14 | End: 2021-09-21 | Stop reason: HOSPADM

## 2021-09-14 RX ORDER — CETIRIZINE HYDROCHLORIDE 10 MG/1
10 TABLET ORAL DAILY
Status: DISCONTINUED | OUTPATIENT
Start: 2021-09-14 | End: 2021-09-21 | Stop reason: HOSPADM

## 2021-09-14 RX ORDER — GAUZE BANDAGE 2" X 2"
100 BANDAGE TOPICAL DAILY
Status: DISCONTINUED | OUTPATIENT
Start: 2021-09-14 | End: 2021-09-21 | Stop reason: HOSPADM

## 2021-09-14 RX ORDER — LOPERAMIDE HYDROCHLORIDE 2 MG/1
2 CAPSULE ORAL PRN
Status: DISCONTINUED | OUTPATIENT
Start: 2021-09-14 | End: 2021-09-21 | Stop reason: HOSPADM

## 2021-09-14 RX ORDER — IBUPROFEN 200 MG
400 TABLET ORAL EVERY 6 HOURS PRN
Status: DISCONTINUED | OUTPATIENT
Start: 2021-09-14 | End: 2021-09-21 | Stop reason: HOSPADM

## 2021-09-14 RX ORDER — DEXAMETHASONE SODIUM PHOSPHATE 4 MG/ML
6 INJECTION, SOLUTION INTRA-ARTICULAR; INTRALESIONAL; INTRAMUSCULAR; INTRAVENOUS; SOFT TISSUE EVERY 24 HOURS
Status: DISCONTINUED | OUTPATIENT
Start: 2021-09-15 | End: 2021-09-15 | Stop reason: ALTCHOICE

## 2021-09-14 RX ORDER — MAGNESIUM HYDROXIDE/ALUMINUM HYDROXICE/SIMETHICONE 120; 1200; 1200 MG/30ML; MG/30ML; MG/30ML
10 SUSPENSION ORAL EVERY 4 HOURS PRN
Status: DISCONTINUED | OUTPATIENT
Start: 2021-09-14 | End: 2021-09-21 | Stop reason: HOSPADM

## 2021-09-14 RX ORDER — CALCIUM CARBONATE 200(500)MG
2 TABLET,CHEWABLE ORAL PRN
Status: DISCONTINUED | OUTPATIENT
Start: 2021-09-14 | End: 2021-09-21 | Stop reason: HOSPADM

## 2021-09-14 RX ORDER — FOLIC ACID 1 MG/1
1 TABLET ORAL DAILY
Status: DISCONTINUED | OUTPATIENT
Start: 2021-09-14 | End: 2021-09-21 | Stop reason: HOSPADM

## 2021-09-14 RX ADMIN — CEFTRIAXONE 1000 MG: 1 INJECTION, POWDER, FOR SOLUTION INTRAMUSCULAR; INTRAVENOUS at 14:03

## 2021-09-14 RX ADMIN — Medication 1 TABLET: at 09:06

## 2021-09-14 RX ADMIN — DEXAMETHASONE SODIUM PHOSPHATE 6 MG: 10 INJECTION INTRAMUSCULAR; INTRAVENOUS at 01:07

## 2021-09-14 RX ADMIN — AMITRIPTYLINE HYDROCHLORIDE 25 MG: 25 TABLET, FILM COATED ORAL at 20:36

## 2021-09-14 RX ADMIN — IPRATROPIUM BROMIDE AND ALBUTEROL 1 PUFF: 20; 100 SPRAY, METERED RESPIRATORY (INHALATION) at 14:03

## 2021-09-14 RX ADMIN — FOLIC ACID 1 MG: 1 TABLET ORAL at 17:56

## 2021-09-14 RX ADMIN — Medication 10 MG: at 20:36

## 2021-09-14 RX ADMIN — TOCILIZUMAB 600 MG: 20 INJECTION, SOLUTION, CONCENTRATE INTRAVENOUS at 17:57

## 2021-09-14 RX ADMIN — OXYCODONE HYDROCHLORIDE AND ACETAMINOPHEN 500 MG: 500 TABLET ORAL at 09:07

## 2021-09-14 RX ADMIN — ENOXAPARIN SODIUM 30 MG: 30 INJECTION SUBCUTANEOUS at 20:36

## 2021-09-14 RX ADMIN — AMITRIPTYLINE HYDROCHLORIDE 25 MG: 25 TABLET, FILM COATED ORAL at 04:27

## 2021-09-14 RX ADMIN — IPRATROPIUM BROMIDE AND ALBUTEROL 1 PUFF: 20; 100 SPRAY, METERED RESPIRATORY (INHALATION) at 09:08

## 2021-09-14 RX ADMIN — CETIRIZINE HYDROCHLORIDE 10 MG: 10 TABLET, FILM COATED ORAL at 09:07

## 2021-09-14 RX ADMIN — ATORVASTATIN CALCIUM 40 MG: 40 TABLET, FILM COATED ORAL at 20:36

## 2021-09-14 RX ADMIN — FOLIC ACID 1 MG: 1 TABLET ORAL at 09:07

## 2021-09-14 RX ADMIN — Medication 100 MG: at 17:57

## 2021-09-14 RX ADMIN — PANTOPRAZOLE SODIUM 40 MG: 40 TABLET, DELAYED RELEASE ORAL at 09:07

## 2021-09-14 RX ADMIN — IBUPROFEN 400 MG: 200 TABLET, FILM COATED ORAL at 04:27

## 2021-09-14 RX ADMIN — Medication 50 MG: at 09:07

## 2021-09-14 RX ADMIN — Medication 10 MG: at 01:07

## 2021-09-14 RX ADMIN — ENOXAPARIN SODIUM 30 MG: 30 INJECTION SUBCUTANEOUS at 09:07

## 2021-09-14 RX ADMIN — IPRATROPIUM BROMIDE AND ALBUTEROL 1 PUFF: 20; 100 SPRAY, METERED RESPIRATORY (INHALATION) at 20:37

## 2021-09-14 RX ADMIN — ATORVASTATIN CALCIUM 40 MG: 40 TABLET, FILM COATED ORAL at 01:08

## 2021-09-14 ASSESSMENT — PAIN SCALES - GENERAL
PAINLEVEL_OUTOF10: 4
PAINLEVEL_OUTOF10: 0
PAINLEVEL_OUTOF10: 0

## 2021-09-14 ASSESSMENT — ENCOUNTER SYMPTOMS
SHORTNESS OF BREATH: 1
ABDOMINAL PAIN: 0

## 2021-09-14 NOTE — ED NOTES
Pt repositioned for comfort. Warm blanket provided. Pox 94-95% with 02 5lnc with poor readings from finger polish.  No respiratory distress noted        Cher Downs RN  09/14/21 7243

## 2021-09-14 NOTE — H&P
HISTORY AND PHYSICAL             Date: 9/14/2021        Patient Name: Sapna Stuart     YOB: 1960      Age:  64 y.o. Chief Complaint     Chief Complaint   Patient presents with    Shortness of Breath     RECENT TEST FOR COVID AT URGENT CARE NOT RESULTED          History Obtained From   patient    History of Present Illness   Patient came to the ER because she was diagnosed with Covid-19 and became more short of breath. Past Medical History     Past Medical History:   Diagnosis Date    Asthma     CAD (coronary artery disease)     Closed fracture of proximal end of left humerus with routine healing 6/11/2019    Degeneration of lumbar or lumbosacral intervertebral disc     Fall against sharp object 1960    chest tube in hospital    History of blood transfusion 1997    after vaginal delivery of baby hemmorhage    Hypertension     Hypokalemia     Insomnia     Kidney stone     Nondisplaced fracture of greater tuberosity of right humerus, initial encounter for closed fracture 1/22/2019    Orthostatic hypotension     Severe back pain 2/3/2014        Past Surgical History     Past Surgical History:   Procedure Laterality Date    BUNIONECTOMY      Left foot    CHEST TUBE INSERTION  1990    several    COLONOSCOPY  3/26/09    KYPHOSIS SURGERY N/A 10/28/2019    KYPHOPLASTY L1 WITH VERTEBRAL BONE BIOPSY performed by Levar Kelley MD at [place] [address] LITHOTRIPSY  2012   [facility]  11/09/2017    L3-L4 ,L4-L5  interbody fusion with Dajuan Celeste     OTHER SURGICAL HISTORY      electro ablation for rectal and sigmoid polyps    TONSILLECTOMY      UPPER GASTROINTESTINAL ENDOSCOPY  3/30/15    UPPER GASTROINTESTINAL ENDOSCOPY  4/21/08        Medications Prior to Admission     Prior to Admission medications    Medication Sig Start Date End Date Taking?  Authorizing Provider   nicotine (NICODERM CQ) 21 MG/24HR Place 1 patch onto the skin daily  Patient not taking: Reported on 5/6/2021 4/16/21   NORRIS Menjivar   aspirin 81 MG chewable tablet Take 1 tablet by mouth daily  Patient not taking: Reported on 5/6/2021 4/17/21 5/17/21  NORRIS Menjivar   atorvastatin (LIPITOR) 40 MG tablet Take 1 tablet by mouth nightly 4/16/21   NORRIS Menjivar   aluminum & magnesium hydroxide-simethicone (MAALOX) 200-200-20 MG/5ML SUSP suspension Take 10 mLs by mouth every 4 hours as needed (HEARTBURN, INDIGESTION, OR UPSET STOMACH)    Historical Provider, MD   calcium carbonate (TUMS) 500 MG chewable tablet Take 2 tablets by mouth as needed (INDIGESTION AFTER MEALS)    Historical Provider, MD   magnesium hydroxide (MILK OF MAGNESIA) 400 MG/5ML suspension Take 30 mLs by mouth daily as needed for Constipation    Historical Provider, MD   ibuprofen (ADVIL;MOTRIN) 200 MG tablet Take 400 mg by mouth every 6 hours as needed for Pain or Fever    Historical Provider, MD   mirtazapine (REMERON) 15 MG tablet Take 7.5 mg by mouth nightly as needed (INSOMNIA)    Historical Provider, MD   melatonin 5 MG TABS tablet Take 10 mg by mouth nightly as needed (INSOMNIA)    Historical Provider, MD   cetirizine (ZYRTEC) 10 MG tablet Take 10 mg by mouth daily    Historical Provider, MD   Multiple Vitamins-Minerals (THERAPEUTIC MULTIVITAMIN-MINERALS) tablet Take 1 tablet by mouth daily    Historical Provider, MD   folic acid (FOLVITE) 1 MG tablet Take 1 mg by mouth daily    Historical Provider, MD   vitamin B-1 (THIAMINE) 100 MG tablet Take 100 mg by mouth 3 times daily    Historical Provider, MD   loratadine (CLARITIN) 10 MG tablet Take 10 mg by mouth daily as needed (SINUS CONGESTION)    Historical Provider, MD   benzocaine-menthol (CEPACOL SORE THROAT) 15-3.6 MG lozenge Take 1 lozenge by mouth every 2 hours as needed for Sore Throat    Historical Provider, MD   hydrOXYzine (ATARAX) 50 MG tablet Take 100 mg by mouth every 6 hours as needed for Anxiety    Historical Provider, MD   ondansetron (ZOFRAN) 4 MG tablet Take 4 mg by mouth every 6 hours as needed for Nausea    Historical Provider, MD   loperamide (IMODIUM) 2 MG capsule Take 2 mg by mouth as needed (AFTER EACH LOOSE STOOL *DO NOT EXCEED 4 DOSES IN 24 HOURS*)    Historical Provider, MD   docusate sodium (COLACE) 100 MG capsule Take 100 mg by mouth daily as needed for Constipation    Historical Provider, MD   amitriptyline (ELAVIL) 25 MG tablet Take 25 mg by mouth nightly  11/10/20   Historical Provider, MD   omeprazole (PRILOSEC) 40 MG capsule Take 40 mg by mouth daily as needed     Historical Provider, MD        Allergies   Dilaudid [hydromorphone hcl] and Morphine    Social History     Social History     Tobacco History     Smoking Status  Former Smoker Smoking Start Date  12/7/2018 Smoking Frequency  0.5 packs/day for 2 years (1 pk yrs)    Smokeless Tobacco Use  Never Used          Alcohol History     Alcohol Use Status  Yes Drinks/Week  6 Cans of beer per week Amount  6.0 standard drinks of alcohol/wk Comment  daily          Drug Use     Drug Use Status  No Comment  quit 2000 cocaine in past          Sexual Activity     Sexually Active  Yes                Family History     Family History   Problem Relation Age of Onset    Diabetes Mother     Hypertension Mother     Heart Disease Sister     Stroke Sister     Heart Disease Maternal Grandmother     Heart Disease Maternal Grandfather     Cancer Brother        Review of Systems   Review of Systems   Constitutional: Positive for activity change. HENT: Negative for congestion. Respiratory: Positive for shortness of breath. Cardiovascular: Negative for chest pain. Gastrointestinal: Negative for abdominal pain. Genitourinary: Negative for difficulty urinating. Neurological: Negative for dizziness. Physical Exam   BP (!) 150/103   Pulse 86   Temp 98 °F (36.7 °C) (Oral)   Resp 24   Ht 5' 2.5\" (1.588 m)   Wt 145 lb (65.8 kg)   SpO2 93%   BMI 26.10 kg/m²     Physical Exam  Vitals reviewed.    HENT: Head: Normocephalic. Mouth/Throat:      Mouth: Mucous membranes are moist.   Eyes:      Pupils: Pupils are equal, round, and reactive to light. Cardiovascular:      Rate and Rhythm: Normal rate and regular rhythm. Pulses: Normal pulses. Heart sounds: Normal heart sounds. Pulmonary:      Effort: No respiratory distress. Breath sounds: Wheezing present. Abdominal:      General: Abdomen is flat. Bowel sounds are normal. There is no distension. Tenderness: There is no abdominal tenderness. Skin:     General: Skin is warm and dry. Capillary Refill: Capillary refill takes less than 2 seconds. Neurological:      General: No focal deficit present. Mental Status: She is alert and oriented to person, place, and time.    Psychiatric:         Mood and Affect: Mood normal.         Behavior: Behavior normal.         Labs      Recent Results (from the past 24 hour(s))   EKG 12 Lead    Collection Time: 09/13/21  1:18 PM   Result Value Ref Range    Ventricular Rate 101 BPM    Atrial Rate 101 BPM    P-R Interval 134 ms    QRS Duration 80 ms    Q-T Interval 388 ms    QTc Calculation (Bazett) 503 ms    P Axis 56 degrees    R Axis 25 degrees    T Axis 64 degrees   COVID-19, Rapid    Collection Time: 09/13/21  2:07 PM    Specimen: Nasopharyngeal Swab   Result Value Ref Range    SARS-CoV-2, NAAT DETECTED (A) Not Detected   Lactate, Sepsis    Collection Time: 09/13/21  2:07 PM   Result Value Ref Range    Lactic Acid, Sepsis 2.1 (H) 0.5 - 1.9 mmol/L   Lactate, Sepsis    Collection Time: 09/13/21  2:07 PM   Result Value Ref Range    Lactic Acid, Sepsis 2.2 (H) 0.5 - 1.9 mmol/L   Troponin    Collection Time: 09/13/21  2:07 PM   Result Value Ref Range    Troponin, High Sensitivity 6 0 - 9 ng/L   CBC Auto Differential    Collection Time: 09/13/21  2:07 PM   Result Value Ref Range    WBC 8.6 4.5 - 11.5 E9/L    RBC 4.79 3.50 - 5.50 E12/L    Hemoglobin 14.7 11.5 - 15.5 g/dL    Hematocrit 43.8 34.0 - 48.0 %    MCV 91.4 80.0 - 99.9 fL    MCH 30.7 26.0 - 35.0 pg    MCHC 33.6 32.0 - 34.5 %    RDW 13.0 11.5 - 15.0 fL    Platelets 571 459 - 098 E9/L    MPV 9.7 7.0 - 12.0 fL    Neutrophils % 85.3 (H) 43.0 - 80.0 %    Lymphocytes % 13.8 (L) 20.0 - 42.0 %    Monocytes % 0.9 (L) 2.0 - 12.0 %    Eosinophils % 0.0 0.0 - 6.0 %    Basophils % 0.2 0.0 - 2.0 %    Neutrophils Absolute 7.31 (H) 1.80 - 7.30 E9/L    Lymphocytes Absolute 1.20 (L) 1.50 - 4.00 E9/L    Monocytes Absolute 0.09 (L) 0.10 - 0.95 E9/L    Eosinophils Absolute 0.00 (L) 0.05 - 0.50 E9/L    Basophils Absolute 0.00 0.00 - 0.20 E9/L    Vacuolated Neutrophils 1+     Anisocytosis 1+     Poikilocytes 2+     Kvng Cells 2+    Comprehensive Metabolic Panel    Collection Time: 09/13/21  2:07 PM   Result Value Ref Range    Sodium 133 132 - 146 mmol/L    Potassium 4.4 3.5 - 5.0 mmol/L    Chloride 94 (L) 98 - 107 mmol/L    CO2 25 22 - 29 mmol/L    Anion Gap 14 7 - 16 mmol/L    Glucose 98 74 - 99 mg/dL    BUN 10 6 - 23 mg/dL    CREATININE 0.7 0.5 - 1.0 mg/dL    GFR Non-African American >60 >=60 mL/min/1.73    GFR African American >60     Calcium 9.3 8.6 - 10.2 mg/dL    Total Protein 8.1 6.4 - 8.3 g/dL    Albumin 3.7 3.5 - 5.2 g/dL    Total Bilirubin 0.4 0.0 - 1.2 mg/dL    Alkaline Phosphatase 102 35 - 104 U/L    ALT 14 0 - 32 U/L    AST 49 (H) 0 - 31 U/L   Brain Natriuretic Peptide    Collection Time: 09/13/21  2:07 PM   Result Value Ref Range    Pro-BNP 58 0 - 125 pg/mL   Magnesium    Collection Time: 09/13/21  2:07 PM   Result Value Ref Range    Magnesium 2.0 1.6 - 2.6 mg/dL   D-Dimer, Quantitative    Collection Time: 09/13/21  2:07 PM   Result Value Ref Range    D-Dimer, Quant 688 ng/mL DDU   Blood Gas, Arterial    Collection Time: 09/13/21  2:47 PM   Result Value Ref Range    Date Analyzed [id number]     Time Analyzed 1449     Source: Blood Arterial     pH, Blood Gas 7.467 (H) 7.350 - 7.450    PCO2 28.0 (L) 35.0 - 45.0 mmHg    PO2 64.6 (L) 75.0 - 100.0 mmHg    HCO3 19.8 GFR African American >60     Calcium 9.0 8.6 - 10.2 mg/dL        Imaging/Diagnostics Last 24 Hours   XR CHEST PORTABLE    Result Date: 9/13/2021  EXAMINATION: ONE XRAY VIEW OF THE CHEST 9/13/2021 1:51 pm COMPARISON: 04/13/2021 HISTORY: ORDERING SYSTEM PROVIDED HISTORY: chest pain, sob TECHNOLOGIST PROVIDED HISTORY: Reason for exam:->chest pain, sob What reading provider will be dictating this exam?->CRC FINDINGS: There are patchy infiltrate seen within the right upper lobe, right lower lobe and left lower lobe. The cardiac silhouette is within normals. There is no pleural effusion. Patchy infiltrates seen within the right upper lobe, right lower lobe and left lower lobe more prominent within the right lower lobe. CTA PULMONARY W CONTRAST    Result Date: 9/13/2021  EXAMINATION: CTA OF THE CHEST 9/13/2021 7:49 pm TECHNIQUE: CTA of the chest was performed after the administration of intravenous contrast.  Multiplanar reformatted images are provided for review. MIP images are provided for review. Dose modulation, iterative reconstruction, and/or weight based adjustment of the mA/kV was utilized to reduce the radiation dose to as low as reasonably achievable. COMPARISON: None. HISTORY: ORDERING SYSTEM PROVIDED HISTORY: COVID, hypoxic TECHNOLOGIST PROVIDED HISTORY: Reason for exam:->COVID, hypoxic What reading provider will be dictating this exam?->CRC FINDINGS: Pulmonary Arteries: Pulmonary arteries are adequately opacified for evaluation. No evidence of intraluminal filling defect to suggest pulmonary embolism. Main pulmonary artery is normal in caliber. Mediastinum: No evidence of mediastinal lymphadenopathy. The heart and pericardium demonstrate no acute abnormality. There is no acute abnormality of the thoracic aorta. Lungs/pleura: No pneumothorax. No pleural effusion.   Extensive ground-glass densities and consolidations in the bilateral upper and lower lungs, compatible with multifocal COVID-19 pneumonia. Upper Abdomen: Reflux of contrast into the IVC and right renal vein, likely due to power injection. CHF cannot be excluded. Soft Tissues/Bones: Old compression fractures of T3-T6. Status post L1 vertebroplasty. No evidence of pulmonary embolism. Multifocal bilateral COVID-19 pneumonia. Assessment      Hospital Problems         Last Modified POA    Acute hypoxemic respiratory failure due to COVID-19 [facility]) 9/13/2021 Yes      Asthma  CAD  HTN    Plan   IV steroids. Oxygen, wean as tolerated. Pulmonary to see. Continue meds.     Consultations Ordered:  IP CONSULT TO INTERNAL MEDICINE  IP CONSULT TO PULMONOLOGY    Electronically signed by Tj Small MD on 9/14/21 at 6:40 AM EDT

## 2021-09-14 NOTE — PROGRESS NOTES
Call placed to ED to look for patient's glasses. Pt states that she came to ED with glasses. Writer unable to find them in belongings or in patient room. ED states that no glasses were turned in to them.

## 2021-09-14 NOTE — CARE COORDINATION
Care Coordination:  Pt admitted 9/13, initially 4 ltr ,currently increased to 6 ltr at 0900 and per nursing, will continue to titrate as sat is 90%- Per nursing, pt confused, Dr Rachel Lee following- toci ordered, steroids. Hx of asthma  02 prn and symptoms for several days prior to testing positive. pcp listed Dr Newby Sees noted as med's to beds. I called into room and spoke with pt as she is covid isolation. Discussion regarding transition of care upon discharge. She states she does not have home 02 /nebulizer. She uses only MDI's at home. Uses a cane to ambulate. Lives in a 2 story home. GF lives with her. She has the ability to remain on first floor if needed. Reviewed decision makers and [place] is her first child and she is not . Family will  upon discharge. No hx of paulette or hhc. She declines hhc at discharge currently.  If home 02 is needed, will use Medical services company as per her insurance will only pay and if transport is needed, can call [facility]

## 2021-09-14 NOTE — ED PROVIDER NOTES
I have received sign out on this patient from Dr. Antonino Draper. The patient is admitted to the hospital to Dr. Hesham Guzman for Covid pneumonia requiring nasal cannula. She is on 4 L of oxygen. She was pending CT PE.    CT PE shows no evidence of pulmonary embolism. There is multifocal bilateral Covid pneumonia. This is interpreted by radiology.      Fito Martinez MD  09/13/21 1659

## 2021-09-14 NOTE — CONSULTS
[facility]  Department of Internal Medicine  Division of Pulmonary, Critical Care and Sleep Medicine  Consult Note    Layla Hernández DO, MPH, Brielle Yap MD, [facility]  [facility]      Patient: Martha Moss  MRN: [id number]  : 1960    Encounter Time: 8:00 AM     Date of Admission: 2021  1:11 PM    Primary Care Physician: Yehuda Madison MD    Reason for Consultation: COVID     HISTORY OF PRESENT ILLNESS : William Stuart 64 y.o. female was seen in consultation regarding the above chief compliant. Patient is a 35-year-old female who presents today for shortness of breath. Has a history of asthma and wears oxygen at home as needed. Has been wearing oxygen more frequently. Has been using her inhalers at home as well. States he has not been feeling well for the past several days, does have shortness of breath, chest tightness, generalized myalgias with associated nausea and vomiting. Has not tried any other medication for the symptoms. Symptoms not aggravated alleviated any specific factors. No history of Covid. CT scan done evaluation.  No evidence of intraluminal filling defect to suggest pulmonary embolism.  Main pulmonary artery is normal in caliber. Mediastinum: No evidence of mediastinal lymphadenopathy.  The heart and pericardium demonstrate no acute abnormality.  There is no acute abnormality of the thoracic aorta. COVID rapid + and was placed on oxygen for PaO2 65 torr. Pulmonary was consulted.      PAST MEDICAL HISTORY:  has a past medical history of Asthma, CAD (coronary artery disease), Closed fracture of proximal end of left humerus with routine healing, Degeneration of lumbar or lumbosacral intervertebral disc, Fall against sharp object, History of blood transfusion, Hypertension, Hypokalemia, Insomnia, Kidney stone, Nondisplaced fracture of greater tuberosity of right humerus, initial encounter for closed fracture, Orthostatic hypotension, and Severe back pain. SURGICAL HISTORY:  has a past surgical history that includes Tonsillectomy; Bunionectomy; chest tube insertion (1990); Lithotripsy (2012); Colonoscopy (3/26/09); other surgical history; Upper gastrointestinal endoscopy (3/30/15); Upper gastrointestinal endoscopy (4/21/08); lumbar fusion (11/09/2017); and Kyphosis surgery (N/A, 10/28/2019). SOCIAL HISTORY:  reports that she has quit smoking. She started smoking about 2 years ago. She has a 1.00 pack-year smoking history. She has never used smokeless tobacco. She reports current alcohol use of about 6.0 standard drinks of alcohol per week. She reports that she does not use drugs. FAMILY  HISTORY: family history includes Cancer in her brother; Diabetes in her mother; Heart Disease in her maternal grandfather, maternal grandmother, and sister; Hypertension in her mother; Stroke in her sister. MEDICATIONS:    Prior to Admission medications    Medication Sig Start Date End Date Taking?  Authorizing Provider   nicotine (NICODERM CQ) 21 MG/24HR Place 1 patch onto the skin daily  Patient not taking: Reported on 5/6/2021 4/16/21   NORRIS Nichols   aspirin 81 MG chewable tablet Take 1 tablet by mouth daily  Patient not taking: Reported on 5/6/2021 4/17/21 5/17/21  NORRIS Nichols   atorvastatin (LIPITOR) 40 MG tablet Take 1 tablet by mouth nightly 4/16/21   NORRIS Nichols   aluminum & magnesium hydroxide-simethicone (MAALOX) 200-200-20 MG/5ML SUSP suspension Take 10 mLs by mouth every 4 hours as needed (HEARTBURN, INDIGESTION, OR UPSET STOMACH)    Historical Provider, MD   calcium carbonate (TUMS) 500 MG chewable tablet Take 2 tablets by mouth as needed (INDIGESTION AFTER MEALS)    Historical Provider, MD   magnesium hydroxide (MILK OF MAGNESIA) 400 MG/5ML suspension Take 30 mLs by mouth daily as needed for Constipation    Historical Provider, MD   ibuprofen (ADVIL;MOTRIN) 200 MG tablet Take 400 mg by mouth every 6 hours as needed for Pain or Fever    Historical Provider, MD   mirtazapine (REMERON) 15 MG tablet Take 7.5 mg by mouth nightly as needed (INSOMNIA)    Historical Provider, MD   melatonin 5 MG TABS tablet Take 10 mg by mouth nightly as needed (INSOMNIA)    Historical Provider, MD   cetirizine (ZYRTEC) 10 MG tablet Take 10 mg by mouth daily    Historical Provider, MD   Multiple Vitamins-Minerals (THERAPEUTIC MULTIVITAMIN-MINERALS) tablet Take 1 tablet by mouth daily    Historical Provider, MD   folic acid (FOLVITE) 1 MG tablet Take 1 mg by mouth daily    Historical Provider, MD   vitamin B-1 (THIAMINE) 100 MG tablet Take 100 mg by mouth 3 times daily    Historical Provider, MD   loratadine (CLARITIN) 10 MG tablet Take 10 mg by mouth daily as needed (SINUS CONGESTION)    Historical Provider, MD   benzocaine-menthol (CEPACOL SORE THROAT) 15-3.6 MG lozenge Take 1 lozenge by mouth every 2 hours as needed for Sore Throat    Historical Provider, MD   hydrOXYzine (ATARAX) 50 MG tablet Take 100 mg by mouth every 6 hours as needed for Anxiety    Historical Provider, MD   ondansetron (ZOFRAN) 4 MG tablet Take 4 mg by mouth every 6 hours as needed for Nausea    Historical Provider, MD   loperamide (IMODIUM) 2 MG capsule Take 2 mg by mouth as needed (AFTER EACH LOOSE STOOL *DO NOT EXCEED 4 DOSES IN 24 HOURS*)    Historical Provider, MD   docusate sodium (COLACE) 100 MG capsule Take 100 mg by mouth daily as needed for Constipation    Historical Provider, MD   amitriptyline (ELAVIL) 25 MG tablet Take 25 mg by mouth nightly  11/10/20   Historical Provider, MD   omeprazole (PRILOSEC) 40 MG capsule Take 40 mg by mouth daily as needed     Historical Provider, MD       ALLERGIES: Dilaudid [hydromorphone hcl] and Morphine       REVIEW OF SYSTEMS:  Otherwise negative if not reported or listed below  Constitutional:  No unanticipated weight loss. No change in sleep pattern or activity.       No fevers, chills or rigors. Eyes:    No visual changes or diplopia. No scleral icterus. ENT:    No Headaches, hearing loss or vertigo. No mouth sores or sore throat. Cardiovascular:  + chest discomfort, palpitations. Respiratory:  + cough, No wheezing      No sputum production. No hemoptysis, pleuritic pain. Gastrointestinal:  No abdominal pain, appetite loss, blood in stools. No hematemesis  Genitourinary:  No dysuria, trouble voiding or hematuria. No nocturia. Musculoskeletal:   No weakness or joint complaints. Integumentary: No rashes or pruritis. Neurological:  No headache, numbness or tingling. Psychiatric:   No effect on mood, memory, mentation, or behavior. No anxiety or depression. Endocrine:   No excessive thirst, fluid intake, or urination. No tremor. Hematologic: No abnormal bruising or bleeding. Lymphatic:  No swollen lymph nodes. Immunologic:  No hives or hx of anaphaxsis. OBJECTIVE:     PHYSICAL EXAM:   VITALS:   Vitals:    09/14/21 0300 09/14/21 0400 09/14/21 0500 09/14/21 0600   BP: 126/88 126/82 (!) 150/103 116/89   Pulse: 82 81 86 83   Resp: 26 24 24 26   Temp:   98 °F (36.7 °C)    TempSrc:   Oral    SpO2: 94% 94% 93% 93%   Weight:       Height:          No intake or output data in the 24 hours ending 09/14/21 0800     CONSTITUTIONAL:   A&O x 3, NAD  SKIN:     No rash, No suspicious lesions or skin discoloration  HEENT:     EOMI, MMM, No thrush  NECK:    No bruits, No JVP apprechiated  CV:      Sinus,  No murmur, No rubs, No gallops  PULMONARY:   Couse BS,  No Wheezing, No Rales, No Rhonchi      No noted egophony  ABDOMEN:     Soft, non-tender. BS normal. No R/R/G  EXT:    No deformities . No clubbing. No lower extremity edema, No venous stasis  PULSE:   Appears equal and palpable.   PSYCHIATRIC:  Seems appropriate, No acute psycosis  MS:    No fractures, No gross weakness  NEUROLOGIC:   The clinical assessment is non-focal DATA: IMAGING & TESTING:     LABORATORY TESTS:    CBC:   Lab Results   Component Value Date    WBC 8.6 09/14/2021    RBC 4.42 09/14/2021    HGB 13.5 09/14/2021    HCT 40.1 09/14/2021    MCV 90.7 09/14/2021    MCH 30.5 09/14/2021    MCHC 33.7 09/14/2021    RDW 13.0 09/14/2021     09/14/2021    MPV 9.6 09/14/2021     CMP:    Lab Results   Component Value Date     09/14/2021    K 4.0 09/14/2021    K 3.0 04/13/2021    CL 99 09/14/2021    CO2 21 09/14/2021    BUN 11 09/14/2021    CREATININE 0.3 09/14/2021    GFRAA >60 09/14/2021    LABGLOM >60 09/14/2021    GLUCOSE 143 09/14/2021    GLUCOSE 84 11/28/2011    PROT 8.1 09/13/2021    LABALBU 3.7 09/13/2021    LABALBU 4.5 11/28/2011    CALCIUM 9.0 09/14/2021    BILITOT 0.4 09/13/2021    ALKPHOS 102 09/13/2021    AST 49 09/13/2021    ALT 14 09/13/2021     Calcium:    Lab Results   Component Value Date    CALCIUM 9.0 09/14/2021     Magnesium:    Lab Results   Component Value Date    MG 2.0 09/13/2021     ABG:    Lab Results   Component Value Date    PH 7.467 09/13/2021    PCO2 28.0 09/13/2021    PO2 64.6 09/13/2021    HCO3 19.8 09/13/2021    BE -2.4 09/13/2021    O2SAT 92.5 09/13/2021     FERRITIN:  No results found for: FERRITIN  Fibrinogen Level:  No components found for: FIB     PRO-BNP:   Lab Results   Component Value Date    PROBNP 58 09/13/2021    PROBNP 41 03/20/2021      ABGs:   Lab Results   Component Value Date    PH 7.467 09/13/2021    PO2 64.6 09/13/2021    PCO2 28.0 09/13/2021     Hemoglobin A1C: No components found for: HGBA1C    IMAGING:  Imaging tests were completed and reviewed and discussed radiology and care team involved and reveals   XR CHEST PORTABLE    Result Date: 9/13/2021  EXAMINATION: ONE XRAY VIEW OF THE CHEST 9/13/2021 1:51 pm COMPARISON: 04/13/2021 HISTORY: ORDERING SYSTEM PROVIDED HISTORY: chest pain, sob TECHNOLOGIST PROVIDED HISTORY: Reason for exam:->chest pain, sob What reading provider will be dictating this exam?->CRC FINDINGS: There are patchy infiltrate seen within the right upper lobe, right lower lobe and left lower lobe. The cardiac silhouette is within normals. There is no pleural effusion. Patchy infiltrates seen within the right upper lobe, right lower lobe and left lower lobe more prominent within the right lower lobe. CTA PULMONARY W CONTRAST    Result Date: 9/13/2021  EXAMINATION: CTA OF THE CHEST 9/13/2021 7:49 pm TECHNIQUE: CTA of the chest was performed after the administration of intravenous contrast.  Multiplanar reformatted images are provided for review. MIP images are provided for review. Dose modulation, iterative reconstruction, and/or weight based adjustment of the mA/kV was utilized to reduce the radiation dose to as low as reasonably achievable. COMPARISON: None. HISTORY: ORDERING SYSTEM PROVIDED HISTORY: COVID, hypoxic TECHNOLOGIST PROVIDED HISTORY: Reason for exam:->COVID, hypoxic What reading provider will be dictating this exam?->CRC FINDINGS: Pulmonary Arteries: Pulmonary arteries are adequately opacified for evaluation. No evidence of intraluminal filling defect to suggest pulmonary embolism. Main pulmonary artery is normal in caliber. Mediastinum: No evidence of mediastinal lymphadenopathy. The heart and pericardium demonstrate no acute abnormality. There is no acute abnormality of the thoracic aorta. Lungs/pleura: No pneumothorax. No pleural effusion. Extensive ground-glass densities and consolidations in the bilateral upper and lower lungs, compatible with multifocal COVID-19 pneumonia. Upper Abdomen: Reflux of contrast into the IVC and right renal vein, likely due to power injection. CHF cannot be excluded. Soft Tissues/Bones: Old compression fractures of T3-T6. Status post L1 vertebroplasty. No evidence of pulmonary embolism. Multifocal bilateral COVID-19 pneumonia. Assessment:   1. Acute Respiratory Failure  2. COVID 19 pneumonia  3. Asthma,  4.  CAD (coronary artery disease),   5. Closed fracture of proximal end of left humerus with routine healing,  6. Degeneration of lumbar or lumbosacral intervertebral disc,   7. Fall against sharp object,   8. History of blood transfusion,   9. Hypertension,   10. Hypokalemia,   11. Insomnia,   12. Kidney stone,   13. Nondisplaced fracture of greater tuberosity of right humerus  14. Orthostatic hypotension  15. Severe back pain. Plan:   1. Steroids po for 10 days  2. Start TOCI  3. Vitamins C and D  4. Titrate oxygen for sats >90%  5. PPI  6. Bronchodilators QID  7.  Anticoagulate per protocol        Lucina Diaz DO DO, MPH, [facility], [place]  Professor of Internal Medicine  Pulmonary, Critical Care and Sleep Medicine

## 2021-09-14 NOTE — ACP (ADVANCE CARE PLANNING)
Advance Care Planning   Advance Care Planning Clinical Specialist  Conversation Note      Date of ACP Conversation: 9/14/2021    Conversation Conducted with:   Patient with [address] Decision Maker: Melody Ríos  ACP Clinical Specialist: He Bridges RN      *When Decision Maker makes decisions on behalf of the incapacitated patient: Zoe Winchester is asked to consider and make decisions based on patient values, known preferences, or best interests. Current Designated Health Care Decision Maker:   Primary Decision Maker: Marzena Shepherd - Child - [phone]    Secondary Decision Maker: Glen Jerome - Brother/Sister - [phone]    Secondary Decision Maker: Negrita PalaciosHolly - Brother/Sister - [phone]    Supplemental (Other) Decision Maker: Holly Bunch - Parent - [phone]  (as entered in [address] field. Validate  this information as still accurate & up-to-date; edit [employer] field as needed.)    If no Decision Maker listed above or available through scanned documents, then:    [address] [place] [address]   Who do you trust to make healthcare decisions for you? Name:   Claudia Del Cid  Phone  Number: [phone]  Can this person be reached and be able to respond quickly, such as within a few minutes or hours? Yes    Who would be your back-up decision maker? Name n/a  Phone Number:    For below questions, when conducting conversation with [employer], substitute \"she\" and \"her\" for \"you\" and \"your\". Hospitalization  If your health were to worsen and it became clear that your chance of recovery was unlikely, what would your preferences be regarding hospitalization?:    Choice:  [x]  The patient would want hospitalization  []  The patient would prefer comfort-focused treatment without hospitalization.     Ventilation  If you were in your present state of health and suddenly became very ill and were unable to breathe on your own, what would your preference be about the use of a ventilator (breathing machine) if it were available to you? If patient would desire the use of a ventilator (breathing machine), answer \"yes\", if not \"no\":yes    If your health were to worsen and it became clear that your chance of recovery was unlikely, would that change your answer? No    Resuscitation  CPR works best to restart the heart when there is a sudden event, like a heart attack, in someone who is otherwise healthy. Unfortunately, CPR does not typically restart the heart for people who have serious health conditions or who are very sick. In the event your heart stopped, would you want attempts to restart your heart (answer \"yes\") or would you prefer a natural death (answer \"no\")? yes    If your health were to worsen and it became clear that your chance of recovery was unlikely, would that change your answer? No    [] Yes  [] No   Educated Patient / Cherry Joyner regarding differences between Advance Directives and portable DNR orders.     Length of ACP Conversation in minutes:  10 minutes    Conversation Outcomes:  [x] ACP discussion completed  [] Existing advance directive reviewed with patient; no changes to patient's previously recorded wishes   [] New Advance Directive completed   [] Portable Do Not Rescitate prepared for Provider review and signature  [] POLST/POST/MOLST/MOST prepared for Provider review and signature      Follow-up plan:    [] Schedule follow-up conversation to continue planning  [x] Referred individual to Provider for additional questions/concerns   [] Advised patient/agent/surrogate to review completed ACP document and update if needed with changes in condition, patient preferences or care setting     [] This note routed to one or more involved healthcare providers

## 2021-09-14 NOTE — PLAN OF CARE
Problem: Airway Clearance - Ineffective  Goal: Achieve or maintain patent airway  Outcome: Met This Shift     Problem: Gas Exchange - Impaired  Goal: Absence of hypoxia  Outcome: Met This Shift  Goal: Promote optimal lung function  Outcome: Met This Shift     Problem: Breathing Pattern - Ineffective  Goal: Ability to achieve and maintain a regular respiratory rate  Outcome: Met This Shift     Problem:  Body Temperature -  Risk of, Imbalanced  Goal: Ability to maintain a body temperature within defined limits  Outcome: Met This Shift  Goal: Will regain or maintain usual level of consciousness  Outcome: Met This Shift  Goal: Complications related to the disease process, condition or treatment will be avoided or minimized  Outcome: Met This Shift     Problem: Nutrition Deficits  Goal: Optimize nutritional status  Outcome: Met This Shift     Problem: Risk for Fluid Volume Deficit  Goal: Maintain normal heart rhythm  Outcome: Met This Shift  Goal: Maintain absence of muscle cramping  Outcome: Met This Shift  Goal: Maintain normal serum potassium, sodium, calcium, phosphorus, and pH  Outcome: Met This Shift     Problem: Fatigue  Goal: Verbalize increase energy and improved vitality  Outcome: Met This Shift

## 2021-09-15 LAB
ANION GAP SERPL CALCULATED.3IONS-SCNC: 15 MMOL/L (ref 7–16)
BUN BLDV-MCNC: 20 MG/DL (ref 6–23)
CALCIUM SERPL-MCNC: 9.4 MG/DL (ref 8.6–10.2)
CHLORIDE BLD-SCNC: 101 MMOL/L (ref 98–107)
CO2: 23 MMOL/L (ref 22–29)
CREAT SERPL-MCNC: 0.5 MG/DL (ref 0.5–1)
GFR AFRICAN AMERICAN: >60
GFR NON-AFRICAN AMERICAN: >60 ML/MIN/1.73
GLUCOSE BLD-MCNC: 146 MG/DL (ref 74–99)
HCT VFR BLD CALC: 40.4 % (ref 34–48)
HEMOGLOBIN: 13.5 G/DL (ref 11.5–15.5)
MCH RBC QN AUTO: 30.8 PG (ref 26–35)
MCHC RBC AUTO-ENTMCNC: 33.4 % (ref 32–34.5)
MCV RBC AUTO: 92.2 FL (ref 80–99.9)
PDW BLD-RTO: 13.1 FL (ref 11.5–15)
PLATELET # BLD: 343 E9/L (ref 130–450)
PMV BLD AUTO: 10 FL (ref 7–12)
POTASSIUM SERPL-SCNC: 4.2 MMOL/L (ref 3.5–5)
RBC # BLD: 4.38 E12/L (ref 3.5–5.5)
SODIUM BLD-SCNC: 139 MMOL/L (ref 132–146)
WBC # BLD: 11.7 E9/L (ref 4.5–11.5)

## 2021-09-15 PROCEDURE — 6370000000 HC RX 637 (ALT 250 FOR IP): Performed by: INTERNAL MEDICINE

## 2021-09-15 PROCEDURE — 2140000000 HC CCU INTERMEDIATE R&B

## 2021-09-15 PROCEDURE — 6360000002 HC RX W HCPCS: Performed by: INTERNAL MEDICINE

## 2021-09-15 PROCEDURE — 6360000002 HC RX W HCPCS: Performed by: FAMILY MEDICINE

## 2021-09-15 PROCEDURE — 99233 SBSQ HOSP IP/OBS HIGH 50: CPT | Performed by: INTERNAL MEDICINE

## 2021-09-15 PROCEDURE — 2580000003 HC RX 258: Performed by: FAMILY MEDICINE

## 2021-09-15 PROCEDURE — 6370000000 HC RX 637 (ALT 250 FOR IP): Performed by: FAMILY MEDICINE

## 2021-09-15 PROCEDURE — 85027 COMPLETE CBC AUTOMATED: CPT

## 2021-09-15 PROCEDURE — 80048 BASIC METABOLIC PNL TOTAL CA: CPT

## 2021-09-15 PROCEDURE — 36415 COLL VENOUS BLD VENIPUNCTURE: CPT

## 2021-09-15 PROCEDURE — 2700000000 HC OXYGEN THERAPY PER DAY

## 2021-09-15 RX ORDER — GUAIFENESIN 400 MG/1
400 TABLET ORAL EVERY 6 HOURS PRN
Status: DISCONTINUED | OUTPATIENT
Start: 2021-09-15 | End: 2021-09-21 | Stop reason: HOSPADM

## 2021-09-15 RX ORDER — DEXAMETHASONE 4 MG/1
6 TABLET ORAL DAILY
Status: DISCONTINUED | OUTPATIENT
Start: 2021-09-16 | End: 2021-09-19

## 2021-09-15 RX ADMIN — Medication 100 MG: at 09:42

## 2021-09-15 RX ADMIN — ENOXAPARIN SODIUM 30 MG: 30 INJECTION SUBCUTANEOUS at 21:09

## 2021-09-15 RX ADMIN — IPRATROPIUM BROMIDE AND ALBUTEROL 1 PUFF: 20; 100 SPRAY, METERED RESPIRATORY (INHALATION) at 21:13

## 2021-09-15 RX ADMIN — FOLIC ACID 1 MG: 1 TABLET ORAL at 09:42

## 2021-09-15 RX ADMIN — OXYCODONE HYDROCHLORIDE AND ACETAMINOPHEN 500 MG: 500 TABLET ORAL at 09:42

## 2021-09-15 RX ADMIN — AMITRIPTYLINE HYDROCHLORIDE 25 MG: 25 TABLET, FILM COATED ORAL at 21:08

## 2021-09-15 RX ADMIN — IPRATROPIUM BROMIDE AND ALBUTEROL 1 PUFF: 20; 100 SPRAY, METERED RESPIRATORY (INHALATION) at 02:54

## 2021-09-15 RX ADMIN — BENZOCAINE AND MENTHOL 1 LOZENGE: 15; 3.6 LOZENGE ORAL at 21:17

## 2021-09-15 RX ADMIN — BENZOCAINE AND MENTHOL 1 LOZENGE: 15; 3.6 LOZENGE ORAL at 02:53

## 2021-09-15 RX ADMIN — IPRATROPIUM BROMIDE AND ALBUTEROL 1 PUFF: 20; 100 SPRAY, METERED RESPIRATORY (INHALATION) at 09:42

## 2021-09-15 RX ADMIN — IBUPROFEN 400 MG: 200 TABLET, FILM COATED ORAL at 02:53

## 2021-09-15 RX ADMIN — PANTOPRAZOLE SODIUM 40 MG: 40 TABLET, DELAYED RELEASE ORAL at 09:42

## 2021-09-15 RX ADMIN — Medication 50 MG: at 09:42

## 2021-09-15 RX ADMIN — ATORVASTATIN CALCIUM 40 MG: 40 TABLET, FILM COATED ORAL at 21:09

## 2021-09-15 RX ADMIN — HYDROXYZINE PAMOATE 100 MG: 50 CAPSULE ORAL at 22:21

## 2021-09-15 RX ADMIN — Medication 1 TABLET: at 09:42

## 2021-09-15 RX ADMIN — CETIRIZINE HYDROCHLORIDE 10 MG: 10 TABLET, FILM COATED ORAL at 09:42

## 2021-09-15 RX ADMIN — DEXAMETHASONE SODIUM PHOSPHATE 6 MG: 4 INJECTION, SOLUTION INTRAMUSCULAR; INTRAVENOUS at 05:27

## 2021-09-15 RX ADMIN — CEFTRIAXONE 1000 MG: 1 INJECTION, POWDER, FOR SOLUTION INTRAMUSCULAR; INTRAVENOUS at 14:49

## 2021-09-15 RX ADMIN — GUAIFENESIN 400 MG: 400 TABLET ORAL at 22:21

## 2021-09-15 RX ADMIN — IBUPROFEN 400 MG: 200 TABLET, FILM COATED ORAL at 22:21

## 2021-09-15 RX ADMIN — ENOXAPARIN SODIUM 30 MG: 30 INJECTION SUBCUTANEOUS at 09:42

## 2021-09-15 RX ADMIN — IPRATROPIUM BROMIDE AND ALBUTEROL 1 PUFF: 20; 100 SPRAY, METERED RESPIRATORY (INHALATION) at 14:49

## 2021-09-15 ASSESSMENT — PAIN SCALES - GENERAL
PAINLEVEL_OUTOF10: 4
PAINLEVEL_OUTOF10: 0
PAINLEVEL_OUTOF10: 4
PAINLEVEL_OUTOF10: 0
PAINLEVEL_OUTOF10: 6

## 2021-09-15 ASSESSMENT — ENCOUNTER SYMPTOMS
ABDOMINAL PAIN: 0
SHORTNESS OF BREATH: 1

## 2021-09-15 ASSESSMENT — PAIN DESCRIPTION - LOCATION: LOCATION: BACK

## 2021-09-15 NOTE — PLAN OF CARE
Problem: Airway Clearance - Ineffective  Goal: Achieve or maintain patent airway  Outcome: Met This Shift     Problem: Gas Exchange - Impaired  Goal: Absence of hypoxia  Outcome: Met This Shift  Goal: Promote optimal lung function  Outcome: Met This Shift     Problem: Gas Exchange - Impaired  Goal: Absence of hypoxia  Outcome: Met This Shift  Goal: Promote optimal lung function  Outcome: Met This Shift     Problem: Breathing Pattern - Ineffective  Goal: Ability to achieve and maintain a regular respiratory rate  Outcome: Met This Shift     Problem:  Body Temperature -  Risk of, Imbalanced  Goal: Ability to maintain a body temperature within defined limits  Outcome: Met This Shift  Goal: Will regain or maintain usual level of consciousness  Outcome: Met This Shift  Goal: Complications related to the disease process, condition or treatment will be avoided or minimized  Outcome: Met This Shift     Problem: Isolation Precautions - Risk of Spread of Infection  Goal: Prevent transmission of infection  Outcome: Met This Shift     Problem: Nutrition Deficits  Goal: Optimize nutritional status  Outcome: Met This Shift     Problem: Risk for Fluid Volume Deficit  Goal: Maintain normal heart rhythm  Outcome: Met This Shift  Goal: Maintain absence of muscle cramping  Outcome: Met This Shift  Goal: Maintain normal serum potassium, sodium, calcium, phosphorus, and pH  Outcome: Met This Shift     Problem: Patient Education: Go to Patient Education Activity  Goal: Patient/Family Education  Outcome: Met This Shift     Problem: Pain:  Goal: Pain level will decrease  Description: Pain level will decrease  Outcome: Met This Shift  Goal: Control of acute pain  Description: Control of acute pain  Outcome: Met This Shift  Goal: Control of chronic pain  Description: Control of chronic pain  Outcome: Met This Shift

## 2021-09-15 NOTE — CARE COORDINATION
Care Coordination: Pt remains covid isolation. , plan remains home. Has unitized health care and will need to use medical service company for home 02. Dc plan remains home. Declines hhc.currently on 5 ltr nc at 95%. Will need ambulating pulse ox and home 02 order near dc date. Changed to po decadron, on loveonox sq and rocephin iv.     Melinda Paiz

## 2021-09-15 NOTE — PROGRESS NOTES
Updated patients daughter over the phone. Patient daughter lives in [place], and is an RN there. She is aware of patients current status, and what is going on with her treatment plan at this time.

## 2021-09-15 NOTE — PROGRESS NOTES
[facility]  Department of Internal Medicine  Division of Pulmonary, Critical Care and Sleep Medicine  Progress Note    Lacy Mckenzie DO, MPH, FCCP, FACOI, FACP  Hamida Love MD, [facility]  [facility]      Patient: María Romero  MRN: [id number]  : 1960    Encounter Time: 1:58 PM     Date of Admission: 2021  1:11 PM    Primary Care Physician: Pramod Crowder MD    Reason for Consultation: COVID     SUBJECTIVE:    Ok cough and chest pain with breathing    OBJECTIVE:     PHYSICAL EXAM:   VITALS:   Vitals:    09/14/21 2015 09/15/21 0115 09/15/21 0945 09/15/21 1145   BP: 90/62 97/67  123/80   Pulse: 80 78  74   Resp: 18 17  18   Temp: 96.7 °F (35.9 °C) 96.9 °F (36.1 °C)  97.3 °F (36.3 °C)   TempSrc: Temporal Temporal  Temporal   SpO2: 92% 93% 94% 95%   Weight:       Height:          No intake or output data in the 24 hours ending 09/15/21 1358     CONSTITUTIONAL:   A&O x 3, NAD  SKIN:     No rash, No suspicious lesions or skin discoloration  HEENT:     EOMI, MMM, No thrush  NECK:    No bruits, No JVP apprechiated  CV:      Sinus,  No murmur, No rubs, No gallops  PULMONARY:   Couse BS,  No Wheezing, No Rales, No Rhonchi      No noted egophony  ABDOMEN:     Soft, non-tender. BS normal. No R/R/G  EXT:    No deformities . No clubbing. No lower extremity edema, No venous stasis  PULSE:   Appears equal and palpable.   PSYCHIATRIC:  Seems appropriate, No acute psycosis  MS:    No fractures, No gross weakness  NEUROLOGIC:   The clinical assessment is non-focal     DATA: IMAGING & TESTING:     LABORATORY TESTS:    CBC:   Lab Results   Component Value Date    WBC 11.7 09/15/2021    RBC 4.38 09/15/2021    HGB 13.5 09/15/2021    HCT 40.4 09/15/2021    MCV 92.2 09/15/2021    MCH 30.8 09/15/2021    MCHC 33.4 09/15/2021    RDW 13.1 09/15/2021     09/15/2021    MPV 10.0 09/15/2021     CMP:    Lab Results   Component Value Date     09/15/2021    K 4.2 09/15/2021    K 3.0 04/13/2021     09/15/2021    CO2 23 09/15/2021    BUN 20 09/15/2021    CREATININE 0.5 09/15/2021    GFRAA >60 09/15/2021    LABGLOM >60 09/15/2021    GLUCOSE 146 09/15/2021    GLUCOSE 84 11/28/2011    PROT 8.1 09/13/2021    LABALBU 3.7 09/13/2021    LABALBU 4.5 11/28/2011    CALCIUM 9.4 09/15/2021    BILITOT 0.4 09/13/2021    ALKPHOS 102 09/13/2021    AST 49 09/13/2021    ALT 14 09/13/2021     Calcium:    Lab Results   Component Value Date    CALCIUM 9.4 09/15/2021     Magnesium:    Lab Results   Component Value Date    MG 2.0 09/13/2021     ABG:    Lab Results   Component Value Date    PH 7.467 09/13/2021    PCO2 28.0 09/13/2021    PO2 64.6 09/13/2021    HCO3 19.8 09/13/2021    BE -2.4 09/13/2021    O2SAT 92.5 09/13/2021     FERRITIN:  No results found for: FERRITIN  Fibrinogen Level:  No components found for: FIB     PRO-BNP:   Lab Results   Component Value Date    PROBNP 58 09/13/2021    PROBNP 41 03/20/2021      ABGs:   Lab Results   Component Value Date    PH 7.467 09/13/2021    PO2 64.6 09/13/2021    PCO2 28.0 09/13/2021     Hemoglobin A1C: No components found for: HGBA1C    IMAGING:  Imaging tests were completed and reviewed and discussed radiology and care team involved and reveals   XR CHEST PORTABLE    Result Date: 9/13/2021  EXAMINATION: ONE XRAY VIEW OF THE CHEST 9/13/2021 1:51 pm COMPARISON: 04/13/2021 HISTORY: ORDERING SYSTEM PROVIDED HISTORY: chest pain, sob TECHNOLOGIST PROVIDED HISTORY: Reason for exam:->chest pain, sob What reading provider will be dictating this exam?->CRC FINDINGS: There are patchy infiltrate seen within the right upper lobe, right lower lobe and left lower lobe. The cardiac silhouette is within normals. There is no pleural effusion. Patchy infiltrates seen within the right upper lobe, right lower lobe and left lower lobe more prominent within the right lower lobe.      CTA PULMONARY W CONTRAST    Result Date: 9/13/2021  EXAMINATION: CTA OF THE CHEST 9/13/2021 7:49 pm TECHNIQUE: CTA of the chest was performed after the administration of intravenous contrast.  Multiplanar reformatted images are provided for review. MIP images are provided for review. Dose modulation, iterative reconstruction, and/or weight based adjustment of the mA/kV was utilized to reduce the radiation dose to as low as reasonably achievable. COMPARISON: None. HISTORY: ORDERING SYSTEM PROVIDED HISTORY: COVID, hypoxic TECHNOLOGIST PROVIDED HISTORY: Reason for exam:->COVID, hypoxic What reading provider will be dictating this exam?->CRC FINDINGS: Pulmonary Arteries: Pulmonary arteries are adequately opacified for evaluation. No evidence of intraluminal filling defect to suggest pulmonary embolism. Main pulmonary artery is normal in caliber. Mediastinum: No evidence of mediastinal lymphadenopathy. The heart and pericardium demonstrate no acute abnormality. There is no acute abnormality of the thoracic aorta. Lungs/pleura: No pneumothorax. No pleural effusion. Extensive ground-glass densities and consolidations in the bilateral upper and lower lungs, compatible with multifocal COVID-19 pneumonia. Upper Abdomen: Reflux of contrast into the IVC and right renal vein, likely due to power injection. CHF cannot be excluded. Soft Tissues/Bones: Old compression fractures of T3-T6. Status post L1 vertebroplasty. No evidence of pulmonary embolism. Multifocal bilateral COVID-19 pneumonia. Assessment:   1. Acute Respiratory Failure  2. COVID 19 pneumonia  3. Asthma,  4. CAD (coronary artery disease),   5. Closed fracture of proximal end of left humerus with routine healing,  6. Degeneration of lumbar or lumbosacral intervertebral disc,   7. Fall against sharp object,   8. History of blood transfusion,   9. Hypertension,   10. Hypokalemia,   11. Insomnia,   12. Kidney stone,   13. Nondisplaced fracture of greater tuberosity of right humerus  14. Orthostatic hypotension  15.   Severe back pain.      Plan:   1. Steroids po for 10 days  2. Start TOCI  3. Vitamins C and D  4. Titrate oxygen for sats >90%  5. PPI  6. Bronchodilators QID  7.  Anticoagulate per protocol        Sammy Martins DO DO, MPH, FCCP, Benny Acosta  Professor of Internal Medicine  Pulmonary, Critical Care and Sleep Medicine

## 2021-09-15 NOTE — PROGRESS NOTES
Progress Note  Date:9/15/2021       Room:[address] [place] [address]-A  Patient Christine Stuart     Date of Birth:[birth year]     Age:61 y.o. Patient says she feels better today. Subjective    Subjective:  Symptoms:  Stable. She reports shortness of breath. No chest pain. Diet:  Adequate intake. Activity level: Impaired due to weakness. Pain:  She reports no pain. Review of Systems   Constitutional: Negative for activity change and fever. Respiratory: Positive for shortness of breath. Cardiovascular: Negative for chest pain. Gastrointestinal: Negative for abdominal pain. Neurological: Negative for dizziness. Objective         Vitals Last 24 Hours:  TEMPERATURE:  Temp  Av.4 °F (36.3 °C)  Min: 96.7 °F (35.9 °C)  Max: 98.4 °F (36.9 °C)  RESPIRATIONS RANGE: Resp  Av.3  Min: 17  Max: 22  PULSE OXIMETRY RANGE: SpO2  Av %  Min: 87 %  Max: 99 %  PULSE RANGE: Pulse  Av.8  Min: 78  Max: 87  BLOOD PRESSURE RANGE: Systolic (37KGX), CHIQUITA:70 , Min:90 , DRW:367   ; Diastolic (11LXC), DGI:22, Min:62, Max:80    I/O (24Hr): No intake or output data in the 24 hours ending 09/15/21 0651  Objective:  General Appearance:  Comfortable. Vital signs: (most recent): Blood pressure 97/67, pulse 78, temperature 96.9 °F (36.1 °C), temperature source Temporal, resp. rate 17, height 5' 2.5\" (1.588 m), weight 145 lb (65.8 kg), SpO2 93 %, not currently breastfeeding. No fever. Lungs:  She is not in respiratory distress. Heart: Normal rate. Regular rhythm.   S1 normal and S2 normal.      Labs/Imaging/Diagnostics    Labs:  CBC:Recent Labs     21  1407 21  0501   WBC 8.6 8.6   RBC 4.79 4.42   HGB 14.7 13.5   HCT 43.8 40.1   MCV 91.4 90.7   RDW 13.0 13.0    262     CHEMISTRIES:  Recent Labs     21  1407 21  0501    136   K 4.4 4.0   CL 94* 99   CO2 25 21*   BUN 10 11   CREATININE 0.7 0.3*   GLUCOSE 98 143*   MG 2.0  --      PT/INR:No results for input(s): PROTIME, INR in the last 72 hours. APTT:No results for input(s): APTT in the last 72 hours. LIVER PROFILE:  Recent Labs     09/13/21  1407   AST 49*   ALT 14   BILITOT 0.4   ALKPHOS 102       Imaging Last 24 Hours:  XR CHEST PORTABLE    Result Date: 9/13/2021  EXAMINATION: ONE XRAY VIEW OF THE CHEST 9/13/2021 1:51 pm COMPARISON: 04/13/2021 HISTORY: ORDERING SYSTEM PROVIDED HISTORY: chest pain, sob TECHNOLOGIST PROVIDED HISTORY: Reason for exam:->chest pain, sob What reading provider will be dictating this exam?->CRC FINDINGS: There are patchy infiltrate seen within the right upper lobe, right lower lobe and left lower lobe. The cardiac silhouette is within normals. There is no pleural effusion. Patchy infiltrates seen within the right upper lobe, right lower lobe and left lower lobe more prominent within the right lower lobe. CTA PULMONARY W CONTRAST    Result Date: 9/13/2021  EXAMINATION: CTA OF THE CHEST 9/13/2021 7:49 pm TECHNIQUE: CTA of the chest was performed after the administration of intravenous contrast.  Multiplanar reformatted images are provided for review. MIP images are provided for review. Dose modulation, iterative reconstruction, and/or weight based adjustment of the mA/kV was utilized to reduce the radiation dose to as low as reasonably achievable. COMPARISON: None. HISTORY: ORDERING SYSTEM PROVIDED HISTORY: COVID, hypoxic TECHNOLOGIST PROVIDED HISTORY: Reason for exam:->COVID, hypoxic What reading provider will be dictating this exam?->CRC FINDINGS: Pulmonary Arteries: Pulmonary arteries are adequately opacified for evaluation. No evidence of intraluminal filling defect to suggest pulmonary embolism. Main pulmonary artery is normal in caliber. Mediastinum: No evidence of mediastinal lymphadenopathy. The heart and pericardium demonstrate no acute abnormality. There is no acute abnormality of the thoracic aorta. Lungs/pleura: No pneumothorax. No pleural effusion.   Extensive ground-glass densities and consolidations in the bilateral upper and lower lungs, compatible with multifocal COVID-19 pneumonia. Upper Abdomen: Reflux of contrast into the IVC and right renal vein, likely due to power injection. CHF cannot be excluded. Soft Tissues/Bones: Old compression fractures of T3-T6. Status post L1 vertebroplasty. No evidence of pulmonary embolism. Multifocal bilateral COVID-19 pneumonia. Assessment//Plan           Hospital Problems         Last Modified POA    Acute hypoxemic respiratory failure due to COVID-19 [facility]) 9/13/2021 Yes        Assessment:  (Acute hypoxemic respiratory failure due to COVID-19 ([address] 9/13/2021 Yes     Asthma  CAD  HTN  ). Plan:   (Steroids, oxygen, toci  Pulmonary following.).        Electronically signed by Consuelo López MD on 9/15/21 at 6:51 AM EDT

## 2021-09-16 LAB
ANION GAP SERPL CALCULATED.3IONS-SCNC: 15 MMOL/L (ref 7–16)
BUN BLDV-MCNC: 19 MG/DL (ref 6–23)
CALCIUM SERPL-MCNC: 9.4 MG/DL (ref 8.6–10.2)
CHLORIDE BLD-SCNC: 102 MMOL/L (ref 98–107)
CO2: 22 MMOL/L (ref 22–29)
CREAT SERPL-MCNC: 0.5 MG/DL (ref 0.5–1)
GFR AFRICAN AMERICAN: >60
GFR NON-AFRICAN AMERICAN: >60 ML/MIN/1.73
GLUCOSE BLD-MCNC: 82 MG/DL (ref 74–99)
HCT VFR BLD CALC: 40.3 % (ref 34–48)
HEMOGLOBIN: 13.6 G/DL (ref 11.5–15.5)
MCH RBC QN AUTO: 30.9 PG (ref 26–35)
MCHC RBC AUTO-ENTMCNC: 33.7 % (ref 32–34.5)
MCV RBC AUTO: 91.6 FL (ref 80–99.9)
PDW BLD-RTO: 13.1 FL (ref 11.5–15)
PLATELET # BLD: 385 E9/L (ref 130–450)
PMV BLD AUTO: 10.2 FL (ref 7–12)
POTASSIUM SERPL-SCNC: 3.9 MMOL/L (ref 3.5–5)
RBC # BLD: 4.4 E12/L (ref 3.5–5.5)
SODIUM BLD-SCNC: 139 MMOL/L (ref 132–146)
WBC # BLD: 9.1 E9/L (ref 4.5–11.5)

## 2021-09-16 PROCEDURE — 6360000002 HC RX W HCPCS: Performed by: FAMILY MEDICINE

## 2021-09-16 PROCEDURE — 6370000000 HC RX 637 (ALT 250 FOR IP): Performed by: INTERNAL MEDICINE

## 2021-09-16 PROCEDURE — 85027 COMPLETE CBC AUTOMATED: CPT

## 2021-09-16 PROCEDURE — 2700000000 HC OXYGEN THERAPY PER DAY

## 2021-09-16 PROCEDURE — 97161 PT EVAL LOW COMPLEX 20 MIN: CPT

## 2021-09-16 PROCEDURE — 2140000000 HC CCU INTERMEDIATE R&B

## 2021-09-16 PROCEDURE — 97530 THERAPEUTIC ACTIVITIES: CPT

## 2021-09-16 PROCEDURE — 97165 OT EVAL LOW COMPLEX 30 MIN: CPT

## 2021-09-16 PROCEDURE — 6370000000 HC RX 637 (ALT 250 FOR IP): Performed by: FAMILY MEDICINE

## 2021-09-16 PROCEDURE — 80048 BASIC METABOLIC PNL TOTAL CA: CPT

## 2021-09-16 PROCEDURE — 97535 SELF CARE MNGMENT TRAINING: CPT

## 2021-09-16 PROCEDURE — 2580000003 HC RX 258: Performed by: FAMILY MEDICINE

## 2021-09-16 PROCEDURE — 36415 COLL VENOUS BLD VENIPUNCTURE: CPT

## 2021-09-16 PROCEDURE — 6360000002 HC RX W HCPCS: Performed by: INTERNAL MEDICINE

## 2021-09-16 RX ADMIN — OXYCODONE HYDROCHLORIDE AND ACETAMINOPHEN 500 MG: 500 TABLET ORAL at 12:27

## 2021-09-16 RX ADMIN — IPRATROPIUM BROMIDE AND ALBUTEROL 1 PUFF: 20; 100 SPRAY, METERED RESPIRATORY (INHALATION) at 12:26

## 2021-09-16 RX ADMIN — Medication 1 TABLET: at 12:27

## 2021-09-16 RX ADMIN — ATORVASTATIN CALCIUM 40 MG: 40 TABLET, FILM COATED ORAL at 22:15

## 2021-09-16 RX ADMIN — DEXAMETHASONE 6 MG: 4 TABLET ORAL at 08:50

## 2021-09-16 RX ADMIN — IPRATROPIUM BROMIDE AND ALBUTEROL 1 PUFF: 20; 100 SPRAY, METERED RESPIRATORY (INHALATION) at 06:41

## 2021-09-16 RX ADMIN — ENOXAPARIN SODIUM 30 MG: 30 INJECTION SUBCUTANEOUS at 08:50

## 2021-09-16 RX ADMIN — IPRATROPIUM BROMIDE AND ALBUTEROL 1 PUFF: 20; 100 SPRAY, METERED RESPIRATORY (INHALATION) at 18:20

## 2021-09-16 RX ADMIN — HYDROXYZINE PAMOATE 100 MG: 50 CAPSULE ORAL at 22:15

## 2021-09-16 RX ADMIN — Medication 100 MG: at 08:50

## 2021-09-16 RX ADMIN — CETIRIZINE HYDROCHLORIDE 10 MG: 10 TABLET, FILM COATED ORAL at 08:51

## 2021-09-16 RX ADMIN — PANTOPRAZOLE SODIUM 40 MG: 40 TABLET, DELAYED RELEASE ORAL at 06:41

## 2021-09-16 RX ADMIN — ENOXAPARIN SODIUM 30 MG: 30 INJECTION SUBCUTANEOUS at 22:15

## 2021-09-16 RX ADMIN — FOLIC ACID 1 MG: 1 TABLET ORAL at 08:51

## 2021-09-16 RX ADMIN — Medication 10 MG: at 22:15

## 2021-09-16 RX ADMIN — CEFTRIAXONE 1000 MG: 1 INJECTION, POWDER, FOR SOLUTION INTRAMUSCULAR; INTRAVENOUS at 15:23

## 2021-09-16 RX ADMIN — AMITRIPTYLINE HYDROCHLORIDE 25 MG: 25 TABLET, FILM COATED ORAL at 22:15

## 2021-09-16 RX ADMIN — IBUPROFEN 400 MG: 200 TABLET, FILM COATED ORAL at 22:14

## 2021-09-16 RX ADMIN — GUAIFENESIN 400 MG: 400 TABLET ORAL at 22:15

## 2021-09-16 RX ADMIN — Medication 50 MG: at 08:51

## 2021-09-16 ASSESSMENT — ENCOUNTER SYMPTOMS
SHORTNESS OF BREATH: 1
ABDOMINAL PAIN: 0

## 2021-09-16 ASSESSMENT — PAIN SCALES - GENERAL
PAINLEVEL_OUTOF10: 0
PAINLEVEL_OUTOF10: 3

## 2021-09-16 NOTE — CARE COORDINATION
Care Coordination. Pt down to 4 ltrs at rest. Plan is home with home 02 if needed. Will need to use medical service company d/t insurance. Will need ambulating pulse ox    Triston Smith    PULSE OX ON ROOM AIR SITTING____%  PULSE OX ON ROOM AIR AMBULATING ____%  PULSE OX ON ____LITERS SITTING RECOVERY ____%  PULSE OX ON ____LITERS AMBULATING RECOVERY ___%  RN/ Please document Pulse Ox in PG note the following way;(Cut and Paste, Fill in Blanks). If home o2 is needed.   If > 4 ltr needed to recover, please show insignificant recovery on 4 ltr and then increase for recovery rate

## 2021-09-16 NOTE — PROGRESS NOTES
Occupational Therapy  OCCUPATIONAL THERAPY INITIAL EVALUATION    [employer] [id number] [address]      Date:2021                                                Patient Name: Michael Reynolds  MRN: [id number]  : 1960  [address]: [address]    Referring Provider: Marcelino Mari MD  Specific Provider Orders/Date: OT eval and treat 21    Diagnosis: Acute respiratory failure with hypoxia ([address] [J96.01]  Acute hypoxemic respiratory failure due to COVID-19 ([facility] [U07.1, J96.01]  COVID-19 [U07.1]   Pt admitted to hospital on 21 for SOB      Pertinent Medical History:  has a past medical history of Asthma, CAD (coronary artery disease), Closed fracture of proximal end of left humerus with routine healing, Degeneration of lumbar or lumbosacral intervertebral disc, Fall against sharp object, History of blood transfusion, Hypertension, Hypokalemia, Insomnia, Kidney stone, Nondisplaced fracture of greater tuberosity of right humerus, initial encounter for closed fracture, Orthostatic hypotension, and Severe back pain.        Precautions:  Fall Risk, Droplet Isolation (covid+), O2    Assessment of current deficits   [x] Functional mobility  [x]ADLs  [x] Strength               []Cognition    [x] Functional transfers   [x] IADLs         [x] Safety Awareness   [x]Endurance    [] Fine Coordination              [x] Balance      [] Vision/perception   []Sensation     []Gross Motor Coordination  [] ROM  [] Delirium                   [] Motor Control     OT PLAN OF CARE   OT POC based on physician orders, patient diagnosis and results of clinical assessment    Frequency/Duration 1-3 days/wk for 2 weeks PRN   Specific OT Treatment Interventions to include:   * Instruction/training on adapted ADL techniques and AE recommendations to increase functional independence within precautions       * Training on energy conservation strategies, correct breathing pattern and techniques to improve independence/tolerance for self-care routine  * Functional transfer/mobility training/DME recommendations for increased independence, safety, and fall prevention  * Patient/Family education to increase follow through with safety techniques and functional independence  * Recommendation of environmental modifications for increased safety with functional transfers/mobility and ADLs  * Therapeutic exercise to improve motor endurance, ROM, and functional strength for ADLs/functional transfers  * Therapeutic activities to facilitate/challenge dynamic balance, stand tolerance for increased safety and independence with ADLs  * Therapeutic activities to facilitate gross/fine motor skills for increased independence with ADLs    Recommended Adaptive Equipment: TBD     Home Living: Pt lives with a girlfriend in 2 floor home.  5-6 KAREN, 1 handrail  Bath and bed on 2nd floor - 10 stairs, 1 handrail   Bathroom setup: tub/shower     Equipment owned: [facility]    Prior Level of Function: independent with ADLs , shares IADLs; ambulated w/ SPC   Driving: yes    Pain Level: Pt c/o no pain this session    Cognition: A&O: 4/4; Follows 1-2 step directions   Memory:  good    Sequencing:  good    Problem solving:  fair    Judgement/safety:  fair      Functional Assessment:  AM-PAC Daily Activity Raw Score: 17/24   Initial Eval Status  Date: 9/16/21 Treatment Status  Date: STGs = LTGs  Time frame: 10-14 days   Feeding Modified [place]      Grooming Minimal Assist   Standing at sink  Modified [place]    UB Dressing Minimal Assist   Modified [place]    LB Dressing Moderate Assist   Simulated pants  Improved to CGA to don/doff socks  Stand by Assist    Bathing Moderate Assist  D/t fatigue  Simulated  Stand by Assist    Toileting Minimal Assist   Including hygiene  Modified [place]    Bed Mobility  Supine to sit: Stand by Assist   Sit to supine: NT Supine to sit: Modified [place]   Sit to supine: Modified [place]    Functional Transfers Minimal Assist   Supervision    Functional Mobility Minimal Assist w/o AD  To/from bathroom  Supervision    Balance Sitting:     Static:  Supervision    Dynamic:SBA  Standing: Min A, no AD     Activity Tolerance Fair-  Fair+   Visual/  Perceptual Glasses: yes, not present                  Hand Dominance L   AROM (PROM) Strength Additional Info:    RUE  WFL 4-/5 good  and wfl FMC/dexterity noted during ADL tasks       LUE WFL 4-/5 good  and wfl FMC/dexterity noted during ADL tasks       Hearing: WFL  Sensation:  No c/o numbness or tingling   Tone: WFL   Edema: none noted    Comments: Upon arrival patient lying in bed. Pt agreeable to OT session this date. Therapist educated pt on role of OT. At end of session, patient seated in chair (RN present) with call light and phone within reach, all lines and tubes intact. Overall patient demonstrated decreased independence and safety during completion of ADL/functional transfer/mobility tasks. Pt would benefit from continued skilled OT to increase safety and independence with completion of ADL/IADL tasks for functional independence and quality of life.     Treatment: OT treatment provided this date includes: Facilitation of bed mobility (education/cues for body mechanics, initiation of rest breaks), unsupported sitting balance (addressing posture, weight shifting, dynamic reaching to prep for ADL's), functional transfers (various surfaces (EOB, chair 2x, commode) w/ education/cues for safety/hand placement), standing tolerance tasks (addressing posture, balance and activity tolerance while incorporating light functional reaching impacting ADLs and functional activity) and functional ambulation tasks with w/w (including to/ from bathroom and in preparation for item retrieval tasks w/ education/skilled cuing on hand placement, posture, body mechanics, energy conservation techniques and safety. Slow gait speed). Therapist facilitated self-care retraining: UB/LB self-care tasks (socks, simulated gown), toileting task (including hygiene) and standing grooming tasks (at sink) while educating pt on modified techniques, posture, safety and energy conservation techniques. Skilled monitoring of HR, O2 sats and pts response to treatment. Pt on 6L O2 via nasal cannula. At rest: O2=92%, HR=76-78 bpm  Seated EOB: O2 sat=88% w/ recovery to 98% after ~1 minute  During ambulation>bathroom: O2 sat remained ^92%,   During ambulation bathroom>bedside chair:^94%  Pt did c/o mild SOB during ambulation task. Cues provided for pursed lip breathing during task and seated rest breaks (on commode and in chair). At end of session: O2 sat=^95%  RN notified/aware of the above vitals during session. Rehab Potential: Good for established goals     Patient / Family Goal: to increase independence      Patient and/or family were instructed on functional diagnosis, prognosis/goals and OT plan of care. Demonstrated fair understanding. Eval Complexity: Low    Time In: 08:15  Time Out: 08:49  Total Treatment Time: 23 minutes    Min Units   OT Eval Low 97165  X  1   OT Eval Medium 33032      OT Eval High 94221      OT Re-Eval [id number]       Therapeutic Ex 59560       Therapeutic Activities 47711  12  1   ADL/Self Care 32537  11  1   Orthotic Management 21539       Manual 19404     Neuro Re-Ed [id number]       Non-Billable Time          Evaluation Time additionally includes thorough review of current medical information, gathering information on past medical history/social history and prior level of function, interpretation of standardized testing/informal observation of tasks, assessment of data and development of plan of care and goals.         Jasmin OTR/L #[id number]

## 2021-09-16 NOTE — PROGRESS NOTES
Progress Note  Date:2021       [id number]  Patient Sofia Stuart     YOB: 1960     Age:61 y.o. Patient says she feels better today. Subjective    Subjective:  Symptoms:  Stable. She reports shortness of breath. No chest pain. Diet:  Adequate intake. Activity level: Impaired due to weakness. Pain:  She reports no pain. Review of Systems   Constitutional: Negative for activity change and fever. Respiratory: Positive for shortness of breath. Cardiovascular: Negative for chest pain. Gastrointestinal: Negative for abdominal pain. Neurological: Negative for dizziness. Objective         Vitals Last 24 Hours:  TEMPERATURE:  Temp  Av.7 °F (36.5 °C)  Min: 97.3 °F (36.3 °C)  Max: 97.9 °F (36.6 °C)  RESPIRATIONS RANGE: Resp  Av  Min: 18  Max: 18  PULSE OXIMETRY RANGE: SpO2  Av %  Min: 91 %  Max: 95 %  PULSE RANGE: Pulse  Av.3  Min: 74  Max: 78  BLOOD PRESSURE RANGE: Systolic (82SYR), UOH:726 , Min:108 , NWB:699   ; Diastolic (29AZQ), CVV:57, Min:79, Max:84    I/O (24Hr): No intake or output data in the 24 hours ending 21 0628  Objective:  General Appearance:  Comfortable. Vital signs: (most recent): Blood pressure 108/84, pulse 74, temperature 97.9 °F (36.6 °C), resp. rate 18, height 5' 2.5\" (1.588 m), weight 145 lb (65.8 kg), SpO2 92 %, not currently breastfeeding. No fever. Lungs:  She is not in respiratory distress. Heart: Normal rate. Regular rhythm.   S1 normal and S2 normal.      Labs/Imaging/Diagnostics    Labs:  CBC:  Recent Labs     21  1407 21  0501 09/15/21  1159   WBC 8.6 8.6 11.7*   RBC 4.79 4.42 4.38   HGB 14.7 13.5 13.5   HCT 43.8 40.1 40.4   MCV 91.4 90.7 92.2   RDW 13.0 13.0 13.1    262 343     CHEMISTRIES:  Recent Labs     21  1407 21  0501 09/15/21  1159    136 139   K 4.4 4.0 4.2   CL 94* 99 101   CO2 25 21* 23   BUN 10 11 20   CREATININE 0.7 0.3* 0.5   GLUCOSE 98 143* 146*   MG 2.0  --   --      PT/INR:No results for input(s): PROTIME, INR in the last 72 hours. APTT:No results for input(s): APTT in the last 72 hours. LIVER PROFILE:  Recent Labs     09/13/21  1407   AST 49*   ALT 14   BILITOT 0.4   ALKPHOS 102       Imaging Last 24 Hours:  XR CHEST PORTABLE    Result Date: 9/13/2021  EXAMINATION: ONE XRAY VIEW OF THE CHEST 9/13/2021 1:51 pm COMPARISON: 04/13/2021 HISTORY: ORDERING SYSTEM PROVIDED HISTORY: chest pain, sob TECHNOLOGIST PROVIDED HISTORY: Reason for exam:->chest pain, sob What reading provider will be dictating this exam?->CRC FINDINGS: There are patchy infiltrate seen within the right upper lobe, right lower lobe and left lower lobe. The cardiac silhouette is within normals. There is no pleural effusion. Patchy infiltrates seen within the right upper lobe, right lower lobe and left lower lobe more prominent within the right lower lobe. CTA PULMONARY W CONTRAST    Result Date: 9/13/2021  EXAMINATION: CTA OF THE CHEST 9/13/2021 7:49 pm TECHNIQUE: CTA of the chest was performed after the administration of intravenous contrast.  Multiplanar reformatted images are provided for review. MIP images are provided for review. Dose modulation, iterative reconstruction, and/or weight based adjustment of the mA/kV was utilized to reduce the radiation dose to as low as reasonably achievable. COMPARISON: None. HISTORY: ORDERING SYSTEM PROVIDED HISTORY: COVID, hypoxic TECHNOLOGIST PROVIDED HISTORY: Reason for exam:->COVID, hypoxic What reading provider will be dictating this exam?->CRC FINDINGS: Pulmonary Arteries: Pulmonary arteries are adequately opacified for evaluation. No evidence of intraluminal filling defect to suggest pulmonary embolism. Main pulmonary artery is normal in caliber. Mediastinum: No evidence of mediastinal lymphadenopathy. The heart and pericardium demonstrate no acute abnormality. There is no acute abnormality of the thoracic aorta. Lungs/pleura: No pneumothorax. No pleural effusion. Extensive ground-glass densities and consolidations in the bilateral upper and lower lungs, compatible with multifocal COVID-19 pneumonia. Upper Abdomen: Reflux of contrast into the IVC and right renal vein, likely due to power injection. CHF cannot be excluded. Soft Tissues/Bones: Old compression fractures of T3-T6. Status post L1 vertebroplasty. No evidence of pulmonary embolism. Multifocal bilateral COVID-19 pneumonia. Assessment//Plan           Hospital Problems         Last Modified POA    Acute hypoxemic respiratory failure due to COVID-19 [facility]) 9/13/2021 Yes        Assessment:  (Acute hypoxemic respiratory failure due to COVID-19 ([address] 9/13/2021 Yes     Asthma  CAD  HTN  ). Plan:   (Steroids, oxygen, toci  Pulmonary following. ).

## 2021-09-16 NOTE — PROGRESS NOTES
Physical Therapy  Physical Therapy Initial Assessment     Name: Lavinia Florian  : 1960  MRN: [id number]      Date of Service: 2021    Evaluating PT:  Michelle Nance PT, DPT [id number]     Room #:  5671/1-O  Diagnosis:  Acute respiratory failure with hypoxia ([facility] [address] [J96.01]  Acute hypoxemic respiratory failure due to COVID-19 ([facility] [address] [U07.1, J96.01]  COVID-19 [U07.1]  Reason for admission: SOB   Precautions:  Falls, COVID+ droplet plus   Procedure/Surgery:  None   Equipment Recommendations:  TBD     SUBJECTIVE:  Pt lives with a girlfriend in a 2 floor home with 5-6 KAREN 1 rail. Bed and bath on 2nd floor with 10 steps and 1 rail - tub shower 2nd floor. Pt ambulated with SPC PTA. + driving. L handed. + glasses. OBJECTIVE:   Initial Evaluation  Date:  Treatment   Short Term/ Long Term   Goals   AM-PAC 6 Clicks      Was pt agreeable to Eval/treatment? Yes      Does pt have pain?  No      Bed Mobility  Rolling: NT  Supine to sit: SBA  Sit to supine: NT  Scooting: SBA  Independent    Transfers Sit to stand: CGA/Fritz  Stand to sit: CGA  Stand pivot: CGA  Independent    Ambulation    20 feet x2 with no AD Fritz    >100 feet with no AD vs cane indep/Mod I    Stair negotiation: ascended and descended  NT  TBD   ROM BUE:  See OT eval   BLE:  WFL     Strength BUE:  See OT eval   BLE:  knee ext 5/5  Ankle DF 5/5  Increase by 1/3 MMT grade    Balance Sitting EOB:  Supervision  Dynamic Standing:  Fritz  Sitting EOB:  Independent  Dynamic Standing:  Independent     -Pt is A & O x 4  -Sensation:  Pt denies numbness and tingling to extremities  -Edema:  unremarkable     Vitals: on 6 L O2  Resting - 76 HR, 92% spo2  Sitting EOB - 79 HR, 88% spo2  Post SPT to chair - 95-98% spo2  Post 20 ft ambulation - low of 92%, recovered to mid 90s in 30 seconds   Second ambulation - 96-99%    Therapeutic Exercises:  Functional activity     Patient education  Pt educated on safety, sequencing of transfers, and role of PT    Patient response to education:   Pt verbalized understanding Pt demonstrated skill Pt requires further education in this area   Yes  Partial  Yes      ASSESSMENT:  Conditions Requiring Skilled Therapeutic Intervention:  []Decreased strength     []Decreased ROM  [x]Decreased functional mobility  [x]Decreased balance   [x]Decreased endurance   []Decreased posture  []Decreased sensation  []Decreased coordination   []Decreased vision  [x]Decreased safety awareness   []Increased pain       Comments:  Pt received supine in bed and agreeable to PT session   Pt requires extended time for all mobility d/t slow pace of movement, need for rest breaks, and vitals monitoring. Pt able to sit up to EOB without assistance. Mild SOB noted with activity. Completed pivot to chair without device showing fair strength standing from bedside. Ambulation completed x 2 bouts with light assist for balance showing very slow speed and short stride length. Provided time for pt to void in bathroom prior to ambulating back to chair at bedside. Pt with all needs met and call light in reach. Pt would benefit from continued PT POC to address functional deficits described above. Treatment:  Patient practiced and was instructed in the following treatment:     Patient education provided continuously throughout session for sequencing, safety maintenance, and improving any deficits found during the evaluation.  Bed mobility training - pt given verbal and tactile cues to facilitate proper sequencing and safety during rolling and supine>sit as well as provided with physical assistance to complete task     Sitting EOB for >5 minutes for upright tolerance, postural awareness and BLE ROM.  Sitting in chair x 5 minutes before ambulating   STS and pivot transfer training - pt educated on proper hand and foot placement, safety and sequencing, and use of proper technique to safely complete sit<>stand and pivot transfers with hands on assistance to complete task safely     Gait training- pt was given verbal and tactile cues to facilitate improved balance and speed during ambulation as well as provided with physical assistance to complete task. Ambulation completed x 2 reps. Pt's/ family goals   1. Return home     Patient and or family understand(s) diagnosis, prognosis, and plan of care. Yes     Prognosis is good for reaching above PT goals. PHYSICAL THERAPY PLAN OF CARE:    PT POC is established based on physician order and patient diagnosis     Referring provider/PT Order:    09/14/21 1300  PT eval and treat      Garrett Chairez MD   Diagnosis:  Acute respiratory failure with hypoxia ([facility] [address] [J96.01]  Acute hypoxemic respiratory failure due to COVID-19 ([facility] [address] [U07.1, J96.01]  COVID-19 [U07.1]  Specific instructions for next treatment:  Progress transfers and ambulation distance. oob in chair     Current Treatment Recommendations:   [] Strengthening to improve independence with functional mobility   [] ROM to improve independence with functional mobility   [x] Balance Training to improve static/dynamic balance and to reduce fall risk  [x] Endurance Training to improve activity tolerance during functional mobility   [x] Transfer Training to improve safety and independence with all functional transfers   [x] Gait Training to improve gait mechanics, endurance and asses need for appropriate assistive device  [] Stair Training in preparation for safe discharge home and/or into the community   [x] Positioning to prevent skin breakdown and contractures  [x] Safety and Education Training   [] Patient/Caregiver Education   [] HEP  [] Other     PT long term treatment goals are located in above grid    Frequency of treatments: 2-5x/week x 1-2 weeks.     Time in  0815  Time out  0850    Total Treatment Time  25 minutes     Evaluation Time includes thorough review of current medical information, gathering information on past medical history/social history and prior level of function, completion of standardized testing/informal observation of tasks, assessment of data and education on plan of care and goals.     CPT codes:  [x] Low Complexity PT evaluation 28665  [] Moderate Complexity PT evaluation 05598  [] High Complexity PT evaluation 77609  [] PT Re-evaluation 86019  [] Gait training 09574 - minutes  [] Manual therapy 14293 - minutes  [x] Therapeutic activities 95707 25 minutes  [] Therapeutic exercises 30841 - minutes  [] Neuromuscular reeducation 03929 - minutes     Brandt Reyna, PT, DPT  [id number]

## 2021-09-17 LAB
ANION GAP SERPL CALCULATED.3IONS-SCNC: 19 MMOL/L (ref 7–16)
BUN BLDV-MCNC: 16 MG/DL (ref 6–23)
CALCIUM SERPL-MCNC: 9.2 MG/DL (ref 8.6–10.2)
CHLORIDE BLD-SCNC: 104 MMOL/L (ref 98–107)
CO2: 21 MMOL/L (ref 22–29)
CREAT SERPL-MCNC: 0.5 MG/DL (ref 0.5–1)
GFR AFRICAN AMERICAN: >60
GFR NON-AFRICAN AMERICAN: >60 ML/MIN/1.73
GLUCOSE BLD-MCNC: 88 MG/DL (ref 74–99)
HCT VFR BLD CALC: 41.6 % (ref 34–48)
HEMOGLOBIN: 13.9 G/DL (ref 11.5–15.5)
MCH RBC QN AUTO: 30.8 PG (ref 26–35)
MCHC RBC AUTO-ENTMCNC: 33.4 % (ref 32–34.5)
MCV RBC AUTO: 92.2 FL (ref 80–99.9)
PDW BLD-RTO: 13 FL (ref 11.5–15)
PLATELET # BLD: 409 E9/L (ref 130–450)
PMV BLD AUTO: 10.5 FL (ref 7–12)
POTASSIUM SERPL-SCNC: 4.2 MMOL/L (ref 3.5–5)
RBC # BLD: 4.51 E12/L (ref 3.5–5.5)
SODIUM BLD-SCNC: 144 MMOL/L (ref 132–146)
WBC # BLD: 9.2 E9/L (ref 4.5–11.5)

## 2021-09-17 PROCEDURE — 36415 COLL VENOUS BLD VENIPUNCTURE: CPT

## 2021-09-17 PROCEDURE — 6370000000 HC RX 637 (ALT 250 FOR IP): Performed by: INTERNAL MEDICINE

## 2021-09-17 PROCEDURE — 2580000003 HC RX 258: Performed by: FAMILY MEDICINE

## 2021-09-17 PROCEDURE — 80048 BASIC METABOLIC PNL TOTAL CA: CPT

## 2021-09-17 PROCEDURE — 6370000000 HC RX 637 (ALT 250 FOR IP): Performed by: FAMILY MEDICINE

## 2021-09-17 PROCEDURE — 6360000002 HC RX W HCPCS: Performed by: INTERNAL MEDICINE

## 2021-09-17 PROCEDURE — 2700000000 HC OXYGEN THERAPY PER DAY

## 2021-09-17 PROCEDURE — 85027 COMPLETE CBC AUTOMATED: CPT

## 2021-09-17 PROCEDURE — 2140000000 HC CCU INTERMEDIATE R&B

## 2021-09-17 PROCEDURE — 6360000002 HC RX W HCPCS: Performed by: FAMILY MEDICINE

## 2021-09-17 PROCEDURE — 99233 SBSQ HOSP IP/OBS HIGH 50: CPT | Performed by: INTERNAL MEDICINE

## 2021-09-17 RX ADMIN — CETIRIZINE HYDROCHLORIDE 10 MG: 10 TABLET, FILM COATED ORAL at 09:00

## 2021-09-17 RX ADMIN — FOLIC ACID 1 MG: 1 TABLET ORAL at 09:00

## 2021-09-17 RX ADMIN — Medication 10 MG: at 22:19

## 2021-09-17 RX ADMIN — HYDROXYZINE PAMOATE 100 MG: 50 CAPSULE ORAL at 22:24

## 2021-09-17 RX ADMIN — Medication 1 TABLET: at 09:00

## 2021-09-17 RX ADMIN — IPRATROPIUM BROMIDE AND ALBUTEROL 1 PUFF: 20; 100 SPRAY, METERED RESPIRATORY (INHALATION) at 07:00

## 2021-09-17 RX ADMIN — IBUPROFEN 400 MG: 200 TABLET, FILM COATED ORAL at 22:19

## 2021-09-17 RX ADMIN — ATORVASTATIN CALCIUM 40 MG: 40 TABLET, FILM COATED ORAL at 22:19

## 2021-09-17 RX ADMIN — Medication 100 MG: at 09:00

## 2021-09-17 RX ADMIN — PANTOPRAZOLE SODIUM 40 MG: 40 TABLET, DELAYED RELEASE ORAL at 07:00

## 2021-09-17 RX ADMIN — DEXAMETHASONE 6 MG: 4 TABLET ORAL at 09:00

## 2021-09-17 RX ADMIN — ENOXAPARIN SODIUM 30 MG: 30 INJECTION SUBCUTANEOUS at 22:18

## 2021-09-17 RX ADMIN — IPRATROPIUM BROMIDE AND ALBUTEROL 1 PUFF: 20; 100 SPRAY, METERED RESPIRATORY (INHALATION) at 00:38

## 2021-09-17 RX ADMIN — IPRATROPIUM BROMIDE AND ALBUTEROL 1 PUFF: 20; 100 SPRAY, METERED RESPIRATORY (INHALATION) at 18:47

## 2021-09-17 RX ADMIN — ENOXAPARIN SODIUM 30 MG: 30 INJECTION SUBCUTANEOUS at 09:00

## 2021-09-17 RX ADMIN — IPRATROPIUM BROMIDE AND ALBUTEROL 1 PUFF: 20; 100 SPRAY, METERED RESPIRATORY (INHALATION) at 12:00

## 2021-09-17 RX ADMIN — CEFTRIAXONE 1000 MG: 1 INJECTION, POWDER, FOR SOLUTION INTRAMUSCULAR; INTRAVENOUS at 15:46

## 2021-09-17 RX ADMIN — OXYCODONE HYDROCHLORIDE AND ACETAMINOPHEN 500 MG: 500 TABLET ORAL at 09:00

## 2021-09-17 RX ADMIN — Medication 50 MG: at 09:00

## 2021-09-17 ASSESSMENT — ENCOUNTER SYMPTOMS
SHORTNESS OF BREATH: 1
ABDOMINAL PAIN: 0

## 2021-09-17 ASSESSMENT — PAIN SCALES - GENERAL
PAINLEVEL_OUTOF10: 6
PAINLEVEL_OUTOF10: 0

## 2021-09-17 NOTE — PROGRESS NOTES
09/17/2021    K 4.2 09/17/2021    K 3.0 04/13/2021     09/17/2021    CO2 21 09/17/2021    BUN 16 09/17/2021    CREATININE 0.5 09/17/2021    GFRAA >60 09/17/2021    LABGLOM >60 09/17/2021    GLUCOSE 88 09/17/2021    GLUCOSE 84 11/28/2011    PROT 8.1 09/13/2021    LABALBU 3.7 09/13/2021    LABALBU 4.5 11/28/2011    CALCIUM 9.2 09/17/2021    BILITOT 0.4 09/13/2021    ALKPHOS 102 09/13/2021    AST 49 09/13/2021    ALT 14 09/13/2021     Calcium:    Lab Results   Component Value Date    CALCIUM 9.2 09/17/2021     Magnesium:    Lab Results   Component Value Date    MG 2.0 09/13/2021     ABG:    Lab Results   Component Value Date    PH 7.467 09/13/2021    PCO2 28.0 09/13/2021    PO2 64.6 09/13/2021    HCO3 19.8 09/13/2021    BE -2.4 09/13/2021    O2SAT 92.5 09/13/2021     FERRITIN:  No results found for: FERRITIN  Fibrinogen Level:  No components found for: FIB     PRO-BNP:   Lab Results   Component Value Date    PROBNP 58 09/13/2021    PROBNP 41 03/20/2021      ABGs:   Lab Results   Component Value Date    PH 7.467 09/13/2021    PO2 64.6 09/13/2021    PCO2 28.0 09/13/2021     Hemoglobin A1C: No components found for: HGBA1C    IMAGING:  Imaging tests were completed and reviewed and discussed radiology and care team involved and reveals   XR CHEST PORTABLE    Result Date: 9/13/2021  EXAMINATION: ONE XRAY VIEW OF THE CHEST 9/13/2021 1:51 pm COMPARISON: 04/13/2021 HISTORY: ORDERING SYSTEM PROVIDED HISTORY: chest pain, sob TECHNOLOGIST PROVIDED HISTORY: Reason for exam:->chest pain, sob What reading provider will be dictating this exam?->CRC FINDINGS: There are patchy infiltrate seen within the right upper lobe, right lower lobe and left lower lobe. The cardiac silhouette is within normals. There is no pleural effusion. Patchy infiltrates seen within the right upper lobe, right lower lobe and left lower lobe more prominent within the right lower lobe.      CTA PULMONARY W CONTRAST    Result Date: 9/13/2021  EXAMINATION: CTA OF THE CHEST 9/13/2021 7:49 pm TECHNIQUE: CTA of the chest was performed after the administration of intravenous contrast.  Multiplanar reformatted images are provided for review. MIP images are provided for review. Dose modulation, iterative reconstruction, and/or weight based adjustment of the mA/kV was utilized to reduce the radiation dose to as low as reasonably achievable. COMPARISON: None. HISTORY: ORDERING SYSTEM PROVIDED HISTORY: COVID, hypoxic TECHNOLOGIST PROVIDED HISTORY: Reason for exam:->COVID, hypoxic What reading provider will be dictating this exam?->CRC FINDINGS: Pulmonary Arteries: Pulmonary arteries are adequately opacified for evaluation. No evidence of intraluminal filling defect to suggest pulmonary embolism. Main pulmonary artery is normal in caliber. Mediastinum: No evidence of mediastinal lymphadenopathy. The heart and pericardium demonstrate no acute abnormality. There is no acute abnormality of the thoracic aorta. Lungs/pleura: No pneumothorax. No pleural effusion. Extensive ground-glass densities and consolidations in the bilateral upper and lower lungs, compatible with multifocal COVID-19 pneumonia. Upper Abdomen: Reflux of contrast into the IVC and right renal vein, likely due to power injection. CHF cannot be excluded. Soft Tissues/Bones: Old compression fractures of T3-T6. Status post L1 vertebroplasty. No evidence of pulmonary embolism. Multifocal bilateral COVID-19 pneumonia. Assessment:   1. Acute Respiratory Failure  2. COVID 19 pneumonia  3. Asthma,  4. CAD (coronary artery disease),   5. Closed fracture of proximal end of left humerus with routine healing,  6. Degeneration of lumbar or lumbosacral intervertebral disc,   7. Fall against sharp object,   8. History of blood transfusion,   9. Hypertension,   10. Hypokalemia,   11. Insomnia,   12. Kidney stone,   13. Nondisplaced fracture of greater tuberosity of right humerus  14. Orthostatic hypotension  15. Severe back pain. Plan:   1. Steroids po for 10 days  2. Finished TOCI  3. Vitamins C and D  4. Titrate oxygen for sats >90%  5. PPI  6. Bronchodilators QID  7. Anticoagulate per protocol  8.  PT OT   9. Gautam Hawkins DO DO, MPH, Faye Young  Professor of Internal Medicine  Pulmonary, Critical Care and Sleep Medicine

## 2021-09-17 NOTE — CARE COORDINATION
Care Coordination:  Pt is on 3 ltr 02, will need ambulating pulse ox to determine home going needs if pt is medically stable for dc. This was discussed in nursing rounds. .  Pt is [facility] plan and 02 will need ordered from [facility]  Will need home 02 orders     Gunner [place]      PULSE OX ON ROOM AIR SITTING____%  PULSE OX ON ROOM AIR AMBULATING ____%  PULSE OX ON ____LITERS SITTING RECOVERY ____%  PULSE OX ON ____LITERS AMBULATING RECOVERY ___%  RN/ Please document Pulse Ox in PG note the following way;(Cut and Paste, Fill in Blanks).  If home o2 is needed.  If > 4 ltr needed to recover, please show insignificant recovery on 4 ltr and then increase for recovery rate    Y==

## 2021-09-17 NOTE — PROGRESS NOTES
Progress Note  Date:2021       Room:[address] [place] [address]  Patient Rayvon Leyden Dothard     YOB: 1960     Age:61 y.o. Patient resting comfortably. Subjective    Subjective:  Symptoms:  Stable. She reports shortness of breath. No chest pain. Diet:  Adequate intake. Activity level: Impaired due to weakness. Pain:  She reports no pain. Review of Systems   Constitutional: Negative for activity change and fever. Respiratory: Positive for shortness of breath. Cardiovascular: Negative for chest pain. Gastrointestinal: Negative for abdominal pain. Neurological: Negative for dizziness. Objective         Vitals Last 24 Hours:  TEMPERATURE:  Temp  Av.7 °F (35.4 °C)  Min: 92 °F (33.3 °C)  Max: 97.7 °F (36.5 °C)  RESPIRATIONS RANGE: Resp  Av.7  Min: 16  Max: 18  PULSE OXIMETRY RANGE: SpO2  Av %  Min: 92 %  Max: 100 %  PULSE RANGE: Pulse  Av.3  Min: 68  Max: 78  BLOOD PRESSURE RANGE: Systolic (55YGE), BXO:721 , Min:99 , UUI:537   ; Diastolic (60BDN), GZJ:17, Min:72, Max:92    I/O (24Hr): No intake or output data in the 24 hours ending 21 0639  Objective:  General Appearance:  Comfortable. Vital signs: (most recent): Blood pressure (!) 127/92, pulse 77, temperature 92 °F (33.3 °C), resp. rate 18, height 5' 2.5\" (1.588 m), weight 145 lb (65.8 kg), SpO2 92 %, not currently breastfeeding. No fever. Lungs:  She is not in respiratory distress. Heart: Normal rate. Regular rhythm. S1 normal and S2 normal.      Labs/Imaging/Diagnostics    Labs:  CBC:  Recent Labs     09/15/21  1159 21  0856   WBC 11.7* 9.1   RBC 4.38 4.40   HGB 13.5 13.6   HCT 40.4 40.3   MCV 92.2 91.6   RDW 13.1 13.1    385     CHEMISTRIES:  Recent Labs     09/15/21  1159 21  0856    139   K 4.2 3.9    102   CO2 23 22   BUN 20 19   CREATININE 0.5 0.5   GLUCOSE 146* 82     PT/INR:No results for input(s): PROTIME, INR in the last 72 hours.   APTT:No results for input(s): APTT in the last 72 hours. LIVER PROFILE:  No results for input(s): AST, ALT, BILIDIR, BILITOT, ALKPHOS in the last 72 hours. Imaging Last 24 Hours:  XR CHEST PORTABLE    Result Date: 9/13/2021  EXAMINATION: ONE XRAY VIEW OF THE CHEST 9/13/2021 1:51 pm COMPARISON: 04/13/2021 HISTORY: ORDERING SYSTEM PROVIDED HISTORY: chest pain, sob TECHNOLOGIST PROVIDED HISTORY: Reason for exam:->chest pain, sob What reading provider will be dictating this exam?->CRC FINDINGS: There are patchy infiltrate seen within the right upper lobe, right lower lobe and left lower lobe. The cardiac silhouette is within normals. There is no pleural effusion. Patchy infiltrates seen within the right upper lobe, right lower lobe and left lower lobe more prominent within the right lower lobe. CTA PULMONARY W CONTRAST    Result Date: 9/13/2021  EXAMINATION: CTA OF THE CHEST 9/13/2021 7:49 pm TECHNIQUE: CTA of the chest was performed after the administration of intravenous contrast.  Multiplanar reformatted images are provided for review. MIP images are provided for review. Dose modulation, iterative reconstruction, and/or weight based adjustment of the mA/kV was utilized to reduce the radiation dose to as low as reasonably achievable. COMPARISON: None. HISTORY: ORDERING SYSTEM PROVIDED HISTORY: COVID, hypoxic TECHNOLOGIST PROVIDED HISTORY: Reason for exam:->COVID, hypoxic What reading provider will be dictating this exam?->CRC FINDINGS: Pulmonary Arteries: Pulmonary arteries are adequately opacified for evaluation. No evidence of intraluminal filling defect to suggest pulmonary embolism. Main pulmonary artery is normal in caliber. Mediastinum: No evidence of mediastinal lymphadenopathy. The heart and pericardium demonstrate no acute abnormality. There is no acute abnormality of the thoracic aorta. Lungs/pleura: No pneumothorax. No pleural effusion.   Extensive ground-glass densities and consolidations in the bilateral upper and lower lungs, compatible with multifocal COVID-19 pneumonia. Upper Abdomen: Reflux of contrast into the IVC and right renal vein, likely due to power injection. CHF cannot be excluded. Soft Tissues/Bones: Old compression fractures of T3-T6. Status post L1 vertebroplasty. No evidence of pulmonary embolism. Multifocal bilateral COVID-19 pneumonia. Assessment//Plan           Hospital Problems         Last Modified POA    Acute hypoxemic respiratory failure due to COVID-19 [facility]) 9/13/2021 Yes        Assessment:  (Acute hypoxemic respiratory failure due to COVID-19 ([address] 9/13/2021 Yes     Asthma  CAD  HTN  ). Plan:   (Steroids, oxygen, toci  Pulmonary following. ).

## 2021-09-17 NOTE — CARE COORDINATION
Care Coordination: If pt will need Home 02, as discussed in rounds, will need to order from [address] d/t insurance. Will need to call . And fax orders and ambulating pulse ox and demos and physician note. Not yet seen by attending or pulmonary today. RN is aware of plan from morning report    Avani Connell    PULSE OX ON ROOM AIR SITTING____%  PULSE OX ON ROOM AIR AMBULATING ____%  PULSE OX ON ____LITERS SITTING RECOVERY ____%  PULSE OX ON ____LITERS AMBULATING RECOVERY ___%  RN/ Please document Pulse Ox in PG note the following way;(Cut and Paste, Fill in Blanks).  If home o2 is needed.  If > 4 ltr needed to recover, please show insignificant recovery on 4 ltr and then increase for recovery rate

## 2021-09-18 LAB
ANION GAP SERPL CALCULATED.3IONS-SCNC: 12 MMOL/L (ref 7–16)
BLOOD CULTURE, ROUTINE: NORMAL
BUN BLDV-MCNC: 12 MG/DL (ref 6–23)
CALCIUM SERPL-MCNC: 9.1 MG/DL (ref 8.6–10.2)
CHLORIDE BLD-SCNC: 102 MMOL/L (ref 98–107)
CO2: 26 MMOL/L (ref 22–29)
CREAT SERPL-MCNC: 0.5 MG/DL (ref 0.5–1)
CULTURE, BLOOD 2: NORMAL
GFR AFRICAN AMERICAN: >60
GFR NON-AFRICAN AMERICAN: >60 ML/MIN/1.73
GLUCOSE BLD-MCNC: 161 MG/DL (ref 74–99)
HCT VFR BLD CALC: 43.1 % (ref 34–48)
HEMOGLOBIN: 14 G/DL (ref 11.5–15.5)
MCH RBC QN AUTO: 30.4 PG (ref 26–35)
MCHC RBC AUTO-ENTMCNC: 32.5 % (ref 32–34.5)
MCV RBC AUTO: 93.7 FL (ref 80–99.9)
PDW BLD-RTO: 13 FL (ref 11.5–15)
PLATELET # BLD: 443 E9/L (ref 130–450)
PMV BLD AUTO: 10.4 FL (ref 7–12)
POTASSIUM SERPL-SCNC: 4.2 MMOL/L (ref 3.5–5)
RBC # BLD: 4.6 E12/L (ref 3.5–5.5)
SODIUM BLD-SCNC: 140 MMOL/L (ref 132–146)
WBC # BLD: 7.9 E9/L (ref 4.5–11.5)

## 2021-09-18 PROCEDURE — 6370000000 HC RX 637 (ALT 250 FOR IP): Performed by: INTERNAL MEDICINE

## 2021-09-18 PROCEDURE — 6360000002 HC RX W HCPCS: Performed by: INTERNAL MEDICINE

## 2021-09-18 PROCEDURE — 2580000003 HC RX 258: Performed by: FAMILY MEDICINE

## 2021-09-18 PROCEDURE — 85027 COMPLETE CBC AUTOMATED: CPT

## 2021-09-18 PROCEDURE — 2140000000 HC CCU INTERMEDIATE R&B

## 2021-09-18 PROCEDURE — 6360000002 HC RX W HCPCS: Performed by: FAMILY MEDICINE

## 2021-09-18 PROCEDURE — 80048 BASIC METABOLIC PNL TOTAL CA: CPT

## 2021-09-18 PROCEDURE — 99232 SBSQ HOSP IP/OBS MODERATE 35: CPT | Performed by: INTERNAL MEDICINE

## 2021-09-18 PROCEDURE — 2700000000 HC OXYGEN THERAPY PER DAY

## 2021-09-18 PROCEDURE — 6370000000 HC RX 637 (ALT 250 FOR IP): Performed by: FAMILY MEDICINE

## 2021-09-18 PROCEDURE — 36415 COLL VENOUS BLD VENIPUNCTURE: CPT

## 2021-09-18 RX ADMIN — CEFTRIAXONE 1000 MG: 1 INJECTION, POWDER, FOR SOLUTION INTRAMUSCULAR; INTRAVENOUS at 15:11

## 2021-09-18 RX ADMIN — IPRATROPIUM BROMIDE AND ALBUTEROL 1 PUFF: 20; 100 SPRAY, METERED RESPIRATORY (INHALATION) at 00:14

## 2021-09-18 RX ADMIN — AMITRIPTYLINE HYDROCHLORIDE 25 MG: 25 TABLET, FILM COATED ORAL at 00:13

## 2021-09-18 RX ADMIN — Medication 1 TABLET: at 09:59

## 2021-09-18 RX ADMIN — ATORVASTATIN CALCIUM 40 MG: 40 TABLET, FILM COATED ORAL at 20:21

## 2021-09-18 RX ADMIN — OXYCODONE HYDROCHLORIDE AND ACETAMINOPHEN 500 MG: 500 TABLET ORAL at 09:59

## 2021-09-18 RX ADMIN — IPRATROPIUM BROMIDE AND ALBUTEROL 1 PUFF: 20; 100 SPRAY, METERED RESPIRATORY (INHALATION) at 19:06

## 2021-09-18 RX ADMIN — AMITRIPTYLINE HYDROCHLORIDE 25 MG: 25 TABLET, FILM COATED ORAL at 20:21

## 2021-09-18 RX ADMIN — Medication 50 MG: at 09:09

## 2021-09-18 RX ADMIN — IPRATROPIUM BROMIDE AND ALBUTEROL 1 PUFF: 20; 100 SPRAY, METERED RESPIRATORY (INHALATION) at 12:51

## 2021-09-18 RX ADMIN — PANTOPRAZOLE SODIUM 40 MG: 40 TABLET, DELAYED RELEASE ORAL at 05:47

## 2021-09-18 RX ADMIN — DEXAMETHASONE 6 MG: 4 TABLET ORAL at 09:08

## 2021-09-18 RX ADMIN — ENOXAPARIN SODIUM 30 MG: 30 INJECTION SUBCUTANEOUS at 09:08

## 2021-09-18 RX ADMIN — FOLIC ACID 1 MG: 1 TABLET ORAL at 09:08

## 2021-09-18 RX ADMIN — ENOXAPARIN SODIUM 30 MG: 30 INJECTION SUBCUTANEOUS at 20:21

## 2021-09-18 RX ADMIN — IPRATROPIUM BROMIDE AND ALBUTEROL 1 PUFF: 20; 100 SPRAY, METERED RESPIRATORY (INHALATION) at 05:47

## 2021-09-18 RX ADMIN — CETIRIZINE HYDROCHLORIDE 10 MG: 10 TABLET, FILM COATED ORAL at 09:08

## 2021-09-18 RX ADMIN — Medication 100 MG: at 09:09

## 2021-09-18 ASSESSMENT — ENCOUNTER SYMPTOMS
SHORTNESS OF BREATH: 1
ABDOMINAL PAIN: 0

## 2021-09-18 ASSESSMENT — PAIN SCALES - GENERAL
PAINLEVEL_OUTOF10: 0

## 2021-09-18 NOTE — PROGRESS NOTES
[place]  Department of Internal Medicine  Division of Pulmonary, Critical Care and Sleep Medicine  Progress Note    Santana Paige DO, MPH, FCCP, FACOI, FACP  Hamida Love MD, [facility]  [facility]      Patient: Elvira Todd  MRN: [id number]  : 1960    Encounter Time: 1:17 PM     Date of Admission: 2021  1:11 PM    Primary Care Physician: Lianet Coto MD    Reason for Consultation: COVID     SUBJECTIVE:    Feeling better  Some frustrations      OBJECTIVE:     PHYSICAL EXAM:   VITALS:   Vitals:    21 1600 21 0015 21 0018 21 0915   BP: 89/60  98/64 137/67   Pulse: 66  75 73   Resp: 20  18 20   Temp: 98 °F (36.7 °C)  97.7 °F (36.5 °C) 97.8 °F (36.6 °C)   TempSrc: Oral  Oral Oral   SpO2: 98% (!) 88% 96% 95%   Weight:       Height:          No intake or output data in the 24 hours ending 21 1317     CONSTITUTIONAL:   A&O x 3, NAD  SKIN:     No rash, No suspicious lesions or skin discoloration  HEENT:     EOMI, MMM, No thrush  NECK:    No bruits, No JVP apprechiated  CV:      Sinus,  No murmur, No rubs, No gallops  PULMONARY:   Couse BS,  No Wheezing, No Rales, No Rhonchi      No noted egophony  ABDOMEN:     Soft, non-tender. BS normal. No R/R/G  EXT:    No deformities . No clubbing. No lower extremity edema, No venous stasis  PULSE:   Appears equal and palpable.   PSYCHIATRIC:  Seems appropriate, No acute psycosis  MS:    No fractures, No gross weakness  NEUROLOGIC:   The clinical assessment is non-focal     DATA: IMAGING & TESTING:     LABORATORY TESTS:    CBC:   Lab Results   Component Value Date    WBC 9.2 2021    RBC 4.51 2021    HGB 13.9 2021    HCT 41.6 2021    MCV 92.2 2021    MCH 30.8 2021    MCHC 33.4 2021    RDW 13.0 2021     2021    MPV 10.5 2021     CMP:    Lab Results   Component Value Date     2021    K 4.2 2021    K 3.0 04/13/2021     09/17/2021    CO2 21 09/17/2021    BUN 16 09/17/2021    CREATININE 0.5 09/17/2021    GFRAA >60 09/17/2021    LABGLOM >60 09/17/2021    GLUCOSE 88 09/17/2021    GLUCOSE 84 11/28/2011    PROT 8.1 09/13/2021    LABALBU 3.7 09/13/2021    LABALBU 4.5 11/28/2011    CALCIUM 9.2 09/17/2021    BILITOT 0.4 09/13/2021    ALKPHOS 102 09/13/2021    AST 49 09/13/2021    ALT 14 09/13/2021     Calcium:    Lab Results   Component Value Date    CALCIUM 9.2 09/17/2021     Magnesium:    Lab Results   Component Value Date    MG 2.0 09/13/2021     ABG:    Lab Results   Component Value Date    PH 7.467 09/13/2021    PCO2 28.0 09/13/2021    PO2 64.6 09/13/2021    HCO3 19.8 09/13/2021    BE -2.4 09/13/2021    O2SAT 92.5 09/13/2021     FERRITIN:  No results found for: FERRITIN  Fibrinogen Level:  No components found for: FIB     PRO-BNP:   Lab Results   Component Value Date    PROBNP 58 09/13/2021    PROBNP 41 03/20/2021      ABGs:   Lab Results   Component Value Date    PH 7.467 09/13/2021    PO2 64.6 09/13/2021    PCO2 28.0 09/13/2021     Hemoglobin A1C: No components found for: HGBA1C    IMAGING:  Imaging tests were completed and reviewed and discussed radiology and care team involved and reveals   XR CHEST PORTABLE    Result Date: 9/13/2021  EXAMINATION: ONE XRAY VIEW OF THE CHEST 9/13/2021 1:51 pm COMPARISON: 04/13/2021 HISTORY: ORDERING SYSTEM PROVIDED HISTORY: chest pain, sob TECHNOLOGIST PROVIDED HISTORY: Reason for exam:->chest pain, sob What reading provider will be dictating this exam?->CRC FINDINGS: There are patchy infiltrate seen within the right upper lobe, right lower lobe and left lower lobe. The cardiac silhouette is within normals. There is no pleural effusion. Patchy infiltrates seen within the right upper lobe, right lower lobe and left lower lobe more prominent within the right lower lobe.      CTA PULMONARY W CONTRAST    Result Date: 9/13/2021  EXAMINATION: CTA OF THE CHEST 9/13/2021 7:49 pm TECHNIQUE: CTA of the chest was performed after the administration of intravenous contrast.  Multiplanar reformatted images are provided for review. MIP images are provided for review. Dose modulation, iterative reconstruction, and/or weight based adjustment of the mA/kV was utilized to reduce the radiation dose to as low as reasonably achievable. COMPARISON: None. HISTORY: ORDERING SYSTEM PROVIDED HISTORY: COVID, hypoxic TECHNOLOGIST PROVIDED HISTORY: Reason for exam:->COVID, hypoxic What reading provider will be dictating this exam?->CRC FINDINGS: Pulmonary Arteries: Pulmonary arteries are adequately opacified for evaluation. No evidence of intraluminal filling defect to suggest pulmonary embolism. Main pulmonary artery is normal in caliber. Mediastinum: No evidence of mediastinal lymphadenopathy. The heart and pericardium demonstrate no acute abnormality. There is no acute abnormality of the thoracic aorta. Lungs/pleura: No pneumothorax. No pleural effusion. Extensive ground-glass densities and consolidations in the bilateral upper and lower lungs, compatible with multifocal COVID-19 pneumonia. Upper Abdomen: Reflux of contrast into the IVC and right renal vein, likely due to power injection. CHF cannot be excluded. Soft Tissues/Bones: Old compression fractures of T3-T6. Status post L1 vertebroplasty. No evidence of pulmonary embolism. Multifocal bilateral COVID-19 pneumonia. Assessment:   1. Acute Respiratory Failure  2. COVID 19 pneumonia  3. Asthma,  4. CAD (coronary artery disease),   5. Closed fracture of proximal end of left humerus with routine healing,  6. Degeneration of lumbar or lumbosacral intervertebral disc,   7. Fall against sharp object,   8. History of blood transfusion,   9. Hypertension,   10. Hypokalemia,   11. Insomnia,   12. Kidney stone,   13. Nondisplaced fracture of greater tuberosity of right humerus  14. Orthostatic hypotension  15. Severe back pain.       Plan: 1. Steroids for total 10 days  2. Finished TOCI  3. Vitamins C and D  4. Titrate oxygen for sats >90%  5. PPI  6. Bronchodilators QID  7. Anticoagulate per protocol  8. PT OT   9. OOB   10.  Continue present care        Layla Hernández DO DO, MPH, Amadeo Serrato  Professor of Internal Medicine  Pulmonary, Critical Care and Sleep Medicine

## 2021-09-18 NOTE — PROGRESS NOTES
Progress Note  Date:2021       Room:[address] [place] [address]  Patient Mirela Stuart     YOB: 1960     Age:61 y.o. Patient resting comfortably. Subjective    Subjective:  Symptoms:  Stable. She reports shortness of breath. No chest pain. Diet:  Adequate intake. Activity level: Impaired due to weakness. Pain:  She reports no pain. Review of Systems   Constitutional: Negative for activity change and fever. Respiratory: Positive for shortness of breath. Cardiovascular: Negative for chest pain. Gastrointestinal: Negative for abdominal pain. Neurological: Negative for dizziness. Objective         Vitals Last 24 Hours:  TEMPERATURE:  Temp  Av.4 °F (36.3 °C)  Min: 96.5 °F (35.8 °C)  Max: 98 °F (36.7 °C)  RESPIRATIONS RANGE: Resp  Av.3  Min: 18  Max: 20  PULSE OXIMETRY RANGE: SpO2  Av.4 %  Min: 88 %  Max: 98 %  PULSE RANGE: Pulse  Av.7  Min: 66  Max: 75  BLOOD PRESSURE RANGE: Systolic (75YYK), RJC:427 , Min:89 , HHU:096   ; Diastolic (43HUN), DEF:03, Min:60, Max:83    I/O (24Hr): No intake or output data in the 24 hours ending 21 0742  Objective:  General Appearance:  Comfortable. Vital signs: (most recent): Blood pressure 98/64, pulse 75, temperature 97.7 °F (36.5 °C), temperature source Oral, resp. rate 18, height 5' 2.5\" (1.588 m), weight 145 lb (65.8 kg), SpO2 96 %, not currently breastfeeding. No fever. Lungs:  She is not in respiratory distress. Heart: Normal rate. Regular rhythm.   S1 normal and S2 normal.      Labs/Imaging/Diagnostics    Labs:  CBC:  Recent Labs     09/15/21  1159 21  0856 21  0833   WBC 11.7* 9.1 9.2   RBC 4.38 4.40 4.51   HGB 13.5 13.6 13.9   HCT 40.4 40.3 41.6   MCV 92.2 91.6 92.2   RDW 13.1 13.1 13.0    385 409     CHEMISTRIES:  Recent Labs     09/15/21  1159 21  0856 21  0833    139 144   K 4.2 3.9 4.2    102 104   CO2 23 22 21*   BUN 20 19 16   CREATININE 0.5 0.5 0.5 GLUCOSE 146* 82 88     PT/INR:No results for input(s): PROTIME, INR in the last 72 hours. APTT:No results for input(s): APTT in the last 72 hours. LIVER PROFILE:  No results for input(s): AST, ALT, BILIDIR, BILITOT, ALKPHOS in the last 72 hours. Imaging Last 24 Hours:  XR CHEST PORTABLE    Result Date: 9/13/2021  EXAMINATION: ONE XRAY VIEW OF THE CHEST 9/13/2021 1:51 pm COMPARISON: 04/13/2021 HISTORY: ORDERING SYSTEM PROVIDED HISTORY: chest pain, sob TECHNOLOGIST PROVIDED HISTORY: Reason for exam:->chest pain, sob What reading provider will be dictating this exam?->CRC FINDINGS: There are patchy infiltrate seen within the right upper lobe, right lower lobe and left lower lobe. The cardiac silhouette is within normals. There is no pleural effusion. Patchy infiltrates seen within the right upper lobe, right lower lobe and left lower lobe more prominent within the right lower lobe. CTA PULMONARY W CONTRAST    Result Date: 9/13/2021  EXAMINATION: CTA OF THE CHEST 9/13/2021 7:49 pm TECHNIQUE: CTA of the chest was performed after the administration of intravenous contrast.  Multiplanar reformatted images are provided for review. MIP images are provided for review. Dose modulation, iterative reconstruction, and/or weight based adjustment of the mA/kV was utilized to reduce the radiation dose to as low as reasonably achievable. COMPARISON: None. HISTORY: ORDERING SYSTEM PROVIDED HISTORY: COVID, hypoxic TECHNOLOGIST PROVIDED HISTORY: Reason for exam:->COVID, hypoxic What reading provider will be dictating this exam?->CRC FINDINGS: Pulmonary Arteries: Pulmonary arteries are adequately opacified for evaluation. No evidence of intraluminal filling defect to suggest pulmonary embolism. Main pulmonary artery is normal in caliber. Mediastinum: No evidence of mediastinal lymphadenopathy. The heart and pericardium demonstrate no acute abnormality. There is no acute abnormality of the thoracic aorta. Lungs/pleura: No pneumothorax. No pleural effusion. Extensive ground-glass densities and consolidations in the bilateral upper and lower lungs, compatible with multifocal COVID-19 pneumonia. Upper Abdomen: Reflux of contrast into the IVC and right renal vein, likely due to power injection. CHF cannot be excluded. Soft Tissues/Bones: Old compression fractures of T3-T6. Status post L1 vertebroplasty. No evidence of pulmonary embolism. Multifocal bilateral COVID-19 pneumonia. Assessment//Plan           Hospital Problems         Last Modified POA    Acute hypoxemic respiratory failure due to COVID-19 [facility]) 9/13/2021 Yes        Assessment:  (Acute hypoxemic respiratory failure due to COVID-19 ([address] 9/13/2021 Yes     Asthma  CAD  HTN  ). Plan:   (Steroids, oxygen, toci  Pulmonary following. ).

## 2021-09-19 LAB
ANION GAP SERPL CALCULATED.3IONS-SCNC: 13 MMOL/L (ref 7–16)
BUN BLDV-MCNC: 13 MG/DL (ref 6–23)
CALCIUM SERPL-MCNC: 9.5 MG/DL (ref 8.6–10.2)
CHLORIDE BLD-SCNC: 100 MMOL/L (ref 98–107)
CO2: 26 MMOL/L (ref 22–29)
CREAT SERPL-MCNC: 0.5 MG/DL (ref 0.5–1)
GFR AFRICAN AMERICAN: >60
GFR NON-AFRICAN AMERICAN: >60 ML/MIN/1.73
GLUCOSE BLD-MCNC: 80 MG/DL (ref 74–99)
HCT VFR BLD CALC: 52.2 % (ref 34–48)
HEMOGLOBIN: 16.9 G/DL (ref 11.5–15.5)
MCH RBC QN AUTO: 30.7 PG (ref 26–35)
MCHC RBC AUTO-ENTMCNC: 32.4 % (ref 32–34.5)
MCV RBC AUTO: 94.9 FL (ref 80–99.9)
METER GLUCOSE: 96 MG/DL (ref 74–99)
PDW BLD-RTO: 13.1 FL (ref 11.5–15)
PLATELET # BLD: 263 E9/L (ref 130–450)
PMV BLD AUTO: 10.9 FL (ref 7–12)
POTASSIUM SERPL-SCNC: 4.8 MMOL/L (ref 3.5–5)
RBC # BLD: 5.5 E12/L (ref 3.5–5.5)
SODIUM BLD-SCNC: 139 MMOL/L (ref 132–146)
WBC # BLD: 11.4 E9/L (ref 4.5–11.5)

## 2021-09-19 PROCEDURE — 6370000000 HC RX 637 (ALT 250 FOR IP): Performed by: INTERNAL MEDICINE

## 2021-09-19 PROCEDURE — 80048 BASIC METABOLIC PNL TOTAL CA: CPT

## 2021-09-19 PROCEDURE — 99232 SBSQ HOSP IP/OBS MODERATE 35: CPT | Performed by: INTERNAL MEDICINE

## 2021-09-19 PROCEDURE — 2700000000 HC OXYGEN THERAPY PER DAY

## 2021-09-19 PROCEDURE — 36415 COLL VENOUS BLD VENIPUNCTURE: CPT

## 2021-09-19 PROCEDURE — 2140000000 HC CCU INTERMEDIATE R&B

## 2021-09-19 PROCEDURE — 6360000002 HC RX W HCPCS: Performed by: INTERNAL MEDICINE

## 2021-09-19 PROCEDURE — 82962 GLUCOSE BLOOD TEST: CPT

## 2021-09-19 PROCEDURE — 6370000000 HC RX 637 (ALT 250 FOR IP): Performed by: FAMILY MEDICINE

## 2021-09-19 PROCEDURE — 85027 COMPLETE CBC AUTOMATED: CPT

## 2021-09-19 RX ORDER — DEXAMETHASONE 4 MG/1
2 TABLET ORAL DAILY
Status: DISCONTINUED | OUTPATIENT
Start: 2021-09-20 | End: 2021-09-20

## 2021-09-19 RX ADMIN — AMITRIPTYLINE HYDROCHLORIDE 25 MG: 25 TABLET, FILM COATED ORAL at 20:18

## 2021-09-19 RX ADMIN — DEXAMETHASONE 6 MG: 4 TABLET ORAL at 08:21

## 2021-09-19 RX ADMIN — PANTOPRAZOLE SODIUM 40 MG: 40 TABLET, DELAYED RELEASE ORAL at 06:55

## 2021-09-19 RX ADMIN — IPRATROPIUM BROMIDE AND ALBUTEROL 1 PUFF: 20; 100 SPRAY, METERED RESPIRATORY (INHALATION) at 13:00

## 2021-09-19 RX ADMIN — OXYCODONE HYDROCHLORIDE AND ACETAMINOPHEN 500 MG: 500 TABLET ORAL at 08:21

## 2021-09-19 RX ADMIN — ENOXAPARIN SODIUM 30 MG: 30 INJECTION SUBCUTANEOUS at 08:21

## 2021-09-19 RX ADMIN — ATORVASTATIN CALCIUM 40 MG: 40 TABLET, FILM COATED ORAL at 20:18

## 2021-09-19 RX ADMIN — FOLIC ACID 1 MG: 1 TABLET ORAL at 08:21

## 2021-09-19 RX ADMIN — ENOXAPARIN SODIUM 30 MG: 30 INJECTION SUBCUTANEOUS at 20:17

## 2021-09-19 RX ADMIN — CETIRIZINE HYDROCHLORIDE 10 MG: 10 TABLET, FILM COATED ORAL at 08:22

## 2021-09-19 RX ADMIN — IPRATROPIUM BROMIDE AND ALBUTEROL 1 PUFF: 20; 100 SPRAY, METERED RESPIRATORY (INHALATION) at 18:58

## 2021-09-19 RX ADMIN — IPRATROPIUM BROMIDE AND ALBUTEROL 1 PUFF: 20; 100 SPRAY, METERED RESPIRATORY (INHALATION) at 06:13

## 2021-09-19 RX ADMIN — Medication 1 TABLET: at 09:51

## 2021-09-19 RX ADMIN — Medication 100 MG: at 09:51

## 2021-09-19 RX ADMIN — Medication 50 MG: at 08:22

## 2021-09-19 ASSESSMENT — PAIN SCALES - GENERAL
PAINLEVEL_OUTOF10: 0

## 2021-09-19 ASSESSMENT — ENCOUNTER SYMPTOMS
ABDOMINAL PAIN: 0
SHORTNESS OF BREATH: 1

## 2021-09-19 NOTE — PROGRESS NOTES
Progress Note  Date:2021       AQTD:[id number]  Patient Reji Stuart     YOB: 1960     Age:61 y.o. Patient resting comfortably. Subjective    Subjective:  Symptoms:  Stable. She reports shortness of breath. No chest pain. Diet:  Adequate intake. Activity level: Impaired due to weakness. Pain:  She reports no pain. Review of Systems   Constitutional: Negative for activity change and fever. Respiratory: Positive for shortness of breath. Cardiovascular: Negative for chest pain. Gastrointestinal: Negative for abdominal pain. Neurological: Negative for dizziness. Objective         Vitals Last 24 Hours:  TEMPERATURE:  Temp  Av °F (36.7 °C)  Min: 97.8 °F (36.6 °C)  Max: 98.1 °F (36.7 °C)  RESPIRATIONS RANGE: Resp  Av.3  Min: 18  Max: 20  PULSE OXIMETRY RANGE: SpO2  Av.3 %  Min: 94 %  Max: 95 %  PULSE RANGE: Pulse  Av.7  Min: 65  Max: 80  BLOOD PRESSURE RANGE: Systolic (73NWF), KBY:280 , Min:88 , HWM:618   ; Diastolic (03GQN), OYB:14, Min:59, Max:82    I/O (24Hr): Intake/Output Summary (Last 24 hours) at 2021 0754  Last data filed at 2021 0606  Gross per 24 hour   Intake 320 ml   Output 1200 ml   Net -880 ml     Objective:  General Appearance:  Comfortable. Vital signs: (most recent): Blood pressure 132/82, pulse 65, temperature 98.1 °F (36.7 °C), temperature source Oral, resp. rate 20, height 5' 2.5\" (1.588 m), weight 145 lb (65.8 kg), SpO2 94 %, not currently breastfeeding. No fever. Lungs:  She is not in respiratory distress. Heart: Normal rate. Regular rhythm.   S1 normal and S2 normal.      Labs/Imaging/Diagnostics    Labs:  CBC:  Recent Labs     21  0856 21  0833 21  1221   WBC 9.1 9.2 7.9   RBC 4.40 4.51 4.60   HGB 13.6 13.9 14.0   HCT 40.3 41.6 43.1   MCV 91.6 92.2 93.7   RDW 13.1 13.0 13.0    409 443     CHEMISTRIES:  Recent Labs     21  0856 21  0833 21  1220   NA 139 144 140   K 3.9 4.2 4.2    104 102   CO2 22 21* 26   BUN 19 16 12   CREATININE 0.5 0.5 0.5   GLUCOSE 82 88 161*     PT/INR:No results for input(s): PROTIME, INR in the last 72 hours. APTT:No results for input(s): APTT in the last 72 hours. LIVER PROFILE:  No results for input(s): AST, ALT, BILIDIR, BILITOT, ALKPHOS in the last 72 hours. Imaging Last 24 Hours:  XR CHEST PORTABLE    Result Date: 9/13/2021  EXAMINATION: ONE XRAY VIEW OF THE CHEST 9/13/2021 1:51 pm COMPARISON: 04/13/2021 HISTORY: ORDERING SYSTEM PROVIDED HISTORY: chest pain, sob TECHNOLOGIST PROVIDED HISTORY: Reason for exam:->chest pain, sob What reading provider will be dictating this exam?->CRC FINDINGS: There are patchy infiltrate seen within the right upper lobe, right lower lobe and left lower lobe. The cardiac silhouette is within normals. There is no pleural effusion. Patchy infiltrates seen within the right upper lobe, right lower lobe and left lower lobe more prominent within the right lower lobe. CTA PULMONARY W CONTRAST    Result Date: 9/13/2021  EXAMINATION: CTA OF THE CHEST 9/13/2021 7:49 pm TECHNIQUE: CTA of the chest was performed after the administration of intravenous contrast.  Multiplanar reformatted images are provided for review. MIP images are provided for review. Dose modulation, iterative reconstruction, and/or weight based adjustment of the mA/kV was utilized to reduce the radiation dose to as low as reasonably achievable. COMPARISON: None. HISTORY: ORDERING SYSTEM PROVIDED HISTORY: COVID, hypoxic TECHNOLOGIST PROVIDED HISTORY: Reason for exam:->COVID, hypoxic What reading provider will be dictating this exam?->CRC FINDINGS: Pulmonary Arteries: Pulmonary arteries are adequately opacified for evaluation. No evidence of intraluminal filling defect to suggest pulmonary embolism. Main pulmonary artery is normal in caliber. Mediastinum: No evidence of mediastinal lymphadenopathy.   The heart and pericardium demonstrate no acute abnormality. There is no acute abnormality of the thoracic aorta. Lungs/pleura: No pneumothorax. No pleural effusion. Extensive ground-glass densities and consolidations in the bilateral upper and lower lungs, compatible with multifocal COVID-19 pneumonia. Upper Abdomen: Reflux of contrast into the IVC and right renal vein, likely due to power injection. CHF cannot be excluded. Soft Tissues/Bones: Old compression fractures of T3-T6. Status post L1 vertebroplasty. No evidence of pulmonary embolism. Multifocal bilateral COVID-19 pneumonia. Assessment//Plan           Hospital Problems         Last Modified POA    Acute hypoxemic respiratory failure due to COVID-19 [facility]) 9/13/2021 Yes        Assessment:  (Acute hypoxemic respiratory failure due to COVID-19 ([address] 9/13/2021 Yes     Asthma  CAD  HTN  ). Plan:   (Steroids, oxygen, toci  Pulmonary following. ).

## 2021-09-19 NOTE — PROGRESS NOTES
[place]  Department of Internal Medicine  Division of Pulmonary, Critical Care and Sleep Medicine  Progress Note    Kira Sarkar DO, MPH, [facility], FACOI, FACP  Hamida Love MD, [facility]  Bethanie GRIFFIN, [facility]      Patient: Feliz Joshi  MRN: [id number]  : 1960    Encounter Time: 12:17 PM     Date of Admission: 2021  1:11 PM    Primary Care Physician: Gerri King MD    Reason for Consultation: COVID     SUBJECTIVE:    Feeling better  3 liters      OBJECTIVE:     PHYSICAL EXAM:   VITALS:   Vitals:    21 1600 21 2015 21 0100 21 0816   BP: 128/60 (!) 88/59 132/82 95/66   Pulse: 80 65  69   Resp: 18   18   Temp: 98 °F (36.7 °C) 98.1 °F (36.7 °C)  96.6 °F (35.9 °C)   TempSrc: Oral Oral  Tympanic   SpO2: 94% 94%  93%   Weight:       Height:            Intake/Output Summary (Last 24 hours) at 2021 1217  Last data filed at 2021 0606  Gross per 24 hour   Intake 120 ml   Output 700 ml   Net -580 ml        CONSTITUTIONAL:   A&O x 3, NAD  SKIN:     No rash, No suspicious lesions or skin discoloration  HEENT:     EOMI, MMM, No thrush  NECK:    No bruits, No JVP apprechiated  CV:      Sinus,  No murmur, No rubs, No gallops  PULMONARY:   Couse BS,  No Wheezing, No Rales, No Rhonchi      No noted egophony  ABDOMEN:     Soft, non-tender. BS normal. No R/R/G  EXT:    No deformities . No clubbing. No lower extremity edema, No venous stasis  PULSE:   Appears equal and palpable.   PSYCHIATRIC:  Seems appropriate, No acute psycosis  MS:    No fractures, No gross weakness  NEUROLOGIC:   The clinical assessment is non-focal     DATA: IMAGING & TESTING:     LABORATORY TESTS:    CBC:   Lab Results   Component Value Date    WBC 11.4 2021    RBC 5.50 2021    HGB 16.9 2021    HCT 52.2 2021    MCV 94.9 2021    MCH 30.7 2021    MCHC 32.4 2021    RDW 13.1 2021     2021    MPV 10.9 09/19/2021     CMP:    Lab Results   Component Value Date     09/19/2021    K 4.8 09/19/2021    K 3.0 04/13/2021     09/19/2021    CO2 26 09/19/2021    BUN 13 09/19/2021    CREATININE 0.5 09/19/2021    GFRAA >60 09/19/2021    LABGLOM >60 09/19/2021    GLUCOSE 80 09/19/2021    GLUCOSE 84 11/28/2011    PROT 8.1 09/13/2021    LABALBU 3.7 09/13/2021    LABALBU 4.5 11/28/2011    CALCIUM 9.5 09/19/2021    BILITOT 0.4 09/13/2021    ALKPHOS 102 09/13/2021    AST 49 09/13/2021    ALT 14 09/13/2021     Calcium:    Lab Results   Component Value Date    CALCIUM 9.5 09/19/2021     Magnesium:    Lab Results   Component Value Date    MG 2.0 09/13/2021     ABG:    Lab Results   Component Value Date    PH 7.467 09/13/2021    PCO2 28.0 09/13/2021    PO2 64.6 09/13/2021    HCO3 19.8 09/13/2021    BE -2.4 09/13/2021    O2SAT 92.5 09/13/2021     FERRITIN:  No results found for: FERRITIN  Fibrinogen Level:  No components found for: FIB     PRO-BNP:   Lab Results   Component Value Date    PROBNP 58 09/13/2021    PROBNP 41 03/20/2021      ABGs:   Lab Results   Component Value Date    PH 7.467 09/13/2021    PO2 64.6 09/13/2021    PCO2 28.0 09/13/2021     Hemoglobin A1C: No components found for: HGBA1C    IMAGING:  Imaging tests were completed and reviewed and discussed radiology and care team involved and reveals   XR CHEST PORTABLE    Result Date: 9/13/2021  EXAMINATION: ONE XRAY VIEW OF THE CHEST 9/13/2021 1:51 pm COMPARISON: 04/13/2021 HISTORY: ORDERING SYSTEM PROVIDED HISTORY: chest pain, sob TECHNOLOGIST PROVIDED HISTORY: Reason for exam:->chest pain, sob What reading provider will be dictating this exam?->CRC FINDINGS: There are patchy infiltrate seen within the right upper lobe, right lower lobe and left lower lobe. The cardiac silhouette is within normals. There is no pleural effusion.      Patchy infiltrates seen within the right upper lobe, right lower lobe and left lower lobe more prominent within the right lower lobe.     CTA PULMONARY W CONTRAST    Result Date: 9/13/2021  EXAMINATION: CTA OF THE CHEST 9/13/2021 7:49 pm TECHNIQUE: CTA of the chest was performed after the administration of intravenous contrast.  Multiplanar reformatted images are provided for review. MIP images are provided for review. Dose modulation, iterative reconstruction, and/or weight based adjustment of the mA/kV was utilized to reduce the radiation dose to as low as reasonably achievable. COMPARISON: None. HISTORY: ORDERING SYSTEM PROVIDED HISTORY: COVID, hypoxic TECHNOLOGIST PROVIDED HISTORY: Reason for exam:->COVID, hypoxic What reading provider will be dictating this exam?->CRC FINDINGS: Pulmonary Arteries: Pulmonary arteries are adequately opacified for evaluation. No evidence of intraluminal filling defect to suggest pulmonary embolism. Main pulmonary artery is normal in caliber. Mediastinum: No evidence of mediastinal lymphadenopathy. The heart and pericardium demonstrate no acute abnormality. There is no acute abnormality of the thoracic aorta. Lungs/pleura: No pneumothorax. No pleural effusion. Extensive ground-glass densities and consolidations in the bilateral upper and lower lungs, compatible with multifocal COVID-19 pneumonia. Upper Abdomen: Reflux of contrast into the IVC and right renal vein, likely due to power injection. CHF cannot be excluded. Soft Tissues/Bones: Old compression fractures of T3-T6. Status post L1 vertebroplasty. No evidence of pulmonary embolism. Multifocal bilateral COVID-19 pneumonia. Assessment:   1. Acute Respiratory Failure  2. COVID 19 pneumonia  3. Asthma,  4. CAD (coronary artery disease),   5. Closed fracture of proximal end of left humerus with routine healing,  6. Degeneration of lumbar or lumbosacral intervertebral disc,   7. Fall against sharp object,   8. History of blood transfusion,   9. Hypertension,   10. Hypokalemia,   11. Insomnia,   12. Kidney stone,   13.  Nondisplaced

## 2021-09-20 LAB
ANION GAP SERPL CALCULATED.3IONS-SCNC: 14 MMOL/L (ref 7–16)
BUN BLDV-MCNC: 11 MG/DL (ref 6–23)
CALCIUM SERPL-MCNC: 8.9 MG/DL (ref 8.6–10.2)
CHLORIDE BLD-SCNC: 102 MMOL/L (ref 98–107)
CO2: 24 MMOL/L (ref 22–29)
CREAT SERPL-MCNC: 0.5 MG/DL (ref 0.5–1)
GFR AFRICAN AMERICAN: >60
GFR NON-AFRICAN AMERICAN: >60 ML/MIN/1.73
GLUCOSE BLD-MCNC: 89 MG/DL (ref 74–99)
HCT VFR BLD CALC: 39.4 % (ref 34–48)
HEMOGLOBIN: 12.9 G/DL (ref 11.5–15.5)
MCH RBC QN AUTO: 30.6 PG (ref 26–35)
MCHC RBC AUTO-ENTMCNC: 32.7 % (ref 32–34.5)
MCV RBC AUTO: 93.4 FL (ref 80–99.9)
PDW BLD-RTO: 13.1 FL (ref 11.5–15)
PLATELET # BLD: 419 E9/L (ref 130–450)
PMV BLD AUTO: 10.7 FL (ref 7–12)
POTASSIUM SERPL-SCNC: 3.6 MMOL/L (ref 3.5–5)
RBC # BLD: 4.22 E12/L (ref 3.5–5.5)
SODIUM BLD-SCNC: 140 MMOL/L (ref 132–146)
WBC # BLD: 14 E9/L (ref 4.5–11.5)

## 2021-09-20 PROCEDURE — 6360000002 HC RX W HCPCS: Performed by: INTERNAL MEDICINE

## 2021-09-20 PROCEDURE — 97535 SELF CARE MNGMENT TRAINING: CPT

## 2021-09-20 PROCEDURE — 6370000000 HC RX 637 (ALT 250 FOR IP): Performed by: FAMILY MEDICINE

## 2021-09-20 PROCEDURE — 97530 THERAPEUTIC ACTIVITIES: CPT

## 2021-09-20 PROCEDURE — 80048 BASIC METABOLIC PNL TOTAL CA: CPT

## 2021-09-20 PROCEDURE — 85027 COMPLETE CBC AUTOMATED: CPT

## 2021-09-20 PROCEDURE — 99233 SBSQ HOSP IP/OBS HIGH 50: CPT | Performed by: INTERNAL MEDICINE

## 2021-09-20 PROCEDURE — 36415 COLL VENOUS BLD VENIPUNCTURE: CPT

## 2021-09-20 PROCEDURE — 2140000000 HC CCU INTERMEDIATE R&B

## 2021-09-20 PROCEDURE — 2700000000 HC OXYGEN THERAPY PER DAY

## 2021-09-20 RX ADMIN — Medication 1 TABLET: at 08:16

## 2021-09-20 RX ADMIN — IBUPROFEN 400 MG: 200 TABLET, FILM COATED ORAL at 02:54

## 2021-09-20 RX ADMIN — Medication 10 MG: at 21:14

## 2021-09-20 RX ADMIN — IPRATROPIUM BROMIDE AND ALBUTEROL 1 PUFF: 20; 100 SPRAY, METERED RESPIRATORY (INHALATION) at 13:17

## 2021-09-20 RX ADMIN — ENOXAPARIN SODIUM 30 MG: 30 INJECTION SUBCUTANEOUS at 20:32

## 2021-09-20 RX ADMIN — ATORVASTATIN CALCIUM 40 MG: 40 TABLET, FILM COATED ORAL at 20:31

## 2021-09-20 RX ADMIN — IPRATROPIUM BROMIDE AND ALBUTEROL 1 PUFF: 20; 100 SPRAY, METERED RESPIRATORY (INHALATION) at 06:03

## 2021-09-20 RX ADMIN — IBUPROFEN 400 MG: 200 TABLET, FILM COATED ORAL at 21:14

## 2021-09-20 RX ADMIN — Medication 100 MG: at 08:16

## 2021-09-20 RX ADMIN — IPRATROPIUM BROMIDE AND ALBUTEROL 1 PUFF: 20; 100 SPRAY, METERED RESPIRATORY (INHALATION) at 18:42

## 2021-09-20 RX ADMIN — AMITRIPTYLINE HYDROCHLORIDE 25 MG: 25 TABLET, FILM COATED ORAL at 20:31

## 2021-09-20 RX ADMIN — ENOXAPARIN SODIUM 30 MG: 30 INJECTION SUBCUTANEOUS at 08:18

## 2021-09-20 RX ADMIN — CETIRIZINE HYDROCHLORIDE 10 MG: 10 TABLET, FILM COATED ORAL at 08:17

## 2021-09-20 RX ADMIN — PANTOPRAZOLE SODIUM 40 MG: 40 TABLET, DELAYED RELEASE ORAL at 06:03

## 2021-09-20 RX ADMIN — FOLIC ACID 1 MG: 1 TABLET ORAL at 08:17

## 2021-09-20 RX ADMIN — DEXAMETHASONE 2 MG: 4 TABLET ORAL at 08:17

## 2021-09-20 ASSESSMENT — PAIN DESCRIPTION - LOCATION: LOCATION: KNEE

## 2021-09-20 ASSESSMENT — PAIN DESCRIPTION - FREQUENCY: FREQUENCY: CONTINUOUS

## 2021-09-20 ASSESSMENT — PAIN SCALES - GENERAL
PAINLEVEL_OUTOF10: 0
PAINLEVEL_OUTOF10: 8
PAINLEVEL_OUTOF10: 6
PAINLEVEL_OUTOF10: 0
PAINLEVEL_OUTOF10: 5

## 2021-09-20 ASSESSMENT — PAIN DESCRIPTION - ORIENTATION: ORIENTATION: RIGHT;LEFT;POSTERIOR

## 2021-09-20 ASSESSMENT — PAIN DESCRIPTION - PAIN TYPE: TYPE: CHRONIC PAIN

## 2021-09-20 ASSESSMENT — ENCOUNTER SYMPTOMS
SHORTNESS OF BREATH: 1
ABDOMINAL PAIN: 0

## 2021-09-20 ASSESSMENT — PAIN DESCRIPTION - DESCRIPTORS: DESCRIPTORS: ACHING;DISCOMFORT

## 2021-09-20 NOTE — PROGRESS NOTES
right lower lobe. CTA PULMONARY W CONTRAST    Result Date: 9/13/2021  EXAMINATION: CTA OF THE CHEST 9/13/2021 7:49 pm TECHNIQUE: CTA of the chest was performed after the administration of intravenous contrast.  Multiplanar reformatted images are provided for review. MIP images are provided for review. Dose modulation, iterative reconstruction, and/or weight based adjustment of the mA/kV was utilized to reduce the radiation dose to as low as reasonably achievable. COMPARISON: None. HISTORY: ORDERING SYSTEM PROVIDED HISTORY: COVID, hypoxic TECHNOLOGIST PROVIDED HISTORY: Reason for exam:->COVID, hypoxic What reading provider will be dictating this exam?->CRC FINDINGS: Pulmonary Arteries: Pulmonary arteries are adequately opacified for evaluation. No evidence of intraluminal filling defect to suggest pulmonary embolism. Main pulmonary artery is normal in caliber. Mediastinum: No evidence of mediastinal lymphadenopathy. The heart and pericardium demonstrate no acute abnormality. There is no acute abnormality of the thoracic aorta. Lungs/pleura: No pneumothorax. No pleural effusion. Extensive ground-glass densities and consolidations in the bilateral upper and lower lungs, compatible with multifocal COVID-19 pneumonia. Upper Abdomen: Reflux of contrast into the IVC and right renal vein, likely due to power injection. CHF cannot be excluded. Soft Tissues/Bones: Old compression fractures of T3-T6. Status post L1 vertebroplasty. No evidence of pulmonary embolism. Multifocal bilateral COVID-19 pneumonia. Assessment:   1. Acute Respiratory Failure  2. COVID 19 pneumonia  3. Asthma,  4. CAD (coronary artery disease),   5. Closed fracture of proximal end of left humerus with routine healing,  6. Degeneration of lumbar or lumbosacral intervertebral disc,   7. Fall against sharp object,   8. History of blood transfusion,   9. Hypertension,   10. Hypokalemia,   11. Insomnia,   12.  Kidney stone, 13. Nondisplaced fracture of greater tuberosity of right humerus  14. Orthostatic hypotension  15. Severe back pain. Plan:   1. Stop steroids  2. Finished TOCI  3. Vitamins C and D  4. Titrate oxygen for sats >90%  5. PPI  6. Bronchodilators QID  7. Anticoagulate per protocol  8. PT OT   9. OOB   10. Continue present care  11. Discharge planning   12. She needs help with couseling  13.  Discharge once off oxygen         Aditi Yang DO DO, MPH, [facility]  Professor of Internal Medicine  Pulmonary, Critical Care and Sleep Medicine

## 2021-09-20 NOTE — PROGRESS NOTES
OCCUPATIONAL THERAPY TREATMENT NOTE    ONEL [address]o [address] [address] [facility]                       [address] [place] [address]     Date:2021  Patient Name: Nancy Loera  MRN: [id number]  : 1960  Room: A          Evaluating [address] [place] [address] [id number]     Referring Provider: Luis Méndez MD  Specific Provider Orders/Date: OT eval and treat 21     Diagnosis: Acute respiratory failure with hypoxia ([address] [J96.01]  Acute hypoxemic respiratory failure due to COVID-19 ([address] [U07.1, J96.01]  COVID-19 [U07.1]   Pt admitted to hospital on 21 for SOB        Pertinent Medical History:  has a past medical history of Asthma, CAD (coronary artery disease), Closed fracture of proximal end of left humerus with routine healing, Degeneration of lumbar or lumbosacral intervertebral disc, Fall against sharp object, History of blood transfusion, Hypertension, Hypokalemia, Insomnia, Kidney stone, Nondisplaced fracture of greater tuberosity of right humerus, initial encounter for closed fracture, Orthostatic hypotension, and Severe back pain.         Precautions:  Fall Risk, Droplet Isolation (covid+), O2     Assessment of current deficits   [x]? Functional mobility         [x]? ADLs           [x]? Strength                  []?Cognition    [x]? Functional transfers       [x]? IADLs         [x]? Safety Awareness   [x]? Endurance    []? Fine Coordination                      [x]? Balance      []? Vision/perception   []? Sensation      []? Gross Motor Coordination          []? ROM           []?  Delirium                   []? Motor Control      OT PLAN OF CARE   OT POC based on physician orders, patient diagnosis and results of clinical assessment     Frequency/Duration 1-3 days/wk for 2 weeks PRN   Specific OT Treatment Interventions to include:   * Instruction/training on adapted ADL techniques and AE recommendations to increase functional independence within precautions       * Training on energy conservation strategies, correct breathing pattern and techniques to improve independence/tolerance for self-care routine  * Functional transfer/mobility training/DME recommendations for increased independence, safety, and fall prevention  * Patient/Family education to increase follow through with safety techniques and functional independence  * Recommendation of environmental modifications for increased safety with functional transfers/mobility and ADLs  * Therapeutic exercise to improve motor endurance, ROM, and functional strength for ADLs/functional transfers  * Therapeutic activities to facilitate/challenge dynamic balance, stand tolerance for increased safety and independence with ADLs  * Therapeutic activities to facilitate gross/fine motor skills for increased independence with ADLs     Recommended Adaptive Equipment: TBD      Home Living: Pt lives with a girlfriend in 2 floor home.  5-6 KAREN, 1 handrail  Bath and bed on 2nd floor - 10 stairs, 1 handrail   Bathroom setup: tub/shower     Equipment owned: [facility]     Prior Level of Function: independent with ADLs , shares IADLs; ambulated w/ SPC   Driving: yes     Pain Level: Pt c/o no pain this session     Cognition: A&O: 4/4; Follows 1-2 step directions           Memory:  good            Sequencing:  good            Problem solving:  fair            Judgement/safety:  fair              Functional Assessment:  AM-PAC Daily Activity Raw Score: 17/24    Initial Eval Status  Date: 9/16/21 Treatment Status  Date: 9/20/21 STGs = LTGs  Time frame: 10-14 days   Feeding Modified [place]  MOD I       Grooming Minimal Assist   Standing at sink SBA seated EOB  Modified [place]    UB Dressing Minimal Assist  n/t Modified [place]    LB Dressing Moderate Assist   Simulated pants  Improved to CGA to don/doff socks SBA to [place] pants requiring assist for safety while standing;   Supervision to [place]/doff socks seated EOB  Stand by Assist    Bathing Moderate Assist  D/t fatigue  Simulated  n/t; pt educated with regards to DME, bating AE, bathroom safety, and ECT's  Stand by Assist    Toileting Minimal Assist   Including hygiene n/t Modified [place]    Bed Mobility  Supine to sit: Stand by Assist   Sit to supine: NT Supine>sit SBA  Sit>supine n/t pt seated up in chair at end of session  Supine to sit: Modified [place]   Sit to supine: Modified [place]    Functional Transfers Minimal Assist  SBA sit<>stand from EOB  Supervision    Functional Mobility Minimal Assist w/o AD  To/from bathroom MIN A SPT from EOB to chair no AD with HHA  Supervision    Balance Sitting:     Static:  Supervision    Dynamic:SBA  Standing: Min A, no AD Sitting:   Static: independent   Dynamic: supervision   Standing: MIN A       Activity Tolerance Fair- Fair pt engaged in light ADL tasks with SPO2 at 90-95% during activity with pt on 4L O2 high flow  Fair+   Visual/  Perceptual Glasses: yes, not present                              Comments: Upon arrival pt supine in bed, agreeable to therapy session. Pt educated with regards to bed mobility, functional transfers, functional mobility, energy conservation techniques, pursed lip breathing, DME, bathing AE, LE dressing, grooming tasks, importance of out of bed activity. At end of session pt seated up in chair  all lines and tubes intact, call light within reach. · Pt has made good  progress towards set goals.    · Continue with current plan of care      Treatment Time In:1120            Treatment Time Out: 4544                Treatment Charges: Mins Units   Ther Ex  10660     Manual Therapy [address] [facility]     Thera Activities [id number] 14 1   ADL/Home Mgt 04119 14 1   Neuro Re-ed 93575     Group Therapy      Orthotic manage/training  73026     Non-Billable Time     Total Timed Treatment 28 0161 [facility] [address]

## 2021-09-20 NOTE — PROGRESS NOTES
Progress Note  Date:2021       DBIJ:[id number]  Patient Keith Stuart     Date of Birth:[birth year]     Age:61 y.o. Patient resting comfortably. Subjective    Subjective:  Symptoms:  Stable. She reports shortness of breath. No chest pain. Diet:  Adequate intake. Activity level: Impaired due to weakness. Pain:  She reports no pain. Review of Systems   Constitutional: Negative for activity change and fever. Respiratory: Positive for shortness of breath. Cardiovascular: Negative for chest pain. Gastrointestinal: Negative for abdominal pain. Neurological: Negative for dizziness. Objective         Vitals Last 24 Hours:  TEMPERATURE:  Temp  Av.8 °F (36.6 °C)  Min: 96.6 °F (35.9 °C)  Max: 98.6 °F (37 °C)  RESPIRATIONS RANGE: Resp  Av.3  Min: 18  Max: 19  PULSE OXIMETRY RANGE: SpO2  Av %  Min: 90 %  Max: 96 %  PULSE RANGE: Pulse  Av.3  Min: 56  Max: 69  BLOOD PRESSURE RANGE: Systolic (34ELC), AWY:937 , Min:89 , YGH:731   ; Diastolic (46FZP), YYB:71, Min:52, Max:75    I/O (24Hr): Intake/Output Summary (Last 24 hours) at 2021 0659  Last data filed at 2021 3037  Gross per 24 hour   Intake 200 ml   Output 300 ml   Net -100 ml     Objective:  General Appearance:  Comfortable. Vital signs: (most recent): Blood pressure 119/75, pulse 56, temperature 98.1 °F (36.7 °C), temperature source Oral, resp. rate 19, height 5' 2.5\" (1.588 m), weight 145 lb (65.8 kg), SpO2 90 %, not currently breastfeeding. No fever. Lungs:  She is not in respiratory distress. Heart: Normal rate. Regular rhythm.   S1 normal and S2 normal.      Labs/Imaging/Diagnostics    Labs:  CBC:  Recent Labs     21  0833 21  1221 21  0811   WBC 9.2 7.9 11.4   RBC 4.51 4.60 5.50   HGB 13.9 14.0 16.9*   HCT 41.6 43.1 52.2*   MCV 92.2 93.7 94.9   RDW 13.0 13.0 13.1    443 263     CHEMISTRIES:  Recent Labs     21  0833 21  1220 21  0811   NA 144 140 139   K 4.2 4.2 4.8    102 100   CO2 21* 26 26   BUN 16 12 13   CREATININE 0.5 0.5 0.5   GLUCOSE 88 161* 80     PT/INR:No results for input(s): PROTIME, INR in the last 72 hours. APTT:No results for input(s): APTT in the last 72 hours. LIVER PROFILE:  No results for input(s): AST, ALT, BILIDIR, BILITOT, ALKPHOS in the last 72 hours. Imaging Last 24 Hours:  XR CHEST PORTABLE    Result Date: 9/13/2021  EXAMINATION: ONE XRAY VIEW OF THE CHEST 9/13/2021 1:51 pm COMPARISON: 04/13/2021 HISTORY: ORDERING SYSTEM PROVIDED HISTORY: chest pain, sob TECHNOLOGIST PROVIDED HISTORY: Reason for exam:->chest pain, sob What reading provider will be dictating this exam?->CRC FINDINGS: There are patchy infiltrate seen within the right upper lobe, right lower lobe and left lower lobe. The cardiac silhouette is within normals. There is no pleural effusion. Patchy infiltrates seen within the right upper lobe, right lower lobe and left lower lobe more prominent within the right lower lobe. CTA PULMONARY W CONTRAST    Result Date: 9/13/2021  EXAMINATION: CTA OF THE CHEST 9/13/2021 7:49 pm TECHNIQUE: CTA of the chest was performed after the administration of intravenous contrast.  Multiplanar reformatted images are provided for review. MIP images are provided for review. Dose modulation, iterative reconstruction, and/or weight based adjustment of the mA/kV was utilized to reduce the radiation dose to as low as reasonably achievable. COMPARISON: None. HISTORY: ORDERING SYSTEM PROVIDED HISTORY: COVID, hypoxic TECHNOLOGIST PROVIDED HISTORY: Reason for exam:->COVID, hypoxic What reading provider will be dictating this exam?->CRC FINDINGS: Pulmonary Arteries: Pulmonary arteries are adequately opacified for evaluation. No evidence of intraluminal filling defect to suggest pulmonary embolism. Main pulmonary artery is normal in caliber. Mediastinum: No evidence of mediastinal lymphadenopathy.   The heart and pericardium demonstrate no acute abnormality. There is no acute abnormality of the thoracic aorta. Lungs/pleura: No pneumothorax. No pleural effusion. Extensive ground-glass densities and consolidations in the bilateral upper and lower lungs, compatible with multifocal COVID-19 pneumonia. Upper Abdomen: Reflux of contrast into the IVC and right renal vein, likely due to power injection. CHF cannot be excluded. Soft Tissues/Bones: Old compression fractures of T3-T6. Status post L1 vertebroplasty. No evidence of pulmonary embolism. Multifocal bilateral COVID-19 pneumonia. Assessment//Plan           Hospital Problems         Last Modified POA    Acute hypoxemic respiratory failure due to COVID-19 [facility]) 9/13/2021 Yes        Assessment:  (Acute hypoxemic respiratory failure due to COVID-19 ([address] 9/13/2021 Yes     Asthma  CAD  HTN  ). Plan:   (Steroids, oxygen,   Pulmonary following. Home when cleared by Pulmonary. ).

## 2021-09-20 NOTE — PROGRESS NOTES
Nutrition Assessment     Type and Reason for Visit: Initial, RD Nutrition Re-Screen/LOS    Nutrition Recommendations/Plan: Continue Current Diet     Nutrition Assessment:  Pt admitted w/ acute respiratory failure w/ hypoxia r/t COVID-19. Pt w/ good intake since admission w/ >75% intake of most meals. Recent wt gain per pt, however unable to verify d/t lack of actual wt this admission. No nutrition intervention needed at this time, will continue to monitor while admitted. Nutrition Related Findings: Pt A/Ox4, abd WDL, +BS, -1L I/O, no edema      Current Nutrition Therapies:    ADULT DIET; Regular;  No Added Salt (3-4 gm)    Anthropometric Measures:  · Height: 5' 2.5\" (158.8 cm)  · Current Body Wt: 145 lb (65.8 kg) (9/13 no method)   · BMI: 26.1    Nutrition Diagnosis:   No nutrition diagnosis at this time     Nutrition Interventions:   Food and/or Nutrient Delivery:  Continue Current Diet  Nutrition Education/Counseling:  Education not indicated   Coordination of Nutrition Care:  Continue to monitor while inpatient    Goals:  Pt is to consume >75% of most meals       Nutrition Monitoring and Evaluation:   Behavioral-Environmental Outcomes:  None Identified   Food/Nutrient Intake Outcomes:  Food and Nutrient Intake  Physical Signs/Symptoms Outcomes:  Biochemical Data, GI Status, Fluid Status or Edema, Nutrition Focused Physical Findings, Skin, Weight     Discharge Planning:    No discharge needs at this time     Electronically signed by Sonu Mishra RD, LD on 9/20/21 at 12:20 PM EDT    Contact: [phone]

## 2021-09-21 VITALS
OXYGEN SATURATION: 98 % | TEMPERATURE: 97 F | HEART RATE: 60 BPM | HEIGHT: 63 IN | WEIGHT: 145 LBS | SYSTOLIC BLOOD PRESSURE: 126 MMHG | BODY MASS INDEX: 25.69 KG/M2 | DIASTOLIC BLOOD PRESSURE: 78 MMHG | RESPIRATION RATE: 18 BRPM

## 2021-09-21 LAB
ANION GAP SERPL CALCULATED.3IONS-SCNC: 12 MMOL/L (ref 7–16)
BUN BLDV-MCNC: 13 MG/DL (ref 6–23)
CALCIUM SERPL-MCNC: 8.6 MG/DL (ref 8.6–10.2)
CHLORIDE BLD-SCNC: 101 MMOL/L (ref 98–107)
CO2: 26 MMOL/L (ref 22–29)
CREAT SERPL-MCNC: 0.5 MG/DL (ref 0.5–1)
GFR AFRICAN AMERICAN: >60
GFR NON-AFRICAN AMERICAN: >60 ML/MIN/1.73
GLUCOSE BLD-MCNC: 122 MG/DL (ref 74–99)
HCT VFR BLD CALC: 36.7 % (ref 34–48)
HEMOGLOBIN: 12.1 G/DL (ref 11.5–15.5)
MCH RBC QN AUTO: 30.5 PG (ref 26–35)
MCHC RBC AUTO-ENTMCNC: 33 % (ref 32–34.5)
MCV RBC AUTO: 92.4 FL (ref 80–99.9)
PDW BLD-RTO: 12.9 FL (ref 11.5–15)
PLATELET # BLD: 389 E9/L (ref 130–450)
PMV BLD AUTO: 10.7 FL (ref 7–12)
POTASSIUM SERPL-SCNC: 3.5 MMOL/L (ref 3.5–5)
RBC # BLD: 3.97 E12/L (ref 3.5–5.5)
SODIUM BLD-SCNC: 139 MMOL/L (ref 132–146)
WBC # BLD: 13.7 E9/L (ref 4.5–11.5)

## 2021-09-21 PROCEDURE — 2700000000 HC OXYGEN THERAPY PER DAY

## 2021-09-21 PROCEDURE — 6360000002 HC RX W HCPCS: Performed by: INTERNAL MEDICINE

## 2021-09-21 PROCEDURE — 80048 BASIC METABOLIC PNL TOTAL CA: CPT

## 2021-09-21 PROCEDURE — 6370000000 HC RX 637 (ALT 250 FOR IP): Performed by: FAMILY MEDICINE

## 2021-09-21 PROCEDURE — 85027 COMPLETE CBC AUTOMATED: CPT

## 2021-09-21 PROCEDURE — 36415 COLL VENOUS BLD VENIPUNCTURE: CPT

## 2021-09-21 RX ORDER — NICOTINE 21 MG/24HR
1 PATCH, TRANSDERMAL 24 HOURS TRANSDERMAL DAILY
Qty: 30 PATCH | Refills: 3 | Status: SHIPPED | OUTPATIENT
Start: 2021-09-21 | End: 2022-07-21

## 2021-09-21 RX ORDER — GUAIFENESIN 400 MG/1
400 TABLET ORAL EVERY 6 HOURS PRN
Qty: 56 TABLET | Refills: 0 | Status: SHIPPED | OUTPATIENT
Start: 2021-09-21 | End: 2022-07-21

## 2021-09-21 RX ADMIN — CETIRIZINE HYDROCHLORIDE 10 MG: 10 TABLET, FILM COATED ORAL at 08:17

## 2021-09-21 RX ADMIN — FOLIC ACID 1 MG: 1 TABLET ORAL at 08:17

## 2021-09-21 RX ADMIN — IPRATROPIUM BROMIDE AND ALBUTEROL 1 PUFF: 20; 100 SPRAY, METERED RESPIRATORY (INHALATION) at 13:23

## 2021-09-21 RX ADMIN — IPRATROPIUM BROMIDE AND ALBUTEROL 1 PUFF: 20; 100 SPRAY, METERED RESPIRATORY (INHALATION) at 06:35

## 2021-09-21 RX ADMIN — Medication 1 TABLET: at 08:17

## 2021-09-21 RX ADMIN — Medication 100 MG: at 08:16

## 2021-09-21 RX ADMIN — PANTOPRAZOLE SODIUM 40 MG: 40 TABLET, DELAYED RELEASE ORAL at 06:35

## 2021-09-21 RX ADMIN — IPRATROPIUM BROMIDE AND ALBUTEROL 1 PUFF: 20; 100 SPRAY, METERED RESPIRATORY (INHALATION) at 01:30

## 2021-09-21 RX ADMIN — ENOXAPARIN SODIUM 30 MG: 30 INJECTION SUBCUTANEOUS at 08:16

## 2021-09-21 ASSESSMENT — PAIN SCALES - GENERAL: PAINLEVEL_OUTOF10: 0

## 2021-09-21 NOTE — DISCHARGE SUMMARY
Discharge Summary    Date: 9/21/2021  Patient Name: Monica Terrell YOB: 1960 Age: 64 y.o. Admit Date: 9/13/2021  Discharge Date: 9/21/2021  Discharge Condition: Stable    Admission Diagnosis  Acute respiratory failure with hypoxia (HCC) (J96.01); Acute hypoxemic respiratory failure due to COVID-19 (HCC) (U07.1, J96.01);COVID-19 (U07.1)     Discharge Diagnosis  Active Problems: Acute hypoxemic respiratory failure due to COVID-19 (HCC)Resolved Problems: * No resolved hospital problems. [facility] Stay  Narrative of Hospital Course:  Patient admitted with covid 19 pneumonia. Slow improved with steroids and toci. Pulmonary followed. Home off oxygen. Consultants:  IP CONSULT TO INTERNAL MEDICINEIP CONSULT TO PULMONOLOGYIP CONSULT TO PHARMACY    Surgeries/procedures Performed:       Treatments:           Discharge Plan/Disposition:  Home    Hospital/Incidental Findings Requiring Follow Up:    Patient Instructions:    Diet: Cardiac Diet and Regular Diet    Activity:Activity as Tolerated  For number of days (if applicable): Other Instructions:    Provider Follow-Up:   No follow-ups on file. Significant Diagnostic Studies:    Recent Labs:  Admission on 09/13/2021No results displayed because visit has over 200 results. ------------    Radiology last 7 days:  No results found.      Pending Labs Order Current Status  Arterial Blood Gas, Respiratory Only Collected (09/13/21 2124)  Basic metabolic panel In process      Discharge Medications    Current Discharge Medication ListSTART taking these medicationsalbuterol-ipratropium (COMBIVENT RESPIMAT)  MCG/ACT AERS inhalerInhale 1 puff into the lungs every 6 hoursQty: 1 each Refills: 1guaiFENesin 400 MG tabletTake 1 tablet by mouth every 6 hours as needed for CoughQty: 56 tablet Refills: 0    Current Discharge Medication ListCONTINUE these medications which have CHANGEDnicotine (NICODERM CQ) 21 MG/24HRPlace 1 patch onto the skin dailyQty: 30 patch Refills: 3    Current Discharge Medication ListCONTINUE these medications which have NOT CHANGEDHYDROcodone-acetaminophen (NORCO) 7.5-325 MG per tabletTake 1 tablet by mouth every 8 hours as needed for Pain. aluminum & magnesium hydroxide-simethicone (MAALOX) 200-200-20 MG/5ML SUSP suspensionTake 10 mLs by mouth every 4 hours as needed (HEARTBURN, INDIGESTION, OR UPSET STOMACH)calcium carbonate (TUMS) 500 MG chewable tabletTake 2 tablets by mouth as needed (INDIGESTION AFTER MEALS)magnesium hydroxide (MILK OF MAGNESIA) 400 MG/5ML suspensionTake 30 mLs by mouth daily as needed for Constipationibuprofen (ADVIL;MOTRIN) 200 MG tabletTake 400 mg by mouth every 6 hours as needed for Pain or Fevermelatonin 5 MG TABS tabletTake 10 mg by mouth nightly as needed (INSOMNIA)Multiple Vitamins-Minerals (THERAPEUTIC MULTIVITAMIN-MINERALS) tabletTake 1 tablet by mouth dailybenzocaine-menthol (CEPACOL SORE THROAT) 15-3.6 MG lozengeTake 1 lozenge by mouth every 2 hours as needed for Sore ThroathydrOXYzine (ATARAX) 25 MG tabletTake 25 mg by mouth every 8 hours as needed for Anxiety ondansetron (ZOFRAN) 4 MG tabletTake 4 mg by mouth every 6 hours as needed for Nausealoperamide (IMODIUM) 2 MG capsuleTake 2 mg by mouth as needed (AFTER EACH LOOSE STOOL *DO NOT EXCEED 4 DOSES IN 24 HOURS*)docusate sodium (COLACE) 100 MG capsuleTake 100 mg by mouth daily as needed for Constipationamitriptyline (ELAVIL) 25 MG tabletTake 25 mg by mouth nightly omeprazole (PRILOSEC) 40 MG capsuleTake 40 mg by mouth daily as needed atorvastatin (LIPITOR) 40 MG tabletTake 1 tablet by mouth nightlyQty: 30 tablet Refills: 3cetirizine (ZYRTEC) 10 MG tabletTake 10 mg by mouth dailyfolic acid (FOLVITE) 1 MG tabletTake 1 mg by mouth dailyvitamin B-1 (THIAMINE) 100 MG tabletTake 100 mg by mouth 3 times daily    Current Discharge Medication ListSTOP taking these medicationsaspirin 81 MG chewable tabletComments:Reason for Stopping:mirtazapine (REMERON) 15 MG tabletComments:Reason for Stopping:loratadine (CLARITIN) 10 MG tabletComments:Reason for Stopping:    Time Spent on Discharge:1E] minutes were spent in patient examination, evaluation, counseling as well as medication reconciliation, prescriptions for required medications, discharge plan, and follow up.     Electronically signed by Yoshi Salas MD on 9/21/21 at 7:03 AM EDT

## 2021-09-21 NOTE — CARE COORDINATION
Care Coordination:  Spoke to pt via room as covid isolation. She is aware she has been discharged. Pt has not had a recent ambulating pulse ox since last week. Template on chart. I have [address]  Regarding plan. Pt states she is eating breakfast and will be ready shortly. If 02 is needed, will need nursing to obtain orders and will need to submit to Medical services in [place]. If no transport, I will set up via insurance company for . Pt verbalized an understanding but is trying to locate transport as daughter is in [facility] ____%   PULSE OX ON ROOM AIR AMBULATING ___%   PULSE OX ON ___ LITERS SITTING RECOVERY ___%   PULSE OX ON ___ LITERS AMBULATING RECOVERY___%      Addendum:  Spoke to charge nurse as well regarding the above plan and she will reach out to nurse to insure message was received.   Pt is ready for testing after breakfast    Jesús Friedman

## 2021-09-21 NOTE — CARE COORDINATION
Care Coordination: I have ordered 02 via fax to [facility]. Spoke to Re. Fax is [phone]. Dc planning for today    Mar Qualia    Addendum: I have called company and order is in progress but stalled, I asked for rep to call me for eta    Mar [employer]    Addendum: I still have no call back from Rep, I have again called [facility] regarding ETA . I spoke to Ferd Bosworth and order was approved at 13:24 and are out for delivery    Mar [employer]    Addendum: I called daughter Ferd Bosworth and lives in [address] [place] [address], sister in [facility], Sister Nikolai Bella is in [facility] with destinee and Toma Kelly. I have schedueld an ambulette ride with vaibhav from physicians.  Upon updating the nurse, she states the pt has already secured a ride for 430 with sister and transport was then cancelled - spoke to [address] [place] [address]

## 2021-09-21 NOTE — PROGRESS NOTES
PULSE OX ON ROOM AIR SITTING 93%   PULSE OX ON ROOM AIR AMBULATING 88%   PULSE OX ON 0 LITERS SITTING RECOVERY 93%   PULSE OX ON 2 LITERS AMBULATING RECOVERY 92%

## 2021-09-22 ENCOUNTER — CARE COORDINATION (OUTPATIENT)
Dept: CASE MANAGEMENT | Age: 61
End: 2021-09-22

## 2021-09-22 NOTE — CARE COORDINATION
[facility] Transitions Initial Covid Call    Call within 2 business days of discharge: Yes    Patient: Angelica Stuart Patient : 1960   MRN: [id number]  Reason for Admission: Acute hypoxemic respiratory failure due to COVID-19   Discharge Date: 21 RARS: Readmission Risk Score: 13      Last Discharge [facility]       Complaint Diagnosis Description Type Department Provider    21 Shortness of Breath COVID-19 . .. ED to Hosp-Admission (Discharged) (ADMITTED) Clay Persaud MD; [facility]. ..        1ST ATTEMPT COVID NON REACHED CALL:     Date/Time:  2021 3:18 PM  This is the 1st Covid Call Attempted to reach patient by telephone. Called within 2 business days of discharge: Yes     No answer - Unable to leave message. Keeps ringing    Will attempt to reach patient again. Philomena Lovett LPN    [phone]  [facility] [address] [facility] Transitions 24 Hour Call    Care Transitions Interventions         Follow Up  No future appointments.     Philomena Lovett LPN

## 2021-09-23 ENCOUNTER — CARE COORDINATION (OUTPATIENT)
Dept: CASE MANAGEMENT | Age: 61
End: 2021-09-23

## 2021-09-23 NOTE — CARE COORDINATION
[facility] Transitions Initial Follow Up Call    Call within 2 business days of discharge: Yes    Patient: Rowan Stuart Patient : 1960   MRN: [id number]  Reason for Admission: Covid 19  Discharge Date: 21 RARS: Readmission Risk Score: 13      Last Discharge [facility]       Complaint Diagnosis Description Type Department Provider    21 Shortness of Breath COVID-19 . .. ED to Hosp-Admission (Discharged) (ADMITTED) Andrey Martínez MD; [facility]. ..        2nd ATTEMPT COVID NON REACHED CALL:     Date/Time:  2021  8:42 AM  This is the 2nd Covid Call Attempted to reach patient by telephone. Called within 2 business days of discharge: Yes     No answer - Unable to leave message. Final Call    Lakia Gregg LPN    [phone]  [facility] / [facility] Transitions 24 Hour Call    Care Transitions Interventions         Follow Up  No future appointments.     Lakia Gregg LPN

## 2021-11-12 ENCOUNTER — HOSPITAL ENCOUNTER (OUTPATIENT)
Age: 61
Discharge: HOME | End: 2021-11-12
Payer: MEDICAID

## 2021-11-12 LAB
ALBUMIN SERPL-MCNC: 4.7 G/DL (ref 3.5–5.2)
ALP BLD-CCNC: 82 U/L (ref 35–104)
ALT SERPL-CCNC: 22 U/L (ref 0–32)
ANION GAP SERPL CALCULATED.3IONS-SCNC: 17 MMOL/L (ref 7–16)
ANISOCYTOSIS: ABNORMAL
AST SERPL-CCNC: 21 U/L (ref 0–31)
BASOPHILS ABSOLUTE: 0 E9/L (ref 0–0.2)
BASOPHILS RELATIVE PERCENT: 1.4 % (ref 0–2)
BILIRUB SERPL-MCNC: 0.3 MG/DL (ref 0–1.2)
BUN BLDV-MCNC: 15 MG/DL (ref 6–23)
CALCIUM SERPL-MCNC: 9.8 MG/DL (ref 8.6–10.2)
CHLORIDE BLD-SCNC: 104 MMOL/L (ref 98–107)
CO2: 24 MMOL/L (ref 22–29)
CREAT SERPL-MCNC: 0.7 MG/DL (ref 0.5–1)
EOSINOPHILS ABSOLUTE: 0.17 E9/L (ref 0.05–0.5)
EOSINOPHILS RELATIVE PERCENT: 2.6 % (ref 0–6)
FOLATE: 12.3 NG/ML (ref 4.8–24.2)
GFR AFRICAN AMERICAN: >60
GFR NON-AFRICAN AMERICAN: >60 ML/MIN/1.73
GLUCOSE BLD-MCNC: 90 MG/DL (ref 74–99)
HBA1C MFR BLD: 5.6 % (ref 4–5.6)
HCT VFR BLD CALC: 38.8 % (ref 34–48)
HEMOGLOBIN: 12.6 G/DL (ref 11.5–15.5)
LYMPHOCYTES ABSOLUTE: 3.64 E9/L (ref 1.5–4)
LYMPHOCYTES RELATIVE PERCENT: 55.7 % (ref 20–42)
MCH RBC QN AUTO: 31.2 PG (ref 26–35)
MCHC RBC AUTO-ENTMCNC: 32.5 % (ref 32–34.5)
MCV RBC AUTO: 96 FL (ref 80–99.9)
MONOCYTES ABSOLUTE: 0.78 E9/L (ref 0.1–0.95)
MONOCYTES RELATIVE PERCENT: 12.2 % (ref 2–12)
MYELOCYTE PERCENT: 0.9 % (ref 0–0)
NEUTROPHILS ABSOLUTE: 1.95 E9/L (ref 1.8–7.3)
NEUTROPHILS RELATIVE PERCENT: 28.7 % (ref 43–80)
PDW BLD-RTO: 14.7 FL (ref 11.5–15)
PLATELET # BLD: 268 E9/L (ref 130–450)
PMV BLD AUTO: 9.7 FL (ref 7–12)
POTASSIUM SERPL-SCNC: 4.5 MMOL/L (ref 3.5–5)
RBC # BLD: 4.04 E12/L (ref 3.5–5.5)
SODIUM BLD-SCNC: 145 MMOL/L (ref 132–146)
TOTAL PROTEIN: 7.3 G/DL (ref 6.4–8.3)
VITAMIN B-12: 531 PG/ML (ref 211–946)
WBC # BLD: 6.5 E9/L (ref 4.5–11.5)

## 2021-11-12 PROCEDURE — 82607 VITAMIN B-12: CPT

## 2021-11-12 PROCEDURE — 85025 COMPLETE CBC W/AUTO DIFF WBC: CPT

## 2021-11-12 PROCEDURE — 36415 COLL VENOUS BLD VENIPUNCTURE: CPT

## 2021-11-12 PROCEDURE — 83036 HEMOGLOBIN GLYCOSYLATED A1C: CPT

## 2021-11-12 PROCEDURE — 82746 ASSAY OF FOLIC ACID SERUM: CPT

## 2021-11-12 PROCEDURE — 80053 COMPREHEN METABOLIC PANEL: CPT

## 2022-01-13 ENCOUNTER — HOSPITAL ENCOUNTER (OUTPATIENT)
Age: 62
Discharge: HOME | End: 2022-01-15
Payer: MEDICAID

## 2022-01-13 ENCOUNTER — HOSPITAL ENCOUNTER (OUTPATIENT)
Dept: GENERAL RADIOLOGY | Age: 62
Discharge: HOME | End: 2022-01-15
Payer: MEDICAID

## 2022-01-13 DIAGNOSIS — R05.8 DRY COUGH: ICD-10-CM

## 2022-01-13 PROCEDURE — 71046 X-RAY EXAM CHEST 2 VIEWS: CPT

## 2022-05-21 ENCOUNTER — APPOINTMENT (OUTPATIENT)
Dept: CT IMAGING | Age: 62
End: 2022-05-21
Payer: MEDICAID

## 2022-05-21 ENCOUNTER — HOSPITAL ENCOUNTER (OUTPATIENT)
Age: 62
Setting detail: OBSERVATION
Discharge: HOME HEALTH | End: 2022-05-24
Attending: EMERGENCY MEDICINE | Admitting: FAMILY MEDICINE
Payer: MEDICAID

## 2022-05-21 DIAGNOSIS — R26.2 UNABLE TO AMBULATE: Primary | ICD-10-CM

## 2022-05-21 DIAGNOSIS — S22.080A CLOSED WEDGE COMPRESSION FRACTURE OF T12 VERTEBRA, INITIAL ENCOUNTER (HCC): ICD-10-CM

## 2022-05-21 DIAGNOSIS — I95.2 HYPOTENSION DUE TO MEDICATION: ICD-10-CM

## 2022-05-21 PROBLEM — I95.9 HYPOTENSION: Status: ACTIVE | Noted: 2022-05-21

## 2022-05-21 LAB
ALBUMIN SERPL-MCNC: 3.6 G/DL (ref 3.5–5.2)
ALP BLD-CCNC: 97 U/L (ref 35–104)
ALT SERPL-CCNC: 19 U/L (ref 0–32)
ANION GAP SERPL CALCULATED.3IONS-SCNC: 12 MMOL/L (ref 7–16)
AST SERPL-CCNC: 44 U/L (ref 0–31)
BASOPHILS ABSOLUTE: 0.05 E9/L (ref 0–0.2)
BASOPHILS RELATIVE PERCENT: 0.5 % (ref 0–2)
BILIRUB SERPL-MCNC: 0.8 MG/DL (ref 0–1.2)
BUN BLDV-MCNC: 7 MG/DL (ref 6–23)
CALCIUM SERPL-MCNC: 7.8 MG/DL (ref 8.6–10.2)
CHLORIDE BLD-SCNC: 101 MMOL/L (ref 98–107)
CO2: 21 MMOL/L (ref 22–29)
CREAT SERPL-MCNC: 1 MG/DL (ref 0.5–1)
EOSINOPHILS ABSOLUTE: 0.07 E9/L (ref 0.05–0.5)
EOSINOPHILS RELATIVE PERCENT: 0.8 % (ref 0–6)
GFR AFRICAN AMERICAN: >60
GFR NON-AFRICAN AMERICAN: >60 ML/MIN/1.73
GLUCOSE BLD-MCNC: 89 MG/DL (ref 74–99)
HCT VFR BLD CALC: 32.8 % (ref 34–48)
HEMOGLOBIN: 11.1 G/DL (ref 11.5–15.5)
IMMATURE GRANULOCYTES #: 0.06 E9/L
IMMATURE GRANULOCYTES %: 0.6 % (ref 0–5)
LYMPHOCYTES ABSOLUTE: 2.26 E9/L (ref 1.5–4)
LYMPHOCYTES RELATIVE PERCENT: 24.3 % (ref 20–42)
MCH RBC QN AUTO: 33.2 PG (ref 26–35)
MCHC RBC AUTO-ENTMCNC: 33.8 % (ref 32–34.5)
MCV RBC AUTO: 98.2 FL (ref 80–99.9)
MONOCYTES ABSOLUTE: 0.94 E9/L (ref 0.1–0.95)
MONOCYTES RELATIVE PERCENT: 10.1 % (ref 2–12)
NEUTROPHILS ABSOLUTE: 5.92 E9/L (ref 1.8–7.3)
NEUTROPHILS RELATIVE PERCENT: 63.7 % (ref 43–80)
PDW BLD-RTO: 15.2 FL (ref 11.5–15)
PLATELET # BLD: 130 E9/L (ref 130–450)
PMV BLD AUTO: 10.6 FL (ref 7–12)
POTASSIUM REFLEX MAGNESIUM: 4.2 MMOL/L (ref 3.5–5)
RBC # BLD: 3.34 E12/L (ref 3.5–5.5)
SARS-COV-2, NAAT: NOT DETECTED
SODIUM BLD-SCNC: 134 MMOL/L (ref 132–146)
TOTAL PROTEIN: 6.1 G/DL (ref 6.4–8.3)
WBC # BLD: 9.3 E9/L (ref 4.5–11.5)

## 2022-05-21 PROCEDURE — 2580000003 HC RX 258: Performed by: FAMILY MEDICINE

## 2022-05-21 PROCEDURE — 6370000000 HC RX 637 (ALT 250 FOR IP): Performed by: FAMILY MEDICINE

## 2022-05-21 PROCEDURE — G0378 HOSPITAL OBSERVATION PER HR: HCPCS

## 2022-05-21 PROCEDURE — 93005 ELECTROCARDIOGRAM TRACING: CPT | Performed by: STUDENT IN AN ORGANIZED HEALTH CARE EDUCATION/TRAINING PROGRAM

## 2022-05-21 PROCEDURE — 6370000000 HC RX 637 (ALT 250 FOR IP): Performed by: EMERGENCY MEDICINE

## 2022-05-21 PROCEDURE — 99285 EMERGENCY DEPT VISIT HI MDM: CPT

## 2022-05-21 PROCEDURE — 2580000003 HC RX 258: Performed by: STUDENT IN AN ORGANIZED HEALTH CARE EDUCATION/TRAINING PROGRAM

## 2022-05-21 PROCEDURE — 96361 HYDRATE IV INFUSION ADD-ON: CPT

## 2022-05-21 PROCEDURE — 80053 COMPREHEN METABOLIC PANEL: CPT

## 2022-05-21 PROCEDURE — 36415 COLL VENOUS BLD VENIPUNCTURE: CPT

## 2022-05-21 PROCEDURE — 87635 SARS-COV-2 COVID-19 AMP PRB: CPT

## 2022-05-21 PROCEDURE — 85025 COMPLETE CBC W/AUTO DIFF WBC: CPT

## 2022-05-21 PROCEDURE — 72131 CT LUMBAR SPINE W/O DYE: CPT

## 2022-05-21 PROCEDURE — 96360 HYDRATION IV INFUSION INIT: CPT

## 2022-05-21 RX ORDER — SODIUM CHLORIDE 0.9 % (FLUSH) 0.9 %
10 SYRINGE (ML) INJECTION EVERY 12 HOURS SCHEDULED
Status: DISCONTINUED | OUTPATIENT
Start: 2022-05-21 | End: 2022-05-24 | Stop reason: HOSPADM

## 2022-05-21 RX ORDER — ENOXAPARIN SODIUM 100 MG/ML
40 INJECTION SUBCUTANEOUS DAILY
Status: DISCONTINUED | OUTPATIENT
Start: 2022-05-22 | End: 2022-05-22

## 2022-05-21 RX ORDER — PROMETHAZINE HYDROCHLORIDE 12.5 MG/1
12.5 TABLET ORAL EVERY 6 HOURS PRN
Status: DISCONTINUED | OUTPATIENT
Start: 2022-05-21 | End: 2022-05-24 | Stop reason: HOSPADM

## 2022-05-21 RX ORDER — SODIUM CHLORIDE 0.9 % (FLUSH) 0.9 %
10 SYRINGE (ML) INJECTION PRN
Status: DISCONTINUED | OUTPATIENT
Start: 2022-05-21 | End: 2022-05-24 | Stop reason: HOSPADM

## 2022-05-21 RX ORDER — OXYCODONE HYDROCHLORIDE 5 MG/1
2.5 TABLET ORAL EVERY 6 HOURS PRN
Status: DISCONTINUED | OUTPATIENT
Start: 2022-05-21 | End: 2022-05-23 | Stop reason: SDUPTHER

## 2022-05-21 RX ORDER — GAUZE BANDAGE 2" X 2"
100 BANDAGE TOPICAL 3 TIMES DAILY
Status: DISCONTINUED | OUTPATIENT
Start: 2022-05-21 | End: 2022-05-21

## 2022-05-21 RX ORDER — 0.9 % SODIUM CHLORIDE 0.9 %
1000 INTRAVENOUS SOLUTION INTRAVENOUS ONCE
Status: COMPLETED | OUTPATIENT
Start: 2022-05-21 | End: 2022-05-21

## 2022-05-21 RX ORDER — SODIUM CHLORIDE 9 MG/ML
INJECTION, SOLUTION INTRAVENOUS CONTINUOUS
Status: DISCONTINUED | OUTPATIENT
Start: 2022-05-21 | End: 2022-05-22

## 2022-05-21 RX ORDER — POLYETHYLENE GLYCOL 3350 17 G/17G
17 POWDER, FOR SOLUTION ORAL DAILY PRN
Status: DISCONTINUED | OUTPATIENT
Start: 2022-05-21 | End: 2022-05-24 | Stop reason: HOSPADM

## 2022-05-21 RX ORDER — IPRATROPIUM BROMIDE AND ALBUTEROL SULFATE 2.5; .5 MG/3ML; MG/3ML
3 SOLUTION RESPIRATORY (INHALATION) EVERY 6 HOURS
Status: DISCONTINUED | OUTPATIENT
Start: 2022-05-21 | End: 2022-05-24 | Stop reason: HOSPADM

## 2022-05-21 RX ORDER — SODIUM CHLORIDE 9 MG/ML
INJECTION, SOLUTION INTRAVENOUS PRN
Status: DISCONTINUED | OUTPATIENT
Start: 2022-05-21 | End: 2022-05-24 | Stop reason: HOSPADM

## 2022-05-21 RX ORDER — ACETAMINOPHEN 325 MG/1
650 TABLET ORAL EVERY 6 HOURS PRN
Status: DISCONTINUED | OUTPATIENT
Start: 2022-05-21 | End: 2022-05-24 | Stop reason: HOSPADM

## 2022-05-21 RX ORDER — FOLIC ACID 1 MG/1
1 TABLET ORAL DAILY
Status: DISCONTINUED | OUTPATIENT
Start: 2022-05-22 | End: 2022-05-21

## 2022-05-21 RX ORDER — ACETAMINOPHEN 650 MG/1
650 SUPPOSITORY RECTAL EVERY 6 HOURS PRN
Status: DISCONTINUED | OUTPATIENT
Start: 2022-05-21 | End: 2022-05-24 | Stop reason: HOSPADM

## 2022-05-21 RX ORDER — CALCIUM CARBONATE 200(500)MG
2 TABLET,CHEWABLE ORAL PRN
Status: DISCONTINUED | OUTPATIENT
Start: 2022-05-21 | End: 2022-05-24 | Stop reason: HOSPADM

## 2022-05-21 RX ORDER — CETIRIZINE HYDROCHLORIDE 10 MG/1
10 TABLET ORAL DAILY
Status: DISCONTINUED | OUTPATIENT
Start: 2022-05-22 | End: 2022-05-21

## 2022-05-21 RX ORDER — OXYCODONE HYDROCHLORIDE AND ACETAMINOPHEN 5; 325 MG/1; MG/1
1 TABLET ORAL EVERY 6 HOURS PRN
Status: DISCONTINUED | OUTPATIENT
Start: 2022-05-21 | End: 2022-05-23 | Stop reason: SDUPTHER

## 2022-05-21 RX ORDER — HYDROCODONE BITARTRATE AND ACETAMINOPHEN 7.5; 325 MG/1; MG/1
1 TABLET ORAL EVERY 8 HOURS PRN
Status: DISCONTINUED | OUTPATIENT
Start: 2022-05-21 | End: 2022-05-24 | Stop reason: HOSPADM

## 2022-05-21 RX ORDER — MECOBALAMIN 5000 MCG
10 TABLET,DISINTEGRATING ORAL NIGHTLY PRN
Status: DISCONTINUED | OUTPATIENT
Start: 2022-05-21 | End: 2022-05-24 | Stop reason: HOSPADM

## 2022-05-21 RX ORDER — ATORVASTATIN CALCIUM 40 MG/1
40 TABLET, FILM COATED ORAL NIGHTLY
Status: DISCONTINUED | OUTPATIENT
Start: 2022-05-21 | End: 2022-05-21

## 2022-05-21 RX ORDER — OXYCODONE HYDROCHLORIDE AND ACETAMINOPHEN 5; 325 MG/1; MG/1
1 TABLET ORAL ONCE
Status: COMPLETED | OUTPATIENT
Start: 2022-05-21 | End: 2022-05-21

## 2022-05-21 RX ORDER — DIPHENHYDRAMINE HYDROCHLORIDE 50 MG/ML
50 INJECTION INTRAMUSCULAR; INTRAVENOUS EVERY 6 HOURS PRN
Status: DISCONTINUED | OUTPATIENT
Start: 2022-05-21 | End: 2022-05-24 | Stop reason: HOSPADM

## 2022-05-21 RX ORDER — OXYCODONE AND ACETAMINOPHEN 7.5; 325 MG/1; MG/1
1 TABLET ORAL EVERY 6 HOURS PRN
Status: DISCONTINUED | OUTPATIENT
Start: 2022-05-21 | End: 2022-05-21 | Stop reason: CLARIF

## 2022-05-21 RX ORDER — ENOXAPARIN SODIUM 100 MG/ML
40 INJECTION SUBCUTANEOUS DAILY
Status: DISCONTINUED | OUTPATIENT
Start: 2022-05-21 | End: 2022-05-21

## 2022-05-21 RX ORDER — ONDANSETRON 2 MG/ML
4 INJECTION INTRAMUSCULAR; INTRAVENOUS EVERY 6 HOURS PRN
Status: DISCONTINUED | OUTPATIENT
Start: 2022-05-21 | End: 2022-05-24 | Stop reason: HOSPADM

## 2022-05-21 RX ORDER — PANTOPRAZOLE SODIUM 40 MG/1
40 TABLET, DELAYED RELEASE ORAL
Status: DISCONTINUED | OUTPATIENT
Start: 2022-05-22 | End: 2022-05-24 | Stop reason: HOSPADM

## 2022-05-21 RX ORDER — AMITRIPTYLINE HYDROCHLORIDE 25 MG/1
25 TABLET, FILM COATED ORAL NIGHTLY
Status: DISCONTINUED | OUTPATIENT
Start: 2022-05-21 | End: 2022-05-24 | Stop reason: HOSPADM

## 2022-05-21 RX ORDER — NICOTINE 21 MG/24HR
1 PATCH, TRANSDERMAL 24 HOURS TRANSDERMAL DAILY
Status: DISCONTINUED | OUTPATIENT
Start: 2022-05-22 | End: 2022-05-24 | Stop reason: HOSPADM

## 2022-05-21 RX ORDER — HYDROXYZINE PAMOATE 25 MG/1
25 CAPSULE ORAL EVERY 8 HOURS PRN
Status: DISCONTINUED | OUTPATIENT
Start: 2022-05-21 | End: 2022-05-24 | Stop reason: HOSPADM

## 2022-05-21 RX ADMIN — OXYCODONE AND ACETAMINOPHEN 1 TABLET: 5; 325 TABLET ORAL at 23:38

## 2022-05-21 RX ADMIN — SODIUM CHLORIDE 1000 ML: 9 INJECTION, SOLUTION INTRAVENOUS at 15:36

## 2022-05-21 RX ADMIN — SODIUM CHLORIDE, PRESERVATIVE FREE 10 ML: 5 INJECTION INTRAVENOUS at 23:40

## 2022-05-21 RX ADMIN — OXYCODONE 2.5 MG: 5 TABLET ORAL at 23:37

## 2022-05-21 RX ADMIN — OXYCODONE AND ACETAMINOPHEN 1 TABLET: 5; 325 TABLET ORAL at 18:43

## 2022-05-21 ASSESSMENT — ENCOUNTER SYMPTOMS
COUGH: 0
NAUSEA: 0
CHEST TIGHTNESS: 0
VOMITING: 0
ABDOMINAL PAIN: 0
RHINORRHEA: 0
DIARRHEA: 0
BLOOD IN STOOL: 0
BACK PAIN: 1
SHORTNESS OF BREATH: 0

## 2022-05-21 ASSESSMENT — PAIN DESCRIPTION - PAIN TYPE
TYPE: ACUTE PAIN
TYPE: ACUTE PAIN

## 2022-05-21 ASSESSMENT — PAIN SCALES - GENERAL
PAINLEVEL_OUTOF10: 7
PAINLEVEL_OUTOF10: 7

## 2022-05-21 ASSESSMENT — PAIN DESCRIPTION - LOCATION
LOCATION: BACK
LOCATION: BACK

## 2022-05-21 ASSESSMENT — PAIN DESCRIPTION - FREQUENCY
FREQUENCY: CONTINUOUS
FREQUENCY: CONTINUOUS

## 2022-05-21 ASSESSMENT — PAIN - FUNCTIONAL ASSESSMENT: PAIN_FUNCTIONAL_ASSESSMENT: 0-10

## 2022-05-21 ASSESSMENT — PAIN DESCRIPTION - DESCRIPTORS
DESCRIPTORS: ACHING
DESCRIPTORS: STABBING

## 2022-05-21 NOTE — H&P
Hospitalist History & Physical      PCP: Deja Khan MD    Date of Service: Pt seen/examined on 5/21/2022     Chief Complaint:  had concerns including Dizziness (started today) and Back Pain (lower back pain x's 3 days). History Of Present Illness:    Ms. Lavinia Florian, a 64y.o. year old female  who  has a past medical history of Asthma, CAD (coronary artery disease), Closed fracture of proximal end of left humerus with routine healing, Degeneration of lumbar or lumbosacral intervertebral disc, Fall against sharp object, History of blood transfusion, Hypertension, Hypokalemia, Insomnia, Kidney stone, Nondisplaced fracture of greater tuberosity of right humerus, initial encounter for closed fracture, Orthostatic hypotension, and Severe back pain. Patient presented to the emergency department with complaints of low back pain x3 days. Also dizziness. Patient had apparently been started on a muscle relaxer yesterday. Last night we will try to get out of bed she fell and hurt her back. She is been feeling dizzy since yesterday. She denies fever, chills, nausea, vomiting, chest pain, shortness of breath. Vital signs within normal limits and stable. Laboratory studies unremarkable. CT lumbar spine shows new depression involving superior endplate of J69 suggesting new fracture. Chronic fracture involving L1 with kyphoplasty. Stable compression fracture involving L2, L3, L4, and L5. Emergency department physician ordered TLSO brace. Medicine consulted for admission.       Past Medical History:   Diagnosis Date    Asthma     CAD (coronary artery disease)     Closed fracture of proximal end of left humerus with routine healing 6/11/2019    Degeneration of lumbar or lumbosacral intervertebral disc     Fall against sharp object 1960    chest tube in hospital    History of blood transfusion 1997    after vaginal delivery of baby hemmorhage    Hypertension     Hypokalemia     Insomnia     Kidney stone     Nondisplaced fracture of greater tuberosity of right humerus, initial encounter for closed fracture 1/22/2019    Orthostatic hypotension     Severe back pain 2/3/2014       Past Surgical History:   Procedure Laterality Date    BUNIONECTOMY      Left foot    CHEST TUBE INSERTION  1990    several    COLONOSCOPY  3/26/09    KYPHOSIS SURGERY N/A 10/28/2019    KYPHOPLASTY L1 WITH VERTEBRAL BONE BIOPSY performed by Caroline Gallegos MD at [facility] LITHOTRIPSY  2012   [address]  11/09/2017    L3-L4 ,L4-L5  interbody fusion with Dajuan Carol Galdamez     OTHER SURGICAL HISTORY      electro ablation for rectal and sigmoid polyps    TONSILLECTOMY      UPPER GASTROINTESTINAL ENDOSCOPY  3/30/15    UPPER GASTROINTESTINAL ENDOSCOPY  4/21/08       Prior to Admission medications    Medication Sig Start Date End Date Taking? Authorizing Provider   albuterol-ipratropium (COMBIVENT RESPIMAT)  MCG/ACT AERS inhaler Inhale 1 puff into the lungs every 6 hours 9/21/21   Amanda Hansen MD   guaiFENesin 400 MG tablet Take 1 tablet by mouth every 6 hours as needed for Cough 9/21/21   Amanda Hansen MD   nicotine (NICODERM CQ) 21 MG/24HR Place 1 patch onto the skin daily 9/21/21   Amanda Hansen MD   HYDROcodone-acetaminophen (NORCO) 7.5-325 MG per tablet Take 1 tablet by mouth every 8 hours as needed for Pain.     Historical Provider, MD   atorvastatin (LIPITOR) 40 MG tablet Take 1 tablet by mouth nightly 4/16/21   Andrae NORRIS Kemp   aluminum & magnesium hydroxide-simethicone (MAALOX) 122-097-53 MG/5ML SUSP suspension Take 10 mLs by mouth every 4 hours as needed (HEARTBURN, INDIGESTION, OR UPSET STOMACH)    Historical Provider, MD   calcium carbonate (TUMS) 500 MG chewable tablet Take 2 tablets by mouth as needed (INDIGESTION AFTER MEALS)    Historical Provider, MD   magnesium hydroxide (MILK OF MAGNESIA) 400 MG/5ML suspension Take 30 mLs by mouth daily as needed for Constipation    Historical Provider, MD   ibuprofen (ADVIL;MOTRIN) 200 MG tablet Take 400 mg by mouth every 6 hours as needed for Pain or Fever    Historical Provider, MD   melatonin 5 MG TABS tablet Take 10 mg by mouth nightly as needed (INSOMNIA)    Historical Provider, MD   cetirizine (ZYRTEC) 10 MG tablet Take 10 mg by mouth daily    Historical Provider, MD   Multiple Vitamins-Minerals (THERAPEUTIC MULTIVITAMIN-MINERALS) tablet Take 1 tablet by mouth daily    Historical Provider, MD   folic acid (FOLVITE) 1 MG tablet Take 1 mg by mouth daily    Historical Provider, MD   vitamin B-1 (THIAMINE) 100 MG tablet Take 100 mg by mouth 3 times daily    Historical Provider, MD   benzocaine-menthol (CEPACOL SORE THROAT) 15-3.6 MG lozenge Take 1 lozenge by mouth every 2 hours as needed for Sore Throat    Historical Provider, MD   hydrOXYzine (ATARAX) 25 MG tablet Take 25 mg by mouth every 8 hours as needed for Anxiety     Historical Provider, MD   ondansetron (ZOFRAN) 4 MG tablet Take 4 mg by mouth every 6 hours as needed for Nausea    Historical Provider, MD   loperamide (IMODIUM) 2 MG capsule Take 2 mg by mouth as needed (AFTER EACH LOOSE STOOL *DO NOT EXCEED 4 DOSES IN 24 HOURS*)    Historical Provider, MD   docusate sodium (COLACE) 100 MG capsule Take 100 mg by mouth daily as needed for Constipation    Historical Provider, MD   amitriptyline (ELAVIL) 25 MG tablet Take 25 mg by mouth nightly  11/10/20   Historical Provider, MD   omeprazole (PRILOSEC) 40 MG capsule Take 40 mg by mouth daily as needed     Historical Provider, MD         Allergies:  Dilaudid [hydromorphone hcl] and Morphine    Social History:    TOBACCO:   reports that she has quit smoking. She started smoking about 3 years ago. She has a 1.00 pack-year smoking history. She has never used smokeless tobacco.  ETOH:   reports current alcohol use of about 6.0 standard drinks of alcohol per week.     Family History:    Reviewed in detail and negative for DM, CAD, Cancer, CVA. Positive as follows\"      Problem Relation Age of Onset    Diabetes Mother     Hypertension Mother     Heart Disease Sister     Stroke Sister     Heart Disease Maternal Grandmother     Heart Disease Maternal Grandfather     Cancer Brother        REVIEW OF SYSTEMS:   Pertinent positives as noted in the HPI. All other systems reviewed and negative. PHYSICAL EXAM:  BP (!) 149/99   Pulse 88   Temp 97.7 °F (36.5 °C)   Resp 18   Wt 145 lb (65.8 kg)   SpO2 96%   BMI 26.10 kg/m²   General appearance: No apparent distress, appears stated age and cooperative. HEENT: Normal cephalic, atraumatic without obvious deformity. Pupils equal, round, and reactive to light. Extra ocular muscles intact. Conjunctivae/corneas clear. Neck: Supple, with full range of motion. No jugular venous distention. Trachea midline. Respiratory: Clear to auscultation bilaterally  Cardiovascular: Regular rate and rhythm  Abdomen: Soft, nontender, nondistended  Musculoskeletal: No clubbing, cyanosis, edema of bilateral lower extremities. Brisk capillary refill. Lumbar tenderness  Skin: Normal skin color. No rashes or lesions. Neurologic:  Neurovascularly intact without any focal sensory/motor deficits. Cranial nerves: II-XII intact, grossly non-focal.  Psychiatric: Alert and oriented, thought content appropriate, normal insight    Reviewed EKG and CXR personally      CBC:   Recent Labs     05/21/22  1521   WBC 9.3   RBC 3.34*   HGB 11.1*   HCT 32.8*   MCV 98.2   RDW 15.2*        BMP:   Recent Labs     05/21/22  1521      K 4.2      CO2 21*   BUN 7   CREATININE 1.0     LFT:  Recent Labs     05/21/22  1521   PROT 6.1*   ALKPHOS 97   ALT 19   AST 44*   BILITOT 0.8     CE:  No results for input(s): Halima Moctezuma in the last 72 hours. PT/INR: No results for input(s): INR, APTT in the last 72 hours. BNP: No results for input(s): BNP in the last 72 hours.   ESR:   Lab Results   Component Value Date    SEDRATE 10 11/04/2019     CRP:   Lab Results   Component Value Date    CRP 21.7 (H) 09/14/2021     D Dimer:   Lab Results   Component Value Date    DDIMER 688 09/13/2021      Folate and B12:   Lab Results   Component Value Date    ECUDEGPA49 798 11/12/2021   ,   Lab Results   Component Value Date    FOLATE 12.3 11/12/2021     Lactic Acid:   Lab Results   Component Value Date    LACTA 2.1 08/23/2020     Thyroid Studies:   Lab Results   Component Value Date    TSH 0.600 11/04/2019       Oupatient labs:  Lab Results   Component Value Date    CHOL 137 05/08/2017    TRIG 131 05/08/2017    HDL 78 05/08/2017    LDLCALC 33 05/08/2017    TSH 0.600 11/04/2019    INR 0.9 10/28/2019    LABA1C 5.6 11/12/2021       Urinalysis:    Lab Results   Component Value Date    NITRU Negative 09/14/2021    WBCUA 0-1 09/14/2021    BACTERIA RARE 09/14/2021    RBCUA 0-1 09/14/2021    RBCUA 0-1 10/01/2012    BLOODU TRACE 09/14/2021    SPECGRAV 1.015 09/14/2021    GLUCOSEU Negative 09/14/2021       Imaging:  CT LUMBAR SPINE WO CONTRAST    Result Date: 5/21/2022  EXAMINATION: CT OF THE LUMBAR SPINE WITHOUT CONTRAST  5/21/2022 TECHNIQUE: CT of the lumbar spine was performed without the administration of intravenous contrast. Multiplanar reformatted images are provided for review. Adjustment of mA and/or kV according to patient size was utilized. Automated exposure control, iterative reconstruction, and/or weight based adjustment of the mA/kV was utilized to reduce the radiation dose to as low as reasonably achievable.  COMPARISON: November 14, 2020 HISTORY: ORDERING SYSTEM PROVIDED HISTORY: hypotension, new back pain TECHNOLOGIST PROVIDED HISTORY: Reason for exam:->hypotension, new back pain Decision Support Exception - unselect if not a suspected or confirmed emergency medical condition->Emergency Medical Condition (MA) What reading provider will be dictating this exam?->CRC FINDINGS: Stable alignment status post fusion at levels of L3-L5 with six pedicle screws and two rods. Hardware appears intact. Stable fracture involving L1 with evidence of kyphoplasty. Stable fracture involving superior endplate of L2. Stable fracture involving inferior endplate of L3. Stable compression fracture of L4 of approximately 70%. Stable compression fracture of L5 of approximately 50% centrally. New depression involving superior endplate of A69.     1. New depression involving superior endplate of Q96 suggesting new fracture. 2. Chronic fracture involving L1 with kyphoplasty. 3. Stable compression fractures involving L2, L3, L4, and L5. 4. Stable alignment status post fusion of lumbar spine at levels of L3-L5. ASSESSMENT:  -T12 compression fracture  -Fall at home  -Back pain  -Hypertension  -Coronary artery disease-  -Asthma      PLAN:  -Admit to medicine  -PT/OT  -Back brace  -Pain management  -Continue home medications        Diet: No diet orders on file  Code Status: Prior  Surrogate decision maker confirmed with patient:   Extended Emergency Contact Information  Primary Emergency Contact: Jose C Dalydotty  Address: [address] , [address] [place] [address] Phone: [phone]  Relation: Parent  Secondary Emergency Contact: Mckay Cruz [address] Phone: [phone]  Relation: Brother/Sister    DVT Prophylaxis: []Lovenox []Heparin []PCD [] [address] []Encouraged ambulation  Disposition: []Med/Surg [] Intermediate [] ICU/CCU  Admit status: [] Observation [] Inpatient     +++++++++++++++++++++++++++++++++++++++++++++++++  Aamir Chick, DO  +++++++++++++++++++++++++++++++++++++++++++++++++  NOTE: This report was transcribed using voice recognition software. Every effort was made to ensure accuracy; however, inadvertent computerized transcription errors may be present.

## 2022-05-21 NOTE — ED PROVIDER NOTES
[facility]  Department of Emergency Medicine     Written by: Radha Vidales MD  Patient Name: Kelly Mendoza  Attending Provider: Parag Galarza DO  Admit Date: 2022  2:29 PM  MRN: [id number]                   : 1960        Chief Complaint   Patient presents with    Dizziness     started today    Back Pain     lower back pain x's 3 days    - Chief complaint    Patient is a 78-year-old female presenting to ED with 1 day old back pain and dizziness. Patient was a started on tizanidine yesterday by her pain management doctor. Last night while she was trying to get out of her bed she fell and hurt her back. She has been feeling dizzy since yesterday. She did not have dizziness prior to starting tizanidine. Patient denies hitting her head. She denies having fever, chills, headache, nausea, vomiting, abdominal pain, loss of bowel and bladder control, urinary symptoms. Review of Systems   Constitutional: Negative for chills and fever. HENT: Negative for congestion and rhinorrhea. Eyes: Negative for visual disturbance. Respiratory: Negative for cough, chest tightness and shortness of breath. Cardiovascular: Negative for chest pain, palpitations and leg swelling. Gastrointestinal: Negative for abdominal pain, blood in stool, diarrhea, nausea and vomiting. Genitourinary: Negative for dysuria and hematuria. Musculoskeletal: Positive for back pain (Lower). Neurological: Positive for dizziness. Negative for syncope, weakness, light-headedness and headaches. Psychiatric/Behavioral: Negative for agitation, behavioral problems, confusion and dysphoric mood. Physical Exam  Constitutional:       General: She is not in acute distress. Appearance: She is well-developed. She is not ill-appearing. HENT:      Head: Atraumatic. Cardiovascular:      Rate and Rhythm: Normal rate and regular rhythm. Pulses: Normal pulses.       Heart sounds: Normal Tonsillectomy; Bunionectomy; chest tube insertion (1990); Lithotripsy (2012); Colonoscopy (3/26/09); other surgical history; Upper gastrointestinal endoscopy (3/30/15); Upper gastrointestinal endoscopy (4/21/08); lumbar fusion (11/09/2017); and Kyphosis surgery (N/A, 10/28/2019). Social History:  reports that she has quit smoking. She started smoking about 3 years ago. She has a 1.00 pack-year smoking history. She has never used smokeless tobacco. She reports current alcohol use of about 6.0 standard drinks of alcohol per week. She reports that she does not use drugs. Family History: family history includes Cancer in her brother; Diabetes in her mother; Heart Disease in her maternal grandfather, maternal grandmother, and sister; Hypertension in her mother; Stroke in her sister. The patients home medications have been reviewed.     Allergies: Dilaudid [hydromorphone hcl] and Morphine    -------------------------------------------------- RESULTS -------------------------------------------------    LABS:  Results for orders placed or performed during the hospital encounter of 05/21/22   COVID-19, Rapid    Specimen: Nasopharyngeal Swab   Result Value Ref Range    SARS-CoV-2, NAAT Not Detected Not Detected   CBC with Auto Differential   Result Value Ref Range    WBC 9.3 4.5 - 11.5 E9/L    RBC 3.34 (L) 3.50 - 5.50 E12/L    Hemoglobin 11.1 (L) 11.5 - 15.5 g/dL    Hematocrit 32.8 (L) 34.0 - 48.0 %    MCV 98.2 80.0 - 99.9 fL    MCH 33.2 26.0 - 35.0 pg    MCHC 33.8 32.0 - 34.5 %    RDW 15.2 (H) 11.5 - 15.0 fL    Platelets 037 857 - 786 E9/L    MPV 10.6 7.0 - 12.0 fL    Neutrophils % 63.7 43.0 - 80.0 %    Immature Granulocytes % 0.6 0.0 - 5.0 %    Lymphocytes % 24.3 20.0 - 42.0 %    Monocytes % 10.1 2.0 - 12.0 %    Eosinophils % 0.8 0.0 - 6.0 %    Basophils % 0.5 0.0 - 2.0 %    Neutrophils Absolute 5.92 1.80 - 7.30 E9/L    Immature Granulocytes # 0.06 E9/L    Lymphocytes Absolute 2.26 1.50 - 4.00 E9/L    Monocytes Absolute 0.94 0.10 - 0.95 E9/L    Eosinophils Absolute 0.07 0.05 - 0.50 E9/L    Basophils Absolute 0.05 0.00 - 0.20 E9/L   Comprehensive Metabolic Panel w/ Reflex to MG   Result Value Ref Range    Sodium 134 132 - 146 mmol/L    Potassium reflex Magnesium 4.2 3.5 - 5.0 mmol/L    Chloride 101 98 - 107 mmol/L    CO2 21 (L) 22 - 29 mmol/L    Anion Gap 12 7 - 16 mmol/L    Glucose 89 74 - 99 mg/dL    BUN 7 6 - 23 mg/dL    CREATININE 1.0 0.5 - 1.0 mg/dL    GFR Non-African American >60 >=60 mL/min/1.73    GFR African American >60     Calcium 7.8 (L) 8.6 - 10.2 mg/dL    Total Protein 6.1 (L) 6.4 - 8.3 g/dL    Albumin 3.6 3.5 - 5.2 g/dL    Total Bilirubin 0.8 0.0 - 1.2 mg/dL    Alkaline Phosphatase 97 35 - 104 U/L    ALT 19 0 - 32 U/L    AST 44 (H) 0 - 31 U/L   EKG 12 Lead   Result Value Ref Range    Ventricular Rate 65 BPM    Atrial Rate 65 BPM    P-R Interval 148 ms    QRS Duration 78 ms    Q-T Interval 480 ms    QTc Calculation (Bazett) 499 ms    P Axis 53 degrees    R Axis 17 degrees    T Axis 58 degrees       RADIOLOGY:  CT LUMBAR SPINE WO CONTRAST   Final Result   1. New depression involving superior endplate of O30 suggesting new fracture. 2. Chronic fracture involving L1 with kyphoplasty. 3. Stable compression fractures involving L2, L3, L4, and L5.   4. Stable alignment status post fusion of lumbar spine at levels of L3-L5. EKG:  Normal sinus rhythm  Prolonged QT  Abnormal ECG  When compared with ECG of 13-SEP-2021 13:18,  Significant changes have occurred    ------------------------- NURSING NOTES AND VITALS REVIEWED ---------------------------  Date / Time Roomed:  5/21/2022  2:29 PM  ED Bed Assignment:  DILLARD CHERYL/SARKIS    The nursing notes within the ED encounter and vital signs as below have been reviewed.      Patient Vitals for the past 24 hrs:   BP Temp Pulse Resp SpO2 Weight   05/21/22 1842 (!) 149/99 -- 88 18 96 % --   05/21/22 1340 87/65 97.7 °F (36.5 °C) 61 19 97 % 145 lb (65.8 kg)       Oxygen Saturation Interpretation: Normal    ------------------------------------------ PROGRESS NOTES ------------------------------------------  Re-evaluation(s):  Patient 17:30 was reevaluated. Patient condition remained unchanged. Repeat physical examination is not changed    Counseling:  I have spoken with the patient and discussed todays results, in addition to providing specific details for the plan of care and counseling regarding the diagnosis and prognosis. Their questions are answered at this time and they are agreeable with the plan of admission.    --------------------------------- ADDITIONAL PROVIDER NOTES ---------------------------------  Consultations:  Time: 18:00. Spoke with [facility]. Discussed case. They will admit the patient. This patient's ED course included: a personal history and physicial examination    This patient has remained hemodynamically stable during their ED course. Diagnosis:  1. Unable to ambulate    2. Closed wedge compression fracture of T12 vertebra, initial encounter ([facility] [address]    3. Hypotension due to medication        Disposition:  Patient's disposition: Admit to med/surg floor  Patient's condition is fair. Patient was seen and evaluated by myself and my attending Vincent Marie MD. Assessment and Plan discussed with attending provider, please see attestation for final plan of care.      MD Archie Larsen MD  Resident  05/21/22 2008

## 2022-05-22 PROCEDURE — 6370000000 HC RX 637 (ALT 250 FOR IP): Performed by: INTERNAL MEDICINE

## 2022-05-22 PROCEDURE — 94640 AIRWAY INHALATION TREATMENT: CPT

## 2022-05-22 PROCEDURE — 99254 IP/OBS CNSLTJ NEW/EST MOD 60: CPT | Performed by: NEUROLOGICAL SURGERY

## 2022-05-22 PROCEDURE — 96374 THER/PROPH/DIAG INJ IV PUSH: CPT

## 2022-05-22 PROCEDURE — 6370000000 HC RX 637 (ALT 250 FOR IP): Performed by: FAMILY MEDICINE

## 2022-05-22 PROCEDURE — 2580000003 HC RX 258: Performed by: FAMILY MEDICINE

## 2022-05-22 PROCEDURE — 96361 HYDRATE IV INFUSION ADD-ON: CPT

## 2022-05-22 PROCEDURE — 6360000002 HC RX W HCPCS: Performed by: FAMILY MEDICINE

## 2022-05-22 PROCEDURE — 96372 THER/PROPH/DIAG INJ SC/IM: CPT

## 2022-05-22 PROCEDURE — 96376 TX/PRO/DX INJ SAME DRUG ADON: CPT

## 2022-05-22 PROCEDURE — G0378 HOSPITAL OBSERVATION PER HR: HCPCS

## 2022-05-22 RX ORDER — OXYCODONE HYDROCHLORIDE 5 MG/1
5 TABLET ORAL EVERY 4 HOURS PRN
Status: DISCONTINUED | OUTPATIENT
Start: 2022-05-22 | End: 2022-05-24 | Stop reason: HOSPADM

## 2022-05-22 RX ORDER — GABAPENTIN 100 MG/1
100 CAPSULE ORAL 3 TIMES DAILY
Status: DISCONTINUED | OUTPATIENT
Start: 2022-05-22 | End: 2022-05-24 | Stop reason: HOSPADM

## 2022-05-22 RX ADMIN — IPRATROPIUM BROMIDE AND ALBUTEROL SULFATE 3 ML: .5; 2.5 SOLUTION RESPIRATORY (INHALATION) at 19:03

## 2022-05-22 RX ADMIN — SODIUM CHLORIDE, PRESERVATIVE FREE 10 ML: 5 INJECTION INTRAVENOUS at 21:00

## 2022-05-22 RX ADMIN — HYDROCODONE BITARTRATE AND ACETAMINOPHEN 1 TABLET: 7.5; 325 TABLET ORAL at 15:09

## 2022-05-22 RX ADMIN — GABAPENTIN 100 MG: 100 CAPSULE ORAL at 15:09

## 2022-05-22 RX ADMIN — HYDROXYZINE PAMOATE 25 MG: 25 CAPSULE ORAL at 01:25

## 2022-05-22 RX ADMIN — OXYCODONE 5 MG: 5 TABLET ORAL at 16:37

## 2022-05-22 RX ADMIN — IPRATROPIUM BROMIDE AND ALBUTEROL SULFATE 3 ML: .5; 2.5 SOLUTION RESPIRATORY (INHALATION) at 13:35

## 2022-05-22 RX ADMIN — PANTOPRAZOLE SODIUM 40 MG: 40 TABLET, DELAYED RELEASE ORAL at 06:33

## 2022-05-22 RX ADMIN — IPRATROPIUM BROMIDE AND ALBUTEROL SULFATE 3 ML: .5; 2.5 SOLUTION RESPIRATORY (INHALATION) at 10:01

## 2022-05-22 RX ADMIN — DIPHENHYDRAMINE HYDROCHLORIDE 50 MG: 50 INJECTION, SOLUTION INTRAMUSCULAR; INTRAVENOUS at 18:33

## 2022-05-22 RX ADMIN — ACETAMINOPHEN 650 MG: 325 TABLET ORAL at 01:18

## 2022-05-22 RX ADMIN — ENOXAPARIN SODIUM 40 MG: 100 INJECTION SUBCUTANEOUS at 08:57

## 2022-05-22 RX ADMIN — GABAPENTIN 100 MG: 100 CAPSULE ORAL at 20:59

## 2022-05-22 RX ADMIN — AMITRIPTYLINE HYDROCHLORIDE 25 MG: 25 TABLET, FILM COATED ORAL at 20:59

## 2022-05-22 RX ADMIN — SODIUM CHLORIDE: 9 INJECTION, SOLUTION INTRAVENOUS at 00:21

## 2022-05-22 RX ADMIN — PROMETHAZINE HYDROCHLORIDE 12.5 MG: 12.5 TABLET ORAL at 01:18

## 2022-05-22 RX ADMIN — HYDROXYZINE PAMOATE 25 MG: 25 CAPSULE ORAL at 20:59

## 2022-05-22 RX ADMIN — POLYETHYLENE GLYCOL 3350 17 G: 17 POWDER, FOR SOLUTION ORAL at 08:59

## 2022-05-22 RX ADMIN — HYDROCODONE BITARTRATE AND ACETAMINOPHEN 1 TABLET: 7.5; 325 TABLET ORAL at 07:05

## 2022-05-22 RX ADMIN — DIPHENHYDRAMINE HYDROCHLORIDE 50 MG: 50 INJECTION, SOLUTION INTRAMUSCULAR; INTRAVENOUS at 07:06

## 2022-05-22 RX ADMIN — AMITRIPTYLINE HYDROCHLORIDE 25 MG: 25 TABLET, FILM COATED ORAL at 00:16

## 2022-05-22 ASSESSMENT — PAIN SCALES - GENERAL
PAINLEVEL_OUTOF10: 2
PAINLEVEL_OUTOF10: 2
PAINLEVEL_OUTOF10: 8
PAINLEVEL_OUTOF10: 6
PAINLEVEL_OUTOF10: 3
PAINLEVEL_OUTOF10: 4

## 2022-05-22 ASSESSMENT — PAIN DESCRIPTION - PAIN TYPE
TYPE: ACUTE PAIN;CHRONIC PAIN
TYPE: CHRONIC PAIN

## 2022-05-22 ASSESSMENT — PAIN DESCRIPTION - LOCATION: LOCATION: BACK

## 2022-05-22 ASSESSMENT — PAIN SCALES - WONG BAKER
WONGBAKER_NUMERICALRESPONSE: 0
WONGBAKER_NUMERICALRESPONSE: 4

## 2022-05-22 ASSESSMENT — PAIN DESCRIPTION - FREQUENCY: FREQUENCY: CONTINUOUS

## 2022-05-22 NOTE — PROGRESS NOTES
Occupational Therapy  OT eval order received and chart was reviewed. Order in chart for pt to have a back brace. Will complete OT eval when brace arrives. Jimena Lei OTR/L [id number]

## 2022-05-22 NOTE — PROGRESS NOTES
Hospitalist Progress Note      SYNOPSIS: Patient admitted on 2022 for Hypotension    Admitted for hypotension and dizziness  Patient feels it may be related to the new medication she was prescribed  SUBJECTIVE:  Patient confirms chief complaint  She is concerned that the tizanidine ordered for her may have contributed to the low blood pressure readings and her dizziness  QTC man correction 489 msec Santiago formula  Due to symptoms of low back pain 3 days CT scan lumbar spine reveals  New depression involving superior endplate of X61 suggesting new fracture. Patient states that the middle lower back is the main reason discomfort  Review of systems  Mid low back region discomfort  Pain medicine is helping  She is also experiencing muscle spasms in that region  She denies shortness of breath or abdominal pain  Has experienced a slight amount of pruritus        Temp (24hrs), Av.8 °F (36.6 °C), Min:97.7 °F (36.5 °C), Max:97.9 °F (36.6 °C)    DIET: ADULT DIET; Regular  CODE: Full Code      OBJECTIVE:    BP (!) 139/94   Pulse 82   Temp 97.9 °F (36.6 °C) (Temporal)   Resp 18   Ht 5' 2\" (1.575 m)   Wt 132 lb (59.9 kg)   SpO2 95%   BMI 24.14 kg/m²     General appearance: Not diaphoretic not in extremis   Eyes= pupils symmetrical approximately 4 mm bilateral reactive to light  Bilateral arcus senilis  HEENT: There is no thrush  No swelling to her lips or tongue  Lips are average in size    Respiratory: Clear to auscultation bilaterally in upper lobes, unlabored respiratory effort  Cardiovascular: Pulse reg audible S1-S2  Abdomen:  Active bowel sounds, nondistended  Musculoskeletal: No clubbing,   Warm to palpation     Neurologic: awake, alert and attentive speech is clear  ASSESSMENT:    T12 new frzct  Likely the cause of patient's symptoms  Of back pain and muscle spasms    Dizziness secondary to hypotension-appears to have resolved    Hypotension-possibly as a side effect from medication      Blood pressures appear to have stabilized    Mild anemia hemoglobin 11.1  Normocytic  Borderline low normal platelet count  Smoker  PLAN:    Spinal brace ordered  Neurosurgery consult  Continue Norco for pain  Add Oxy IR    Add gabapentin for treatment of muscle spasms  Avoid Zanaflex    Hold lovenox  epc for dvt prphlxs  DISPOSITION: home anticipaed    Medications:  REVIEWED DAILY    Infusion Medications    sodium chloride       Scheduled Medications    gabapentin  100 mg Oral TID    ipratropium-albuterol  3 mL Inhalation Q6H    amitriptyline  25 mg Oral Nightly    nicotine  1 patch TransDERmal Daily    pantoprazole  40 mg Oral QAM AC    sodium chloride flush  10 mL IntraVENous 2 times per day    enoxaparin  40 mg SubCUTAneous Daily     PRN Meds: oxyCODONE, calcium carbonate, HYDROcodone-acetaminophen, hydrOXYzine, melatonin, sodium chloride flush, sodium chloride, promethazine **OR** ondansetron, polyethylene glycol, acetaminophen **OR** acetaminophen, diphenhydrAMINE, oxyCODONE-acetaminophen **AND** oxyCODONE    Labs:     Recent Labs     05/21/22  1521   WBC 9.3   HGB 11.1*   HCT 32.8*          Recent Labs     05/21/22  1521      K 4.2      CO2 21*   BUN 7   CREATININE 1.0   CALCIUM 7.8*       Recent Labs     05/21/22  1521   PROT 6.1*   ALKPHOS 97   ALT 19   AST 44*   BILITOT 0.8       Chronic labs:    Radiology:     +++++++++++++++++++++++++++++++++++++++++++++++++  DO ANA Diaz/ Michael Haddad [address] [place] [address]  +++++++++++++++++++++++++++++++++++++++++++++++++  NOTE: This report was transcribed using voice recognition software. Every effort was made to ensure accuracy; however, inadvertent computerized transcription errors may be present.

## 2022-05-22 NOTE — CONSULTS
Chief Complaint:   Chief Complaint   Patient presents with    Dizziness     started today    Back Pain     lower back pain x's 3 days       HPI:     I had the pleasure of seeing Elvira Todd today in house. As you know, this former patient of Dr. Anay Patel and s/p L1 kyphoplasty with L3-5 instrumented fusion, single mother of 5 on disability presents with a 2-week history of complaints of low back pain and with dizziness that began yesterday. Apparently patient was attempting to get out of bed and fell onto her back. Against this background then she denies any new numbness weakness or tingling. There is been no loss of bowel or bladder function.     Past Medical History:   Diagnosis Date    Asthma     CAD (coronary artery disease)     Closed fracture of proximal end of left humerus with routine healing 6/11/2019    Degeneration of lumbar or lumbosacral intervertebral disc     Fall against sharp object 1960    chest tube in hospital    History of blood transfusion 1997    after vaginal delivery of baby hemmorhage    Hypertension     Hypokalemia     Insomnia     Kidney stone     Nondisplaced fracture of greater tuberosity of right humerus, initial encounter for closed fracture 1/22/2019    Orthostatic hypotension     Severe back pain 2/3/2014     Past Surgical History:   Procedure Laterality Date    BUNIONECTOMY      Left foot    CHEST TUBE INSERTION  1990    several    COLONOSCOPY  3/26/09    KYPHOSIS SURGERY N/A 10/28/2019    KYPHOPLASTY L1 WITH VERTEBRAL BONE BIOPSY performed by Dennys Longoria MD at [facility] LITHOTRIPSY  2012   [facility]  11/09/2017    L3-L4 ,L4-L5  interbody fusion with Dajuan Patel     OTHER SURGICAL HISTORY      electro ablation for rectal and sigmoid polyps    TONSILLECTOMY      UPPER GASTROINTESTINAL ENDOSCOPY  3/30/15    UPPER GASTROINTESTINAL ENDOSCOPY  4/21/08      Family History   Problem Relation Age of Onset    Diabetes Mother    Hipolito Quevedo Hypertension Mother     Heart Disease Sister     Stroke Sister     Heart Disease Maternal Grandmother     Heart Disease Maternal Grandfather     Cancer Brother       Social History     Socioeconomic History    Marital status: Legally      Spouse name: Not on file    Number of children: Not on file    Years of education: Not on file    Highest education level: Not on file   Occupational History    Not on file   Tobacco Use    Smoking status: Current Every Day Smoker     Packs/day: 0.25     Years: 2.00     Pack years: 0.50     Start date: 12/7/2018    Smokeless tobacco: Never Used   Vaping Use    Vaping Use: Never used   Substance and Sexual Activity    Alcohol use: Yes     Alcohol/week: 2.0 standard drinks     Types: 2 Cans of beer per week     Comment: daily    Drug use: No     Comment: quit 2000 cocaine in past    Sexual activity: Yes   Other Topics Concern    Not on file   Social History Narrative    Not on file     Social Determinants of Health     Financial Resource Strain:     Difficulty of Paying Living Expenses: Not on file   Food Insecurity:     Worried About Running Out of Food in the Last Year: Not on file    Derrell of Food in the Last Year: Not on file   Transportation Needs:     Lack of Transportation (Medical): Not on file    Lack of Transportation (Non-Medical):  Not on file   Physical Activity:     Days of Exercise per Week: Not on file    Minutes of Exercise per Session: Not on file   Stress:     Feeling of Stress : Not on file   Social Connections:     Frequency of Communication with Friends and Family: Not on file    Frequency of Social Gatherings with Friends and Family: Not on file    Attends [employer] Services: Not on file    Active Member of Clubs or Organizations: Not on file    Attends Club or Organization Meetings: Not on file    Marital Status: Not on file   Intimate Partner Violence:     Fear of Current or Ex-Partner: Not on file   [employer] Abused: Not on file    Physically Abused: Not on file    Sexually Abused: Not on file   Housing Stability:     Unable to Pay for Housing in the Last Year: Not on file    Number of Tammy in the Last Year: Not on file    Unstable Housing in the Last Year: Not on file       Medications:   Current Facility-Administered Medications   Medication Dose Route Frequency Provider Last Rate Last Admin    0.9 % sodium chloride infusion   IntraVENous Continuous Maxine Richmonders,  mL/hr at 05/22/22 [address] at 05/22/22 0021    ipratropium-albuterol (DUONEB) nebulizer solution 3 mL  3 mL Inhalation Q6H Maxine Printers, DO        amitriptyline (ELAVIL) tablet 25 mg  25 mg Oral Nightly Maxine Printers, DO   25 mg at 05/22/22 0016    calcium carbonate (TUMS) chewable tablet 1,000 mg  2 tablet Oral PRN Maxine Printers, DO        HYDROcodone-acetaminophen [facility]) 7.5-325 MG per tablet 1 tablet  1 tablet Oral Q8H PRN Maxine Printers, DO   1 tablet at 05/22/22 0705    hydrOXYzine (VISTARIL) capsule 25 mg  25 mg Oral Q8H PRN Maxine Printers, DO   25 mg at 05/22/22 0125    melatonin disintegrating tablet 10 mg  10 mg Oral Nightly PRN Maxine Printers, DO        nicotine (NICODERM CQ) 21 MG/24HR 1 patch  1 patch TransDERmal Daily Maxine Printers, DO        pantoprazole (PROTONIX) tablet 40 mg  40 mg Oral QAM AC Maxine Printers, DO   40 mg at 05/22/22 7521    sodium chloride flush 0.9 % injection 10 mL  10 mL IntraVENous 2 times per day Maxine Printers, DO   10 mL at 05/21/22 2340    sodium chloride flush 0.9 % injection 10 mL  10 mL IntraVENous PRN Maxine Printers, DO        0.9 % sodium chloride infusion   IntraVENous PRN Maxine Printers, DO        enoxaparin (LOVENOX) injection 40 mg  40 mg SubCUTAneous Daily Maxine Printers, DO        promethazine (PHENERGAN) tablet 12.5 mg  12.5 mg Oral Q6H PRN Maxine Printers, DO   12.5 mg at 05/22/22 0118    Or    ondansetron (ZOFRAN) injection 4 mg  4 mg IntraVENous Q6H PRN Maxine Oneill DO  polyethylene glycol (GLYCOLAX) packet 17 g  17 g Oral Daily PRN Tamir Adama, DO        acetaminophen (TYLENOL) tablet 650 mg  650 mg Oral Q6H PRN Tamir Adama, DO   650 mg at 05/22/22 0118    Or    acetaminophen (TYLENOL) suppository 650 mg  650 mg Rectal Q6H PRN Tamir Adama, DO        diphenhydrAMINE (BENADRYL) injection 50 mg  50 mg IntraVENous Q6H PRN Tamir Adama, DO   50 mg at 05/22/22 0706    oxyCODONE-acetaminophen (PERCOCET) 5-325 MG per tablet 1 tablet  1 tablet Oral Q6H PRN Tamir Adama, DO   1 tablet at 05/21/22 2338    And    oxyCODONE (ROXICODONE) immediate release tablet 2.5 mg  2.5 mg Oral Q6H PRN Tamir Adama, DO   2.5 mg at 05/21/22 2337        Allergies:    Dilaudid [hydromorphone hcl] and Morphine       Review of Systems:    Denies any chest pain, headache, dyspnea, recent weight loss, fevers, chills or night sweats. Physical Examination:    BP (!) 139/94   Pulse 82   Temp 97.9 °F (36.6 °C) (Temporal)   Resp 18   Ht 5' 2\" (1.575 m)   Wt 132 lb (59.9 kg)   SpO2 95%   BMI 24.14 kg/m²      AAO x4, rationally conversant. Raza Rathke Speech clear  Face symmetric GI/FT  Tongue MDL  SS symm and strong  QUINTANILLA-C, preserved power  Sensation GI/FT  No pronator drift  Reflexes symm and brisk  Plantar response downgoing  No pain to palpation but some upper lumbar pain to deep percussion. ASSESSMENT:    I personally reviewed Fransisca Stuart's radiographic images, particularly CT scan of the lumbar spine that demonstrates subtle T12 compression fracture without malalignment. Instrumentation appears to be intact. MEDICAL DECISION MAKING & PLAN:    I explained the findings to Ms. Stuart. No NSx intervention is required at this time. Recommend TLSO for comfort when mobilising. Thank you so much for allowing us to participate in the care of this patient.       Electronically signed by Brigido Beckman MD on 5/22/2022 at 8:21 AM       NOTE: This report was transcribed using voice recognition software.  Every effort was made to ensure accuracy; however, inadvertent computerized transcription errors may be present

## 2022-05-23 LAB
EKG ATRIAL RATE: 65 BPM
EKG P AXIS: 53 DEGREES
EKG P-R INTERVAL: 148 MS
EKG Q-T INTERVAL: 480 MS
EKG QRS DURATION: 78 MS
EKG QTC CALCULATION (BAZETT): 499 MS
EKG R AXIS: 17 DEGREES
EKG T AXIS: 58 DEGREES
EKG VENTRICULAR RATE: 65 BPM

## 2022-05-23 PROCEDURE — 6370000000 HC RX 637 (ALT 250 FOR IP): Performed by: INTERNAL MEDICINE

## 2022-05-23 PROCEDURE — 6370000000 HC RX 637 (ALT 250 FOR IP): Performed by: FAMILY MEDICINE

## 2022-05-23 PROCEDURE — G0378 HOSPITAL OBSERVATION PER HR: HCPCS

## 2022-05-23 PROCEDURE — 97165 OT EVAL LOW COMPLEX 30 MIN: CPT

## 2022-05-23 PROCEDURE — 96376 TX/PRO/DX INJ SAME DRUG ADON: CPT

## 2022-05-23 PROCEDURE — 2580000003 HC RX 258: Performed by: FAMILY MEDICINE

## 2022-05-23 PROCEDURE — 6360000002 HC RX W HCPCS: Performed by: FAMILY MEDICINE

## 2022-05-23 PROCEDURE — 6360000002 HC RX W HCPCS: Performed by: INTERNAL MEDICINE

## 2022-05-23 PROCEDURE — 94640 AIRWAY INHALATION TREATMENT: CPT

## 2022-05-23 PROCEDURE — 96372 THER/PROPH/DIAG INJ SC/IM: CPT

## 2022-05-23 PROCEDURE — 97161 PT EVAL LOW COMPLEX 20 MIN: CPT

## 2022-05-23 PROCEDURE — 97530 THERAPEUTIC ACTIVITIES: CPT

## 2022-05-23 RX ORDER — ENOXAPARIN SODIUM 100 MG/ML
40 INJECTION SUBCUTANEOUS DAILY
Status: DISCONTINUED | OUTPATIENT
Start: 2022-05-23 | End: 2022-05-24 | Stop reason: HOSPADM

## 2022-05-23 RX ADMIN — DIPHENHYDRAMINE HYDROCHLORIDE 50 MG: 50 INJECTION, SOLUTION INTRAMUSCULAR; INTRAVENOUS at 11:19

## 2022-05-23 RX ADMIN — PANTOPRAZOLE SODIUM 40 MG: 40 TABLET, DELAYED RELEASE ORAL at 08:30

## 2022-05-23 RX ADMIN — HYDROCODONE BITARTRATE AND ACETAMINOPHEN 1 TABLET: 7.5; 325 TABLET ORAL at 19:48

## 2022-05-23 RX ADMIN — IPRATROPIUM BROMIDE AND ALBUTEROL SULFATE 3 ML: .5; 2.5 SOLUTION RESPIRATORY (INHALATION) at 16:22

## 2022-05-23 RX ADMIN — POLYETHYLENE GLYCOL 3350 17 G: 17 POWDER, FOR SOLUTION ORAL at 19:56

## 2022-05-23 RX ADMIN — HYDROCODONE BITARTRATE AND ACETAMINOPHEN 1 TABLET: 7.5; 325 TABLET ORAL at 03:09

## 2022-05-23 RX ADMIN — DIPHENHYDRAMINE HYDROCHLORIDE 50 MG: 50 INJECTION, SOLUTION INTRAMUSCULAR; INTRAVENOUS at 19:57

## 2022-05-23 RX ADMIN — DICLOFENAC SODIUM 2 G: 10 GEL TOPICAL at 00:18

## 2022-05-23 RX ADMIN — HYDROXYZINE PAMOATE 25 MG: 25 CAPSULE ORAL at 18:14

## 2022-05-23 RX ADMIN — HYDROCODONE BITARTRATE AND ACETAMINOPHEN 1 TABLET: 7.5; 325 TABLET ORAL at 11:19

## 2022-05-23 RX ADMIN — GABAPENTIN 100 MG: 100 CAPSULE ORAL at 19:48

## 2022-05-23 RX ADMIN — OXYCODONE 5 MG: 5 TABLET ORAL at 13:29

## 2022-05-23 RX ADMIN — IPRATROPIUM BROMIDE AND ALBUTEROL SULFATE 3 ML: .5; 2.5 SOLUTION RESPIRATORY (INHALATION) at 22:30

## 2022-05-23 RX ADMIN — IPRATROPIUM BROMIDE AND ALBUTEROL SULFATE 3 ML: .5; 2.5 SOLUTION RESPIRATORY (INHALATION) at 12:47

## 2022-05-23 RX ADMIN — GABAPENTIN 100 MG: 100 CAPSULE ORAL at 13:18

## 2022-05-23 RX ADMIN — SODIUM CHLORIDE, PRESERVATIVE FREE 10 ML: 5 INJECTION INTRAVENOUS at 08:30

## 2022-05-23 RX ADMIN — DICLOFENAC SODIUM 2 G: 10 GEL TOPICAL at 16:42

## 2022-05-23 RX ADMIN — ENOXAPARIN SODIUM 40 MG: 100 INJECTION SUBCUTANEOUS at 13:18

## 2022-05-23 RX ADMIN — SODIUM CHLORIDE, PRESERVATIVE FREE 10 ML: 5 INJECTION INTRAVENOUS at 20:00

## 2022-05-23 RX ADMIN — OXYCODONE 5 MG: 5 TABLET ORAL at 22:55

## 2022-05-23 RX ADMIN — AMITRIPTYLINE HYDROCHLORIDE 25 MG: 25 TABLET, FILM COATED ORAL at 19:48

## 2022-05-23 RX ADMIN — IPRATROPIUM BROMIDE AND ALBUTEROL SULFATE 3 ML: .5; 2.5 SOLUTION RESPIRATORY (INHALATION) at 02:42

## 2022-05-23 RX ADMIN — Medication 10 MG: at 23:04

## 2022-05-23 RX ADMIN — GABAPENTIN 100 MG: 100 CAPSULE ORAL at 08:30

## 2022-05-23 ASSESSMENT — PAIN SCALES - GENERAL
PAINLEVEL_OUTOF10: 3
PAINLEVEL_OUTOF10: 10
PAINLEVEL_OUTOF10: 7
PAINLEVEL_OUTOF10: 2
PAINLEVEL_OUTOF10: 7
PAINLEVEL_OUTOF10: 7

## 2022-05-23 ASSESSMENT — PAIN DESCRIPTION - LOCATION
LOCATION: BACK

## 2022-05-23 ASSESSMENT — PAIN DESCRIPTION - ONSET
ONSET: ON-GOING

## 2022-05-23 ASSESSMENT — PAIN DESCRIPTION - DESCRIPTORS
DESCRIPTORS: ACHING;DISCOMFORT
DESCRIPTORS: ACHING;DISCOMFORT;STABBING
DESCRIPTORS: STABBING;DISCOMFORT;SORE

## 2022-05-23 ASSESSMENT — PAIN DESCRIPTION - ORIENTATION
ORIENTATION: LOWER
ORIENTATION: LOWER

## 2022-05-23 ASSESSMENT — PAIN DESCRIPTION - FREQUENCY
FREQUENCY: CONTINUOUS

## 2022-05-23 ASSESSMENT — PAIN - FUNCTIONAL ASSESSMENT
PAIN_FUNCTIONAL_ASSESSMENT: ACTIVITIES ARE NOT PREVENTED
PAIN_FUNCTIONAL_ASSESSMENT: PREVENTS OR INTERFERES SOME ACTIVE ACTIVITIES AND ADLS

## 2022-05-23 ASSESSMENT — PAIN DESCRIPTION - PAIN TYPE
TYPE: CHRONIC PAIN
TYPE: CHRONIC PAIN

## 2022-05-23 NOTE — PROGRESS NOTES
[address]  [address]      Date:2022                 Patient Name: María Romero  MRN: [id number]  : 1960  Room: [address] [place] [address]    Referring Provider:    Nimo Arriola DO                 Specific Provider Orders/Date: OT evaluation and treat 22    Evaluating OT: Shani Senior. ESTIVEN PettitR/ARIANE #[id number]    Diagnosis: Hypotension [I95.9]  Unable to ambulate [R26.2]  Closed wedge compression fracture of T12 vertebra, initial encounter ([facility] [address] [S22.080A]  Hypotension due to medication [I95.2]      Surgery:   Past Surgical History:   Procedure Laterality Date    BUNIONECTOMY      Left foot    CHEST TUBE INSERTION      several    COLONOSCOPY  3/26/09    KYPHOSIS SURGERY N/A 10/28/2019    KYPHOPLASTY L1 WITH VERTEBRAL BONE BIOPSY performed by Ginny Cast MD at [place] [address] LITHOTRIPSY     [address] [facility]  2017    L3-L4 ,L4-L5  interbody fusion with Dajuan [place]     OTHER SURGICAL HISTORY      electro ablation for rectal and sigmoid polyps    TONSILLECTOMY      UPPER GASTROINTESTINAL ENDOSCOPY  3/30/15    UPPER GASTROINTESTINAL ENDOSCOPY  08          Pertinent Medical History:  has a past medical history of Asthma, CAD (coronary artery disease), Closed fracture of proximal end of left humerus with routine healing, Degeneration of lumbar or lumbosacral intervertebral disc, Fall against sharp object, History of blood transfusion, Hypertension, Hypokalemia, Insomnia, Kidney stone, Nondisplaced fracture of greater tuberosity of right humerus, initial encounter for closed fracture, Orthostatic hypotension, and Severe back pain.      Precautions:  Fall Risk, spinal , TLSO brace,     Assessment of current deficits   [x] Functional mobility  [x]ADLs  [] Strength               []Cognition   [x] Functional transfers   [x] IADLs         [x] Safety Awareness [x]Endurance   [] Fine Coordination              [] Balance      [] Vision/perception   []Sensation    []Gross Motor Coordination  [] ROM  [] Delirium                   [] Motor Control     OT PLAN OF CARE   OT POC based on physician orders, patient diagnosis and results of clinical assessment    Frequency/Duration   2-4  days/wk for 1 week PRN   Specific OT Treatment Interventions to include:   * Instruction/training on adapted ADL techniques and AE recommendations to increase functional independence within precautions       * Training on energy conservation strategies, correct breathing pattern and techniques to improve independence/tolerance for self-care routine  * Functional transfer/mobility training/DME recommendations for increased independence, safety, and fall prevention  * Patient/Family education to increase follow through with safety techniques and functional independence  * Recommendation of environmental modifications for increased safety with functional transfers/mobility and ADLs  * Therapeutic activities to facilitate/challenge dynamic balance, stand tolerance for increased safety and independence with ADLs  * Positioning to improve skin integrity, interaction with environment and functional independence      Recommended Adaptive Equipment:ww shower seat, AE for LE dressing and bathing    Home Living: Pt lives with GF in a 2 floor  home with 5-6  steps and one hand rails. Bed and bath on second floor with full flight of  steps and one HR's.   Bathroom setup: tub shower    Equipment owned: spc and ww    Prior Level of Function: Independent with ADLs , Independent with IADLs; ambulated spc prn  Driving: yes   Occupation: disability  Medication management: self  Leisure: TV and reading    Pain Level: 7/10 back pain    Cognition: A&O: 4/4; Follows 2 step directions   Memory:  Fair recall of spinal precautions   Sequencing:  Good-   Problem solving:  Good-   Judgement/safety:  Good-     Functional Assessment:  AM-PAC Daily Activity Raw Score: 19/24   Initial Eval Status  Date: 5/23/22 Treatment Status  Date: STGs = LTGs  Time frame: 2-4 days   Feeding Independent       Grooming SBA education on modified techniques standing at sink  Mod I    UB Dressing Min A to don TLSO in supine to get proper fit with increased comfort. Mod I    LB Dressing Min A education on figure 4 technique able to perform  Mod I    Bathing Simulate min A education on use and need of seat and LHS  Mod I seated    Toileting Min A simulated  Mod I    Bed Mobility  Supine to sit: SBA  Sit to supine:  SBA  Supine to sit: mod I log rolling  Sit to supine: mod I log rolling   Functional Transfers SBA no AD sit to stand   Mod I    Functional Mobility SBA no AD short distance in room - recommend ww for balance and comfort  Mod I with ww   Balance Sitting:     Static:  good    Dynamic:SBA  Standing: SBA                                                                       Activity Tolerance Good- O2 RA   Good for spinal precaution adherence in ADL completion   Visual/  Perceptual Glasses: yes WFL         Safety Good-                                  good     Hand Dominance L    AROM (PROM) Strength Additional Info:    RUE  WFL 4+/5 good  and wfl FMC/dexterity noted during ADL tasks       LUE WFL 4+/5 good  and wfl FMC/dexterity noted during ADL tasks     Hearing: WFL   Sensation:   No c/o numbness or tingling   Tone: WFL   Edema: none noted    Comments: Upon arrival patient sitting up in chair with TLSO donned improperly and agreeable to evaluation. Performed OT evaluation with education on spinal precautions and best practice in donning TLSO in supine . Therapist educated pt regarding role of OT, spinal precautions and safety with ADL completion with modified techniques.   Therapist facilitated donning of TLSO brace in supine, bed mobility utilizing log roll technique, unsupported sitting balance (addressing posture, weightshifting impacting ADLs), standing balance tasks, functional transfers (various surfaces) and functional ambulation with w/w - skilled cuing on hand placement, body mechanics and safety. Therapist facilitated self-care retraining: UB/LB self-care tasks, simulated toileting task and standing grooming task while educating pt on modified techniques within precautions, posture, safety and energy conservation techniques. Skilled monitoring of HR, O2 sats and pts response to treatment. At end of session, patient sitting up in chair and  with call light and phone within reach, all lines and tubes intact. Nursing notified. Overall patient demonstrated min  decreased independence and safety during completion of ADL/functional transfer/mobility tasks. Pt would benefit from continued skilled OT to increase safety and independence with completion of ADL/IADL tasks for functional independence and quality of life.     Treatment: OT treatment provided this date includes:    Instruction/training on safety and adapted techniques for completion of ADLs: to increase [place] in self care with AE/DME prn     Instruction/training on safe functional mobility/transfer techniques: with focus on safety, technique & precautions ww  ·  Instruction/training on energy conservation/work simplification for completion of ADLs: techniques to increase [place] with self care ADLs & iADLs, work     simplification to improve endurance  ·  Proper Positioning/Alignment: for optimal healing, skin integrity to prevent breakdown, decrease edema  · Skilled monitoring of vitals: to include BP, spO2 & HR during session  · Sitting/standing Balance/Tolerance- to increased balance & activity tolerance during ADLs as well as facilitate proper posture and/or positioning     Rehab Potential: Good  for established goals     Patient / Family Goal: decrease pain       Patient and/or family were instructed on functional diagnosis, prognosis/goals and OT plan of care. Demonstrated good understanding. Eval Complexity: low    Time In: 8:20  Time Out: 8:45  Total Treatment Time: 10 min. Min Units   OT Eval Low 16295  X     OT Eval Medium 64551      OT Eval High [id number]       OT Re-Eval [id number]       Therapeutic Ex [id number]       Therapeutic Activities 80566  10  1   ADL/Self Care 40476       Orthotic Management 59123       Neuro Re-Ed 05648       Non-Billable Time          Evaluation Time includes thorough review of current medical information, gathering information on past medical history/social history and prior level of function, completion of standardized testing/informal observation of tasks, assessment of data and education on plan of care and goals. Ishmael Prasad.  Reanna 72, Perry 70

## 2022-05-23 NOTE — CARE COORDINATION
Spoke with Micaela at [address] [place] [address] St, they can accept for [address], will need orders prior to discharge as well as notify her of discharge date.

## 2022-05-23 NOTE — CARE COORDINATION
DME order for Bedside Commode noted. Patient has no preference for Company. Call placed to Gaby Callejas at [facility] and informed her plan to discharge home tomorrow.

## 2022-05-23 NOTE — PLAN OF CARE
Problem: Discharge Planning  Goal: Discharge to home or other facility with appropriate resources  5/23/2022 1651 by Maricarmen Rodriguez RN  Outcome: Progressing     Problem: Pain  Goal: Verbalizes/displays adequate comfort level or baseline comfort level  5/23/2022 1651 by Maricarmen Rodriguez RN  Outcome: Progressing     Problem: Safety - Adult  Goal: Free from fall injury  5/23/2022 1651 by Maricarmen Rodriguez RN  Outcome: Progressing

## 2022-05-23 NOTE — PROGRESS NOTES
Hospitalist Progress Note      SYNOPSIS: Patient admitted on 2022 for Hypotension    Admitted for hypotension and dizziness  Patient feels it may be related to the new medication she was prescribed  SUBJECTIVE:  T12 depress fx/super end plate    TLSo brace ordered    bp readings elevated this am  Patient is not receiving any anti hypertensives here or at home/  Review of home meds does not list antihypertensives  Patient does not feel ready to go home today either   having difficulty managing with TLSO brace  Requests bed side commode for home  Feel gabapentin ordered yesterday is helping with symptoms of mm spasms  . Rev of systems  Den sob  Den cp  Den pruritus den skin eruptions    Low back pain level 5 (0-10)   worse with movement        Temp (24hrs), Av.2 °F (36.8 °C), Min:98.2 °F (36.8 °C), Max:98.3 °F (36.8 °C)    DIET: ADULT DIET;  Regular  CODE: Full Code      OBJECTIVE:    BP (!) 158/122   Pulse 81   Temp 98.3 °F (36.8 °C) (Temporal)   Resp 16   Ht 5' 2\" (1.575 m)   Wt 132 lb (59.9 kg)   SpO2 96%   BMI 24.14 kg/m²     General appearance: Not diaphoretic not in extremis   Eyes= pupils symmetrical approximately 4 mm bilateral reactive to light  Bilateral arcus senilis  HEENT: There is no thrush  No swelling to her lips or tongue  Lips are average in size    Respiratory: Clear to auscultation bilaterally in upper lobes, unlabored respiratory effort  Cardiovascular: Pulse reg audible S1-S2    Musculoskeletal: No clubbing,   Warm to palpation     Neurologic: awake, alert and attentive speech is clear  ASSESSMENT:    QTc  500 manual calculation use bazett formula    T12 new frzct  Likely the cause of patient's symptoms  Of back pain and muscle spasms    Dizziness secondary to hypotension-appears to have resolved    Hypotension-possibly as a side effect from medication      Blood pressures- higher now     Did not order meds as patient came in with    Symptoms of dizziness and low bp readings  Copd-stable not in excaerbation  Mild anemia hemoglobin 11.1  Normocytic  Borderline low normal platelet count  Smoker  PLAN:due to fluctuations in BP it would be reasonable to monit her bp another 24 hrs    Spinal brace TLSO    Continue Norco for pain  +  Oxy IR for breakthrough  contin gabapentin for treatment of muscle spasms    Avoid zanaflex and eds that may be at risk for QT prolongation    add lovenox as I am not sure patient is moving much here int he hospital  epc for dvt prphlxs  continu regimen of inhalers for her copd  Home care orders  bedside commode dme  DISPOSITION: home anticipaed    Medications:  REVIEWED DAILY    Infusion Medications    sodium chloride       Scheduled Medications    gabapentin  100 mg Oral TID    ipratropium-albuterol  3 mL Inhalation Q6H    amitriptyline  25 mg Oral Nightly    nicotine  1 patch TransDERmal Daily    pantoprazole  40 mg Oral QAM AC    sodium chloride flush  10 mL IntraVENous 2 times per day     PRN Meds: oxyCODONE, diclofenac sodium, calcium carbonate, HYDROcodone-acetaminophen, hydrOXYzine, melatonin, sodium chloride flush, sodium chloride, promethazine **OR** ondansetron, polyethylene glycol, acetaminophen **OR** acetaminophen, diphenhydrAMINE    Labs:     Recent Labs     05/21/22  1521   WBC 9.3   HGB 11.1*   HCT 32.8*          Recent Labs     05/21/22  1521      K 4.2      CO2 21*   BUN 7   CREATININE 1.0   CALCIUM 7.8*       Recent Labs     05/21/22  1521   PROT 6.1*   ALKPHOS 97   ALT 19   AST 44*   BILITOT 0.8       Chronic labs:    Radiology:     +++++++++++++++++++++++++++++++++++++++++++++++++  Lakia Landrum DO  [address]  +++++++++++++++++++++++++++++++++++++++++++++++++  NOTE: This report was transcribed using voice recognition software.  Every effort was made to ensure accuracy; however, inadvertent computerized transcription errors may be present.

## 2022-05-23 NOTE — PROGRESS NOTES
Messaged Dr Ivanna Keating regarding the pt's BP of 158/122. Pt does not have anything ordered PRN. Awaiting response.

## 2022-05-23 NOTE — CARE COORDINATION
SW met with patient in her room. She states she lives home with a friend, Annabelle Conde in a 2 story home. Her bed and bath are on the second floor. She states she uses no DME at home, but has a walker if needed. Her PCP is Dr Judy Delarosa on [facility] and her pharmacy is [facility]. She has no history of HHC. Has been to [facility] [employer], does not want to return. We did discuss transition of care, plan is home at discharge, she would like [facility]  if able and has no preference of company. [facility] is scheduling out to Thursday, [facility] does not accept insurance, referral to Psychiatric hospital, await acceptance. The Plan for Transition of Care is related to the following treatment goals: HHC at discharge. The Patient was provided with a choice of provider and agrees   with the discharge plan. [x] Yes [] No    Freedom of choice list was provided with basic dialogue that supports the patient's individualized plan of care/goals, treatment preferences and shares the quality data associated with the providers.  [x] Yes [] No

## 2022-05-23 NOTE — PROGRESS NOTES
Physical Therapy  Physical Therapy Initial Assessment     Name: Tino Ramos  : 1960  MRN: [id number]      Date of Service: 2022    Evaluating PT:  Vickie Beyer PT, CAROLINE [id number]    Room #:  0689/6796-O  Diagnosis:  Hypotension [I95.9]  Unable to ambulate [R26.2]  Closed wedge compression fracture of T12 vertebra, [facility] [S22.080A]  Hypotension due to medication [I95.2]  PMHx/PSHx:  Asthma, CAD, L humerus fx, fall against sharp object, hx of blood transfusion, HTN, hypokalemia, insomnia, kidney stone, R humerus fx, orthostatic hypotension, severe back pain  Procedure/Surgery:  None this admission  Precautions:  Falls, spinal, TLSO  Equipment Needs:  TBD    SUBJECTIVE:    Pt lives with significant other in a 2 story home with 6 KAREN and 1 rail. Bed and bath on second level with full flight and 1 rail. Pt has a cane and a walker and was using the cane for ambulation PTA. OBJECTIVE:   Initial Evaluation  Date: 22 Treatment Short Term/ Long Term   Goals   AM-PAC 6 Clicks      Was pt agreeable to Eval/treatment? yes     Does pt have pain? 5/10 back pain     Bed Mobility  Rolling: SBA  Supine to sit: SBA  Sit to supine: SBA  Scooting: SBA  Rolling: Independent   Supine to sit: Independent   Sit to supine: Independent   Scooting: Independent    Transfers Sit to stand: SBA  Stand to sit: SBA  Stand pivot: SBA with [employer]  Sit to stand: Independent   Stand to sit: Independent   Stand pivot: Mod I with [employer]   Ambulation    150 feet x2 with [employer] SBA  >400 feet with [employer] Mod I   Stair negotiation: ascended and descended  NT  13 steps with 1 rail Mod I   ROM BUE:  Defer to OT note  BLE:  WFL     Strength BUE:  Defer to OT note  BLE:  3+/5  WNL   Balance Sitting EOB:  SBA  Dynamic Standing:  SBA with [employer]  Sitting EOB:  Independent   Dynamic Standing:   Mod I with [employer]     Pt is A & O x 4  Sensation:  Denies abnormalities   Edema:  None noted    Patient education  Pt educated on role of PT, safety during mobility    Patient response to education:   Pt verbalized understanding Pt demonstrated skill Pt requires further education in this area   yes yes yes     ASSESSMENT:    Conditions Requiring Skilled Therapeutic Intervention:    [x]Decreased strength     [x]Decreased ROM  [x]Decreased functional mobility  [x]Decreased balance   [x]Decreased endurance   [x]Decreased posture  []Decreased sensation  []Decreased coordination   []Decreased vision  []Decreased safety awareness   [x]Increased pain       Comments:  Patient semi-supine in bed upon entry and agreeable to PT evaluation. Patient completed rolling for TLSO application prior to further mobility. Patient able to complete all mobility with no hands on assist. Ambulation into augustine with [employer] slow, but steady with mild forward flexed posture. Patient returned to room and left sitting in chair at end of session with all needs met. [facility] and decrease fall risk. Treatment:  Patient practiced and was instructed in the following treatment:     Bed Mobility: VCs provided for sequencing and safety during mobility.  Transfer Training: Verbal and tactile cueing provided for sequencing and safety during mobility.  Gait Training: Ambulation with [employer] and verbal cues for proper technique and safety. Pt's/ family goals   1. Return home    Prognosis is good for reaching above PT goals. Patient and or family understand(s) diagnosis, prognosis, and plan of care.   yes    PHYSICAL THERAPY PLAN OF CARE:    PT POC is established based on physician order and patient diagnosis     Referring provider/PT Order:    05/21/22 2230  PT eval and treat  Start:  05/21/22 2230,   End:  05/21/22 2230,   ONE TIME,   Standing Count:  1 Occurrences,   R         Alban Hashimoto,        Diagnosis:  Hypotension [I95.9]  Unable to ambulate [R26.2]  Closed wedge compression fracture of T12 vertebra, initial

## 2022-05-24 VITALS
DIASTOLIC BLOOD PRESSURE: 102 MMHG | BODY MASS INDEX: 24.29 KG/M2 | RESPIRATION RATE: 16 BRPM | HEART RATE: 81 BPM | OXYGEN SATURATION: 100 % | TEMPERATURE: 97.4 F | WEIGHT: 132 LBS | SYSTOLIC BLOOD PRESSURE: 150 MMHG | HEIGHT: 62 IN

## 2022-05-24 PROBLEM — S22.080A T12 COMPRESSION FRACTURE (HCC): Status: ACTIVE | Noted: 2022-05-24

## 2022-05-24 PROBLEM — F10.11 HISTORY OF ALCOHOL ABUSE: Status: ACTIVE | Noted: 2022-05-24

## 2022-05-24 PROBLEM — R42 LIGHTHEADEDNESS: Status: ACTIVE | Noted: 2022-05-24
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
## 2022-05-24 PROCEDURE — 97530 THERAPEUTIC ACTIVITIES: CPT

## 2022-05-24 PROCEDURE — 6370000000 HC RX 637 (ALT 250 FOR IP): Performed by: FAMILY MEDICINE

## 2022-05-24 PROCEDURE — 97535 SELF CARE MNGMENT TRAINING: CPT

## 2022-05-24 PROCEDURE — G0378 HOSPITAL OBSERVATION PER HR: HCPCS

## 2022-05-24 PROCEDURE — 6360000002 HC RX W HCPCS: Performed by: INTERNAL MEDICINE

## 2022-05-24 PROCEDURE — 96376 TX/PRO/DX INJ SAME DRUG ADON: CPT

## 2022-05-24 PROCEDURE — 96372 THER/PROPH/DIAG INJ SC/IM: CPT

## 2022-05-24 PROCEDURE — L0464 TLSO 4MOD SACRO-SCAP PRE: HCPCS

## 2022-05-24 PROCEDURE — 6370000000 HC RX 637 (ALT 250 FOR IP): Performed by: INTERNAL MEDICINE

## 2022-05-24 PROCEDURE — 2580000003 HC RX 258: Performed by: FAMILY MEDICINE

## 2022-05-24 PROCEDURE — 94640 AIRWAY INHALATION TREATMENT: CPT

## 2022-05-24 PROCEDURE — 6360000002 HC RX W HCPCS: Performed by: FAMILY MEDICINE

## 2022-05-24 RX ORDER — GABAPENTIN 100 MG/1
100 CAPSULE ORAL 3 TIMES DAILY
Qty: 90 CAPSULE | Refills: 3 | Status: SHIPPED | OUTPATIENT
Start: 2022-05-24 | End: 2022-07-21

## 2022-05-24 RX ADMIN — IPRATROPIUM BROMIDE AND ALBUTEROL SULFATE 3 ML: .5; 2.5 SOLUTION RESPIRATORY (INHALATION) at 04:16

## 2022-05-24 RX ADMIN — OXYCODONE 5 MG: 5 TABLET ORAL at 13:37

## 2022-05-24 RX ADMIN — GABAPENTIN 100 MG: 100 CAPSULE ORAL at 08:38

## 2022-05-24 RX ADMIN — GABAPENTIN 100 MG: 100 CAPSULE ORAL at 13:37

## 2022-05-24 RX ADMIN — OXYCODONE 5 MG: 5 TABLET ORAL at 08:38

## 2022-05-24 RX ADMIN — ENOXAPARIN SODIUM 40 MG: 100 INJECTION SUBCUTANEOUS at 08:37

## 2022-05-24 RX ADMIN — DIPHENHYDRAMINE HYDROCHLORIDE 50 MG: 50 INJECTION, SOLUTION INTRAMUSCULAR; INTRAVENOUS at 08:43

## 2022-05-24 RX ADMIN — PANTOPRAZOLE SODIUM 40 MG: 40 TABLET, DELAYED RELEASE ORAL at 05:51

## 2022-05-24 RX ADMIN — SODIUM CHLORIDE, PRESERVATIVE FREE 10 ML: 5 INJECTION INTRAVENOUS at 08:38

## 2022-05-24 RX ADMIN — DICLOFENAC SODIUM 2 G: 10 GEL TOPICAL at 08:37

## 2022-05-24 RX ADMIN — IPRATROPIUM BROMIDE AND ALBUTEROL SULFATE 3 ML: .5; 2.5 SOLUTION RESPIRATORY (INHALATION) at 11:10

## 2022-05-24 ASSESSMENT — PAIN DESCRIPTION - LOCATION: LOCATION: BACK

## 2022-05-24 ASSESSMENT — PAIN DESCRIPTION - ORIENTATION: ORIENTATION: MID;LOWER

## 2022-05-24 ASSESSMENT — PAIN DESCRIPTION - ONSET: ONSET: ON-GOING

## 2022-05-24 ASSESSMENT — PAIN SCALES - GENERAL
PAINLEVEL_OUTOF10: 3
PAINLEVEL_OUTOF10: 8
PAINLEVEL_OUTOF10: 3
PAINLEVEL_OUTOF10: 6
PAINLEVEL_OUTOF10: 0
PAINLEVEL_OUTOF10: 3

## 2022-05-24 ASSESSMENT — PAIN DESCRIPTION - FREQUENCY: FREQUENCY: CONTINUOUS

## 2022-05-24 ASSESSMENT — PAIN DESCRIPTION - PAIN TYPE: TYPE: ACUTE PAIN

## 2022-05-24 ASSESSMENT — PAIN DESCRIPTION - DESCRIPTORS: DESCRIPTORS: ACHING;DISCOMFORT;SORE;PRESSURE

## 2022-05-24 ASSESSMENT — PAIN - FUNCTIONAL ASSESSMENT: PAIN_FUNCTIONAL_ASSESSMENT: PREVENTS OR INTERFERES SOME ACTIVE ACTIVITIES AND ADLS

## 2022-05-24 NOTE — PROGRESS NOTES
Occupational Therapy  OT BEDSIDE TREATMENT NOTE   [facility] [address] [facility]  [address] [place] [address], [address] [place] [address],[address]  Patient Name: Federico Austin  MRN: [id number]  : 1960  Room: [address] [place] [address]     Per OT Eval:    Evaluating OT: Mayra Pettit OTR/L #[id number]     Diagnosis: Hypotension [I95.9]  Unable to ambulate [R26.2]  Closed wedge compression fracture of T12 vertebra, initial encounter ([facility] [S22.080A]  Hypotension due to medication [I95.2]       Surgery:   Past Surgical History         Past Surgical History:   Procedure Laterality Date    BUNIONECTOMY         Left foot    CHEST TUBE INSERTION        several    COLONOSCOPY   3/26/09    KYPHOSIS SURGERY N/A 10/28/2019     KYPHOPLASTY L1 WITH VERTEBRAL BONE BIOPSY performed [facility] Alfred Zarco [facility] at [facility] LITHOTRIPSY   2012   [address] [facility]   2017     L3-L4 ,L4-L5  interbody fusion with Dajuan Uteangy Cabrera     OTHER SURGICAL HISTORY         electro ablation for rectal and sigmoid polyps    TONSILLECTOMY        UPPER GASTROINTESTINAL ENDOSCOPY   3/30/15    UPPER GASTROINTESTINAL ENDOSCOPY   08               Pertinent Medical History:  has a past medical history of Asthma, CAD (coronary artery disease), Closed fracture of proximal end of left humerus with routine healing, Degeneration of lumbar or lumbosacral intervertebral disc, Fall against sharp object, History of blood transfusion, Hypertension, Hypokalemia, Insomnia, Kidney stone, Nondisplaced fracture of greater tuberosity of right humerus, initial encounter for closed fracture, Orthostatic hypotension, and Severe back pain.      Precautions:  Fall Risk, spinal , TLSO brace,      Assessment of current deficits   [x]? Functional mobility             [x]?ADLs           []? Strength                  []?Cognition   [x]? Functional transfers           [x]? IADLs         [x]? Safety Awareness   [x]? Endurance   []?  Fine Coordination              []? Balance      []? Vision/perception   []? Sensation     []? Gross Motor Coordination  []? ROM           []? Delirium                   []? Motor Control      OT PLAN OF CARE   OT POC based on physician orders, patient diagnosis and results of clinical assessment     Frequency/Duration   2-4  days/wk for 1 week PRN   Specific OT Treatment Interventions to include:   * Instruction/training on adapted ADL techniques and AE recommendations to increase functional independence within precautions       * Training on energy conservation strategies, correct breathing pattern and techniques to improve independence/tolerance for self-care routine  * Functional transfer/mobility training/DME recommendations for increased independence, safety, and fall prevention  * Patient/Family education to increase follow through with safety techniques and functional independence  * Recommendation of environmental modifications for increased safety with functional transfers/mobility and ADLs  * Therapeutic activities to facilitate/challenge dynamic balance, stand tolerance for increased safety and independence with ADLs  * Positioning to improve skin integrity, interaction with environment and functional independence        Recommended Adaptive Equipment:ww shower seat, AE for LE dressing and bathing     Home Living: Pt lives with GF in a 2 floor  home with 5-6  steps and one hand rails. Bed and bath on second floor with full flight of  steps and one HR's.   Bathroom setup: tub shower    Equipment owned: spc and ww     Prior Level of Function: Independent with ADLs , Independent with IADLs; ambulated spc prn  Driving: yes   Occupation: disability  Medication management: self  Leisure: TV and reading     Pain Level: Pt complained of back pain this session  Cognition: A&O: 4/4; Follows 2 step directions              Memory:  Fair recall of spinal precautions              Sequencing:  Good-              Problem solving:  Good-              Judgement/safety:  Good-                Functional Assessment:  AM-PAC Daily Activity Raw Score: 18/24    Initial Eval Status  Date: 5/23/22 Treatment Status  Date: 5/24/22 STGs = LTGs  Time frame: 2-4 days   Feeding Independent     Independent     Grooming SBA education on modified techniques standing at sink  SBA  Pt washed face, applied deodorant, brushed teeth standing at sink Mod I    UB Dressing Min A to don TLSO in supine to get proper fit with increased comfort.  maxA  Blaise TLSO brace    SBA-gown  Blaise/doff hospital gown seated  Mod I    LB Dressing Min A education on figure 4 technique able to perform  SBA  Pt donned/doffed hospital socks using cross over technique    Pt donned pants, able to thread and stand to pull over hips Mod I    Bathing Simulate min A education on use and need of seat and LHS  SBA  Sponge bathing task seated/standing, pt able to wash of UB, using cross over technique to wash of LB, standing to wash of buttocks/zhanna area Mod I seated    Toileting Min A simulated  SBA  Pt completed toileting task on standard commode, pt able to complete of transfer with use of grab bar, manage of clothes and complete of hygiene to zhanna area Mod I    Bed Mobility  Supine to sit: SBA  Sit to supine:  SBA DNT  SBA per previous level    Pt seated upright in chair upon arrival and at end of session  Supine to sit: mod I log rolling  Sit to supine: mod I log rolling   Functional Transfers SBA no AD sit to stand   SBA  Sit to Stand  Stand to Sit    Cueing for hand placement Mod I    Functional Mobility SBA no AD short distance in room - recommend ww for balance and comfort SBA  Pt ambulated short household distance in room Chair<>Bathroom with no device, slow pace  Mod I with ww   Balance Sitting:     Static:  good    Dynamic:SBA  Standing: SBA  Sitting:  Independent    Standing:  SBA                                                                      Activity Tolerance Good- O2 RA   Fair-  Complaining of pain Good for spinal precaution adherence in ADL completion   Visual/  Perceptual Glasses: yes WFL           Safety Good-                                   good      Hand Dominance L     AROM (PROM) Strength Additional Info:    RUE  WFL 4+/5 good  and wfl FMC/dexterity noted during ADL tasks         LUE WFL 4+/5 good  and wfl FMC/dexterity noted during ADL tasks      Hearing: WFL   Sensation:   No c/o numbness or tingling   Tone: WFL   Edema: none noted        Education:  Pt was educated on role of OT, goals to be reached, importance of OOB activity, precautions to follow, safety and hand placement with transfers, safety/balance with functional mobility and techniques to assist with LB dressing/bathing tasks. Comments: Upon arrival pt seated upright in chair, agreeable to therapy, nursing present okaying pt to be seen this session. Pt completed of functional mobility, transfers and ADL tasks this session. Pt being agitated throughout session, complaining of back pain. At end of session, pt seated upright in chair, all lines and tubes intact, call light within reach. · Pt has made fair progress towards set goals.    · Continue with current plan of care focusing on increasing of independency with transfers and ADL tasks      Treatment Time In: 8:00am           Treatment Time Out: 8:27am                Treatment Charges: Mins Units   Ther Ex  35548     Manual Therapy 53251     Thera Activities 67783     ADL/Home Mgt 57848 27 2   Neuro Re-ed 62829     Group Therapy      Orthotic manage/training  13283     Non-Billable Time     Total Timed Treatment 27 2        Vannesa RAMÍREZ/ARIANE [id number]

## 2022-05-24 NOTE — CARE COORDINATION
5/24/22 Update CM Note; Patient remains observation. With elevated bp yesterday with no treatment per pcp. BP remains elevated today with no treatment 153/100. Plan is for patient to return home. PT 18/24 and OT 19/24. [address] [place] [address] following for discharge. Will follow for treatment plan.  Electronically signed by Michell Aragon RN CM on 5/24/2022 at 10:12 AM

## 2022-05-24 NOTE — PROGRESS NOTES
CLINICAL PHARMACY NOTE: MEDS TO BEDS    Total # of Prescriptions Filled: 2   The following medications were delivered to the patient:  · Diclofenac gel 1% gel  · Gabapentin 100    Additional Documentation:

## 2022-05-24 NOTE — CARE COORDINATION
Plan remains home at discharge with [facility]. [facility] only at [facility].

## 2022-05-24 NOTE — PROGRESS NOTES
Hospitalist Progress Note      Synopsis: Patient admitted on 5/21/2022 64y.o. year old female  who  has a past medical history of Asthma, CAD (coronary artery disease), Closed fracture of proximal end of left humerus with routine healing, Degeneration of lumbar or lumbosacral intervertebral disc, Fall against sharp object, History of blood transfusion, Hypertension, Hypokalemia, Insomnia, Kidney stone, Nondisplaced fracture of greater tuberosity of right humerus, initial encounter for closed fracture, Orthostatic hypotension, and Severe back pain.      Patient presented to the emergency department with complaints of low back pain x3 days. Also dizziness. Patient had apparently been started on a muscle relaxer yesterday. Last night we will try to get out of bed she fell and hurt her back. She is been feeling dizzy since yesterday. She denies fever, chills, nausea, vomiting, chest pain, shortness of breath. Vital signs within normal limits and stable. Laboratory studies unremarkable. CT lumbar spine shows new depression involving superior endplate of B61 suggesting new fracture. Chronic fracture involving L1 with kyphoplasty. Stable compression fracture involving L2, L3, L4, and L5. Emergency department physician ordered TLSO brace. Medicine consulted for admission. Subjective    Clinically improving. Feeling better. Stable overnight. No other overnight issues reported. No CP, SOB, palpitations, blurred vision, HA, lightheadedness, LOC or focal neurological deficits    Exam:  BP (!) 150/102   Pulse 81   Temp 97.4 °F (36.3 °C) (Oral)   Resp 18   Ht 5' 2\" (1.575 m)   Wt 132 lb (59.9 kg)   SpO2 100%   BMI 24.14 kg/m²   General appearance: No apparent distress, appears stated age and cooperative. HEENT: Pupils equal, round, and reactive to light. Conjunctivae/corneas clear. Neck: Supple. No jugular venous distention. Trachea midline. Respiratory:  Normal respiratory effort.  Clear to auscultation, bilaterally without Rales/Wheezes/Rhonchi. Cardiovascular: Regular rate and rhythm with normal S1/S2 without murmurs, rubs or gallops. Abdomen: Soft, non-tender, non-distended with normal bowel sounds. Musculoskeletal: No clubbing, cyanosis or edema bilaterally. Brisk capillary refill. 2+rad  Skin:  No rashes    Neurologic: awake, alert and following commands     Medications:  Reviewed    Infusion Medications    sodium chloride       Scheduled Medications    enoxaparin  40 mg SubCUTAneous Daily    gabapentin  100 mg Oral TID    ipratropium-albuterol  3 mL Inhalation Q6H    amitriptyline  25 mg Oral Nightly    nicotine  1 patch TransDERmal Daily    pantoprazole  40 mg Oral QAM AC    sodium chloride flush  10 mL IntraVENous 2 times per day     PRN Meds: oxyCODONE, diclofenac sodium, calcium carbonate, HYDROcodone-acetaminophen, hydrOXYzine, melatonin, sodium chloride flush, sodium chloride, promethazine **OR** ondansetron, polyethylene glycol, acetaminophen **OR** acetaminophen, diphenhydrAMINE    I/O    Intake/Output Summary (Last 24 hours) at 5/24/2022 1214  Last data filed at 5/23/2022 1700  Gross per 24 hour   Intake 360 ml   Output --   Net 360 ml       Labs:   Recent Labs     05/21/22  1521   WBC 9.3   HGB 11.1*   HCT 32.8*          Recent Labs     05/21/22  1521      K 4.2      CO2 21*   BUN 7   CREATININE 1.0   CALCIUM 7.8*       Recent Labs     05/21/22  1521   PROT 6.1*   ALKPHOS 97   ALT 19   AST 44*   BILITOT 0.8       No results for input(s): INR in the last 72 hours. No results for input(s): Towana Ball in the last 72 hours. Chronic labs:  Lab Results   Component Value Date    CHOL 137 05/08/2017    TRIG 131 05/08/2017    HDL 78 05/08/2017    LDLCALC 33 05/08/2017    TSH 0.600 11/04/2019    INR 0.9 10/28/2019    LABA1C 5.6 11/12/2021       Radiology:  Imaging studies reviewed today.     ASSESSMENT:    Principal Problem:    Hypotension  Active Problems:    T12 compression fracture (HCC)    Lightheadedness    Fall    History of alcohol abuse    Low back pain  Resolved Problems:    * No resolved hospital problems. *       PLAN:    BP mildly elevated--possibly from etoh withdrawal as pt is a daily drinker no low since admission- recommend follow up w PCP for outpatinet monitoring- no antihypertensives at this time due to low BP on admission  NeuroSG Consult appreciated TLSO brace  PDMP reviewed. Pt must follow up w pain management for opioid pain medications-- just filled Norco 5/20. Diet: ADULT DIET; Regular  Code Status: Full Code  PT/OT Eval Status:     DVT Prophylaxis:     Recommended disposition at discharge:  Home    +++++++++++++++++++++++++++++++++++++++++++++++++  Janay Hood MD   [facility].  +++++++++++++++++++++++++++++++++++++++++++++++++  NOTE: This report was transcribed using voice recognition software. Every effort was made to ensure accuracy; however, inadvertent computerized transcription errors may be present.

## 2022-05-24 NOTE — PROGRESS NOTES
Physical Therapy  Physical Therapy Treatment Note     Name: Elroy Anderson  : 1960  MRN: [id number]      Date of Service: 2022    Evaluating PT:  Malachi Johnon PT, DPT [id number]    Room #:  1431/6097-W  Diagnosis:  Hypotension [I95.9]  Unable to ambulate [R26.2]  Closed wedge compression fracture of T12 vertebra, initial encounter ([facility] [S22.080A]  Hypotension due to medication [I95.2]  PMHx/PSHx:  Asthma, CAD, L humerus fx, fall against sharp object, hx of blood transfusion, HTN, hypokalemia, insomnia, kidney stone, R humerus fx, orthostatic hypotension, severe back pain  Procedure/Surgery:  None this admission  Precautions:  Falls, spinal, TLSO  Equipment Needs:  TBD    SUBJECTIVE:    Pt lives with significant other in a 2 story home with 6 KAREN and 1 rail. Bed and bath on second level with full flight and 1 rail. Pt has a cane and a walker and was using the cane for ambulation PTA. OBJECTIVE:   Initial Evaluation  Date: 22 Treatment  22 Short Term/ Long Term   Goals   AM-PAC 6 Clicks  67/17    Was pt agreeable to Eval/treatment? yes Yes     Does pt have pain? 5/10 back pain 7/10 back pain     Bed Mobility  Rolling: SBA  Supine to sit: SBA  Sit to supine: SBA  Scooting: SBA Rolling SBA  Sit to supine min A  Scooting SBA Rolling: Independent   Supine to sit: Independent   Sit to supine: Independent   Scooting: Independent    Transfers Sit to stand: SBA  Stand to sit: SBA  Stand pivot: SBA with [address] Sit to stand SBA  Stand to sit SBA  Stand pivot with ww with SBA Sit to stand: Independent   Stand to sit: Independent   Stand pivot:  Mod I with [address]   Ambulation    150 feet x2 with [address]  feet x2 with ww with SBA >400 feet with [address] Mod I   Stair negotiation: ascended and descended  NT 12 steps with unilateral rail with CG/SBA 13 steps with 1 rail Mod I   ROM BUE:  Defer to OT note  BLE:  WFL     Strength BUE:  Defer to OT note  BLE:  3+/5  WNL   Balance Sitting EOB:  SBA  Dynamic Standing:  SBA with [employer]  Sitting EOB:  Independent   Dynamic Standing: Mod I with [employer]       Patient education  Pt educated on spinal precautions    Patient response to education:   Pt verbalized understanding Pt demonstrated skill Pt requires further education in this area   x X with verbal cues  x     ASSESSMENT:    Conditions Requiring Skilled Therapeutic Intervention:    [x]Decreased strength     [x]Decreased ROM  [x]Decreased functional mobility  [x]Decreased balance   [x]Decreased endurance   [x]Decreased posture  []Decreased sensation  []Decreased coordination   []Decreased vision  []Decreased safety awareness   [x]Increased pain       Comments:  Pt sitting in chair upon arrival and agreed to participate in therapy. Pt completed functional mobility as noted above. Pt able to complete mobility with little to no hands on assist.  Decreased safety at times noted with mobility. Pt ambulated with slow steady gait. Pt requested to return to bed . Assisted with doffing TLSO brace once in supine. Pt remained in supine with call light in reach. Treatment:  Patient practiced and was instructed in the following treatment:     Bed Mobility: Verbal instruction for technique and to maintain spinal precautions. Assistance required to complete task.  Transfer Training: Verbal instruction for hand placement and technique to improve safety and precautions. Gait Training: Verbal instruction for walker approximation and safety to improve balance. Stair negotiation- verbal instruction for sequencing and technique to improve safety and balance. Assistance required to complete task. Pt's/ family goals   1. Return home    Prognosis is good for reaching above PT goals. Patient and or family understand(s) diagnosis, prognosis, and plan of care.   yes    PHYSICAL THERAPY PLAN OF CARE:    PT POC is established based on physician order and patient diagnosis     Referring provider/PT Order:    05/21/22 [id number]  PT flory and treat  Start:  05/21/22 2230,   End:  05/21/22 2230,   ONE TIME,   Standing Count:  1 Occurrences,   R         Wang Cedillo, DO       Diagnosis:  Hypotension [I95.9]  Unable to ambulate [R26.2]  Closed wedge compression fracture of T12 vertebra, initial encounter ([facility] [S22.080A]  Hypotension due to medication [I95.2]  Specific instructions for next treatment:  Progress mobility as appropriate    Current Treatment Recommendations:     [x] Strengthening to improve independence with functional mobility   [x] ROM to improve independence with functional mobility   [x] Balance Training to improve static/dynamic balance and to reduce fall risk  [x] Endurance Training to improve activity tolerance during functional mobility   [x] Transfer Training to improve safety and independence with all functional transfers   [x] Gait Training to improve gait mechanics, endurance and assess need for appropriate assistive device  [x] Stair Training in preparation for safe discharge home and/or into the community   [] Positioning to prevent skin breakdown and contractures  [x] Safety and Education Training   [x] Patient/Caregiver Education   [x] HEP  [x] Other     PT long term treatment goals are located in above grid    Frequency of treatments: 2-5x/week x 1-2 weeks.     Time in  0855  Time out  0910    Total Treatment Time  15 minutes         CPT codes:  [] Low Complexity PT evaluation 44504  [] Moderate Complexity PT evaluation 47094  [] High Complexity PT evaluation 13893  [] PT Re-evaluation 62811  [] Gait training 06720 - minutes  [] Manual therapy 90652 - minutes  [x] Therapeutic activities 09470 -15  minutes  [] Therapeutic exercises 21244 - minutes  [] Neuromuscular reeducation 43557 - minutes     David Johnson [id number]

## 2022-05-24 NOTE — CARE COORDINATION
Spoke with Delmy Caldwell at [facility], patient already had gotten a bedside commode in July of 2019 and is not eligible for another one for 5 years. She will need to pay privately for a bedside commode if she needs another one.

## 2022-05-24 NOTE — DISCHARGE SUMMARY
Physician Discharge Summary     Patient ID:  Luciano Stuart  [id number]  64 y.o.  1960    Admit date: 5/21/2022    Discharge date and time:  5/24/2022    Discharge Diagnoses: Principal Problem:    Hypotension  Active Problems:    T12 compression fracture ([address]    Lightheadedness    Fall    History of alcohol abuse    Low back pain  Resolved Problems:    * No resolved hospital problems. *      Consults: IP CONSULT TO NEUROSURGERY  INPATIENT CONSULT TO ORTHOTIST/PROSTHETIST  IP CONSULT TO INTERNAL MEDICINE  IP CONSULT TO NEUROSURGERY  IP CONSULT TO HOME CARE NEEDS    Procedures: See below    Hospital Course:   BP mildly elevated--possibly from etoh withdrawal as pt is a daily drinker no low since admission- recommend follow up w PCP for outpatient monitoring- no antihypertensives at this time due to low BP on admission  NeuroSG Consult appreciated TLSO brace  PDMP reviewed. Pt must follow up w pain management for opioid pain medications-- just filled Norco 5/20. Discharge Exam:  See progress note from today    Condition:  Stable    Disposition: home    Patient Instructions:   Current Discharge Medication List      START taking these medications    Details   gabapentin (NEURONTIN) 100 MG capsule Take 1 capsule by mouth 3 times daily for 120 days.   Qty: 90 capsule, Refills: 3      diclofenac sodium (VOLTAREN) 1 % GEL Apply 2 g topically 4 times daily as needed for Pain  Qty: 150 g, Refills: 2         CONTINUE these medications which have NOT CHANGED    Details   albuterol-ipratropium (COMBIVENT RESPIMAT)  MCG/ACT AERS inhaler Inhale 1 puff into the lungs every 6 hours  Qty: 1 each, Refills: 1      guaiFENesin 400 MG tablet Take 1 tablet by mouth every 6 hours as needed for Cough  Qty: 56 tablet, Refills: 0      nicotine (NICODERM CQ) 21 MG/24HR Place 1 patch onto the skin daily  Qty: 30 patch, Refills: 3      HYDROcodone-acetaminophen (NORCO) 7.5-325 MG per tablet Take 1 tablet by mouth every 8 hours as needed for Pain.       aluminum & magnesium hydroxide-simethicone (MAALOX) 200-200-20 MG/5ML SUSP suspension Take 10 mLs by mouth every 4 hours as needed (HEARTBURN, INDIGESTION, OR UPSET STOMACH)      calcium carbonate (TUMS) 500 MG chewable tablet Take 2 tablets by mouth as needed (INDIGESTION AFTER MEALS)      magnesium hydroxide (MILK OF MAGNESIA) 400 MG/5ML suspension Take 30 mLs by mouth daily as needed for Constipation      ibuprofen (ADVIL;MOTRIN) 200 MG tablet Take 400 mg by mouth every 6 hours as needed for Pain or Fever      melatonin 5 MG TABS tablet Take 10 mg by mouth nightly as needed (INSOMNIA)      Multiple Vitamins-Minerals (THERAPEUTIC MULTIVITAMIN-MINERALS) tablet Take 1 tablet by mouth daily      folic acid (FOLVITE) 1 MG tablet Take 1 mg by mouth daily      vitamin B-1 (THIAMINE) 100 MG tablet Take 100 mg by mouth 3 times daily      benzocaine-menthol (CEPACOL SORE THROAT) 15-3.6 MG lozenge Take 1 lozenge by mouth every 2 hours as needed for Sore Throat      hydrOXYzine (ATARAX) 25 MG tablet Take 25 mg by mouth every 8 hours as needed for Anxiety       docusate sodium (COLACE) 100 MG capsule Take 100 mg by mouth daily as needed for Constipation      amitriptyline (ELAVIL) 25 MG tablet Take 25 mg by mouth nightly       omeprazole (PRILOSEC) 40 MG capsule Take 40 mg by mouth daily as needed          STOP taking these medications       atorvastatin (LIPITOR) 40 MG tablet Comments:   Reason for Stopping:         cetirizine (ZYRTEC) 10 MG tablet Comments:   Reason for Stopping:         ondansetron (ZOFRAN) 4 MG tablet Comments:   Reason for Stopping:         loperamide (IMODIUM) 2 MG capsule Comments:   Reason for Stopping:             Activity: activity as tolerated  Diet: regular diet    Follow-up with 1 wk PCP, 2 wk Neurosurg      Signed:  Don Wright MD  5/24/2022  1:15 PM

## 2022-05-24 NOTE — PROGRESS NOTES
Discharge instructions given to patient TLSO brace applied . I instructed her that she must wear her TLSO brace when up and No Bending Lifting or twisting . She verbalizes understanding .

## 2022-05-26 ENCOUNTER — TELEPHONE (OUTPATIENT)
Dept: NEUROSURGERY | Age: 62
End: 2022-05-26

## 2022-05-26 DIAGNOSIS — S22.080A COMPRESSION FRACTURE OF T12 VERTEBRA, INITIAL ENCOUNTER (HCC): Primary | ICD-10-CM

## 2022-06-21 ENCOUNTER — HOSPITAL ENCOUNTER (OUTPATIENT)
Dept: GENERAL RADIOLOGY | Age: 62
Discharge: HOME | End: 2022-06-23
Payer: MEDICAID

## 2022-06-21 ENCOUNTER — HOSPITAL ENCOUNTER (OUTPATIENT)
Age: 62
Discharge: HOME | End: 2022-06-23
Payer: MEDICAID

## 2022-06-21 ENCOUNTER — OFFICE VISIT (OUTPATIENT)
Dept: NEUROSURGERY | Age: 62
End: 2022-06-21
Payer: MEDICAID

## 2022-06-21 VITALS
BODY MASS INDEX: 23.55 KG/M2 | DIASTOLIC BLOOD PRESSURE: 112 MMHG | WEIGHT: 128 LBS | HEIGHT: 62 IN | OXYGEN SATURATION: 98 % | SYSTOLIC BLOOD PRESSURE: 153 MMHG | HEART RATE: 66 BPM | TEMPERATURE: 97.4 F

## 2022-06-21 DIAGNOSIS — S22.080D COMPRESSION FRACTURE OF T12 VERTEBRA WITH ROUTINE HEALING, SUBSEQUENT ENCOUNTER: Primary | ICD-10-CM

## 2022-06-21 DIAGNOSIS — S22.080A COMPRESSION FRACTURE OF T12 VERTEBRA, INITIAL ENCOUNTER (HCC): ICD-10-CM

## 2022-06-21 DIAGNOSIS — M25.562 LEFT KNEE PAIN, UNSPECIFIED CHRONICITY: ICD-10-CM

## 2022-06-21 PROCEDURE — 4004F PT TOBACCO SCREEN RCVD TLK: CPT | Performed by: STUDENT IN AN ORGANIZED HEALTH CARE EDUCATION/TRAINING PROGRAM

## 2022-06-21 PROCEDURE — 3017F COLORECTAL CA SCREEN DOC REV: CPT | Performed by: STUDENT IN AN ORGANIZED HEALTH CARE EDUCATION/TRAINING PROGRAM

## 2022-06-21 PROCEDURE — 73560 X-RAY EXAM OF KNEE 1 OR 2: CPT

## 2022-06-21 PROCEDURE — 72100 X-RAY EXAM L-S SPINE 2/3 VWS: CPT

## 2022-06-21 PROCEDURE — G8427 DOCREV CUR MEDS BY ELIG CLIN: HCPCS | Performed by: STUDENT IN AN ORGANIZED HEALTH CARE EDUCATION/TRAINING PROGRAM

## 2022-06-21 PROCEDURE — 99213 OFFICE O/P EST LOW 20 MIN: CPT | Performed by: STUDENT IN AN ORGANIZED HEALTH CARE EDUCATION/TRAINING PROGRAM

## 2022-06-21 PROCEDURE — 99212 OFFICE O/P EST SF 10 MIN: CPT

## 2022-06-21 PROCEDURE — G8420 CALC BMI NORM PARAMETERS: HCPCS | Performed by: STUDENT IN AN ORGANIZED HEALTH CARE EDUCATION/TRAINING PROGRAM
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
## 2022-07-21 ENCOUNTER — APPOINTMENT (OUTPATIENT)
Dept: GENERAL RADIOLOGY | Age: 62
End: 2022-07-21
Payer: MEDICAID

## 2022-07-21 ENCOUNTER — APPOINTMENT (OUTPATIENT)
Dept: CT IMAGING | Age: 62
End: 2022-07-21
Payer: MEDICAID

## 2022-07-21 ENCOUNTER — HOSPITAL ENCOUNTER (OUTPATIENT)
Age: 62
Setting detail: OBSERVATION
Discharge: HOME | End: 2022-07-24
Attending: EMERGENCY MEDICINE | Admitting: FAMILY MEDICINE
Payer: MEDICAID

## 2022-07-21 DIAGNOSIS — R94.31 PROLONGED QT INTERVAL: ICD-10-CM

## 2022-07-21 DIAGNOSIS — R55 SYNCOPE AND COLLAPSE: Primary | ICD-10-CM

## 2022-07-21 DIAGNOSIS — E87.6 HYPOKALEMIA: ICD-10-CM

## 2022-07-21 DIAGNOSIS — G89.29 CHRONIC LOW BACK PAIN WITHOUT SCIATICA, UNSPECIFIED BACK PAIN LATERALITY: ICD-10-CM

## 2022-07-21 DIAGNOSIS — E83.42 HYPOMAGNESEMIA: ICD-10-CM

## 2022-07-21 DIAGNOSIS — M54.50 CHRONIC LOW BACK PAIN WITHOUT SCIATICA, UNSPECIFIED BACK PAIN LATERALITY: ICD-10-CM

## 2022-07-21 LAB
ANION GAP SERPL CALCULATED.3IONS-SCNC: 17 MMOL/L (ref 7–16)
BASOPHILS ABSOLUTE: 0.04 E9/L (ref 0–0.2)
BASOPHILS RELATIVE PERCENT: 0.6 % (ref 0–2)
BUN BLDV-MCNC: 8 MG/DL (ref 6–23)
CALCIUM SERPL-MCNC: 8.6 MG/DL (ref 8.6–10.2)
CHLORIDE BLD-SCNC: 101 MMOL/L (ref 98–107)
CO2: 20 MMOL/L (ref 22–29)
CREAT SERPL-MCNC: 0.5 MG/DL (ref 0.5–1)
EOSINOPHILS ABSOLUTE: 0.06 E9/L (ref 0.05–0.5)
EOSINOPHILS RELATIVE PERCENT: 0.9 % (ref 0–6)
GFR AFRICAN AMERICAN: >60
GFR NON-AFRICAN AMERICAN: >60 ML/MIN/1.73
GLUCOSE BLD-MCNC: 74 MG/DL (ref 74–99)
HCT VFR BLD CALC: 39 % (ref 34–48)
HEMOGLOBIN: 13.8 G/DL (ref 11.5–15.5)
IMMATURE GRANULOCYTES #: 0.02 E9/L
IMMATURE GRANULOCYTES %: 0.3 % (ref 0–5)
LYMPHOCYTES ABSOLUTE: 2.52 E9/L (ref 1.5–4)
LYMPHOCYTES RELATIVE PERCENT: 37.6 % (ref 20–42)
MAGNESIUM: 1.2 MG/DL (ref 1.6–2.6)
MCH RBC QN AUTO: 34.7 PG (ref 26–35)
MCHC RBC AUTO-ENTMCNC: 35.4 % (ref 32–34.5)
MCV RBC AUTO: 98 FL (ref 80–99.9)
MONOCYTES ABSOLUTE: 0.76 E9/L (ref 0.1–0.95)
MONOCYTES RELATIVE PERCENT: 11.3 % (ref 2–12)
NEUTROPHILS ABSOLUTE: 3.3 E9/L (ref 1.8–7.3)
NEUTROPHILS RELATIVE PERCENT: 49.3 % (ref 43–80)
PDW BLD-RTO: 13.8 FL (ref 11.5–15)
PLATELET # BLD: 118 E9/L (ref 130–450)
PMV BLD AUTO: 11.8 FL (ref 7–12)
POTASSIUM SERPL-SCNC: 3.3 MMOL/L (ref 3.5–5)
RBC # BLD: 3.98 E12/L (ref 3.5–5.5)
REASON FOR REJECTION: NORMAL
REJECTED TEST: NORMAL
SODIUM BLD-SCNC: 138 MMOL/L (ref 132–146)
TROPONIN, HIGH SENSITIVITY: 8 NG/L (ref 0–9)
WBC # BLD: 6.7 E9/L (ref 4.5–11.5)

## 2022-07-21 PROCEDURE — 85025 COMPLETE CBC W/AUTO DIFF WBC: CPT

## 2022-07-21 PROCEDURE — 6360000002 HC RX W HCPCS: Performed by: STUDENT IN AN ORGANIZED HEALTH CARE EDUCATION/TRAINING PROGRAM

## 2022-07-21 PROCEDURE — 36415 COLL VENOUS BLD VENIPUNCTURE: CPT

## 2022-07-21 PROCEDURE — 80048 BASIC METABOLIC PNL TOTAL CA: CPT

## 2022-07-21 PROCEDURE — 96365 THER/PROPH/DIAG IV INF INIT: CPT

## 2022-07-21 PROCEDURE — 83735 ASSAY OF MAGNESIUM: CPT

## 2022-07-21 PROCEDURE — 71045 X-RAY EXAM CHEST 1 VIEW: CPT

## 2022-07-21 PROCEDURE — 2580000003 HC RX 258: Performed by: STUDENT IN AN ORGANIZED HEALTH CARE EDUCATION/TRAINING PROGRAM

## 2022-07-21 PROCEDURE — 72131 CT LUMBAR SPINE W/O DYE: CPT

## 2022-07-21 PROCEDURE — 72128 CT CHEST SPINE W/O DYE: CPT

## 2022-07-21 PROCEDURE — 6370000000 HC RX 637 (ALT 250 FOR IP): Performed by: FAMILY MEDICINE

## 2022-07-21 PROCEDURE — 96375 TX/PRO/DX INJ NEW DRUG ADDON: CPT

## 2022-07-21 PROCEDURE — G0378 HOSPITAL OBSERVATION PER HR: HCPCS | Performed by: INTERNAL MEDICINE

## 2022-07-21 PROCEDURE — 96374 THER/PROPH/DIAG INJ IV PUSH: CPT

## 2022-07-21 PROCEDURE — 70450 CT HEAD/BRAIN W/O DYE: CPT

## 2022-07-21 PROCEDURE — 6360000002 HC RX W HCPCS: Performed by: EMERGENCY MEDICINE

## 2022-07-21 PROCEDURE — 99285 EMERGENCY DEPT VISIT HI MDM: CPT

## 2022-07-21 PROCEDURE — G0378 HOSPITAL OBSERVATION PER HR: HCPCS

## 2022-07-21 PROCEDURE — 84484 ASSAY OF TROPONIN QUANT: CPT

## 2022-07-21 PROCEDURE — 6370000000 HC RX 637 (ALT 250 FOR IP): Performed by: STUDENT IN AN ORGANIZED HEALTH CARE EDUCATION/TRAINING PROGRAM

## 2022-07-21 PROCEDURE — 93005 ELECTROCARDIOGRAM TRACING: CPT | Performed by: PHYSICIAN ASSISTANT

## 2022-07-21 RX ORDER — POTASSIUM CHLORIDE 7.45 MG/ML
10 INJECTION INTRAVENOUS ONCE
Status: COMPLETED | OUTPATIENT
Start: 2022-07-21 | End: 2022-07-22

## 2022-07-21 RX ORDER — TIZANIDINE 4 MG/1
4 TABLET ORAL EVERY 6 HOURS PRN
COMMUNITY

## 2022-07-21 RX ORDER — MAGNESIUM SULFATE IN WATER 40 MG/ML
2000 INJECTION, SOLUTION INTRAVENOUS ONCE
Status: COMPLETED | OUTPATIENT
Start: 2022-07-21 | End: 2022-07-22

## 2022-07-21 RX ORDER — MAGNESIUM SULFATE HEPTAHYDRATE 500 MG/ML
2000 INJECTION, SOLUTION INTRAMUSCULAR; INTRAVENOUS ONCE
Status: DISCONTINUED | OUTPATIENT
Start: 2022-07-21 | End: 2022-07-21 | Stop reason: SDUPTHER

## 2022-07-21 RX ORDER — ALBUTEROL SULFATE 90 UG/1
2 AEROSOL, METERED RESPIRATORY (INHALATION) EVERY 6 HOURS PRN
COMMUNITY

## 2022-07-21 RX ORDER — CETIRIZINE HYDROCHLORIDE 10 MG/1
10 TABLET ORAL DAILY
COMMUNITY

## 2022-07-21 RX ORDER — SULFAMETHOXAZOLE AND TRIMETHOPRIM 800; 160 MG/1; MG/1
1 TABLET ORAL 2 TIMES DAILY
Status: ON HOLD | COMMUNITY
End: 2022-07-24 | Stop reason: HOSPADM

## 2022-07-21 RX ORDER — HYDROCODONE BITARTRATE AND ACETAMINOPHEN 7.5; 325 MG/1; MG/1
1 TABLET ORAL EVERY 8 HOURS PRN
Status: DISCONTINUED | OUTPATIENT
Start: 2022-07-21 | End: 2022-07-24 | Stop reason: HOSPADM

## 2022-07-21 RX ORDER — 0.9 % SODIUM CHLORIDE 0.9 %
500 INTRAVENOUS SOLUTION INTRAVENOUS ONCE
Status: COMPLETED | OUTPATIENT
Start: 2022-07-21 | End: 2022-07-21

## 2022-07-21 RX ORDER — POTASSIUM CHLORIDE 20 MEQ/1
40 TABLET, EXTENDED RELEASE ORAL ONCE
Status: COMPLETED | OUTPATIENT
Start: 2022-07-21 | End: 2022-07-21

## 2022-07-21 RX ORDER — FENTANYL CITRATE 50 UG/ML
50 INJECTION, SOLUTION INTRAMUSCULAR; INTRAVENOUS ONCE
Status: COMPLETED | OUTPATIENT
Start: 2022-07-21 | End: 2022-07-21

## 2022-07-21 RX ADMIN — SODIUM CHLORIDE 500 ML: 9 INJECTION, SOLUTION INTRAVENOUS at 20:15

## 2022-07-21 RX ADMIN — FENTANYL CITRATE 50 MCG: 50 INJECTION, SOLUTION INTRAMUSCULAR; INTRAVENOUS at 21:14

## 2022-07-21 RX ADMIN — POTASSIUM CHLORIDE 40 MEQ: 1500 TABLET, EXTENDED RELEASE ORAL at 23:00

## 2022-07-21 RX ADMIN — HYDROCODONE BITARTRATE AND ACETAMINOPHEN 1 TABLET: 7.5; 325 TABLET ORAL at 23:13

## 2022-07-21 RX ADMIN — POTASSIUM CHLORIDE 10 MEQ: 7.46 INJECTION, SOLUTION INTRAVENOUS at 22:58

## 2022-07-21 ASSESSMENT — ENCOUNTER SYMPTOMS
NAUSEA: 0
VOICE CHANGE: 0
VOMITING: 0
BACK PAIN: 1
PHOTOPHOBIA: 0
RHINORRHEA: 0
DIARRHEA: 0
SHORTNESS OF BREATH: 0
ABDOMINAL PAIN: 0
TROUBLE SWALLOWING: 0
COUGH: 0

## 2022-07-21 ASSESSMENT — PAIN SCALES - GENERAL
PAINLEVEL_OUTOF10: 8
PAINLEVEL_OUTOF10: 6
PAINLEVEL_OUTOF10: 6

## 2022-07-21 ASSESSMENT — LIFESTYLE VARIABLES
HOW MANY STANDARD DRINKS CONTAINING ALCOHOL DO YOU HAVE ON A TYPICAL DAY: 3 OR 4
HOW OFTEN DO YOU HAVE A DRINK CONTAINING ALCOHOL: 2-3 TIMES A WEEK

## 2022-07-21 ASSESSMENT — PAIN DESCRIPTION - LOCATION
LOCATION: BACK
LOCATION: BACK

## 2022-07-21 NOTE — ED NOTES
Department of Emergency Medicine    FIRST PROVIDER TRIAGE NOTE             Independent MLP           7/21/22  5:06 PM EDT    Date of Encounter: 7/21/22   MRN: [id number]    Vitals:    07/21/22 1702 07/21/22 1707   BP:  109/80   Pulse: (!) 108 98   Resp:  16   Temp: 98 °F (36.7 °C) 98.2 °F (36.8 °C)   TempSrc: Oral    SpO2: 97%    Weight:  125 lb (56.7 kg)   Height:  5' 2\" (1.575 m)      HPI: Christal Black is a 58 y.o. female who presents to the ED for Loss of Consciousness (Syncopal episode yesterday, striking the back of her head and hurting her back. Was sent in by her doctor to be evaluated. )    ROS: Negative for cp, vomiting, or diarrhea. Physical Exam:   Gen Appearance/Constitutional: alert  CV: regular rate   Pulm: CTA bilat     Initial Plan of Care: All treatment areas with department are currently occupied. Plan to order/Initiate the following while awaiting opening in ED: labs, EKG, and imaging studies.     Initial Plan of Care: Initiate Treatment-Testing, Proceed toTreatment Area When Bed Available for ED Attending/MLP to Continue Care    Electronically signed by Karthik Cole PA-C   DD: 7/21/22       Karthik Cole PA-C  07/21/22 0816

## 2022-07-22 LAB
ANION GAP SERPL CALCULATED.3IONS-SCNC: 15 MMOL/L (ref 7–16)
BASOPHILS ABSOLUTE: 0.07 E9/L (ref 0–0.2)
BASOPHILS RELATIVE PERCENT: 1.4 % (ref 0–2)
BUN BLDV-MCNC: 7 MG/DL (ref 6–23)
CALCIUM SERPL-MCNC: 8.3 MG/DL (ref 8.6–10.2)
CHLORIDE BLD-SCNC: 104 MMOL/L (ref 98–107)
CO2: 19 MMOL/L (ref 22–29)
CREAT SERPL-MCNC: 0.5 MG/DL (ref 0.5–1)
EKG ATRIAL RATE: 93 BPM
EKG P AXIS: 54 DEGREES
EKG P-R INTERVAL: 136 MS
EKG Q-T INTERVAL: 490 MS
EKG QRS DURATION: 84 MS
EKG QTC CALCULATION (BAZETT): 609 MS
EKG R AXIS: 34 DEGREES
EKG T AXIS: 71 DEGREES
EKG VENTRICULAR RATE: 93 BPM
EOSINOPHILS ABSOLUTE: 0.1 E9/L (ref 0.05–0.5)
EOSINOPHILS RELATIVE PERCENT: 2 % (ref 0–6)
GFR AFRICAN AMERICAN: >60
GFR NON-AFRICAN AMERICAN: >60 ML/MIN/1.73
GLUCOSE BLD-MCNC: 81 MG/DL (ref 74–99)
HCT VFR BLD CALC: 36.8 % (ref 34–48)
HEMOGLOBIN: 12.7 G/DL (ref 11.5–15.5)
IMMATURE GRANULOCYTES #: 0.03 E9/L
IMMATURE GRANULOCYTES %: 0.6 % (ref 0–5)
LV EF: 58 %
LVEF MODALITY: NORMAL
LYMPHOCYTES ABSOLUTE: 2.16 E9/L (ref 1.5–4)
LYMPHOCYTES RELATIVE PERCENT: 44 % (ref 20–42)
MAGNESIUM: 1.9 MG/DL (ref 1.6–2.6)
MCH RBC QN AUTO: 34.7 PG (ref 26–35)
MCHC RBC AUTO-ENTMCNC: 34.5 % (ref 32–34.5)
MCV RBC AUTO: 100.5 FL (ref 80–99.9)
MONOCYTES ABSOLUTE: 0.57 E9/L (ref 0.1–0.95)
MONOCYTES RELATIVE PERCENT: 11.6 % (ref 2–12)
NEUTROPHILS ABSOLUTE: 1.98 E9/L (ref 1.8–7.3)
NEUTROPHILS RELATIVE PERCENT: 40.4 % (ref 43–80)
PDW BLD-RTO: 13.6 FL (ref 11.5–15)
PLATELET # BLD: 94 E9/L (ref 130–450)
PLATELET CONFIRMATION: NORMAL
PMV BLD AUTO: 11.9 FL (ref 7–12)
POTASSIUM REFLEX MAGNESIUM: 3.7 MMOL/L (ref 3.5–5)
RBC # BLD: 3.66 E12/L (ref 3.5–5.5)
SODIUM BLD-SCNC: 138 MMOL/L (ref 132–146)
WBC # BLD: 4.9 E9/L (ref 4.5–11.5)

## 2022-07-22 PROCEDURE — 96366 THER/PROPH/DIAG IV INF ADDON: CPT

## 2022-07-22 PROCEDURE — 83735 ASSAY OF MAGNESIUM: CPT

## 2022-07-22 PROCEDURE — 2580000003 HC RX 258: Performed by: INTERNAL MEDICINE

## 2022-07-22 PROCEDURE — 6370000000 HC RX 637 (ALT 250 FOR IP): Performed by: INTERNAL MEDICINE

## 2022-07-22 PROCEDURE — G0378 HOSPITAL OBSERVATION PER HR: HCPCS

## 2022-07-22 PROCEDURE — 6360000002 HC RX W HCPCS: Performed by: INTERNAL MEDICINE

## 2022-07-22 PROCEDURE — 6370000000 HC RX 637 (ALT 250 FOR IP): Performed by: FAMILY MEDICINE

## 2022-07-22 PROCEDURE — 6360000002 HC RX W HCPCS: Performed by: STUDENT IN AN ORGANIZED HEALTH CARE EDUCATION/TRAINING PROGRAM

## 2022-07-22 PROCEDURE — 6370000000 HC RX 637 (ALT 250 FOR IP)

## 2022-07-22 PROCEDURE — 96372 THER/PROPH/DIAG INJ SC/IM: CPT

## 2022-07-22 PROCEDURE — 36415 COLL VENOUS BLD VENIPUNCTURE: CPT

## 2022-07-22 PROCEDURE — 99214 OFFICE O/P EST MOD 30 MIN: CPT | Performed by: INTERNAL MEDICINE

## 2022-07-22 PROCEDURE — 2580000003 HC RX 258: Performed by: FAMILY MEDICINE

## 2022-07-22 PROCEDURE — 96367 TX/PROPH/DG ADDL SEQ IV INF: CPT

## 2022-07-22 PROCEDURE — 80048 BASIC METABOLIC PNL TOTAL CA: CPT

## 2022-07-22 PROCEDURE — 6360000002 HC RX W HCPCS: Performed by: FAMILY MEDICINE

## 2022-07-22 PROCEDURE — APPSS60 APP SPLIT SHARED TIME 46-60 MINUTES

## 2022-07-22 PROCEDURE — 85025 COMPLETE CBC W/AUTO DIFF WBC: CPT

## 2022-07-22 PROCEDURE — 93306 TTE W/DOPPLER COMPLETE: CPT

## 2022-07-22 RX ORDER — PANTOPRAZOLE SODIUM 40 MG/1
40 TABLET, DELAYED RELEASE ORAL
Status: DISCONTINUED | OUTPATIENT
Start: 2022-07-22 | End: 2022-07-24 | Stop reason: HOSPADM

## 2022-07-22 RX ORDER — SODIUM CHLORIDE AND POTASSIUM CHLORIDE .9; .15 G/100ML; G/100ML
SOLUTION INTRAVENOUS CONTINUOUS
Status: DISCONTINUED | OUTPATIENT
Start: 2022-07-22 | End: 2022-07-23

## 2022-07-22 RX ORDER — SODIUM CHLORIDE 9 MG/ML
INJECTION, SOLUTION INTRAVENOUS PRN
Status: DISCONTINUED | OUTPATIENT
Start: 2022-07-22 | End: 2022-07-24 | Stop reason: HOSPADM

## 2022-07-22 RX ORDER — PROMETHAZINE HYDROCHLORIDE 12.5 MG/1
12.5 TABLET ORAL EVERY 6 HOURS PRN
Status: DISCONTINUED | OUTPATIENT
Start: 2022-07-22 | End: 2022-07-24 | Stop reason: HOSPADM

## 2022-07-22 RX ORDER — SODIUM CHLORIDE 0.9 % (FLUSH) 0.9 %
10 SYRINGE (ML) INJECTION EVERY 12 HOURS SCHEDULED
Status: DISCONTINUED | OUTPATIENT
Start: 2022-07-22 | End: 2022-07-24 | Stop reason: HOSPADM

## 2022-07-22 RX ORDER — ONDANSETRON 2 MG/ML
4 INJECTION INTRAMUSCULAR; INTRAVENOUS EVERY 6 HOURS PRN
Status: DISCONTINUED | OUTPATIENT
Start: 2022-07-22 | End: 2022-07-22

## 2022-07-22 RX ORDER — ALBUTEROL SULFATE 2.5 MG/3ML
2.5 SOLUTION RESPIRATORY (INHALATION) EVERY 6 HOURS PRN
Status: DISCONTINUED | OUTPATIENT
Start: 2022-07-22 | End: 2022-07-24 | Stop reason: HOSPADM

## 2022-07-22 RX ORDER — ACETAMINOPHEN 325 MG/1
650 TABLET ORAL EVERY 6 HOURS PRN
Status: DISCONTINUED | OUTPATIENT
Start: 2022-07-22 | End: 2022-07-24 | Stop reason: HOSPADM

## 2022-07-22 RX ORDER — DIPHENHYDRAMINE HCL 25 MG
25 TABLET ORAL EVERY 6 HOURS PRN
Status: DISCONTINUED | OUTPATIENT
Start: 2022-07-22 | End: 2022-07-24 | Stop reason: HOSPADM

## 2022-07-22 RX ORDER — 0.9 % SODIUM CHLORIDE 0.9 %
1000 INTRAVENOUS SOLUTION INTRAVENOUS ONCE
Status: COMPLETED | OUTPATIENT
Start: 2022-07-22 | End: 2022-07-22

## 2022-07-22 RX ORDER — SODIUM CHLORIDE 0.9 % (FLUSH) 0.9 %
10 SYRINGE (ML) INJECTION PRN
Status: DISCONTINUED | OUTPATIENT
Start: 2022-07-22 | End: 2022-07-24 | Stop reason: HOSPADM

## 2022-07-22 RX ORDER — LANOLIN ALCOHOL/MO/W.PET/CERES
400 CREAM (GRAM) TOPICAL DAILY
Status: DISCONTINUED | OUTPATIENT
Start: 2022-07-22 | End: 2022-07-24

## 2022-07-22 RX ORDER — ACETAMINOPHEN 650 MG/1
650 SUPPOSITORY RECTAL EVERY 6 HOURS PRN
Status: DISCONTINUED | OUTPATIENT
Start: 2022-07-22 | End: 2022-07-24 | Stop reason: HOSPADM

## 2022-07-22 RX ORDER — ENOXAPARIN SODIUM 100 MG/ML
40 INJECTION SUBCUTANEOUS DAILY
Status: DISCONTINUED | OUTPATIENT
Start: 2022-07-22 | End: 2022-07-24 | Stop reason: HOSPADM

## 2022-07-22 RX ORDER — POLYETHYLENE GLYCOL 3350 17 G/17G
17 POWDER, FOR SOLUTION ORAL DAILY PRN
Status: DISCONTINUED | OUTPATIENT
Start: 2022-07-22 | End: 2022-07-24 | Stop reason: HOSPADM

## 2022-07-22 RX ORDER — ALBUTEROL SULFATE 90 UG/1
2 AEROSOL, METERED RESPIRATORY (INHALATION) EVERY 6 HOURS PRN
Status: DISCONTINUED | OUTPATIENT
Start: 2022-07-22 | End: 2022-07-22 | Stop reason: CLARIF

## 2022-07-22 RX ORDER — CETIRIZINE HYDROCHLORIDE 10 MG/1
10 TABLET ORAL DAILY
Status: DISCONTINUED | OUTPATIENT
Start: 2022-07-22 | End: 2022-07-24 | Stop reason: HOSPADM

## 2022-07-22 RX ORDER — AMITRIPTYLINE HYDROCHLORIDE 25 MG/1
25 TABLET, FILM COATED ORAL NIGHTLY
Status: DISCONTINUED | OUTPATIENT
Start: 2022-07-22 | End: 2022-07-24 | Stop reason: HOSPADM

## 2022-07-22 RX ADMIN — CETIRIZINE HYDROCHLORIDE TABLETS 10 MG: 10 TABLET, FILM COATED ORAL at 08:25

## 2022-07-22 RX ADMIN — POLYETHYLENE GLYCOL 3350 17 G: 17 POWDER, FOR SOLUTION ORAL at 16:11

## 2022-07-22 RX ADMIN — PANTOPRAZOLE SODIUM 40 MG: 40 TABLET, DELAYED RELEASE ORAL at 08:25

## 2022-07-22 RX ADMIN — HYDROCODONE BITARTRATE AND ACETAMINOPHEN 1 TABLET: 7.5; 325 TABLET ORAL at 17:34

## 2022-07-22 RX ADMIN — Medication 10 ML: at 18:15

## 2022-07-22 RX ADMIN — DIPHENHYDRAMINE HYDROCHLORIDE 25 MG: 25 TABLET ORAL at 10:19

## 2022-07-22 RX ADMIN — POTASSIUM CHLORIDE AND SODIUM CHLORIDE: 900; 150 INJECTION, SOLUTION INTRAVENOUS at 22:37

## 2022-07-22 RX ADMIN — Medication 400 MG: at 16:11

## 2022-07-22 RX ADMIN — HYDROCODONE BITARTRATE AND ACETAMINOPHEN 1 TABLET: 7.5; 325 TABLET ORAL at 08:25

## 2022-07-22 RX ADMIN — Medication 10 ML: at 08:25

## 2022-07-22 RX ADMIN — MAGNESIUM SULFATE HEPTAHYDRATE 2000 MG: 40 INJECTION, SOLUTION INTRAVENOUS at 01:03

## 2022-07-22 RX ADMIN — SODIUM CHLORIDE 1000 ML: 9 INJECTION, SOLUTION INTRAVENOUS at 18:21

## 2022-07-22 RX ADMIN — DIPHENHYDRAMINE HYDROCHLORIDE 25 MG: 25 TABLET ORAL at 18:25

## 2022-07-22 RX ADMIN — AMITRIPTYLINE HYDROCHLORIDE 25 MG: 25 TABLET, FILM COATED ORAL at 22:40

## 2022-07-22 RX ADMIN — ENOXAPARIN SODIUM 40 MG: 100 INJECTION SUBCUTANEOUS at 08:25

## 2022-07-22 ASSESSMENT — PAIN DESCRIPTION - LOCATION: LOCATION: BACK

## 2022-07-22 ASSESSMENT — PAIN SCALES - GENERAL
PAINLEVEL_OUTOF10: 3
PAINLEVEL_OUTOF10: 6
PAINLEVEL_OUTOF10: 3
PAINLEVEL_OUTOF10: 6

## 2022-07-22 ASSESSMENT — PAIN - FUNCTIONAL ASSESSMENT
PAIN_FUNCTIONAL_ASSESSMENT: PREVENTS OR INTERFERES SOME ACTIVE ACTIVITIES AND ADLS
PAIN_FUNCTIONAL_ASSESSMENT: NONE - DENIES PAIN

## 2022-07-22 ASSESSMENT — PAIN DESCRIPTION - PAIN TYPE: TYPE: ACUTE PAIN

## 2022-07-22 ASSESSMENT — PAIN DESCRIPTION - ONSET: ONSET: ON-GOING

## 2022-07-22 ASSESSMENT — PAIN DESCRIPTION - DESCRIPTORS: DESCRIPTORS: ACHING;DISCOMFORT;DULL

## 2022-07-22 ASSESSMENT — PAIN DESCRIPTION - FREQUENCY: FREQUENCY: CONTINUOUS

## 2022-07-22 NOTE — ED NOTES
Pt requesting benadryl for itching all over d/t norco. No hives, swelling or resp distress noted. Speech clear and wnl. Dr. Julian Givens made aware via perfectserve.      Sherrill Nyhan, RN  07/22/22 [address] [place] [address], RN  07/22/22 9898

## 2022-07-22 NOTE — PROGRESS NOTES
Hospitalist Progress Note      PCP: Lyudmila Lu MD    Date of Admission: 7/21/2022    Chief Complaint: Syncope    Hospital Course: Patient was sent in by her PCP because of LOC. Apparently she passed out without any premonitory symptoms. She then went to see her PCP and was then directed to ED. Patient was noted to have prolonged QTC. Patient was then admitted for further evaluation treatment. Subjective: No further syncope since admission        Medications:  Reviewed    Infusion Medications    sodium chloride       Scheduled Medications    amitriptyline  25 mg Oral Nightly    cetirizine  10 mg Oral Daily    pantoprazole  40 mg Oral QAM AC    sodium chloride flush  10 mL IntraVENous 2 times per day    enoxaparin  40 mg SubCUTAneous Daily     PRN Meds: sodium chloride flush, sodium chloride, promethazine **OR** [DISCONTINUED] ondansetron, polyethylene glycol, acetaminophen **OR** acetaminophen, perflutren lipid microspheres, albuterol, diphenhydrAMINE, HYDROcodone-acetaminophen      Intake/Output Summary (Last 24 hours) at 7/22/2022 1114  Last data filed at 7/22/2022 0930  Gross per 24 hour   Intake 270 ml   Output --   Net 270 ml       Exam:    /89   Pulse 68   Temp 97.6 °F (36.4 °C) (Oral)   Resp 16   Ht 5' 2\" (1.575 m)   Wt 125 lb (56.7 kg)   SpO2 100%   BMI 22.86 kg/m²     General appearance: No apparent distress, appears stated age and cooperative. HEENT: Pupils equal, round, and reactive to light. Conjunctivae/corneas clear. Neck: Supple, with full range of motion. No jugular venous distention. Trachea midline. Respiratory:  Normal respiratory effort. Clear to auscultation, bilaterally without Rales/Wheezes/Rhonchi. Cardiovascular: Regular rate and rhythm with normal S1/S2 without murmurs, rubs or gallops. Abdomen: Soft, non-tender, non-distended with normal bowel sounds. Musculoskeletal: No clubbing, cyanosis or edema bilaterally.   Full range of motion without

## 2022-07-22 NOTE — H&P
Hospitalist History & Physical      PCP: Edward Gerardo MD    Date of Service: Pt seen/examined on 7/21/2022     Chief Complaint:  had concerns including Loss of Consciousness (Syncopal episode yesterday, striking the back of her head and hurting her back. Was sent in by her doctor to be evaluated. ). History Of Present Illness:    Ms. Lavinia Chan, a 58y.o. year old female  who  has a past medical history of Asthma, CAD (coronary artery disease), Closed fracture of proximal end of left humerus with routine healing, Degeneration of lumbar or lumbosacral intervertebral disc, Fall against sharp object, History of blood transfusion, Hypertension, Hypokalemia, Insomnia, Kidney stone, Nondisplaced fracture of greater tuberosity of right humerus, initial encounter for closed fracture, Orthostatic hypotension, and Severe back pain. Patient is a 78-year-old female with past medical history of CAD, hypertension, valvular disease, TIA and pulmonary hypertension presenting to the emergency department due to syncope. Patient states that she passed out yesterday. She denies any inciting factors for her symptoms. Patient apparently went to PCP office today and was advised to come to the emergency department for further evaluation. Patient states that when she fell yesterday she struck the back of her head. She denies any loss of consciousness. Patient does endorse similar symptoms in the past but nothing recent. She has no fever, chills, nausea, vomiting, chest pain, shortness of breath, headache. Vital signs are normal and stable. CT head unremarkable. EKG shows prolonged . Laboratory studies demonstrate potassium 3.3, anion gap 17, knees 1.2.       Past Medical History:   Diagnosis Date    Asthma     CAD (coronary artery disease)     Closed fracture of proximal end of left humerus with routine healing 6/11/2019    Degeneration of lumbar or lumbosacral intervertebral disc     Fall against sharp Provider, MD   omeprazole (PRILOSEC) 40 MG capsule Take 40 mg by mouth daily as needed     Historical Provider, MD         Allergies:  Dilaudid [hydromorphone hcl] and Morphine    Social History:    TOBACCO:   reports that she has been smoking. She started smoking about 3 years ago. She has a 0.50 pack-year smoking history. She has never used smokeless tobacco.  ETOH:   reports current alcohol use of about 2.0 standard drinks per week. Family History:    Reviewed in detail and negative for DM, CAD, Cancer, CVA. Positive as follows\"      Problem Relation Age of Onset    Diabetes Mother     Hypertension Mother     Heart Disease Sister     Stroke Sister     Heart Disease Maternal Grandmother     Heart Disease Maternal Grandfather     Cancer Brother        REVIEW OF SYSTEMS:   Pertinent positives as noted in the HPI. All other systems reviewed and negative. PHYSICAL EXAM:  /80   Pulse 98   Temp 98.2 °F (36.8 °C)   Resp 16   Ht 5' 2\" (1.575 m)   Wt 125 lb (56.7 kg)   SpO2 97%   BMI 22.86 kg/m²   General appearance: No apparent distress, appears stated age and cooperative. HEENT: Normal cephalic, atraumatic without obvious deformity. Pupils equal, round, and reactive to light. Extra ocular muscles intact. Conjunctivae/corneas clear. Neck: Supple, with full range of motion. No jugular venous distention. Trachea midline. Respiratory: CTA  Cardiovascular: RRR  Abdomen: S/NT/ND  Musculoskeletal: No clubbing, cyanosis, edema of bilateral lower extremities. Brisk capillary refill. Skin: Normal skin color. No rashes or lesions. Neurologic:  Neurovascularly intact without any focal sensory/motor deficits.  Cranial nerves: II-XII intact, grossly non-focal.  Psychiatric: Alert and oriented, thought content appropriate, normal insight    Reviewed EKG and CXR personally      CBC:   Recent Labs     07/21/22  1717   WBC 6.7   RBC 3.98   HGB 13.8   HCT 39.0   MCV 98.0   RDW 13.8   *     BMP:   Recent Labs     07/21/22 2004      K 3.3*      CO2 20*   BUN 8   CREATININE 0.5   MG 1.2*     LFT:  No results for input(s): PROT, ALB, ALKPHOS, ALT, AST, BILITOT, AMYLASE, LIPASE in the last 72 hours. CE:  No results for input(s): Rebekah Layton in the last 72 hours. PT/INR: No results for input(s): INR, APTT in the last 72 hours. BNP: No results for input(s): BNP in the last 72 hours. ESR:   Lab Results   Component Value Date    SEDRATE 10 11/04/2019     CRP:   Lab Results   Component Value Date    CRP 21.7 (H) 09/14/2021     D Dimer:   Lab Results   Component Value Date    DDIMER 688 09/13/2021      Folate and B12:   Lab Results   Component Value Date    MCNEMWQJ74 541 11/12/2021   ,   Lab Results   Component Value Date    FOLATE 12.3 11/12/2021     Lactic Acid:   Lab Results   Component Value Date    LACTA 2.1 08/23/2020     Thyroid Studies:   Lab Results   Component Value Date    TSH 0.600 11/04/2019       Oupatient labs:  Lab Results   Component Value Date    CHOL 137 05/08/2017    TRIG 131 05/08/2017    HDL 78 05/08/2017    LDLCALC 33 05/08/2017    TSH 0.600 11/04/2019    INR 0.9 10/28/2019    LABA1C 5.6 11/12/2021       Urinalysis:    Lab Results   Component Value Date/Time    NITRU Negative 09/14/2021 12:05 AM    WBCUA 0-1 09/14/2021 12:05 AM    BACTERIA RARE 09/14/2021 12:05 AM    RBCUA 0-1 09/14/2021 12:05 AM    RBCUA 0-1 10/01/2012 09:53 AM    BLOODU TRACE 09/14/2021 12:05 AM    SPECGRAV 1.015 09/14/2021 12:05 AM    GLUCOSEU Negative 09/14/2021 12:05 AM       Imaging:  CT HEAD WO CONTRAST    Result Date: 7/21/2022  EXAMINATION: CT OF THE HEAD WITHOUT CONTRAST  7/21/2022 6:00 pm TECHNIQUE: CT of the head was performed without the administration of intravenous contrast. Automated exposure control, iterative reconstruction, and/or weight based adjustment of the mA/kV was utilized to reduce the radiation dose to as low as reasonably achievable.  COMPARISON: April 13, 2021 HISTORY: ORDERING SYSTEM PROVIDED HISTORY: fall- hit head, syncope TECHNOLOGIST PROVIDED HISTORY: Reason for exam:->fall- hit head, syncope Has a \"code stroke\" or \"stroke alert\" been called? ->No Decision Support Exception - unselect if not a suspected or confirmed emergency medical condition->Emergency Medical Condition (MA) What reading provider will be dictating this exam?->CRC FINDINGS: BRAIN/VENTRICLES: There is no acute intracranial hemorrhage, mass effect or midline shift. No abnormal extra-axial fluid collection. The gray-white differentiation is maintained without evidence of an acute infarct. There is no evidence of hydrocephalus. ORBITS: The visualized portion of the orbits demonstrate no acute abnormality. SINUSES: The visualized paranasal sinuses and mastoid air cells demonstrate no acute abnormality. SOFT TISSUES/SKULL:  No acute abnormality of the visualized skull or soft tissues. No acute intracranial abnormality. XR CHEST PORTABLE    Result Date: 7/21/2022  EXAMINATION: ONE XRAY VIEW OF THE CHEST 7/21/2022 6:16 pm COMPARISON: 01/13/2022 HISTORY: ORDERING SYSTEM PROVIDED HISTORY: Shortness of breath TECHNOLOGIST PROVIDED HISTORY: Reason for exam:->Shortness of breath What reading provider will be dictating this exam?->CRC FINDINGS: There is a small left basilar opacity. The right lung is clear. The cardiomediastinal contour is unremarkable. No pneumothorax is seen. Small nonspecific left basilar opacity, which may represent atelectasis, pneumonia, effusion or a combination thereof.        ASSESSMENT:  -Syncope and collapse  -Prolonged QTC  -Hypokalemia  -Hypomagnesemia  -Coronary artery disease  -Asthma  -Hypertension      PLAN:  -Admit to medicine  -Echocardiogram  -Telemetry  -Monitor serum electrolytes replete as needed  -Continue home medication        Diet: No diet orders on file  Code Status: Prior  Surrogate decision maker confirmed with patient:   Extended Emergency Contact Information  Primary Emergency Contact: Chay Cruz  Address: [address] , [address] [facility] [address] [facility] Phone: [phone]  Relation: Parent  Secondary Emergency Contact: Jb Martin [facility] [address] [facility] Phone: [phone]  Relation: Brother/Sister    DVT Prophylaxis: []Lovenox []Heparin []PCD [] [address] []Encouraged ambulation  Disposition: []Med/Surg [] Intermediate [] ICU/CCU  Admit status: [] Observation [] Inpatient     +++++++++++++++++++++++++++++++++++++++++++++++++  Sofiya Mckeon DO  +++++++++++++++++++++++++++++++++++++++++++++++++  NOTE: This report was transcribed using voice recognition software. Every effort was made to ensure accuracy; however, inadvertent computerized transcription errors may be present.

## 2022-07-22 NOTE — ED NOTES
Awake and requesting and given mian crackers. Tolerating well. No distress noted.       Shweta Stroud RN  07/22/22 9212

## 2022-07-22 NOTE — ED PROVIDER NOTES
HPI   Patient is a 26-year-old female with past medical history of CAD, hypertension, valvular disease, TIA and pulmonary hypertension presenting to the emergency department due to syncope. Patient states that she passed out yesterday. She denies any inciting factors for her symptoms. Patient apparently went to PCP office today and was advised to come to the emergency department for further evaluation. Patient states that when she fell yesterday she struck the back of her head. She denies any loss of consciousness. Patient does endorse similar symptoms in the past but nothing recent. Patient does state that she has some low back pain after a fall. She does admit to chronic low back pain but states that is worsened after the fall. She otherwise denies other symptoms including fever, chills, nausea, vomiting, chest pain, shortness of breath, headache, urinary symptoms, abdominal pain, cough, congestion, sore throat, myalgias, constipation or diarrhea. Her symptoms are moderate with no remitting or exacerbating factors. Patient denies any syncopal episodes today. Patient does states that she has had generalized weakness and fatigue. She also has had a poor appetite lately and has not been eating and drinking well at home. Patient endorses 5 pound weight loss in the last month or so. She has had no recent illnesses or sick contacts. Review of Systems   Constitutional:  Positive for appetite change and fatigue. Negative for chills and fever. Decreased appetite. HENT:  Negative for congestion, rhinorrhea, trouble swallowing and voice change. Eyes:  Negative for photophobia and visual disturbance. Respiratory:  Negative for cough and shortness of breath. Cardiovascular:  Negative for chest pain and palpitations. Gastrointestinal:  Negative for abdominal pain, diarrhea, nausea and vomiting. Genitourinary:  Negative for dysuria, flank pain, frequency and urgency.    Musculoskeletal: Positive for back pain. Negative for arthralgias. Low back pain, chronic but worse than normal   Skin:  Negative for rash and wound. Neurological:  Positive for dizziness, syncope and headaches. Psychiatric/Behavioral:  Negative for behavioral problems and confusion. Physical Exam  Constitutional:       General: She is not in acute distress. Appearance: Normal appearance. She is not ill-appearing. HENT:      Head: Normocephalic and atraumatic. Right Ear: External ear normal.      Left Ear: External ear normal.      Nose: Nose normal.      Mouth/Throat:      Mouth: Mucous membranes are moist.      Pharynx: Oropharynx is clear. Eyes:      Conjunctiva/sclera: Conjunctivae normal.      Pupils: Pupils are equal, round, and reactive to light. Cardiovascular:      Rate and Rhythm: Normal rate and regular rhythm. Pulses: Normal pulses. Heart sounds: Normal heart sounds. Pulmonary:      Effort: Pulmonary effort is normal. No respiratory distress. Breath sounds: Normal breath sounds. No wheezing or rales. Abdominal:      General: Abdomen is flat. Palpations: Abdomen is soft. Tenderness: There is no abdominal tenderness. There is no guarding or rebound. Musculoskeletal:         General: No tenderness. Normal range of motion. Cervical back: Normal range of motion and neck supple. Right lower leg: No edema. Left lower leg: No edema. Comments: No midline bony tenderness to the cervical, thoracic or lumbar spine. No obvious step-off or deformities noted. Bilateral lower extremities neurovascularly intact. Good DP/TP pulses palpated. No sensory deficits noted. Skin:     General: Skin is warm and dry. Capillary Refill: Capillary refill takes less than 2 seconds. Neurological:      General: No focal deficit present. Mental Status: She is alert and oriented to person, place, and time. Cranial Nerves: No cranial nerve deficit. Coordination: Coordination normal.   Psychiatric:         Mood and Affect: Mood normal.         Behavior: Behavior normal.        Procedures   EKG: This EKG is signed and interpreted by me. Normal sinus rhythm with ventricular rate of 93 bpm.  NC interval normal.  QTc very prolonged at 609 MS. Nonspecific ST and T wave abnormalities. Mild ST depressions in the inferior and lateral leads. Some changes compared previous EKG with her prominent ST depressions compared to previous on 5/20/2020. QTC also more prolonged. MDM   Patient is a 69-year-old female with past medical history of CAD, hypertension, valvular disease, TIA and pulmonary hypertension presenting to the emergency department due to syncope. On initial evaluation, patient is well-appearing, hemodynamically stable and in no acute distress. She is mildly hypertensive with BP of 109/80 initially. Physical exam was essentially benign. Work-up in the emergency department significant for hypomagnesemia with a magnesium of 1.2. Patient intravenously repleted in the ED. Potassium also low at 3.3. Patient given oral and IV repletion. She is also given some IV fluids and pain medication for her symptoms. EKG was remarkable for prolonged QT of 609 MS. She was placed on cardiac monitor in the ED and appeared to be in normal sinus rhythm throughout ED stay. On reevaluation she was noting improvement in her pain. Plan for admission for further work-up and evaluation of syncope. Patient was agreeable to admission. Spoke with Dr. Shy Beauchamp who agreed to admit the patient. She remained hemodynamically stable in the ED. ED Course as of 07/21/22 4349   Thu Jul 21, 2022 2037 Patient reevaluated. She is feeling fine currently.   Sinus rhythm on the monitor approximately 80 bpm. [PP]   5751 Patient accepted for admission by Dr. Pepper Zee [PP]      ED Course User Index  [PP] Roseann Head, DO        --------------------------------------------- PAST HISTORY ---------------------------------------------  Past Medical History:  has a past medical history of Asthma, CAD (coronary artery disease), Closed fracture of proximal end of left humerus with routine healing, Degeneration of lumbar or lumbosacral intervertebral disc, Fall against sharp object, History of blood transfusion, Hypertension, Hypokalemia, Insomnia, Kidney stone, Nondisplaced fracture of greater tuberosity of right humerus, initial encounter for closed fracture, Orthostatic hypotension, and Severe back pain. Past Surgical History:  has a past surgical history that includes Tonsillectomy; Bunionectomy; chest tube insertion (1990); Lithotripsy (2012); Colonoscopy (3/26/09); other surgical history; Upper gastrointestinal endoscopy (3/30/15); Upper gastrointestinal endoscopy (4/21/08); lumbar fusion (11/09/2017); and Kyphosis surgery (N/A, 10/28/2019). Social History:  reports that she has been smoking. She started smoking about 3 years ago. She has a 0.50 pack-year smoking history. She has never used smokeless tobacco. She reports current alcohol use of about 2.0 standard drinks per week. She reports that she does not use drugs. Family History: family history includes Cancer in her brother; Diabetes in her mother; Heart Disease in her maternal grandfather, maternal grandmother, and sister; Hypertension in her mother; Stroke in her sister. The patients home medications have been reviewed.     Allergies: Dilaudid [hydromorphone hcl]    -------------------------------------------------- RESULTS -------------------------------------------------    LABS:  Results for orders placed or performed during the hospital encounter of 07/21/22   CBC with Auto Differential   Result Value Ref Range    WBC 6.7 4.5 - 11.5 E9/L    RBC 3.98 3.50 - 5.50 E12/L    Hemoglobin 13.8 11.5 - 15.5 g/dL    Hematocrit 39.0 34.0 - 48.0 %    MCV 98.0 80.0 - 99.9 fL    MCH 34.7 26.0 - 35.0 pg    MCHC 35.4 (H) 32.0 - 34.5 %    RDW 13.8 11.5 - 15.0 fL    Platelets 382 (L) 316 - 450 E9/L    MPV 11.8 7.0 - 12.0 fL    Neutrophils % 49.3 43.0 - 80.0 %    Immature Granulocytes % 0.3 0.0 - 5.0 %    Lymphocytes % 37.6 20.0 - 42.0 %    Monocytes % 11.3 2.0 - 12.0 %    Eosinophils % 0.9 0.0 - 6.0 %    Basophils % 0.6 0.0 - 2.0 %    Neutrophils Absolute 3.30 1.80 - 7.30 E9/L    Immature Granulocytes # 0.02 E9/L    Lymphocytes Absolute 2.52 1.50 - 4.00 E9/L    Monocytes Absolute 0.76 0.10 - 0.95 E9/L    Eosinophils Absolute 0.06 0.05 - 0.50 E9/L    Basophils Absolute 0.04 0.00 - 0.20 E9/L   SPECIMEN REJECTION   Result Value Ref Range    Rejected Test CMP/TRP5     Reason for Rejection see below    Basic Metabolic Panel   Result Value Ref Range    Sodium 138 132 - 146 mmol/L    Potassium 3.3 (L) 3.5 - 5.0 mmol/L    Chloride 101 98 - 107 mmol/L    CO2 20 (L) 22 - 29 mmol/L    Anion Gap 17 (H) 7 - 16 mmol/L    Glucose 74 74 - 99 mg/dL    BUN 8 6 - 23 mg/dL    Creatinine 0.5 0.5 - 1.0 mg/dL    GFR Non-African American >60 >=60 mL/min/1.73    GFR African American >60     Calcium 8.6 8.6 - 10.2 mg/dL   Troponin   Result Value Ref Range    Troponin, High Sensitivity 8 0 - 9 ng/L   Magnesium   Result Value Ref Range    Magnesium 1.2 (L) 1.6 - 2.6 mg/dL   EKG 12 Lead   Result Value Ref Range    Ventricular Rate 93 BPM    Atrial Rate 93 BPM    P-R Interval 136 ms    QRS Duration 84 ms    Q-T Interval 490 ms    QTc Calculation (Bazett) 609 ms    P Axis 54 degrees    R Axis 34 degrees    T Axis 71 degrees       RADIOLOGY:  CT THORACIC SPINE WO CONTRAST   Final Result   New mild T12 compression fracture. Stable chronic moderate T5 and T6 anterior compression fractures. CT LUMBAR SPINE WO CONTRAST   Final Result   No new acute lumbar spine abnormality identified. Stable moderate L4 and L5 anterior compression fractures status post prior   L3-L5 fusion.          XR CHEST PORTABLE   Final Result   Small nonspecific left basilar opacity, which may represent atelectasis,   pneumonia, effusion or a combination thereof. CT HEAD WO CONTRAST   Final Result   No acute intracranial abnormality.           ------------------------- NURSING NOTES AND VITALS REVIEWED ---------------------------  Date / Time Roomed:  7/21/2022  5:34 PM  ED Bed Assignment:  22/22    The nursing notes within the ED encounter and vital signs as below have been reviewed. Patient Vitals for the past 24 hrs:   BP Temp Temp src Pulse Resp SpO2 Height Weight   07/21/22 2310 (!) 90/56 -- -- 86 18 100 % -- --   07/21/22 2130 (!) 138/102 -- -- 80 14 99 % -- --   07/21/22 1707 109/80 98.2 °F (36.8 °C) -- 98 16 -- 5' 2\" (1.575 m) 125 lb (56.7 kg)   07/21/22 1702 -- 98 °F (36.7 °C) Oral (!) 108 -- 97 % -- --       Oxygen Saturation Interpretation: Normal    ------------------------------------------ PROGRESS NOTES ------------------------------------------  Re-evaluation(s):  See ED course    Counseling:  I have spoken with the patient and discussed todays results, in addition to providing specific details for the plan of care and counseling regarding the diagnosis and prognosis. Their questions are answered at this time and they are agreeable with the plan of admission.    --------------------------------- ADDITIONAL PROVIDER NOTES ---------------------------------  Consultations:  Time: 2215. Spoke with Dr. Sammy Aguilar. Discussed case. They will admit the patient. This patient's ED course included: a personal history and physicial examination, re-evaluation prior to disposition, multiple bedside re-evaluations, IV medications, cardiac monitoring, and continuous pulse oximetry    This patient has remained hemodynamically stable during their ED course. Diagnosis:  1. Syncope and collapse    2. Prolonged QT interval    3. Hypomagnesemia    4. Hypokalemia    5.  Chronic low back pain without sciatica, unspecified back pain laterality        Disposition:  Patient's disposition: Admit to

## 2022-07-22 NOTE — CARE COORDINATION
Patient presented to the ED for loss of consciousness (syncopal episode, fell and struck the back of her head), sent in by her PCP; currently in observation. Met with patient at bedside for transition of care planning. Patient reports she lives in a home, her friend is currently living with her, she is independent without a device. Patient reports she has an oxygen tank at home (has not used it since August 2021) and is sending it back. Previous home care services with [facility]; does not need resumption orders, has a script for outpatient therapy. [place] [facility] [place] and PCP is Dr. Mary Jane Ferguson is home, she reports no home going needs at this time and her friend will transport home. Per nursing rounds, patient pending an echo. Advance Care Planning   Healthcare Decision Maker:    Primary Decision Maker: Braeden Young - Child - [phone]    Secondary Decision Maker: Camille Miner - Brother/Sister - [phone]    Secondary Decision Maker: Virgia Mcburney - Brother/Sister - [phone]    Supplemental (Other) Decision Maker: Renita Donato - Parent - [phone]      Today we documented Decision Maker(s) consistent with Legal Next of Kin hierarchy.     Ina Hough, MSW, LSW [phone]

## 2022-07-22 NOTE — CONSULTS
Inpatient Cardiology Consultation      Reason for Consult:  Syncope     Consulting Physician: Dr Urbano Zaragoza     Requesting Physician:  Lucio Chen MD    Date of Consultation: 7/22/2022    HISTORY OF PRESENT ILLNESS:   Patient is known to [facility] cardiology through Dr. Bee Garza and was last seen inpatient 8/21/2020 for syncope with V. tach. Last seen by EP 6/29/2016 for torsade de pointe. PMHx: History of prolonged QT with torsades de points in setting of hypokalemia/hypomagnesemia, history of V. tach, VHD (TTE 8/2020: EF 60%. Mild MR. Mild AR. Moderate TR.  ), Syncope, EtOH abuse with history of DT, hypertension, severe protein-calorie malnutrition, asthma, GERD, spondylolithiasis, kyphoplasty, lumbar fusion, tonsillectomy, chest tube after falling at sharp object 1990, left bunionectomy, kidney stones with lithotripsy. HPI:  Patient presented to ER 7/21/2022 for syncope the day prior and presented to her PCP that day and was told to come to ER. Patient was found to be hypomagnesemic in ER at 1.2 and supplemented at that time. Potassium was also found to be low at 3.3 with oral repletion. Patient was found to have prolonged QT at 609 ms. Patient was admitted for syncope and collapse, prolonged QT interval, hypomagnesemia, hypokalemia, chronic low back pain. VS at time of arrival 98 Fahrenheit, 16 respirations, 108 pulse, 109/80, 97% room air. Labs: Potassium 3.7, CO2 19, BUN 7, creatinine 0.5, magnesium 1.2, serum calcium 8.3, glucose 81,HScTNT 8, WBC 4.9, H&H 12.7/36.8, platelet 157>14(IKV)  CXR: small nonspecific left basilar opacity, which may represent atelectasis, pneumonia, effusion or a combination thereof. CT thoracic spine: New mild T12 compression fracture. Upon assessment today 7/22/2022 patient is lying semi-Patton in hospital bed on room air. Patient is alert and oriented, speaking full sentences, and is in no apparent distress at this time.   The patient reports 7/20/2022 8/22/2020:Normal left ventricular systolic function. Ejection fraction is visually estimated at 60%. Normal right ventricular size and function (TAPSE 2.5 cm). There is doppler evidence of stage I diastolic dysfunction Mild mitral regurgitation. The aortic valve is trileaflet. Mild aortic regurgitation. Moderate tricuspid regurgitation. PASP is estimated at 53 mmHg. Exercise NM stress 6/9/2016: Normal examination. In particular there is no evidence of left ventricular myocardium at risk for stress-induced ischemia. TTE 6/8/716: EF 65%. Normal left ventricle size and systolic function Stage I diastolic dysfunction Trace mitral regurgitation Trace aortic regurgitation      Medications Prior to admit:  Prior to Admission medications    Medication Sig Start Date End Date Taking? Authorizing Provider   albuterol sulfate HFA (PROVENTIL;VENTOLIN;PROAIR) 108 (90 Base) MCG/ACT inhaler Inhale 2 puffs into the lungs every 6 hours as needed for Wheezing   Yes Historical Provider, MD   cetirizine (ZYRTEC) 10 MG tablet Take 10 mg by mouth in the morning. Yes Historical Provider, MD   tiZANidine (ZANAFLEX) 4 MG tablet Take 4 mg by mouth every 6 hours as needed   Yes Historical Provider, MD   sulfamethoxazole-trimethoprim (BACTRIM DS;SEPTRA DS) 800-160 MG per tablet Take 1 tablet by mouth in the morning and 1 tablet before bedtime. Yes Historical Provider, MD   HYDROcodone-acetaminophen (NORCO) 7.5-325 MG per tablet Take 1 tablet by mouth every 8 hours as needed for Pain.     Historical Provider, MD   hydrOXYzine (ATARAX) 25 MG tablet Take 25 mg by mouth every 8 hours as needed for Anxiety     Historical Provider, MD   amitriptyline (ELAVIL) 25 MG tablet Take 25 mg by mouth nightly  11/10/20   Historical Provider, MD   omeprazole (PRILOSEC) 40 MG capsule Take 40 mg by mouth daily as needed     Historical Provider, MD       Current Medications:    Current Facility-Administered Medications: amitriptyline (ELAVIL) tablet 25 mg, 25 mg, Oral, Nightly  cetirizine (ZYRTEC) tablet 10 mg, 10 mg, Oral, Daily  pantoprazole (PROTONIX) tablet 40 mg, 40 mg, Oral, QAM AC  sodium chloride flush 0.9 % injection 10 mL, 10 mL, IntraVENous, 2 times per day  sodium chloride flush 0.9 % injection 10 mL, 10 mL, IntraVENous, PRN  0.9 % sodium chloride infusion, , IntraVENous, PRN  enoxaparin (LOVENOX) injection 40 mg, 40 mg, SubCUTAneous, Daily  promethazine (PHENERGAN) tablet 12.5 mg, 12.5 mg, Oral, Q6H PRN **OR** [DISCONTINUED] ondansetron (ZOFRAN) injection 4 mg, 4 mg, IntraVENous, Q6H PRN  polyethylene glycol (GLYCOLAX) packet 17 g, 17 g, Oral, Daily PRN  acetaminophen (TYLENOL) tablet 650 mg, 650 mg, Oral, Q6H PRN **OR** acetaminophen (TYLENOL) suppository 650 mg, 650 mg, Rectal, Q6H PRN  perflutren lipid microspheres (DEFINITY) injection 1.65 mg, 1.5 mL, IntraVENous, ONCE PRN  albuterol (PROVENTIL) nebulizer solution 2.5 mg, 2.5 mg, Nebulization, Q6H PRN  diphenhydrAMINE (BENADRYL) tablet 25 mg, 25 mg, Oral, Q6H PRN  HYDROcodone-acetaminophen (NORCO) 7.5-325 MG per tablet 1 tablet, 1 tablet, Oral, Q8H PRN    Allergies:  Patient has no known allergies. Social History:    Housing: Patient lives with friend in two-story house  Tobacco: 0.5 PPD D x10 years  EtOH: Drinks at least 2 cans of beer and 1 shot of liquor daily  Drugs: Denies  Oxygen: Room air at home  Ambulation: Does not use any ambulatory aids    Family History:   Family History   Problem Relation Age of Onset    Diabetes Mother     Hypertension Mother     Heart Disease Sister     Stroke Sister     Heart Disease Maternal Grandmother     Heart Disease Maternal Grandfather     Cancer Brother          REVIEW OF SYSTEMS:     Constitutional: Denies fevers, chills, night sweats, and fatigue. Loss of appetite. HEENT: Denies headaches, nose bleeds, and blurred vision,oral pain, abscess or lesion. Musculoskeletal: Denies pain to BLE with ambulation and edema to BLE.   Recent fall due to syncope. Neurological: Denies numbness and tingling. Resolved dizziness. Cardiovascular: Denies chest pain, palpitations, and feelings of heart racing. Respiratory: Denies orthopnea and PND, SOB. New AUGUSTE x3 days. Gastrointestinal: Denies heartburn, nausea/vomiting, diarrhea and constipation, black/bloody, and tarry stools. Genitourinary: Denies dysuria and hematuria. Reports concentrated dark urine. Hematologic: Denies excessive bruising or bleeding  Lymphatic: Denies lumps and bumps to neck, axilla, breast, and groin  Endocrine: Denies excessive thirst. Denies intolerance to hot and cold  GYN: Postmenopausal state; Denies vaginal bleeding. Psychiatric: Denies anxiety and depression. PHYSICAL EXAM:   /89   Pulse 68   Temp 97.6 °F (36.4 °C) (Oral)   Resp 16   Ht 5' 2\" (1.575 m)   Wt 125 lb (56.7 kg)   SpO2 100%   BMI 22.86 kg/m²   CONST:  Well developed, malnourished 58-year-old -American female who appears stated age. Awake, alert, cooperative, no apparent distress  HEENT:   Head- Normocephalic, atraumatic   Eyes- Conjunctivae pink, anicteric  Throat- Oral mucosa pink and moist  Neck-  No stridor, trachea midline, no jugular venous distention. No adenopathy   CHEST: Chest symmetrical and non-tender to palpation. No accessory muscle use or intercostal retractions  RESPIRATORY: Lung sounds - clear throughout fields   CARDIOVASCULAR:     No carotid bruit  Heart Inspection- shows no noted pulsations  Heart Palpation- no heaves or thrills; PMI is non-displaced   Heart Ausculation- Regular rate and rhythm, no murmur. No s3, s4 or rub   PV: No lower extremity edema. No varicosities. Pedal pulses palpable, no clubbing or cyanosis   ABDOMEN: Soft, non-tender to light palpation. Bowel sounds present. No palpable masses no organomegaly; no abdominal bruit  MS: Good muscle strength and tone. No atrophy or abnormal movements.    : Deferred  SKIN: Warm and dry no statis dermatitis or ulcers NEURO / PSYCH: Oriented to person, place and time. Speech clear and appropriate. Follows all commands. Pleasant affect     DATA:    ECG: Normal sinus rhythm with prolonged QT. Vent rate 93, DC interval 136, QRS duration 84, QTc 609. Tele strips: Normal sinus rhythm without ectopy, 79 heart rate.   Diagnostic:    Intake/Output Summary (Last 24 hours) at 7/22/2022 1228  Last data filed at 7/22/2022 0930  Gross per 24 hour   Intake 270 ml   Output --   Net 270 ml       Labs:   CBC:   Recent Labs     07/21/22  1717 07/22/22  0723   WBC 6.7 4.9   HGB 13.8 12.7   HCT 39.0 36.8   * 94*     BMP:   Recent Labs     07/21/22 2004 07/22/22 0723    138   K 3.3* 3.7   CO2 20* 19*   BUN 8 7   CREATININE 0.5 0.5   LABGLOM >60 >60   CALCIUM 8.6 8.3*     Mag:   Recent Labs     07/21/22 2004   MG 1.2*       HgA1c:   Lab Results   Component Value Date    LABA1C 5.6 11/12/2021       FASTING LIPID PANEL:  Lab Results   Component Value Date/Time    CHOL 137 05/08/2017 07:30 AM    HDL 78 05/08/2017 07:30 AM    LDLCALC 33 05/08/2017 07:30 AM    TRIG 131 05/08/2017 07:30 AM             Assessment and plan to follow as per Dr. Keysha Werner    Electronically signed by RONAN South CNP on 7/22/2022 at 12:28 PM

## 2022-07-23 LAB
ANION GAP SERPL CALCULATED.3IONS-SCNC: 13 MMOL/L (ref 7–16)
BUN BLDV-MCNC: 4 MG/DL (ref 6–23)
CALCIUM SERPL-MCNC: 8.7 MG/DL (ref 8.6–10.2)
CHLORIDE BLD-SCNC: 103 MMOL/L (ref 98–107)
CO2: 21 MMOL/L (ref 22–29)
CREAT SERPL-MCNC: 0.5 MG/DL (ref 0.5–1)
GFR AFRICAN AMERICAN: >60
GFR NON-AFRICAN AMERICAN: >60 ML/MIN/1.73
GLUCOSE BLD-MCNC: 84 MG/DL (ref 74–99)
MAGNESIUM: 1.3 MG/DL (ref 1.6–2.6)
POTASSIUM SERPL-SCNC: 5.1 MMOL/L (ref 3.5–5)
SODIUM BLD-SCNC: 137 MMOL/L (ref 132–146)

## 2022-07-23 PROCEDURE — 99226 PR SBSQ OBSERVATION CARE/DAY 35 MINUTES: CPT | Performed by: INTERNAL MEDICINE

## 2022-07-23 PROCEDURE — 6370000000 HC RX 637 (ALT 250 FOR IP)

## 2022-07-23 PROCEDURE — 96366 THER/PROPH/DIAG IV INF ADDON: CPT

## 2022-07-23 PROCEDURE — 83735 ASSAY OF MAGNESIUM: CPT

## 2022-07-23 PROCEDURE — 6360000002 HC RX W HCPCS: Performed by: INTERNAL MEDICINE

## 2022-07-23 PROCEDURE — 6370000000 HC RX 637 (ALT 250 FOR IP): Performed by: INTERNAL MEDICINE

## 2022-07-23 PROCEDURE — 36415 COLL VENOUS BLD VENIPUNCTURE: CPT

## 2022-07-23 PROCEDURE — 2580000003 HC RX 258: Performed by: FAMILY MEDICINE

## 2022-07-23 PROCEDURE — 6360000002 HC RX W HCPCS: Performed by: FAMILY MEDICINE

## 2022-07-23 PROCEDURE — 80048 BASIC METABOLIC PNL TOTAL CA: CPT

## 2022-07-23 PROCEDURE — 96368 THER/DIAG CONCURRENT INF: CPT

## 2022-07-23 PROCEDURE — 96372 THER/PROPH/DIAG INJ SC/IM: CPT

## 2022-07-23 PROCEDURE — 6370000000 HC RX 637 (ALT 250 FOR IP): Performed by: FAMILY MEDICINE

## 2022-07-23 PROCEDURE — G0378 HOSPITAL OBSERVATION PER HR: HCPCS

## 2022-07-23 RX ORDER — POTASSIUM CHLORIDE 20 MEQ/1
40 TABLET, EXTENDED RELEASE ORAL ONCE
Status: COMPLETED | OUTPATIENT
Start: 2022-07-23 | End: 2022-07-23

## 2022-07-23 RX ORDER — MAGNESIUM SULFATE IN WATER 40 MG/ML
2000 INJECTION, SOLUTION INTRAVENOUS ONCE
Status: COMPLETED | OUTPATIENT
Start: 2022-07-23 | End: 2022-07-24

## 2022-07-23 RX ADMIN — Medication 400 MG: at 08:59

## 2022-07-23 RX ADMIN — CETIRIZINE HYDROCHLORIDE TABLETS 10 MG: 10 TABLET, FILM COATED ORAL at 08:59

## 2022-07-23 RX ADMIN — ENOXAPARIN SODIUM 40 MG: 100 INJECTION SUBCUTANEOUS at 08:59

## 2022-07-23 RX ADMIN — HYDROCODONE BITARTRATE AND ACETAMINOPHEN 1 TABLET: 7.5; 325 TABLET ORAL at 01:38

## 2022-07-23 RX ADMIN — MAGNESIUM SULFATE HEPTAHYDRATE 2000 MG: 40 INJECTION, SOLUTION INTRAVENOUS at 15:46

## 2022-07-23 RX ADMIN — PANTOPRAZOLE SODIUM 40 MG: 40 TABLET, DELAYED RELEASE ORAL at 06:25

## 2022-07-23 RX ADMIN — POTASSIUM CHLORIDE 40 MEQ: 1500 TABLET, EXTENDED RELEASE ORAL at 10:05

## 2022-07-23 RX ADMIN — ACETAMINOPHEN 650 MG: 325 TABLET ORAL at 16:09

## 2022-07-23 RX ADMIN — AMITRIPTYLINE HYDROCHLORIDE 25 MG: 25 TABLET, FILM COATED ORAL at 20:55

## 2022-07-23 RX ADMIN — POTASSIUM CHLORIDE AND SODIUM CHLORIDE: 900; 150 INJECTION, SOLUTION INTRAVENOUS at 09:01

## 2022-07-23 RX ADMIN — Medication 10 ML: at 12:03

## 2022-07-23 RX ADMIN — DIPHENHYDRAMINE HYDROCHLORIDE 25 MG: 25 TABLET ORAL at 09:46

## 2022-07-23 RX ADMIN — HYDROCODONE BITARTRATE AND ACETAMINOPHEN 1 TABLET: 7.5; 325 TABLET ORAL at 09:46

## 2022-07-23 RX ADMIN — DIPHENHYDRAMINE HYDROCHLORIDE 25 MG: 25 TABLET ORAL at 18:22

## 2022-07-23 RX ADMIN — HYDROCODONE BITARTRATE AND ACETAMINOPHEN 1 TABLET: 7.5; 325 TABLET ORAL at 18:22

## 2022-07-23 RX ADMIN — DIPHENHYDRAMINE HYDROCHLORIDE 25 MG: 25 TABLET ORAL at 01:04

## 2022-07-23 RX ADMIN — Medication 10 ML: at 21:40

## 2022-07-23 ASSESSMENT — PAIN SCALES - GENERAL
PAINLEVEL_OUTOF10: 0
PAINLEVEL_OUTOF10: 8
PAINLEVEL_OUTOF10: 8
PAINLEVEL_OUTOF10: 0
PAINLEVEL_OUTOF10: 6
PAINLEVEL_OUTOF10: 6

## 2022-07-23 ASSESSMENT — PAIN DESCRIPTION - ORIENTATION
ORIENTATION: MID

## 2022-07-23 ASSESSMENT — PAIN DESCRIPTION - LOCATION
LOCATION: BACK

## 2022-07-23 ASSESSMENT — PAIN SCALES - WONG BAKER: WONGBAKER_NUMERICALRESPONSE: 0

## 2022-07-23 ASSESSMENT — PAIN DESCRIPTION - DESCRIPTORS: DESCRIPTORS: ACHING;DISCOMFORT;PRESSURE

## 2022-07-23 ASSESSMENT — PAIN - FUNCTIONAL ASSESSMENT: PAIN_FUNCTIONAL_ASSESSMENT: ACTIVITIES ARE NOT PREVENTED

## 2022-07-23 NOTE — PROGRESS NOTES
Hospitalist Progress Note      SYNOPSIS: Patient admitted on 2022 for syncopal episode      SUBJECTIVE:    Patient seen and examined  Records reviewed. Patient is complaining of severe lower back pain/abdominal pain      Stable overnight. No other overnight issues reported. Temp (24hrs), Av.8 °F (36.6 °C), Min:97.8 °F (36.6 °C), Max:97.8 °F (36.6 °C)    DIET: ADULT DIET; Regular  CODE: Full Code    Intake/Output Summary (Last 24 hours) at 2022 1520  Last data filed at 2022 1145  Gross per 24 hour   Intake 360 ml   Output 0 ml   Net 360 ml       OBJECTIVE:    BP (!) 135/91   Pulse 75   Temp 97.8 °F (36.6 °C)   Resp 18   Ht 5' 2\" (1.575 m)   Wt 125 lb (56.7 kg)   SpO2 94%   BMI 22.86 kg/m²     General appearance: No apparent distress, appears stated age and cooperative. HEENT:  Conjunctivae/corneas clear. Neck: Supple. No jugular venous distention. Respiratory: Clear to auscultation bilaterally, normal respiratory effort  Cardiovascular: Regular rate rhythm, normal S1-S2  Abdomen: Soft, nontender, nondistended  Musculoskeletal: No clubbing, cyanosis, no bilateral lower extremity edema. Brisk capillary refill. Skin:  No rashes  on visible skin  Neurologic: awake, alert and following commands     ASSESSMENT:  Syncopal episode  Prolonged QT, history of 2//ventricular tachycardia  Mild pulm hypertension  Orthostatic hypotension  History of alcohol abuse  Hypokalemia now hyperkalemia  Hypomagnesemia  Thrombocytopenia  Tobacco abuse     PLAN:  -Appreciate cardiology input, echo shows normal ejection fraction with mild pulm hypertension.   Continue magnesium replacement, continue to monitor potassium level, cardiology plans for 14-day Holter monitor on discharge  -Obtain CT of the abdomen and pelvis  -Pain meds as needed  -Lovenox for DVT prophylaxis      DISPOSITION: Discharge pending CT of the abdomen , likely discharge tomorrow if no further syncopal episode or acute findings  Medications:  REVIEWED DAILY    Infusion Medications    sodium chloride       Scheduled Medications    magnesium sulfate  2,000 mg IntraVENous Once    amitriptyline  25 mg Oral Nightly    cetirizine  10 mg Oral Daily    pantoprazole  40 mg Oral QAM AC    sodium chloride flush  10 mL IntraVENous 2 times per day    enoxaparin  40 mg SubCUTAneous Daily    magnesium oxide  400 mg Oral Daily     PRN Meds: sodium chloride flush, sodium chloride, promethazine **OR** [DISCONTINUED] ondansetron, polyethylene glycol, acetaminophen **OR** acetaminophen, perflutren lipid microspheres, albuterol, diphenhydrAMINE, HYDROcodone-acetaminophen    Labs:     Recent Labs     07/21/22  1717 07/22/22  0723   WBC 6.7 4.9   HGB 13.8 12.7   HCT 39.0 36.8   * 94*       Recent Labs     07/21/22 2004 07/22/22  0723 07/23/22  1429    138 137   K 3.3* 3.7 5.1*    104 103   CO2 20* 19* 21*   BUN 8 7 4*   CREATININE 0.5 0.5 0.5   CALCIUM 8.6 8.3* 8.7       No results for input(s): PROT, ALB, ALKPHOS, ALT, AST, BILITOT, AMYLASE, LIPASE in the last 72 hours. No results for input(s): INR in the last 72 hours. No results for input(s): Tylerhosea Friased in the last 72 hours.     Chronic labs:    Lab Results   Component Value Date    CHOL 137 05/08/2017    TRIG 131 05/08/2017    HDL 78 05/08/2017    LDLCALC 33 05/08/2017    TSH 0.600 11/04/2019    INR 0.9 10/28/2019    LABA1C 5.6 11/12/2021       Radiology: REVIEWED DAILY    Echo Complete    Result Date: 7/22/2022  Transthoracic Echocardiography Report (TTE)  Demographics   Patient Name    Timbo De León Gender            Female                  J   Medical Record  [id number]      Room Number       1511  Number   Account #       [de-identified]     Procedure Date    07/22/2022   Corporate ID                  Ordering                                Physician   Accession       [id number]    Referring  Number                        Physician   Date of Birth   1960 Sonographer       Charley Babcock [facility]   Age             58 year(s)    Interpreting      [address]                                Physician         Physician Cardiology                                                  Jose Breaux MD                                 Any Other  Procedure Type of Study   TTE procedure:Echo Complete W/Doppler & Color Flow. Procedure Date Date: 07/22/2022 Start: 07:43 AM Study Location: Portable Technical Quality: Adequate visualization Indications:Syncope. Patient Status: Routine Height: 62 inches Weight: 125 pounds BSA: 1.57 m^2 BMI: 22.86 kg/m^2 HR: 72 bpm BP: 122/74 mmHg  Findings   Left Ventricle  Normal left ventricle size and systolic function. Ejection fraction is visually estimated at 55-60%. No regional wall motion abnormalities seen. Normal left ventricle wall thickness. Indeterminate diastolic function. Right Ventricle  Normal right ventricular size and function. TAPSE 24 mm. Left Atrium  Left atrium is of normal size. Right Atrium  Normal right atrium size. Mitral Valve  Normal mitral valve structure and function. No evidence of mitral valve stenosis. Physiologic and/or trace mitral regurgitation is present. Tricuspid Valve  The tricuspid valve appears structurally normal.  Mild to moderate tricuspid regurgitation. RVSP is 46 mmHg. Pulmonary hypertension is mild . Aortic Valve  Structurally normal aortic valve. The aortic valve is trileaflet. No hemodynamically significant aortic stenosis is present. Physiologic and/or trace aortic regurgitation is noted. Pulmonic Valve  Pulmonic valve is structurally normal.  No evidence of any pulmonic regurgitation. No evidence of pulmonic valve stenosis. Pericardial Effusion  No evidence for hemodynamically significant pericardial effusion. Pleural Effusion  No evidence of pleural effusion. Aorta  Aortic root dimension within normal limits.   Miscellaneous  The inferior vena cava diameter is normal with normal respiratory  variation. Conclusions   Summary  Normal left ventricle size and systolic function. Ejection fraction is visually estimated at 55-60%. No regional wall motion abnormalities seen. Normal left ventricle wall thickness. Indeterminate diastolic function. Normal right ventricular size and function. TAPSE 24 mm. No hemodynamically significant aortic stenosis is present. Mild to moderate tricuspid regurgitation. RVSP is 46 mmHg. Pulmonary hypertension is mild . No evidence for hemodynamically significant pericardial effusion.    Signature   ----------------------------------------------------------------  Electronically signed by Sage Lund MD(Interpreting  physician) on 07/22/2022 06:56 PM  ----------------------------------------------------------------  M-Mode/2D Measurements & Calculations   LV Diastolic   LV Systolic Dimension: 2.5   AV Cusp Separation: 1.7 cmLA  Dimension: 4   cm                           Dimension: 3 cmAO Root  cm             LV Volume Diastolic: 29.2 ml Dimension: 3.1 cm  LV FS:37.5 %   LV Volume Systolic: 23 ml  LV PW          LV EDV/LV EDV Index: 15.8  Diastolic: 0.9 XK/65 MA/X^8ZE ESV/LV ESV  cm             Index: 23 ml/15ml/ m^2       RV Diastolic Dimension: 2.6 cm  Septum         EF Calculated: 96.7 %  Diastolic: 0.9 LV Mass Index: 70 l/min*m^2  Ascending Aorta: 2.9 cm  cm                                          LA volume/Index: 20.8 ml  CO: 5.58 l/min                              /13.24ml/m^2  CI: 3.55       LVOT: 2 cm                   RA Area: 11 cm^2  l/m*m^2  LV Mass:  109.69 g  Doppler Measurements & Calculations   MV Peak E-Wave: 0.52 AV Peak Velocity: 1.33 LVOT Peak Velocity: 1.18 m/s  m/s                  m/s                    LVOT Mean Velocity: 0.82 m/s  MV Peak A-Wave: 0.82 AV Peak Gradient: 7.08 LVOT Peak Gradient: 5.5  m/s                  mmHg                   mmHgLVOT Mean Gradient: 3 mmHg  MV E/A Ratio: 0.64   AV Mean Velocity: 1.03 Estimated RVSP: 46 mmHg  MV Peak Gradient:    m/s                    Estimated RAP:3 mmHg  3.3 mmHg             AV Mean Gradient: 4.5  MV Mean Gradient:    mmHg  1.1 mmHg             AV VTI: 25.7 cm        TR Velocity:3.28 m/s  MV Mean Velocity:    AV Area                TR Gradient:42.98 mmHg  0.47 m/s             (Continuity):3.02 cm^2 PV Peak Velocity: 0.77 m/s  MV Deceleration      AV Deceleration Time:  PV Peak Gradient: 2.37 mmHg  Time: 189.8 msec     2170.2 msec            PV Mean Velocity: 0.56 m/s  MV P1/2t: 52.8 msec  LVOT VTI: 24.7 cm      PV Mean Gradient: 1.4 mmHg  MVA by PHT:4.17 cm^2 IVRT: 90 msec  MV Area              Estimated PASP: 45.98  (continuity): 3.9    mmHg  cm^2  MV E' Septal  Velocity: 0.06 m/s  MV E' Lateral  Velocity: 6 m/s  [email or web address] [id number]    CT HEAD WO CONTRAST    Result Date: 7/21/2022  EXAMINATION: CT OF THE HEAD WITHOUT CONTRAST  7/21/2022 6:00 pm TECHNIQUE: CT of the head was performed without the administration of intravenous contrast. Automated exposure control, iterative reconstruction, and/or weight based adjustment of the mA/kV was utilized to reduce the radiation dose to as low as reasonably achievable. COMPARISON: April 13, 2021 HISTORY: ORDERING SYSTEM PROVIDED HISTORY: fall- hit head, syncope TECHNOLOGIST PROVIDED HISTORY: Reason for exam:->fall- hit head, syncope Has a \"code stroke\" or \"stroke alert\" been called? ->No Decision Support Exception - unselect if not a suspected or confirmed emergency medical condition->Emergency Medical Condition (MA) What reading provider will be dictating this exam?->CRC FINDINGS: BRAIN/VENTRICLES: There is no acute intracranial hemorrhage, mass effect or midline shift. No abnormal extra-axial fluid collection. The gray-white differentiation is maintained without evidence of an acute infarct.   There is no evidence of hydrocephalus. ORBITS: The visualized portion of the orbits demonstrate no acute abnormality. SINUSES: The visualized paranasal sinuses and mastoid air cells demonstrate no acute abnormality. SOFT TISSUES/SKULL:  No acute abnormality of the visualized skull or soft tissues. No acute intracranial abnormality. CT THORACIC SPINE WO CONTRAST    Result Date: 7/21/2022  EXAMINATION: CT OF THE THORACIC SPINE WITHOUT CONTRAST  7/21/2022 8:22 pm: TECHNIQUE: CT of the thoracic spine was performed without the administration of intravenous contrast. Multiplanar reformatted images are provided for review. Automated exposure control, iterative reconstruction, and/or weight based adjustment of the mA/kV was utilized to reduce the radiation dose to as low as reasonably achievable. Dose: Total Exam DLP: 659 mGy-cm. COMPARISON: CTA chest 09/13/2021 HISTORY: ORDERING SYSTEM PROVIDED HISTORY: pain TECHNOLOGIST PROVIDED HISTORY: Reason for exam:->pain What reading provider will be dictating this exam?->CRC FINDINGS: BONES/ALIGNMENT: T5 and T6 moderate anterior compression fractures are stable compared to the prior exam.  There is a new mild T12 compression fracture. Normal thoracic spine alignment is noted. No osseous destructive lesions are seen. DEGENERATIVE CHANGES: No gross spinal canal stenosis or bony neural foraminal narrowing of the thoracic spine. SOFT TISSUES: No paraspinal mass is seen. New mild T12 compression fracture. Stable chronic moderate T5 and T6 anterior compression fractures. CT LUMBAR SPINE WO CONTRAST    Result Date: 7/21/2022  EXAMINATION: CT OF THE LUMBAR SPINE WITHOUT CONTRAST  7/21/2022 TECHNIQUE: CT of the lumbar spine was performed without the administration of intravenous contrast. Multiplanar reformatted images are provided for review. Adjustment of mA and/or kV according to patient size was utilized.   Automated exposure control, iterative reconstruction, and/or weight based adjustment of the mA/kV was utilized to reduce the radiation dose to as low as reasonably achievable. Dose: Total Exam DLP: 360 mGy-cm. COMPARISON: Lumbar spine x-rays 06/21/2022. CT lumbar spine 05/21/2022. HISTORY: ORDERING SYSTEM PROVIDED HISTORY: pain TECHNOLOGIST PROVIDED HISTORY: Reason for exam:->pain Decision Support Exception - unselect if not a suspected or confirmed emergency medical condition->Emergency Medical Condition (MA) What reading provider will be dictating this exam?->CRC FINDINGS: BONES/ALIGNMENT: Moderate L4 and L5 anterior compression fractures are unchanged. L1 vertebroplasty is stable. No new acute fracture is identified in the lumbar spine. Postsurgical changes consistent with L3-L5 fusion appear stable. There is stable 4 mm anterolisthesis of L3 on L4. DEGENERATIVE CHANGES: Multilevel degenerative endplate change and facet arthropathy is stable. No new canal or neural foraminal stenosis is seen. SOFT TISSUES/RETROPERITONEUM: No paraspinal mass is seen. No new acute lumbar spine abnormality identified. Stable moderate L4 and L5 anterior compression fractures status post prior L3-L5 fusion. XR CHEST PORTABLE    Result Date: 7/21/2022  EXAMINATION: ONE XRAY VIEW OF THE CHEST 7/21/2022 6:16 pm COMPARISON: 01/13/2022 HISTORY: ORDERING SYSTEM PROVIDED HISTORY: Shortness of breath TECHNOLOGIST PROVIDED HISTORY: Reason for exam:->Shortness of breath What reading provider will be dictating this exam?->CRC FINDINGS: There is a small left basilar opacity. The right lung is clear. The cardiomediastinal contour is unremarkable. No pneumothorax is seen.      Small nonspecific left basilar opacity, which may represent atelectasis, pneumonia, effusion or a combination thereof.         +++++++++++++++++++++++++++++++++++++++++++++++++  DO Marsha Fox Physician - [address], [place]  +++++++++++++++++++++++++++++++++++++++++++++++++  NOTE: This report was transcribed using voice recognition software. Every effort was made to ensure accuracy; however, inadvertent computerized transcription errors may be present.

## 2022-07-23 NOTE — PROGRESS NOTES
INPATIENT CARDIOLOGY FOLLOW-UP    Name: Bryce Gibson    Age: 58 y.o. Date of Admission: 7/21/2022  5:34 PM    Date of Service: 7/23/2022    Chief Complaint: Follow-up for syncope    Interim History:  No new overnight cardiac complaints. Patient is complaining of lower abdominal pain and lower back pain and denies any cardiac symptoms. Currently with no complaints of CP, SOB, palpitations, dizziness, or lightheadedness. SR on telemetry.     Review of Systems:   Cardiac: As per HPI  General: No fever, chills  Pulmonary: As per HPI  HEENT: No visual disturbances, difficult swallowing  GI: No nausea, vomiting  Endocrine: No thyroid disease or DM  Musculoskeletal: QUINTANILLA x 4, no focal motor deficits  Skin: Intact, no rashes  Neuro/Psych: No headache or seizures    Problem List:  Patient Active Problem List   Diagnosis    Osteoarthritis of left knee    Osteoarthritis of right knee    Low back pain    Lumbar disc displacement at L3-4, L4-5    Acute sinusitis    Syncope and collapse    Prolonged QT interval    Torsades de pointes (HCC)    Hypokalemia    Hypomagnesemia    NSVT (nonsustained ventricular tachycardia) (HCC)    Spondylolisthesis of lumbar region, L4 on L5    Acute alcoholic intoxication ([address]    Ileus ([address]    Delirium tremens ([address]    Closed nondisplaced fracture of greater tuberosity of right humerus with routine healing    Closed fracture of proximal end of left humerus with routine healing    Multiple closed fractures of metatarsal bone of left foot    Gait abnormality    Lumbar compression fracture, closed, initial encounter ([address]    Alcohol withdrawal with perceptual disturbances ([address]    Alcohol withdrawal delirium ([address]    Severe protein-calorie malnutrition ([address]    Nonrheumatic mitral valve regurgitation    Nonrheumatic tricuspid valve regurgitation    Nonrheumatic aortic valve insufficiency    Pulmonary HTN ([address]    TIA (transient ischemic attack)    Acute hypoxemic respiratory failure due to COVID-19 ([facility]    Hypotension    T12 compression fracture (HCC)    Lightheadedness    History of alcohol abuse       Allergies:  No Known Allergies    Current Medications:  Current Facility-Administered Medications   Medication Dose Route Frequency Provider Last Rate Last Admin    amitriptyline (ELAVIL) tablet 25 mg  25 mg Oral Nightly Lurene Parmelee, DO   25 mg at 07/22/22 2240    cetirizine (ZYRTEC) tablet 10 mg  10 mg Oral Daily Lurene Armando, DO   10 mg at 07/23/22 0859    pantoprazole (PROTONIX) tablet 40 mg  40 mg Oral QAM AC Lurene Armando, DO   40 mg at 07/23/22 9002    sodium chloride flush 0.9 % injection 10 mL  10 mL IntraVENous 2 times per day Lurene Armando, DO   10 mL at 07/23/22 1203    sodium chloride flush 0.9 % injection 10 mL  10 mL IntraVENous PRN Lurene Armando, DO   10 mL at 07/22/22 1815    0.9 % sodium chloride infusion   IntraVENous PRN Lurene Armando, DO        enoxaparin (LOVENOX) injection 40 mg  40 mg SubCUTAneous Daily Lurene Parmelee, DO   40 mg at 07/23/22 0859    promethazine (PHENERGAN) tablet 12.5 mg  12.5 mg Oral Q6H PRN Lurene Parmelee, DO        polyethylene glycol (GLYCOLAX) packet 17 g  17 g Oral Daily PRN Lurene Parmelee, DO   17 g at 07/22/22 1611    acetaminophen (TYLENOL) tablet 650 mg  650 mg Oral Q6H PRN Lurene Armando, DO        Or    acetaminophen (TYLENOL) suppository 650 mg  650 mg Rectal Q6H PRN Lurene Parmelee, DO        perflutren lipid microspheres (DEFINITY) injection 1.65 mg  1.5 mL IntraVENous ONCE PRN Lurene Parmelee, DO        albuterol (PROVENTIL) nebulizer solution 2.5 mg  2.5 mg Nebulization Q6H PRN Lurene Armando, DO        diphenhydrAMINE (BENADRYL) tablet 25 mg  25 mg Oral Q6H PRN Reji Concepcion MD   25 mg at 07/23/22 0946    magnesium oxide (MAG-OX) tablet 400 mg  400 mg Oral Daily RONAN Brock - CNP   400 mg at 07/23/22 0859    HYDROcodone-acetaminophen (NORCO) 7.5-325 MG per tablet 1 tablet  1 tablet Oral Q8H PRN Lurene Parmelee, DO   1 tablet at 07/23/22 0946      sodium chloride         Physical Exam:  BP (!) 135/91   Pulse 75   Temp 97.8 °F (36.6 °C)   Resp 18   Ht 5' 2\" (1.575 m)   Wt 125 lb (56.7 kg)   SpO2 94%   BMI 22.86 kg/m²   Wt Readings from Last 3 Encounters:   07/21/22 125 lb (56.7 kg)   06/21/22 128 lb (58.1 kg)   05/21/22 132 lb (59.9 kg)     Appearance: Awake, alert, no acute respiratory distress  Skin: Intact, no rash  Head: Normocephalic, atraumatic  Eyes: EOMI, no conjunctival erythema  ENMT: No pharyngeal erythema, MMM, no rhinorrhea  Neck: Supple, no elevated JVP, no carotid bruits  Lungs: Clear to auscultation bilaterally. No wheezes, rales, or rhonchi. Cardiac: Regular rate and rhythm, +S1S2, no murmurs apparent  Abdomen: Soft, nontender, +bowel sounds, tenderness over the right lower quadrant of the abdomen  Extremities: Moves all extremities x 4, no lower extremity edema  Neurologic: No focal motor deficits apparent, normal mood and affect  Peripheral Pulses: Intact posterior tibial pulses bilaterally    Intake/Output:    Intake/Output Summary (Last 24 hours) at 7/23/2022 1236  Last data filed at 7/23/2022 1145  Gross per 24 hour   Intake 360 ml   Output 0 ml   Net 360 ml     I/O this shift:  In: 360 [P.O.:60; I.V.:300]  Out: 0     Laboratory Tests:  Recent Labs     07/21/22 2004 07/22/22  0723    138   K 3.3* 3.7    104   CO2 20* 19*   BUN 8 7   CREATININE 0.5 0.5   GLUCOSE 74 81   CALCIUM 8.6 8.3*     Lab Results   Component Value Date/Time    MG 1.9 07/22/2022 07:23 AM     No results for input(s): ALKPHOS, ALT, AST, PROT, BILITOT, BILIDIR, LABALBU in the last 72 hours.   Recent Labs     07/21/22  1717 07/22/22  0723   WBC 6.7 4.9   RBC 3.98 3.66   HGB 13.8 12.7   HCT 39.0 36.8   MCV 98.0 100.5*   MCH 34.7 34.7   MCHC 35.4* 34.5   RDW 13.8 13.6   * 94*   MPV 11.8 11.9     Lab Results   Component Value Date    CKTOTAL 50 05/07/2017    CKMB 1.5 05/08/2017    TROPONINI <0.01 04/13/2021    TROPONINI <0.01 03/20/2021    TROPONINI <0.01 08/21/2020     Lab Results   Component Value Date    INR 0.9 10/28/2019    INR 1.0 04/19/2019    INR 1.0 03/09/2019    PROTIME 10.3 10/28/2019    PROTIME 11.8 04/19/2019    PROTIME 10.8 03/09/2019     Lab Results   Component Value Date    TSH 0.600 11/04/2019     Lab Results   Component Value Date    LABA1C 5.6 11/12/2021     No results found for: EAG  Lab Results   Component Value Date    CHOL 137 05/08/2017    CHOL 185 11/28/2011    CHOL 164 11/15/2010     Lab Results   Component Value Date    TRIG 131 05/08/2017    TRIG 139 11/28/2011    TRIG 527 (H) 11/15/2010     Lab Results   Component Value Date    HDL 78 05/08/2017    HDL 64.0 11/28/2011    .0 11/15/2010     Lab Results   Component Value Date    LDLCALC 33 05/08/2017    LDLCALC 93 11/28/2011    LDLCALC -  (AA) 11/15/2010     Lab Results   Component Value Date    LABVLDL 26 05/08/2017     No results found for: CHOLHDLRATIO    Cardiac Tests:  Telemetry findings reviewed: SR at rate 70, no new tachy/bradyarrhythmias overnight     EKG: Normal sinus rhythm, nonspecific ST-T changes, prolonged QTc interval abnormal EKG     Vitals and labs were reviewed: Supine blood pressure 135/91  heart rate 75. Labs-potassium 3.3>> 3.7, magnesium 1.2>> 1.9 rest of the BMP normal.  Troponin, high-sensitivity 80, glucose 81, CBC normal except for a platelet count of 86,234        Exercise NM stress 8/28/2020: No reversible perfusion defect. Ejection fraction is greater than 70  %. No significant wall motion abnormality  TTE 8/22/2020:Normal left ventricular systolic function. Ejection fraction is visually estimated at 60%. Normal right ventricular size and function (TAPSE 2.5 cm). There is doppler evidence of stage I diastolic dysfunction Mild mitral regurgitation. The aortic valve is trileaflet. Mild aortic regurgitation. Moderate tricuspid regurgitation. PASP is estimated at 53 mmHg.   TTE-7/22/2022:   Normal left ventricle size and systolic function. Ejection fraction is visually estimated at 55-60%. No regional wall motion abnormalities seen. Normal left ventricle wall thickness. Indeterminate diastolic function. Normal right ventricular size and function. TAPSE 24 mm. No hemodynamically significant aortic stenosis is present. Mild to moderate tricuspid regurgitation. RVSP is 46 mmHg. Pulmonary hypertension is mild . No evidence for hemodynamically significant pericardial effusion     Assessment:  Syncope etiology unclear, rule out  VT/torsades from hypokalemia/hypomagnesemia with prolonged QTc interval and also orthostatic blood pressures  Dizziness mostly from orthostatic hypotension  Electrolyte imbalance secondary to poor nutrition and also alcohol use  Alcohol abuse  Prior history of VT and torsades secondary to hypokalemia and hypomagnesemia  History of hypertension, blood pressure low  Tobacco use use disorder  No history of diabetes or hyperlipidemia  Bronchial asthma  Mild thrombocytopenia, etiology unclear  Lower abdominal pain, etiology unclear. Hemodynamically stable, current blood pressure 135/91  Mild pulmonary hypertension mostly WHO group 3           Plan:   Keep potassium between 4 and 5 and magnesium about 2 check BMP today. Continue IV fluids with potassium supplements, check BMP and mag level  Check orthostatic blood pressures x1 today. Echocardiogram results were reviewed, as above with normal LV function without any significant valvular heart disease. Has mild to moderate TR with mild pulm hypertension. Patient was strongly encouraged to quit tobacco and alcohol use. Continue magnesium oxide 400 mg p.o. twice daily. Arrange 14-day Holter monitor upon discharge  Rest of the management as per primary service  Evaluation of abdominal pain as per primary service. Eder Wilcox MD., Rebeka Rice.   [facility]) Cardiology

## 2022-07-24 ENCOUNTER — APPOINTMENT (OUTPATIENT)
Dept: CT IMAGING | Age: 62
End: 2022-07-24
Payer: MEDICAID

## 2022-07-24 VITALS
TEMPERATURE: 98 F | RESPIRATION RATE: 16 BRPM | WEIGHT: 125 LBS | BODY MASS INDEX: 23 KG/M2 | HEIGHT: 62 IN | DIASTOLIC BLOOD PRESSURE: 89 MMHG | SYSTOLIC BLOOD PRESSURE: 111 MMHG | OXYGEN SATURATION: 98 % | HEART RATE: 73 BPM

## 2022-07-24 PROCEDURE — 96372 THER/PROPH/DIAG INJ SC/IM: CPT

## 2022-07-24 PROCEDURE — G0378 HOSPITAL OBSERVATION PER HR: HCPCS

## 2022-07-24 PROCEDURE — 6360000004 HC RX CONTRAST MEDICATION: Performed by: RADIOLOGY

## 2022-07-24 PROCEDURE — 6360000002 HC RX W HCPCS: Performed by: FAMILY MEDICINE

## 2022-07-24 PROCEDURE — 96366 THER/PROPH/DIAG IV INF ADDON: CPT

## 2022-07-24 PROCEDURE — 6370000000 HC RX 637 (ALT 250 FOR IP): Performed by: INTERNAL MEDICINE

## 2022-07-24 PROCEDURE — 74178 CT ABD&PLV WO CNTR FLWD CNTR: CPT

## 2022-07-24 PROCEDURE — 6370000000 HC RX 637 (ALT 250 FOR IP): Performed by: FAMILY MEDICINE

## 2022-07-24 PROCEDURE — 2580000003 HC RX 258: Performed by: FAMILY MEDICINE

## 2022-07-24 PROCEDURE — 6360000002 HC RX W HCPCS: Performed by: INTERNAL MEDICINE

## 2022-07-24 RX ORDER — LANOLIN ALCOHOL/MO/W.PET/CERES
400 CREAM (GRAM) TOPICAL 2 TIMES DAILY
Qty: 30 TABLET | Refills: 0 | Status: SHIPPED | OUTPATIENT
Start: 2022-07-24

## 2022-07-24 RX ORDER — LANOLIN ALCOHOL/MO/W.PET/CERES
400 CREAM (GRAM) TOPICAL 2 TIMES DAILY
Status: DISCONTINUED | OUTPATIENT
Start: 2022-07-24 | End: 2022-07-24 | Stop reason: HOSPADM

## 2022-07-24 RX ORDER — MAGNESIUM SULFATE IN WATER 40 MG/ML
2000 INJECTION, SOLUTION INTRAVENOUS ONCE
Status: COMPLETED | OUTPATIENT
Start: 2022-07-24 | End: 2022-07-24

## 2022-07-24 RX ORDER — SODIUM CHLORIDE 0.9 % (FLUSH) 0.9 %
10 SYRINGE (ML) INJECTION
Status: DISCONTINUED | OUTPATIENT
Start: 2022-07-24 | End: 2022-07-24 | Stop reason: HOSPADM

## 2022-07-24 RX ADMIN — ENOXAPARIN SODIUM 40 MG: 100 INJECTION SUBCUTANEOUS at 09:50

## 2022-07-24 RX ADMIN — DIPHENHYDRAMINE HYDROCHLORIDE 25 MG: 25 TABLET ORAL at 02:21

## 2022-07-24 RX ADMIN — HYDROCODONE BITARTRATE AND ACETAMINOPHEN 1 TABLET: 7.5; 325 TABLET ORAL at 02:21

## 2022-07-24 RX ADMIN — HYDROCODONE BITARTRATE AND ACETAMINOPHEN 1 TABLET: 7.5; 325 TABLET ORAL at 13:52

## 2022-07-24 RX ADMIN — Medication 10 ML: at 09:23

## 2022-07-24 RX ADMIN — Medication 10 ML: at 09:50

## 2022-07-24 RX ADMIN — IOPAMIDOL 60 ML: 755 INJECTION, SOLUTION INTRAVENOUS at 09:23

## 2022-07-24 RX ADMIN — MAGNESIUM SULFATE HEPTAHYDRATE 2000 MG: 40 INJECTION, SOLUTION INTRAVENOUS at 11:32

## 2022-07-24 RX ADMIN — CETIRIZINE HYDROCHLORIDE TABLETS 10 MG: 10 TABLET, FILM COATED ORAL at 09:49

## 2022-07-24 RX ADMIN — Medication 400 MG: at 09:50

## 2022-07-24 RX ADMIN — DIPHENHYDRAMINE HYDROCHLORIDE 25 MG: 25 TABLET ORAL at 13:52

## 2022-07-24 RX ADMIN — MAGNESIUM SULFATE HEPTAHYDRATE 2000 MG: 40 INJECTION, SOLUTION INTRAVENOUS at 09:55

## 2022-07-24 NOTE — PROGRESS NOTES
Pt was to dc home with holter monitor, clarified with Dr. Raymond Stone for order and made aware that pt to f/u with cardiology as outpt for placement of holter monitor

## 2022-07-24 NOTE — PROGRESS NOTES
Hospitalist Progress Note      SYNOPSIS: Patient admitted on 2022 for syncopal episode. Cardiology consulted and recommends 14 day holter monitor at [place]. SUBJECTIVE:    Patient seen and examined  Records reviewed. Patient is complaining of severe lower back pain/abdominal pain      Stable overnight. No other overnight issues reported. Temp (24hrs), Av.7 °F (36.5 °C), Min:97.2 °F (36.2 °C), Max:98.2 °F (36.8 °C)    DIET: ADULT DIET; Regular  CODE: Full Code    Intake/Output Summary (Last 24 hours) at 2022 1152  Last data filed at 2022  Gross per 24 hour   Intake 490 ml   Output --   Net 490 ml         OBJECTIVE:    BP (!) 131/105   Pulse 70   Temp 97.2 °F (36.2 °C) (Oral)   Resp 16   Ht 5' 2\" (1.575 m)   Wt 125 lb (56.7 kg)   SpO2 100%   BMI 22.86 kg/m²     General appearance: No apparent distress, appears stated age and cooperative. HEENT:  Conjunctivae/corneas clear. Neck: Supple. No jugular venous distention. Respiratory: Clear to auscultation bilaterally, normal respiratory effort  Cardiovascular: Regular rate rhythm, normal S1-S2  Abdomen: Soft, nontender, nondistended  Musculoskeletal: No clubbing, cyanosis, no bilateral lower extremity edema. Brisk capillary refill. Skin:  No rashes  on visible skin  Neurologic: awake, alert and following commands     ASSESSMENT:  Syncopal episode  Prolonged QT, history of 2//ventricular tachycardia  Mild pulm hypertension  Orthostatic hypotension  History of alcohol abuse  Hypokalemia now hyperkalemia  Hypomagnesemia  Thrombocytopenia  Tobacco abuse     PLAN:  -Appreciate cardiology input, echo shows normal ejection fraction with mild pulm hypertension.   Continue magnesium replacement, continue to monitor potassium level, cardiology plans for 14-day Holter monitor on discharge  -Obtain CT of the abdomen and pelvis - follow up  -Pain meds as needed  -Lovenox for DVT prophylaxis      DISPOSITION: Discharge pending CT of the abdomen , likely discharge today if no further syncopal episode or acute findings  Medications:  REVIEWED DAILY    Infusion Medications    sodium chloride       Scheduled Medications    magnesium oxide  400 mg Oral BID    magnesium sulfate  2,000 mg IntraVENous Once    amitriptyline  25 mg Oral Nightly    cetirizine  10 mg Oral Daily    pantoprazole  40 mg Oral QAM AC    sodium chloride flush  10 mL IntraVENous 2 times per day    enoxaparin  40 mg SubCUTAneous Daily     PRN Meds: sodium chloride flush, sodium chloride flush, sodium chloride, promethazine **OR** [DISCONTINUED] ondansetron, polyethylene glycol, acetaminophen **OR** acetaminophen, perflutren lipid microspheres, albuterol, diphenhydrAMINE, HYDROcodone-acetaminophen    Labs:     Recent Labs     07/21/22  1717 07/22/22  0723   WBC 6.7 4.9   HGB 13.8 12.7   HCT 39.0 36.8   * 94*         Recent Labs     07/21/22  2004 07/22/22  0723 07/23/22  1429    138 137   K 3.3* 3.7 5.1*    104 103   CO2 20* 19* 21*   BUN 8 7 4*   CREATININE 0.5 0.5 0.5   CALCIUM 8.6 8.3* 8.7         No results for input(s): PROT, ALB, ALKPHOS, ALT, AST, BILITOT, AMYLASE, LIPASE in the last 72 hours. No results for input(s): INR in the last 72 hours. No results for input(s): Rhae Childes in the last 72 hours.     Chronic labs:    Lab Results   Component Value Date    CHOL 137 05/08/2017    TRIG 131 05/08/2017    HDL 78 05/08/2017    LDLCALC 33 05/08/2017    TSH 0.600 11/04/2019    INR 0.9 10/28/2019    LABA1C 5.6 11/12/2021       Radiology: REVIEWED DAILY    Echo Complete    Result Date: 7/22/2022  Transthoracic Echocardiography Report (TTE)  Demographics   Patient Name    Umesh Ghosh Gender            Female                  J   Medical Record  [id number]      Room Number       8989  Number   Account #       [de-identified]     Procedure Date    07/22/2022   Corporate ID                  Ordering                                Physician   Accession [id number]    Referring  Number                        Physician   Date of Birth   1960    Sonographer       Charley Babcock [facility]   Age             58 year(s)    Interpreting      [address]                                Physician         Physician Cardiology                                                  Jose Breaux MD                                 Any Other  Procedure Type of Study   TTE procedure:Echo Complete W/Doppler & Color Flow. Procedure Date Date: 07/22/2022 Start: 07:43 AM Study Location: Portable Technical Quality: Adequate visualization Indications:Syncope. Patient Status: Routine Height: 62 inches Weight: 125 pounds BSA: 1.57 m^2 BMI: 22.86 kg/m^2 HR: 72 bpm BP: 122/74 mmHg  Findings   Left Ventricle  Normal left ventricle size and systolic function. Ejection fraction is visually estimated at 55-60%. No regional wall motion abnormalities seen. Normal left ventricle wall thickness. Indeterminate diastolic function. Right Ventricle  Normal right ventricular size and function. TAPSE 24 mm. Left Atrium  Left atrium is of normal size. Right Atrium  Normal right atrium size. Mitral Valve  Normal mitral valve structure and function. No evidence of mitral valve stenosis. Physiologic and/or trace mitral regurgitation is present. Tricuspid Valve  The tricuspid valve appears structurally normal.  Mild to moderate tricuspid regurgitation. RVSP is 46 mmHg. Pulmonary hypertension is mild . Aortic Valve  Structurally normal aortic valve. The aortic valve is trileaflet. No hemodynamically significant aortic stenosis is present. Physiologic and/or trace aortic regurgitation is noted. Pulmonic Valve  Pulmonic valve is structurally normal.  No evidence of any pulmonic regurgitation. No evidence of pulmonic valve stenosis. Pericardial Effusion  No evidence for hemodynamically significant pericardial effusion. Pleural Effusion  No evidence of pleural effusion.    Aorta Aortic root dimension within normal limits. Miscellaneous  The inferior vena cava diameter is normal with normal respiratory  variation. Conclusions   Summary  Normal left ventricle size and systolic function. Ejection fraction is visually estimated at 55-60%. No regional wall motion abnormalities seen. Normal left ventricle wall thickness. Indeterminate diastolic function. Normal right ventricular size and function. TAPSE 24 mm. No hemodynamically significant aortic stenosis is present. Mild to moderate tricuspid regurgitation. RVSP is 46 mmHg. Pulmonary hypertension is mild . No evidence for hemodynamically significant pericardial effusion.    Signature   ----------------------------------------------------------------  Electronically signed by Isabel Laws MD(Interpreting  physician) on 07/22/2022 06:56 PM  ----------------------------------------------------------------  M-Mode/2D Measurements & Calculations   LV Diastolic   LV Systolic Dimension: 2.5   AV Cusp Separation: 1.7 cmLA  Dimension: 4   cm                           Dimension: 3 cmAO Root  cm             LV Volume Diastolic: 08.0 ml Dimension: 3.1 cm  LV FS:37.5 %   LV Volume Systolic: 23 ml  LV PW          LV EDV/LV EDV Index: 51.6  Diastolic: 0.9 MC/83 IC/G^1SG ESV/LV ESV  cm             Index: 23 ml/15ml/ m^2       RV Diastolic Dimension: 2.6 cm  Septum         EF Calculated: 57.3 %  Diastolic: 0.9 LV Mass Index: 70 l/min*m^2  Ascending Aorta: 2.9 cm  cm                                          LA volume/Index: 20.8 ml  CO: 5.58 l/min                              /13.24ml/m^2  CI: 3.55       LVOT: 2 cm                   RA Area: 11 cm^2  l/m*m^2  LV Mass:  109.69 g  Doppler Measurements & Calculations   MV Peak E-Wave: 0.52 AV Peak Velocity: 1.33 LVOT Peak Velocity: 1.18 m/s  m/s                  m/s                    LVOT Mean Velocity: 0.82 m/s  MV Peak A-Wave: 0.82 AV Peak Gradient: 7.08 LVOT Peak Gradient: 5.5  m/s mmHg                   mmHgLVOT Mean Gradient: 3 mmHg  MV E/A Ratio: 0.64   AV Mean Velocity: 1.03 Estimated RVSP: 46 mmHg  MV Peak Gradient:    m/s                    Estimated RAP:3 mmHg  3.3 mmHg             AV Mean Gradient: 4.5  MV Mean Gradient:    mmHg  1.1 mmHg             AV VTI: 25.7 cm        TR Velocity:3.28 m/s  MV Mean Velocity:    AV Area                TR Gradient:42.98 mmHg  0.47 m/s             (Continuity):3.02 cm^2 PV Peak Velocity: 0.77 m/s  MV Deceleration      AV Deceleration Time:  PV Peak Gradient: 2.37 mmHg  Time: 189.8 msec     2170.2 msec            PV Mean Velocity: 0.56 m/s  MV P1/2t: 52.8 msec  LVOT VTI: 24.7 cm      PV Mean Gradient: 1.4 mmHg  MVA by PHT:4.17 cm^2 IVRT: 90 msec  MV Area              Estimated PASP: 45.98  (continuity): 3.9    mmHg  cm^2  MV E' Septal  Velocity: 0.06 m/s  MV E' Lateral  Velocity: 6 m/s  [email or web address] [id number]    CT HEAD WO CONTRAST    Result Date: 7/21/2022  EXAMINATION: CT OF THE HEAD WITHOUT CONTRAST  7/21/2022 6:00 pm TECHNIQUE: CT of the head was performed without the administration of intravenous contrast. Automated exposure control, iterative reconstruction, and/or weight based adjustment of the mA/kV was utilized to reduce the radiation dose to as low as reasonably achievable. COMPARISON: April 13, 2021 HISTORY: ORDERING SYSTEM PROVIDED HISTORY: fall- hit head, syncope TECHNOLOGIST PROVIDED HISTORY: Reason for exam:->fall- hit head, syncope Has a \"code stroke\" or \"stroke alert\" been called? ->No Decision Support Exception - unselect if not a suspected or confirmed emergency medical condition->Emergency Medical Condition (MA) What reading provider will be dictating this exam?->CRC FINDINGS: BRAIN/VENTRICLES: There is no acute intracranial hemorrhage, mass effect or midline shift. No abnormal extra-axial fluid collection.   The gray-white differentiation is maintained without evidence of an acute infarct. There is no evidence of hydrocephalus. ORBITS: The visualized portion of the orbits demonstrate no acute abnormality. SINUSES: The visualized paranasal sinuses and mastoid air cells demonstrate no acute abnormality. SOFT TISSUES/SKULL:  No acute abnormality of the visualized skull or soft tissues. No acute intracranial abnormality. CT THORACIC SPINE WO CONTRAST    Result Date: 7/21/2022  EXAMINATION: CT OF THE THORACIC SPINE WITHOUT CONTRAST  7/21/2022 8:22 pm: TECHNIQUE: CT of the thoracic spine was performed without the administration of intravenous contrast. Multiplanar reformatted images are provided for review. Automated exposure control, iterative reconstruction, and/or weight based adjustment of the mA/kV was utilized to reduce the radiation dose to as low as reasonably achievable. Dose: Total Exam DLP: 129 mGy-cm. COMPARISON: CTA chest 09/13/2021 HISTORY: ORDERING SYSTEM PROVIDED HISTORY: pain TECHNOLOGIST PROVIDED HISTORY: Reason for exam:->pain What reading provider will be dictating this exam?->CRC FINDINGS: BONES/ALIGNMENT: T5 and T6 moderate anterior compression fractures are stable compared to the prior exam.  There is a new mild T12 compression fracture. Normal thoracic spine alignment is noted. No osseous destructive lesions are seen. DEGENERATIVE CHANGES: No gross spinal canal stenosis or bony neural foraminal narrowing of the thoracic spine. SOFT TISSUES: No paraspinal mass is seen. New mild T12 compression fracture. Stable chronic moderate T5 and T6 anterior compression fractures. CT LUMBAR SPINE WO CONTRAST    Result Date: 7/21/2022  EXAMINATION: CT OF THE LUMBAR SPINE WITHOUT CONTRAST  7/21/2022 TECHNIQUE: CT of the lumbar spine was performed without the administration of intravenous contrast. Multiplanar reformatted images are provided for review.  Adjustment of mA and/or kV according to patient size was utilized. Automated exposure control, iterative reconstruction, and/or weight based adjustment of the mA/kV was utilized to reduce the radiation dose to as low as reasonably achievable. Dose: Total Exam DLP: 360 mGy-cm. COMPARISON: Lumbar spine x-rays 06/21/2022. CT lumbar spine 05/21/2022. HISTORY: ORDERING SYSTEM PROVIDED HISTORY: pain TECHNOLOGIST PROVIDED HISTORY: Reason for exam:->pain Decision Support Exception - unselect if not a suspected or confirmed emergency medical condition->Emergency Medical Condition (MA) What reading provider will be dictating this exam?->CRC FINDINGS: BONES/ALIGNMENT: Moderate L4 and L5 anterior compression fractures are unchanged. L1 vertebroplasty is stable. No new acute fracture is identified in the lumbar spine. Postsurgical changes consistent with L3-L5 fusion appear stable. There is stable 4 mm anterolisthesis of L3 on L4. DEGENERATIVE CHANGES: Multilevel degenerative endplate change and facet arthropathy is stable. No new canal or neural foraminal stenosis is seen. SOFT TISSUES/RETROPERITONEUM: No paraspinal mass is seen. No new acute lumbar spine abnormality identified. Stable moderate L4 and L5 anterior compression fractures status post prior L3-L5 fusion. XR CHEST PORTABLE    Result Date: 7/21/2022  EXAMINATION: ONE XRAY VIEW OF THE CHEST 7/21/2022 6:16 pm COMPARISON: 01/13/2022 HISTORY: ORDERING SYSTEM PROVIDED HISTORY: Shortness of breath TECHNOLOGIST PROVIDED HISTORY: Reason for exam:->Shortness of breath What reading provider will be dictating this exam?->CRC FINDINGS: There is a small left basilar opacity. The right lung is clear. The cardiomediastinal contour is unremarkable. No pneumothorax is seen.      Small nonspecific left basilar opacity, which may represent atelectasis, pneumonia, effusion or a combination thereof.         +++++++++++++++++++++++++++++++++++++++++++++++++  Becki Landers MD  [facility] [place] Physician [place] [address] [address]  +++++++++++++++++++++++++++++++++++++++++++++++++  NOTE: This report was transcribed using voice recognition software. Every effort was made to ensure accuracy; however, inadvertent computerized transcription errors may be present.

## 2022-07-25 ENCOUNTER — TELEPHONE (OUTPATIENT)
Dept: NEUROSURGERY | Age: 62
End: 2022-07-25

## 2022-07-25 ENCOUNTER — TELEPHONE (OUTPATIENT)
Dept: CARDIOLOGY CLINIC | Age: 62
End: 2022-07-25

## 2022-07-25 DIAGNOSIS — R55 SYNCOPE AND COLLAPSE: Primary | ICD-10-CM

## 2022-07-25 NOTE — TELEPHONE ENCOUNTER
Patient called and left a message to schedule hospital F/U. Per Dr. Carbajal [place] note from 7/23/22, patient needs 14-day Zio AT re: syncope. Attempted to reach patient, number busy.

## 2022-07-27 NOTE — TELEPHONE ENCOUNTER
Patient notified of Dr. Tucker Bill recommendation. Patient requests 8/2/22. Monitor scheduled for 8/2/22 at 2:40 p.m.

## 2022-08-05 ENCOUNTER — HOSPITAL ENCOUNTER (OUTPATIENT)
Age: 62
Discharge: HOME | End: 2022-08-05
Payer: MEDICAID

## 2022-08-05 LAB
ALBUMIN SERPL-MCNC: 4.1 G/DL (ref 3.5–5.2)
ALP BLD-CCNC: 140 U/L (ref 35–104)
ALT SERPL-CCNC: 19 U/L (ref 0–32)
ANION GAP SERPL CALCULATED.3IONS-SCNC: 16 MMOL/L (ref 7–16)
AST SERPL-CCNC: 33 U/L (ref 0–31)
BACTERIA: ABNORMAL /HPF
BASOPHILS ABSOLUTE: 0.07 E9/L (ref 0–0.2)
BASOPHILS RELATIVE PERCENT: 0.9 % (ref 0–2)
BILIRUB SERPL-MCNC: 0.6 MG/DL (ref 0–1.2)
BILIRUBIN URINE: ABNORMAL
BLOOD, URINE: ABNORMAL
BUN BLDV-MCNC: 9 MG/DL (ref 6–23)
C-REACTIVE PROTEIN: 0.3 MG/DL (ref 0–0.4)
CALCIUM SERPL-MCNC: 9.3 MG/DL (ref 8.6–10.2)
CHLORIDE BLD-SCNC: 104 MMOL/L (ref 98–107)
CLARITY: ABNORMAL
CO2: 19 MMOL/L (ref 22–29)
COLOR: ABNORMAL
CREAT SERPL-MCNC: 0.8 MG/DL (ref 0.5–1)
CRYSTALS, UA: ABNORMAL /HPF
EOSINOPHILS ABSOLUTE: 0.08 E9/L (ref 0.05–0.5)
EOSINOPHILS RELATIVE PERCENT: 1.1 % (ref 0–6)
GFR AFRICAN AMERICAN: >60
GFR NON-AFRICAN AMERICAN: >60 ML/MIN/1.73
GLUCOSE BLD-MCNC: 93 MG/DL (ref 74–99)
GLUCOSE URINE: 100 MG/DL
HBA1C MFR BLD: 5 % (ref 4–5.6)
HCT VFR BLD CALC: 39.8 % (ref 34–48)
HEMOGLOBIN: 13.8 G/DL (ref 11.5–15.5)
IMMATURE GRANULOCYTES #: 0.02 E9/L
IMMATURE GRANULOCYTES %: 0.3 % (ref 0–5)
KETONES, URINE: ABNORMAL MG/DL
LEUKOCYTE ESTERASE, URINE: ABNORMAL
LYMPHOCYTES ABSOLUTE: 2.91 E9/L (ref 1.5–4)
LYMPHOCYTES RELATIVE PERCENT: 38.3 % (ref 20–42)
MCH RBC QN AUTO: 34.1 PG (ref 26–35)
MCHC RBC AUTO-ENTMCNC: 34.7 % (ref 32–34.5)
MCV RBC AUTO: 98.3 FL (ref 80–99.9)
MONOCYTES ABSOLUTE: 0.6 E9/L (ref 0.1–0.95)
MONOCYTES RELATIVE PERCENT: 7.9 % (ref 2–12)
NEUTROPHILS ABSOLUTE: 3.91 E9/L (ref 1.8–7.3)
NEUTROPHILS RELATIVE PERCENT: 51.5 % (ref 43–80)
NITRITE, URINE: NEGATIVE
PDW BLD-RTO: 13.3 FL (ref 11.5–15)
PH UA: 5 (ref 5–9)
PLATELET # BLD: 239 E9/L (ref 130–450)
PMV BLD AUTO: 11 FL (ref 7–12)
POTASSIUM SERPL-SCNC: 4 MMOL/L (ref 3.5–5)
PROTEIN UA: NEGATIVE MG/DL
RBC # BLD: 4.05 E12/L (ref 3.5–5.5)
RBC UA: ABNORMAL /HPF (ref 0–2)
SEDIMENTATION RATE, ERYTHROCYTE: 3 MM/HR (ref 0–20)
SODIUM BLD-SCNC: 139 MMOL/L (ref 132–146)
SPECIFIC GRAVITY UA: >=1.03 (ref 1–1.03)
TOTAL PROTEIN: 7 G/DL (ref 6.4–8.3)
TSH SERPL DL<=0.05 MIU/L-ACNC: 0.89 UIU/ML (ref 0.27–4.2)
UROBILINOGEN, URINE: 1 E.U./DL
WBC # BLD: 7.6 E9/L (ref 4.5–11.5)
WBC UA: ABNORMAL /HPF (ref 0–5)

## 2022-08-05 PROCEDURE — 83550 IRON BINDING TEST: CPT

## 2022-08-05 PROCEDURE — 85651 RBC SED RATE NONAUTOMATED: CPT

## 2022-08-05 PROCEDURE — 81001 URINALYSIS AUTO W/SCOPE: CPT

## 2022-08-05 PROCEDURE — 82728 ASSAY OF FERRITIN: CPT

## 2022-08-05 PROCEDURE — 85025 COMPLETE CBC W/AUTO DIFF WBC: CPT

## 2022-08-05 PROCEDURE — 84443 ASSAY THYROID STIM HORMONE: CPT

## 2022-08-05 PROCEDURE — 83036 HEMOGLOBIN GLYCOSYLATED A1C: CPT

## 2022-08-05 PROCEDURE — 80053 COMPREHEN METABOLIC PANEL: CPT

## 2022-08-05 PROCEDURE — 86140 C-REACTIVE PROTEIN: CPT

## 2022-08-05 PROCEDURE — 83540 ASSAY OF IRON: CPT

## 2022-08-05 PROCEDURE — 80074 ACUTE HEPATITIS PANEL: CPT

## 2022-08-05 PROCEDURE — 84439 ASSAY OF FREE THYROXINE: CPT

## 2022-08-05 PROCEDURE — 86703 HIV-1/HIV-2 1 RESULT ANTBDY: CPT

## 2022-08-05 PROCEDURE — 36415 COLL VENOUS BLD VENIPUNCTURE: CPT

## 2022-08-06 LAB
FERRITIN: 574 NG/ML
IRON SATURATION: 28 % (ref 15–50)
IRON: 68 MCG/DL (ref 37–145)
T4 FREE: 0.95 NG/DL (ref 0.93–1.7)
TOTAL IRON BINDING CAPACITY: 242 MCG/DL (ref 250–450)

## 2022-08-08 LAB
HAV IGM SER IA-ACNC: NORMAL
HEPATITIS B CORE IGM ANTIBODY: NORMAL
HEPATITIS B SURFACE ANTIGEN INTERPRETATION: NORMAL
HEPATITIS C ANTIBODY INTERPRETATION: NORMAL
HIV-1 AND HIV-2 ANTIBODIES: NORMAL

## 2022-08-17 ENCOUNTER — NURSE ONLY (OUTPATIENT)
Dept: CARDIOLOGY CLINIC | Age: 62
End: 2022-08-17

## 2022-08-17 NOTE — PROGRESS NOTES
Patient was seen today and a 14 day monitor was placed. Monitor was ordered by Dr. Luis Enrique Persaud. The monitor was applied, instructions were given to the patient. Patient stated understanding and gave verbalize readback.    Monitor company:[employer]  Serial number: [id number]

## 2022-08-19 ENCOUNTER — NURSE ONLY (OUTPATIENT)
Dept: CARDIOLOGY CLINIC | Age: 62
End: 2022-08-19

## 2022-08-19 NOTE — PROGRESS NOTES
Patient called in stating she lost part of her Zio AT monitor. After speaking to patient found out that the [place] was broken in half. She still has the  part. She lost the part that holds the envelope. So I called [employer] and spoke to [employer] who stated she would monitor the patient to make sure transmissions were coming through and would ship her a new envelope. Also if she needed a new Zio At she would ship the patient a new one.

## 2022-08-22 DIAGNOSIS — S22.080D COMPRESSION FRACTURE OF T12 VERTEBRA WITH ROUTINE HEALING, SUBSEQUENT ENCOUNTER: Primary | ICD-10-CM

## 2022-08-23 ENCOUNTER — HOSPITAL ENCOUNTER (OUTPATIENT)
Age: 62
Discharge: HOME | End: 2022-08-25
Payer: MEDICAID

## 2022-08-23 ENCOUNTER — HOSPITAL ENCOUNTER (OUTPATIENT)
Dept: GENERAL RADIOLOGY | Age: 62
Discharge: HOME | End: 2022-08-25
Payer: MEDICAID

## 2022-08-23 ENCOUNTER — OFFICE VISIT (OUTPATIENT)
Dept: NEUROSURGERY | Age: 62
End: 2022-08-23
Payer: MEDICAID

## 2022-08-23 VITALS
SYSTOLIC BLOOD PRESSURE: 111 MMHG | HEIGHT: 62 IN | DIASTOLIC BLOOD PRESSURE: 90 MMHG | BODY MASS INDEX: 22.63 KG/M2 | HEART RATE: 83 BPM | WEIGHT: 123 LBS

## 2022-08-23 DIAGNOSIS — M25.559 HIP PAIN: ICD-10-CM

## 2022-08-23 DIAGNOSIS — S22.080D COMPRESSION FRACTURE OF T12 VERTEBRA WITH ROUTINE HEALING, SUBSEQUENT ENCOUNTER: Primary | ICD-10-CM

## 2022-08-23 DIAGNOSIS — S22.080D COMPRESSION FRACTURE OF T12 VERTEBRA WITH ROUTINE HEALING, SUBSEQUENT ENCOUNTER: ICD-10-CM

## 2022-08-23 PROCEDURE — 3017F COLORECTAL CA SCREEN DOC REV: CPT | Performed by: STUDENT IN AN ORGANIZED HEALTH CARE EDUCATION/TRAINING PROGRAM

## 2022-08-23 PROCEDURE — 99212 OFFICE O/P EST SF 10 MIN: CPT

## 2022-08-23 PROCEDURE — 72100 X-RAY EXAM L-S SPINE 2/3 VWS: CPT

## 2022-08-23 PROCEDURE — 99213 OFFICE O/P EST LOW 20 MIN: CPT | Performed by: STUDENT IN AN ORGANIZED HEALTH CARE EDUCATION/TRAINING PROGRAM

## 2022-08-23 PROCEDURE — G8427 DOCREV CUR MEDS BY ELIG CLIN: HCPCS | Performed by: STUDENT IN AN ORGANIZED HEALTH CARE EDUCATION/TRAINING PROGRAM

## 2022-08-23 PROCEDURE — G8420 CALC BMI NORM PARAMETERS: HCPCS | Performed by: STUDENT IN AN ORGANIZED HEALTH CARE EDUCATION/TRAINING PROGRAM

## 2022-08-23 PROCEDURE — 4004F PT TOBACCO SCREEN RCVD TLK: CPT | Performed by: STUDENT IN AN ORGANIZED HEALTH CARE EDUCATION/TRAINING PROGRAM

## 2022-08-23 PROCEDURE — 73521 X-RAY EXAM HIPS BI 2 VIEWS: CPT

## 2022-08-23 NOTE — PROGRESS NOTES
Hospital Follow-up     This is a 64year old female who presents to the office for a 3 month follow-up s/p T12 compression fracture. Subjective: Patient states her lower back is feeling better, but she admits to right hip pain that radiates down her right leg. She denies any associated weakness, but does admit to associated numbness in her feet. She denies any alleviating or aggravating factors. She has not tried any recent PT or COBY but states she has been referred to PT and was planning on starting soon. She denies any bowel or bladder incontinence. She is a current smoker, smoking about 4 cigarettes a day. She denies any blood thinning medications. Brace complaint. XR reviewed. Physical Exam:              WDWN, no apparent distress              Non-labored breathing               Vitals Stable              Alert and oriented x3              CN 3-12 intact              PERRL              EOMI              QUINTANILLA well              Motor strength symmetric              Sensation to LT intact bilaterally   In LSO brace                  Imagin2022 XR Lumbar Spine  Impression   Stable compression injury at L4 with stable appearing L1 kyphoplasty and   intact lumbar fusion hardware. Stable alignment. See above. Assessment: This is a 58 y.o.  female presenting for a 3 month follow-up s/p T12 compression fracture. Stable. Plan:  -Pain control and expectations discussed  -Can discontinue brace and restrictions   -Hip XR to evaluate hip pain  -Okay to start PT from our standpoint. -OARRS report reviewed   -Follow-up in neurosurgery clinic in prn  -Call or return to neurosurgery office sooner if symptoms worsen or if new issues arise in the interim.     Electronically signed by Bert Samano PA-C on 2022 at 12:45 PM

## 2022-08-28 NOTE — DISCHARGE SUMMARY
Hospital Medicine Discharge Summary    Patient ID: Penny Mcpherson      Patient's PCP: Sue Turpin MD    Admit Date: 7/21/2022     Discharge Date: 7/24/2022      Admitting Physician: Nicolás Miller DO     Discharge Physician: Migue Cordova MD     Discharge Diagnoses: Active Hospital Problems    Diagnosis Date Noted    Syncope and collapse [R55] 06/08/2016       The patient was seen and examined on day of discharge and this discharge summary is in conjunction with any daily progress note from day of discharge. Hospital Course:   Patient admitted on 7/21/2022 for syncopal episode. Cardiology consulted and recommends 14 day holter monitor at [place]. Discharged to home on 7/24/22 in stable condition with pcp follow up         Exam:     /89   Pulse 73   Temp 98 °F (36.7 °C) (Oral)   Resp 16   Ht 5' 2\" (1.575 m)   Wt 125 lb (56.7 kg)   SpO2 98%   BMI 22.86 kg/m²   General appearance: No apparent distress, appears stated age and cooperative. HEENT:  Conjunctivae/corneas clear. Neck: Supple. No jugular venous distention. Respiratory: Clear to auscultation bilaterally, normal respiratory effort  Cardiovascular: Regular rate rhythm, normal S1-S2  Abdomen: Soft, nontender, nondistended  Musculoskeletal: No clubbing, cyanosis, no bilateral lower extremity edema. Brisk capillary refill. Skin:  No rashes  on visible skin  Neurologic: awake, alert and following commands     Consults:     IP CONSULT TO INTERNAL MEDICINE  IP CONSULT TO CARDIOLOGY    Significant Diagnostic Studies:     CT ABDOMEN PELVIS W WO CONTRAST Additional Contrast? None   Final Result   1. Right inguinal hernia containing small bowel loops but no obstruction or   strangulation. No other significant gastrointestinal abnormality. Mild   diverticulosis but no acute diverticulitis. 2. Fatty liver but no focal disease. Gallbladder distended but not thickened. 3. No evidence of renal stones or obstructive uropathy.    4. Stable posterior fusion between L3 and L5. Stable vertebroplasty cement   at L1. Mild compression injury at L2.         CT THORACIC SPINE WO CONTRAST   Final Result   New mild T12 compression fracture. Stable chronic moderate T5 and T6 anterior compression fractures. CT LUMBAR SPINE WO CONTRAST   Final Result   No new acute lumbar spine abnormality identified. Stable moderate L4 and L5 anterior compression fractures status post prior   L3-L5 fusion. XR CHEST PORTABLE   Final Result   Small nonspecific left basilar opacity, which may represent atelectasis,   pneumonia, effusion or a combination thereof. CT HEAD WO CONTRAST   Final Result   No acute intracranial abnormality. Disposition:  home     Discharge Instructions/Follow-up:  Keep scheduled follow up appointments. Take medications as prescribed. Code Status:  Prior     Activity: activity as tolerated    Diet: cardiac diet    Labs: For convenience and continuity at follow-up the following most recent labs are provided:      CBC:    Lab Results   Component Value Date/Time    WBC 7.6 08/05/2022 03:05 PM    HGB 13.8 08/05/2022 03:05 PM    HCT 39.8 08/05/2022 03:05 PM     08/05/2022 03:05 PM       Renal:    Lab Results   Component Value Date/Time     08/05/2022 03:05 PM    K 4.0 08/05/2022 03:05 PM    K 3.7 07/22/2022 07:23 AM     08/05/2022 03:05 PM    CO2 19 08/05/2022 03:05 PM    BUN 9 08/05/2022 03:05 PM    CREATININE 0.8 08/05/2022 03:05 PM    CALCIUM 9.3 08/05/2022 03:05 PM    PHOS 3.9 04/14/2021 01:10 PM       Discharge Medications:     Discharge Medication List as of 7/24/2022  5:15 PM             Details   magnesium oxide (MAG-OX) 400 (240 Mg) MG tablet Take 1 tablet by mouth in the morning and 1 tablet before bedtime. , Disp-30 tablet, R-0Normal                Details   albuterol sulfate HFA (PROVENTIL;VENTOLIN;PROAIR) 108 (90 Base) MCG/ACT inhaler Inhale 2 puffs into the lungs every 6 hours as needed for WheezingHistorical Med      cetirizine (ZYRTEC) 10 MG tablet Take 10 mg by mouth in the morning. Historical Med      tiZANidine (ZANAFLEX) 4 MG tablet Take 4 mg by mouth every 6 hours as neededHistorical Med      HYDROcodone-acetaminophen (NORCO) 7.5-325 MG per tablet Take 1 tablet by mouth every 8 hours as needed for Pain. Historical Med      hydrOXYzine (ATARAX) 25 MG tablet Take 25 mg by mouth every 8 hours as needed for Anxiety Historical Med      amitriptyline (ELAVIL) 25 MG tablet Take 25 mg by mouth nightly Historical Med      omeprazole (PRILOSEC) 40 MG capsule Take 40 mg by mouth daily as needed Historical Med             Time Spent on discharge is more than 45 minutes in the examination, evaluation, counseling and review of medications and discharge plan.       Signed:    Santa Zamorano MD   8/28/2022

## 2022-08-29 ENCOUNTER — HOSPITAL ENCOUNTER (OUTPATIENT)
Dept: ULTRASOUND IMAGING | Age: 62
Discharge: HOME | End: 2022-08-31
Payer: MEDICAID

## 2022-08-29 DIAGNOSIS — R55 SYNCOPE AND COLLAPSE: ICD-10-CM

## 2022-08-29 PROCEDURE — 93880 EXTRACRANIAL BILAT STUDY: CPT

## 2022-08-29 PROCEDURE — 93880 EXTRACRANIAL BILAT STUDY: CPT | Performed by: RADIOLOGY

## 2022-09-12 ENCOUNTER — TELEPHONE (OUTPATIENT)
Dept: CARDIOLOGY CLINIC | Age: 62
End: 2022-09-12

## 2022-09-12 DIAGNOSIS — R55 SYNCOPE AND COLLAPSE: ICD-10-CM

## 2022-09-12 NOTE — TELEPHONE ENCOUNTER
----- Message from Lavelle Avilez MD sent at 9/12/2022  1:33 PM EDT -----  Holter monitor showed normal rhythm without any life-threatening arrhythmias.

## 2022-09-28 NOTE — PROGRESS NOTES
Subjective: Patient seen and examined for left proximal humerus fracture. She is feeling much better. She has been coming out of her sling at least twice a day to stretch her elbow wrist and fingers. She is out of physical therapy visits due to her recent back surgery. Otherwise, I am going to give her guidelines she did not therapy work with her in the office today. She really has no complaints. Review of systems: Patient denies chest pain, shortness of breath, fevers, chills, nausea, vomiting        Objective:  Patient is oriented and alert x3. Patient is in no acute distress. Patient appears comfortable. Left shoulder   Skin intact, no swelling or ecchymosis present   No significant tenderness palpation of proximal humerus   No pain on gentle range of motion   Axillary nerve sensations intact   Compartments soft and compressible   Fires M U R nerves   2/4 radial pulse   Sensation intact   Capillary refill less than 3 seconds          X-ray -left shoulder shows that the proximal humerus is in acceptable position for nonoperative treatment. There is early callus formation present. Session: I talked the patient detail about the fact that we will show her exercises to do here with the therapist to give her a sheet of exercises to do at home. I like to see her back in 3 to 4 weeks for further x-ray examination. She is agreeable to plan.     Impression: Left proximal humerus fracture being treated nonoperatively      Occupational therapy exercises at home due to out of therapy visits  Follow-up in 3 weeks for x-rays  Call sooner with any questions or concerns Yes

## 2022-12-25 ENCOUNTER — HOSPITAL ENCOUNTER (EMERGENCY)
Age: 62
Discharge: HOME | End: 2022-12-25
Attending: EMERGENCY MEDICINE
Payer: MEDICAID

## 2022-12-25 ENCOUNTER — APPOINTMENT (OUTPATIENT)
Dept: GENERAL RADIOLOGY | Age: 62
End: 2022-12-25
Payer: MEDICAID

## 2022-12-25 VITALS
RESPIRATION RATE: 19 BRPM | OXYGEN SATURATION: 94 % | WEIGHT: 115 LBS | HEIGHT: 62 IN | SYSTOLIC BLOOD PRESSURE: 130 MMHG | BODY MASS INDEX: 21.16 KG/M2 | DIASTOLIC BLOOD PRESSURE: 86 MMHG | TEMPERATURE: 97.7 F | HEART RATE: 86 BPM

## 2022-12-25 DIAGNOSIS — R07.9 CHEST PAIN, UNSPECIFIED TYPE: Primary | ICD-10-CM

## 2022-12-25 LAB
ALBUMIN SERPL-MCNC: 3.9 G/DL (ref 3.5–5.2)
ALP BLD-CCNC: 97 U/L (ref 35–104)
ALT SERPL-CCNC: 25 U/L (ref 0–32)
ANION GAP SERPL CALCULATED.3IONS-SCNC: 15 MMOL/L (ref 7–16)
AST SERPL-CCNC: 63 U/L (ref 0–31)
BASOPHILS ABSOLUTE: 0.07 E9/L (ref 0–0.2)
BASOPHILS RELATIVE PERCENT: 1.5 % (ref 0–2)
BILIRUB SERPL-MCNC: 0.7 MG/DL (ref 0–1.2)
BUN BLDV-MCNC: 4 MG/DL (ref 6–23)
CALCIUM SERPL-MCNC: 8.7 MG/DL (ref 8.6–10.2)
CHLORIDE BLD-SCNC: 107 MMOL/L (ref 98–107)
CO2: 28 MMOL/L (ref 22–29)
CREAT SERPL-MCNC: 0.6 MG/DL (ref 0.5–1)
EOSINOPHILS ABSOLUTE: 0.04 E9/L (ref 0.05–0.5)
EOSINOPHILS RELATIVE PERCENT: 0.9 % (ref 0–6)
GFR SERPL CREATININE-BSD FRML MDRD: >60 ML/MIN/1.73
GLUCOSE BLD-MCNC: 78 MG/DL (ref 74–99)
HCT VFR BLD CALC: 34.3 % (ref 34–48)
HEMOGLOBIN: 11.8 G/DL (ref 11.5–15.5)
IMMATURE GRANULOCYTES #: 0.01 E9/L
IMMATURE GRANULOCYTES %: 0.2 % (ref 0–5)
LIPASE: 19 U/L (ref 13–60)
LYMPHOCYTES ABSOLUTE: 3.05 E9/L (ref 1.5–4)
LYMPHOCYTES RELATIVE PERCENT: 64.9 % (ref 20–42)
MAGNESIUM: 1.2 MG/DL (ref 1.6–2.6)
MCH RBC QN AUTO: 32.5 PG (ref 26–35)
MCHC RBC AUTO-ENTMCNC: 34.4 % (ref 32–34.5)
MCV RBC AUTO: 94.5 FL (ref 80–99.9)
MONOCYTES ABSOLUTE: 0.4 E9/L (ref 0.1–0.95)
MONOCYTES RELATIVE PERCENT: 8.5 % (ref 2–12)
NEUTROPHILS ABSOLUTE: 1.13 E9/L (ref 1.8–7.3)
NEUTROPHILS RELATIVE PERCENT: 24 % (ref 43–80)
PDW BLD-RTO: 14.4 FL (ref 11.5–15)
PLATELET # BLD: 130 E9/L (ref 130–450)
PMV BLD AUTO: 11.3 FL (ref 7–12)
POTASSIUM SERPL-SCNC: 3.3 MMOL/L (ref 3.5–5)
RBC # BLD: 3.63 E12/L (ref 3.5–5.5)
SODIUM BLD-SCNC: 150 MMOL/L (ref 132–146)
TOTAL PROTEIN: 6.2 G/DL (ref 6.4–8.3)
TROPONIN, HIGH SENSITIVITY: 12 NG/L (ref 0–9)
TROPONIN, HIGH SENSITIVITY: 8 NG/L (ref 0–9)
TROPONIN, HIGH SENSITIVITY: 9 NG/L (ref 0–9)
WBC # BLD: 4.7 E9/L (ref 4.5–11.5)

## 2022-12-25 PROCEDURE — 71045 X-RAY EXAM CHEST 1 VIEW: CPT

## 2022-12-25 PROCEDURE — 96366 THER/PROPH/DIAG IV INF ADDON: CPT

## 2022-12-25 PROCEDURE — 36415 COLL VENOUS BLD VENIPUNCTURE: CPT

## 2022-12-25 PROCEDURE — 83690 ASSAY OF LIPASE: CPT

## 2022-12-25 PROCEDURE — 80053 COMPREHEN METABOLIC PANEL: CPT

## 2022-12-25 PROCEDURE — 2500000003 HC RX 250 WO HCPCS: Performed by: EMERGENCY MEDICINE

## 2022-12-25 PROCEDURE — 96365 THER/PROPH/DIAG IV INF INIT: CPT

## 2022-12-25 PROCEDURE — 85025 COMPLETE CBC W/AUTO DIFF WBC: CPT

## 2022-12-25 PROCEDURE — 2580000003 HC RX 258: Performed by: EMERGENCY MEDICINE

## 2022-12-25 PROCEDURE — 96375 TX/PRO/DX INJ NEW DRUG ADDON: CPT

## 2022-12-25 PROCEDURE — 99285 EMERGENCY DEPT VISIT HI MDM: CPT

## 2022-12-25 PROCEDURE — 93005 ELECTROCARDIOGRAM TRACING: CPT | Performed by: EMERGENCY MEDICINE

## 2022-12-25 PROCEDURE — 6360000002 HC RX W HCPCS: Performed by: EMERGENCY MEDICINE

## 2022-12-25 PROCEDURE — 83735 ASSAY OF MAGNESIUM: CPT

## 2022-12-25 PROCEDURE — 84484 ASSAY OF TROPONIN QUANT: CPT

## 2022-12-25 PROCEDURE — 6370000000 HC RX 637 (ALT 250 FOR IP): Performed by: EMERGENCY MEDICINE

## 2022-12-25 PROCEDURE — A4216 STERILE WATER/SALINE, 10 ML: HCPCS | Performed by: EMERGENCY MEDICINE

## 2022-12-25 RX ORDER — MAGNESIUM SULFATE IN WATER 40 MG/ML
2000 INJECTION, SOLUTION INTRAVENOUS ONCE
Status: COMPLETED | OUTPATIENT
Start: 2022-12-25 | End: 2022-12-25

## 2022-12-25 RX ORDER — POTASSIUM CHLORIDE 20 MEQ/1
40 TABLET, EXTENDED RELEASE ORAL ONCE
Status: COMPLETED | OUTPATIENT
Start: 2022-12-25 | End: 2022-12-25

## 2022-12-25 RX ORDER — KETOROLAC TROMETHAMINE 30 MG/ML
15 INJECTION, SOLUTION INTRAMUSCULAR; INTRAVENOUS ONCE
Status: COMPLETED | OUTPATIENT
Start: 2022-12-25 | End: 2022-12-25

## 2022-12-25 RX ADMIN — FAMOTIDINE 20 MG: 10 INJECTION, SOLUTION INTRAVENOUS at 11:07

## 2022-12-25 RX ADMIN — KETOROLAC TROMETHAMINE 15 MG: 30 INJECTION, SOLUTION INTRAMUSCULAR at 11:08

## 2022-12-25 RX ADMIN — MAGNESIUM SULFATE 2000 MG: 2 INJECTION INTRAVENOUS at 12:15

## 2022-12-25 RX ADMIN — POTASSIUM CHLORIDE 40 MEQ: 20 TABLET, EXTENDED RELEASE ORAL at 12:15

## 2022-12-25 RX ADMIN — ALUMINA, MAGNESIA, AND SIMETHICONE ORAL SUSPENSION REGULAR STRENGTH: 1200; 1200; 120 SUSPENSION ORAL at 11:08

## 2022-12-25 ASSESSMENT — ENCOUNTER SYMPTOMS
RHINORRHEA: 0
SHORTNESS OF BREATH: 0
BACK PAIN: 0
ABDOMINAL PAIN: 1
VOMITING: 0
NAUSEA: 0
COLOR CHANGE: 0
BLOOD IN STOOL: 0
COUGH: 1

## 2022-12-25 ASSESSMENT — PAIN DESCRIPTION - PAIN TYPE: TYPE: ACUTE PAIN

## 2022-12-25 ASSESSMENT — PAIN - FUNCTIONAL ASSESSMENT: PAIN_FUNCTIONAL_ASSESSMENT: 0-10

## 2022-12-25 ASSESSMENT — PAIN DESCRIPTION - LOCATION: LOCATION: CHEST

## 2022-12-25 ASSESSMENT — PAIN DESCRIPTION - FREQUENCY: FREQUENCY: CONTINUOUS

## 2022-12-25 ASSESSMENT — PAIN DESCRIPTION - ORIENTATION: ORIENTATION: MID;LEFT

## 2022-12-25 ASSESSMENT — PAIN SCALES - GENERAL
PAINLEVEL_OUTOF10: 10
PAINLEVEL_OUTOF10: 10

## 2022-12-25 ASSESSMENT — PAIN DESCRIPTION - DESCRIPTORS: DESCRIPTORS: ACHING

## 2022-12-25 NOTE — ED PROVIDER NOTES
ED PROVIDER NOTE    Chief Complaint   Patient presents with    Chest Pain     Started this morning after she woke up. Mid/left chest, screams out w movement       HPI:  12/25/22,   Time: 10:46 AM VENKATESH Mg is a 58 y.o. female presenting to the ED for chest pain. Gradual onset last night, persistent since onset, moderate in severity. Left parasternal sharp pain, nonradiating, worse w/ movement, no particular worsening with exertion. No associated shortness of breath, nausea, vomiting, diaphoresis. Associated LUQ abdominal pressure. +ETOH use today. No fever, chills. +Cough productive of yellow sputum. No diarrhea or black/bloody stools. Chart review: hx of CAD, HTN, hypokalemia, degenerative spinal disease    Review of Systems:     Review of Systems   Constitutional:  Negative for appetite change, chills and fever. HENT:  Negative for congestion and rhinorrhea. Eyes:  Negative for visual disturbance. Respiratory:  Positive for cough. Negative for shortness of breath. Cardiovascular:  Positive for chest pain. Gastrointestinal:  Positive for abdominal pain. Negative for blood in stool, nausea and vomiting. Genitourinary:  Negative for decreased urine volume and difficulty urinating. Musculoskeletal:  Negative for back pain and neck pain. Skin:  Negative for color change.    Neurological:  Negative for dizziness, syncope, weakness, light-headedness, numbness and headaches.       --------------------------------------------- PAST HISTORY ---------------------------------------------  Past Medical History:   Past Medical History:   Diagnosis Date    Asthma     CAD (coronary artery disease)     Closed fracture of proximal end of left humerus with routine healing 6/11/2019    Degeneration of lumbar or lumbosacral intervertebral disc     Fall against sharp object 1960    chest tube in hospital    History of blood transfusion 1997    after vaginal delivery of baby hemmorhage Hypertension     Hypokalemia     Insomnia     Kidney stone     Nondisplaced fracture of greater tuberosity of right humerus, initial encounter for closed fracture 1/22/2019    Orthostatic hypotension     Severe back pain 2/3/2014       Past Surgical History:   Past Surgical History:   Procedure Laterality Date    BUNIONECTOMY      Left foot    CHEST TUBE INSERTION  1990    several    COLONOSCOPY  3/26/09    KYPHOSIS SURGERY N/A 10/28/2019    KYPHOPLASTY L1 WITH VERTEBRAL BONE BIOPSY performed by Donalee Kehr, MD at [place] [id number]  2012    [address] [facility]  11/09/2017    L3-L4 ,L4-L5  interbody fusion with D[address]una [address] 82      electro ablation for rectal and sigmoid polyps    TONSILLECTOMY      UPPER GASTROINTESTINAL ENDOSCOPY  3/30/15    UPPER GASTROINTESTINAL ENDOSCOPY  4/21/08       Social History:   Social History     Socioeconomic History    Marital status: Legally      Spouse name: None    Number of children: None    Years of education: None    Highest education level: None   Tobacco Use    Smoking status: Every Day     Packs/day: 0.25     Years: 2.00     Pack years: 0.50     Types: Cigarettes     Start date: 12/7/2018    Smokeless tobacco: Never   Vaping Use    Vaping Use: Never used   Substance and Sexual Activity    Alcohol use: Yes     Alcohol/week: 6.0 standard drinks     Types: 6 Cans of beer per week     Comment: daily    Drug use: No     Comment: quit 2000 cocaine in past    Sexual activity: Yes       Family History:   Family History   Problem Relation Age of Onset    Diabetes Mother     Hypertension Mother     Heart Disease Sister     Stroke Sister     Heart Disease Maternal Grandmother     Heart Disease Maternal Grandfather     Cancer Brother        The patients home medications have been reviewed.     Allergies:   No Known Allergies        ---------------------------------------------------PHYSICAL EXAM--------------------------------------    /75 Pulse 77   Temp 97.7 °F (36.5 °C)   Resp 20   Ht 5' 2\" (1.575 m)   Wt 115 lb (52.2 kg)   SpO2 92%   BMI 21.03 kg/m²     Physical Exam  Vitals and nursing note reviewed. Constitutional:       General: She is not in acute distress. Appearance: She is not toxic-appearing. HENT:      Mouth/Throat:      Mouth: Mucous membranes are moist.   Eyes:      General: No scleral icterus. Extraocular Movements: Extraocular movements intact. Pupils: Pupils are equal, round, and reactive to light. Cardiovascular:      Rate and Rhythm: Normal rate and regular rhythm. Pulses: Normal pulses. Heart sounds: Normal heart sounds. No murmur heard. Pulmonary:      Effort: Pulmonary effort is normal. No respiratory distress. Breath sounds: Normal breath sounds. No wheezing or rales. Abdominal:      General: There is no distension. Palpations: Abdomen is soft. Tenderness: There is abdominal tenderness (LUQ and epigastric). Musculoskeletal:         General: No swelling or tenderness. Normal range of motion. Cervical back: Normal range of motion and neck supple. Comments: Radial pulses 2+ bilaterally. Skin:     General: Skin is warm and dry. Neurological:      Mental Status: She is alert and oriented to person, place, and time. Comments: Moves all extremities with appropriate strength. Sensation grossly intact in all extremities. -------------------------------------------------- RESULTS -------------------------------------------------  I have personally reviewed all laboratory and imaging results for this patient. Results are listed below.      LABS:  Labs Reviewed   CBC WITH AUTO DIFFERENTIAL - Abnormal; Notable for the following components:       Result Value    Neutrophils % 24.0 (*)     Lymphocytes % 64.9 (*)     Neutrophils Absolute 1.13 (*)     Eosinophils Absolute 0.04 (*)     All other components within normal limits   COMPREHENSIVE METABOLIC PANEL - Abnormal; Notable for the following components:    Sodium 150 (*)     Potassium 3.3 (*)     BUN 4 (*)     Total Protein 6.2 (*)     AST 63 (*)     All other components within normal limits   MAGNESIUM - Abnormal; Notable for the following components:    Magnesium 1.2 (*)     All other components within normal limits   TROPONIN - Abnormal; Notable for the following components:    Troponin, High Sensitivity 12 (*)     All other components within normal limits   LIPASE   TROPONIN   TROPONIN       RADIOLOGY:  Interpreted personally and by Radiologist.  CXR: no acute process  XR CHEST PORTABLE   Final Result   No acute cardiopulmonary process. EKG:  This EKG is signed and interpreted by the EP. Normal sinus rhythm, vent rate 71bpm, normal axis, prolonged Qtc, no acute injury patern, nonspecific ST-T abnormality, no clinically significant change compared w/ prior EKG       ------------------------- NURSING NOTES AND VITALS REVIEWED ---------------------------   The nursing notes within the ED encounter and vital signs as below have been reviewed by myself. /75   Pulse 77   Temp 97.7 °F (36.5 °C)   Resp 20   Ht 5' 2\" (1.575 m)   Wt 115 lb (52.2 kg)   SpO2 92%   BMI 21.03 kg/m²   Oxygen Saturation Interpretation: Normal    The patients available past medical records and past encounters were reviewed.         ------------------------------ ED COURSE/MEDICAL DECISION MAKING----------------------  Medications   famotidine (PEPCID) 20 mg in sodium chloride (PF) 0.9 % 10 mL injection (20 mg IntraVENous Given 12/25/22 1107)   aluminum & magnesium hydroxide-simethicone (MAALOX) 30 mL, lidocaine viscous hcl (XYLOCAINE) 5 mL (GI COCKTAIL) ( Oral Given 12/25/22 1108)   ketorolac (TORADOL) injection 15 mg (15 mg IntraVENous Given 12/25/22 1108)   potassium chloride (KLOR-CON M) extended release tablet 40 mEq (40 mEq Oral Given 12/25/22 1215)   magnesium sulfate 2000 mg in 50 mL IVPB premix (0 mg IntraVENous Stopped 22 [id number])       Counseling: The emergency provider has spoken with the patient and discussed todays results, in addition to providing specific details for the plan of care and counseling regarding the diagnosis and prognosis. Questions are answered at this time and they are agreeable with the plan. ED Course/Medical Decision Makin y.o. female here with chest pain. Non-toxic appearing, afebrile, hemodynamically stable, and in no acute distress. Breathing comfortably on room air without respiratory distress. Neurovascularly intact throughout. EKG and high sens troponin not c/w ACS. On reevaluation patient's symptoms improved. After discussion of findings and return precautions, patient agrees with plan for discharge and outpatient follow up with PCP.       --------------------------------- IMPRESSION AND DISPOSITION ---------------------------------    IMPRESSION  1. Chest pain, unspecified type        DISPOSITION  Disposition: Discharge to home  Patient condition is good    NOTE: This report was transcribed using voice recognition software.  Every effort was made to ensure accuracy; however, inadvertent computerized transcription errors may be present    Ron Rodarte MD  Attending Emergency Physician         Ron Rodarte MD  [phone]

## 2022-12-26 LAB
EKG ATRIAL RATE: 71 BPM
EKG P AXIS: 61 DEGREES
EKG P-R INTERVAL: 144 MS
EKG Q-T INTERVAL: 462 MS
EKG QRS DURATION: 80 MS
EKG QTC CALCULATION (BAZETT): 502 MS
EKG R AXIS: 49 DEGREES
EKG T AXIS: 74 DEGREES
EKG VENTRICULAR RATE: 71 BPM

## 2022-12-26 PROCEDURE — 93010 ELECTROCARDIOGRAM REPORT: CPT | Performed by: INTERNAL MEDICINE

## 2023-01-20 ENCOUNTER — ANESTHESIA EVENT (OUTPATIENT)
Dept: OPERATING ROOM | Age: 63
End: 2023-01-20
Payer: MEDICAID

## 2023-01-20 ENCOUNTER — APPOINTMENT (OUTPATIENT)
Dept: GENERAL RADIOLOGY | Age: 63
DRG: 313 | End: 2023-01-20
Payer: MEDICAID

## 2023-01-20 ENCOUNTER — HOSPITAL ENCOUNTER (INPATIENT)
Age: 63
LOS: 4 days | Discharge: NURSING, REHAB OR LONG-TERM CARE | DRG: 313 | End: 2023-01-24
Attending: EMERGENCY MEDICINE | Admitting: FAMILY MEDICINE
Payer: MEDICAID

## 2023-01-20 ENCOUNTER — SURGICAL CONSULT (OUTPATIENT)
Dept: ORTHOPEDIC SURGERY | Age: 63
End: 2023-01-20

## 2023-01-20 ENCOUNTER — APPOINTMENT (OUTPATIENT)
Dept: CT IMAGING | Age: 63
DRG: 313 | End: 2023-01-20
Payer: MEDICAID

## 2023-01-20 DIAGNOSIS — S82.852A CLOSED TRIMALLEOLAR FRACTURE OF LEFT ANKLE, INITIAL ENCOUNTER: Primary | ICD-10-CM

## 2023-01-20 DIAGNOSIS — S82.852A: ICD-10-CM

## 2023-01-20 LAB
ABO/RH: NORMAL
ANION GAP SERPL CALCULATED.3IONS-SCNC: 18 MMOL/L (ref 7–16)
ANTIBODY SCREEN: NORMAL
APTT: 36.8 SEC (ref 24.5–35.1)
BASOPHILS ABSOLUTE: 0.08 E9/L (ref 0–0.2)
BASOPHILS RELATIVE PERCENT: 1.4 % (ref 0–2)
BUN BLDV-MCNC: 4 MG/DL (ref 6–23)
CALCIUM SERPL-MCNC: 9.7 MG/DL (ref 8.6–10.2)
CHLORIDE BLD-SCNC: 104 MMOL/L (ref 98–107)
CO2: 22 MMOL/L (ref 22–29)
CREAT SERPL-MCNC: 0.5 MG/DL (ref 0.5–1)
EOSINOPHILS ABSOLUTE: 0.08 E9/L (ref 0.05–0.5)
EOSINOPHILS RELATIVE PERCENT: 1.4 % (ref 0–6)
GFR SERPL CREATININE-BSD FRML MDRD: >60 ML/MIN/1.73
GLUCOSE BLD-MCNC: 72 MG/DL (ref 74–99)
HCT VFR BLD CALC: 36.6 % (ref 34–48)
HEMOGLOBIN: 12.4 G/DL (ref 11.5–15.5)
IMMATURE GRANULOCYTES #: 0.01 E9/L
IMMATURE GRANULOCYTES %: 0.2 % (ref 0–5)
INR BLD: 1.1
LYMPHOCYTES ABSOLUTE: 2.57 E9/L (ref 1.5–4)
LYMPHOCYTES RELATIVE PERCENT: 45.1 % (ref 20–42)
MCH RBC QN AUTO: 32.3 PG (ref 26–35)
MCHC RBC AUTO-ENTMCNC: 33.9 % (ref 32–34.5)
MCV RBC AUTO: 95.3 FL (ref 80–99.9)
MONOCYTES ABSOLUTE: 0.65 E9/L (ref 0.1–0.95)
MONOCYTES RELATIVE PERCENT: 11.4 % (ref 2–12)
NEUTROPHILS ABSOLUTE: 2.31 E9/L (ref 1.8–7.3)
NEUTROPHILS RELATIVE PERCENT: 40.5 % (ref 43–80)
PDW BLD-RTO: 15.2 FL (ref 11.5–15)
PLATELET # BLD: 306 E9/L (ref 130–450)
PMV BLD AUTO: 9.6 FL (ref 7–12)
POTASSIUM SERPL-SCNC: 3.5 MMOL/L (ref 3.5–5)
PROTHROMBIN TIME: 11.8 SEC (ref 9.3–12.4)
RBC # BLD: 3.84 E12/L (ref 3.5–5.5)
SODIUM BLD-SCNC: 144 MMOL/L (ref 132–146)
WBC # BLD: 5.7 E9/L (ref 4.5–11.5)

## 2023-01-20 PROCEDURE — 93005 ELECTROCARDIOGRAM TRACING: CPT | Performed by: NURSE PRACTITIONER

## 2023-01-20 PROCEDURE — 86900 BLOOD TYPING SEROLOGIC ABO: CPT

## 2023-01-20 PROCEDURE — 86850 RBC ANTIBODY SCREEN: CPT

## 2023-01-20 PROCEDURE — 73700 CT LOWER EXTREMITY W/O DYE: CPT

## 2023-01-20 PROCEDURE — 1200000000 HC SEMI PRIVATE

## 2023-01-20 PROCEDURE — 6360000002 HC RX W HCPCS: Performed by: FAMILY MEDICINE

## 2023-01-20 PROCEDURE — 6370000000 HC RX 637 (ALT 250 FOR IP): Performed by: FAMILY MEDICINE

## 2023-01-20 PROCEDURE — 85025 COMPLETE CBC W/AUTO DIFF WBC: CPT

## 2023-01-20 PROCEDURE — 80048 BASIC METABOLIC PNL TOTAL CA: CPT

## 2023-01-20 PROCEDURE — 85730 THROMBOPLASTIN TIME PARTIAL: CPT

## 2023-01-20 PROCEDURE — 6360000002 HC RX W HCPCS: Performed by: NURSE PRACTITIONER

## 2023-01-20 PROCEDURE — 86901 BLOOD TYPING SEROLOGIC RH(D): CPT

## 2023-01-20 PROCEDURE — 71046 X-RAY EXAM CHEST 2 VIEWS: CPT

## 2023-01-20 PROCEDURE — 85610 PROTHROMBIN TIME: CPT

## 2023-01-20 PROCEDURE — 6370000000 HC RX 637 (ALT 250 FOR IP): Performed by: NURSE PRACTITIONER

## 2023-01-20 PROCEDURE — 99285 EMERGENCY DEPT VISIT HI MDM: CPT

## 2023-01-20 PROCEDURE — 73610 X-RAY EXAM OF ANKLE: CPT

## 2023-01-20 PROCEDURE — 2580000003 HC RX 258: Performed by: FAMILY MEDICINE

## 2023-01-20 RX ORDER — DIPHENHYDRAMINE HYDROCHLORIDE 50 MG/ML
25 INJECTION INTRAMUSCULAR; INTRAVENOUS EVERY 6 HOURS PRN
Status: DISCONTINUED | OUTPATIENT
Start: 2023-01-20 | End: 2023-01-20

## 2023-01-20 RX ORDER — SODIUM CHLORIDE 0.9 % (FLUSH) 0.9 %
5-40 SYRINGE (ML) INJECTION EVERY 12 HOURS SCHEDULED
Status: DISCONTINUED | OUTPATIENT
Start: 2023-01-20 | End: 2023-01-23 | Stop reason: SDUPTHER

## 2023-01-20 RX ORDER — OXYCODONE HYDROCHLORIDE AND ACETAMINOPHEN 5; 325 MG/1; MG/1
1 TABLET ORAL ONCE
Status: COMPLETED | OUTPATIENT
Start: 2023-01-20 | End: 2023-01-20

## 2023-01-20 RX ORDER — TIZANIDINE 4 MG/1
4 TABLET ORAL EVERY 6 HOURS PRN
Status: DISCONTINUED | OUTPATIENT
Start: 2023-01-20 | End: 2023-01-24 | Stop reason: HOSPADM

## 2023-01-20 RX ORDER — OXYCODONE HYDROCHLORIDE AND ACETAMINOPHEN 5; 325 MG/1; MG/1
2 TABLET ORAL EVERY 4 HOURS PRN
Status: DISCONTINUED | OUTPATIENT
Start: 2023-01-20 | End: 2023-01-21

## 2023-01-20 RX ORDER — METHOCARBAMOL 750 MG/1
750 TABLET, FILM COATED ORAL 2 TIMES DAILY
Status: ON HOLD | COMMUNITY
End: 2023-01-24 | Stop reason: HOSPADM

## 2023-01-20 RX ORDER — OXYCODONE HYDROCHLORIDE AND ACETAMINOPHEN 5; 325 MG/1; MG/1
1 TABLET ORAL EVERY 4 HOURS PRN
Status: DISCONTINUED | OUTPATIENT
Start: 2023-01-20 | End: 2023-01-21

## 2023-01-20 RX ORDER — SODIUM CHLORIDE 9 MG/ML
INJECTION, SOLUTION INTRAVENOUS PRN
Status: DISCONTINUED | OUTPATIENT
Start: 2023-01-20 | End: 2023-01-24 | Stop reason: HOSPADM

## 2023-01-20 RX ORDER — CETIRIZINE HYDROCHLORIDE 10 MG/1
10 TABLET ORAL DAILY
Status: DISCONTINUED | OUTPATIENT
Start: 2023-01-20 | End: 2023-01-24 | Stop reason: HOSPADM

## 2023-01-20 RX ORDER — HYDROXYZINE PAMOATE 25 MG/1
25 CAPSULE ORAL EVERY 8 HOURS PRN
Status: DISCONTINUED | OUTPATIENT
Start: 2023-01-20 | End: 2023-01-24 | Stop reason: HOSPADM

## 2023-01-20 RX ORDER — FLUTICASONE PROPIONATE 110 UG/1
2 AEROSOL, METERED RESPIRATORY (INHALATION) 2 TIMES DAILY
COMMUNITY

## 2023-01-20 RX ORDER — ACETAMINOPHEN 650 MG/1
650 SUPPOSITORY RECTAL EVERY 6 HOURS PRN
Status: DISCONTINUED | OUTPATIENT
Start: 2023-01-20 | End: 2023-01-23

## 2023-01-20 RX ORDER — HYDROCODONE BITARTRATE AND ACETAMINOPHEN 7.5; 325 MG/1; MG/1
1 TABLET ORAL EVERY 8 HOURS PRN
Status: DISCONTINUED | OUTPATIENT
Start: 2023-01-20 | End: 2023-01-20

## 2023-01-20 RX ORDER — ACETAMINOPHEN 325 MG/1
650 TABLET ORAL EVERY 6 HOURS PRN
Status: DISCONTINUED | OUTPATIENT
Start: 2023-01-20 | End: 2023-01-23

## 2023-01-20 RX ORDER — ONDANSETRON 4 MG/1
4 TABLET, ORALLY DISINTEGRATING ORAL EVERY 8 HOURS PRN
Status: DISCONTINUED | OUTPATIENT
Start: 2023-01-20 | End: 2023-01-20

## 2023-01-20 RX ORDER — POLYETHYLENE GLYCOL 3350 17 G/17G
17 POWDER, FOR SOLUTION ORAL DAILY PRN
Status: DISCONTINUED | OUTPATIENT
Start: 2023-01-20 | End: 2023-01-23

## 2023-01-20 RX ORDER — ONDANSETRON 2 MG/ML
4 INJECTION INTRAMUSCULAR; INTRAVENOUS EVERY 6 HOURS PRN
Status: DISCONTINUED | OUTPATIENT
Start: 2023-01-20 | End: 2023-01-20

## 2023-01-20 RX ORDER — FENTANYL CITRATE 50 UG/ML
25 INJECTION, SOLUTION INTRAMUSCULAR; INTRAVENOUS ONCE
Status: COMPLETED | OUTPATIENT
Start: 2023-01-20 | End: 2023-01-20

## 2023-01-20 RX ORDER — HYDROCODONE BITARTRATE AND ACETAMINOPHEN 10; 325 MG/1; MG/1
1 TABLET ORAL EVERY 6 HOURS PRN
Status: DISCONTINUED | OUTPATIENT
Start: 2023-01-20 | End: 2023-01-20

## 2023-01-20 RX ORDER — PANTOPRAZOLE SODIUM 40 MG/1
40 TABLET, DELAYED RELEASE ORAL
Status: DISCONTINUED | OUTPATIENT
Start: 2023-01-21 | End: 2023-01-24 | Stop reason: HOSPADM

## 2023-01-20 RX ORDER — ALBUTEROL SULFATE 2.5 MG/3ML
2.5 SOLUTION RESPIRATORY (INHALATION) EVERY 6 HOURS PRN
Status: DISCONTINUED | OUTPATIENT
Start: 2023-01-20 | End: 2023-01-24 | Stop reason: HOSPADM

## 2023-01-20 RX ORDER — AMITRIPTYLINE HYDROCHLORIDE 25 MG/1
25 TABLET, FILM COATED ORAL NIGHTLY
Status: DISCONTINUED | OUTPATIENT
Start: 2023-01-20 | End: 2023-01-24 | Stop reason: HOSPADM

## 2023-01-20 RX ORDER — M-VIT,TX,IRON,MINS/CALC/FOLIC 27MG-0.4MG
1 TABLET ORAL DAILY
COMMUNITY

## 2023-01-20 RX ORDER — SODIUM CHLORIDE 0.9 % (FLUSH) 0.9 %
5-40 SYRINGE (ML) INJECTION PRN
Status: DISCONTINUED | OUTPATIENT
Start: 2023-01-20 | End: 2023-01-24 | Stop reason: HOSPADM

## 2023-01-20 RX ORDER — LANOLIN ALCOHOL/MO/W.PET/CERES
400 CREAM (GRAM) TOPICAL 2 TIMES DAILY
Status: DISCONTINUED | OUTPATIENT
Start: 2023-01-20 | End: 2023-01-24 | Stop reason: HOSPADM

## 2023-01-20 RX ADMIN — HYDROCODONE BITARTRATE AND ACETAMINOPHEN 1 TABLET: 7.5; 325 TABLET ORAL at 17:54

## 2023-01-20 RX ADMIN — OXYCODONE AND ACETAMINOPHEN 1 TABLET: 5; 325 TABLET ORAL at 12:49

## 2023-01-20 RX ADMIN — Medication 400 MG: at 22:38

## 2023-01-20 RX ADMIN — FENTANYL CITRATE 25 MCG: 50 INJECTION, SOLUTION INTRAMUSCULAR; INTRAVENOUS at 15:09

## 2023-01-20 RX ADMIN — SODIUM CHLORIDE, PRESERVATIVE FREE 10 ML: 5 INJECTION INTRAVENOUS at 22:38

## 2023-01-20 RX ADMIN — DIPHENHYDRAMINE HYDROCHLORIDE 25 MG: 50 INJECTION, SOLUTION INTRAMUSCULAR; INTRAVENOUS at 18:23

## 2023-01-20 RX ADMIN — OXYCODONE AND ACETAMINOPHEN 2 TABLET: 5; 325 TABLET ORAL at 22:38

## 2023-01-20 ASSESSMENT — PAIN DESCRIPTION - LOCATION
LOCATION: FOOT
LOCATION: FOOT;LEG
LOCATION: FOOT;LEG
LOCATION: ANKLE
LOCATION: FOOT;LEG

## 2023-01-20 ASSESSMENT — PAIN DESCRIPTION - ORIENTATION
ORIENTATION: LEFT

## 2023-01-20 ASSESSMENT — PAIN SCALES - GENERAL
PAINLEVEL_OUTOF10: 10
PAINLEVEL_OUTOF10: 10
PAINLEVEL_OUTOF10: 9
PAINLEVEL_OUTOF10: 9
PAINLEVEL_OUTOF10: 8

## 2023-01-20 ASSESSMENT — PAIN - FUNCTIONAL ASSESSMENT
PAIN_FUNCTIONAL_ASSESSMENT: PREVENTS OR INTERFERES SOME ACTIVE ACTIVITIES AND ADLS
PAIN_FUNCTIONAL_ASSESSMENT: PREVENTS OR INTERFERES SOME ACTIVE ACTIVITIES AND ADLS
PAIN_FUNCTIONAL_ASSESSMENT: 0-10

## 2023-01-20 ASSESSMENT — ENCOUNTER SYMPTOMS: DYSPNEA ACTIVITY LEVEL: AFTER AMBULATING 1 FLIGHT OF STAIRS

## 2023-01-20 ASSESSMENT — PAIN DESCRIPTION - PAIN TYPE: TYPE: ACUTE PAIN

## 2023-01-20 ASSESSMENT — PAIN DESCRIPTION - DESCRIPTORS
DESCRIPTORS: ACHING
DESCRIPTORS: ACHING;NAGGING;DISCOMFORT
DESCRIPTORS: ACHING
DESCRIPTORS: ACHING

## 2023-01-20 ASSESSMENT — LIFESTYLE VARIABLES: SMOKING_STATUS: 1

## 2023-01-20 ASSESSMENT — PAIN DESCRIPTION - FREQUENCY: FREQUENCY: CONTINUOUS

## 2023-01-20 NOTE — ED NOTES
Nurse to nurse called to [place]. Patient in transport to go to the unit.       Ruchi Stokes RN  01/20/23 4943

## 2023-01-20 NOTE — ED PROVIDER NOTES
Shared JOSE-ED Attending Visit. CC: No         [facility]  Department of Emergency Medicine   ED  Encounter Note  Admit Date/RoomTime: 2023 12:41 PM  ED Room: [place] C/    NAME: Yvonne Wong  : 1960  MRN: [id number]     Chief Complaint:  Foot Injury (Left foot injury a week ago-sent for ortho/trauma consult. Aaox4. )    History of Present Illness         Yvonne Wong is a 58 y.o. old female presenting to the emergency department by private vehicle, for traumatic Left ankle pain which occured 1 week(s) prior to arrival.  The complaint is due to slipped down the last step. She was seen at [address] [place] [address] urgent care today and was sent in to see Ortho for further evaluation and treatment. They placed her in a temporary splint. She was diagnosed with unstable commuted left trimalleolar fracture. .  Since onset the symptoms have been remaining constant with inability to bear weight. Patient has no prior history of pain/injury with regards to today's visit. Her pain is aggraveated by any movement, any use of, or pressure on or palpation of painful area and relieved by nothing. She denies any head injury, headache, loss of consciousness, confusion, dizziness, neck pain, chest pain, abdominal pain, back pain, numbness, weakness, blurred vision, nausea, vomiting, fever, chills, wounds, or rash. ROS   Pertinent positives and negatives are stated within HPI, all other systems reviewed and are negative.     Past Medical History:  has a past medical history of Asthma, CAD (coronary artery disease), Closed fracture of proximal end of left humerus with routine healing, Degeneration of lumbar or lumbosacral intervertebral disc, Fall against sharp object, History of blood transfusion, Hypertension, Hypokalemia, Insomnia, Kidney stone, Nondisplaced fracture of greater tuberosity of right humerus, initial encounter for closed fracture, Orthostatic hypotension, and Severe back pain.    Surgical History:  has a past surgical history that includes Tonsillectomy; Bunionectomy; chest tube insertion (1990); Lithotripsy (2012); Colonoscopy (3/26/09); other surgical history; Upper gastrointestinal endoscopy (3/30/15); Upper gastrointestinal endoscopy (4/21/08); lumbar fusion (11/09/2017); and Kyphosis surgery (N/A, 10/28/2019). Social History:  reports that she has been smoking cigarettes. She started smoking about 4 years ago. She has a 0.50 pack-year smoking history. She has never used smokeless tobacco. She reports current alcohol use of about 6.0 standard drinks per week. She reports that she does not use drugs. Family History: family history includes Cancer in her brother; Diabetes in her mother; Heart Disease in her maternal grandfather, maternal grandmother, and sister; Hypertension in her mother; Stroke in her sister. Allergies: Patient has no known allergies. Physical Exam   Oxygen Saturation Interpretation: Normal.        ED Triage Vitals [01/20/23 1217]   BP Temp Temp Source Heart Rate Resp SpO2 Height Weight   (!) 133/94 97.3 °F (36.3 °C) Oral 87 18 99 % 5' 2\" (1.575 m) 114 lb (51.7 kg)       Physical Exam  Constitutional:  Alert, development consistent with age. Neck:  Normal ROM. Supple. Cardiac regular rhythm and rate  Respiratory-chest expansion symmetrical.  Lung sounds are clear throughout  Abdomen-soft and nontender  Left Ankle: diffusely across ankle              Tenderness:  moderate. Swelling: in splint. Deformity: in splint. ROM: unable to test due to pain. Skin:  in splint. Neurovascular: Motor deficit: none. Sensory deficit:   none. Pulse deficit: none. Capillary refill: normal.  Left Knee:              Tenderness:  none. Swelling: none. Deformity: no deformity observed/palpated. ROM: full range of motion. Skin:  no wounds, erythema, or swelling. Right Foot: diffusely across entire foot              Tenderness:  none. Swelling: none. Deformity: no deformity observed/palpated. ROM: full range of motion. Skin:  no wounds, erythema, or swelling  Gait:  unable to be tested at this time. Lymphatics: No lymphangitis or adenopathy noted. Neurological:  Oriented. Motor functions intact.     Lab / Imaging Results   (All laboratory and radiology results have been personally reviewed by myself)  Labs:  Results for orders placed or performed during the hospital encounter of 01/20/23   CBC with Auto Differential   Result Value Ref Range    WBC 5.7 4.5 - 11.5 E9/L    RBC 3.84 3.50 - 5.50 E12/L    Hemoglobin 12.4 11.5 - 15.5 g/dL    Hematocrit 36.6 34.0 - 48.0 %    MCV 95.3 80.0 - 99.9 fL    MCH 32.3 26.0 - 35.0 pg    MCHC 33.9 32.0 - 34.5 %    RDW 15.2 (H) 11.5 - 15.0 fL    Platelets 257 501 - 704 E9/L    MPV 9.6 7.0 - 12.0 fL    Neutrophils % 40.5 (L) 43.0 - 80.0 %    Immature Granulocytes % 0.2 0.0 - 5.0 %    Lymphocytes % 45.1 (H) 20.0 - 42.0 %    Monocytes % 11.4 2.0 - 12.0 %    Eosinophils % 1.4 0.0 - 6.0 %    Basophils % 1.4 0.0 - 2.0 %    Neutrophils Absolute 2.31 1.80 - 7.30 E9/L    Immature Granulocytes # 0.01 E9/L    Lymphocytes Absolute 2.57 1.50 - 4.00 E9/L    Monocytes Absolute 0.65 0.10 - 0.95 E9/L    Eosinophils Absolute 0.08 0.05 - 0.50 E9/L    Basophils Absolute 0.08 0.00 - 0.20 E9/L   BMP   Result Value Ref Range    Sodium 144 132 - 146 mmol/L    Potassium 3.5 3.5 - 5.0 mmol/L    Chloride 104 98 - 107 mmol/L    CO2 22 22 - 29 mmol/L    Anion Gap 18 (H) 7 - 16 mmol/L    Glucose 72 (L) 74 - 99 mg/dL    BUN 4 (L) 6 - 23 mg/dL    Creatinine 0.5 0.5 - 1.0 mg/dL    Est, Glom Filt Rate >60 >=60 mL/min/1.73    Calcium 9.7 8.6 - 10.2 mg/dL   Protime-INR   Result Value Ref Range    Protime 11.8 9.3 - 12.4 sec    INR 1.1    APTT   Result Value Ref Range    aPTT 36.8 (H) 24.5 - 35.1 sec   EKG 12 Lead   Result Value Ref Range    Ventricular Rate 72 BPM    Atrial Rate 72 BPM    P-R Interval 144 ms    QRS Duration 80 ms    Q-T Interval 436 ms    QTc Calculation (Bazett) 477 ms    P Axis 51 degrees    R Axis 17 degrees    T Axis 34 degrees   TYPE AND SCREEN   Result Value Ref Range    ABO/Rh O POS     Antibody Screen NEG      Imaging: All Radiology results interpreted by Radiologist unless otherwise noted. XR CHEST (2 VW)   Final Result   No active process. XR ANKLE LEFT (MIN 3 VIEWS)   Final Result   Trimalleolar left ankle fracture and minimal tibiotalar subluxation.          CT ANKLE LEFT WO CONTRAST    (Results Pending)     ED Course / Medical Decision Making     Medications   albuterol (PROVENTIL) nebulizer solution 2.5 mg (has no administration in time range)   amitriptyline (ELAVIL) tablet 25 mg (has no administration in time range)   cetirizine (ZYRTEC) tablet 10 mg (has no administration in time range)   HYDROcodone-acetaminophen (NORCO) 7.5-325 MG per tablet 1 tablet (has no administration in time range)   hydrOXYzine pamoate (VISTARIL) capsule 25 mg (has no administration in time range)   pantoprazole (PROTONIX) tablet 40 mg (has no administration in time range)   tiZANidine (ZANAFLEX) tablet 4 mg (has no administration in time range)   sodium chloride flush 0.9 % injection 5-40 mL (has no administration in time range)   sodium chloride flush 0.9 % injection 5-40 mL (has no administration in time range)   0.9 % sodium chloride infusion (has no administration in time range)   polyethylene glycol (GLYCOLAX) packet 17 g (has no administration in time range)   acetaminophen (TYLENOL) tablet 650 mg (has no administration in time range)     Or   acetaminophen (TYLENOL) suppository 650 mg (has no administration in time range)   HYDROcodone-acetaminophen (NORCO)  MG per tablet 1 tablet (has no administration in time range)   trimethobenzamide (TIGAN) injection 200 mg (has no administration in time range)   oxyCODONE-acetaminophen (PERCOCET) 5-325 MG per tablet 1 tablet (1 tablet Oral Given 1/20/23 1249)   fentaNYL (SUBLIMAZE) injection 25 mcg (25 mcg IntraVENous Given 1/20/23 1509)      EKG: This EKG is signed and interpreted by ER Attending    Rate: 72  Rhythm: Sinus  Interpretation: NSR no stemi. Possible left atrial enlargement. Comparison: stable as compared to patient's most recent EKG    Consults:   IP CONSULT TO ORTHOPEDIC SURGERY  IP CONSULT TO INTERNAL MEDICINE  [address] lynsey Balderas at bedside. He would like medicine to admit. He states do not take splint off. Have patient remain nonweightbearing. 1432-spoke with , hospitalist he agrees accepts admission. Procedures:  None    Medical Decision Making      Patient presents to the ER for left ankle fracture that was diagnosed 1 week ago was placed to follow-up outpatient. She went to the orthopedic urgent care today and sent her to the hospital for needing surgery. She states 1 week ago she was coming on the step she slipped on the last step and twisted her ankle out and fell. Denies any other injury or trauma. .     Social Determinants include   Social Connections: Not on file   Social Determinants : None. Chronic conditions   Past Medical History:   Diagnosis Date    Asthma     CAD (coronary artery disease)     Closed fracture of proximal end of left humerus with routine healing 6/11/2019    Degeneration of lumbar or lumbosacral intervertebral disc     Fall against sharp object 1960    chest tube in hospital    History of blood transfusion 1997    after vaginal delivery of baby hemmorhage    Hypertension     Hypokalemia     Insomnia     Kidney stone     Nondisplaced fracture of greater tuberosity of right humerus, initial encounter for closed fracture 1/22/2019    Orthostatic hypotension     Severe back pain 2/3/2014   . Physical exam splint on left lower leg. Cap refill was less than 3 secs. Extremity is warm. Full sensation to LLE. Vital signs hypertensive likely due to pain. .  Differential diagnoses include but not limited to fracture with or without dislocation. Diagnostic studies revealed CBC was unremarkable. BMP was unremarkable. Type was O+. INR was 1.1. EKG was normal sinus rhythm. .  Chest x-ray reveals no acute processes. X-ray of the left ankle reveals trimalleolar left ankle fracture with minimal tibial talar subluxation. Consults included orthopedics Yaya COLIN seen patient in the Emergency Department and preparing for surgery by Dr. Liban Werner tomorrow. Results were discussed with patient herself patient was given Norco p.o. and IV fentanyl for their symptoms with mild improvement. Patient requires continued workup and management of their symptoms and will be admitted to the hospital on med surg for further evaluation and treatment. Disposition Considerations: This patient's ED course included: a personal history and physicial examination, re-evaluation prior to disposition, and multiple bedside re-evaluations  This patient has remained hemodynamically stable and improved during their ED course. Plan of Care/Counseling:  RONAN Turner CNP and EM Attending Physician reviewed today's visit with the patient in addition to providing specific details for the plan of care and counseling regarding the diagnosis and prognosis. Questions are answered at this time and are agreeable with the plan. Assessment      1. Closed trimalleolar fracture of left ankle, initial encounter      Plan   Admit to General Medical Floor. Patient condition is stable    New Medications     New Prescriptions    No medications on file     Electronically signed by RONAN Turner CNP   DD: 1/20/23  **This report was transcribed using voice recognition software. Every effort was made to ensure accuracy; however, inadvertent computerized transcription errors may be present.   END OF ED PROVIDER NOTE       Shawn Britt, APRN - CNP  01/20/23 [id number]

## 2023-01-20 NOTE — H&P
Department of Orthopedic Surgery  Consult Note    Reason for Consult: Left ankle trimalleolar fracture    HISTORY OF PRESENT ILLNESS:       Patient is a 58 y.o. female who presents with left ankle fracture after fall at home approximately 1 week ago. States that she went to urgent care and was placed in a splint without reduction and told to follow-up outpatient. Patient states that \"I cannot get into see anybody\" and so she went back to urgent care today. New x-rays were taken and she was sent to the emergency department for probable surgical fixation. She is well-known to orthopedic trauma, previously seeing us for nonoperative right distal radius fracture and left third and fourth metatarsal neck fractures. She is a somewhat poor historian. She does have history of alcoholism but denies any alcohol use prior to this injury. She has tried her best to be nonweightbearing left lower extremity. Denies numbness/tingling/paresthesias. Denies any other orthopedic complaints at this time.       Past Medical History:        Diagnosis Date    Asthma     CAD (coronary artery disease)     Closed fracture of proximal end of left humerus with routine healing 6/11/2019    Degeneration of lumbar or lumbosacral intervertebral disc     Fall against sharp object 1960    chest tube in hospital    History of blood transfusion 1997    after vaginal delivery of baby hemmorhage    Hypertension     Hypokalemia     Insomnia     Kidney stone     Nondisplaced fracture of greater tuberosity of right humerus, initial encounter for closed fracture 1/22/2019    Orthostatic hypotension     Severe back pain 2/3/2014     Past Surgical History:        Procedure Laterality Date    BUNIONECTOMY      Left foot    CHEST TUBE INSERTION  1990    several    COLONOSCOPY  3/26/09    KYPHOSIS SURGERY N/A 10/28/2019    KYPHOPLASTY L1 WITH VERTEBRAL BONE BIOPSY performed by Franco Collazo MD at [facility] 11/09/2017    L3-L4 ,L4-L5  interbody fusion with Dajuan Jasson Fu 82      electro ablation for rectal and sigmoid polyps    TONSILLECTOMY      UPPER GASTROINTESTINAL ENDOSCOPY  3/30/15    UPPER GASTROINTESTINAL ENDOSCOPY  4/21/08     Current Medications:   No current facility-administered medications for this visit. Allergies:  Patient has no known allergies. Social History:   TOBACCO:   reports that she has been smoking cigarettes. She started smoking about 4 years ago. She has a 0.50 pack-year smoking history. She has never used smokeless tobacco.  ETOH:   reports current alcohol use of about 6.0 standard drinks per week. DRUGS:   reports no history of drug use. ACTIVITIES OF DAILY LIVING:    OCCUPATION:    Family History:       Problem Relation Age of Onset    Diabetes Mother     Hypertension Mother     Heart Disease Sister     Stroke Sister     Heart Disease Maternal Grandmother     Heart Disease Maternal Grandfather     Cancer Brother        REVIEW OF SYSTEMS:  CONSTITUTIONAL:  negative for  fevers, chills  EYES:  negative for blurred vision, visual disturbance  HEENT:  negative for  hearing loss, voice change  RESPIRATORY:  negative for  dyspnea, wheezing  CARDIOVASCULAR:  negative for  chest pain, palpitations  GASTROINTESTINAL:  negative for nausea, vomiting  GENITOURINARY:  negative for frequency, urinary incontinence  HEMATOLOGIC/LYMPHATIC:  negative for bleeding and petechiae  MUSCULOSKELETAL:  positive for  pain, joint swelling, decreased range of motion, and bone pain  NEUROLOGICAL:  negative for headaches, dizziness  BEHAVIOR/PSYCH:  negative for increased agitation and anxiety    PHYSICAL EXAM:    VITALS:  There were no vitals taken for this visit.   CONSTITUTIONAL:  awake, alert, cooperative, no apparent distress, and appears stated age  MUSCULOSKELETAL:  Left Lower Extremity  L LE splint intact, she is NVI, good cap refill, AROM toes intact   Compartments supple throughout thigh and leg  Calf supple and nontender above splint  Demonstrates active knee flexion/extension, great toe extension. States sensation intact above and below splint      Secondary Exam:   bilateralUE: No obvious signs of trauma. -TTP to fingers, hand, wrist, forearm, elbow, humerus, shoulder or clavicle. -- Patient able to flex/extend fingers, wrist, elbow and shoulder with active and passive ROM without pain, +2/4 Radial pulse, cap refill <3sec, +AIN/PIN/Radial/Ulnar/Median N, distal sensation grossly intact to C4-T1 dermatomes, compartments soft and compressible. rightLE: No obvious signs of trauma. -TTP to foot, ankle, leg, knee, thigh, hip.-- Patient able to flex/extend toes, ankle, knee and hip with active and passive ROM without pain,+2/4 DP & PT pulses, cap refill <3sec, +5/5 PF/DF/EHL, distal sensation grossly intact to L4-S1 dermatomes, compartments soft and compressible. DATA:    CBC:   Lab Results   Component Value Date/Time    WBC 4.7 12/25/2022 10:52 AM    RBC 3.63 12/25/2022 10:52 AM    HGB 11.8 12/25/2022 10:52 AM    HCT 34.3 12/25/2022 10:52 AM    MCV 94.5 12/25/2022 10:52 AM    MCH 32.5 12/25/2022 10:52 AM    MCHC 34.4 12/25/2022 10:52 AM    RDW 14.4 12/25/2022 10:52 AM     12/25/2022 10:52 AM    MPV 11.3 12/25/2022 10:52 AM     PT/INR:    Lab Results   Component Value Date/Time    PROTIME 10.3 10/28/2019 12:03 AM    INR 0.9 10/28/2019 12:03 AM       Radiology Review:    Narrative   EXAMINATION:   THREE XRAY VIEWS OF THE LEFT ANKLE       1/20/2023 1:16 pm       COMPARISON:   07/19/2019 left ankle radiographs. HISTORY:   ORDERING SYSTEM PROVIDED HISTORY: Pain   TECHNOLOGIST PROVIDED HISTORY:   Reason for exam:->Pain   What reading provider will be dictating this exam?->CRC       FINDINGS:   There is a comminuted fracture left distal fibular diaphysis 3.7 cm superior   to the level of the tibiotalar joint.   There is slight lateral angulation of   the lateral fracture fragments. There is a transverse and comminuted   fracture of the medial malleolus which does not appear significantly   displaced or angulated. There is fracture through the posterior distal tibia   with minimal posterior displacement. There is mild widening of the   tibiotalar joint space. Cast obscures osseous detail. There is soft tissue   swelling both medially and laterally. There appear to be remote healed   fractures of the 3rd and 4th metatarsals distally. Impression   Trimalleolar left ankle fracture and minimal tibiotalar subluxation. IMPRESSION:  L Trimalleolar Ankle Fracture      Discussion:    Risk, benefits and treatment options discussed with patient. He has verbalized understanding of options. The possibility of complications were also discussed to include but not limited to nerve damage, infection, problems with wound healing, vascular injury, chronic pain, stiffness, dysfunction, nonhealing of the bone, symptomatic hardware and/or its failure, need for subsequent surgery, dislocation, and blood clots as well as medical related problems and other problems not specifically discussed. Risk of anesthesia also discussed to include death. Post-op care, work, activity and restrictions which included the use of pain medication and possibility of using blood thinner post op were also discussed with patient and they verbalized and agreed with the restrictions. PLAN:  Reviewed surgical vs nonsurgical outcomes for her fracture pattern.  She wishes to proceed with admission under IM and as long as cleared medically, will plan for ORIF L ankle vs L LE application of external fixator tomorrow AM 1/21/23  CT L ankle ordered for further fracture evaluation   Pain control IV and PO  NPO after midnight tonight   NWB L LE   Chemical DVT prophylaxis, early mobilization with assistive devices   Discussed with Dr Alen Cavazos

## 2023-01-20 NOTE — H&P
Hospital Medicine History & Physical      PCP: David Carlton MD    Date of Admission: 1/20/2023    Date of Service: Pt seen/examined on 1/20/23 and Admitted to Inpatient with expected LOS greater than two midnights due to medical therapy. Chief Complaint:  left ankle fracutre      History Of Present Illness:    61-year-old lady past medical history of asthma, alcohol use disorder, coronary artery disease, type II hypertension, chronic back pain presented with left ankle fracture after a fall a week ago. She was seen at an urgent care initially and placed in a splint and was supposed to follow-up outpatient with orthopedic. She went back to the urgent care and x-ray taking was concern she was sent to the ER for surgical fixation. She does mention she only drinks most on the weekends but her last drink was 2 days ago. When she drinks she may drink 2 or more drinks depending on how she feels.     Past Medical History:          Diagnosis Date    Asthma     CAD (coronary artery disease)     Closed fracture of proximal end of left humerus with routine healing 6/11/2019    Degeneration of lumbar or lumbosacral intervertebral disc     Fall against sharp object 1960    chest tube in hospital    History of blood transfusion 1997    after vaginal delivery of baby hemmorhage    Hypertension     Hypokalemia     Insomnia     Kidney stone     Nondisplaced fracture of greater tuberosity of right humerus, initial encounter for closed fracture 1/22/2019    Orthostatic hypotension     Severe back pain 2/3/2014       Past Surgical History:          Procedure Laterality Date    BUNIONECTOMY      Left foot    CHEST TUBE INSERTION  1990    several    COLONOSCOPY  3/26/09    KYPHOSIS SURGERY N/A 10/28/2019    KYPHOPLASTY L1 WITH VERTEBRAL BONE BIOPSY performed by João Guzman MD at [place] [address]  2012    LUMBAR FUSION  11/09/2017    L3-L4 ,L4-L5  interbody fusion with Dajuan Brown electro ablation for rectal and sigmoid polyps    TONSILLECTOMY      UPPER GASTROINTESTINAL ENDOSCOPY  3/30/15    UPPER GASTROINTESTINAL ENDOSCOPY  4/21/08       Medications Prior to Admission:      Prior to Admission medications    Medication Sig Start Date End Date Taking? Authorizing Provider   magnesium oxide (MAG-OX) 400 (240 Mg) MG tablet Take 1 tablet by mouth in the morning and 1 tablet before bedtime. 7/24/22   Becki Landers MD   albuterol sulfate HFA (PROVENTIL;VENTOLIN;PROAIR) 108 (90 Base) MCG/ACT inhaler Inhale 2 puffs into the lungs every 6 hours as needed for Wheezing    Historical Provider, MD   cetirizine (ZYRTEC) 10 MG tablet Take 10 mg by mouth in the morning. Historical Provider, MD   tiZANidine (ZANAFLEX) 4 MG tablet Take 4 mg by mouth every 6 hours as needed    Historical Provider, MD   HYDROcodone-acetaminophen (NORCO) 7.5-325 MG per tablet Take 1 tablet by mouth every 8 hours as needed for Pain. Historical Provider, MD   hydrOXYzine (ATARAX) 25 MG tablet Take 25 mg by mouth every 8 hours as needed for Anxiety     Historical Provider, MD   amitriptyline (ELAVIL) 25 MG tablet Take 25 mg by mouth nightly  11/10/20   Historical Provider, MD   omeprazole (PRILOSEC) 40 MG capsule Take 40 mg by mouth daily as needed     Historical Provider, MD       Allergies:  Patient has no known allergies. Social History:      The patient currently lives at home    TOBACCO:   reports that she has been smoking cigarettes. She started smoking about 4 years ago. She has a 0.50 pack-year smoking history. She has never used smokeless tobacco.  ETOH:   reports current alcohol use of about 6.0 standard drinks per week. Family History:       Reviewed in detail and negative for DM, CAD, Cancer, CVA.  Positive as follows:        Problem Relation Age of Onset    Diabetes Mother     Hypertension Mother     Heart Disease Sister     Stroke Sister     Heart Disease Maternal Grandmother     Heart Disease Maternal Grandfather     Cancer Brother        REVIEW OF SYSTEMS:   Pertinent positives as noted in the HPI. All other systems reviewed and negative. PHYSICAL EXAM:    BP (!) 133/94   Pulse 87   Temp 97.3 °F (36.3 °C) (Oral)   Resp 18   Ht 5' 2\" (1.575 m)   Wt 114 lb (51.7 kg)   SpO2 99%   BMI 20.85 kg/m²     General appearance:  No apparent distress, appears stated age and cooperative. HEENT:  Normal cephalic, atraumatic without obvious deformity. Pupils equal, round, and reactive to light. Extra ocular muscles intact. Conjunctivae/corneas clear. Neck: Supple, with full range of motion. No jugular venous distention. Trachea midline. Respiratory:  Normal respiratory effort. Clear to auscultation, bilaterally without Rales/Wheezes/Rhonchi. Cardiovascular:  Regular rate and rhythm with normal S1/S2 without murmurs, rubs or gallops. Abdomen: Soft, non-tender, non-distended with normal bowel sounds. Musculoskeletal:  No clubbing, cyanosis or edema bilaterally. Left foot surgical splint dressing  Skin: Skin color, texture, turgor normal.  No rashes or lesions. Neurologic:  Neurovascularly intact without any focal sensory/motor deficits. Cranial nerves: II-XII intact, grossly non-focal.  Psychiatric:  Alert and oriented, thought content appropriate, normal insight      Labs:     No results for input(s): WBC, HGB, HCT, PLT in the last 72 hours. No results for input(s): NA, K, CL, CO2, BUN, CREATININE, CALCIUM, PHOS in the last 72 hours. Invalid input(s): MAGNES  No results for input(s): AST, ALT, BILIDIR, BILITOT, ALKPHOS in the last 72 hours. No results for input(s): INR in the last 72 hours. No results for input(s): Kendrick Gey in the last 72 hours.     Urinalysis:      Lab Results   Component Value Date/Time    NITRU Negative 08/05/2022 03:05 PM    WBCUA 5-10 08/05/2022 03:05 PM    BACTERIA FEW 08/05/2022 03:05 PM    RBCUA 0-1 08/05/2022 03:05 PM    RBCUA 0-1 10/01/2012 09:53 AM    BLOODU TRACE-INTACT 08/05/2022 03:05 PM    SPECGRAV >=1.030 08/05/2022 03:05 PM    GLUCOSEU 100 08/05/2022 03:05 PM         ASSESSMENT:  Trimalleolar left ankle fracture and minimal tibiotalar subluxation.   Hypertension   Coronary artery disease  Chronic back pain  Alcohol use disorder      PLAN:  Plan is for surgery during his admission orthopedic.  Orthopedic consulted.  N.p.o. after midnight.  Continue pain control.  I did review EKG with sinus rhythm no ST changes.  Noncardiac surgical score of 1, class II low risk.  Patient is medically cleared for surgery.  Monitor off alcohol withdrawal protocol for now.           Cayden Bradley MD    Thank you KEDAR VILLALOBOS MD for the opportunity to be involved in this patient's care. If you have any questions or concerns please feel free to contact me through [employer].

## 2023-01-21 ENCOUNTER — APPOINTMENT (OUTPATIENT)
Dept: GENERAL RADIOLOGY | Age: 63
DRG: 313 | End: 2023-01-21
Payer: MEDICAID

## 2023-01-21 ENCOUNTER — ANESTHESIA (OUTPATIENT)
Dept: OPERATING ROOM | Age: 63
End: 2023-01-21
Payer: MEDICAID

## 2023-01-21 LAB
ANION GAP SERPL CALCULATED.3IONS-SCNC: 10 MMOL/L (ref 7–16)
ANISOCYTOSIS: ABNORMAL
BASOPHILS ABSOLUTE: 0 E9/L (ref 0–0.2)
BASOPHILS RELATIVE PERCENT: 0.4 % (ref 0–2)
BUN BLDV-MCNC: 3 MG/DL (ref 6–23)
CALCIUM SERPL-MCNC: 8.5 MG/DL (ref 8.6–10.2)
CHLORIDE BLD-SCNC: 102 MMOL/L (ref 98–107)
CO2: 25 MMOL/L (ref 22–29)
CREAT SERPL-MCNC: 0.5 MG/DL (ref 0.5–1)
EKG ATRIAL RATE: 72 BPM
EKG P AXIS: 51 DEGREES
EKG P-R INTERVAL: 144 MS
EKG Q-T INTERVAL: 436 MS
EKG QRS DURATION: 80 MS
EKG QTC CALCULATION (BAZETT): 477 MS
EKG R AXIS: 17 DEGREES
EKG T AXIS: 34 DEGREES
EKG VENTRICULAR RATE: 72 BPM
EOSINOPHILS ABSOLUTE: 0 E9/L (ref 0.05–0.5)
EOSINOPHILS RELATIVE PERCENT: 0 % (ref 0–6)
GFR SERPL CREATININE-BSD FRML MDRD: >60 ML/MIN/1.73
GLUCOSE BLD-MCNC: 122 MG/DL (ref 74–99)
HCT VFR BLD CALC: 33.4 % (ref 34–48)
HEMOGLOBIN: 11.4 G/DL (ref 11.5–15.5)
HYPOCHROMIA: ABNORMAL
LYMPHOCYTES ABSOLUTE: 0.21 E9/L (ref 1.5–4)
LYMPHOCYTES RELATIVE PERCENT: 4.4 % (ref 20–42)
MAGNESIUM: 1.6 MG/DL (ref 1.6–2.6)
MCH RBC QN AUTO: 32.5 PG (ref 26–35)
MCHC RBC AUTO-ENTMCNC: 34.1 % (ref 32–34.5)
MCV RBC AUTO: 95.2 FL (ref 80–99.9)
MONOCYTES ABSOLUTE: 0.16 E9/L (ref 0.1–0.95)
MONOCYTES RELATIVE PERCENT: 2.6 % (ref 2–12)
NEUTROPHILS ABSOLUTE: 4.84 E9/L (ref 1.8–7.3)
NEUTROPHILS RELATIVE PERCENT: 93 % (ref 43–80)
NUCLEATED RED BLOOD CELLS: 1.7 /100 WBC
OVALOCYTES: ABNORMAL
PDW BLD-RTO: 14.7 FL (ref 11.5–15)
PLATELET # BLD: 273 E9/L (ref 130–450)
PMV BLD AUTO: 9.9 FL (ref 7–12)
POIKILOCYTES: ABNORMAL
POLYCHROMASIA: ABNORMAL
POTASSIUM REFLEX MAGNESIUM: 3.4 MMOL/L (ref 3.5–5)
RBC # BLD: 3.51 E12/L (ref 3.5–5.5)
SODIUM BLD-SCNC: 137 MMOL/L (ref 132–146)
TARGET CELLS: ABNORMAL
WBC # BLD: 5.2 E9/L (ref 4.5–11.5)

## 2023-01-21 PROCEDURE — C1713 ANCHOR/SCREW BN/BN,TIS/BN: HCPCS | Performed by: ORTHOPAEDIC SURGERY

## 2023-01-21 PROCEDURE — 6360000002 HC RX W HCPCS: Performed by: INTERNAL MEDICINE

## 2023-01-21 PROCEDURE — 99222 1ST HOSP IP/OBS MODERATE 55: CPT | Performed by: ORTHOPAEDIC SURGERY

## 2023-01-21 PROCEDURE — 2500000003 HC RX 250 WO HCPCS

## 2023-01-21 PROCEDURE — 80048 BASIC METABOLIC PNL TOTAL CA: CPT

## 2023-01-21 PROCEDURE — 77071 MNL APPL STRS JT RADIOGRAPHY: CPT | Performed by: ORTHOPAEDIC SURGERY

## 2023-01-21 PROCEDURE — 2580000003 HC RX 258: Performed by: FAMILY MEDICINE

## 2023-01-21 PROCEDURE — 3600000005 HC SURGERY LEVEL 5 BASE: Performed by: ORTHOPAEDIC SURGERY

## 2023-01-21 PROCEDURE — 85025 COMPLETE CBC W/AUTO DIFF WBC: CPT

## 2023-01-21 PROCEDURE — 36415 COLL VENOUS BLD VENIPUNCTURE: CPT

## 2023-01-21 PROCEDURE — 2709999900 HC NON-CHARGEABLE SUPPLY: Performed by: ORTHOPAEDIC SURGERY

## 2023-01-21 PROCEDURE — 3700000001 HC ADD 15 MINUTES (ANESTHESIA): Performed by: ORTHOPAEDIC SURGERY

## 2023-01-21 PROCEDURE — 27829 TREAT LOWER LEG JOINT: CPT | Performed by: ORTHOPAEDIC SURGERY

## 2023-01-21 PROCEDURE — 6360000002 HC RX W HCPCS: Performed by: FAMILY MEDICINE

## 2023-01-21 PROCEDURE — 83735 ASSAY OF MAGNESIUM: CPT

## 2023-01-21 PROCEDURE — 6370000000 HC RX 637 (ALT 250 FOR IP): Performed by: FAMILY MEDICINE

## 2023-01-21 PROCEDURE — 6360000002 HC RX W HCPCS: Performed by: ANESTHESIOLOGY

## 2023-01-21 PROCEDURE — 0QSH04Z REPOSITION LEFT TIBIA WITH INTERNAL FIXATION DEVICE, OPEN APPROACH: ICD-10-PCS | Performed by: ORTHOPAEDIC SURGERY

## 2023-01-21 PROCEDURE — 6360000002 HC RX W HCPCS

## 2023-01-21 PROCEDURE — 6360000002 HC RX W HCPCS: Performed by: STUDENT IN AN ORGANIZED HEALTH CARE EDUCATION/TRAINING PROGRAM

## 2023-01-21 PROCEDURE — 1200000000 HC SEMI PRIVATE

## 2023-01-21 PROCEDURE — 73610 X-RAY EXAM OF ANKLE: CPT

## 2023-01-21 PROCEDURE — 93010 ELECTROCARDIOGRAM REPORT: CPT | Performed by: INTERNAL MEDICINE

## 2023-01-21 PROCEDURE — 3700000000 HC ANESTHESIA ATTENDED CARE: Performed by: ORTHOPAEDIC SURGERY

## 2023-01-21 PROCEDURE — 3600000015 HC SURGERY LEVEL 5 ADDTL 15MIN: Performed by: ORTHOPAEDIC SURGERY

## 2023-01-21 PROCEDURE — 7100000000 HC PACU RECOVERY - FIRST 15 MIN: Performed by: ORTHOPAEDIC SURGERY

## 2023-01-21 PROCEDURE — 0QSK04Z REPOSITION LEFT FIBULA WITH INTERNAL FIXATION DEVICE, OPEN APPROACH: ICD-10-PCS | Performed by: ORTHOPAEDIC SURGERY

## 2023-01-21 PROCEDURE — 2580000003 HC RX 258: Performed by: STUDENT IN AN ORGANIZED HEALTH CARE EDUCATION/TRAINING PROGRAM

## 2023-01-21 PROCEDURE — 3209999900 FLUORO FOR SURGICAL PROCEDURES

## 2023-01-21 PROCEDURE — 2720000010 HC SURG SUPPLY STERILE: Performed by: ORTHOPAEDIC SURGERY

## 2023-01-21 PROCEDURE — 27822 TREATMENT OF ANKLE FRACTURE: CPT | Performed by: ORTHOPAEDIC SURGERY

## 2023-01-21 PROCEDURE — 7100000001 HC PACU RECOVERY - ADDTL 15 MIN: Performed by: ORTHOPAEDIC SURGERY

## 2023-01-21 DEVICE — SCREW BNE L45MM DIA4MM CANC S STL PARTIALLY THRD SM HEX: Type: IMPLANTABLE DEVICE | Site: ANKLE | Status: FUNCTIONAL

## 2023-01-21 DEVICE — SCREW BNE L12MM DIA3.5MM CORT S STL ST NONCANNULATED LOK: Type: IMPLANTABLE DEVICE | Site: ANKLE | Status: FUNCTIONAL

## 2023-01-21 DEVICE — SCREW BNE L12MM DIA3.5MM CORT S STL ST LOK FULL THRD: Type: IMPLANTABLE DEVICE | Site: ANKLE | Status: FUNCTIONAL

## 2023-01-21 DEVICE — SCREW BNE L16MM DIA4MM CANC S STL ST CANN NONLOCKING FULL: Type: IMPLANTABLE DEVICE | Site: ANKLE | Status: FUNCTIONAL

## 2023-01-21 DEVICE — IMPLANTABLE DEVICE: Type: IMPLANTABLE DEVICE | Site: ANKLE | Status: FUNCTIONAL

## 2023-01-21 DEVICE — SCREW BNE L10MM DIA2.4MM S STL ST LOK FULL THRD T8 STARDRV: Type: IMPLANTABLE DEVICE | Site: ANKLE | Status: FUNCTIONAL

## 2023-01-21 DEVICE — SCREW BNE L10MM DIA3.5MM CORT S STL ST NONCANNULATED LOK: Type: IMPLANTABLE DEVICE | Site: ANKLE | Status: FUNCTIONAL

## 2023-01-21 DEVICE — SCREW BNE L8MM DIA2.4MM S STL ST LOK FULL THRD T8 STARDRV: Type: IMPLANTABLE DEVICE | Site: ANKLE | Status: FUNCTIONAL

## 2023-01-21 DEVICE — PLATE BNE L141MM 12 H S STL 1/3 TBLR LOK COMPR W/ CLLR FOR: Type: IMPLANTABLE DEVICE | Site: ANKLE | Status: FUNCTIONAL

## 2023-01-21 DEVICE — SCREW BNE L28MM DIA2.4MM CORT S STL ST T8 STARDRV RECESS: Type: IMPLANTABLE DEVICE | Site: ANKLE | Status: FUNCTIONAL

## 2023-01-21 DEVICE — SCREW BNE L14MM DIA4MM CANC S STL ST CANN NONLOCKING FULL: Type: IMPLANTABLE DEVICE | Site: ANKLE | Status: FUNCTIONAL

## 2023-01-21 DEVICE — SCREW BNE L24MM DIA2.4MM CORT S STL ST T8 STARDRV RECESS: Type: IMPLANTABLE DEVICE | Site: ANKLE | Status: FUNCTIONAL

## 2023-01-21 RX ORDER — SODIUM CHLORIDE 9 MG/ML
INJECTION, SOLUTION INTRAVENOUS PRN
Status: DISCONTINUED | OUTPATIENT
Start: 2023-01-21 | End: 2023-01-21 | Stop reason: HOSPADM

## 2023-01-21 RX ORDER — TRAMADOL HYDROCHLORIDE 50 MG/1
50 TABLET ORAL EVERY 6 HOURS PRN
Status: DISCONTINUED | OUTPATIENT
Start: 2023-01-21 | End: 2023-01-24 | Stop reason: HOSPADM

## 2023-01-21 RX ORDER — TRAMADOL HYDROCHLORIDE 50 MG/1
50 TABLET ORAL EVERY 6 HOURS PRN
Status: DISCONTINUED | OUTPATIENT
Start: 2023-01-21 | End: 2023-01-21 | Stop reason: SDUPTHER

## 2023-01-21 RX ORDER — ASPIRIN 81 MG/1
81 TABLET ORAL 2 TIMES DAILY
Qty: 60 TABLET | Refills: 0 | Status: SHIPPED | OUTPATIENT
Start: 2023-01-21 | End: 2023-01-24 | Stop reason: SDUPTHER

## 2023-01-21 RX ORDER — TRAMADOL HYDROCHLORIDE 50 MG/1
100 TABLET ORAL EVERY 6 HOURS PRN
Status: DISCONTINUED | OUTPATIENT
Start: 2023-01-21 | End: 2023-01-21 | Stop reason: SDUPTHER

## 2023-01-21 RX ORDER — FENTANYL CITRATE 50 UG/ML
INJECTION, SOLUTION INTRAMUSCULAR; INTRAVENOUS PRN
Status: DISCONTINUED | OUTPATIENT
Start: 2023-01-21 | End: 2023-01-21 | Stop reason: SDUPTHER

## 2023-01-21 RX ORDER — TRAMADOL HYDROCHLORIDE 50 MG/1
50 TABLET ORAL EVERY 6 HOURS PRN
Status: DISCONTINUED | OUTPATIENT
Start: 2023-01-21 | End: 2023-01-21

## 2023-01-21 RX ORDER — DIPHENHYDRAMINE HYDROCHLORIDE 50 MG/ML
25 INJECTION INTRAMUSCULAR; INTRAVENOUS EVERY 6 HOURS PRN
Status: DISCONTINUED | OUTPATIENT
Start: 2023-01-21 | End: 2023-01-23

## 2023-01-21 RX ORDER — CEFAZOLIN SODIUM 1 G/3ML
INJECTION, POWDER, FOR SOLUTION INTRAMUSCULAR; INTRAVENOUS PRN
Status: DISCONTINUED | OUTPATIENT
Start: 2023-01-21 | End: 2023-01-21 | Stop reason: SDUPTHER

## 2023-01-21 RX ORDER — MAGNESIUM SULFATE HEPTAHYDRATE 40 MG/ML
INJECTION, SOLUTION INTRAVENOUS PRN
Status: DISCONTINUED | OUTPATIENT
Start: 2023-01-21 | End: 2023-01-21 | Stop reason: SDUPTHER

## 2023-01-21 RX ORDER — FENTANYL CITRATE 50 UG/ML
25 INJECTION, SOLUTION INTRAMUSCULAR; INTRAVENOUS EVERY 4 HOURS PRN
Status: DISCONTINUED | OUTPATIENT
Start: 2023-01-21 | End: 2023-01-21

## 2023-01-21 RX ORDER — SODIUM CHLORIDE 0.9 % (FLUSH) 0.9 %
5-40 SYRINGE (ML) INJECTION PRN
Status: DISCONTINUED | OUTPATIENT
Start: 2023-01-21 | End: 2023-01-24 | Stop reason: HOSPADM

## 2023-01-21 RX ORDER — SODIUM CHLORIDE 9 MG/ML
INJECTION, SOLUTION INTRAVENOUS PRN
Status: DISCONTINUED | OUTPATIENT
Start: 2023-01-21 | End: 2023-01-24 | Stop reason: HOSPADM

## 2023-01-21 RX ORDER — ROCURONIUM BROMIDE 10 MG/ML
INJECTION, SOLUTION INTRAVENOUS PRN
Status: DISCONTINUED | OUTPATIENT
Start: 2023-01-21 | End: 2023-01-21 | Stop reason: SDUPTHER

## 2023-01-21 RX ORDER — DIPHENHYDRAMINE HYDROCHLORIDE 50 MG/ML
12.5 INJECTION INTRAMUSCULAR; INTRAVENOUS EVERY 6 HOURS PRN
Status: DISCONTINUED | OUTPATIENT
Start: 2023-01-21 | End: 2023-01-21

## 2023-01-21 RX ORDER — LIDOCAINE HYDROCHLORIDE 20 MG/ML
INJECTION, SOLUTION INTRAVENOUS PRN
Status: DISCONTINUED | OUTPATIENT
Start: 2023-01-21 | End: 2023-01-21 | Stop reason: SDUPTHER

## 2023-01-21 RX ORDER — ASPIRIN 81 MG/1
81 TABLET ORAL 2 TIMES DAILY
Status: DISCONTINUED | OUTPATIENT
Start: 2023-01-22 | End: 2023-01-24 | Stop reason: HOSPADM

## 2023-01-21 RX ORDER — OXYCODONE HYDROCHLORIDE AND ACETAMINOPHEN 5; 325 MG/1; MG/1
2 TABLET ORAL EVERY 4 HOURS PRN
Status: DISCONTINUED | OUTPATIENT
Start: 2023-01-21 | End: 2023-01-21

## 2023-01-21 RX ORDER — KETAMINE HCL IN NACL, ISO-OSM 100MG/10ML
SYRINGE (ML) INJECTION PRN
Status: DISCONTINUED | OUTPATIENT
Start: 2023-01-21 | End: 2023-01-21 | Stop reason: SDUPTHER

## 2023-01-21 RX ORDER — SODIUM CHLORIDE 0.9 % (FLUSH) 0.9 %
5-40 SYRINGE (ML) INJECTION EVERY 12 HOURS SCHEDULED
Status: DISCONTINUED | OUTPATIENT
Start: 2023-01-21 | End: 2023-01-24 | Stop reason: HOSPADM

## 2023-01-21 RX ORDER — PROPOFOL 10 MG/ML
INJECTION, EMULSION INTRAVENOUS PRN
Status: DISCONTINUED | OUTPATIENT
Start: 2023-01-21 | End: 2023-01-21 | Stop reason: SDUPTHER

## 2023-01-21 RX ORDER — DEXAMETHASONE SODIUM PHOSPHATE 10 MG/ML
INJECTION INTRAMUSCULAR; INTRAVENOUS PRN
Status: DISCONTINUED | OUTPATIENT
Start: 2023-01-21 | End: 2023-01-21 | Stop reason: SDUPTHER

## 2023-01-21 RX ORDER — MIDAZOLAM HYDROCHLORIDE 1 MG/ML
INJECTION INTRAMUSCULAR; INTRAVENOUS PRN
Status: DISCONTINUED | OUTPATIENT
Start: 2023-01-21 | End: 2023-01-21 | Stop reason: SDUPTHER

## 2023-01-21 RX ORDER — ONDANSETRON 2 MG/ML
4 INJECTION INTRAMUSCULAR; INTRAVENOUS
Status: DISCONTINUED | OUTPATIENT
Start: 2023-01-21 | End: 2023-01-21 | Stop reason: HOSPADM

## 2023-01-21 RX ORDER — LABETALOL HYDROCHLORIDE 5 MG/ML
INJECTION, SOLUTION INTRAVENOUS PRN
Status: DISCONTINUED | OUTPATIENT
Start: 2023-01-21 | End: 2023-01-21 | Stop reason: SDUPTHER

## 2023-01-21 RX ORDER — SODIUM CHLORIDE 0.9 % (FLUSH) 0.9 %
5-40 SYRINGE (ML) INJECTION PRN
Status: DISCONTINUED | OUTPATIENT
Start: 2023-01-21 | End: 2023-01-21 | Stop reason: HOSPADM

## 2023-01-21 RX ORDER — TRAMADOL HYDROCHLORIDE 50 MG/1
100 TABLET ORAL EVERY 6 HOURS PRN
Status: DISCONTINUED | OUTPATIENT
Start: 2023-01-21 | End: 2023-01-21

## 2023-01-21 RX ORDER — SODIUM CHLORIDE 0.9 % (FLUSH) 0.9 %
5-40 SYRINGE (ML) INJECTION EVERY 12 HOURS SCHEDULED
Status: DISCONTINUED | OUTPATIENT
Start: 2023-01-21 | End: 2023-01-21 | Stop reason: HOSPADM

## 2023-01-21 RX ORDER — SODIUM CHLORIDE 9 MG/ML
INJECTION, SOLUTION INTRAVENOUS CONTINUOUS
Status: ACTIVE | OUTPATIENT
Start: 2023-01-21 | End: 2023-01-22

## 2023-01-21 RX ORDER — MORPHINE SULFATE 2 MG/ML
2 INJECTION, SOLUTION INTRAMUSCULAR; INTRAVENOUS ONCE
Status: COMPLETED | OUTPATIENT
Start: 2023-01-21 | End: 2023-01-21

## 2023-01-21 RX ORDER — OXYCODONE HYDROCHLORIDE AND ACETAMINOPHEN 5; 325 MG/1; MG/1
1 TABLET ORAL EVERY 6 HOURS PRN
Qty: 28 TABLET | Refills: 0 | Status: SHIPPED | OUTPATIENT
Start: 2023-01-21 | End: 2023-01-24 | Stop reason: HOSPADM

## 2023-01-21 RX ORDER — OXYCODONE HYDROCHLORIDE AND ACETAMINOPHEN 5; 325 MG/1; MG/1
1 TABLET ORAL EVERY 4 HOURS PRN
Status: DISCONTINUED | OUTPATIENT
Start: 2023-01-21 | End: 2023-01-21

## 2023-01-21 RX ADMIN — DIPHENHYDRAMINE HYDROCHLORIDE 25 MG: 50 INJECTION, SOLUTION INTRAMUSCULAR; INTRAVENOUS at 21:37

## 2023-01-21 RX ADMIN — CETIRIZINE HYDROCHLORIDE 10 MG: 10 TABLET, FILM COATED ORAL at 16:55

## 2023-01-21 RX ADMIN — CEFAZOLIN 2 G: 1 INJECTION, POWDER, FOR SOLUTION INTRAMUSCULAR; INTRAVENOUS at 07:56

## 2023-01-21 RX ADMIN — FENTANYL CITRATE 50 MCG: 50 INJECTION, SOLUTION INTRAMUSCULAR; INTRAVENOUS at 07:38

## 2023-01-21 RX ADMIN — HYDROXYZINE PAMOATE 25 MG: 25 CAPSULE ORAL at 11:15

## 2023-01-21 RX ADMIN — TRAMADOL HYDROCHLORIDE 50 MG: 50 TABLET ORAL at 21:34

## 2023-01-21 RX ADMIN — ROCURONIUM BROMIDE 10 MG: 10 INJECTION, SOLUTION INTRAVENOUS at 08:42

## 2023-01-21 RX ADMIN — PHENYLEPHRINE HYDROCHLORIDE 100 MCG: 10 INJECTION INTRAVENOUS at 08:19

## 2023-01-21 RX ADMIN — HYDROXYZINE PAMOATE 25 MG: 25 CAPSULE ORAL at 18:11

## 2023-01-21 RX ADMIN — HYDROMORPHONE HYDROCHLORIDE 0.5 MG: 1 INJECTION, SOLUTION INTRAMUSCULAR; INTRAVENOUS; SUBCUTANEOUS at 10:21

## 2023-01-21 RX ADMIN — FENTANYL CITRATE 50 MCG: 50 INJECTION, SOLUTION INTRAMUSCULAR; INTRAVENOUS at 09:06

## 2023-01-21 RX ADMIN — FENTANYL CITRATE 50 MCG: 50 INJECTION, SOLUTION INTRAMUSCULAR; INTRAVENOUS at 08:51

## 2023-01-21 RX ADMIN — SODIUM CHLORIDE: 9 INJECTION, SOLUTION INTRAVENOUS at 07:37

## 2023-01-21 RX ADMIN — PROPOFOL 4 MG: 10 INJECTION, EMULSION INTRAVENOUS at 09:31

## 2023-01-21 RX ADMIN — LABETALOL HYDROCHLORIDE 2.5 MG: 5 INJECTION INTRAVENOUS at 09:13

## 2023-01-21 RX ADMIN — FENTANYL CITRATE 50 MCG: 50 INJECTION, SOLUTION INTRAMUSCULAR; INTRAVENOUS at 07:49

## 2023-01-21 RX ADMIN — HYDROMORPHONE HYDROCHLORIDE 0.5 MG: 1 INJECTION, SOLUTION INTRAMUSCULAR; INTRAVENOUS; SUBCUTANEOUS at 09:55

## 2023-01-21 RX ADMIN — ROCURONIUM BROMIDE 40 MG: 10 INJECTION, SOLUTION INTRAVENOUS at 07:42

## 2023-01-21 RX ADMIN — SODIUM CHLORIDE: 9 INJECTION, SOLUTION INTRAVENOUS at 11:24

## 2023-01-21 RX ADMIN — MIDAZOLAM 2 MG: 1 INJECTION INTRAMUSCULAR; INTRAVENOUS at 07:37

## 2023-01-21 RX ADMIN — OXYCODONE AND ACETAMINOPHEN 2 TABLET: 5; 325 TABLET ORAL at 14:41

## 2023-01-21 RX ADMIN — TIZANIDINE 4 MG: 4 TABLET ORAL at 16:55

## 2023-01-21 RX ADMIN — PROPOFOL 120 MG: 10 INJECTION, EMULSION INTRAVENOUS at 07:42

## 2023-01-21 RX ADMIN — AMITRIPTYLINE HYDROCHLORIDE 25 MG: 25 TABLET, FILM COATED ORAL at 21:34

## 2023-01-21 RX ADMIN — MORPHINE SULFATE 2 MG: 2 INJECTION, SOLUTION INTRAMUSCULAR; INTRAVENOUS at 18:11

## 2023-01-21 RX ADMIN — LIDOCAINE HYDROCHLORIDE 80 MG: 20 INJECTION, SOLUTION INTRAVENOUS at 07:42

## 2023-01-21 RX ADMIN — HYDROMORPHONE HYDROCHLORIDE 0.5 MG: 1 INJECTION, SOLUTION INTRAMUSCULAR; INTRAVENOUS; SUBCUTANEOUS at 10:08

## 2023-01-21 RX ADMIN — SODIUM CHLORIDE, PRESERVATIVE FREE 10 ML: 5 INJECTION INTRAVENOUS at 21:35

## 2023-01-21 RX ADMIN — DIPHENHYDRAMINE HYDROCHLORIDE 12.5 MG: 50 INJECTION, SOLUTION INTRAMUSCULAR; INTRAVENOUS at 13:25

## 2023-01-21 RX ADMIN — FENTANYL CITRATE 50 MCG: 50 INJECTION, SOLUTION INTRAMUSCULAR; INTRAVENOUS at 08:02

## 2023-01-21 RX ADMIN — FENTANYL CITRATE 25 MCG: 50 INJECTION, SOLUTION INTRAMUSCULAR; INTRAVENOUS at 04:48

## 2023-01-21 RX ADMIN — MAGNESIUM SULFATE HEPTAHYDRATE 2000 MG: 40 INJECTION, SOLUTION INTRAVENOUS at 07:52

## 2023-01-21 RX ADMIN — CEFAZOLIN 2000 MG: 2 INJECTION, POWDER, FOR SOLUTION INTRAMUSCULAR; INTRAVENOUS at 16:55

## 2023-01-21 RX ADMIN — DEXAMETHASONE SODIUM PHOSPHATE 10 MG: 10 INJECTION INTRAMUSCULAR; INTRAVENOUS at 07:49

## 2023-01-21 RX ADMIN — Medication 20 MG: at 07:49

## 2023-01-21 RX ADMIN — PROPOFOL 40 MG: 10 INJECTION, EMULSION INTRAVENOUS at 07:49

## 2023-01-21 RX ADMIN — HYDROMORPHONE HYDROCHLORIDE 0.5 MG: 1 INJECTION, SOLUTION INTRAMUSCULAR; INTRAVENOUS; SUBCUTANEOUS at 09:50

## 2023-01-21 RX ADMIN — SUGAMMADEX 100 MG: 100 INJECTION, SOLUTION INTRAVENOUS at 09:28

## 2023-01-21 RX ADMIN — Medication 400 MG: at 21:34

## 2023-01-21 ASSESSMENT — PAIN - FUNCTIONAL ASSESSMENT
PAIN_FUNCTIONAL_ASSESSMENT: PREVENTS OR INTERFERES SOME ACTIVE ACTIVITIES AND ADLS

## 2023-01-21 ASSESSMENT — PAIN DESCRIPTION - LOCATION
LOCATION: ANKLE
LOCATION: ANKLE
LOCATION: LEG
LOCATION: ANKLE
LOCATION: ANKLE
LOCATION: ANKLE;LEG
LOCATION: ANKLE

## 2023-01-21 ASSESSMENT — PAIN DESCRIPTION - ORIENTATION
ORIENTATION: LEFT

## 2023-01-21 ASSESSMENT — PAIN SCALES - GENERAL
PAINLEVEL_OUTOF10: 8
PAINLEVEL_OUTOF10: 7
PAINLEVEL_OUTOF10: 3
PAINLEVEL_OUTOF10: 10
PAINLEVEL_OUTOF10: 8
PAINLEVEL_OUTOF10: 7

## 2023-01-21 ASSESSMENT — PAIN DESCRIPTION - DESCRIPTORS
DESCRIPTORS: ACHING;BURNING;DISCOMFORT
DESCRIPTORS: BURNING;ACHING;DISCOMFORT
DESCRIPTORS: ACHING;BURNING
DESCRIPTORS: ACHING;SHARP;SHOOTING
DESCRIPTORS: ACHING;DISCOMFORT;BURNING
DESCRIPTORS: SPASM;SHARP;SHOOTING
DESCRIPTORS: ACHING;BURNING;DISCOMFORT
DESCRIPTORS: ACHING
DESCRIPTORS: ACHING;DISCOMFORT
DESCRIPTORS: ACHING;BURNING;DISCOMFORT

## 2023-01-21 ASSESSMENT — PAIN SCALES - WONG BAKER
WONGBAKER_NUMERICALRESPONSE: 0

## 2023-01-21 NOTE — DISCHARGE INSTRUCTIONS
[facility] Department of Orthopedic Surgery  [address] [facility] [address]    Dr. Gerard Aguilera, DO         MD Dr. Gerard Orellana MD Frank Ansevin, PA-C Jessica DelloStritto PA-C Tyler Tsangaris PA-C      Orthopaedics Discharge Instructions   Weight bearing Status - Non-weight bearing - on left lower Extremity  Pain medication Per Prescriptions  Contact Office for Medication Refill- [phone]  Office can refill pain med every 7 days  If patient discharging to facility then pain control will be continued per facility physician  Ice to operative/injured site for 15-30 minutes of each hour for next 5 days    Recommend that you continue to ice the area 2-3 times per day after this   Elevate operative/injured limb on 2 pillows at home  Goal is to have limb above the heart if able  Continue DVT Prophylaxis (blood clot prevention) as Prescribed: Aspirin 81 twice daily   Wound care - Please maintain splint to surgical extremity, do not remove or alter without contacting office. Please keep clean and dry until follow up in office. If this becomes too tight, too loose, wet or damaged please contact Ortho Trauma Office.   Fracture Care - Strict non-weight bearing to the left lower extremity.  Walker for assistance.  Follow Up in Office in 2 weeks. Your first post op appointment is often with one of our PAs.     Call the office at [phone] or directions or with any questions.  Watch for these significant complications.  Call physician if they or any other problems occur:  Fever over 101°, redness, swelling or warmth at the operative site  Unrelieved nausea    Foul smelling or cloudy drainage at the operative site   Unrelieved pain    Blood soaked dressing. (Some oozing may be normal)     Numb, pale, blue, cold or tingling extremity    Future Appointments   Date Time Provider Department Center   2/14/2023  9:30 AM [facility] RM[facility]2 [facility] HOD         It is the  Department of Orthopaedic Trauma's standard of practice that providers will de-escalate(wean) all patients from narcotic(opioid) medications during the post-operative period. We provide multimodal pain control but opioid medications are tapered in all of our patients. If patient requires referral to pain management for prolonged taper off of opioid pain medication we will facilitate this process.

## 2023-01-21 NOTE — ANESTHESIA POSTPROCEDURE EVALUATION
Department of Anesthesiology  Postprocedure Note    Patient: Elvira Todd  MRN: [id number]  YOB: 1960  Date of evaluation: 1/21/2023      Procedure Summary     Date: 01/21/23 Room / Location: [facility] OR  / [place] [facility]    Anesthesia Start: 1714 Anesthesia Stop: 0945    Procedure: LEFT ANKLE OPEN REDUCTION INTERNAL FIXATION (Left: Leg Lower) Diagnosis:       Closed displaced trimalleolar fracture of left ankle, initial encounter      (Closed displaced trimalleolar fracture of left ankle, initial encounter [A88.721V])    Surgeons: Perri Tucker MD Responsible Provider: Kristin Cohen MD    Anesthesia Type: General ASA Status: 3          Anesthesia Type: General    Marija Phase I: Marija Score: 10    Marija Phase II:        Anesthesia Post Evaluation    Patient location during evaluation: PACU  Patient participation: complete - patient participated  Level of consciousness: awake  Pain score: 0  Airway patency: patent  Nausea & Vomiting: no nausea  Complications: no  Cardiovascular status: hemodynamically stable  Respiratory status: acceptable  Hydration status: stable  Multimodal analgesia pain management approach

## 2023-01-21 NOTE — PROGRESS NOTES
Comprehensive Nutrition Assessment    Type and Reason for Visit:  Initial, Positive Nutrition Screen, Wound    Nutrition Recommendations/Plan:   Continue NPO- will start ONS w/ diet to optimize wound healing and encourage oral intake  Will monitor     Malnutrition Assessment:  Malnutrition Status: At risk for malnutrition (Comment) (01/21/23 0932)    Context:  Acute Illness     Findings of the 6 clinical characteristics of malnutrition:  Energy Intake:  Mild decrease in energy intake (Comment)  Weight Loss:  Unable to assess     Body Fat Loss:  Unable to assess (pt in OR)     Muscle Mass Loss:  Unable to assess (pt in OR)    Fluid Accumulation:  No significant fluid accumulation     Strength:  Not Performed    Nutrition Assessment:    pt adm d/t L-ankle frx s/p fall; PMhx of CAD, HTN; ETOH use noted; pt currently NPO and in OR for L-ankle ORIF- will start ONS w/ diet to aid in wound healing and monitor. Nutrition Related Findings:    A&Ox4; U partial plate; active BS; nonpitting edema; I/O WNL Wound Type: Surgical Incision       Current Nutrition Intake & Therapies:    Average Meal Intake: NPO  Average Supplements Intake: NPO  Diet NPO  ADULT ORAL NUTRITION SUPPLEMENT; Breakfast, Lunch; Standard High Calorie/High Protein Oral Supplement  ADULT ORAL NUTRITION SUPPLEMENT; Lunch, Dinner; Wound Healing Oral Supplement    Anthropometric Measures:  Height: 5' 2\" (157.5 cm)  Ideal Body Weight (IBW): 110 lbs (50 kg)       Current Body Weight: 114 lb (51.7 kg) (1/20-stated), 103.6 % IBW. Current BMI (kg/m2): 20.8  Usual Body Weight: 132 lb (59.9 kg) (5/21/22-BS)  % Weight Change (Calculated): -13.6  Weight Adjustment For: No Adjustment                 BMI Categories: Normal Weight (BMI 18.5-24. 9)    Estimated Daily Nutrient Needs:  Energy Requirements Based On: Formula  Weight Used for Energy Requirements: Current  Energy (kcal/day): 2002-4460  Weight Used for Protein Requirements: Current  Protein (g/day): 1.3-1.5g/kgxCBW=70-80g  Method Used for Fluid Requirements: 1 ml/kcal  Fluid (ml/day): 7926-0641    Nutrition Diagnosis:   Increased nutrient needs related to increase demand for energy/nutrients as evidenced by wounds (surgical)    Nutrition Interventions:   Food and/or Nutrient Delivery: Continue NPO, Start Oral Nutrition Supplement (start Ensure BID and connie BID w/ diet)  Nutrition Education/Counseling: Education not indicated  Coordination of Nutrition Care: Continue to monitor while inpatient       Goals:     Goals: Initiate PO diet, within 2 days       Nutrition Monitoring and Evaluation:   Behavioral-Environmental Outcomes: None Identified  Food/Nutrient Intake Outcomes: Diet Advancement/Tolerance  Physical Signs/Symptoms Outcomes: Biochemical Data, Nutrition Focused Physical Findings, Skin, Chewing or Swallowing, Weight, GI Status, Fluid Status or Edema    Discharge Planning:     Too soon to determine     Jose Bazan RD  Contact: [phone]

## 2023-01-21 NOTE — ANESTHESIA PRE PROCEDURE
Department of Anesthesiology  Preprocedure Note       Name:  Gianna Isaac   Age:  58 y.o.  :  1960                                          MRN:  [id number]         Date:  2023      Surgeon: Tommy Gallardo):  Federica Mcknight MD    Procedure: ANKLE OPEN REDUCTION INTERNAL FIXATION VS LEFT ANKLE EXTERNAL FIXATOR APPLICATION (Left: Leg Lower)  LEFT ANKLE EXTERNAL FIXATOR APPLICATION (Left)    Medications prior to admission:   Prior to Admission medications    Medication Sig Start Date End Date Taking? Authorizing Provider   fluticasone (FLOVENT HFA) 110 MCG/ACT inhaler Inhale 2 puffs into the lungs 2 times daily    Historical Provider, MD   methocarbamol (ROBAXIN) 750 MG tablet Take 750 mg by mouth 2 times daily    Historical Provider, MD   Multiple Vitamins-Minerals (THERAPEUTIC MULTIVITAMIN-MINERALS) tablet Take 1 tablet by mouth daily    Historical Provider, MD   albuterol sulfate HFA (PROVENTIL;VENTOLIN;PROAIR) 108 (90 Base) MCG/ACT inhaler Inhale 2 puffs into the lungs every 6 hours as needed for Wheezing    Historical Provider, MD   HYDROcodone-acetaminophen (NORCO) 7.5-325 MG per tablet Take 1 tablet by mouth every 8 hours as needed for Pain. Historical Provider, MD   hydrOXYzine (ATARAX) 25 MG tablet Take 25 mg by mouth every 8 hours as needed for Anxiety     Historical Provider, MD   amitriptyline (ELAVIL) 25 MG tablet Take 25 mg by mouth nightly  11/10/20   Historical Provider, MD   omeprazole (PRILOSEC) 40 MG capsule Take 40 mg by mouth daily as needed     Historical Provider, MD       Current medications:    No current facility-administered medications for this visit. No current outpatient medications on file.      Facility-Administered Medications Ordered in Other Visits   Medication Dose Route Frequency Provider Last Rate Last Admin    albuterol (PROVENTIL) nebulizer solution 2.5 mg  2.5 mg Nebulization Q6H PRN Vineet Cox MD        amitriptyline (ELAVIL) tablet 25 mg  25 mg Oral Nightly Tavares Madrigal MD        cetirizine (ZYRTEC) tablet 10 mg  10 mg Oral Daily Tavares Madrigal MD        HYDROcodone-acetaminophen [facility]) 7.5-325 MG per tablet 1 tablet  1 tablet Oral Q8H PRN Tavares Madrigal MD   1 tablet at 01/20/23 1754    hydrOXYzine pamoate (VISTARIL) capsule 25 mg  25 mg Oral Q8H PRN Tavares Madrigal MD        magnesium oxide (MAG-OX) tablet 400 mg  400 mg Oral BID MD Hipolito Carlisle ON 1/21/2023] pantoprazole (PROTONIX) tablet 40 mg  40 mg Oral QAM AC Tavares Madrigal MD        tiZANidine (ZANAFLEX) tablet 4 mg  4 mg Oral Q6H PRN Tavares Madrigal MD        sodium chloride flush 0.9 % injection 5-40 mL  5-40 mL IntraVENous 2 times per day Tavares Madrigal MD        sodium chloride flush 0.9 % injection 5-40 mL  5-40 mL IntraVENous PRN Tavares Madrigal MD        0.9 % sodium chloride infusion   IntraVENous PRN Tavares Madrigal MD        polyethylene glycol (GLYCOLAX) packet 17 g  17 g Oral Daily PRN Tavares Madrigal MD        acetaminophen (TYLENOL) tablet 650 mg  650 mg Oral Q6H PRN Tavares Madrigal MD        Or   Hipolito Quevedo acetaminophen (TYLENOL) suppository 650 mg  650 mg Rectal Q6H PRN Tavares Madrigal MD        HYDROcodone-acetaminophen (NORCO)  MG per tablet 1 tablet  1 tablet Oral Q6H PRN Tavares Madrigal MD        trimethobenzamide Arcelia Castellani) injection 200 mg  200 mg IntraMUSCular Q6H PRN Tavares Madrigal MD        diphenhydrAMINE (BENADRYL) injection 25 mg  25 mg IntraVENous Q6H PRN Tavares Madrigal MD   25 mg at 01/20/23 1823       Allergies:    No Known Allergies    Problem List:    Patient Active Problem List   Diagnosis Code    Osteoarthritis of left knee M17.12    Osteoarthritis of right knee M17.11    Low back pain M54.50    Lumbar disc displacement at L3-4, L4-5 M51.26    Acute sinusitis J01.90    Syncope and collapse R55    Prolonged QT interval R94.31    Torsades de pointes I47.21  Hypokalemia E87.6    Hypomagnesemia E83.42    NSVT (nonsustained ventricular tachycardia) I47.29    Spondylolisthesis of lumbar region, L4 on L5 M43.16    Acute alcoholic intoxication ([facility]) F10.929    Ileus ([facility]) K56.7    Delirium tremens ([facility]) F10.931    Closed nondisplaced fracture of greater tuberosity of right humerus with routine healing S42.254D    Closed fracture of proximal end of left humerus with routine healing S42.202D    Multiple closed fractures of metatarsal bone of left foot S92.302A    Gait abnormality R26.9    [facility] compression [facility], closed[facility] S32.000A    Alcohol withdrawal with perceptual disturbances ([facility]) F10.932    Alcohol withdrawal delirium ([facility]) F10.931    Severe protein-calorie malnutrition ([facility]) E43    Nonrheumatic mitral valve regurgitation I34.0    Nonrheumatic tricuspid valve regurgitation I36.1    Nonrheumatic aortic valve insufficiency I35.1    Pulmonary HTN ([facility]) I27.20    TIA (transient ischemic attack) G45.9    Acute hypoxemic respiratory failure due to COVID-19 ([facility]) U07.1, J96.01    Hypotension I95.9    T12 compression fracture ([facility]) S22.080A    Lightheadedness R42    History of alcohol abuse F10.11    Fracture of ankle, trimalleolar, closed, left, initial encounter N84.323C       Past Medical History:        Diagnosis Date    Asthma     CAD (coronary artery disease)     Closed fracture of proximal end of left humerus with routine healing 6/11/2019    Degeneration of lumbar or lumbosacral intervertebral disc     Fall against sharp object 1960    chest tube in hospital    History of blood transfusion 1997    after vaginal delivery of baby hemmorhage    Hypertension     Hypokalemia     Insomnia     Kidney stone     Nondisplaced fracture of greater tuberosity of right humerus, initial encounter for closed fracture 1/22/2019    Orthostatic hypotension     Severe back pain 2/3/2014       Past Surgical History: Procedure Laterality Date    BUNIONECTOMY      Left foot    CHEST TUBE INSERTION  1990    several    COLONOSCOPY  3/26/09    KYPHOSIS SURGERY N/A 10/28/2019    KYPHOPLASTY L1 WITH VERTEBRAL BONE BIOPSY performed by Tez Hogan MD at [place] [address] [facility]RIPSY  2012   [address] [facility]  11/09/2017    L3-L4 ,L4-L5  interbody fusion with Dajuan Gonzales     OTHER SURGICAL HISTORY      electro ablation for rectal and sigmoid polyps    TONSILLECTOMY      UPPER GASTROINTESTINAL ENDOSCOPY  3/30/15    UPPER GASTROINTESTINAL ENDOSCOPY  4/21/08       Social History:    Social History     Tobacco Use    Smoking status: Every Day     Packs/day: 0.25     Years: 2.00     Pack years: 0.50     Types: Cigarettes     Start date: 12/7/2018    Smokeless tobacco: Never   Substance Use Topics    Alcohol use: Yes     Alcohol/week: 6.0 standard drinks     Types: 6 Cans of beer per week     Comment: daily                                Ready to quit: Not Answered  Counseling given: Not Answered      Vital Signs (Current): There were no vitals filed for this visit.                                            BP Readings from Last 3 Encounters:   01/20/23 (!) 150/110   12/25/22 130/86   08/23/22 (!) 111/90       NPO Status:   pt educated on NPO status after midnight                                                                               BMI:   Wt Readings from Last 3 Encounters:   01/20/23 114 lb (51.7 kg)   12/25/22 115 lb (52.2 kg)   08/23/22 123 lb (55.8 kg)     There is no height or weight on file to calculate BMI.    CBC:   Lab Results   Component Value Date/Time    WBC 5.7 01/20/2023 02:12 PM    RBC 3.84 01/20/2023 02:12 PM    HGB 12.4 01/20/2023 02:12 PM    HCT 36.6 01/20/2023 02:12 PM    MCV 95.3 01/20/2023 02:12 PM    RDW 15.2 01/20/2023 02:12 PM     01/20/2023 02:12 PM       CMP:   Lab Results   Component Value Date/Time     01/20/2023 02:12 PM    K 3.5 01/20/2023 02:12 PM    K 3.7 07/22/2022 07:23 AM     01/20/2023 02:12 PM    CO2 22 01/20/2023 02:12 PM    BUN 4 01/20/2023 02:12 PM    CREATININE 0.5 01/20/2023 02:12 PM    GFRAA >60 08/05/2022 03:05 PM    LABGLOM >60 01/20/2023 02:12 PM    GLUCOSE 72 01/20/2023 02:12 PM    GLUCOSE 84 11/28/2011 01:25 PM    PROT 6.2 12/25/2022 10:52 AM    CALCIUM 9.7 01/20/2023 02:12 PM    BILITOT 0.7 12/25/2022 10:52 AM    ALKPHOS 97 12/25/2022 10:52 AM    AST 63 12/25/2022 10:52 AM    ALT 25 12/25/2022 10:52 AM       POC Tests: No results for input(s): POCGLU, POCNA, POCK, POCCL, POCBUN, POCHEMO, POCHCT in the last 72 hours. Coags:   Lab Results   Component Value Date/Time    PROTIME 11.8 01/20/2023 02:12 PM    INR 1.1 01/20/2023 02:12 PM    APTT 36.8 01/20/2023 02:12 PM       HCG (If Applicable): No results found for: PREGTESTUR, PREGSERUM, HCG, HCGQUANT     ABGs: No results found for: PHART, PO2ART, CQW4RRW, ESV8KYP, BEART, E9LEVOVI     Type & Screen (If Applicable):  No results found for: LABABO, [address]       Anesthesia Evaluation  Patient summary reviewed and Nursing notes reviewed no history of anesthetic complications:   Airway: Mallampati: II  TM distance: >3 FB   Neck ROM: full  Mouth opening: > = 3 FB   Dental:    (+) partials  Comment: Pt denies any loose, chipped, or cracked teeth    Pulmonary: breath sounds clear to auscultation  (+) asthma (pt states takes inhaler as needed ): current smoker          Patient did not smoke on day of surgery.                  Cardiovascular:  Exercise tolerance: poor (<4 METS),   (+) hypertension:, valvular problems/murmurs (mild to moderate TVR per 1/20/23 echo):, CAD:, dysrhythmias (Torsades de pointes): ventricular tachycardia, AUGUSTE: after ambulating 1 flight of stairs, pulmonary hypertension (RVSP is 46 mmHg, Estimated PASP: 45.98):,       ECG reviewed  Rhythm: regular  Rate: normal  Echocardiogram reviewed         Beta Blocker:  Not on Beta Blocker      ROS comment: Orthostatic hypotension    Prolonged QTc- 477 on 1/20/23 EKG         Neuro/Psych:   (+) TIA, psychiatric history:             ROS comment:   Syncope and collapse, gait abnormality/falls    Insomnia     Degeneration of lumbar or lumbosacral intervertebral disc  Severe back pain  Lumbar disc displacement at L3-4, L4-5  Spondylolisthesis of lumbar region, L4 on L5  Lumbar compression fracture, closed, initial encounter   Lumbar fusion 11/2017 L3-L4, L4-L5  T12 compression fx    Neuropathy BLE--feet    Kypho L1     Closed fx of Right & Left Humerus with routine healing  Multiple closed fx of Left Metatarsals  Left Ankle fx - for ORIF on 1/21/23 GI/Hepatic/Renal:   (+) GERD (pt states takes prilosec as needed, no recent issues):,          ROS comment:   Kidney stone with lithrotripsy     Ileus     Malnutrition   . Endo/Other:    (+) : arthritis (bilateral knees): OA., . Pt had no PAT visit        ROS comment:   ETOH abuse with DT Abdominal:       Abdomen: soft. Vascular: negative vascular ROS. Other Findings:      EKG 1/20/23:  Normal sinus rhythm  Possible Left atrial enlargement  Borderline ECG    ECHO 1/20/23:  Summary   Normal left ventricle size and systolic function. Ejection fraction is visually estimated at 55-60%. No regional wall motion abnormalities seen. Normal left ventricle wall thickness. Indeterminate diastolic function. Normal right ventricular size and function. TAPSE 24 mm. No hemodynamically significant aortic stenosis is present. Mild to moderate tricuspid regurgitation. RVSP is 46 mmHg. Pulmonary hypertension is mild . No evidence for hemodynamically significant pericardial effusion. CXR 1/20/23:  Impression   No active process. Anesthesia Plan      general     ASA 3       Induction: intravenous. MIPS: Postoperative opioids intended, Prophylactic antiemetics administered and Postoperative trial extubation. Anesthetic plan and risks discussed with patient.     Use of blood products discussed with patient whom consented to blood products. Plan discussed with CRNA and attending.                     Imelda Howard RN , MARTINA  1/20/2023

## 2023-01-21 NOTE — FLOWSHEET NOTE
Dr. Kemp Danger about wanting more medication for itching and stronger pain meds. At this time no new orders.

## 2023-01-21 NOTE — ED PROVIDER NOTES
ATTENDING PROVIDER ATTESTATION:     Yvonne Wong presented to the emergency department for evaluation of Foot Injury (Left foot injury a week ago-sent for ortho/trauma consult. Aaox4. )    I have reviewed and discussed the case, including pertinent history (medical, surgical, family and social) and exam findings with the Midlevel and the Nurse assigned to Yvonne Wong. I have personally performed and/or participated in the history, exam, medical decision making, and procedures and agree with all pertinent clinical information. I performed a face to face evaluation or the patient as well as guided the management and care of this patient. I have reviewed my findings and recommendations with Yvonne Wong and members of family present at the time of disposition. History of Present Illness:      vYonne Wong is a 58 y.o. female presenting to the ED for left ankle fracture. Fall, sent from [facility] for ankle fracture. Already reduced and in a splint. She c/o pain. Review of Systems:    Pertinent positives and negatives are stated within HPI    --------------------------------------------- PAST HISTORY ---------------------------------------------  Past Medical History:  has a past medical history of Asthma, CAD (coronary artery disease), Closed fracture of proximal end of left humerus with routine healing, Degeneration of lumbar or lumbosacral intervertebral disc, Fall against sharp object, History of blood transfusion, Hypertension, Hypokalemia, Insomnia, Kidney stone, Nondisplaced fracture of greater tuberosity of right humerus, initial encounter for closed fracture, Orthostatic hypotension, and Severe back pain. Past Surgical History:  has a past surgical history that includes Tonsillectomy; Bunionectomy; chest tube insertion (1990); Lithotripsy (2012); Colonoscopy (3/26/09); other surgical history; Upper gastrointestinal endoscopy (3/30/15);  Upper gastrointestinal endoscopy (4/21/08); lumbar fusion (11/09/2017); and Kyphosis surgery (N/A, 10/28/2019). Social History:  reports that she has been smoking cigarettes. She started smoking about 4 years ago. She has a 0.50 pack-year smoking history. She has never used smokeless tobacco. She reports current alcohol use of about 6.0 standard drinks per week. She reports that she does not use drugs. Family History: family history includes Cancer in her brother; Diabetes in her mother; Heart Disease in her maternal grandfather, maternal grandmother, and sister; Hypertension in her mother; Stroke in her sister. The patients home medications have been reviewed. Allergies: Patient has no known allergies. My Exam:         Constitutional/General: Alert and oriented x3  Head: Normocephalic and atraumatic  Respiratory:   i. Not in respiratory distress  Cardiovascular:  Regular rate. . Equal extremity pulses. Musculoskeletal: Moves all extremities x 4. Warm and well perfused, no clubbing, no cyanosis, no edema. Capillary refill <3 seconds.'    Left foot with short leg splint in place  Wiggles toes  Palpable pulses  No pain with passive stretch  Normal capillary refill    Integument: skin warm and dry. No rashes. Neurologic: GCS 15, n                   IEdmundo MD am the primary provider of record    I have personally performed a substantive portion of the encounter including all aspects of the medical decision making:    Medical Decision Making  Ankle pain, concerns for fracture, dislocation to name a few. Xray with trimal. Ortho consult. Already splinted. They recommended medicine to admit. Medicine consulted for admission    Problems Addressed:  Closed trimalleolar fracture of left ankle, initial encounter: acute illness or injury    Amount and/or Complexity of Data Reviewed  External Data Reviewed: notes. Details: [facility] notes  Labs: ordered.      Details: cbc normal  bmp normal  Radiology: ordered and independent interpretation performed.     Details: CXR without ptx or acute infiltate  xray anle with left trimal fracture  ECG/medicine tests: ordered and independent interpretation performed.     Details: EKG sinus rhythm, normal rate, no acute ST elevations or depressions or acute ischemic changes  Discussion of management or test interpretation with external provider(s): Ortho  Internal medicine    Risk  Prescription drug management.  Parenteral controlled substances.  Drug therapy requiring intensive monitoring for toxicity.  Decision regarding hospitalization.                  1. Closed trimalleolar fracture of left ankle, initial encounter                Sea Kelsey MD  01/20/23 2264

## 2023-01-21 NOTE — PROGRESS NOTES
Hospitalist Progress Note      SYNOPSIS: Patient admitted on 2023 for Fracture of ankle, trimalleolar, closed, left, initial encounter      SUBJECTIVE:    Patient seen and examined. She does report that she is still very itchy. Discussed switch percocet to tramadol as she was itchy with norco but She does have a history of QT prolongation and torsades documented. At this time avoid tramadol. Records reviewed. 35-year-old lady past medical history of asthma, alcohol use disorder, coronary artery disease, type II hypertension, chronic back pain presented with left ankle fracture after a fall a week ago. She was seen at an urgent care initially and placed in a splint and was supposed to follow-up outpatient with orthopedic. She went back to the urgent care and x-ray taking was concern she was sent to the ER for surgical fixation. Status post open reduction internal fixation of left ankle trimalleolar fracture with fixation of posterior malleolus on 23    Stable overnight. No other overnight issues reported. Temp (24hrs), Av.8 °F (36.6 °C), Min:97.3 °F (36.3 °C), Max:98.5 °F (36.9 °C)    DIET: ADULT DIET; Regular  ADULT ORAL NUTRITION SUPPLEMENT; Breakfast, Lunch; Standard High Calorie/High Protein Oral Supplement  ADULT ORAL NUTRITION SUPPLEMENT; Lunch, Dinner; Wound Healing Oral Supplement  CODE: Full Code    Intake/Output Summary (Last 24 hours) at 2023 1121  Last data filed at 2023 1030  Gross per 24 hour   Intake 850 ml   Output 30 ml   Net 820 ml       OBJECTIVE:    BP (!) 141/95   Pulse 67   Temp 97.3 °F (36.3 °C) (Temporal)   Resp (!) 5   Ht 5' 2\" (1.575 m)   Wt 114 lb (51.7 kg)   SpO2 96%   BMI 20.85 kg/m²     General appearance: No apparent distress, appears stated age and cooperative. HEENT:  Conjunctivae/corneas clear. Neck: Supple. No jugular venous distention.    Respiratory: Clear to auscultation bilaterally, normal respiratory effort  Cardiovascular: Regular rate rhythm, normal S1-S2  Abdomen: Soft, nontender, nondistended  Musculoskeletal: No clubbing, cyanosis, no bilateral lower extremity edema. Brisk capillary refill. left foot surgical dressing  Skin:  No rashes  on visible skin  Neurologic: awake, alert and following commands     ASSESSMENT:  Trimalleolar left ankle fracture and minimal tibiotalar subluxation status post open reduction internal fixation of left ankle trimalleolar fracture with fixation of posterior malleolus on 1/21/23  Hypertension   Coronary artery disease  Chronic back pain  Alcohol use disorder     PLAN:  Patient is status post open reduction internal fixation of left ankle trimalleolar fracture with fixation of posterior malleolus. Continue pain control. PT OT per orthopedic. DVT prophylaxis per orthopedic recommendation. DISPOSITION:     Medications:  REVIEWED DAILY    Infusion Medications    sodium chloride      sodium chloride      sodium chloride       Scheduled Medications    sodium chloride flush  5-40 mL IntraVENous 2 times per day    ceFAZolin (ANCEF) IVPB  2,000 mg IntraVENous Q8H    [START ON 1/22/2023] aspirin  81 mg Oral BID    amitriptyline  25 mg Oral Nightly    cetirizine  10 mg Oral Daily    magnesium oxide  400 mg Oral BID    pantoprazole  40 mg Oral QAM AC    sodium chloride flush  5-40 mL IntraVENous 2 times per day     PRN Meds: fentanNYL, sodium chloride flush, sodium chloride, albuterol, hydrOXYzine pamoate, tiZANidine, sodium chloride flush, sodium chloride, polyethylene glycol, acetaminophen **OR** acetaminophen, trimethobenzamide, oxyCODONE-acetaminophen, oxyCODONE-acetaminophen    Labs:     Recent Labs     01/20/23  1412   WBC 5.7   HGB 12.4   HCT 36.6          Recent Labs     01/20/23  1412      K 3.5      CO2 22   BUN 4*   CREATININE 0.5   CALCIUM 9.7       No results for input(s): PROT, ALB, ALKPHOS, ALT, AST, BILITOT, AMYLASE, LIPASE in the last 72 hours.     Recent Labs 01/20/23  1412   INR 1.1       No results for input(s): CKTOTAL, TROPONINI in the last 72 hours. Chronic labs:    Lab Results   Component Value Date    CHOL 137 05/08/2017    TRIG 131 05/08/2017    HDL 78 05/08/2017    LDLCALC 33 05/08/2017    TSH 0.894 08/05/2022    INR 1.1 01/20/2023    LABA1C 5.0 08/05/2022       Radiology: REVIEWED DAILY    +++++++++++++++++++++++++++++++++++++++++++++++++  Cony Mackey MD  [facility] [place] Physician - [address] [place] [address]  +++++++++++++++++++++++++++++++++++++++++++++++++  NOTE: This report was transcribed using voice recognition software. Every effort was made to ensure accuracy; however, inadvertent computerized transcription errors may be present.

## 2023-01-21 NOTE — PROGRESS NOTES
945 admit to pacu from surgery. Resting with eyes closed. O2 face mask on with oral airway inplace. Dressing dry and intact. Toes warm and dry,. Elevated and ice to sight. [place] head    950 oral airway removed. Called for x rays.   Medicated for pain

## 2023-01-21 NOTE — PLAN OF CARE
Problem: Discharge Planning  Goal: Discharge to home or other facility with appropriate resources  Outcome: Progressing     Problem: Pain  Goal: Verbalizes/displays adequate comfort level or baseline comfort level  Outcome: Progressing     Problem: Safety - Adult  Goal: Free from fall injury  Outcome: Progressing

## 2023-01-21 NOTE — OP NOTE
Operative Note      Patient: Vasiliy Rios  YOB: 1960  MRN: [id number]    Date of Procedure: 1/21/2023    Pre-Op Diagnosis:   1. Displaced trimalleolar ankle fracture left ankle, closed    2. Neuropathy bilateral feet    Post-Op Diagnosis: Same           Procedure:  1. Open reduction internal fixation left ankle trimalleolar fracture without fixation of the posterior malleolus    2. Stress examination of the syndesmosis under anesthesia    3. Open reduction internal fixation left ankle syndesmosis          Surgeon(s):  Maksim Acosta MD    Assistant:   Resident: Boo Escamilla DO; Carter Miranda DO; Jose Whitfield DO    Anesthesia: General    Estimated Blood Loss (mL): Minimal    Complications: None    Specimens:   * No specimens in log *    Implants:  Implant Name Type Inv.  Item Serial No.  Lot No. LRB No. Used Action   PLATE BNE [id number] 12 H S STL 1/3 TBLR KATHY COMPR W/ CLLR FOR - [id number]  PLATE BNE [id number] 12 H S STL 1/3 TBLR KATHY COMPR W/ CLLR FOR  DEPU[employer] [place]  Left 1 Implanted   SCREW BNE L10MM DIA3.5MM POLI S STL ST NONCANNULATED KATHY - [id number]  SCREW BNE L10MM DIA3.5MM POLI S STL ST NONCANNULATED KATHY  DE[employer] [place]  Left 1 Implanted   SCREW BNE L12MM DIA3.5MM POLI S STL ST NONCANNULATED KATHY - [id number]  SCREW BNE L12MM DIA3.5MM POLI S STL ST NONCANNULATED KATHY  DEPU[employer] [place]-  Left 5 Implanted   SCREW BNE L16MM DIA3.5MM POLI S STL ST NONCANNULATED KATHY - [id number]  SCREW BNE L16MM DIA3.5MM POLI S STL ST NONCANNULATED KATHY  DEPU[employer] [place]-  Left 1 Implanted   SCREW BNE L16MM DIA4MM CANC S STL ST AFUA NONLOCKING FULL - [id number]  SCREW BNE L16MM DIA4MM CANC S STL ST AFUA NONLOCKING FULL  DEPU[employer] [place]  Left 1 Implanted   SCREW BNE L12MM DIA3.5MM POLI S STL ST KATHY FULL THRD - [id number]  SCREW BNE L12MM DIA3.5MM POLI S STL ST KATHY FULL THRD  DEPU[employer] [place]  Left 1 Implanted   SCREW BNE L14MM DIA4MM CANC S STL ST AFUA NONLOCKING FULL - [id number]  SCREW BNE L14MM DIA4MM CANC S STL ST AFUA NONLOCKING FULL  [employer] [place]-  Left 1 Implanted   PLATE LK 3.3HN LCP CONDYLER  7H SHAFT - [id number]  PLATE LK 6.3XR LCP CONDYLER  7H SHAFT  DEPUY SYNTHES [facility]-  Left 1 Implanted   SCREW BNE L28MM DIA2. 4MM POLI S STL ST T8 STARDRV RECESS - [id number]  SCREW BNE L28MM DIA2. 4MM POLI S STL ST T8 STARDRV RECESS  DEPU[employer] [place]  Left 2 Implanted   SCREW BNE L24MM DIA2. 4MM POLI S STL ST T8 STARDRV RECESS - [id number]  SCREW BNE L24MM DIA2. 4MM POLI S STL ST T8 STARDRV RECESS  DE[employer] [place]-  Left 1 Implanted   SCREW CORTX SLFTP W/ T8 2.4X32MM - [id number]  SCREW CORTX SLFTP W/ T8 2.4X32MM  DEPU[employer] [place]-  Left 1 Implanted   SCREW BNE L8MM DIA2. 4MM S STL ST KATHY FULL THRD T8 STARDRV - [id number]  SCREW BNE L8MM DIA2. 4MM S STL ST KATHY FULL THRD T8 STARDRV  DE[employer] [place]-  Left 1 Implanted   SCREW BNE L10MM DIA2. 4MM S STL ST KATHY FULL THRD T8 STARDRV - [id number]  SCREW BNE L10MM DIA2. 4MM S STL ST KATHY FULL THRD T8 STARDRV  DEPU[employer] [place]-  Left 1 Implanted   SCREW BNE L45MM DIA4MM CANC S STL PARTIALLY THRD SM HEX - [id number]  SCREW BNE L45MM DIA4MM CANC S STL PARTIALLY THRD SM HEX  DEPU[employer] [place]-  Left 2 Implanted         Drains: * No LDAs found *    Findings: Patient with poor bone quality. She also had a comminuted segmental fibular fracture. Her stress view was not grossly positive although she has a anterior lateral piece pulled off her talus therefore for stability of the syndesmosis was fixed, also in the setting of neuropathy. Detailed Description of Procedure:   Patient was brought to the operating room in a supine position on a hospital bed. Patient was transferred to the operating room table by multiple individuals in a safe fashion with anesthesia in control of the patient's C-spine and airway. Once on the operating table, all points of pressure were identified and well-padded.   A tourniquet was applied to the patient's left upper thigh. The patient's left lower extremity was sterilely prepped and draped in the standard orthopedic fashion. A timeout was performed indicating the appropriate identification of the patient, the procedure to be performed, and the side to be performed upon. This was agreed upon by all individuals in the room. A lateral approach the fibula was marked out along with the medial malleolus approach. An Esmarch was applied tourniquet was elevated to 275 mmHg. We initially started on the fibular side with a 10 blade making incision. We did extend the incision quite long due to the extensive nature of the fracture. We carefully dissected out the superficial peroneal nerve. We subperiosteal dissected the fracture fragments. Due to the comminution we utilized a bridge plating technique. A one third tubular plate was then utilized with bicortical fixation and 2 proximally. Multiple reduction clamps were placed distally we achieved fixation with locking and cancellous screw fixation. We able to recreate the length of her fibula. She did have chronic changes to her ankle consistent with potential previous fractures or potentially even Charcot foot. There was significant osteophytes and erosion of her midfoot along with previous fractures of her metatarsals and spurs off her talus and tibia. Once the length was recreated we then turned our attention to the medial malleolus. The incision was made careful dissection was carried out to identify the saphenous vein and protected throughout the approach. There was multiple fracture fragments along with a vertical shear component of the medial malleolus. We able to reduce it with a dental pick and place 1 provisional K wire. It was evident that it would need buttressed. A 2.4 mm condylar plate was then contoured appropriately. Bicortical screw fixation was placed proximal to the vertical shear medial malleolus component.   There is also a coronal and sagittal splits in the medial malleolus therefore it was used to buttress these all down. Once we had bicortical fixation proximally as a buttress plate we then turned our attention distally where a typical medial malleolus screw was placed very near to the medial shoulder to give it support. Once the 4.0 mm cancellous screw was put in position we then applied to locking screws into the distal portion of her condylar plate to prevent sagittal shifting of the fragment. It was at this point time we obtained multiple views showing good placement of our hardware. The bones were near anatomically reduced. On the stress view she did wind very minimally although with her history of neuropathy the anterior lateral fragment off her distal tibia and her bone quality the decision was made to place a syndesmotic screw for fixation distally. Due to the amount of hardware already in her ankle a 45 mm partially-threaded cancellous screw was placed parallel to the ankle joint. This did help with stability on our repeat stress view. The final x-rays showed all components in good position. Ankle is well aligned. We therefore francesco irrigated the wounds with sterile normal saline. Closed with 2-0 Monocryl and 3-0 nylon. She had bacitracin Xeroform and a well-padded 3 sided splint put in position. She was taken to the PACU in stable condition. Postoperative plan:  1. Strict nonweightbearing left lower extremity    2. DVT prophylaxis in the form of oral aspirin    3. Follow-up in office in 2 weeks for potential suture removal and will most likely transition to a short leg cast for 3 to 4 weeks. She will most likely be nonweightbearing for 10 to 12 weeks.       Electronically signed by Janine Choe MD on 1/21/2023 at 9:10 AM

## 2023-01-21 NOTE — CONSULTS
Department of Orthopedic Surgery  Consult Note    Reason for Consult: Left ankle trimalleolar fracture    HISTORY OF PRESENT ILLNESS:       Patient is a 58 y.o. female who presents with left ankle fracture after fall at home approximately 1 week ago. States that she went to urgent care and was placed in a splint without reduction and told to follow-up outpatient. Patient states that \"I cannot get into see anybody\" and so she went back to urgent care today. New x-rays were taken and she was sent to the emergency department for probable surgical fixation. She is well-known to orthopedic trauma, previously seeing us for nonoperative right distal radius fracture and left third and fourth metatarsal neck fractures. She is a somewhat poor historian. She does have history of alcoholism but denies any alcohol use prior to this injury. She has tried her best to be nonweightbearing left lower extremity. Denies numbness/tingling/paresthesias. Denies any other orthopedic complaints at this time.       Past Medical History:        Diagnosis Date    Asthma     CAD (coronary artery disease)     Closed fracture of proximal end of left humerus with routine healing 6/11/2019    Degeneration of lumbar or lumbosacral intervertebral disc     Fall against sharp object 1960    chest tube in hospital    History of blood transfusion 1997    after vaginal delivery of baby hemmorhage    Hypertension     Hypokalemia     Insomnia     Kidney stone     Nondisplaced fracture of greater tuberosity of right humerus, initial encounter for closed fracture 1/22/2019    Orthostatic hypotension     Severe back pain 2/3/2014     Past Surgical History:        Procedure Laterality Date    BUNIONECTOMY      Left foot    CHEST TUBE INSERTION  1990    several    COLONOSCOPY  3/26/09    KYPHOSIS SURGERY N/A 10/28/2019    KYPHOPLASTY L1 WITH VERTEBRAL BONE BIOPSY performed by Erika Howell MD at [address] [place] [address] 11/09/2017    L3-L4 ,L4-L5  interbody fusion with [facility] [place]     OTHER SURGICAL HISTORY      electro ablation for rectal and sigmoid polyps    TONSILLECTOMY      UPPER GASTROINTESTINAL ENDOSCOPY  3/30/15    UPPER GASTROINTESTINAL ENDOSCOPY  4/21/08     Current Medications:   Current Facility-Administered Medications: fentaNYL (SUBLIMAZE) injection 25 mcg, 25 mcg, IntraVENous, Q4H PRN  albuterol (PROVENTIL) nebulizer solution 2.5 mg, 2.5 mg, Nebulization, Q6H PRN  amitriptyline (ELAVIL) tablet 25 mg, 25 mg, Oral, Nightly  cetirizine (ZYRTEC) tablet 10 mg, 10 mg, Oral, Daily  hydrOXYzine pamoate (VISTARIL) capsule 25 mg, 25 mg, Oral, Q8H PRN  magnesium oxide (MAG-OX) tablet 400 mg, 400 mg, Oral, BID  pantoprazole (PROTONIX) tablet 40 mg, 40 mg, Oral, QAM AC  tiZANidine (ZANAFLEX) tablet 4 mg, 4 mg, Oral, Q6H PRN  sodium chloride flush 0.9 % injection 5-40 mL, 5-40 mL, IntraVENous, 2 times per day  sodium chloride flush 0.9 % injection 5-40 mL, 5-40 mL, IntraVENous, PRN  0.9 % sodium chloride infusion, , IntraVENous, PRN  polyethylene glycol (GLYCOLAX) packet 17 g, 17 g, Oral, Daily PRN  acetaminophen (TYLENOL) tablet 650 mg, 650 mg, Oral, Q6H PRN **OR** acetaminophen (TYLENOL) suppository 650 mg, 650 mg, Rectal, Q6H PRN  trimethobenzamide (TIGAN) injection 200 mg, 200 mg, IntraMUSCular, Q6H PRN  oxyCODONE-acetaminophen (PERCOCET) 5-325 MG per tablet 1 tablet, 1 tablet, Oral, Q4H PRN  oxyCODONE-acetaminophen (PERCOCET) 5-325 MG per tablet 2 tablet, 2 tablet, Oral, Q4H PRN  Allergies:  Patient has no known allergies. Social History:   TOBACCO:   reports that she has been smoking cigarettes. She started smoking about 4 years ago. She has a 0.50 pack-year smoking history. She has never used smokeless tobacco.  ETOH:   reports current alcohol use of about 6.0 standard drinks per week. DRUGS:   reports no history of drug use.   ACTIVITIES OF DAILY LIVING:    OCCUPATION:    Family History:       Problem Relation Age of Onset    Diabetes Mother     Hypertension Mother     Heart Disease Sister     Stroke Sister     Heart Disease Maternal Grandmother     Heart Disease Maternal Grandfather     Cancer Brother        REVIEW OF SYSTEMS:  CONSTITUTIONAL:  negative for  fevers, chills  EYES:  negative for blurred vision, visual disturbance  HEENT:  negative for  hearing loss, voice change  RESPIRATORY:  negative for  dyspnea, wheezing  CARDIOVASCULAR:  negative for  chest pain, palpitations  GASTROINTESTINAL:  negative for nausea, vomiting  GENITOURINARY:  negative for frequency, urinary incontinence  HEMATOLOGIC/LYMPHATIC:  negative for bleeding and petechiae  MUSCULOSKELETAL:  positive for  pain, joint swelling, decreased range of motion, and bone pain  NEUROLOGICAL:  negative for headaches, dizziness  BEHAVIOR/PSYCH:  negative for increased agitation and anxiety    PHYSICAL EXAM:    VITALS:  BP (!) 140/91   Pulse 81   Temp 98.5 °F (36.9 °C) (Temporal)   Resp 16   Ht 5' 2\" (1.575 m)   Wt 114 lb (51.7 kg)   SpO2 99%   BMI 20.85 kg/m²   CONSTITUTIONAL:  awake, alert, cooperative, no apparent distress, and appears stated age  MUSCULOSKELETAL:  Left Lower Extremity  L LE splint intact, she is NVI, good cap refill, AROM toes intact   Compartments supple throughout thigh and leg  Calf supple and nontender above splint  Demonstrates active knee flexion/extension, great toe extension. States sensation intact above and below splint      Secondary Exam:   bilateralUE: No obvious signs of trauma. -TTP to fingers, hand, wrist, forearm, elbow, humerus, shoulder or clavicle. -- Patient able to flex/extend fingers, wrist, elbow and shoulder with active and passive ROM without pain, +2/4 Radial pulse, cap refill <3sec, +AIN/PIN/Radial/Ulnar/Median N, distal sensation grossly intact to C4-T1 dermatomes, compartments soft and compressible. rightLE: No obvious signs of trauma.    -TTP to foot, ankle, leg, knee, thigh, hip.-- Patient able to flex/extend toes, ankle, knee and hip with active and passive ROM without pain,+2/4 DP & PT pulses, cap refill <3sec, +5/5 PF/DF/EHL, distal sensation grossly intact to L4-S1 dermatomes, compartments soft and compressible. DATA:    CBC:   Lab Results   Component Value Date/Time    WBC 5.7 01/20/2023 02:12 PM    RBC 3.84 01/20/2023 02:12 PM    HGB 12.4 01/20/2023 02:12 PM    HCT 36.6 01/20/2023 02:12 PM    MCV 95.3 01/20/2023 02:12 PM    MCH 32.3 01/20/2023 02:12 PM    MCHC 33.9 01/20/2023 02:12 PM    RDW 15.2 01/20/2023 02:12 PM     01/20/2023 02:12 PM    MPV 9.6 01/20/2023 02:12 PM     PT/INR:    Lab Results   Component Value Date/Time    PROTIME 11.8 01/20/2023 02:12 PM    INR 1.1 01/20/2023 02:12 PM       Radiology Review:    Narrative   EXAMINATION:   THREE XRAY VIEWS OF THE LEFT ANKLE       1/20/2023 1:16 pm       COMPARISON:   07/19/2019 left ankle radiographs. HISTORY:   ORDERING SYSTEM PROVIDED HISTORY: Pain   TECHNOLOGIST PROVIDED HISTORY:   Reason for exam:->Pain   What reading provider will be dictating this exam?->CRC       FINDINGS:   There is a comminuted fracture left distal fibular diaphysis 3.7 cm superior   to the level of the tibiotalar joint. There is slight lateral angulation of   the lateral fracture fragments. There is a transverse and comminuted   fracture of the medial malleolus which does not appear significantly   displaced or angulated. There is fracture through the posterior distal tibia   with minimal posterior displacement. There is mild widening of the   tibiotalar joint space. Cast obscures osseous detail. There is soft tissue   swelling both medially and laterally. There appear to be remote healed   fractures of the 3rd and 4th metatarsals distally. Impression   Trimalleolar left ankle fracture and minimal tibiotalar subluxation.                IMPRESSION:  L Trimalleolar Ankle Fracture      Discussion:    Risk, benefits and treatment options discussed with patient. He has verbalized understanding of options. The possibility of complications were also discussed to include but not limited to nerve damage, infection, problems with wound healing, vascular injury, chronic pain, stiffness, dysfunction, nonhealing of the bone, symptomatic hardware and/or its failure, need for subsequent surgery, dislocation, and blood clots as well as medical related problems and other problems not specifically discussed. Risk of anesthesia also discussed to include death. Post-op care, work, activity and restrictions which included the use of pain medication and possibility of using blood thinner post op were also discussed with patient and they verbalized and agreed with the restrictions. PLAN:  Reviewed surgical vs nonsurgical outcomes for her fracture pattern. She wishes to proceed with admission under IM and as long as cleared medically, will plan for ORIF L ankle vs L LE application of external fixator tomorrow AM 1/21/23  CT L ankle ordered for further fracture evaluation   Pain control IV and PO  NPO after midnight tonight   NWB L LE   Chemical DVT prophylaxis, early mobilization with assistive devices   Discussed with Dr Delia Martínez    I have seen and evaluated the patient and agree with the above assessment on today's visit. I have performed the key components of the history and physical examination and concur completely with the findings and plans as documented. Agree with ROS, examination, FMH, PMH, PSH, SocHx, and allergies as above. Patient physically seen and examined. Patient with history of alcohol abuse and presents with a left ankle pilon variant. She has a very high and comminuted fibular fracture, posterior malleolus fracture, anterior tibial plafond fracture, and comminuted vertical shear medial malleolus fracture. She is presenting on a delayed basis. Her skin is intact. Her compartments soft compressible.   Her swelling appears amenable for definitive fixation. She does wiggle her toes but appears to have some degree of neuropathy bilaterally up to about her medial malleoli are area. She does have palpable DP and PT pulses 1/4. She does not have any knee pain on examination. I talked her in detail about her surgery. Explained we have to do it in a staged fashion. Also explained the risk of surgery in detail. I explained the risks and complications of surgery with the patient including but not limited to death from anesthesia, possible neurovascular damage, possible infection, possible nonunion, possible hardware failure, possible need for further surgery, etc.  Patient understood this, asked appropriate questions and decided to go forward with the procedure.       Past Medical History:   Diagnosis Date    Asthma     CAD (coronary artery disease)     Closed fracture of proximal end of left humerus with routine healing 6/11/2019    Degeneration of lumbar or lumbosacral intervertebral disc     Fall against sharp object 1960    chest tube in hospital    History of blood transfusion 1997    after vaginal delivery of baby hemmorhage    Hypertension     Hypokalemia     Insomnia     Kidney stone     Nondisplaced fracture of greater tuberosity of right humerus, initial encounter for closed fracture 1/22/2019    Orthostatic hypotension     Severe back pain 2/3/2014     Past Surgical History:   Procedure Laterality Date    BUNIONECTOMY      Left foot    CHEST TUBE INSERTION  1990    several    COLONOSCOPY  3/26/09    KYPHOSIS SURGERY N/A 10/28/2019    KYPHOPLASTY L1 WITH VERTEBRAL BONE BIOPSY performed by Lelo Damico MD at [place] [address]  2012    LUMBAR FUSION  11/09/2017    L3-L4 ,L4-L5  interbody fusion with Dajuan [facility] [address] 82      electro ablation for rectal and sigmoid polyps    TONSILLECTOMY      UPPER GASTROINTESTINAL ENDOSCOPY  3/30/15    UPPER GASTROINTESTINAL ENDOSCOPY  4/21/08     Family History Problem Relation Age of Onset    Diabetes Mother     Hypertension Mother     Heart Disease Sister     Stroke Sister     Heart Disease Maternal Grandmother     Heart Disease Maternal Grandfather     Cancer Brother      Social History     Tobacco Use    Smoking status: Every Day     Packs/day: 0.25     Years: 2.00     Pack years: 0.50     Types: Cigarettes     Start date: 12/7/2018    Smokeless tobacco: Never   Vaping Use    Vaping Use: Never used   Substance Use Topics    Alcohol use: Yes     Alcohol/week: 6.0 standard drinks     Types: 6 Cans of beer per week     Comment: daily    Drug use: No     Comment: quit 2000 cocaine in past     No Known Allergies        Physical Examination:   General appearance: alert, well appearing, and in no distress,  normal appearing weight. No visible signs of trauma   Mental status: alert, oriented to person, place, and time, normal mood, behavior, speech, dress, motor activity, and thought processes  Abdomen: soft, nondistended  Resp:   resp easy and unlabored, no audible wheezes note, normal symmetrical expansion of both hemithoraces  Cardiac: distal pulses palpable, skin and extremities well perfused  Neurological: alert, oriented X3, normal speech, no focal findings or movement disorder noted, motor and sensory grossly normal bilaterally, normal muscle tone, no tremors  HEENT: normochephalic atraumatic, external ears and eyes normal, sclera normal, neck supple, no nasal discharge. Extremities:   peripheral pulses normal, no edema, redness or tenderness in the calves   Skin: normal coloration, no rashes or open wounds, no suspicious skin lesions noted  Psych: Affect euthymic   Musculoskeletal:   Extremity:  Please see my above examination the history of present illness. Talk to the patient detail. We will plan on open reduction internal fixation versus external fixation.         ELECTRONICALLY signed by:    Maria Luisa Shi MD  1/21/23   This is been dictated utilizing voice recognition software. All efforts have been made to make the note accurate although inadvertent errors may be present.

## 2023-01-22 LAB
ANION GAP SERPL CALCULATED.3IONS-SCNC: 11 MMOL/L (ref 7–16)
BASOPHILS ABSOLUTE: 0.02 E9/L (ref 0–0.2)
BASOPHILS RELATIVE PERCENT: 0.2 % (ref 0–2)
BUN BLDV-MCNC: 5 MG/DL (ref 6–23)
CALCIUM SERPL-MCNC: 8.5 MG/DL (ref 8.6–10.2)
CHLORIDE BLD-SCNC: 104 MMOL/L (ref 98–107)
CO2: 24 MMOL/L (ref 22–29)
CREAT SERPL-MCNC: 0.4 MG/DL (ref 0.5–1)
EOSINOPHILS ABSOLUTE: 0.01 E9/L (ref 0.05–0.5)
EOSINOPHILS RELATIVE PERCENT: 0.1 % (ref 0–6)
GFR SERPL CREATININE-BSD FRML MDRD: >60 ML/MIN/1.73
GLUCOSE BLD-MCNC: 90 MG/DL (ref 74–99)
HCT VFR BLD CALC: 28.6 % (ref 34–48)
HEMOGLOBIN: 9.6 G/DL (ref 11.5–15.5)
IMMATURE GRANULOCYTES #: 0.03 E9/L
IMMATURE GRANULOCYTES %: 0.3 % (ref 0–5)
LYMPHOCYTES ABSOLUTE: 2.42 E9/L (ref 1.5–4)
LYMPHOCYTES RELATIVE PERCENT: 25.8 % (ref 20–42)
MAGNESIUM: 1.3 MG/DL (ref 1.6–2.6)
MCH RBC QN AUTO: 33.1 PG (ref 26–35)
MCHC RBC AUTO-ENTMCNC: 33.6 % (ref 32–34.5)
MCV RBC AUTO: 98.6 FL (ref 80–99.9)
MONOCYTES ABSOLUTE: 1.14 E9/L (ref 0.1–0.95)
MONOCYTES RELATIVE PERCENT: 12.2 % (ref 2–12)
NEUTROPHILS ABSOLUTE: 5.75 E9/L (ref 1.8–7.3)
NEUTROPHILS RELATIVE PERCENT: 61.4 % (ref 43–80)
PDW BLD-RTO: 14.3 FL (ref 11.5–15)
PLATELET # BLD: 240 E9/L (ref 130–450)
PMV BLD AUTO: 10.4 FL (ref 7–12)
POTASSIUM REFLEX MAGNESIUM: 3.3 MMOL/L (ref 3.5–5)
RBC # BLD: 2.9 E12/L (ref 3.5–5.5)
SODIUM BLD-SCNC: 139 MMOL/L (ref 132–146)
WBC # BLD: 9.4 E9/L (ref 4.5–11.5)

## 2023-01-22 PROCEDURE — 97165 OT EVAL LOW COMPLEX 30 MIN: CPT

## 2023-01-22 PROCEDURE — 97530 THERAPEUTIC ACTIVITIES: CPT

## 2023-01-22 PROCEDURE — 6370000000 HC RX 637 (ALT 250 FOR IP): Performed by: FAMILY MEDICINE

## 2023-01-22 PROCEDURE — 6370000000 HC RX 637 (ALT 250 FOR IP): Performed by: STUDENT IN AN ORGANIZED HEALTH CARE EDUCATION/TRAINING PROGRAM

## 2023-01-22 PROCEDURE — 36415 COLL VENOUS BLD VENIPUNCTURE: CPT

## 2023-01-22 PROCEDURE — 6360000002 HC RX W HCPCS: Performed by: FAMILY MEDICINE

## 2023-01-22 PROCEDURE — 83735 ASSAY OF MAGNESIUM: CPT

## 2023-01-22 PROCEDURE — 2580000003 HC RX 258: Performed by: FAMILY MEDICINE

## 2023-01-22 PROCEDURE — 1200000000 HC SEMI PRIVATE

## 2023-01-22 PROCEDURE — 80048 BASIC METABOLIC PNL TOTAL CA: CPT

## 2023-01-22 PROCEDURE — 6360000002 HC RX W HCPCS

## 2023-01-22 PROCEDURE — 6370000000 HC RX 637 (ALT 250 FOR IP)

## 2023-01-22 PROCEDURE — 85025 COMPLETE CBC W/AUTO DIFF WBC: CPT

## 2023-01-22 PROCEDURE — 97161 PT EVAL LOW COMPLEX 20 MIN: CPT

## 2023-01-22 PROCEDURE — 2580000003 HC RX 258: Performed by: STUDENT IN AN ORGANIZED HEALTH CARE EDUCATION/TRAINING PROGRAM

## 2023-01-22 PROCEDURE — 6360000002 HC RX W HCPCS: Performed by: STUDENT IN AN ORGANIZED HEALTH CARE EDUCATION/TRAINING PROGRAM

## 2023-01-22 RX ORDER — KETOROLAC TROMETHAMINE 30 MG/ML
30 INJECTION, SOLUTION INTRAMUSCULAR; INTRAVENOUS ONCE
Status: COMPLETED | OUTPATIENT
Start: 2023-01-22 | End: 2023-01-22

## 2023-01-22 RX ORDER — MAGNESIUM SULFATE IN WATER 40 MG/ML
4000 INJECTION, SOLUTION INTRAVENOUS ONCE
Status: COMPLETED | OUTPATIENT
Start: 2023-01-22 | End: 2023-01-22

## 2023-01-22 RX ORDER — GABAPENTIN 100 MG/1
100 CAPSULE ORAL 3 TIMES DAILY
Status: DISCONTINUED | OUTPATIENT
Start: 2023-01-22 | End: 2023-01-24 | Stop reason: HOSPADM

## 2023-01-22 RX ORDER — POTASSIUM CHLORIDE 20 MEQ/1
40 TABLET, EXTENDED RELEASE ORAL ONCE
Status: COMPLETED | OUTPATIENT
Start: 2023-01-22 | End: 2023-01-22

## 2023-01-22 RX ADMIN — CETIRIZINE HYDROCHLORIDE 10 MG: 10 TABLET, FILM COATED ORAL at 08:42

## 2023-01-22 RX ADMIN — AMITRIPTYLINE HYDROCHLORIDE 25 MG: 25 TABLET, FILM COATED ORAL at 21:23

## 2023-01-22 RX ADMIN — Medication 400 MG: at 08:42

## 2023-01-22 RX ADMIN — TRAMADOL HYDROCHLORIDE 50 MG: 50 TABLET ORAL at 03:54

## 2023-01-22 RX ADMIN — HYDROXYZINE PAMOATE 25 MG: 25 CAPSULE ORAL at 08:42

## 2023-01-22 RX ADMIN — HYDROXYZINE PAMOATE 25 MG: 25 CAPSULE ORAL at 22:05

## 2023-01-22 RX ADMIN — GABAPENTIN 100 MG: 100 CAPSULE ORAL at 13:35

## 2023-01-22 RX ADMIN — TIZANIDINE 4 MG: 4 TABLET ORAL at 10:35

## 2023-01-22 RX ADMIN — SODIUM CHLORIDE, PRESERVATIVE FREE 10 ML: 5 INJECTION INTRAVENOUS at 21:24

## 2023-01-22 RX ADMIN — TRAMADOL HYDROCHLORIDE 50 MG: 50 TABLET ORAL at 10:35

## 2023-01-22 RX ADMIN — MAGNESIUM SULFATE HEPTAHYDRATE 4000 MG: 40 INJECTION, SOLUTION INTRAVENOUS at 12:34

## 2023-01-22 RX ADMIN — Medication 400 MG: at 21:23

## 2023-01-22 RX ADMIN — ASPIRIN 81 MG: 81 TABLET, COATED ORAL at 21:23

## 2023-01-22 RX ADMIN — PANTOPRAZOLE SODIUM 40 MG: 40 TABLET, DELAYED RELEASE ORAL at 05:18

## 2023-01-22 RX ADMIN — GABAPENTIN 100 MG: 100 CAPSULE ORAL at 21:22

## 2023-01-22 RX ADMIN — KETOROLAC TROMETHAMINE 30 MG: 30 INJECTION, SOLUTION INTRAMUSCULAR; INTRAVENOUS at 12:27

## 2023-01-22 RX ADMIN — CEFAZOLIN 2000 MG: 2 INJECTION, POWDER, FOR SOLUTION INTRAMUSCULAR; INTRAVENOUS at 00:19

## 2023-01-22 RX ADMIN — POTASSIUM CHLORIDE 40 MEQ: 1500 TABLET, EXTENDED RELEASE ORAL at 10:35

## 2023-01-22 RX ADMIN — ASPIRIN 81 MG: 81 TABLET, COATED ORAL at 08:42

## 2023-01-22 RX ADMIN — TRAMADOL HYDROCHLORIDE 50 MG: 50 TABLET ORAL at 21:23

## 2023-01-22 RX ADMIN — ACETAMINOPHEN 650 MG: 325 TABLET ORAL at 08:42

## 2023-01-22 ASSESSMENT — PAIN - FUNCTIONAL ASSESSMENT
PAIN_FUNCTIONAL_ASSESSMENT: PREVENTS OR INTERFERES SOME ACTIVE ACTIVITIES AND ADLS

## 2023-01-22 ASSESSMENT — PAIN DESCRIPTION - ORIENTATION
ORIENTATION: LEFT

## 2023-01-22 ASSESSMENT — PAIN DESCRIPTION - DESCRIPTORS
DESCRIPTORS: ACHING;DISCOMFORT
DESCRIPTORS: BURNING;ACHING;HEAVINESS
DESCRIPTORS: ACHING;SHOOTING;SHARP

## 2023-01-22 ASSESSMENT — PAIN DESCRIPTION - LOCATION
LOCATION: LEG
LOCATION: ANKLE
LOCATION: LEG

## 2023-01-22 ASSESSMENT — PAIN SCALES - GENERAL
PAINLEVEL_OUTOF10: 8
PAINLEVEL_OUTOF10: 8
PAINLEVEL_OUTOF10: 7

## 2023-01-22 ASSESSMENT — PAIN SCALES - WONG BAKER: WONGBAKER_NUMERICALRESPONSE: 0

## 2023-01-22 NOTE — PROGRESS NOTES
Hospitalist Progress Note      SYNOPSIS: Patient admitted on 2023 for Fracture of ankle, trimalleolar, closed, left, initial encounter      SUBJECTIVE:    Patient seen and examined  Records reviewed. Patient had no itching yesterday after she was switched to tramadol. Discussed we will try some Toradol IV if okay with orthopedic surgery. 55-year-old lady past medical history of asthma, alcohol use disorder, torsades de pointes , coronary artery disease, type II hypertension, chronic back pain presented with left ankle fracture after a fall a week ago. She was seen at an urgent care initially and placed in a splint and was supposed to follow-up outpatient with orthopedic. She went back to the urgent care and x-ray taking was concern she was sent to the ER for surgical fixation. Status post open reduction internal fixation of left ankle trimalleolar fracture with fixation of posterior malleolus on 23  Stable overnight. No other overnight issues reported. Temp (24hrs), Av.9 °F (36.6 °C), Min:97 °F (36.1 °C), Max:98.6 °F (37 °C)    DIET: ADULT ORAL NUTRITION SUPPLEMENT; Breakfast, Lunch; Standard High Calorie/High Protein Oral Supplement  ADULT ORAL NUTRITION SUPPLEMENT; Lunch, Dinner; Wound Healing Oral Supplement  ADULT DIET; Regular  CODE: Full Code    Intake/Output Summary (Last 24 hours) at 2023 1102  Last data filed at 2023 0820  Gross per 24 hour   Intake 0 ml   Output --   Net 0 ml       OBJECTIVE:    BP (!) 154/101   Pulse 78   Temp 97 °F (36.1 °C) (Temporal)   Resp 16   Ht 5' 2\" (1.575 m)   Wt 114 lb (51.7 kg)   SpO2 98%   BMI 20.85 kg/m²     General appearance: No apparent distress, appears stated age and cooperative. HEENT:  Conjunctivae/corneas clear. Neck: Supple. No jugular venous distention.    Respiratory: Clear to auscultation bilaterally, normal respiratory effort  Cardiovascular: Regular rate rhythm, normal S1-S2  Abdomen: Soft, nontender, nondistended  Musculoskeletal: No clubbing, cyanosis, no bilateral lower extremity edema. Brisk capillary refill. Leftfoot surgical dressing  Skin:  No rashes  on visible skin  Neurologic: awake, alert and following commands     ASSESSMENT:  Trimalleolar left ankle fracture and minimal tibiotalar subluxation status post open reduction internal fixation of left ankle trimalleolar fracture with fixation of posterior malleolus on 1/21/23  Hypertension   Coronary artery disease  Chronic back pain  Alcohol use disorder        PLAN:  Continue pain control with tramadol 50 mg low-dose for now. Avoiding high-dose given a history of torsades. We will consider adding Toradol once okay with orthopedic. PT OT therapeutic. DVT prophylaxis per orthopedic.     DISPOSITION:     Medications:  REVIEWED DAILY    Infusion Medications    sodium chloride 100 mL/hr at 01/21/23 1124    sodium chloride      sodium chloride       Scheduled Medications    magnesium sulfate  4,000 mg IntraVENous Once    gabapentin  100 mg Oral TID    ketorolac  30 mg IntraVENous Once    sodium chloride flush  5-40 mL IntraVENous 2 times per day    aspirin  81 mg Oral BID    amitriptyline  25 mg Oral Nightly    cetirizine  10 mg Oral Daily    magnesium oxide  400 mg Oral BID    pantoprazole  40 mg Oral QAM AC    sodium chloride flush  5-40 mL IntraVENous 2 times per day     PRN Meds: sodium chloride flush, sodium chloride, diphenhydrAMINE, traMADol, albuterol, hydrOXYzine pamoate, tiZANidine, sodium chloride flush, sodium chloride, polyethylene glycol, acetaminophen **OR** acetaminophen, trimethobenzamide    Labs:     Recent Labs     01/20/23  1412 01/21/23  1156 01/22/23  0742   WBC 5.7 5.2 9.4   HGB 12.4 11.4* 9.6*   HCT 36.6 33.4* 28.6*    273 240       Recent Labs     01/20/23  1412 01/21/23  1156 01/22/23  0742    137 139   K 3.5 3.4* 3.3*    102 104   CO2 22 25 24   BUN 4* 3* 5*   CREATININE 0.5 0.5 0.4*   CALCIUM 9.7 8.5* 8.5* No results for input(s): PROT, ALB, ALKPHOS, ALT, AST, BILITOT, AMYLASE, LIPASE in the last 72 hours. Recent Labs     01/20/23  1412   INR 1.1       No results for input(s): Rimma Luke in the last 72 hours. Chronic labs:    Lab Results   Component Value Date    CHOL 137 05/08/2017    TRIG 131 05/08/2017    HDL 78 05/08/2017    LDLCALC 33 05/08/2017    TSH 0.894 08/05/2022    INR 1.1 01/20/2023    LABA1C 5.0 08/05/2022       Radiology: REVIEWED DAILY    +++++++++++++++++++++++++++++++++++++++++++++++++  Monica Neal MD  [facility] Physician - [address] [place] [address] [place], [facility]  +++++++++++++++++++++++++++++++++++++++++++++++++  NOTE: This report was transcribed using voice recognition software. Every effort was made to ensure accuracy; however, inadvertent computerized transcription errors may be present.

## 2023-01-22 NOTE — PROGRESS NOTES
Department of Orthopedic Surgery  Resident Progress Note    Patient seen and examined. Pain controlled. No new complaints. Denies chest pain, shortness of breath, dizziness/lightheadedness. + Flatulence, -BM    Patient states that she is having some discomfort in the left lower extremity at the proximal aspect of the splint. Loosened during my exam.    VITALS:  BP (!) 139/96   Pulse 82   Temp 98 °F (36.7 °C) (Temporal)   Resp 16   Ht 5' 2\" (1.575 m)   Wt 114 lb (51.7 kg)   SpO2 98%   BMI 20.85 kg/m²     General: alert and oriented to person, place and time, well-developed and well-nourished, in no acute distress    MUSCULOSKELETAL:   left lower extremity:  Dressing C/D/I, Ace wrap and proximal extent of the splint loosened.   Compartments soft and compressible  + Plantar and dorsiflex his toes  +2/4 DP & PT pulses, Brisk Cap refill, Toes warm and perfused  Distal sensation grossly intact to Peroneals, Sural, Saphenous, and tibial nrs    CBC:   Lab Results   Component Value Date/Time    WBC 5.2 01/21/2023 11:56 AM    HGB 11.4 01/21/2023 11:56 AM    HCT 33.4 01/21/2023 11:56 AM     01/21/2023 11:56 AM     PT/INR:    Lab Results   Component Value Date/Time    PROTIME 11.8 01/20/2023 02:12 PM    INR 1.1 01/20/2023 02:12 PM           ASSESSMENT  S/P ORIF L trimal ankle fracture - 1/21/223    PLAN      Continue physical therapy and protocol: NWB - LLE  24 hour abx coverage  Deep venous thrombosis prophylaxis - aspirin , early mobilization  Continue ice and elevation to affected extremity  PT/OT  Pain Control: IV and PO, wean to orals as able   Monitor H&H, pending, vss  D/C Plan: Pending

## 2023-01-22 NOTE — PROGRESS NOTES
[address]          [id number]                                                   Patient Name: Lindsay Ennis     MRN: [id number]     : 1960     Room: [address] [place] [address]       Evaluating OT: Alee CARTER, OTR/L, [id number]      Referring Provider: Dax Lucas DO    Specific Provider Orders/Date: OT eval and treat (23)    Diagnosis: Closed trimalleolar fracture of left ankle, initial encounter [S82.852A]  Fracture of ankle, trimalleolar, closed, left, initial encounter [S82.852A]    Surgeries/Procedures:    : Open reduction internal fixation left ankle trimalleolar fracture without fixation of the posterior malleolus      Pt admitted s/p fall down stairs with L trimalleolar fx. Pertinent Medical History:       has a past medical history of Asthma, CAD (coronary artery disease), Closed fracture of proximal end of left humerus with routine healing, Degeneration of lumbar or lumbosacral intervertebral disc, Fall against sharp object, History of blood transfusion, Hypertension, Hypokalemia, Insomnia, Kidney stone, Nondisplaced fracture of greater tuberosity of right humerus, initial encounter for closed fracture, Orthostatic hypotension, and Severe back pain.          Precautions:  Fall Risk, NWB LLE     Assessment of current deficits    [x] Functional mobility  [x]ADLs  [x] Strength               [x]Cognition    [x] Functional transfers   [x] IADLs         [x] Safety Awareness   [x]Endurance    [] Fine Coordination              [x] Balance      [] Vision/perception   [x]Sensation     []Gross Motor Coordination  [] ROM  [] Delirium                   [] Motor Control     OT PLAN OF CARE   OT POC based on physician orders, patient diagnosis and results of clinical assessment    Frequency/Duration 2-5 days/wk for 2 weeks PRN   Specific OT Treatment Interventions to include:   * Instruction/training on adapted ADL techniques and AE recommendations to increase functional independence within precautions       * Training on energy conservation strategies, correct breathing pattern and techniques to improve independence/tolerance for self-care routine  * Functional transfer/mobility training/DME recommendations for increased independence, safety, and fall prevention  * Patient/Family education to increase follow through with safety techniques and functional independence  * Recommendation of environmental modifications for increased safety with functional transfers/mobility and ADLs  * Cognitive retraining/development of therapeutic activities to improve problem solving, judgement, memory, and attention for increased safety/participation in ADL/IADL tasks  * Splinting/positioning for increased function, prevention of contractures, and improve skin integrity  * Therapeutic exercise to improve motor endurance, ROM, and functional strength for ADLs/functional transfers  * Therapeutic activities to facilitate/challenge dynamic balance, stand tolerance for increased safety and independence with ADLs  * Therapeutic activities to facilitate gross/fine motor skills for increased independence with ADLs  * Positioning to improve skin integrity, interaction with environment and functional independence    Recommended Adaptive Equipment/DME: BSC for hospital use, TBD     Home Living: Pt lives with girlfriend in 2 story home with 4 KAREN and bed and bath on 2nd level with 1st floor set-up. Bathroom setup: tub/shower   Equipment owned: BSC, FWW, cane    Prior Level of Function: Ind with ADLs , Ind with IADLs; Used no AD for functional mobility.   Driving: Yes  Occupation: None stated    Pain Level: 8/10; positioning provided  Cognition: A&O: 4/4; Follows 2 step directions   Memory: G-   Sequencing: G   Problem solving: G-   Judgement/safety: G-    Vitals Assessed:   SpO2: 95% seated at EOB BP:  NT     Functional Assessment:  AM-PAC Daily Activity Raw Score: 15/24   Initial Eval Status  Date: 1/22/23 Treatment Status  Date: STGs = LTGs  Time frame: 10-14 days   Feeding Set-up     Independent    Grooming Stand by Assist   Seated in chair    Modified [place]    UB Dressing Stand by Assist   To don gown around back in seated position. Modified [place]    LB Dressing Maximal Assist overall  SBA  To don/doff r sock seated in chair    Stand by Assist    Bathing Moderate Assist    Stand by Assist   seated   Toileting Moderate Assist     Stand by Assist    Bed Mobility  Supine to sit: Stand by Assist   Sit to supine: NT     Supine to sit: Independent   Sit to supine: Independent    Functional Transfers Sit to stand:Minimal Assist  Stand to sit: Minimal Assist  Stand pivot: Moderate Assist     Supervision    Functional Mobility Moderate Assist with FWW  For small hops from bed to chair. Stand by Assist    Balance Sitting:     Static: S    Dynamic:SBA  Standing: Mod A    during functional activity  Sitting:     Static: Ind    Dynamic:S  Standing:SBA   Activity Tolerance Fair+ with light activity  WFL  For full ADL/light IADL tasks   Visual/  Perceptual Glasses: yes        Safety G-  G     Hand Dominance: L   AROM (PROM) Strength Additional Info:    RUE  WFL Grossly WFL Good  and wfl FMC/dexterity noted during ADL tasks       LUE WFL Grossly WFL Good  and wfl FMC/dexterity noted during ADL tasks       [facility]  Sensation: Pt complaint of itchiness post surgery. Nursing aware. Tone: WFL   Edema: Unremarkable    Comments: Patient cleared by nursing. Upon arrival patient semi supine in bed, educated on the role of OT, and agreeable to OT session. No family present for session today. OT facilitated ADLs, bed mobility, functional transfers with focus on safety, body mechanics, and NWB precautions to LLE.  At end of session, patient seated upright in chair, properly positioned, oriented to call light, with call light to L side and phone within reach, all lines and tubes intact. Nursing notified. Overall patient demonstrated fair+ reception to education this date and decreased independence and safety during completion of ADL/functional transfer/mobility tasks. Pt would benefit from continued skilled OT to increase safety and independence with completion of ADL/IADL tasks for functional independence and quality of life. Treatment: OT treatment provided this date includes:   Instruction/training on safety and adapted techniques for completion of ADLs: Pt completed ADLs while seated in chair to increase independence in self-care. Instruction/training on safe functional mobility/transfer techniques: Pt educated on precautions prior to mobility with focus on safety, body mechanics, and precautions. Skilled Monitoring of Vitals: to include spO2, and HR throughout session to maximize safety. Therapeutic activity: to challenge dynamic sitting/standing balance and endurance to promote safety during ADL tasks and functional transfers and mobility. Rehab Potential: Good for established goals     Patient / Family Goal: to return home. Patient and/or family were instructed on functional diagnosis, prognosis/goals and OT plan of care. Demonstrated fair+ understanding.      Eval Complexity: Low    Time In: 0750  Time Out: 0815  Total Treatment Time: 10 min    Min Units   OT Eval Low 48094 x      OT Eval Medium 93961      OT Eval High 90517      OT Re-Eval [id number]       Therapeutic Ex 61182       Therapeutic Activities 50905 10  1    ADL/Self Care 13771       Orthotic Management 21231       Manual [id number]     Neuro Re-Ed 34896       Non-Billable Time          Evaluation Time additionally includes thorough review of current medical information, gathering information on past medical history/social history and prior level of function, interpretation of standardized testing/informal observation of tasks, assessment of data and development of plan of care and goals.             Luis Daniel Back, OTD,  OTR/L; [id number]

## 2023-01-22 NOTE — PROGRESS NOTES
Physical Therapy  Physical Therapy Initial Assessment     Name: Thelma Mcfadden  : 1960  MRN: [id number]      Date of Service: 2023    Evaluating PT:  Calijohann Montano PT, CAROLINE [id number]    Room #:  8050/3261-S  Diagnosis:  Closed trimalleolar fracture of left ankle, initial encounter [X25.480I]  Fracture of ankle, trimalleolar, closed, left, initial encounter [T51.368D]  PMHx/PSHx:   has a past medical history of Asthma, CAD (coronary artery disease), Closed fracture of proximal end of left humerus with routine healing, Degeneration of lumbar or lumbosacral intervertebral disc, Fall against sharp object, History of blood transfusion, Hypertension, Hypokalemia, Insomnia, Kidney stone, Nondisplaced fracture of greater tuberosity of right humerus, initial encounter for closed fracture, Orthostatic hypotension, and Severe back pain. has a past surgical history that includes Tonsillectomy; Bunionectomy; chest tube insertion (); Lithotripsy (); Colonoscopy (3/26/09); other surgical history; Upper gastrointestinal endoscopy (3/30/15); Upper gastrointestinal endoscopy (08); lumbar fusion (2017); and Kyphosis surgery (N/A, 10/28/2019). Procedure/Surgery:  Open reduction internal fixation left ankle trimalleolar fracture without fixation of the posterior malleolus; Stress examination of the syndesmosis under anesthesia; Open reduction internal fixation left ankle syndesmosis (2023)  Precautions:  Falls, NWB LLE  Equipment Needs:  TBD  Equipment Owned:  , SANDRA    SUBJECTIVE:    Pt lives with friend in a 2 story home with 4 stair(s) to enter and 1 rail(s). Pt will be staying on 1st floor. Pt ambulated with no AD PTA. OBJECTIVE:   Initial Evaluation  Date: 2023 Treatment Short Term/ Long Term   Goals   AM-PAC 6 Clicks      Was pt agreeable to Eval/treatment? Yes     Does pt have pain?  8/10 LLE     Bed Mobility  Rolling: NT  Supine to sit: SBA  Sit to supine: NT  Scooting: SBA (seated)  Rolling: Independent  Supine to sit: Independent  Sit to supine: Independent  Scooting: Independent   Transfers Sit to stand: Fritz  Stand to sit: Fritz  Stand pivot: ModA with [employer]  Sit to stand: Mod I  Stand to sit: Mod I  Stand pivot: Mod I with AAD   Ambulation    3 feet with [employer] ModA  50 feet with AAD Mod I   Stair negotiation: ascended and descended  NT  4 step(s) with 0 or 1 rail(s) + AAD Mod I   ROM BUE:  See OT note  RLE:  WFL     Strength BUE:  See OT note  RLE:  Grossly 5/5     Balance Sitting EOB:  SBA  Dynamic Standing:  ModA with [employer]  Sitting EOB:  Independent  Dynamic Standing: Mod I with AAD     Pt is A & O x 4  Sensation:  Pt denies numbness/tingling to extremities  Edema:  Unremarkable    Vitals:   HR 83, SpO2 95% sitting EOB. , SpO2 98% with activity. Patient education  Pt educated on role of PT, weight bearing status, safety during functional mobility, use of call light for assistance. Patient response to education:   Pt verbalized understanding Pt demonstrated skill Pt requires further education in this area   Yes Yes Yes     ASSESSMENT:    Conditions Requiring Skilled Therapeutic Intervention:    [x]Decreased strength     []Decreased ROM  [x]Decreased functional mobility  [x]Decreased balance   [x]Decreased endurance   []Decreased posture  []Decreased sensation  []Decreased coordination   []Decreased vision  [x]Decreased safety awareness   [x]Increased pain       Comments:  Session cleared by nursing. Patient in semi-Patton's position upon arrival; agreeable to PT session with OT collaboration. Required increased time, verbal cues related to positioning/sequencing to ensure safety during functional transfers. Ambulated from bed to bedside chair via RLE hopping/shuffling with decreased speed and unsteadiness. Following seated rest break, completed sit <> stand transfer from bedside chair. Vitals monitored and noted.  Patient left sitting in bedside chair with LLE elevated, call light in reach. Instructed not to get up on own and to use call light for assistance; expressed understanding. Nursing notified. Patient would benefit from continued skilled PT services to address functional deficits and prevent deconditioning. Treatment:  Patient practiced and was instructed in the following treatment:    Bed mobility - physical assistance provided as needed during activity  Functional transfers - verbal cues to facilitate proper positioning and sequencing, particularly related to hand/foot placement; physical assistance provided as needed during activity  Ambulation - education and verbal cues to facilitate proper positioning and sequencing; physical assistance provided as needed during activity  Skilled monitoring of vitals  Skilled positioning - patient positioned optimally to promote comfort    Pt's/ family goals   1. None stated    Prognosis is good for reaching above PT goals. Patient and or family understand(s) diagnosis, prognosis, and plan of care. Yes    PHYSICAL THERAPY PLAN OF CARE:    PT POC is established based on physician order and patient diagnosis     Referring provider/PT Order:    01/21/23 1115  PT eval and treat  Start:  01/21/23 1115,   End:  01/21/23 1115,   ONE TIME,   Standing Count:  1 Occurrences,   R         Ludwig Tanner, DO     Diagnosis:  Closed trimalleolar fracture of left ankle, initial encounter [S82.852A]  Fracture of ankle, trimalleolar, closed, left, initial encounter [C52.821G]  Specific instructions for next treatment:  Continue to facilitate safe performance of functional mobility; progress as appropriate.     Current Treatment Recommendations:     [x] Strengthening to improve independence with functional mobility   [] ROM to improve independence with functional mobility   [x] Balance Training to improve static/dynamic balance and to reduce fall risk  [x] Endurance Training to improve activity tolerance during functional mobility   [x] Transfer Training to improve safety and independence with all functional transfers   [x] Gait Training to improve gait mechanics, endurance and assess need for appropriate assistive device  [x] Stair Training in preparation for safe discharge home and/or into the community   [x] Positioning to prevent skin breakdown and contractures  [x] Safety and Education Training   [x] Patient/Caregiver Education   [] HEP  [] Other     PT long term treatment goals are located in above grid    Frequency of treatments: 2-5x/week x 1-2 weeks. Time in  0750  Time out  0810    Total Treatment Time  10 minutes     Evaluation Time includes thorough review of current medical information, gathering information on past medical history/social history and prior level of function, completion of standardized testing/informal observation of tasks, assessment of data and education on plan of care and goals.     CPT codes:  [x] Low Complexity PT evaluation 58421  [] Moderate Complexity PT evaluation 26474  [] High Complexity PT evaluation 47141  [] PT Re-evaluation 49611  [] Gait training 12805 0 minutes  [] Manual therapy 48718 0 minutes  [x] Therapeutic activities 58219 10 minutes  [] Therapeutic exercises 61944 0 minutes  [] Neuromuscular reeducation 70257 0 minutes     Jarrett Mclean, PT, DPT  [id number]

## 2023-01-23 LAB
AMPHETAMINE SCREEN, URINE: NOT DETECTED
ANION GAP SERPL CALCULATED.3IONS-SCNC: 9 MMOL/L (ref 7–16)
BARBITURATE SCREEN URINE: NOT DETECTED
BASOPHILS ABSOLUTE: 0.04 E9/L (ref 0–0.2)
BASOPHILS RELATIVE PERCENT: 0.5 % (ref 0–2)
BENZODIAZEPINE SCREEN, URINE: NOT DETECTED
BUN BLDV-MCNC: 4 MG/DL (ref 6–23)
CALCIUM SERPL-MCNC: 8.6 MG/DL (ref 8.6–10.2)
CANNABINOID SCREEN URINE: NOT DETECTED
CHLORIDE BLD-SCNC: 99 MMOL/L (ref 98–107)
CO2: 28 MMOL/L (ref 22–29)
COCAINE METABOLITE SCREEN URINE: NOT DETECTED
CREAT SERPL-MCNC: 0.4 MG/DL (ref 0.5–1)
EOSINOPHILS ABSOLUTE: 0.07 E9/L (ref 0.05–0.5)
EOSINOPHILS RELATIVE PERCENT: 0.9 % (ref 0–6)
FENTANYL SCREEN, URINE: NOT DETECTED
GFR SERPL CREATININE-BSD FRML MDRD: >60 ML/MIN/1.73
GLUCOSE BLD-MCNC: 94 MG/DL (ref 74–99)
HCT VFR BLD CALC: 30.4 % (ref 34–48)
HEMOGLOBIN: 10.4 G/DL (ref 11.5–15.5)
IMMATURE GRANULOCYTES #: 0.03 E9/L
IMMATURE GRANULOCYTES %: 0.4 % (ref 0–5)
LYMPHOCYTES ABSOLUTE: 2.68 E9/L (ref 1.5–4)
LYMPHOCYTES RELATIVE PERCENT: 33.1 % (ref 20–42)
Lab: NORMAL
MCH RBC QN AUTO: 33.5 PG (ref 26–35)
MCHC RBC AUTO-ENTMCNC: 34.2 % (ref 32–34.5)
MCV RBC AUTO: 98.1 FL (ref 80–99.9)
METER GLUCOSE: 122 MG/DL (ref 74–99)
METHADONE SCREEN, URINE: NOT DETECTED
MONOCYTES ABSOLUTE: 0.86 E9/L (ref 0.1–0.95)
MONOCYTES RELATIVE PERCENT: 10.6 % (ref 2–12)
NEUTROPHILS ABSOLUTE: 4.42 E9/L (ref 1.8–7.3)
NEUTROPHILS RELATIVE PERCENT: 54.5 % (ref 43–80)
OPIATE SCREEN URINE: NOT DETECTED
OXYCODONE URINE: NOT DETECTED
PDW BLD-RTO: 14.6 FL (ref 11.5–15)
PHENCYCLIDINE SCREEN URINE: NOT DETECTED
PLATELET # BLD: 246 E9/L (ref 130–450)
PMV BLD AUTO: 10.7 FL (ref 7–12)
POTASSIUM REFLEX MAGNESIUM: 3.6 MMOL/L (ref 3.5–5)
RBC # BLD: 3.1 E12/L (ref 3.5–5.5)
SODIUM BLD-SCNC: 136 MMOL/L (ref 132–146)
WBC # BLD: 8.1 E9/L (ref 4.5–11.5)

## 2023-01-23 PROCEDURE — 6370000000 HC RX 637 (ALT 250 FOR IP): Performed by: FAMILY MEDICINE

## 2023-01-23 PROCEDURE — 6370000000 HC RX 637 (ALT 250 FOR IP): Performed by: STUDENT IN AN ORGANIZED HEALTH CARE EDUCATION/TRAINING PROGRAM

## 2023-01-23 PROCEDURE — 6370000000 HC RX 637 (ALT 250 FOR IP)

## 2023-01-23 PROCEDURE — 36415 COLL VENOUS BLD VENIPUNCTURE: CPT

## 2023-01-23 PROCEDURE — 2580000003 HC RX 258: Performed by: STUDENT IN AN ORGANIZED HEALTH CARE EDUCATION/TRAINING PROGRAM

## 2023-01-23 PROCEDURE — 82962 GLUCOSE BLOOD TEST: CPT

## 2023-01-23 PROCEDURE — 80048 BASIC METABOLIC PNL TOTAL CA: CPT

## 2023-01-23 PROCEDURE — 6370000000 HC RX 637 (ALT 250 FOR IP): Performed by: INTERNAL MEDICINE

## 2023-01-23 PROCEDURE — 85025 COMPLETE CBC W/AUTO DIFF WBC: CPT

## 2023-01-23 PROCEDURE — 6360000002 HC RX W HCPCS: Performed by: FAMILY MEDICINE

## 2023-01-23 PROCEDURE — 1200000000 HC SEMI PRIVATE

## 2023-01-23 PROCEDURE — 80307 DRUG TEST PRSMV CHEM ANLYZR: CPT

## 2023-01-23 RX ORDER — POLYETHYLENE GLYCOL 3350 17 G/17G
17 POWDER, FOR SOLUTION ORAL 2 TIMES DAILY
Status: DISCONTINUED | OUTPATIENT
Start: 2023-01-23 | End: 2023-01-24 | Stop reason: HOSPADM

## 2023-01-23 RX ORDER — ACETAMINOPHEN 500 MG
1000 TABLET ORAL 4 TIMES DAILY
Status: DISCONTINUED | OUTPATIENT
Start: 2023-01-23 | End: 2023-01-24 | Stop reason: HOSPADM

## 2023-01-23 RX ORDER — SENNA AND DOCUSATE SODIUM 50; 8.6 MG/1; MG/1
2 TABLET, FILM COATED ORAL EVERY EVENING
Status: DISCONTINUED | OUTPATIENT
Start: 2023-01-23 | End: 2023-01-24 | Stop reason: HOSPADM

## 2023-01-23 RX ORDER — LACTOBACILLUS RHAMNOSUS GG 10B CELL
1 CAPSULE ORAL 2 TIMES DAILY
Status: DISCONTINUED | OUTPATIENT
Start: 2023-01-23 | End: 2023-01-24 | Stop reason: HOSPADM

## 2023-01-23 RX ORDER — SENNA AND DOCUSATE SODIUM 50; 8.6 MG/1; MG/1
2 TABLET, FILM COATED ORAL 2 TIMES DAILY PRN
Status: DISCONTINUED | OUTPATIENT
Start: 2023-01-23 | End: 2023-01-24 | Stop reason: HOSPADM

## 2023-01-23 RX ORDER — MECOBALAMIN 5000 MCG
5 TABLET,DISINTEGRATING ORAL NIGHTLY PRN
Status: DISCONTINUED | OUTPATIENT
Start: 2023-01-23 | End: 2023-01-24 | Stop reason: HOSPADM

## 2023-01-23 RX ORDER — MECOBALAMIN 5000 MCG
5 TABLET,DISINTEGRATING ORAL NIGHTLY
Status: DISCONTINUED | OUTPATIENT
Start: 2023-01-23 | End: 2023-01-24 | Stop reason: HOSPADM

## 2023-01-23 RX ADMIN — AMITRIPTYLINE HYDROCHLORIDE 25 MG: 25 TABLET, FILM COATED ORAL at 22:31

## 2023-01-23 RX ADMIN — DIPHENHYDRAMINE HYDROCHLORIDE 25 MG: 50 INJECTION, SOLUTION INTRAMUSCULAR; INTRAVENOUS at 10:02

## 2023-01-23 RX ADMIN — HYDROXYZINE PAMOATE 25 MG: 25 CAPSULE ORAL at 22:31

## 2023-01-23 RX ADMIN — Medication 400 MG: at 22:31

## 2023-01-23 RX ADMIN — SODIUM CHLORIDE, PRESERVATIVE FREE 10 ML: 5 INJECTION INTRAVENOUS at 10:03

## 2023-01-23 RX ADMIN — ACETAMINOPHEN 1000 MG: 500 TABLET ORAL at 22:30

## 2023-01-23 RX ADMIN — ASPIRIN 81 MG: 81 TABLET, COATED ORAL at 08:32

## 2023-01-23 RX ADMIN — CETIRIZINE HYDROCHLORIDE 10 MG: 10 TABLET, FILM COATED ORAL at 08:32

## 2023-01-23 RX ADMIN — Medication 5 MG: at 22:30

## 2023-01-23 RX ADMIN — GABAPENTIN 100 MG: 100 CAPSULE ORAL at 14:57

## 2023-01-23 RX ADMIN — SODIUM CHLORIDE, PRESERVATIVE FREE 10 ML: 5 INJECTION INTRAVENOUS at 08:33

## 2023-01-23 RX ADMIN — TRAMADOL HYDROCHLORIDE 50 MG: 50 TABLET ORAL at 03:46

## 2023-01-23 RX ADMIN — SODIUM CHLORIDE, PRESERVATIVE FREE 10 ML: 5 INJECTION INTRAVENOUS at 22:31

## 2023-01-23 RX ADMIN — TRAMADOL HYDROCHLORIDE 50 MG: 50 TABLET ORAL at 11:07

## 2023-01-23 RX ADMIN — GABAPENTIN 100 MG: 100 CAPSULE ORAL at 08:32

## 2023-01-23 RX ADMIN — ASPIRIN 81 MG: 81 TABLET, COATED ORAL at 22:31

## 2023-01-23 RX ADMIN — Medication 1 CAPSULE: at 22:30

## 2023-01-23 RX ADMIN — TRAMADOL HYDROCHLORIDE 50 MG: 50 TABLET ORAL at 22:31

## 2023-01-23 RX ADMIN — TIZANIDINE 4 MG: 4 TABLET ORAL at 17:50

## 2023-01-23 RX ADMIN — PANTOPRAZOLE SODIUM 40 MG: 40 TABLET, DELAYED RELEASE ORAL at 05:08

## 2023-01-23 RX ADMIN — ACETAMINOPHEN 1000 MG: 500 TABLET ORAL at 17:50

## 2023-01-23 RX ADMIN — Medication 400 MG: at 08:32

## 2023-01-23 RX ADMIN — GABAPENTIN 100 MG: 100 CAPSULE ORAL at 22:31

## 2023-01-23 ASSESSMENT — PAIN DESCRIPTION - FREQUENCY
FREQUENCY: CONTINUOUS

## 2023-01-23 ASSESSMENT — PAIN DESCRIPTION - LOCATION
LOCATION: ANKLE;LEG
LOCATION: ANKLE
LOCATION: ANKLE
LOCATION: LEG

## 2023-01-23 ASSESSMENT — PAIN - FUNCTIONAL ASSESSMENT
PAIN_FUNCTIONAL_ASSESSMENT: PREVENTS OR INTERFERES SOME ACTIVE ACTIVITIES AND ADLS

## 2023-01-23 ASSESSMENT — PAIN DESCRIPTION - ORIENTATION
ORIENTATION: LEFT

## 2023-01-23 ASSESSMENT — PAIN DESCRIPTION - DESCRIPTORS
DESCRIPTORS: ACHING;BURNING
DESCRIPTORS: SHARP;SHOOTING;STABBING
DESCRIPTORS: ACHING;DULL;SHARP;SHOOTING

## 2023-01-23 ASSESSMENT — PAIN SCALES - GENERAL
PAINLEVEL_OUTOF10: 4
PAINLEVEL_OUTOF10: 7
PAINLEVEL_OUTOF10: 9

## 2023-01-23 ASSESSMENT — PAIN DESCRIPTION - PAIN TYPE
TYPE: SURGICAL PAIN
TYPE: SURGICAL PAIN
TYPE: SURGICAL PAIN;ACUTE PAIN

## 2023-01-23 ASSESSMENT — PAIN DESCRIPTION - ONSET
ONSET: GRADUAL
ONSET: ON-GOING
ONSET: ON-GOING

## 2023-01-23 NOTE — PROGRESS NOTES
Department of Orthopedic Surgery  Resident Progress Note    Patient seen and examined. Pain controlled. No new complaints. Complains of tightness over splint which was loosened yesterday. Explained patient that splint has to be kept in place and she will eventually be transitioned to a walking boot which may be more comfortable. Denies chest pain, shortness of breath, dizziness/lightheadedness. + Flatulence, -BM    Patient states that she is having some discomfort in the left lower extremity at the proximal aspect of the splint. Loosened during my exam.    VITALS:  BP (!) 154/95   Pulse 86   Temp 98.8 °F (37.1 °C) (Temporal)   Resp 15   Ht 5' 2\" (1.575 m)   Wt 114 lb (51.7 kg)   SpO2 99%   BMI 20.85 kg/m²     General: alert and oriented to person, place and time, well-developed and well-nourished, in no acute distress    MUSCULOSKELETAL:   left lower extremity:  Dressing C/D/I, Ace wrap and proximal extent of the splint loosened.   Compartments soft and compressible  + Plantar and dorsiflex his toes  +2/4 DP & PT pulses, Brisk Cap refill, Toes warm and perfused  Distal sensation grossly intact to Peroneals, Sural, Saphenous, and tibial nrs    CBC:   Lab Results   Component Value Date/Time    WBC 8.1 01/23/2023 05:36 AM    HGB 10.4 01/23/2023 05:36 AM    HCT 30.4 01/23/2023 05:36 AM     01/23/2023 05:36 AM     PT/INR:    Lab Results   Component Value Date/Time    PROTIME 11.8 01/20/2023 02:12 PM    INR 1.1 01/20/2023 02:12 PM           ASSESSMENT  S/P ORIF L trimal ankle fracture - 1/21/223    PLAN      Continue physical therapy and protocol: NWB - LLE  24 hour abx coverage  Deep venous thrombosis prophylaxis - aspirin , early mobilization  Continue ice and elevation to affected extremity  PT/OT AMPAC 13  Pain Control: IV and PO, wean to orals as able   Monitor H&H, pending, vss  D/C Plan: Pending

## 2023-01-23 NOTE — PROGRESS NOTES
Perfect serve sent to ortho resident. Dr. Cruz Room. Patient stating splint to L ankle is rubbing against her shin and is causing her some discomfort. Pain medication provided.  Awaiting response to see if dr can look at dressing

## 2023-01-23 NOTE — PROGRESS NOTES
Hospitalist Progress Note      SYNOPSIS: Patient admitted on 2023 for Fracture of ankle, trimalleolar, closed, left, initial encounter 29-year-old lady past medical history of asthma, alcohol use disorder, torsades de pointes , coronary artery disease, type II hypertension, chronic back pain presented with left ankle fracture after a fall a week ago. She was seen at an urgent care initially and placed in a splint and was supposed to follow-up outpatient with orthopedic. She went back to the urgent care and x-ray taking was concern she was sent to the ER for surgical fixation. Status post open reduction internal fixation of left ankle trimalleolar fracture with fixation of posterior malleolus on 23  Stable overnight. No other overnight issues reported. SUBJECTIVE:    Patient assessed at bedside, alert, oriented, answering questions appropriately  Patient states that she is experiencing 8/10 pain that is well controlled with medication regimen. Patient states splint is rubbing on her leg causing discomfort  Will await input from ortho regarding discharge planning  Patient without further medical complaints at time of assessment        Temp (24hrs), Av.9 °F (36.6 °C), Min:96.8 °F (36 °C), Max:98.8 °F (37.1 °C)    DIET: ADULT ORAL NUTRITION SUPPLEMENT; Breakfast, Lunch; Standard High Calorie/High Protein Oral Supplement  ADULT ORAL NUTRITION SUPPLEMENT; Lunch, Dinner; Wound Healing Oral Supplement  ADULT DIET; Regular  CODE: Full Code    Intake/Output Summary (Last 24 hours) at 2023 1357  Last data filed at 2023 1251  Gross per 24 hour   Intake 440 ml   Output --   Net 440 ml       OBJECTIVE:    BP (!) 146/102   Pulse 76   Temp 97.5 °F (36.4 °C) (Temporal)   Resp 17   Ht 5' 2\" (1.575 m)   Wt 114 lb (51.7 kg)   SpO2 97%   BMI 20.85 kg/m²     General appearance: No apparent distress, appears stated age and cooperative. HEENT:  Conjunctivae/corneas clear. Neck: Supple.  No jugular venous distention. Respiratory: Clear to auscultation bilaterally, normal respiratory effort  Cardiovascular: Regular rate rhythm, normal S1-S2  Abdomen: Soft, nontender, nondistended  Musculoskeletal: No clubbing, cyanosis, no bilateral lower extremity edema. Brisk capillary refill.   Leftfoot surgical dressing  Skin:  No rashes  on visible skin  Neurologic: awake, alert and following commands     ASSESSMENT & PLAN  Trimalleolar left ankle fracture and minimal tibiotalar subluxation status post open reduction internal fixation of left ankle trimalleolar fracture with fixation of posterior malleolus on 1/21/23   PRN analgesia   PT/OT eval and treat  Hypertension    Continue home medications  Coronary artery disease   Continue home medications  Chronic back pain  Alcohol use disorder   Monitor for signs of alcohol withdrawal            DISPOSITION: To home once medically stable    Medications:  REVIEWED DAILY    Infusion Medications    sodium chloride      sodium chloride       Scheduled Medications    gabapentin  100 mg Oral TID    sodium chloride flush  5-40 mL IntraVENous 2 times per day    aspirin  81 mg Oral BID    amitriptyline  25 mg Oral Nightly    cetirizine  10 mg Oral Daily    magnesium oxide  400 mg Oral BID    pantoprazole  40 mg Oral QAM AC    sodium chloride flush  5-40 mL IntraVENous 2 times per day     PRN Meds: sodium chloride flush, sodium chloride, diphenhydrAMINE, traMADol, albuterol, hydrOXYzine pamoate, tiZANidine, sodium chloride flush, sodium chloride, polyethylene glycol, acetaminophen **OR** acetaminophen, trimethobenzamide    Labs:     Recent Labs     01/21/23  1156 01/22/23  0742 01/23/23  0536   WBC 5.2 9.4 8.1   HGB 11.4* 9.6* 10.4*   HCT 33.4* 28.6* 30.4*    240 246       Recent Labs     01/21/23  1156 01/22/23  0742 01/23/23  0536    139 136   K 3.4* 3.3* 3.6    104 99   CO2 25 24 28   BUN 3* 5* 4*   CREATININE 0.5 0.4* 0.4*   CALCIUM 8.5* 8.5* 8.6       No results for input(s): PROT, ALB, ALKPHOS, ALT, AST, BILITOT, AMYLASE, LIPASE in the last 72 hours. Recent Labs     01/20/23  1412   INR 1.1       No results for input(s): Tiffany Valles in the last 72 hours. Chronic labs:    Lab Results   Component Value Date    CHOL 137 05/08/2017    TRIG 131 05/08/2017    HDL 78 05/08/2017    LDLCALC 33 05/08/2017    TSH 0.894 08/05/2022    INR 1.1 01/20/2023    LABA1C 5.0 08/05/2022       Radiology: REVIEWED DAILY    +++++++++++++++++++++++++++++++++++++++++++++++++  RONAN Campbell - [facility] Physician - [address] [place], [place]  +++++++++++++++++++++++++++++++++++++++++++++++++  NOTE: This report was transcribed using voice recognition software. Every effort was made to ensure accuracy; however, inadvertent computerized transcription errors may be present.

## 2023-01-23 NOTE — PLAN OF CARE
Problem: Discharge Planning  Goal: Discharge to home or other facility with appropriate resources  1/22/2023 2325 by Ludwig Bergman RN  Outcome: Progressing  1/22/2023 2325 by Ludwig Bergman RN  Outcome: Progressing     Problem: Pain  Goal: Verbalizes/displays adequate comfort level or baseline comfort level  1/22/2023 2325 by Ludwig Bergman RN  Outcome: Progressing  1/22/2023 2325 by Ludwig Bergman RN  Outcome: Progressing     Problem: Safety - Adult  Goal: Free from fall injury  1/22/2023 2325 by Ludwig Bergman RN  Outcome: Progressing  1/22/2023 2325 by Ludwig Bergman RN  Flowsheets (Taken 1/22/2023 2325)  Free From Fall Injury: Based on caregiver fall risk screen, instruct family/caregiver to ask for assistance with transferring infant if caregiver noted to have fall risk factors     Problem: ABCDS Injury Assessment  Goal: Absence of physical injury  Outcome: Progressing

## 2023-01-23 NOTE — CARE COORDINATION
1/23. Met with the pt at the bedside to discuss transition of care. The pt lives with a friend in a 2 story home. She feels that she could benefit from rehab. She chose [facility]. Referral made. Gillian Moyer RN    The Plan for Transition of Care is related to the following treatment goals: to be able to return home to be able to perform ADLs. The Patient was provided with a choice of provider and agrees   with the discharge plan. [x] Yes [] No    Freedom of choice list was provided with basic dialogue that supports the patient's individualized plan of care/goals, treatment preferences and shares the quality data associated with the providers.  [x] Yes [] No

## 2023-01-24 VITALS
RESPIRATION RATE: 17 BRPM | SYSTOLIC BLOOD PRESSURE: 115 MMHG | TEMPERATURE: 97.6 F | BODY MASS INDEX: 20.98 KG/M2 | HEART RATE: 71 BPM | DIASTOLIC BLOOD PRESSURE: 74 MMHG | HEIGHT: 62 IN | OXYGEN SATURATION: 97 % | WEIGHT: 114 LBS

## 2023-01-24 PROCEDURE — 6370000000 HC RX 637 (ALT 250 FOR IP)

## 2023-01-24 PROCEDURE — 6370000000 HC RX 637 (ALT 250 FOR IP): Performed by: INTERNAL MEDICINE

## 2023-01-24 PROCEDURE — 97530 THERAPEUTIC ACTIVITIES: CPT

## 2023-01-24 PROCEDURE — 97535 SELF CARE MNGMENT TRAINING: CPT

## 2023-01-24 PROCEDURE — 6370000000 HC RX 637 (ALT 250 FOR IP): Performed by: STUDENT IN AN ORGANIZED HEALTH CARE EDUCATION/TRAINING PROGRAM

## 2023-01-24 PROCEDURE — 6370000000 HC RX 637 (ALT 250 FOR IP): Performed by: FAMILY MEDICINE

## 2023-01-24 PROCEDURE — 2580000003 HC RX 258: Performed by: STUDENT IN AN ORGANIZED HEALTH CARE EDUCATION/TRAINING PROGRAM

## 2023-01-24 RX ORDER — SENNA AND DOCUSATE SODIUM 50; 8.6 MG/1; MG/1
2 TABLET, FILM COATED ORAL 2 TIMES DAILY PRN
Qty: 1 TABLET | Refills: 0
Start: 2023-01-24

## 2023-01-24 RX ORDER — TRAMADOL HYDROCHLORIDE 50 MG/1
50 TABLET ORAL EVERY 4 HOURS PRN
Qty: 42 TABLET | Refills: 0 | Status: SHIPPED | OUTPATIENT
Start: 2023-01-24 | End: 2023-01-31

## 2023-01-24 RX ORDER — ASPIRIN 81 MG/1
81 TABLET ORAL 2 TIMES DAILY
Qty: 60 TABLET | Refills: 0 | Status: SHIPPED | OUTPATIENT
Start: 2023-01-24 | End: 2023-02-23

## 2023-01-24 RX ORDER — GABAPENTIN 100 MG/1
100 CAPSULE ORAL 3 TIMES DAILY
Qty: 90 CAPSULE | Refills: 3 | Status: SHIPPED | OUTPATIENT
Start: 2023-01-24 | End: 2023-05-24

## 2023-01-24 RX ORDER — SENNA AND DOCUSATE SODIUM 50; 8.6 MG/1; MG/1
2 TABLET, FILM COATED ORAL EVERY EVENING
Qty: 1 TABLET | Refills: 0
Start: 2023-01-24

## 2023-01-24 RX ORDER — POLYETHYLENE GLYCOL 3350 17 G/17G
17 POWDER, FOR SOLUTION ORAL 2 TIMES DAILY
Qty: 527 G | Refills: 0
Start: 2023-01-24 | End: 2023-02-23

## 2023-01-24 RX ADMIN — Medication 1 CAPSULE: at 09:21

## 2023-01-24 RX ADMIN — SODIUM CHLORIDE, PRESERVATIVE FREE 10 ML: 5 INJECTION INTRAVENOUS at 09:21

## 2023-01-24 RX ADMIN — TRAMADOL HYDROCHLORIDE 50 MG: 50 TABLET ORAL at 18:02

## 2023-01-24 RX ADMIN — CETIRIZINE HYDROCHLORIDE 10 MG: 10 TABLET, FILM COATED ORAL at 09:21

## 2023-01-24 RX ADMIN — TRAMADOL HYDROCHLORIDE 50 MG: 50 TABLET ORAL at 09:20

## 2023-01-24 RX ADMIN — GABAPENTIN 100 MG: 100 CAPSULE ORAL at 14:04

## 2023-01-24 RX ADMIN — GABAPENTIN 100 MG: 100 CAPSULE ORAL at 09:20

## 2023-01-24 RX ADMIN — PANTOPRAZOLE SODIUM 40 MG: 40 TABLET, DELAYED RELEASE ORAL at 09:20

## 2023-01-24 RX ADMIN — Medication 400 MG: at 09:21

## 2023-01-24 RX ADMIN — ASPIRIN 81 MG: 81 TABLET, COATED ORAL at 09:20

## 2023-01-24 RX ADMIN — ACETAMINOPHEN 1000 MG: 500 TABLET ORAL at 09:20

## 2023-01-24 ASSESSMENT — PAIN DESCRIPTION - ORIENTATION
ORIENTATION: LEFT
ORIENTATION: LEFT

## 2023-01-24 ASSESSMENT — PAIN SCALES - GENERAL
PAINLEVEL_OUTOF10: 4
PAINLEVEL_OUTOF10: 7
PAINLEVEL_OUTOF10: 6
PAINLEVEL_OUTOF10: 4

## 2023-01-24 ASSESSMENT — PAIN DESCRIPTION - LOCATION
LOCATION: ANKLE
LOCATION: ANKLE;LEG

## 2023-01-24 ASSESSMENT — PAIN DESCRIPTION - PAIN TYPE: TYPE: SURGICAL PAIN

## 2023-01-24 ASSESSMENT — PAIN - FUNCTIONAL ASSESSMENT
PAIN_FUNCTIONAL_ASSESSMENT: ACTIVITIES ARE NOT PREVENTED
PAIN_FUNCTIONAL_ASSESSMENT: ACTIVITIES ARE NOT PREVENTED

## 2023-01-24 ASSESSMENT — PAIN DESCRIPTION - FREQUENCY: FREQUENCY: CONTINUOUS

## 2023-01-24 ASSESSMENT — PAIN DESCRIPTION - DESCRIPTORS
DESCRIPTORS: ACHING;SORE;DISCOMFORT
DESCRIPTORS: ACHING;DISCOMFORT;DULL

## 2023-01-24 ASSESSMENT — PAIN DESCRIPTION - ONSET: ONSET: GRADUAL

## 2023-01-24 NOTE — CARE COORDINATION
1/24. Discharge plan is [facility]. skilled nursing facility when insurance precert is received.  Lucila Kirkpatrick RN

## 2023-01-24 NOTE — PROGRESS NOTES
Physical Therapy  Rx    Name: Suhas Medrano  : 1960  MRN: [id number]      Date of Service: 2023    Evaluating PT:  Carloz Clemens PT, DPT [id number]    Room #:  1658/5695-O  Diagnosis:  Closed trimalleolar fracture of left ankle, initial encounter [S82.616W]  Fracture of ankle, trimalleolar, closed, left, initial encounter [J32.737T]  PMHx/PSHx:   has a past medical history of Asthma, CAD (coronary artery disease), Closed fracture of proximal end of left humerus with routine healing, Degeneration of lumbar or lumbosacral intervertebral disc, Fall against sharp object, History of blood transfusion, Hypertension, Hypokalemia, Insomnia, Kidney stone, Nondisplaced fracture of greater tuberosity of right humerus, initial encounter for closed fracture, Orthostatic hypotension, and Severe back pain. has a past surgical history that includes Tonsillectomy; Bunionectomy; chest tube insertion (); Lithotripsy (); Colonoscopy (3/26/09); other surgical history; Upper gastrointestinal endoscopy (3/30/15); Upper gastrointestinal endoscopy (08); lumbar fusion (2017); Kyphosis surgery (N/A, 10/28/2019); and Ankle fracture surgery (Left, 2023). Procedure/Surgery:  Open reduction internal fixation left ankle trimalleolar fracture without fixation of the posterior malleolus; Stress examination of the syndesmosis under anesthesia; Open reduction internal fixation left ankle syndesmosis (2023)  Precautions:  Falls, NWB LLE  Equipment Needs:  TBD  Equipment Owned:  , FWW    SUBJECTIVE:    Pt lives with friend in a 2 story home with 4 stair(s) to enter and 1 rail(s). Pt will be staying on 1st floor. Pt ambulated with no AD PTA. OBJECTIVE:   Initial Evaluation  Date: 2023 Treatment  23 Short Term/ Long Term   Goals   AM-PAC 6 Clicks 87/64 73/20    Was pt agreeable to Eval/treatment? Yes yes    Does pt have pain?  8/10 LLE     Bed Mobility  Rolling: NT  Supine to sit: SBA  Sit to supine: NT  Scooting: SBA (seated) Rolling: SBA  Supine to sit: SBA  Sit to supine: SBA  Scooting: SBA (seated) Rolling: Independent  Supine to sit: Independent  Sit to supine: Independent  Scooting: Independent   Transfers Sit to stand: Fritz  Stand to sit: Fritz  Stand pivot: ModA with [employer] Sit to stand: Fritz  Stand to sit: Fritz  Stand pivot: ModA with [employer] Sit to stand: Mod I  Stand to sit: Mod I  Stand pivot: Mod I with AAD   Ambulation    3 feet with [employer] ModA 8 feet x 2 reps with seated rest break did better with stw vs fww for gait. Mod A 50 feet with AAD Mod I   Stair negotiation: ascended and descended  NT NT 4 step(s) with 0 or 1 rail(s) + AAD Mod I   ROM BUE:  See OT note  RLE:  WFL     Strength BUE:  See OT note  RLE:  Grossly 5/5     Balance Sitting EOB:  SBA  Dynamic Standing:  ModA with [employer]  Sitting EOB:  Independent  Dynamic Standing: Mod I with AAD     Pt is A & O x 4  Sensation:  Pt denies numbness/tingling to extremities  Edema:  Unremarkable  Patient education  Pt educated on role of PT, weight bearing status, safety during functional mobility, use of call light for assistance. Patient response to education:   Pt verbalized understanding Pt demonstrated skill Pt requires further education in this area   Yes Yes Yes     ASSESSMENT:    Conditions Requiring Skilled Therapeutic Intervention:    [x]Decreased strength     []Decreased ROM  [x]Decreased functional mobility  [x]Decreased balance   [x]Decreased endurance   []Decreased posture  []Decreased sensation  []Decreased coordination   []Decreased vision  [x]Decreased safety awareness   [x]Increased pain       Comments:  Session cleared by nursing. Patient sitting EOB  agreeable to PT session with OT collaboration. Required increased time, verbal cues related to positioning/sequencing to ensure safety during functional transfers. Following seated rest break, completed sit <> stand transfer from bedside chair. Vitals monitored and noted.  Gait completed with fww in room for limited distance. Cues to maintain NWB. With stw improved gait stability. Nursing notified. Patient would benefit from continued skilled PT services to address functional deficits and prevent deconditioning. Treatment:  Patient practiced and was instructed in the following treatment:    Bed mobility - physical assistance provided as needed during activity  Functional transfers - verbal cues to facilitate proper positioning and sequencing, particularly related to hand/foot placement; physical assistance provided as needed during activity  Ambulation - education and verbal cues to facilitate proper positioning and sequencing; physical assistance provided as needed during activity  Skilled monitoring of vitals  Skilled positioning - patient positioned optimally to promote comfort    Pt's/ family goals   1. None stated    Prognosis is good for reaching above PT goals. Patient and or family understand(s) diagnosis, prognosis, and plan of care. Yes    PHYSICAL THERAPY PLAN OF CARE:    PT POC is established based on physician order and patient diagnosis     Referring provider/PT Order:    01/21/23 1115  PT eval and treat  Start:  01/21/23 1115,   End:  01/21/23 1115,   ONE TIME,   Standing Count:  1 Occurrences,   R         Amada Garcia,      Diagnosis:  Closed trimalleolar fracture of left ankle, initial encounter [S82.852A]  Fracture of ankle, trimalleolar, closed, left, initial encounter [K62.361I]  Specific instructions for next treatment:  Continue to facilitate safe performance of functional mobility; progress as appropriate.     Current Treatment Recommendations:     [x] Strengthening to improve independence with functional mobility   [] ROM to improve independence with functional mobility   [x] Balance Training to improve static/dynamic balance and to reduce fall risk  [x] Endurance Training to improve activity tolerance during functional mobility   [x] Transfer Training to improve safety and independence with all functional transfers   [x] Gait Training to improve gait mechanics, endurance and assess need for appropriate assistive device  [x] Stair Training in preparation for safe discharge home and/or into the community   [x] Positioning to prevent skin breakdown and contractures  [x] Safety and Education Training   [x] Patient/Caregiver Education   [] HEP  [] Other     PT long term treatment goals are located in above grid    Frequency of treatments: 2-5x/week x 1-2 weeks. Time in  1250  Time out  1315    Total Treatment Time  25 minutes     Evaluation Time includes thorough review of current medical information, gathering information on past medical history/social history and prior level of function, completion of standardized testing/informal observation of tasks, assessment of data and education on plan of care and goals.     CPT codes:  [] Low Complexity PT evaluation 88797  [] Moderate Complexity PT evaluation 11917  [] High Complexity PT evaluation 83834  [] PT Re-evaluation 85810  [] Gait training 03731 0 minutes  [] Manual therapy 98174 0 minutes  [x] Therapeutic activities 26736 25 minutes  [] Therapeutic exercises 45145 0 minutes  [] Neuromuscular reeducation 92033 0 minutes     Janny Calero, [id number] [facility]

## 2023-01-24 NOTE — PROGRESS NOTES
Hospitalist Progress Note      SYNOPSIS: Patient admitted on 2023 for Fracture of ankle, trimalleolar, closed, left, initial encounter 60-year-old lady past medical history of asthma, alcohol use disorder, torsades de pointes , coronary artery disease, type II hypertension, chronic back pain presented with left ankle fracture after a fall a week ago. She was seen at an urgent care initially and placed in a splint and was supposed to follow-up outpatient with orthopedic. She went back to the urgent care and x-ray taking was concern she was sent to the ER for surgical fixation. Status post open reduction internal fixation of left ankle trimalleolar fracture with fixation of posterior malleolus on 23  Stable overnight. No other overnight issues reported. SUBJECTIVE:    Patient assessed at bedside, alert, oriented, answering questions appropriately  Patient states that she is experiencing 8/10 pain that is well controlled with medication regimen. Patient states slight relief from splint rubbing her leg  Patient ok to discharge from ortho standpoint  [facility] to [facility] pending precert, hopefully   Patient without further medical complaints at time of assessment        Temp (24hrs), Av.8 °F (36.6 °C), Min:97.6 °F (36.4 °C), Max:98 °F (36.7 °C)    DIET: ADULT ORAL NUTRITION SUPPLEMENT; Breakfast, Lunch; Standard High Calorie/High Protein Oral Supplement  ADULT ORAL NUTRITION SUPPLEMENT; Lunch, Dinner; Wound Healing Oral Supplement  ADULT DIET; Regular  CODE: Full Code    Intake/Output Summary (Last 24 hours) at 2023 1519  Last data filed at 2023 1014  Gross per 24 hour   Intake 260 ml   Output --   Net 260 ml       OBJECTIVE:    /74   Pulse 71   Temp 97.6 °F (36.4 °C) (Temporal)   Resp 17   Ht 5' 2\" (1.575 m)   Wt 114 lb (51.7 kg)   SpO2 97%   BMI 20.85 kg/m²     General appearance: No apparent distress, appears stated age and cooperative.   HEENT: Conjunctivae/corneas clear. Neck: Supple. No jugular venous distention. Respiratory: Clear to auscultation bilaterally, normal respiratory effort  Cardiovascular: Regular rate rhythm, normal S1-S2  Abdomen: Soft, nontender, nondistended  Musculoskeletal: No clubbing, cyanosis, no bilateral lower extremity edema. Brisk capillary refill.   Leftfoot surgical dressing  Skin:  No rashes  on visible skin  Neurologic: awake, alert and following commands     ASSESSMENT & PLAN  Trimalleolar left ankle fracture and minimal tibiotalar subluxation status post open reduction internal fixation of left ankle trimalleolar fracture with fixation of posterior malleolus on 1/21/23   PRN analgesia   PT/OT eval and treat  Hypertension    Continue home medications  Coronary artery disease   Continue home medications  Chronic back pain  Alcohol use disorder   Monitor for signs of alcohol withdrawal            DISPOSITION: To [facility] once precert complete    Medications:  REVIEWED DAILY    Infusion Medications    sodium chloride      sodium chloride       Scheduled Medications    lactobacillus  1 capsule Oral BID    polyethylene glycol  17 g Oral BID    sennosides-docusate sodium  2 tablet Oral QPM    melatonin  5 mg Oral Nightly    acetaminophen  1,000 mg Oral 4x daily    gabapentin  100 mg Oral TID    sodium chloride flush  5-40 mL IntraVENous 2 times per day    aspirin  81 mg Oral BID    amitriptyline  25 mg Oral Nightly    cetirizine  10 mg Oral Daily    magnesium oxide  400 mg Oral BID    pantoprazole  40 mg Oral QAM AC     PRN Meds: sennosides-docusate sodium, melatonin, sodium chloride flush, sodium chloride, traMADol, albuterol, hydrOXYzine pamoate, tiZANidine, sodium chloride flush, sodium chloride, trimethobenzamide    Labs:     Recent Labs     01/22/23  0742 01/23/23  0536   WBC 9.4 8.1   HGB 9.6* 10.4*   HCT 28.6* 30.4*    246       Recent Labs     01/22/23  0742 01/23/23  0536    136   K 3.3* 3.6  99   CO2 24 28   BUN 5* 4*   CREATININE 0.4* 0.4*   CALCIUM 8.5* 8.6       No results for input(s): PROT, ALB, ALKPHOS, ALT, AST, BILITOT, AMYLASE, LIPASE in the last 72 hours. No results for input(s): INR in the last 72 hours. No results for input(s): Adonica Prudent in the last 72 hours. Chronic labs:    Lab Results   Component Value Date    CHOL 137 05/08/2017    TRIG 131 05/08/2017    HDL 78 05/08/2017    LDLCALC 33 05/08/2017    TSH 0.894 08/05/2022    INR 1.1 01/20/2023    LABA1C 5.0 08/05/2022       Radiology: REVIEWED DAILY    +++++++++++++++++++++++++++++++++++++++++++++++++  Yaya Alpha, APRN - CNP  [facility] Physician - [address] [place], [place]  +++++++++++++++++++++++++++++++++++++++++++++++++  NOTE: This report was transcribed using voice recognition software. Every effort was made to ensure accuracy; however, inadvertent computerized transcription errors may be present.

## 2023-01-24 NOTE — DISCHARGE INSTR - COC
Continuity of Care Form    Patient Name: Lindsay Ennis   :  1960  MRN:  [id number]    Admit date:  2023  Discharge date:      Code Status Order: Full Code   Advance Directives:   Advance Care Flowsheet Documentation       Date/Time Healthcare Directive Type of Healthcare Directive Copy in [address] Agent's Name Healthcare Agent's Phone Number    [phone] No, patient does not have an advance directive for healthcare treatment -- -- -- -- --            Admitting Physician:  Wendy Yarbrough MD  PCP: David Carlton MD    Discharging Nurse: [facility] Unit/Room#: 3892/1276-Y  Discharging Unit Phone Number: [phone]    Emergency Contact:   Extended Emergency Contact Information  Primary Emergency Contact: [facility]  Address: [address] , [address] [place] [address] of [address]  Phone: [phone]  Relation: Parent  Secondary Emergency Contact: Segun Steve of [address] Phone: [phone]  Relation: Brother/Sister    Past Surgical History:  Past Surgical History:   Procedure Laterality Date    ANKLE FRACTURE SURGERY Left 2023    LEFT ANKLE OPEN REDUCTION INTERNAL FIXATION performed by Carolyn Yee MD at [facility]      Left foot    [place] [address]    several    COLONOSCOPY  3/26/09    KYPHOSIS SURGERY N/A 10/28/2019    KYPHOPLASTY L1 WITH VERTEBRAL BONE BIOPSY performed by João Guzman MD at [place] [address]  2012    LUMBAR FUSION  2017    L3-L4 ,L4-L5  interbody fusion with Dajuan Jasson Fu 82      electro ablation for rectal and sigmoid polyps    TONSILLECTOMY      UPPER GASTROINTESTINAL ENDOSCOPY  3/30/15    UPPER GASTROINTESTINAL ENDOSCOPY  08       Immunization History:   Immunization History   Administered Date(s) Administered    COVID-19, PFIZER Bivalent BOOSTER, DO NOT Dilute, (age 12y+), IM, 30 mcg/0.3 mL 11/17/2022    COVID-19, PFIZER PURPLE top, DILUTE for use, (age 15 y+), 30mcg/0.3mL 09/25/2021, 10/16/2021    Influenza, FLUCELVAX, (age 10 mo+), MDCK, MDV, 0.5mL 03/13/2019, 09/27/2019    Influenza, FLUCELVAX, (age 10 mo+), MDCK, PF, 0.5mL 09/28/2020    Pneumococcal Polysaccharide ([id number]) 09/11/2015, 12/31/2016    Tdap (Boostrix, Adacel) 05/24/2014, 03/13/2019       Active Problems:  Patient Active Problem List   Diagnosis Code    Osteoarthritis of left knee M17.12    Osteoarthritis of right knee M17.11    Low back pain M54.50    Lumbar disc displacement at L3-4, L4-5 M51.26    Acute sinusitis J01.90    Syncope and collapse R55    Prolonged QT interval R94.31    Torsades de pointes I47.21    Hypokalemia E87.6    Hypomagnesemia E83.42    NSVT (nonsustained ventricular tachycardia) I47.29    Spondylolisthesis of lumbar region, L4 on L5 X31.15    Acute alcoholic intoxication ([facility]) F10.929    Ileus ([facility]) K56.7    Delirium tremens ([facility]) F10.931    Closed nondisplaced fracture of greater tuberosity of right humerus with routine healing S42.254D    Closed fracture of proximal end of left humerus with routine healing S42.202D    Multiple closed fractures of metatarsal bone of left foot S92.302A    Gait abnormality R26.9    Lumbar compression fracture, closed, initial encounter ([address] S32.000A    Alcohol withdrawal with perceptual disturbances ([address] F10.932    Alcohol withdrawal delirium ([facility]) F10.931    Severe protein-calorie malnutrition ([address] E43    Nonrheumatic mitral valve regurgitation I34.0    Nonrheumatic tricuspid valve regurgitation I36.1    Nonrheumatic aortic valve insufficiency I35.1    Pulmonary HTN ([facility]) I27.20    TIA (transient ischemic attack) G45.9    Acute hypoxemic respiratory failure due to COVID-19 ([facility]) U07.1, J96.01    Hypotension I95.9    T12 compression fracture ([facility]) S22.080A    Lightheadedness R42    History of alcohol abuse F10.11    Fracture of ankle, trimalleolar, closed, left, initial encounter S82.852A       Isolation/Infection:   Isolation            No Isolation          Patient Infection Status       Infection Onset Added Last Indicated Last Indicated By Review Planned Expiration Resolved Resolved By    None active    Resolved    COVID-19 (Rule Out) 22 COVID-19, Rapid (Ordered)   22 Rule-Out Test Resulted    COVID-19 21 COVID-19, Rapid   21     COVID-19 (Rule Out) 21 COVID-19, Rapid (Ordered)   21 Rule-Out Test Resulted            Nurse Assessment:  Last Vital Signs: BP 95/68   Pulse 66   Temp 98 °F (36.7 °C) (Temporal)   Resp 18   Ht 5' 2\" (1.575 m)   Wt 114 lb (51.7 kg)   SpO2 100%   BMI 20.85 kg/m²     Last documented pain score (0-10 scale): Pain Level: 9  Last Weight:   Wt Readings from Last 1 Encounters:   23 114 lb (51.7 kg)     Mental Status:  oriented and alert    IV Access:  - None    Nursing Mobility/ADLs:  Walking   Assisted  Transfer  Assisted  Bathing  Assisted  Dressing  Independent  Toileting  Independent  Feeding  Independent  Med Admin  Independent  Med Delivery   whole    Wound Care Documentation and Therapy:  Incision 23 Ankle Left (Active)   Dressing Status Clean;Dry; Intact 23 2100   Dressing/Treatment Ace wrap;ABD pad;Xeroform;Splint; Cast padding;Gauze dressing/dressing sponge 23 2100   Drainage Amount None 23 2100   Odor None 23 2100   Number of days: 3        Elimination:  Continence: Bowel: Yes  Bladder: Yes  Urinary Catheter: None   Colostomy/Ileostomy/Ileal Conduit: No       Date of Last BM: 23    Intake/Output Summary (Last 24 hours) at 2023 0829  Last data filed at 2023 2230  Gross per 24 hour   Intake 1090 ml   Output --   Net 1090 ml     I/O last 3 completed shifts:   In: 0562 [P.O.:1060; I.V.:30]  Out: -     Safety Concerns:     History of Falls (last 30 days) and At Risk for Falls    Impairments/Disabilities:      None    Nutrition Therapy:  Current Nutrition Therapy:   - Oral Diet:  General    Routes of Feeding: Oral  Liquids: Thin Liquids  Daily Fluid Restriction: no  Last Modified Barium Swallow with Video (Video Swallowing Test): not done    Treatments at the Time of Hospital Discharge:   Respiratory Treatments: ***  Oxygen Therapy:  is not on home oxygen therapy. Ventilator:    - No ventilator support    Rehab Therapies: Physical Therapy and Occupational Therapy  Weight Bearing Status/Restrictions: Non-weight bearing on left leg  Other Medical Equipment (for information only, NOT a DME order):  walker and bedside commode  Other Treatments: ***    Patient's personal belongings (please select all that are sent with patient):  Jethro    RN SIGNATURE:  Electronically signed by Sara Victor RN on 1/24/23 at 5:07 PM EST    CASE MANAGEMENT/SOCIAL WORK SECTION    Inpatient Status Date: ***    Readmission Risk Assessment Score:  Readmission Risk              Risk of Unplanned Readmission:  13           Discharging to Facility/ Agency   Name: Guardian  Address:  Phone:  Fax:    Dialysis Facility (if applicable)   Name:  Address:  Dialysis Schedule:  Phone:  Fax:    / signature: Electronically signed by Mary Gtz RN on 1/24/23 at 8:29 AM EST    PHYSICIAN SECTION    Prognosis: {Prognosis:5317585074}    Condition at Discharge: [address] Patient Condition:[id number]}    Rehab Potential (if transferring to Rehab): {Prognosis:1974133620}    Recommended Labs or Other Treatments After Discharge: ***    Physician Certification: I certify the above information and transfer of Shalini Urban  is necessary for the continuing treatment of the diagnosis listed and that she requires [place] for less 30 days.      Update Admission H&P: {CHP DME Changes in GEQAL:[id number]}    PHYSICIAN SIGNATURE:  {Esignature:[id number]}

## 2023-01-24 NOTE — PLAN OF CARE
Problem: Discharge Planning  Goal: Discharge to home or other facility with appropriate resources  Outcome: Progressing     Problem: Pain  Goal: Verbalizes/displays adequate comfort level or baseline comfort level  Outcome: Progressing     Problem: Safety - Adult  Goal: Free from fall injury  Outcome: Progressing     Problem: ABCDS Injury Assessment  Goal: Absence of physical injury  Outcome: Progressing     Problem: Nutrition Deficit:  Goal: Optimize nutritional status  Outcome: Progressing     Problem: Skin/Tissue Integrity  Goal: Absence of new skin breakdown  Description: 1.  Monitor for areas of redness and/or skin breakdown  2.  Assess vascular access sites hourly  3.  Every 4-6 hours minimum:  Change oxygen saturation probe site  4.  Every 4-6 hours:  If on nasal continuous positive airway pressure, respiratory therapy assess nares and determine need for appliance change or resting period.  Outcome: Progressing     Problem: Skin/Tissue Integrity - Adult  Goal: Incisions, wounds, or drain sites healing without S/S of infection  Outcome: Progressing     Problem: Musculoskeletal - Adult  Goal: Return mobility to safest level of function  Outcome: Progressing  Goal: Maintain proper alignment of affected body part  Outcome: Progressing  Goal: Return ADL status to a safe level of function  Outcome: Progressing

## 2023-01-24 NOTE — DISCHARGE SUMMARY
Hospitalist Discharge Summary    Patient ID: Genetta Ek Dothard   Patient : 1960  Patient's PCP: Akil Schwartz MD    Admit Date: 2023   Admitting Physician: Rebbecca Cogan, MD    Discharge Date:  2023   Discharge Physician: RONAN Freeman CNP   Discharge Condition: Stable  Discharge Disposition: [facility] course in brief:  Patient admitted on 2023 for Fracture of ankle, trimalleolar, closed, left, initial encounter 71-year-old lady past medical history of asthma, alcohol use disorder, torsades de pointes , coronary artery disease, type II hypertension, chronic back pain presented with left ankle fracture after a fall a week ago. She was seen at an urgent care initially and placed in a splint and was supposed to follow-up outpatient with orthopedic. She went back to the urgent care and x-ray taking was concern she was sent to the ER for surgical fixation. Status post open reduction internal fixation of left ankle trimalleolar fracture with fixation of posterior malleolus on 23  Stable overnight. No other overnight issues reported. Patient assessed on date of discharge, alert, oriented, answering questions appropriately. Patient to discharge to [facility] for acute rehab. Patient to follow up with PCP and orthopedic surgery as soon as possible. Patient to report to the emergency room or call 911 for any recurrence, worsening, or development of new symptoms.             Consults:   IP CONSULT TO ORTHOPEDIC SURGERY  IP CONSULT TO INTERNAL MEDICINE  IP CONSULT TO CASE MANAGEMENT  IP CONSULT TO SOCIAL WORK  IP CONSULT TO CASE MANAGEMENT    Discharge Diagnoses:  Trimalleolar left ankle fracture and minimal tibiotalar subluxation status post open reduction internal fixation of left ankle trimalleolar fracture with fixation of posterior malleolus on 23             Hypertension      Coronary artery disease             Chronic back pain  Alcohol use disorder      Discharge Instructions / Follow up: Follow-up with PCP within 1 week of discharge. Follow-up with consultants as indicated by them. Compliance with medications as prescribed on discharge. Future Appointments   Date Time Provider Judah Gallardoisti   2/6/2023  9:00 AM SCHEDULE, SE ORTHO [facility]   2/14/2023  9:30 AM SEB ECHO LAB RM-2 [facility] ECHO April HOD       The patient's condition is stable. At this time the patient is without objective evidence of an acute process requiring continuing hospitalization or inpatient management. They are stable for discharge with outpatient follow-up. I have spoken with the patient and discussed the results of the current hospitalization, in addition to providing specific details for the plan of care and counseling regarding the diagnosis and prognosis. The plan has been discussed in detail and they are aware of the specific conditions for emergent return, as well as the importance of follow-up. Their questions are answered at this time and they are agreeable with the plan for discharge to [place] [address]. Continued appropriate risk factor modification of blood pressure, diabetes and serum lipids will remain essential to reducing risk of future atherosclerotic development    Activity: activity as tolerated    Physical exam:  General appearance: No apparent distress, appears stated age and cooperative. HEENT: Conjunctivae/corneas clear. Mucous membranes moist.  Neck: Supple. No JVD. Respiratory:  Clear to auscultation bilaterally. Normal respiratory effort. Cardiovascular:  RRR. S1, S2 without MRG. PV: Pulses palpable. No edema. Abdomen: Soft, non-tender, non-distended. +BS  Musculoskeletal: No obvious deformities. Skin: Normal skin color. No rashes or lesions. Good turgor. Neurologic:  Grossly non-focal. Awake, alert, following commands.    Psychiatric: Alert and oriented, thought content appropriate, normal insight and judgement    Significant labs:  CBC:   Recent Labs     01/22/23  0742 01/23/23  0536   WBC 9.4 8.1   RBC 2.90* 3.10*   HGB 9.6* 10.4*   HCT 28.6* 30.4*   MCV 98.6 98.1   RDW 14.3 14.6    246     BMP:   Recent Labs     01/22/23  0742 01/23/23  0536    136   K 3.3* 3.6    99   CO2 24 28   BUN 5* 4*   CREATININE 0.4* 0.4*   MG 1.3*  --      LFT:  No results for input(s): PROT, ALB, ALKPHOS, ALT, AST, BILITOT, AMYLASE, LIPASE in the last 72 hours. PT/INR: No results for input(s): INR, APTT in the last 72 hours. BNP: No results for input(s): BNP in the last 72 hours. Hgb A1C:   Lab Results   Component Value Date    LABA1C 5.0 08/05/2022     Folate and B12:   Lab Results   Component Value Date    THLGIVOG69 587 11/12/2021   ,   Lab Results   Component Value Date    FOLATE 12.3 11/12/2021     Thyroid Studies:   Lab Results   Component Value Date    TSH 0.894 08/05/2022       Urinalysis:    Lab Results   Component Value Date/Time    NITRU Negative 08/05/2022 03:05 PM    WBCUA 5-10 08/05/2022 03:05 PM    BACTERIA FEW 08/05/2022 03:05 PM    RBCUA 0-1 08/05/2022 03:05 PM    RBCUA 0-1 10/01/2012 09:53 AM    BLOODU TRACE-INTACT 08/05/2022 03:05 PM    SPECGRAV >=1.030 08/05/2022 03:05 PM    GLUCOSEU 100 08/05/2022 03:05 PM       Imaging:  XR CHEST (2 VW)    Result Date: 1/20/2023  EXAMINATION: TWO XRAY VIEWS OF THE CHEST 1/20/2023 3:00 pm COMPARISON: 25 December 2022 and multiple additional. HISTORY: ORDERING SYSTEM PROVIDED HISTORY: pre op TECHNOLOGIST PROVIDED HISTORY: Reason for exam:->pre op What reading provider will be dictating this exam?->CRC FINDINGS: L1 kyphoplasty noted. .  Normal heart and pulmonary vascularity. Lungs are clear. Neither costophrenic angle is blunted. No active process.      XR ANKLE LEFT (MIN 3 VIEWS)    Result Date: 1/21/2023  EXAMINATION: THREE XRAY VIEWS OF THE LEFT ANKLE 1/21/2023 10:37 am COMPARISON: 01/20/2023 HISTORY: ORDERING SYSTEM PROVIDED HISTORY: Post op in PACU TECHNOLOGIST PROVIDED HISTORY: Reason for exam:->Post op in PACU What reading provider will be dictating this exam?->CRC FINDINGS: Since the prior examination there has been metallic fixation the comminuted medial and lateral malleolar fractures with metallic side plates and screws. Alignment appears near anatomic.  No complications are visible.  Ankle mortise appears appropriate.  Posterior malleolar fragment is present without significant displacement.     Metallic fixation of medial and lateral comminuted malleolar fractures with near anatomic alignment, no evidence of complication.     XR ANKLE LEFT (MIN 3 VIEWS)    Result Date: 1/20/2023  EXAMINATION: THREE XRAY VIEWS OF THE LEFT ANKLE 1/20/2023 1:16 pm COMPARISON: 07/19/2019 left ankle radiographs. HISTORY: ORDERING SYSTEM PROVIDED HISTORY: Pain TECHNOLOGIST PROVIDED HISTORY: Reason for exam:->Pain What reading provider will be dictating this exam?->CRC FINDINGS: There is a comminuted fracture left distal fibular diaphysis 3.7 cm superior to the level of the tibiotalar joint.  There is slight lateral angulation of the lateral fracture fragments.  There is a transverse and comminuted fracture of the medial malleolus which does not appear significantly displaced or angulated.  There is fracture through the posterior distal tibia with minimal posterior displacement.  There is mild widening of the tibiotalar joint space.  Cast obscures osseous detail.  There is soft tissue swelling both medially and laterally.  There appear to be remote healed fractures of the 3rd and 4th metatarsals distally.     Trimalleolar left ankle fracture and minimal tibiotalar subluxation.     CT ANKLE LEFT WO CONTRAST    Result Date: 1/20/2023  EXAMINATION: CT OF THE LEFT ANKLE WITHOUT CONTRAST 1/20/2023 4:29 pm TECHNIQUE: CT of the left ankle was performed without the administration of intravenous contrast.  Multiplanar reformatted images are provided for review. Automated exposure  control, iterative reconstruction, and/or weight based adjustment of the mA/kV was utilized to reduce the radiation dose to as low as reasonably achievable. COMPARISON: 01/20/2023 1:11 p.m. left ankle radiographs. HISTORY ORDERING SYSTEM PROVIDED HISTORY: fracture evaluation TECHNOLOGIST PROVIDED HISTORY: Reason for exam:->fracture evaluation What reading provider will be dictating this exam?->CRC FINDINGS: There is a vertically oriented fracture through the posterior distal tibia displaced superiorly 1.5 mm at the level of the posterior tibiotalar joint. This fracture is minimally comminuted. There is a comminuted fracture of the medial malleolus, largely transverse but with an oblique component posteriorly. This is 1-2 mm anteriorly and medially displaced. There is a mildly comminuted but largely oblique fracture through the anterolateral aspect of the distal tibia with 2 mm lateral displacement and 1 mm superior displacement at the level of the tibiotalar joint. There are small fracture fragments at the anterior margin of the distal fibula arising from this tibial fracture. There is a comminuted but largely oblique fracture through the distal fibular diaphysis approximately 3-4 cm above the level of the tibiotalar joint. There is lateral angulation of the lateral fracture fragment apex and minimal medial displacement of the largest distal fracture fragment. This distal fracture fragment is also displaced 2 mm anteriorly. There is a well corticated ossific density off the dorsal aspect of distal medial cuneiform. This may be due to remote posttraumatic or degenerative change. There is a marginal osteophyte at the articulation between the distal calcaneus and cuboid along the posterior margin. There is soft tissue swelling at the ankle both medially and laterally and there is a tibiotalar joint effusion. There is lateral subluxation of the talus relative to the distal tibia of approximately 3 mm.   There is generalized decreased osseous mineralization. Multiple distal tibial fractures along with distal fibular diaphysis fracture as described. There is tibiotalar subluxation in addition. FLUORO FOR SURGICAL PROCEDURES    Result Date: 1/21/2023  EXAMINATION: SPOT FLUOROSCOPIC IMAGES 1/21/2023 9:29 am TECHNIQUE: Fluoroscopy was provided by the radiology department for procedure. Radiologist was not present during examination. FLUOROSCOPY DOSE AND TYPE OR TIME AND EXPOSURES: Fluoroscopy time equals 57.6 seconds. Total dose equals 2.54 mGy COMPARISON: None HISTORY: ORDERING SYSTEM PROVIDED HISTORY: L ankle ORIF TECHNOLOGIST PROVIDED HISTORY: Reason for exam:->L ankle ORIF What reading provider will be dictating this exam?->CRC Intraprocedural imaging. FINDINGS: 6 spot images of the ankle were obtained. Intraprocedural fluoroscopic spot images as above. See separate procedure report for more information. Discharge Medications:      Medication List        START taking these medications      aspirin EC 81 MG EC tablet  Take 1 tablet by mouth 2 times daily     gabapentin 100 MG capsule  Commonly known as: NEURONTIN  Take 1 capsule by mouth 3 times daily for 120 days. oxyCODONE-acetaminophen 5-325 MG per tablet  Commonly known as: Percocet  Take 1 tablet by mouth every 6 hours as needed for Pain for up to 7 days. Intended supply: 7 days. Take lowest dose possible to manage pain Max Daily Amount: 4 tablets     polyethylene glycol 17 g packet  Commonly known as: GLYCOLAX  Take 17 g by mouth 2 times daily     * sennosides-docusate sodium 8.6-50 MG tablet  Commonly known as: SENOKOT-S  Take 2 tablets by mouth 2 times daily as needed for Constipation     * sennosides-docusate sodium 8.6-50 MG tablet  Commonly known as: SENOKOT-S  Take 2 tablets by mouth every evening           * This list has 2 medication(s) that are the same as other medications prescribed for you.  Read the directions carefully, and ask your doctor or other care provider to review them with you. CONTINUE taking these medications      albuterol sulfate  (90 Base) MCG/ACT inhaler  Commonly known as: PROVENTIL;VENTOLIN;PROAIR     amitriptyline 25 MG tablet  Commonly known as: ELAVIL     fluticasone 110 MCG/ACT inhaler  Commonly known as: FLOVENT HFA     hydrOXYzine HCl 25 MG tablet  Commonly known as: ATARAX     omeprazole 40 MG delayed release capsule  Commonly known as: PRILOSEC     therapeutic multivitamin-minerals tablet            STOP taking these medications      HYDROcodone-acetaminophen 7.5-325 MG per tablet  Commonly known as: NORCO     methocarbamol 750 MG tablet  Commonly known as: ROBAXIN               Where to Get Your Medications        These medications were sent to [address] [place] [address] Meng [phone] - F [phone]  [facility] [address] [facility] [address]      Phone: [phone]   aspirin EC 81 MG EC tablet  gabapentin 100 MG capsule  oxyCODONE-acetaminophen 5-325 MG per tablet       Information about where to get these medications is not yet available    Ask your nurse or doctor about these medications  polyethylene glycol 17 g packet  sennosides-docusate sodium 8.6-50 MG tablet  sennosides-docusate sodium 8.6-50 MG tablet         Time Spent on discharge is more than 45 minutes in the examination, evaluation, counseling and review of medications and discharge plan.    +++++++++++++++++++++++++++++++++++++++++++++++++  RONAN Grier [address]  +++++++++++++++++++++++++++++++++++++++++++++++++  NOTE: This report was transcribed using voice recognition software. Every effort was made to ensure accuracy; however, inadvertent computerized transcription errors may be present.

## 2023-01-24 NOTE — PROGRESS NOTES
OCCUPATIONAL THERAPY TREATMENT NOTE    [facility] TREATMENT NOTE      Date:2023  Patient Name: Vasquez Murguia  MRN: [id number]  : 1960  Room: [address] [place], [place] [address]     Evaluating OT: Isaac CARTER, OTR/L, [id number]       Referring Provider: Lorena Ma DO    Specific Provider Orders/Date: OT eval and treat (23)    Diagnosis: Closed trimalleolar fracture of left ankle, initial encounter [S82.852A]  Fracture of ankle, trimalleolar, closed, left, initial encounter [C11.888W]    Surgeries/Procedures:    : Open reduction internal fixation left ankle trimalleolar fracture without fixation of the posterior malleolus       Pt admitted s/p fall down stairs with L trimalleolar fx. Pertinent Medical History:       has a past medical history of Asthma, CAD (coronary artery disease), Closed fracture of proximal end of left humerus with routine healing, Degeneration of lumbar or lumbosacral intervertebral disc, Fall against sharp object, History of blood transfusion, Hypertension, Hypokalemia, Insomnia, Kidney stone, Nondisplaced fracture of greater tuberosity of right humerus, initial encounter for closed fracture, Orthostatic hypotension, and Severe back pain.            Precautions:  Fall Risk, NWB LLE      Assessment of current deficits    [x] Functional mobility            [x]ADLs           [x] Strength                   [x]Cognition    [x] Functional transfers          [x] IADLs          [x] Safety Awareness   [x]Endurance    [] Fine Coordination              [x] Balance      [] Vision/perception    [x]Sensation      []Gross Motor Coordination  [] ROM           [] Delirium                   [] Motor Control      OT PLAN OF CARE   OT POC based on physician orders, patient diagnosis and results of clinical assessment     Frequency/Duration 2-5 days/wk for 2 weeks PRN   Specific OT Treatment Interventions to include:   * Instruction/training on adapted ADL techniques and AE recommendations to increase functional independence within precautions       * Training on energy conservation strategies, correct breathing pattern and techniques to improve independence/tolerance for self-care routine  * Functional transfer/mobility training/DME recommendations for increased independence, safety, and fall prevention  * Patient/Family education to increase follow through with safety techniques and functional independence  * Recommendation of environmental modifications for increased safety with functional transfers/mobility and ADLs  * Cognitive retraining/development of therapeutic activities to improve problem solving, judgement, memory, and attention for increased safety/participation in ADL/IADL tasks  * Splinting/positioning for increased function, prevention of contractures, and improve skin integrity  * Therapeutic exercise to improve motor endurance, ROM, and functional strength for ADLs/functional transfers  * Therapeutic activities to facilitate/challenge dynamic balance, stand tolerance for increased safety and independence with ADLs  * Therapeutic activities to facilitate gross/fine motor skills for increased independence with ADLs  * Positioning to improve skin integrity, interaction with environment and functional independence     Recommended Adaptive Equipment/DME: BSC if home-going, TBD for STW vs. Ww & A.E. Home Living: Pt lives with girlfriend in 2 story home with 4 KAREN and bed and bath on 2nd level with 1st floor set-up. Bathroom setup: tub/shower   Equipment owned: BSC,FWW, cane     Prior Level of Function: Ind with ADLs , Ind with IADLs; Used no AD for functional mobility. Driving: Yes  Occupation: None stated     Pain Level: 7/10; positioning & ice provided at end of session, RN notified.   Cognition: A&O: 4/4; Follows 2 step directions              Memory: G-              Sequencing: G              Problem solving: G-              Judgement/safety: G-     Vitals Assessed:   SpO2: 94-95% seated at EOB on RA, HR WFL. BP:  NT                Functional Assessment:  AM-PAC Daily Activity Raw Score: 15/24    Initial Eval Status  Date: 1/22/23 Treatment Status  Date: 1-24-23 STGs = LTGs  Time frame: 10-14 days   Feeding Set-up      set up Independent    Grooming Stand by Assist   Seated in chair     set up seated Modified [place]    UB Dressing Stand by Assist   To don gown around back in seated position. SBA seated EOB to hilton pull-over t-shirt  Modified [place]    LB Dressing Maximal Assist overall  SBA  To don/doff r sock seated in chair     Max A for pants  seated EOB Stand by Assist    Bathing Moderate Assist     Mod A per last tx. Stand by Assist   seated   Toileting Moderate Assist      Mod A with clothing mgmt. & hygiene Stand by Assist    Bed Mobility  Supine to sit: Stand by Assist   Sit to supine: NT    Sup > Sit: NT  Sit > Sup: SBA for L LE   Supine to sit: Independent   Sit to supine: Independent    Functional Transfers Sit to stand:Minimal Assist  Stand to sit: Minimal Assist  Stand pivot: Moderate Assist     Min A with sit <> stand  Mod A with SPT using ww or STW. Mod A from EOB > bsc Supervision    Functional Mobility Moderate Assist with FWW  For small hops from bed to chair. Mod A with ww & STW short household distances in room (~10'). E.C. techniques reviewed. Stand by Assist    Balance Sitting:     Static: S    Dynamic:SBA  Standing: Mod A    during functional activity Sitting:     Static: S sitting EOB    Dynamic: SBA  Standing: Mod A    during functional activity  Sitting:     Static: Ind    Dynamic:S  Standing:SBA   Activity Tolerance Fair+ with light activity Fair WFL  For full ADL/light IADL tasks   Visual/  Perceptual Glasses: yes          Safety G-      G-  with reminders for hand-placement during transfer techniques, walker safety.  G     Comments: Upon arrival pt seated EOB finishing lunch, agreeable to OT, cleared by Nursing & Ortho. Therapist facilitated bed mobility, ADLs, toileting, functional transfer/mobility training with focus on safety, technique, precautions. Pt. Instructed RE: safe transfers/mobility, ADLs, role of OT, treatment plan, recs. , prec. Pt. demo F+ understanding. At end of session pt. Returned to supine with L LE elevated & ice applied, all needs met, RN notified,  all lines and tubes intact, call light within reach. Pt has made F progress towards set goals. Continue with current plan of care    Treatment Time In:12:55            Treatment Time Out: 13:20                Treatment Charges: Mins Units   Ther Ex  35326     Manual Therapy 16275     Thera Activities 70110 10 1   ADL/Home Mgt 06831 15 1   Neuro Re-ed 08894     Group Therapy      Orthotic manage/training  94308     Non-Billable Time     Total Timed Treatment 25 2     Thank you,  Enedina Schafer, OTR/L   License #  [id number]

## 2023-01-24 NOTE — CARE COORDINATION
1/24. INSURANCE AUTH RECEIVED FOR [facility]. TRANSPORATION ARRANGED WITH THE FACILITY FOR 6:00. HENS COMPLETED.  Yanira Costa RN

## 2023-01-24 NOTE — PROGRESS NOTES
Department of Orthopedic Surgery  Resident Progress Note    Patient seen and examined. Pain controlled. No new complaints. Complains of tightness over splint which was loosened yesterday. Explained patient that splint has to be kept in place and she will eventually be transitioned to a walking boot which may be more comfortable. Denies chest pain, shortness of breath, dizziness/lightheadedness. + Flatulence, -BM    VITALS:  BP 95/68   Pulse 66   Temp 98 °F (36.7 °C) (Temporal)   Resp 18   Ht 5' 2\" (1.575 m)   Wt 114 lb (51.7 kg)   SpO2 100%   BMI 20.85 kg/m²     General: alert and oriented to person, place and time, well-developed and well-nourished, in no acute distress    MUSCULOSKELETAL:   left lower extremity:  Dressing C/D/I, Ace wrap and proximal extent of the splint loosened. Compartments soft and compressible  + Plantar and dorsiflex his toes  +2/4 DP & PT pulses, Brisk Cap refill, Toes warm and perfused  Distal sensation grossly intact to Peroneals, Sural, Saphenous, and tibial nrs    CBC:   Lab Results   Component Value Date/Time    WBC 8.1 01/23/2023 05:36 AM    HGB 10.4 01/23/2023 05:36 AM    HCT 30.4 01/23/2023 05:36 AM     01/23/2023 05:36 AM     PT/INR:    Lab Results   Component Value Date/Time    PROTIME 11.8 01/20/2023 02:12 PM    INR 1.1 01/20/2023 02:12 PM           ASSESSMENT  S/P ORIF L trimal ankle fracture - 1/21/223    PLAN      Continue physical therapy and protocol: NWB - LLE  24 hour abx coverage  Deep venous thrombosis prophylaxis - aspirin , early mobilization  Continue ice and elevation to affected extremity  PT/OT AMPA 13  Pain Control: IV and PO, wean to orals as able   Monitor H&H, pending, vss  No other acute orthopedic intervention at this time. We will continue to monitor patient symptoms peripherally during hospital stay. Patient splint will be transition to a walking boot possibly during postop follow-up visit.   Maintain splint in place  D/C Plan: Pending

## 2023-01-25 LAB
ALBUMIN SERPL-MCNC: 3.2 G/DL (ref 3.5–5.2)
ALP BLD-CCNC: 98 U/L (ref 35–104)
ALT SERPL-CCNC: 9 U/L (ref 0–32)
ANION GAP SERPL CALCULATED.3IONS-SCNC: 10 MMOL/L (ref 7–16)
AST SERPL-CCNC: 19 U/L (ref 0–31)
BASOPHILS ABSOLUTE: 0.06 E9/L (ref 0–0.2)
BASOPHILS RELATIVE PERCENT: 0.7 % (ref 0–2)
BILIRUB SERPL-MCNC: 0.2 MG/DL (ref 0–1.2)
BUN BLDV-MCNC: 11 MG/DL (ref 6–23)
CALCIUM SERPL-MCNC: 9 MG/DL (ref 8.6–10.2)
CHLORIDE BLD-SCNC: 99 MMOL/L (ref 98–107)
CO2: 29 MMOL/L (ref 22–29)
CREAT SERPL-MCNC: 0.5 MG/DL (ref 0.5–1)
EOSINOPHILS ABSOLUTE: 0.15 E9/L (ref 0.05–0.5)
EOSINOPHILS RELATIVE PERCENT: 1.8 % (ref 0–6)
GFR SERPL CREATININE-BSD FRML MDRD: >60 ML/MIN/1.73
GLUCOSE BLD-MCNC: 131 MG/DL (ref 74–99)
HCT VFR BLD CALC: 29.5 % (ref 34–48)
HEMOGLOBIN: 9.9 G/DL (ref 11.5–15.5)
IMMATURE GRANULOCYTES #: 0.03 E9/L
IMMATURE GRANULOCYTES %: 0.4 % (ref 0–5)
LYMPHOCYTES ABSOLUTE: 2.65 E9/L (ref 1.5–4)
LYMPHOCYTES RELATIVE PERCENT: 31.3 % (ref 20–42)
MCH RBC QN AUTO: 32.2 PG (ref 26–35)
MCHC RBC AUTO-ENTMCNC: 33.6 % (ref 32–34.5)
MCV RBC AUTO: 96.1 FL (ref 80–99.9)
MONOCYTES ABSOLUTE: 0.81 E9/L (ref 0.1–0.95)
MONOCYTES RELATIVE PERCENT: 9.6 % (ref 2–12)
NEUTROPHILS ABSOLUTE: 4.76 E9/L (ref 1.8–7.3)
NEUTROPHILS RELATIVE PERCENT: 56.2 % (ref 43–80)
PDW BLD-RTO: 14.9 FL (ref 11.5–15)
PLATELET # BLD: 282 E9/L (ref 130–450)
PMV BLD AUTO: 10.9 FL (ref 7–12)
POTASSIUM SERPL-SCNC: 4.4 MMOL/L (ref 3.5–5)
RBC # BLD: 3.07 E12/L (ref 3.5–5.5)
SODIUM BLD-SCNC: 138 MMOL/L (ref 132–146)
TOTAL PROTEIN: 5.8 G/DL (ref 6.4–8.3)
WBC # BLD: 8.5 E9/L (ref 4.5–11.5)

## 2023-01-25 NOTE — PROGRESS NOTES
Call out to [address] regarding transportation if anyone was going to pick her up. The person I spoke to seemed unsure and was going to ask the admissions nurse. Phone number given to call back to nurses station.

## 2023-01-25 NOTE — PLAN OF CARE
Problem: Discharge Planning  Goal: Discharge to home or other facility with appropriate resources  Outcome: Adequate for Discharge     Problem: Pain  Goal: Verbalizes/displays adequate comfort level or baseline comfort level  Outcome: Adequate for Discharge     Problem: Safety - Adult  Goal: Free from fall injury  Outcome: Adequate for Discharge     Problem: ABCDS Injury Assessment  Goal: Absence of physical injury  Outcome: Adequate for Discharge     Problem: Nutrition Deficit:  Goal: Optimize nutritional status  Outcome: Adequate for Discharge     Problem: Skin/Tissue Integrity  Goal: Absence of new skin breakdown  Description: 1. Monitor for areas of redness and/or skin breakdown  2. Assess vascular access sites hourly  3. Every 4-6 hours minimum:  Change oxygen saturation probe site  4. Every 4-6 hours:  If on nasal continuous positive airway pressure, respiratory therapy assess nares and determine need for appliance change or resting period.   Outcome: Adequate for Discharge     Problem: Skin/Tissue Integrity - Adult  Goal: Incisions, wounds, or drain sites healing without S/S of infection  Outcome: Adequate for Discharge     Problem: Musculoskeletal - Adult  Goal: Return mobility to safest level of function  Outcome: Adequate for Discharge  Goal: Maintain proper alignment of affected body part  Outcome: Adequate for Discharge  Goal: Return ADL status to a safe level of function  Outcome: Adequate for Discharge

## 2023-01-25 NOTE — PROGRESS NOTES
[facility]                Phone: [phone]   Fax: [phone]    To: BEN Thacker From: Val Martines      Patient: Bean Stuart       : 1960  Diagnosis:       Date: 2019  Treatment Diagnosis:  lumbar disk displacement     Physical Therapy Discharge Note    Total Visits to date:  6   Cancels/No-shows to date:  1 cancellaton     Plan of Care/Treatment to date:  [x] Therapeutic Exercise    [x] Modalities:  [x] Therapeutic Activity     [] Ultrasound  [x] Electrical Stimulation  [] Gait Training      [] Cervical Traction   [] Lumbar Traction  [] Neuromuscular Re-education  [x] Cold/hotpack [] Iontophoresis  [x] Instruction in HEP      Other:  [] Manual Therapy       []    [] Aquatic Therapy       []                    ? Progress towards goals:  pt reached all stated functional LTG's for therapy while pain across LB persisted; I with [facility] for home management of condition        Goal Status:  [] Achieved [x] Partially Achieved  [] Not Achieved     Patient Status: [] Continue per initial plan of Care     [x] Patient now discharged to [facility] for home management of condition      [] Additional visits requested, Please re-certify for additional visits:          Electronically signed by: CHRIS Hooper     If you have any questions or concerns, please don't hesitate to call.   Thank you for your referral. I saw and evaluated the patient. I discussed case with resident. I agree with resident's assessment and plan.   Danielle Hernandez, DO Regular Diet - No restrictions

## 2023-01-30 LAB
ALBUMIN SERPL-MCNC: 3.8 G/DL (ref 3.5–5.2)
ALP BLD-CCNC: 123 U/L (ref 35–104)
ALT SERPL-CCNC: 23 U/L (ref 0–32)
ANION GAP SERPL CALCULATED.3IONS-SCNC: 14 MMOL/L (ref 7–16)
ANISOCYTOSIS: ABNORMAL
AST SERPL-CCNC: 38 U/L (ref 0–31)
ATYPICAL LYMPHOCYTE RELATIVE PERCENT: 0.9 % (ref 0–4)
BASOPHILS ABSOLUTE: 0 E9/L (ref 0–0.2)
BASOPHILS RELATIVE PERCENT: 1.5 % (ref 0–2)
BILIRUB SERPL-MCNC: 0.2 MG/DL (ref 0–1.2)
BUN BLDV-MCNC: 10 MG/DL (ref 6–23)
CALCIUM SERPL-MCNC: 9.6 MG/DL (ref 8.6–10.2)
CHLORIDE BLD-SCNC: 100 MMOL/L (ref 98–107)
CO2: 24 MMOL/L (ref 22–29)
CREAT SERPL-MCNC: 0.6 MG/DL (ref 0.5–1)
EOSINOPHILS ABSOLUTE: 0.24 E9/L (ref 0.05–0.5)
EOSINOPHILS RELATIVE PERCENT: 3.5 % (ref 0–6)
GFR SERPL CREATININE-BSD FRML MDRD: >60 ML/MIN/1.73
GLUCOSE BLD-MCNC: 105 MG/DL (ref 74–99)
HCT VFR BLD CALC: 34.5 % (ref 34–48)
HEMOGLOBIN: 11.3 G/DL (ref 11.5–15.5)
HYPOCHROMIA: ABNORMAL
LYMPHOCYTES ABSOLUTE: 3.52 E9/L (ref 1.5–4)
LYMPHOCYTES RELATIVE PERCENT: 49.6 % (ref 20–42)
MCH RBC QN AUTO: 32.5 PG (ref 26–35)
MCHC RBC AUTO-ENTMCNC: 32.8 % (ref 32–34.5)
MCV RBC AUTO: 99.1 FL (ref 80–99.9)
MONOCYTES ABSOLUTE: 1.04 E9/L (ref 0.1–0.95)
MONOCYTES RELATIVE PERCENT: 15 % (ref 2–12)
NEUTROPHILS ABSOLUTE: 2.14 E9/L (ref 1.8–7.3)
NEUTROPHILS RELATIVE PERCENT: 31 % (ref 43–80)
OVALOCYTES: ABNORMAL
PDW BLD-RTO: 14.3 FL (ref 11.5–15)
PLATELET # BLD: 335 E9/L (ref 130–450)
PMV BLD AUTO: 10.6 FL (ref 7–12)
POIKILOCYTES: ABNORMAL
POLYCHROMASIA: ABNORMAL
POTASSIUM SERPL-SCNC: 4.8 MMOL/L (ref 3.5–5)
RBC # BLD: 3.48 E12/L (ref 3.5–5.5)
SODIUM BLD-SCNC: 138 MMOL/L (ref 132–146)
TARGET CELLS: ABNORMAL
TOTAL PROTEIN: 6.7 G/DL (ref 6.4–8.3)
WBC # BLD: 6.9 E9/L (ref 4.5–11.5)

## 2023-01-31 ENCOUNTER — TELEPHONE (OUTPATIENT)
Dept: ORTHOPEDIC SURGERY | Age: 63
End: 2023-01-31

## 2023-01-31 NOTE — TELEPHONE ENCOUNTER
VM from pt did not leave a ph#, requesting appt. Pt resides an [facility]  Call to [facility] s/w nurse appt already scheduled  Future Appointments   Date Time Provider Judah Purcell   2/6/2023  9:00 AM SCHEDULE, [facility]   2/14/2023  9:30 AM SEB ECHO LAB RM-2 SEB ECHO [place] [facility]     She will let pt know.

## 2023-02-03 DIAGNOSIS — S82.892A CLOSED FRACTURE OF LEFT ANKLE, INITIAL ENCOUNTER: Primary | ICD-10-CM

## 2023-02-06 ENCOUNTER — OFFICE VISIT (OUTPATIENT)
Dept: ORTHOPEDIC SURGERY | Age: 63
End: 2023-02-06
Payer: MEDICAID

## 2023-02-06 ENCOUNTER — HOSPITAL ENCOUNTER (OUTPATIENT)
Dept: GENERAL RADIOLOGY | Age: 63
Discharge: HOME | End: 2023-02-08
Payer: MEDICAID

## 2023-02-06 DIAGNOSIS — S82.892A CLOSED FRACTURE OF LEFT ANKLE, INITIAL ENCOUNTER: ICD-10-CM

## 2023-02-06 DIAGNOSIS — S82.852D CLOSED DISPLACED TRIMALLEOLAR FRACTURE OF LEFT ANKLE WITH ROUTINE HEALING, SUBSEQUENT ENCOUNTER: Primary | ICD-10-CM

## 2023-02-06 PROCEDURE — 99213 OFFICE O/P EST LOW 20 MIN: CPT | Performed by: PHYSICIAN ASSISTANT

## 2023-02-06 PROCEDURE — 73610 X-RAY EXAM OF ANKLE: CPT

## 2023-02-06 PROCEDURE — 29405 APPL SHORT LEG CAST: CPT | Performed by: PHYSICIAN ASSISTANT

## 2023-02-06 RX ORDER — ACETAMINOPHEN 325 MG/1
325 TABLET ORAL EVERY 4 HOURS PRN
COMMUNITY

## 2023-02-06 RX ORDER — TRAMADOL HYDROCHLORIDE 50 MG/1
50 TABLET ORAL EVERY 6 HOURS PRN
COMMUNITY

## 2023-02-06 RX ORDER — BISACODYL 10 MG
10 SUPPOSITORY, RECTAL RECTAL DAILY
COMMUNITY

## 2023-02-06 RX ORDER — LANOLIN ALCOHOL/MO/W.PET/CERES
CREAM (GRAM) TOPICAL PRN
COMMUNITY

## 2023-02-06 RX ORDER — ZINC GLUCONATE 50 MG
50 TABLET ORAL DAILY
COMMUNITY

## 2023-02-06 NOTE — PROGRESS NOTES
Sin Rangel is a 58 y.o. female who presents for follow up of left ankle trimall    SURGEON: Dr. Daryn Pickett MD  Date of Injury/Surgery:  01/23/2023      Symptoms: better  New complaints: patient states her pain is 6/10 and the splint is too tight around her calf    Cast/Splint, Brace, or Dressings: Taken down for visit and Disheveled    Weightbearing: left lower Non-weight bearing      Assistive device Wheelchair  Participating in therapy (location if yes)?  yes, [facility]    Refills Needed: None  Order/Referral Needed: no

## 2023-02-06 NOTE — PROGRESS NOTES
Chief Complaint   Patient presents with    Ankle Pain     Left ankle tirmall orif; dos: 1/21/2023; 6/10 pain; patient states she has tried to remain non weight bearing but has put weight through her ankle some       OP:SURGEON: Dr. Paz Hutchins MD  DATE OF PROCEDURE: 1/21/23  PROCEDURE:1.  Open reduction internal fixation left ankle trimalleolar fracture without fixation of the posterior malleolus    2. Stress examination of the syndesmosis under anesthesia    3. Open reduction internal fixation left ankle syndesmosis       Subjective:  En Stuart is approximately 2.5 weeks from surgery. Patient is still nonweightbearing to the left. In using assistive devices. Currently in rehab facility. Receiving physical therapy. Pain is currently controlled. Still taking aspirin twice daily for DVT prophylaxis. States that her postoperative splint was too tight, but never called the office to notify anyone of this. She does have an open wound to the anterior aspect of her mid to distal leg without any noticeable drainage or warmth or erythema. Denies calf pain, CP, SOB, fever, chills, malaise. Review of Systems -  all pertinent positives and negatives in HPI. Objective:    General: Alert and oriented X 3, normocephalic atraumatic, external ears and eye normal, sclera clear, no acute distress, respirations easy and unlabored with no audible wheezes, skin warm and dry, speech and dress appropriate for noted age, affect euthymic. Extremity:  Left Lower Extremity  Small open wound to the anterior mid leg where the proximal part of her splint was touching her skin  Incisions well approximated without signs of redness, warmth or drainage- sutures intact  Mild edema noted throughout the ankle and foot   Compartments supple throughout thigh and leg  Calf supple and nontender  Demonstrates active knee flexion/extension, ankle plantar/dorsiflexion/great toe extension.    States sensation intact to touch in sural/deep peroneal/superficial peroneal/saphenous/posterior tibial nerve distributions to foot/ankle. Palpable dorsalis pedis and posterior tibialis pulses, cap refill brisk in toes, foot warm/perfused. XR:   3 views of R ankle demonstrating s/p ORIF for trimalleolar fracture. Mortise concentric without widening. Hardware remains intact without interval displacement, loosening, or failure. No significant change in alignment. No acute fractures or dislocations or any other osseus abnormality identified. Assessment:   Diagnosis Orders   1. Closed displaced trimalleolar fracture of left ankle with routine healing, subsequent encounter  XR ANKLE LEFT (MIN 3 VIEWS)          Plan:  Reviewed x-rays with patient today in office       Weight Bearing: Non-weight bearing left lower extremity. Use assistive devices when needed. Transition to short leg cast today. If cast is too tight/loose or uncomfortable, call the office. Cast cannot get wet. Patient with wound from ill-fitting splint to the anterior mid leg. Patient requesting a cast instead of the CAM boot that was offered to her because she did not think she could remain NWB in the boot. Well padded cast applied and encouraged patient to call if having any issues with the cast.    Therapy: Continue PT/OT. Recommend home health therapy upon discharge home. Pain control: per facility physician    Incisional Care: sutures removed today in office. Steri strips placed. Steri strips can be removed in 7-10 days if they do not fall off sooner. Continue to monitor for signs or symptoms of infection until skin has completely healed. Recommend thin layer of Vaseline 1-2x daily over incision line to aid in healing. Okay to shower. No scrubbing near incision until skin has completely healed. Thoroughly pat dry with clean towel. DVT Prophylaxis: Continue with Aspirin BID.  Will need DVT prophylaxis until she is able to bear weight, about 10-12 weeks from date of surgery. Follow up:     Future Appointments   Date Time Provider Judah Purcell   2/14/2023  9:30 AM SE[facility] ECHO LAB RM-2 [facility]   3/6/2023 10:30 AM SCHEDULE, SE ORTHO [facility]         Call with any questions or concerns. [phone]        Electronically signed by Evangelina Enamorado PA-C on 2/6/2023 at 9:31 AM  Note: This report was completed using [employer] voiced recognition software. Every effort has been made to ensure accuracy; however, inadvertent computerized transcription errors may be present.

## 2023-02-06 NOTE — PATIENT INSTRUCTIONS
INSTRUCTIONS FOR FACILITY      Weight Bearing: Non-weight bearing left lower extremity. Use assistive devices when needed. Transition to short leg cast today. If cast is too tight/loose or uncomfortable, call the office. Cast cannot get wet. Therapy: Continue PT/OT. Recommend home health therapy upon discharge home. Pain control: per facility physician    Incisional Care: sutures removed today in office. Steri strips placed. Steri strips can be removed in 7-10 days if they do not fall off sooner. Continue to monitor for signs or symptoms of infection until skin has completely healed. Recommend thin layer of Vaseline 1-2x daily over incision line to aid in healing. Okay to shower. No scrubbing near incision until skin has completely healed. Thoroughly pat dry with clean towel. DVT Prophylaxis: Continue with Aspirin BID. Will need DVT prophylaxis until she is able to bear weight, about 10-12 weeks from date of surgery. Follow up:     Future Appointments   Date Time Provider Judah Purcell   2/14/2023  9:30 AM SE[facility] ECHO LAB RM-2 [facility] ECHO [place] [facility]   3/6/2023 10:30 AM SCHEDULE, SE ORTHO APC SE Orth[facility]           Call with any questions or concerns.    [phone]

## 2023-03-06 ENCOUNTER — HOSPITAL ENCOUNTER (OUTPATIENT)
Dept: GENERAL RADIOLOGY | Age: 63
Discharge: HOME | End: 2023-03-08
Payer: MEDICAID

## 2023-03-06 ENCOUNTER — OFFICE VISIT (OUTPATIENT)
Dept: ORTHOPEDIC SURGERY | Age: 63
End: 2023-03-06
Payer: MEDICAID

## 2023-03-06 VITALS — WEIGHT: 114 LBS | BODY MASS INDEX: 20.98 KG/M2 | HEIGHT: 62 IN

## 2023-03-06 DIAGNOSIS — S82.852D CLOSED DISPLACED TRIMALLEOLAR FRACTURE OF LEFT ANKLE WITH ROUTINE HEALING, SUBSEQUENT ENCOUNTER: ICD-10-CM

## 2023-03-06 DIAGNOSIS — S82.852D CLOSED DISPLACED TRIMALLEOLAR FRACTURE OF LEFT ANKLE WITH ROUTINE HEALING, SUBSEQUENT ENCOUNTER: Primary | ICD-10-CM

## 2023-03-06 PROCEDURE — 99213 OFFICE O/P EST LOW 20 MIN: CPT

## 2023-03-06 PROCEDURE — L4350 ANKLE CONTROL ORTHO PRE OTS: HCPCS

## 2023-03-06 PROCEDURE — 73610 X-RAY EXAM OF ANKLE: CPT

## 2023-03-06 PROCEDURE — 99024 POSTOP FOLLOW-UP VISIT: CPT | Performed by: PHYSICIAN ASSISTANT

## 2023-03-06 RX ORDER — HYDROCODONE BITARTRATE AND ACETAMINOPHEN 7.5; 325 MG/1; MG/1
TABLET ORAL
COMMUNITY
Start: 2023-02-16

## 2023-03-06 NOTE — PROGRESS NOTES
Chief Complaint   Patient presents with    Post-Op Check     6 week PO Lt ankle trimall ORIF 01/21/23       OP:SURGEON: Dr. Nicole Young MD  DATE OF PROCEDURE: 1/21/23  PROCEDURE:1.  Open reduction internal fixation left ankle trimalleolar fracture without fixation of the posterior malleolus    2. Stress examination of the syndesmosis under anesthesia    3. Open reduction internal fixation left ankle syndesmosis    Subjective:  Alexandre Stuart is approximately 6 weeks follow-up from the above surgery. Patient overall is doing well with mild pain. She does admit to noncompliance with aspirin for DVT prophylaxis and does not take this every day. States that her home health care physical therapy was paused due to left lower extremity cast.  She has maintained nonweightbearing left lower extremity precautions. Denies calf pain, CP, SOB, fever, chills, malaise. Review of Systems -  all pertinent positives and negatives in HPI. Objective:    General: Alert and oriented X 3, normocephalic atraumatic, external ears and eye normal, sclera clear, no acute distress, respirations easy and unlabored with no audible wheezes, skin warm and dry, speech and dress appropriate for noted age, affect euthymic. Extremity:  Left Lower Extremity  Skin clean dry and intact, without signs of infection  Incisions healed  Mild edema at the foot and ankle  Mild TTP throughout the ankle  Compartments supple throughout thigh and leg  Calf supple and nontender  Demonstrates active knee flexion/extension, ankle plantar/dorsiflexion/great toe extension. States sensation intact to touch in sural/deep peroneal/superficial peroneal/saphenous/posterior tibial nerve distributions to foot/ankle. Palpable dorsalis pedis and posterior tibialis pulses, cap refill brisk in toes, foot warm/perfused.            Ht 5' 2\" (1.575 m)   Wt 114 lb (51.7 kg)   BMI 20.85 kg/m²     XR:   3 views of L ankle demonstrating interval healing of trimalleolar ankle fracture s/p ORIF. Mortise appears concentric without widening. Hardware remains intact without interval displacement, loosening, or failure. No significant change in alignment. No acute fractures or dislocations or any other osseus abnormality identified. Assessment:   Diagnosis Orders   1. Closed displaced trimalleolar fracture of left ankle with routine healing, subsequent encounter  XR ANKLE LEFT (MIN 3 VIEWS)          Plan:  Reviewed x-rays with patient today in office   Transition to CAM boot today. Okay to remove for therapy and HEP and for hygiene purposes  WB:  Non-weight bearing left lower extremity- use assistive devices if needed  Therapy: Resume [facility] PT. Call for outpatient referral when needed  Multimodal pain control, RICE therapy prn  DVT Prophylaxis: Continue with ASA 81 mg BID as prescribed    Follow up in 4 weeks with XR of the L ankle    Electronically signed by Deepak Altman PA-C on 3/6/2023 at 11:28 AM  Note: This report was completed using [employer] voiced recognition software. Every effort has been made to ensure accuracy; however, inadvertent computerized transcription errors may be present.

## 2023-03-27 ENCOUNTER — HOSPITAL ENCOUNTER (OUTPATIENT)
Age: 63
Discharge: HOME | End: 2023-03-27
Payer: MEDICAID

## 2023-03-27 LAB
ALBUMIN SERPL-MCNC: 4.1 G/DL (ref 3.5–5.2)
ALP SERPL-CCNC: 103 U/L (ref 35–104)
ALT SERPL-CCNC: 11 U/L (ref 0–32)
ANION GAP SERPL CALCULATED.3IONS-SCNC: 15 MMOL/L (ref 7–16)
AST SERPL-CCNC: 21 U/L (ref 0–31)
BASOPHILS # BLD: 0.07 E9/L (ref 0–0.2)
BASOPHILS NFR BLD: 1.2 % (ref 0–2)
BILIRUB SERPL-MCNC: 0.3 MG/DL (ref 0–1.2)
BUN SERPL-MCNC: 11 MG/DL (ref 6–23)
CALCIUM SERPL-MCNC: 9.1 MG/DL (ref 8.6–10.2)
CHLORIDE SERPL-SCNC: 105 MMOL/L (ref 98–107)
CO2 SERPL-SCNC: 21 MMOL/L (ref 22–29)
CREAT SERPL-MCNC: 0.6 MG/DL (ref 0.5–1)
EOSINOPHIL # BLD: 0.08 E9/L (ref 0.05–0.5)
EOSINOPHIL NFR BLD: 1.4 % (ref 0–6)
ERYTHROCYTE [DISTWIDTH] IN BLOOD BY AUTOMATED COUNT: 15 FL (ref 11.5–15)
GLUCOSE SERPL-MCNC: 76 MG/DL (ref 74–99)
HBA1C MFR BLD: 4.6 % (ref 4–5.6)
HCT VFR BLD AUTO: 36.6 % (ref 34–48)
HGB BLD-MCNC: 11.7 G/DL (ref 11.5–15.5)
IMM GRANULOCYTES # BLD: 0.01 E9/L
IMM GRANULOCYTES NFR BLD: 0.2 % (ref 0–5)
LYMPHOCYTES # BLD: 2.84 E9/L (ref 1.5–4)
LYMPHOCYTES NFR BLD: 49.7 % (ref 20–42)
MCH RBC QN AUTO: 31 PG (ref 26–35)
MCHC RBC AUTO-ENTMCNC: 32 % (ref 32–34.5)
MCV RBC AUTO: 97.1 FL (ref 80–99.9)
MONOCYTES # BLD: 0.36 E9/L (ref 0.1–0.95)
MONOCYTES NFR BLD: 6.3 % (ref 2–12)
NEUTROPHILS # BLD: 2.35 E9/L (ref 1.8–7.3)
NEUTS SEG NFR BLD: 41.2 % (ref 43–80)
PLATELET # BLD AUTO: 192 E9/L (ref 130–450)
PMV BLD AUTO: 10.7 FL (ref 7–12)
POTASSIUM SERPL-SCNC: 3.8 MMOL/L (ref 3.5–5)
PREALB SERPL-MCNC: 23 MG/DL (ref 20–40)
PROT SERPL-MCNC: 7 G/DL (ref 6.4–8.3)
RBC # BLD AUTO: 3.77 E12/L (ref 3.5–5.5)
SODIUM SERPL-SCNC: 141 MMOL/L (ref 132–146)
T4 FREE SERPL-MCNC: 1.13 NG/DL (ref 0.93–1.7)
TSH SERPL-MCNC: 0.77 UIU/ML (ref 0.27–4.2)
WBC # BLD: 5.7 E9/L (ref 4.5–11.5)

## 2023-03-27 PROCEDURE — 80053 COMPREHEN METABOLIC PANEL: CPT

## 2023-03-27 PROCEDURE — 84134 ASSAY OF PREALBUMIN: CPT

## 2023-03-27 PROCEDURE — 85025 COMPLETE CBC W/AUTO DIFF WBC: CPT

## 2023-03-27 PROCEDURE — 36415 COLL VENOUS BLD VENIPUNCTURE: CPT

## 2023-03-27 PROCEDURE — 84439 ASSAY OF FREE THYROXINE: CPT

## 2023-03-27 PROCEDURE — 84443 ASSAY THYROID STIM HORMONE: CPT

## 2023-03-27 PROCEDURE — 83036 HEMOGLOBIN GLYCOSYLATED A1C: CPT

## 2023-04-05 ENCOUNTER — OFFICE VISIT (OUTPATIENT)
Dept: ORTHOPEDIC SURGERY | Age: 63
End: 2023-04-05
Payer: MEDICAID

## 2023-04-05 ENCOUNTER — HOSPITAL ENCOUNTER (OUTPATIENT)
Dept: GENERAL RADIOLOGY | Age: 63
Discharge: HOME | End: 2023-04-07
Payer: MEDICAID

## 2023-04-05 DIAGNOSIS — S82.852D CLOSED DISPLACED TRIMALLEOLAR FRACTURE OF LEFT ANKLE WITH ROUTINE HEALING, SUBSEQUENT ENCOUNTER: Primary | ICD-10-CM

## 2023-04-05 DIAGNOSIS — S82.852D CLOSED DISPLACED TRIMALLEOLAR FRACTURE OF LEFT ANKLE WITH ROUTINE HEALING, SUBSEQUENT ENCOUNTER: ICD-10-CM

## 2023-04-05 PROCEDURE — 73610 X-RAY EXAM OF ANKLE: CPT

## 2023-04-05 PROCEDURE — 99024 POSTOP FOLLOW-UP VISIT: CPT | Performed by: PHYSICIAN ASSISTANT

## 2023-04-05 NOTE — PROGRESS NOTES
Eriberto Grubbs is a 58 y.o. female who presents for follow up of 10 week Post-Op L Ankle Trimall. ORIF    SURGEON: Dr. Leonidas Mcfarlane MD  Date of Injury/Surgery:  1-  Date last seen in office:  3-6-2023    Symptoms: better  New complaints: Pt described a soreness or aching sensation on the Lateral aspect of the L Ankle when ambulating. Pt described instability in the L Ankle when ambulating. Cast/Splint, Brace, or Dressings: Clean, dry and intact, Well fitting, and Taken down for visit    Weightbearing: left lower Partial weight bearing      Assistive device Straight cane and Walking Boot. Participating in therapy (location if yes)? yes, at Home.      Refills Needed: None  Order/Referral Needed: N/A
taken for this visit. XR:   3 views left ankle demonstrate ORIF left ankle trimalleolar fracture with syndesmotic repair with hardware in stable position and alignment. No evidence of hardware loosening or failure. Assessment:   Diagnosis Orders   1. Closed displaced trimalleolar fracture of left ankle with routine healing, subsequent encounter          Plan:  Xrays reviewed with patient. Plan of care discussed in detail, all questions sought and answered to patients satisfaction at this time. Nonweightbearing for 1 more week. In 1 week, start progressive weightbearing on the left lower extremity in Boot. Start with 25% of body weight for 7-10 days and if tolerating well, no significant increased pain or swelling ok to progress to 50% of body weight in Boot for 7-10 days. Then to 75% of body weight for 7-10 days, then to 100% on left lower extremity in the Boot. Once full weight bearing in the Boot for 1-2 weeks and tolerating well OK to start weaning out of Boot. At this point, boot only needs to be worn when weightbearing. Patient was given an Aircast today to transition into once she is full weightbearing and able to come out of the boot. Continue aspirin for DVT prophylaxis until fully weightbearing. Continue home therapy. Follow-up in 6 weeks for reevaluation and x-ray. Call if any questions or concerns. Electronically signed by CRISTI Harvey on 4/5/2023 at 12:17 PM  Note: This report was completed using [email or web address] voiced recognition software. Every effort has been made to ensure accuracy; however, inadvertent computerized transcription errors may be present.

## 2023-04-05 NOTE — PATIENT INSTRUCTIONS
Nonweightbearing for 1 more week. In 1 week, start progressive weightbearing on the left lower extremity in Boot. Start with 25% of body weight for 7-10 days and if tolerating well, no significant increased pain or swelling ok to progress to 50% of body weight in Boot for 7-10 days. Then to 75% of body weight for 7-10 days, then to 100% on left lower extremity in the Boot. Once full weight bearing in the Boot for 1-2 weeks and tolerating well OK to start weaning out of Boot. At this point, boot only needs to be worn when weightbearing. Patient was given an Aircast today to transition into once she is full weightbearing and able to come out of the boot. Continue aspirin for DVT prophylaxis until fully weightbearing. Continue home therapy. Follow-up in 6 weeks for reevaluation and x-ray. Call if any questions or concerns.

## 2023-05-05 ENCOUNTER — HOSPITAL ENCOUNTER (OUTPATIENT)
Age: 63
Setting detail: OBSERVATION
Discharge: HOME | End: 2023-05-09
Attending: EMERGENCY MEDICINE | Admitting: INTERNAL MEDICINE
Payer: MEDICAID

## 2023-05-05 ENCOUNTER — APPOINTMENT (OUTPATIENT)
Dept: CT IMAGING | Age: 63
End: 2023-05-05
Payer: MEDICAID

## 2023-05-05 ENCOUNTER — APPOINTMENT (OUTPATIENT)
Dept: GENERAL RADIOLOGY | Age: 63
End: 2023-05-05
Payer: MEDICAID

## 2023-05-05 DIAGNOSIS — R07.9 CHEST PAIN, UNSPECIFIED TYPE: Primary | ICD-10-CM

## 2023-05-05 LAB
ALBUMIN SERPL-MCNC: 4.4 G/DL (ref 3.5–5.2)
ALP SERPL-CCNC: 119 U/L (ref 35–104)
ALT SERPL-CCNC: 15 U/L (ref 0–32)
ANION GAP SERPL CALCULATED.3IONS-SCNC: 15 MMOL/L (ref 7–16)
AST SERPL-CCNC: 38 U/L (ref 0–31)
BASOPHILS # BLD: 0.08 E9/L (ref 0–0.2)
BASOPHILS NFR BLD: 1.6 % (ref 0–2)
BILIRUB SERPL-MCNC: 0.8 MG/DL (ref 0–1.2)
BNP BLD-MCNC: 54 PG/ML (ref 0–125)
BUN SERPL-MCNC: 7 MG/DL (ref 6–23)
CALCIUM SERPL-MCNC: 9.1 MG/DL (ref 8.6–10.2)
CHLORIDE SERPL-SCNC: 101 MMOL/L (ref 98–107)
CO2 SERPL-SCNC: 26 MMOL/L (ref 22–29)
CREAT SERPL-MCNC: 0.6 MG/DL (ref 0.5–1)
EOSINOPHIL # BLD: 0.03 E9/L (ref 0.05–0.5)
EOSINOPHIL NFR BLD: 0.6 % (ref 0–6)
ERYTHROCYTE [DISTWIDTH] IN BLOOD BY AUTOMATED COUNT: 17.2 FL (ref 11.5–15)
FLUAV RNA RESP QL NAA+PROBE: NOT DETECTED
FLUBV RNA RESP QL NAA+PROBE: NOT DETECTED
GLUCOSE SERPL-MCNC: 79 MG/DL (ref 74–99)
HCT VFR BLD AUTO: 37.9 % (ref 34–48)
HGB BLD-MCNC: 12.3 G/DL (ref 11.5–15.5)
IMM GRANULOCYTES # BLD: 0.01 E9/L
IMM GRANULOCYTES NFR BLD: 0.2 % (ref 0–5)
LYMPHOCYTES # BLD: 2.5 E9/L (ref 1.5–4)
LYMPHOCYTES NFR BLD: 49 % (ref 20–42)
MCH RBC QN AUTO: 30.8 PG (ref 26–35)
MCHC RBC AUTO-ENTMCNC: 32.5 % (ref 32–34.5)
MCV RBC AUTO: 94.8 FL (ref 80–99.9)
MONOCYTES # BLD: 0.62 E9/L (ref 0.1–0.95)
MONOCYTES NFR BLD: 12.2 % (ref 2–12)
NEUTROPHILS # BLD: 1.86 E9/L (ref 1.8–7.3)
NEUTS SEG NFR BLD: 36.4 % (ref 43–80)
PLATELET # BLD AUTO: 159 E9/L (ref 130–450)
PMV BLD AUTO: 10.6 FL (ref 7–12)
POTASSIUM SERPL-SCNC: 3.9 MMOL/L (ref 3.5–5)
PROT SERPL-MCNC: 6.8 G/DL (ref 6.4–8.3)
RBC # BLD AUTO: 4 E12/L (ref 3.5–5.5)
REASON FOR REJECTION: NORMAL
REJECTED TEST: NORMAL
SARS-COV-2 RNA RESP QL NAA+PROBE: NOT DETECTED
SODIUM SERPL-SCNC: 142 MMOL/L (ref 132–146)
TROPONIN, HIGH SENSITIVITY: 8 NG/L (ref 0–9)
WBC # BLD: 5.1 E9/L (ref 4.5–11.5)

## 2023-05-05 PROCEDURE — 85025 COMPLETE CBC W/AUTO DIFF WBC: CPT

## 2023-05-05 PROCEDURE — 71045 X-RAY EXAM CHEST 1 VIEW: CPT

## 2023-05-05 PROCEDURE — 99285 EMERGENCY DEPT VISIT HI MDM: CPT

## 2023-05-05 PROCEDURE — 6360000002 HC RX W HCPCS: Performed by: STUDENT IN AN ORGANIZED HEALTH CARE EDUCATION/TRAINING PROGRAM

## 2023-05-05 PROCEDURE — 96374 THER/PROPH/DIAG INJ IV PUSH: CPT

## 2023-05-05 PROCEDURE — 87636 SARSCOV2 & INF A&B AMP PRB: CPT

## 2023-05-05 PROCEDURE — 83880 ASSAY OF NATRIURETIC PEPTIDE: CPT

## 2023-05-05 PROCEDURE — 93005 ELECTROCARDIOGRAM TRACING: CPT | Performed by: EMERGENCY MEDICINE

## 2023-05-05 PROCEDURE — 71275 CT ANGIOGRAPHY CHEST: CPT

## 2023-05-05 PROCEDURE — 2500000003 HC RX 250 WO HCPCS: Performed by: STUDENT IN AN ORGANIZED HEALTH CARE EDUCATION/TRAINING PROGRAM

## 2023-05-05 PROCEDURE — 2580000003 HC RX 258: Performed by: STUDENT IN AN ORGANIZED HEALTH CARE EDUCATION/TRAINING PROGRAM

## 2023-05-05 PROCEDURE — 80053 COMPREHEN METABOLIC PANEL: CPT

## 2023-05-05 PROCEDURE — 84484 ASSAY OF TROPONIN QUANT: CPT

## 2023-05-05 PROCEDURE — 96375 TX/PRO/DX INJ NEW DRUG ADDON: CPT

## 2023-05-05 PROCEDURE — 6360000004 HC RX CONTRAST MEDICATION: Performed by: RADIOLOGY

## 2023-05-05 RX ORDER — 0.9 % SODIUM CHLORIDE 0.9 %
1000 INTRAVENOUS SOLUTION INTRAVENOUS ONCE
Status: COMPLETED | OUTPATIENT
Start: 2023-05-05 | End: 2023-05-06

## 2023-05-05 RX ORDER — KETOROLAC TROMETHAMINE 30 MG/ML
15 INJECTION, SOLUTION INTRAMUSCULAR; INTRAVENOUS ONCE
Status: COMPLETED | OUTPATIENT
Start: 2023-05-05 | End: 2023-05-06

## 2023-05-05 RX ORDER — FENTANYL CITRATE 50 UG/ML
25 INJECTION, SOLUTION INTRAMUSCULAR; INTRAVENOUS ONCE
Status: COMPLETED | OUTPATIENT
Start: 2023-05-05 | End: 2023-05-05

## 2023-05-05 RX ORDER — THIAMINE HYDROCHLORIDE 100 MG/ML
100 INJECTION, SOLUTION INTRAMUSCULAR; INTRAVENOUS ONCE
Status: COMPLETED | OUTPATIENT
Start: 2023-05-05 | End: 2023-05-05

## 2023-05-05 RX ORDER — FOLIC ACID 5 MG/ML
1 INJECTION, SOLUTION INTRAMUSCULAR; INTRAVENOUS; SUBCUTANEOUS ONCE
Status: COMPLETED | OUTPATIENT
Start: 2023-05-05 | End: 2023-05-05

## 2023-05-05 RX ADMIN — IOPAMIDOL 75 ML: 755 INJECTION, SOLUTION INTRAVENOUS at 23:33

## 2023-05-05 RX ADMIN — FOLIC ACID 1 MG: 5 INJECTION, SOLUTION INTRAMUSCULAR; INTRAVENOUS; SUBCUTANEOUS at 22:31

## 2023-05-05 RX ADMIN — THIAMINE HYDROCHLORIDE 100 MG: 100 INJECTION, SOLUTION INTRAMUSCULAR; INTRAVENOUS at 21:06

## 2023-05-05 RX ADMIN — SODIUM CHLORIDE 1000 ML: 9 INJECTION, SOLUTION INTRAVENOUS at 22:30

## 2023-05-05 RX ADMIN — FENTANYL CITRATE 25 MCG: 50 INJECTION, SOLUTION INTRAMUSCULAR; INTRAVENOUS at 21:04

## 2023-05-05 ASSESSMENT — PAIN DESCRIPTION - LOCATION
LOCATION: CHEST
LOCATION: CHEST;STERNUM

## 2023-05-05 ASSESSMENT — PAIN DESCRIPTION - DESCRIPTORS: DESCRIPTORS: SHARP

## 2023-05-05 ASSESSMENT — PAIN - FUNCTIONAL ASSESSMENT: PAIN_FUNCTIONAL_ASSESSMENT: 0-10

## 2023-05-05 ASSESSMENT — PAIN DESCRIPTION - ORIENTATION
ORIENTATION: MID
ORIENTATION: MID

## 2023-05-05 ASSESSMENT — PAIN SCALES - GENERAL
PAINLEVEL_OUTOF10: 6
PAINLEVEL_OUTOF10: 8

## 2023-05-06 ENCOUNTER — APPOINTMENT (OUTPATIENT)
Dept: NUCLEAR MEDICINE | Age: 63
End: 2023-05-06
Payer: MEDICAID

## 2023-05-06 PROBLEM — R07.9 CHEST PAIN IN ADULT: Status: ACTIVE | Noted: 2023-05-06

## 2023-05-06 LAB
CHOLESTEROL, TOTAL: 194 MG/DL (ref 0–199)
HDLC SERPL-MCNC: 128 MG/DL
LDLC SERPL CALC-MCNC: 53 MG/DL (ref 0–99)
LV EF: 88 %
LVEF MODALITY: NORMAL
TRIGL SERPL-MCNC: 64 MG/DL (ref 0–149)
TROPONIN, HIGH SENSITIVITY: 7 NG/L (ref 0–9)
TROPONIN, HIGH SENSITIVITY: 8 NG/L (ref 0–9)
VLDLC SERPL CALC-MCNC: 13 MG/DL

## 2023-05-06 PROCEDURE — 6360000002 HC RX W HCPCS: Performed by: EMERGENCY MEDICINE

## 2023-05-06 PROCEDURE — 93017 CV STRESS TEST TRACING ONLY: CPT

## 2023-05-06 PROCEDURE — 93018 CV STRESS TEST I&R ONLY: CPT | Performed by: INTERNAL MEDICINE

## 2023-05-06 PROCEDURE — 3430000000 HC RX DIAGNOSTIC RADIOPHARMACEUTICAL: Performed by: RADIOLOGY

## 2023-05-06 PROCEDURE — 93016 CV STRESS TEST SUPVJ ONLY: CPT | Performed by: INTERNAL MEDICINE

## 2023-05-06 PROCEDURE — 6370000000 HC RX 637 (ALT 250 FOR IP)

## 2023-05-06 PROCEDURE — 6360000002 HC RX W HCPCS: Performed by: FAMILY MEDICINE

## 2023-05-06 PROCEDURE — 78452 HT MUSCLE IMAGE SPECT MULT: CPT

## 2023-05-06 PROCEDURE — 78452 HT MUSCLE IMAGE SPECT MULT: CPT | Performed by: INTERNAL MEDICINE

## 2023-05-06 PROCEDURE — 96375 TX/PRO/DX INJ NEW DRUG ADDON: CPT

## 2023-05-06 PROCEDURE — 6370000000 HC RX 637 (ALT 250 FOR IP): Performed by: FAMILY MEDICINE

## 2023-05-06 PROCEDURE — G0378 HOSPITAL OBSERVATION PER HR: HCPCS

## 2023-05-06 PROCEDURE — A9500 TC99M SESTAMIBI: HCPCS | Performed by: RADIOLOGY

## 2023-05-06 PROCEDURE — 93005 ELECTROCARDIOGRAM TRACING: CPT | Performed by: FAMILY MEDICINE

## 2023-05-06 PROCEDURE — 2580000003 HC RX 258: Performed by: FAMILY MEDICINE

## 2023-05-06 PROCEDURE — 80061 LIPID PANEL: CPT

## 2023-05-06 PROCEDURE — 36415 COLL VENOUS BLD VENIPUNCTURE: CPT

## 2023-05-06 PROCEDURE — 96372 THER/PROPH/DIAG INJ SC/IM: CPT

## 2023-05-06 PROCEDURE — 84484 ASSAY OF TROPONIN QUANT: CPT

## 2023-05-06 PROCEDURE — 2500000003 HC RX 250 WO HCPCS: Performed by: STUDENT IN AN ORGANIZED HEALTH CARE EDUCATION/TRAINING PROGRAM

## 2023-05-06 RX ORDER — SODIUM CHLORIDE 0.9 % (FLUSH) 0.9 %
5-40 SYRINGE (ML) INJECTION EVERY 12 HOURS SCHEDULED
Status: DISCONTINUED | OUTPATIENT
Start: 2023-05-06 | End: 2023-05-09 | Stop reason: HOSPADM

## 2023-05-06 RX ORDER — LORAZEPAM 1 MG/1
4 TABLET ORAL
Status: DISCONTINUED | OUTPATIENT
Start: 2023-05-06 | End: 2023-05-09

## 2023-05-06 RX ORDER — LORAZEPAM 1 MG/1
3 TABLET ORAL
Status: DISCONTINUED | OUTPATIENT
Start: 2023-05-06 | End: 2023-05-09

## 2023-05-06 RX ORDER — TETRAKIS(2-METHOXYISOBUTYLISOCYANIDE)COPPER(I) TETRAFLUOROBORATE 1 MG/ML
35 INJECTION, POWDER, LYOPHILIZED, FOR SOLUTION INTRAVENOUS
Status: COMPLETED | OUTPATIENT
Start: 2023-05-06 | End: 2023-05-06

## 2023-05-06 RX ORDER — LORAZEPAM 1 MG/1
2 TABLET ORAL
Status: DISCONTINUED | OUTPATIENT
Start: 2023-05-06 | End: 2023-05-09

## 2023-05-06 RX ORDER — POLYETHYLENE GLYCOL 3350 17 G/17G
17 POWDER, FOR SOLUTION ORAL DAILY PRN
Status: DISCONTINUED | OUTPATIENT
Start: 2023-05-06 | End: 2023-05-09 | Stop reason: HOSPADM

## 2023-05-06 RX ORDER — SODIUM CHLORIDE 9 MG/ML
INJECTION, SOLUTION INTRAVENOUS PRN
Status: DISCONTINUED | OUTPATIENT
Start: 2023-05-06 | End: 2023-05-09 | Stop reason: HOSPADM

## 2023-05-06 RX ORDER — ACETAMINOPHEN 325 MG/1
650 TABLET ORAL EVERY 6 HOURS PRN
Status: DISCONTINUED | OUTPATIENT
Start: 2023-05-06 | End: 2023-05-09 | Stop reason: HOSPADM

## 2023-05-06 RX ORDER — LORAZEPAM 1 MG/1
1 TABLET ORAL
Status: DISCONTINUED | OUTPATIENT
Start: 2023-05-06 | End: 2023-05-09

## 2023-05-06 RX ORDER — LORAZEPAM 2 MG/ML
4 CONCENTRATE ORAL
Status: DISCONTINUED | OUTPATIENT
Start: 2023-05-06 | End: 2023-05-09

## 2023-05-06 RX ORDER — LORAZEPAM 2 MG/ML
2 CONCENTRATE ORAL
Status: DISCONTINUED | OUTPATIENT
Start: 2023-05-06 | End: 2023-05-09

## 2023-05-06 RX ORDER — LORAZEPAM 2 MG/ML
3 CONCENTRATE ORAL
Status: DISCONTINUED | OUTPATIENT
Start: 2023-05-06 | End: 2023-05-09

## 2023-05-06 RX ORDER — TRAMADOL HYDROCHLORIDE 50 MG/1
50 TABLET ORAL EVERY 6 HOURS PRN
Status: DISCONTINUED | OUTPATIENT
Start: 2023-05-06 | End: 2023-05-09 | Stop reason: HOSPADM

## 2023-05-06 RX ORDER — ONDANSETRON 2 MG/ML
4 INJECTION INTRAMUSCULAR; INTRAVENOUS EVERY 6 HOURS PRN
Status: DISCONTINUED | OUTPATIENT
Start: 2023-05-06 | End: 2023-05-09 | Stop reason: HOSPADM

## 2023-05-06 RX ORDER — HYDROXYZINE PAMOATE 25 MG/1
25 CAPSULE ORAL EVERY 8 HOURS PRN
Status: DISCONTINUED | OUTPATIENT
Start: 2023-05-06 | End: 2023-05-09 | Stop reason: HOSPADM

## 2023-05-06 RX ORDER — LABETALOL HYDROCHLORIDE 5 MG/ML
10 INJECTION, SOLUTION INTRAVENOUS ONCE
Status: COMPLETED | OUTPATIENT
Start: 2023-05-06 | End: 2023-05-06

## 2023-05-06 RX ORDER — ONDANSETRON 4 MG/1
4 TABLET, ORALLY DISINTEGRATING ORAL EVERY 8 HOURS PRN
Status: DISCONTINUED | OUTPATIENT
Start: 2023-05-06 | End: 2023-05-09 | Stop reason: HOSPADM

## 2023-05-06 RX ORDER — ATORVASTATIN CALCIUM 40 MG/1
40 TABLET, FILM COATED ORAL NIGHTLY
Status: DISCONTINUED | OUTPATIENT
Start: 2023-05-06 | End: 2023-05-09 | Stop reason: HOSPADM

## 2023-05-06 RX ORDER — ACETAMINOPHEN 650 MG/1
650 SUPPOSITORY RECTAL EVERY 6 HOURS PRN
Status: DISCONTINUED | OUTPATIENT
Start: 2023-05-06 | End: 2023-05-09 | Stop reason: HOSPADM

## 2023-05-06 RX ORDER — TETRAKIS(2-METHOXYISOBUTYLISOCYANIDE)COPPER(I) TETRAFLUOROBORATE 1 MG/ML
10.6 INJECTION, POWDER, LYOPHILIZED, FOR SOLUTION INTRAVENOUS
Status: COMPLETED | OUTPATIENT
Start: 2023-05-06 | End: 2023-05-06

## 2023-05-06 RX ORDER — ENOXAPARIN SODIUM 100 MG/ML
40 INJECTION SUBCUTANEOUS DAILY
Status: DISCONTINUED | OUTPATIENT
Start: 2023-05-06 | End: 2023-05-09 | Stop reason: HOSPADM

## 2023-05-06 RX ORDER — ASPIRIN 81 MG/1
81 TABLET, CHEWABLE ORAL DAILY
Status: DISCONTINUED | OUTPATIENT
Start: 2023-05-06 | End: 2023-05-09 | Stop reason: HOSPADM

## 2023-05-06 RX ORDER — SODIUM CHLORIDE 0.9 % (FLUSH) 0.9 %
5-40 SYRINGE (ML) INJECTION PRN
Status: DISCONTINUED | OUTPATIENT
Start: 2023-05-06 | End: 2023-05-09 | Stop reason: HOSPADM

## 2023-05-06 RX ORDER — NITROGLYCERIN 0.4 MG/1
0.4 TABLET SUBLINGUAL EVERY 5 MIN PRN
Status: DISCONTINUED | OUTPATIENT
Start: 2023-05-06 | End: 2023-05-09 | Stop reason: HOSPADM

## 2023-05-06 RX ORDER — LORAZEPAM 2 MG/ML
1 CONCENTRATE ORAL
Status: DISCONTINUED | OUTPATIENT
Start: 2023-05-06 | End: 2023-05-09

## 2023-05-06 RX ORDER — AMITRIPTYLINE HYDROCHLORIDE 25 MG/1
25 TABLET, FILM COATED ORAL NIGHTLY
Status: DISCONTINUED | OUTPATIENT
Start: 2023-05-06 | End: 2023-05-09 | Stop reason: HOSPADM

## 2023-05-06 RX ADMIN — TRAMADOL HYDROCHLORIDE 50 MG: 50 TABLET ORAL at 18:25

## 2023-05-06 RX ADMIN — Medication 34 MILLICURIE: at 15:08

## 2023-05-06 RX ADMIN — ONDANSETRON 4 MG: 2 INJECTION INTRAMUSCULAR; INTRAVENOUS at 13:51

## 2023-05-06 RX ADMIN — TRAMADOL HYDROCHLORIDE 50 MG: 50 TABLET ORAL at 06:06

## 2023-05-06 RX ADMIN — AMITRIPTYLINE HYDROCHLORIDE 25 MG: 25 TABLET, FILM COATED ORAL at 20:21

## 2023-05-06 RX ADMIN — Medication 10.6 MILLICURIE: at 12:55

## 2023-05-06 RX ADMIN — ASPIRIN 81 MG 81 MG: 81 TABLET ORAL at 17:06

## 2023-05-06 RX ADMIN — SODIUM CHLORIDE, PRESERVATIVE FREE 10 ML: 5 INJECTION INTRAVENOUS at 19:56

## 2023-05-06 RX ADMIN — REGADENOSON 0.4 MG: 0.08 INJECTION, SOLUTION INTRAVENOUS at 15:07

## 2023-05-06 RX ADMIN — ENOXAPARIN SODIUM 40 MG: 100 INJECTION SUBCUTANEOUS at 17:09

## 2023-05-06 RX ADMIN — KETOROLAC TROMETHAMINE 15 MG: 30 INJECTION, SOLUTION INTRAMUSCULAR; INTRAVENOUS at 00:34

## 2023-05-06 RX ADMIN — ACETAMINOPHEN 650 MG: 325 TABLET ORAL at 19:54

## 2023-05-06 RX ADMIN — LABETALOL HYDROCHLORIDE 10 MG: 5 INJECTION INTRAVENOUS at 01:27

## 2023-05-06 RX ADMIN — ATORVASTATIN CALCIUM 40 MG: 40 TABLET, FILM COATED ORAL at 19:54

## 2023-05-06 RX ADMIN — NITROGLYCERIN 0.4 MG: 0.4 TABLET, ORALLY DISINTEGRATING SUBLINGUAL at 22:56

## 2023-05-06 ASSESSMENT — PAIN DESCRIPTION - ORIENTATION
ORIENTATION: MID
ORIENTATION: POSTERIOR
ORIENTATION: POSTERIOR
ORIENTATION: MID

## 2023-05-06 ASSESSMENT — PAIN SCALES - GENERAL
PAINLEVEL_OUTOF10: 8
PAINLEVEL_OUTOF10: 7
PAINLEVEL_OUTOF10: 8
PAINLEVEL_OUTOF10: 3
PAINLEVEL_OUTOF10: 10
PAINLEVEL_OUTOF10: 6
PAINLEVEL_OUTOF10: 8

## 2023-05-06 ASSESSMENT — PAIN DESCRIPTION - DESCRIPTORS
DESCRIPTORS: ACHING
DESCRIPTORS: SHARP
DESCRIPTORS: ACHING;DISCOMFORT;DULL
DESCRIPTORS: ACHING
DESCRIPTORS: SHOOTING;SHARP;ACHING
DESCRIPTORS: ACHING;SHARP

## 2023-05-06 ASSESSMENT — PAIN DESCRIPTION - FREQUENCY: FREQUENCY: INTERMITTENT

## 2023-05-06 ASSESSMENT — PAIN DESCRIPTION - LOCATION
LOCATION: HEAD
LOCATION: BACK;CHEST
LOCATION: CHEST
LOCATION: CHEST;BACK
LOCATION: BACK;CHEST

## 2023-05-06 ASSESSMENT — PAIN DESCRIPTION - ONSET: ONSET: ON-GOING

## 2023-05-06 ASSESSMENT — PAIN - FUNCTIONAL ASSESSMENT: PAIN_FUNCTIONAL_ASSESSMENT: ACTIVITIES ARE NOT PREVENTED

## 2023-05-06 ASSESSMENT — PAIN DESCRIPTION - PAIN TYPE: TYPE: ACUTE PAIN

## 2023-05-07 LAB
ALBUMIN SERPL-MCNC: 4.2 G/DL (ref 3.5–5.2)
ALP SERPL-CCNC: 121 U/L (ref 35–104)
ALT SERPL-CCNC: 11 U/L (ref 0–32)
ANION GAP SERPL CALCULATED.3IONS-SCNC: 17 MMOL/L (ref 7–16)
AST SERPL-CCNC: 27 U/L (ref 0–31)
BASOPHILS # BLD: 0.05 E9/L (ref 0–0.2)
BASOPHILS NFR BLD: 0.8 % (ref 0–2)
BILIRUB SERPL-MCNC: 1.5 MG/DL (ref 0–1.2)
BUN SERPL-MCNC: 9 MG/DL (ref 6–23)
CALCIUM SERPL-MCNC: 9.5 MG/DL (ref 8.6–10.2)
CHLORIDE SERPL-SCNC: 91 MMOL/L (ref 98–107)
CO2 SERPL-SCNC: 27 MMOL/L (ref 22–29)
CREAT SERPL-MCNC: 0.6 MG/DL (ref 0.5–1)
EOSINOPHIL # BLD: 0.12 E9/L (ref 0.05–0.5)
EOSINOPHIL NFR BLD: 1.8 % (ref 0–6)
ERYTHROCYTE [DISTWIDTH] IN BLOOD BY AUTOMATED COUNT: 16 FL (ref 11.5–15)
GLUCOSE SERPL-MCNC: 147 MG/DL (ref 74–99)
HCT VFR BLD AUTO: 40.1 % (ref 34–48)
HGB BLD-MCNC: 13.3 G/DL (ref 11.5–15.5)
IMM GRANULOCYTES # BLD: 0.03 E9/L
IMM GRANULOCYTES NFR BLD: 0.5 % (ref 0–5)
LYMPHOCYTES # BLD: 1.94 E9/L (ref 1.5–4)
LYMPHOCYTES NFR BLD: 29.2 % (ref 20–42)
MCH RBC QN AUTO: 31.4 PG (ref 26–35)
MCHC RBC AUTO-ENTMCNC: 33.2 % (ref 32–34.5)
MCV RBC AUTO: 94.6 FL (ref 80–99.9)
MONOCYTES # BLD: 0.65 E9/L (ref 0.1–0.95)
MONOCYTES NFR BLD: 9.8 % (ref 2–12)
NEUTROPHILS # BLD: 3.85 E9/L (ref 1.8–7.3)
NEUTS SEG NFR BLD: 57.9 % (ref 43–80)
PLATELET # BLD AUTO: 147 E9/L (ref 130–450)
PMV BLD AUTO: 10.8 FL (ref 7–12)
POTASSIUM SERPL-SCNC: 3.4 MMOL/L (ref 3.5–5)
PROT SERPL-MCNC: 7 G/DL (ref 6.4–8.3)
RBC # BLD AUTO: 4.24 E12/L (ref 3.5–5.5)
SODIUM SERPL-SCNC: 135 MMOL/L (ref 132–146)
WBC # BLD: 6.6 E9/L (ref 4.5–11.5)

## 2023-05-07 PROCEDURE — 2580000003 HC RX 258: Performed by: FAMILY MEDICINE

## 2023-05-07 PROCEDURE — 6360000002 HC RX W HCPCS: Performed by: FAMILY MEDICINE

## 2023-05-07 PROCEDURE — 96372 THER/PROPH/DIAG INJ SC/IM: CPT

## 2023-05-07 PROCEDURE — 36415 COLL VENOUS BLD VENIPUNCTURE: CPT

## 2023-05-07 PROCEDURE — 6370000000 HC RX 637 (ALT 250 FOR IP)

## 2023-05-07 PROCEDURE — 85025 COMPLETE CBC W/AUTO DIFF WBC: CPT

## 2023-05-07 PROCEDURE — G0378 HOSPITAL OBSERVATION PER HR: HCPCS

## 2023-05-07 PROCEDURE — 6370000000 HC RX 637 (ALT 250 FOR IP): Performed by: FAMILY MEDICINE

## 2023-05-07 PROCEDURE — 80053 COMPREHEN METABOLIC PANEL: CPT

## 2023-05-07 RX ORDER — POTASSIUM CHLORIDE 20 MEQ/1
40 TABLET, EXTENDED RELEASE ORAL ONCE
Status: COMPLETED | OUTPATIENT
Start: 2023-05-07 | End: 2023-05-07

## 2023-05-07 RX ADMIN — SODIUM CHLORIDE, PRESERVATIVE FREE 10 ML: 5 INJECTION INTRAVENOUS at 20:02

## 2023-05-07 RX ADMIN — ACETAMINOPHEN 650 MG: 325 TABLET ORAL at 11:58

## 2023-05-07 RX ADMIN — LORAZEPAM 1 MG: 1 TABLET ORAL at 03:12

## 2023-05-07 RX ADMIN — AMITRIPTYLINE HYDROCHLORIDE 25 MG: 25 TABLET, FILM COATED ORAL at 20:02

## 2023-05-07 RX ADMIN — TRAMADOL HYDROCHLORIDE 50 MG: 50 TABLET ORAL at 01:24

## 2023-05-07 RX ADMIN — TRAMADOL HYDROCHLORIDE 50 MG: 50 TABLET ORAL at 15:24

## 2023-05-07 RX ADMIN — TRAMADOL HYDROCHLORIDE 50 MG: 50 TABLET ORAL at 09:09

## 2023-05-07 RX ADMIN — SODIUM CHLORIDE, PRESERVATIVE FREE 10 ML: 5 INJECTION INTRAVENOUS at 09:10

## 2023-05-07 RX ADMIN — ATORVASTATIN CALCIUM 40 MG: 40 TABLET, FILM COATED ORAL at 20:01

## 2023-05-07 RX ADMIN — POTASSIUM CHLORIDE 40 MEQ: 1500 TABLET, EXTENDED RELEASE ORAL at 11:25

## 2023-05-07 RX ADMIN — LORAZEPAM 1 MG: 1 TABLET ORAL at 18:17

## 2023-05-07 RX ADMIN — TRAMADOL HYDROCHLORIDE 50 MG: 50 TABLET ORAL at 23:01

## 2023-05-07 RX ADMIN — ENOXAPARIN SODIUM 40 MG: 100 INJECTION SUBCUTANEOUS at 09:09

## 2023-05-07 RX ADMIN — ASPIRIN 81 MG 81 MG: 81 TABLET ORAL at 09:10

## 2023-05-07 ASSESSMENT — PAIN DESCRIPTION - LOCATION
LOCATION: CHEST;BACK
LOCATION: CHEST
LOCATION: CHEST
LOCATION: BACK;CHEST
LOCATION: CHEST;BACK
LOCATION: CHEST
LOCATION: BACK;CHEST
LOCATION: CHEST;BACK

## 2023-05-07 ASSESSMENT — PAIN SCALES - GENERAL
PAINLEVEL_OUTOF10: 8
PAINLEVEL_OUTOF10: 6
PAINLEVEL_OUTOF10: 5
PAINLEVEL_OUTOF10: 8
PAINLEVEL_OUTOF10: 2
PAINLEVEL_OUTOF10: 8
PAINLEVEL_OUTOF10: 6
PAINLEVEL_OUTOF10: 7
PAINLEVEL_OUTOF10: 7

## 2023-05-07 ASSESSMENT — PAIN DESCRIPTION - ONSET: ONSET: ON-GOING

## 2023-05-07 ASSESSMENT — PAIN DESCRIPTION - FREQUENCY: FREQUENCY: INTERMITTENT

## 2023-05-07 ASSESSMENT — PAIN DESCRIPTION - ORIENTATION
ORIENTATION: MID
ORIENTATION: MID
ORIENTATION: MID;POSTERIOR
ORIENTATION: MID;POSTERIOR

## 2023-05-07 ASSESSMENT — PAIN DESCRIPTION - DESCRIPTORS
DESCRIPTORS: SHARP
DESCRIPTORS: ACHING
DESCRIPTORS: ACHING;DISCOMFORT;DULL
DESCRIPTORS: ACHING;DISCOMFORT;DULL;SORE
DESCRIPTORS: ACHING;DISCOMFORT;DULL;SORE

## 2023-05-07 ASSESSMENT — PAIN DESCRIPTION - PAIN TYPE
TYPE: ACUTE PAIN
TYPE: ACUTE PAIN

## 2023-05-08 DIAGNOSIS — S82.852D CLOSED DISPLACED TRIMALLEOLAR FRACTURE OF LEFT ANKLE WITH ROUTINE HEALING, SUBSEQUENT ENCOUNTER: Primary | ICD-10-CM

## 2023-05-08 LAB
ALBUMIN SERPL-MCNC: 3.8 G/DL (ref 3.5–5.2)
ALP SERPL-CCNC: 115 U/L (ref 35–104)
ALT SERPL-CCNC: 8 U/L (ref 0–32)
ANION GAP SERPL CALCULATED.3IONS-SCNC: 12 MMOL/L (ref 7–16)
AST SERPL-CCNC: 24 U/L (ref 0–31)
BASOPHILS # BLD: 0.07 E9/L (ref 0–0.2)
BASOPHILS NFR BLD: 1.1 % (ref 0–2)
BILIRUB SERPL-MCNC: 0.9 MG/DL (ref 0–1.2)
BUN SERPL-MCNC: 14 MG/DL (ref 6–23)
CALCIUM SERPL-MCNC: 9.9 MG/DL (ref 8.6–10.2)
CHLORIDE SERPL-SCNC: 98 MMOL/L (ref 98–107)
CO2 SERPL-SCNC: 27 MMOL/L (ref 22–29)
CREAT SERPL-MCNC: 0.6 MG/DL (ref 0.5–1)
EKG ATRIAL RATE: 65 BPM
EKG ATRIAL RATE: 70 BPM
EKG ATRIAL RATE: 71 BPM
EKG P AXIS: 51 DEGREES
EKG P AXIS: 58 DEGREES
EKG P AXIS: 58 DEGREES
EKG P-R INTERVAL: 142 MS
EKG P-R INTERVAL: 144 MS
EKG P-R INTERVAL: 146 MS
EKG Q-T INTERVAL: 460 MS
EKG Q-T INTERVAL: 470 MS
EKG Q-T INTERVAL: 484 MS
EKG QRS DURATION: 76 MS
EKG QRS DURATION: 80 MS
EKG QRS DURATION: 82 MS
EKG QTC CALCULATION (BAZETT): 496 MS
EKG QTC CALCULATION (BAZETT): 503 MS
EKG QTC CALCULATION (BAZETT): 510 MS
EKG R AXIS: 17 DEGREES
EKG R AXIS: 24 DEGREES
EKG R AXIS: 31 DEGREES
EKG T AXIS: 47 DEGREES
EKG T AXIS: 51 DEGREES
EKG T AXIS: 56 DEGREES
EKG VENTRICULAR RATE: 65 BPM
EKG VENTRICULAR RATE: 70 BPM
EKG VENTRICULAR RATE: 71 BPM
EOSINOPHIL # BLD: 0.11 E9/L (ref 0.05–0.5)
EOSINOPHIL NFR BLD: 1.7 % (ref 0–6)
ERYTHROCYTE [DISTWIDTH] IN BLOOD BY AUTOMATED COUNT: 15.9 FL (ref 11.5–15)
GLUCOSE SERPL-MCNC: 112 MG/DL (ref 74–99)
HCT VFR BLD AUTO: 37.6 % (ref 34–48)
HGB BLD-MCNC: 12.8 G/DL (ref 11.5–15.5)
IMM GRANULOCYTES # BLD: 0.01 E9/L
IMM GRANULOCYTES NFR BLD: 0.2 % (ref 0–5)
LYMPHOCYTES # BLD: 2.61 E9/L (ref 1.5–4)
LYMPHOCYTES NFR BLD: 39.2 % (ref 20–42)
MCH RBC QN AUTO: 32.5 PG (ref 26–35)
MCHC RBC AUTO-ENTMCNC: 34 % (ref 32–34.5)
MCV RBC AUTO: 95.4 FL (ref 80–99.9)
MONOCYTES # BLD: 0.66 E9/L (ref 0.1–0.95)
MONOCYTES NFR BLD: 9.9 % (ref 2–12)
NEUTROPHILS # BLD: 3.19 E9/L (ref 1.8–7.3)
NEUTS SEG NFR BLD: 47.9 % (ref 43–80)
PLATELET # BLD AUTO: 118 E9/L (ref 130–450)
PMV BLD AUTO: 12.8 FL (ref 7–12)
POTASSIUM SERPL-SCNC: 4.3 MMOL/L (ref 3.5–5)
PROT SERPL-MCNC: 6.4 G/DL (ref 6.4–8.3)
RBC # BLD AUTO: 3.94 E12/L (ref 3.5–5.5)
SODIUM SERPL-SCNC: 137 MMOL/L (ref 132–146)
WBC # BLD: 6.7 E9/L (ref 4.5–11.5)

## 2023-05-08 PROCEDURE — G0378 HOSPITAL OBSERVATION PER HR: HCPCS

## 2023-05-08 PROCEDURE — 6360000002 HC RX W HCPCS: Performed by: FAMILY MEDICINE

## 2023-05-08 PROCEDURE — 96372 THER/PROPH/DIAG INJ SC/IM: CPT

## 2023-05-08 PROCEDURE — 2580000003 HC RX 258: Performed by: FAMILY MEDICINE

## 2023-05-08 PROCEDURE — 6370000000 HC RX 637 (ALT 250 FOR IP): Performed by: FAMILY MEDICINE

## 2023-05-08 PROCEDURE — 93010 ELECTROCARDIOGRAM REPORT: CPT | Performed by: INTERNAL MEDICINE

## 2023-05-08 PROCEDURE — 80053 COMPREHEN METABOLIC PANEL: CPT

## 2023-05-08 PROCEDURE — 36415 COLL VENOUS BLD VENIPUNCTURE: CPT

## 2023-05-08 PROCEDURE — 85025 COMPLETE CBC W/AUTO DIFF WBC: CPT

## 2023-05-08 PROCEDURE — 6370000000 HC RX 637 (ALT 250 FOR IP)

## 2023-05-08 RX ORDER — IBUPROFEN 200 MG
400 TABLET ORAL EVERY 6 HOURS PRN
Status: DISCONTINUED | OUTPATIENT
Start: 2023-05-08 | End: 2023-05-09 | Stop reason: HOSPADM

## 2023-05-08 RX ORDER — LIDOCAINE 4 G/G
1 PATCH TOPICAL DAILY
Status: DISCONTINUED | OUTPATIENT
Start: 2023-05-08 | End: 2023-05-09 | Stop reason: HOSPADM

## 2023-05-08 RX ADMIN — TRAMADOL HYDROCHLORIDE 50 MG: 50 TABLET ORAL at 18:22

## 2023-05-08 RX ADMIN — TRAMADOL HYDROCHLORIDE 50 MG: 50 TABLET ORAL at 12:13

## 2023-05-08 RX ADMIN — IBUPROFEN 400 MG: 200 TABLET, FILM COATED ORAL at 21:45

## 2023-05-08 RX ADMIN — ATORVASTATIN CALCIUM 40 MG: 40 TABLET, FILM COATED ORAL at 19:52

## 2023-05-08 RX ADMIN — AMITRIPTYLINE HYDROCHLORIDE 25 MG: 25 TABLET, FILM COATED ORAL at 19:52

## 2023-05-08 RX ADMIN — ENOXAPARIN SODIUM 40 MG: 100 INJECTION SUBCUTANEOUS at 08:31

## 2023-05-08 RX ADMIN — SODIUM CHLORIDE, PRESERVATIVE FREE 10 ML: 5 INJECTION INTRAVENOUS at 19:52

## 2023-05-08 RX ADMIN — IBUPROFEN 400 MG: 200 TABLET, FILM COATED ORAL at 14:36

## 2023-05-08 RX ADMIN — SODIUM CHLORIDE, PRESERVATIVE FREE 10 ML: 5 INJECTION INTRAVENOUS at 08:31

## 2023-05-08 RX ADMIN — ASPIRIN 81 MG 81 MG: 81 TABLET ORAL at 08:31

## 2023-05-08 ASSESSMENT — PAIN DESCRIPTION - DESCRIPTORS
DESCRIPTORS: SORE
DESCRIPTORS: TENDER;SHOOTING;SHARP
DESCRIPTORS: THROBBING;TENDER;SHOOTING
DESCRIPTORS: ACHING;STABBING;TENDER

## 2023-05-08 ASSESSMENT — PAIN DESCRIPTION - LOCATION
LOCATION: CHEST
LOCATION: STERNUM
LOCATION: CHEST
LOCATION: STERNUM
LOCATION: STERNUM

## 2023-05-08 ASSESSMENT — PAIN DESCRIPTION - ORIENTATION
ORIENTATION: MID

## 2023-05-08 ASSESSMENT — PAIN SCALES - GENERAL
PAINLEVEL_OUTOF10: 6
PAINLEVEL_OUTOF10: 6
PAINLEVEL_OUTOF10: 0
PAINLEVEL_OUTOF10: 8
PAINLEVEL_OUTOF10: 0
PAINLEVEL_OUTOF10: 6
PAINLEVEL_OUTOF10: 6
PAINLEVEL_OUTOF10: 0
PAINLEVEL_OUTOF10: 8
PAINLEVEL_OUTOF10: 0
PAINLEVEL_OUTOF10: 0
PAINLEVEL_OUTOF10: 5
PAINLEVEL_OUTOF10: 3
PAINLEVEL_OUTOF10: 0

## 2023-05-08 ASSESSMENT — PAIN DESCRIPTION - DIRECTION
RADIATING_TOWARDS: NONRADIATING

## 2023-05-08 ASSESSMENT — PAIN DESCRIPTION - ONSET
ONSET: GRADUAL

## 2023-05-08 ASSESSMENT — PAIN DESCRIPTION - FREQUENCY
FREQUENCY: INTERMITTENT

## 2023-05-08 ASSESSMENT — PAIN DESCRIPTION - PAIN TYPE
TYPE: ACUTE PAIN

## 2023-05-08 NOTE — PLAN OF CARE
Problem: Discharge Planning  Goal: Discharge to home or other facility with appropriate resources  5/7/2023 2251 by Randy Bonilla RN  Outcome: Progressing  Flowsheets (Taken 5/7/2023 1958)  Discharge to home or other facility with appropriate resources: Identify barriers to discharge with patient and caregiver     Problem: Pain  Goal: Verbalizes/displays adequate comfort level or baseline comfort level  5/7/2023 2251 by Randy Bonilla RN  Outcome: Progressing     Problem: Safety - Adult  Goal: Free from fall injury  5/7/2023 1337 by Shamika Seay RN  Outcome: Progressing  Flowsheets (Taken 5/7/2023 1337)  Free From Fall Injury: Instruct family/caregiver on patient safety

## 2023-05-08 NOTE — PLAN OF CARE
Problem: Discharge Planning  Goal: Discharge to home or other facility with appropriate resources  5/8/2023 1658 by Chun Lopez RN  Outcome: Progressing  Flowsheets  Taken 5/8/2023 1640  Discharge to home or other facility with appropriate resources:   Identify barriers to discharge with patient and caregiver   Arrange for needed discharge resources and transportation as appropriate  Taken 5/8/2023 1205  Discharge to home or other facility with appropriate resources:   Identify barriers to discharge with patient and caregiver   Arrange for needed discharge resources and transportation as appropriate  5/8/2023 0853 by Chun Lopez RN  Outcome: Progressing  Flowsheets (Taken 5/8/2023 0830)  Discharge to home or other facility with appropriate resources:   Identify barriers to discharge with patient and caregiver   Arrange for needed discharge resources and transportation as appropriate     Problem: Pain  Goal: Verbalizes/displays adequate comfort level or baseline comfort level  5/8/2023 1658 by Chun Lopez RN  Outcome: Progressing  Flowsheets  Taken 5/8/2023 1412  Verbalizes/displays adequate comfort level or baseline comfort level:   Encourage patient to monitor pain and request assistance   Assess pain using appropriate pain scale  Taken 5/8/2023 1213  Verbalizes/displays adequate comfort level or baseline comfort level:   Encourage patient to monitor pain and request assistance   Assess pain using appropriate pain scale  Taken 5/8/2023 1112  Verbalizes/displays adequate comfort level or baseline comfort level:   Encourage patient to monitor pain and request assistance   Assess pain using appropriate pain scale  5/8/2023 0853 by Chun Lopez RN  Outcome: Progressing  Flowsheets (Taken 5/8/2023 5603)  Verbalizes/displays adequate comfort level or baseline comfort level:   Encourage patient to monitor pain and request assistance   Assess pain using appropriate pain scale     Problem: Safety - Adult  Goal: Free

## 2023-05-08 NOTE — PATIENT CARE CONFERENCE
[facility] Quality Flow/Interdisciplinary Rounds Progress Note        Quality Flow Rounds held on May 8, 2023    Disciplines Attending:  Bedside Nurse, , , and Nursing Unit Leadership    Chilo Stuart was admitted on 5/5/2023  8:10 PM    Anticipated Discharge Date:       Disposition:    Maciej Score:  Maciej Scale Score: 19    Readmission Risk              Risk of Unplanned Readmission:  0           Discussed patient goal for the day, patient clinical progression, and barriers to discharge.   The following Goal(s) of the Day/Commitment(s) have been identified:  Labs - Report Results and Pain Management         Jorene Nyhan, RN  May 8, 2023

## 2023-05-08 NOTE — PROGRESS NOTES
Hospitalist Progress Note      Synopsis:  Briefly, patient with PMH significant for asthma, CAD, DDD, HTN, ankle fracture, static hypotension, presented to ED with complaints of chest pain. Hospital course thus far unremarkable stress test ordered. Patient has seen cardiology in the past with concern for VT and torsades. Patient had echo in 2022 showed EF 55 to 60%. Stress test read as low risk. Suspect chest pain related to minimally displaced fracture of superior sternal body appreciated on CT of chest.  Hospital course complicated by patient expressing concerns about going threw withdrawal as she drinks 5-6 Bella lights a day for over a year. Last drink was evening of 2023. Patient also with concerns about ADLs upon discharge. PT OT eval ordered for possible placement. Hospital day 0     Subjective:  Patient seen and examined at bedside, still with complaints of significant chest pain. Additional concerns about activities daily of living at home due to pain from sternal fracture when doing any activity. Explained to patient I would be ordering PT OT evaluation for possible placement. Patient agreeable with plan. Stable overnight. No issues reported. Patient seen and examined  Records reviewed. Temp (24hrs), Av.2 °F (36.2 °C), Min:97 °F (36.1 °C), Max:97.3 °F (36.3 °C)    DIET: ADULT DIET; Regular; Low Fat/Low Chol/High Fiber/2 gm Na  ADULT ORAL NUTRITION SUPPLEMENT; Breakfast; Standard High Calorie/High Protein Oral Supplement  CODE: Full Code    Intake/Output Summary (Last 24 hours) at 2023 1158  Last data filed at 2023 0846  Gross per 24 hour   Intake 240 ml   Output --   Net 240 ml       Objective:    /75   Pulse 80   Temp 97 °F (36.1 °C) (Temporal)   Resp 14   Ht 5' 2\" (1.575 m)   Wt 112 lb (50.8 kg)   SpO2 96%   BMI 20.49 kg/m²     General appearance: No apparent distress, appears stated age and cooperative. HEENT: Conjunctivae/corneas clear.

## 2023-05-08 NOTE — PROGRESS NOTES
Patient to downgrade to med/surg care. Telemetry monitor removed per protocol, cleaned, and returned to nurses station.

## 2023-05-09 VITALS
HEART RATE: 78 BPM | HEIGHT: 62 IN | DIASTOLIC BLOOD PRESSURE: 79 MMHG | BODY MASS INDEX: 20.66 KG/M2 | OXYGEN SATURATION: 96 % | SYSTOLIC BLOOD PRESSURE: 114 MMHG | WEIGHT: 112.25 LBS | RESPIRATION RATE: 18 BRPM | TEMPERATURE: 97.3 F

## 2023-05-09 PROCEDURE — 6370000000 HC RX 637 (ALT 250 FOR IP): Performed by: NURSE PRACTITIONER

## 2023-05-09 PROCEDURE — 2580000003 HC RX 258: Performed by: FAMILY MEDICINE

## 2023-05-09 PROCEDURE — 6360000002 HC RX W HCPCS: Performed by: FAMILY MEDICINE

## 2023-05-09 PROCEDURE — 6370000000 HC RX 637 (ALT 250 FOR IP)

## 2023-05-09 PROCEDURE — 6370000000 HC RX 637 (ALT 250 FOR IP): Performed by: FAMILY MEDICINE

## 2023-05-09 PROCEDURE — 96372 THER/PROPH/DIAG INJ SC/IM: CPT

## 2023-05-09 PROCEDURE — G0378 HOSPITAL OBSERVATION PER HR: HCPCS

## 2023-05-09 RX ORDER — LANOLIN ALCOHOL/MO/W.PET/CERES
100 CREAM (GRAM) TOPICAL DAILY
Status: DISCONTINUED | OUTPATIENT
Start: 2023-05-09 | End: 2023-05-09 | Stop reason: HOSPADM

## 2023-05-09 RX ORDER — LIDOCAINE 4 G/G
1 PATCH TOPICAL DAILY
Qty: 30 EACH | Refills: 0 | Status: SHIPPED | OUTPATIENT
Start: 2023-05-10

## 2023-05-09 RX ORDER — FOLIC ACID 1 MG/1
1 TABLET ORAL DAILY
Qty: 30 TABLET | Refills: 0 | Status: SHIPPED | OUTPATIENT
Start: 2023-05-10

## 2023-05-09 RX ORDER — FOLIC ACID 1 MG/1
1 TABLET ORAL DAILY
Status: DISCONTINUED | OUTPATIENT
Start: 2023-05-09 | End: 2023-05-09 | Stop reason: HOSPADM

## 2023-05-09 RX ORDER — LANOLIN ALCOHOL/MO/W.PET/CERES
100 CREAM (GRAM) TOPICAL DAILY
Qty: 30 TABLET | Refills: 0 | Status: SHIPPED | OUTPATIENT
Start: 2023-05-10

## 2023-05-09 RX ADMIN — SODIUM CHLORIDE, PRESERVATIVE FREE 10 ML: 5 INJECTION INTRAVENOUS at 08:33

## 2023-05-09 RX ADMIN — FOLIC ACID 1 MG: 1 TABLET ORAL at 08:32

## 2023-05-09 RX ADMIN — TRAMADOL HYDROCHLORIDE 50 MG: 50 TABLET ORAL at 08:36

## 2023-05-09 RX ADMIN — ASPIRIN 81 MG 81 MG: 81 TABLET ORAL at 08:32

## 2023-05-09 RX ADMIN — Medication 100 MG: at 08:32

## 2023-05-09 RX ADMIN — ENOXAPARIN SODIUM 40 MG: 100 INJECTION SUBCUTANEOUS at 08:33

## 2023-05-09 ASSESSMENT — PAIN - FUNCTIONAL ASSESSMENT: PAIN_FUNCTIONAL_ASSESSMENT: PREVENTS OR INTERFERES SOME ACTIVE ACTIVITIES AND ADLS

## 2023-05-09 ASSESSMENT — PAIN DESCRIPTION - LOCATION: LOCATION: STERNUM

## 2023-05-09 ASSESSMENT — PAIN SCALES - GENERAL
PAINLEVEL_OUTOF10: 6
PAINLEVEL_OUTOF10: 0
PAINLEVEL_OUTOF10: 0

## 2023-05-09 ASSESSMENT — PAIN DESCRIPTION - PAIN TYPE: TYPE: ACUTE PAIN

## 2023-05-09 ASSESSMENT — PAIN DESCRIPTION - ONSET: ONSET: GRADUAL

## 2023-05-09 ASSESSMENT — PAIN DESCRIPTION - FREQUENCY: FREQUENCY: INTERMITTENT

## 2023-05-09 ASSESSMENT — PAIN DESCRIPTION - ORIENTATION: ORIENTATION: MID

## 2023-05-09 ASSESSMENT — PAIN DESCRIPTION - DIRECTION: RADIATING_TOWARDS: NONRADIATING

## 2023-05-09 ASSESSMENT — PAIN DESCRIPTION - DESCRIPTORS: DESCRIPTORS: ACHING;NAGGING;SORE

## 2023-05-09 NOTE — CARE COORDINATION
Full Code   Patient's Primary Decision Maker is: Patient Declined (Legal Next of Kin Remains as Decision Maker)    Primary Decision Maker: Jania Mata - Child - [phone]    Secondary Decision Maker: Bishopence Osler - Brother/Sister - [phone]    Secondary Decision Maker: Marianela Ramirez - Brother/Sister - [phone]    Supplemental (Other) Decision Maker: Alicia Britt - Parent - [phone]    Discharge Planning:    Patient lives with: Friends Type of Home: House  Primary Care Giver: Family  Patient Support Systems include: Spouse/Significant Other, Family Members, Friends/Neighbors   Current Financial resources:    Current community resources:    Current services prior to admission: None            Current DME:              Type of Home Care services:  None    ADLS  Prior functional level: Assistance with the following:, Mobility  Current functional level: Mobility, Assistance with the following:    PT AM-PAC:   /24  OT AM-PAC:   /24    Family can provide assistance at DC: Yes  Would you like Case Management to discuss the discharge plan with any other family members/significant others, and if so, who?  No  Plans to Return to Present Housing: Yes  Other Identified Issues/Barriers to RETURNING to current housing: has steps to enter   Potential Assistance needed at discharge: N/A            Potential DME:    Patient expects to discharge to: [address] [place] [address] for transportation at discharge: family     Financial    Payor: Shola 58 / Plan: Daniel 51 / Product Type: *No Product type* /     Does insurance require precert for SNF: Yes    Potential assistance Purchasing Medications: No  Meds-to-Beds request: Yes      [facility] [address] [place] [address] - [address] [phone] [facility] [phone]  [facility] [address] [place] [id number]  Phone: [phone] Fax: [phone]      Notes:    Factors facilitating achievement of predicted outcomes: Family

## 2023-05-09 NOTE — DISCHARGE INSTRUCTIONS
Chest Pain: Care Instructions  Overview     There are many things that can cause chest pain. Some are not serious and will get better on their own in a few days. But some kinds of chest pain need more testing and treatment. Your doctor may have recommended a follow-up visit in the next few days. If you are not getting better, you may need more tests or treatment. Even though your doctor has released you, you still need to watch for any problems. The doctor carefully checked you, but sometimes problems can develop later. If you have new symptoms or if your symptoms do not get better, get medical care right away. If you have worse or different chest pain or pressure that lasts more than 5 minutes or you passed out (lost consciousness), call 911 or seek other emergency help right away. A medical visit is only one step in your treatment. Even if you feel better, you still need to do what your doctor recommends, such as going to all suggested follow-up appointments and taking medicines exactly as directed. This will help you recover and help prevent future problems. How can you care for yourself at home? Rest until you feel better. Take your medicine exactly as prescribed. Call your doctor if you think you are having a problem with your medicine. Do not drive after taking a prescription pain medicine. When should you call for help? Call 911 if: You passed out (lost consciousness). You have severe difficulty breathing. You have symptoms of a heart attack. These may include:  Chest pain or pressure, or a strange feeling in your chest.  Sweating. Shortness of breath. Nausea or vomiting. Pain, pressure, or a strange feeling in your back, neck, jaw, or upper belly or in one or both shoulders or arms. Lightheadedness or sudden weakness. A fast or irregular heartbeat. After you call 911, the  may tell you to chew 1 adult-strength or 2 to 4 low-dose aspirin. Wait for an ambulance.  Do not try

## 2023-05-09 NOTE — PLAN OF CARE
Problem: Discharge Planning  Goal: Discharge to home or other facility with appropriate resources  5/8/2023 2234 by Governor Charo RN  Outcome: Progressing  Flowsheets (Taken 5/8/2023 1953)  Discharge to home or other facility with appropriate resources: Identify barriers to discharge with patient and caregiver     Problem: Pain  Goal: Verbalizes/displays adequate comfort level or baseline comfort level  5/8/2023 2234 by Governor Charo RN  Outcome: Progressing     Problem: Safety - Adult  Goal: Free from fall injury  5/8/2023 2234 by Governor Mancilla RN  Outcome: Progressing

## 2023-05-09 NOTE — PROGRESS NOTES
CLINICAL PHARMACY NOTE: MEDS TO BEDS    Total # of Prescriptions Filled: 3   The following medications were delivered to the patient:  Lidocaine pain relief 4% patch  Folic acid 1 mg  Thiamine mononitrate 100 mg    Additional Documentation:   RN picked up at register Dupixent Pregnancy And Lactation Text: This medication likely crosses the placenta but the risk for the fetus is uncertain. This medication is excreted in breast milk.

## 2023-05-09 NOTE — PROGRESS NOTES
Patient was discharged from the floor with the following belongings:     Upper dentures  Eyeglasses  Pants  Shirt  Socks  Shoes  Undergarments  Jacket  [address] x2  Ring x2  Calvin Phillips  Personal toiletries  Medications from pharmacy

## 2023-05-09 NOTE — PROGRESS NOTES
Physical Therapy    PT consult to evaluate/treat received and appreciated. Pt chart reviewed and evaluation attempted. Pt is currently does not have walking boot which is required per most recent ortho visit (4/5/23). Discussed with RN and CM. Thank you.         Waqar Mobley, PT, DPT   [id number]

## 2023-05-09 NOTE — PROGRESS NOTES
Discharge instructions provided to patient. Information regarding medications, chest pain, sternal fracture care, heart healthy diet, and follow up appointments given. IV removed and dressing applied. Monitor removed, cleaned, and returned to nurses station. Importance of taking medications as prescribed discussed at length with verbalized understanding. Medications from pharmacy picked up and delivered to patient.

## 2023-05-09 NOTE — PATIENT CARE CONFERENCE
P Quality Flow/Interdisciplinary Rounds Progress Note        Quality Flow Rounds held on May 9, 2023    Disciplines Attending:  Bedside Nurse, , , and Nursing Unit Leadership    Chris Stuart was admitted on 5/5/2023  8:10 PM    Anticipated Discharge Date:       Disposition:    Maciej Score:  Maciej Scale Score: 20    Readmission Risk              Risk of Unplanned Readmission:  0           Discussed patient goal for the day, patient clinical progression, and barriers to discharge.   The following Goal(s) of the Day/Commitment(s) have been identified:  Occupational Therapy - Obtain Consult Order and Physical Therapy - Obtain Consult Order      Tatyana Mix RN  May 9, 2023

## 2023-05-09 NOTE — DISCHARGE SUMMARY
Hospitalist Discharge Summary    Patient ID: Ronn Stuart   Patient : 1960  Patient's PCP: Shannan Srivastava MD    Admit Date: 2023   Admitting Physician: Angelique Lawrence MD    Discharge Date:  2023   Discharge Physician: RONAN Muñiz NP   Discharge Condition: Stable  Discharge Disposition: LTAC, located within [facility] course in brief:  (Please refer to daily progress notes for a comprehensive review of the hospitalization by requesting medical records)    Patient admitted for chest pain. Patient presented to the ED with complaints of chest pain. In ED vitals were stable. Laboratory workup unremarkable. Imaging revealed a minimally displaced sternal fx. She does not recall recent injury however does endorse daily alcohol use. She has a history of CAD therefore stress test was ordered and low risk. She is now stable for discharge home with outpatient follow-up. Consults:   IP CONSULT TO INTERNAL MEDICINE    Discharge Diagnoses:  Chest pain, musculoskeletal  Sternal fracture  Alcohol abuse  History of CAD  Hypertension  Asthma  Hypertension      Discharge Instructions / Follow up: Follow-up with PCP within 1 week of discharge. Recommend continued substance cessation. Compliance with medications as prescribed on discharge. Future Appointments   Date Time Provider Judah Purcell   5/10/2023 10:30 AM Kathia Pal MD [facility]       The patient's condition is stable. At this time the patient is without objective evidence of an acute process requiring continuing hospitalization or inpatient management. They are stable for discharge with outpatient follow-up. I have spoken with the patient and discussed the results of the current hospitalization, in addition to providing specific details for the plan of care and counseling regarding the diagnosis and prognosis.   The plan has been discussed in detail and they are aware of the specific conditions for emergent

## 2023-05-10 ENCOUNTER — OFFICE VISIT (OUTPATIENT)
Dept: ORTHOPEDIC SURGERY | Age: 63
End: 2023-05-10
Payer: MEDICAID

## 2023-05-10 ENCOUNTER — HOSPITAL ENCOUNTER (OUTPATIENT)
Dept: GENERAL RADIOLOGY | Age: 63
Discharge: HOME | End: 2023-05-12
Payer: MEDICAID

## 2023-05-10 VITALS — WEIGHT: 112 LBS | BODY MASS INDEX: 20.61 KG/M2 | HEIGHT: 62 IN

## 2023-05-10 DIAGNOSIS — R53.81 PHYSICAL DECONDITIONING: ICD-10-CM

## 2023-05-10 DIAGNOSIS — S82.852D CLOSED DISPLACED TRIMALLEOLAR FRACTURE OF LEFT ANKLE WITH ROUTINE HEALING, SUBSEQUENT ENCOUNTER: ICD-10-CM

## 2023-05-10 DIAGNOSIS — S82.852D CLOSED DISPLACED TRIMALLEOLAR FRACTURE OF LEFT ANKLE WITH ROUTINE HEALING, SUBSEQUENT ENCOUNTER: Primary | ICD-10-CM

## 2023-05-10 PROCEDURE — 99212 OFFICE O/P EST SF 10 MIN: CPT

## 2023-05-10 PROCEDURE — 73610 X-RAY EXAM OF ANKLE: CPT

## 2023-05-10 NOTE — PROGRESS NOTES
Chief Complaint   Patient presents with    Follow-up     Patient presents from home for follow up L ankle trimall ORIF. Patient reports that she is still seeing PT and that there was an issue with the aircast that she was given, requesting new one today. OP:SURGEON: Dr. Ras Martinez MD  DATE OF PROCEDURE: 1-21-23  PROCEDURE: 1. Open reduction internal fixation left ankle trimalleolar fracture without fixation of the posterior malleolus    2. Stress examination of the syndesmosis under anesthesia    3. Open reduction internal fixation left ankle syndesmosis     POD: Almost 4 months    Subjective:  Lasha Mosley is following up from the above surgery. She is WBAT on left lower extremity. She ambulates with assistive device, cane. Pain to extremity is mild and is not taking prescribed pain medication. They denies numbness or tingling to the left lower extremity. Denies calf pain, chest pain, or shortness of breath. Patient has finished DVT prophylaxis. Patient is participating in therapy, home health care . Patient is doing well after surgery. States that she is making progress in home therapy. She does have some mild soreness in the ankle area which is improving. Review of Systems -  All pertinent positives/negative in HPI     Objective:    General: Alert and oriented X 3, normocephalic atraumatic, external ears and eye normal, sclera clear, no acute distress, respirations easy and unlabored with no audible wheezes, skin warm and dry, speech and dress appropriate for noted age, affect euthymic.     Extremity:  Left Lower Extremity  Skin clean dry and intact, without signs of infection   Incision well healed  mild edema noted to the ankle area  Compartments supple throughout thigh and leg  Calf supple and not tender  negative Homans  Demonstrates active motion with knee flexion/extension, ankle DF/PF  Ambulates with a cane for support  States sensation intact to touch in sural, deep peroneal,

## 2023-05-10 NOTE — PATIENT INSTRUCTIONS
Weightbearing as tolerated left lower extremity. Extension of home PT. Patient was given an air cast to wear as needed for comfort and support  - LEFT MEDIUM ANKLE BRACE     Follow-up as needed. Call if any questions or concerns.

## 2023-06-22 ENCOUNTER — TRANSCRIBE ORDERS (OUTPATIENT)
Dept: ADMINISTRATIVE | Age: 63
End: 2023-06-22

## 2023-06-22 DIAGNOSIS — R55 SYNCOPE AND COLLAPSE: Primary | ICD-10-CM

## 2023-07-03 DIAGNOSIS — S82.852D CLOSED DISPLACED TRIMALLEOLAR FRACTURE OF LEFT ANKLE WITH ROUTINE HEALING, SUBSEQUENT ENCOUNTER: Primary | ICD-10-CM

## 2023-07-26 ENCOUNTER — OFFICE VISIT (OUTPATIENT)
Dept: ORTHOPEDIC SURGERY | Age: 63
End: 2023-07-26
Payer: MEDICAID

## 2023-07-26 ENCOUNTER — HOSPITAL ENCOUNTER (OUTPATIENT)
Dept: GENERAL RADIOLOGY | Age: 63
Discharge: HOME | End: 2023-07-28
Payer: MEDICAID

## 2023-07-26 VITALS — BODY MASS INDEX: 20.61 KG/M2 | HEIGHT: 62 IN | WEIGHT: 112 LBS

## 2023-07-26 DIAGNOSIS — S82.852D CLOSED DISPLACED TRIMALLEOLAR FRACTURE OF LEFT ANKLE WITH ROUTINE HEALING, SUBSEQUENT ENCOUNTER: Primary | ICD-10-CM

## 2023-07-26 DIAGNOSIS — S82.852D CLOSED DISPLACED TRIMALLEOLAR FRACTURE OF LEFT ANKLE WITH ROUTINE HEALING, SUBSEQUENT ENCOUNTER: ICD-10-CM

## 2023-07-26 DIAGNOSIS — T84.84XA PAINFUL ORTHOPAEDIC HARDWARE (HCC): ICD-10-CM

## 2023-07-26 PROCEDURE — 4004F PT TOBACCO SCREEN RCVD TLK: CPT | Performed by: PHYSICIAN ASSISTANT

## 2023-07-26 PROCEDURE — 73610 X-RAY EXAM OF ANKLE: CPT

## 2023-07-26 PROCEDURE — 99213 OFFICE O/P EST LOW 20 MIN: CPT | Performed by: PHYSICIAN ASSISTANT

## 2023-07-26 PROCEDURE — 99212 OFFICE O/P EST SF 10 MIN: CPT | Performed by: PHYSICIAN ASSISTANT

## 2023-07-26 PROCEDURE — 3017F COLORECTAL CA SCREEN DOC REV: CPT | Performed by: PHYSICIAN ASSISTANT

## 2023-07-26 PROCEDURE — G8420 CALC BMI NORM PARAMETERS: HCPCS | Performed by: PHYSICIAN ASSISTANT

## 2023-07-26 PROCEDURE — G8427 DOCREV CUR MEDS BY ELIG CLIN: HCPCS | Performed by: PHYSICIAN ASSISTANT

## 2023-07-26 NOTE — PROGRESS NOTES
Chief Complaint   Patient presents with    Follow-up     Patient here for left ankle trimall ORIF 1/21/23; TTS. Patient states still having pain  walking and sitting, shooting pain up to the calf        OP:SURGEON: Dr. David Ruiz MD  DATE OF PROCEDURE: 1-21-23  PROCEDURE: 1. Open reduction internal fixation left ankle trimalleolar fracture without fixation of the posterior malleolus    2. Stress examination of the syndesmosis under anesthesia    3. Open reduction internal fixation left ankle syndesmosis     POD: 6+ months    Subjective:  Brian Carlos is following up from the above surgery. She is WBAT on left lower extremity. She ambulates with no assistive device. Pain to extremity is mild and is not taking prescribed pain medication. They denies numbness or tingling to the left lower extremity. Denies calf pain, chest pain, or shortness of breath. Patient has finished DVT prophylaxis. Patient is not participating in therapy at this time. She does complain of some discomfort and swelling in the ankle more so over the lateral aspect of the ankle. Review of Systems -  All pertinent positives/negative in HPI     Objective:    General: Alert and oriented X 3, normocephalic atraumatic, external ears and eye normal, sclera clear, no acute distress, respirations easy and unlabored with no audible wheezes, skin warm and dry, speech and dress appropriate for noted age, affect euthymic. Extremity:  Left Lower Extremity  Skin clean dry and intact, without signs of infection   Incision well-healed  Mild/moderate edema noted diffusely in the ankle  Some TTP noted over medial and lateral hardware  Compartments supple throughout thigh and leg  Calf supple and not tender  negative Homans  Demonstrates active motion with knee flexion, ankle DF/PF/EHL  States sensation intact to touch in sural, deep peroneal, superficial peroneal, saphenous, posterior tibial  nerve distributions to foot/ankle.   Palpable thinner post op were also discussed with Chico Holley and she verbalized and agreed with the restrictions. Electronically signed by CRISTI Marcial on 7/26/2023 at 3:11 PM  Note: This report was completed using [employer] voiced recognition software. Every effort has been made to ensure accuracy; however, inadvertent computerized transcription errors may be present.

## 2023-08-01 ENCOUNTER — PREP FOR PROCEDURE (OUTPATIENT)
Dept: ORTHOPEDIC SURGERY | Age: 63
End: 2023-08-01

## 2023-08-01 ENCOUNTER — TELEPHONE (OUTPATIENT)
Dept: ORTHOPEDIC SURGERY | Age: 63
End: 2023-08-01

## 2023-08-01 NOTE — PATIENT INSTRUCTIONS
Your surgery is scheduled for 8-24-23 at 10am  with Dr. Mike Campos MD at the main [address] [place] [address] in [facility] . You will need to report to Preop area  that morning at [facility] are having Outpatient surgery so you will be returning home the same day   Preadmission Testing (PAT) department at [address] [place] [address] will contact you with all the details prior to surgery. Nothing to eat or drink after midnight the night before surgery. You may take a pain pill and any other medicine PAT instructs you to take with small sip of water if needed. Continue with ice and elevation to reduce swelling   Weight bearing as tolerated left lower extremity, use assistive devices   Take pain medicine as instructed   Call office with any question or concerns: [phone]   If you are taking Aspirin or Lovenox, hold the day of surgery. If taking Eliquis, hold this 48 hours prior to surgery. Hold all NSAIDs (non-steroidal anti-inflammatories like Advil, motrin, ibuprofen, etc) 7 days prior to surgery. All surgical patients will be gradually tapered off narcotic pain medication post operatively, this office will not continue narcotics longer than 6 weeks from date of surgery. If narcotics are required longer than this time period without objective finding you will be referred to pain management. How soon you can go back to work and your normal routine depends on your job. It also depends on how long it takes your bone to heal. For example, if you have a broken leg and you sit at work, you may be able to go back in 1 to 2 weeks. But if your job requires you to walk or stand a lot, you may need to wait until your bone has healed.

## 2023-08-01 NOTE — TELEPHONE ENCOUNTER
Prior Authorization Form:    DEMOGRAPHICS:                     Patient Name:  Brian Carlos  Patient :  1960            Insurance:  Payor: [facility] PL / Plan: Fernando Ay[place] / Product Type: *No Product type* /   Insurance ID Number:    Payer/Plan Subscr  Sex Relation Sub.  Ins. ID Effective Group Num   1. [id number] Pee [facility] 1960 Female Self [id number] 3/1/17 [id number]                                   [address]       [] Prior authorization obtained  [] No Prior authorization required   [] Prior authorization pending - Case #       DIAGNOSIS & PROCEDURE:                       Procedure/Operation: Removal of Orthopedic Hardware from Left Ankle     CPT Code: 41245    Diagnosis:  Closed Displaced Trimalleolar Fracture of Left Ankle, Painful Orthopedic Hardware    ICD10 Code: R51.375Z, T84.84XA    Location:  [address] [place] [address]    Surgeon:  Dr. Lakia Moore INFORMATION:                          Date: 2023   Time: 10 am              Anesthesia:  General                                                       Status: Outpatient    Special Comments:         Vendor: N/A  []   Notified     Length of Surgery (with 30 min clean up time): 1 hour    Medical clearance: No Medical Clearance Needed    Pre-Op Labs:       []  Orders Placed    Electronically signed by Nasreen Pickard MA on 2023 at 5:24 PM

## 2023-08-02 PROBLEM — T84.84XA PAIN DUE TO INTERNAL ORTHOPEDIC PROSTHETIC DEVICE (HCC): Status: ACTIVE | Noted: 2023-08-02

## 2023-08-02 RX ORDER — SODIUM CHLORIDE 0.9 % (FLUSH) 0.9 %
5-40 SYRINGE (ML) INJECTION EVERY 12 HOURS SCHEDULED
Status: CANCELLED | OUTPATIENT
Start: 2023-08-02

## 2023-08-02 RX ORDER — SODIUM CHLORIDE 9 MG/ML
INJECTION, SOLUTION INTRAVENOUS PRN
Status: CANCELLED | OUTPATIENT
Start: 2023-08-02

## 2023-08-02 RX ORDER — SODIUM CHLORIDE 0.9 % (FLUSH) 0.9 %
5-40 SYRINGE (ML) INJECTION PRN
Status: CANCELLED | OUTPATIENT
Start: 2023-08-02

## 2023-08-02 NOTE — H&P
Chief Complaint   Patient presents with    Follow-up       Patient here for left ankle trimall ORIF 1/21/23; TTS. Patient states still having pain  walking and sitting, shooting pain up to the calf      History and physical     OP:SURGEON: Dr. Melvin Galvan MD  DATE OF PROCEDURE: 1-21-23  PROCEDURE: 1. Open reduction internal fixation left ankle trimalleolar fracture without fixation of the posterior malleolus    2. Stress examination of the syndesmosis under anesthesia    3. Open reduction internal fixation left ankle syndesmosis     POD: 6+ months     Subjective:  Kassie Goyal is following up from the above surgery. She is WBAT on left lower extremity. She ambulates with no assistive device. Pain to extremity is mild and is not taking prescribed pain medication. They denies numbness or tingling to the left lower extremity. Denies calf pain, chest pain, or shortness of breath. Patient has finished DVT prophylaxis. Patient is not participating in therapy at this time. She does complain of some discomfort and swelling in the ankle more so over the lateral aspect of the ankle. Review of Systems -  All pertinent positives/negative in HPI      Objective:     General: Alert and oriented X 3, normocephalic atraumatic, external ears and eye normal, sclera clear, no acute distress, respirations easy and unlabored with no audible wheezes, skin warm and dry, speech and dress appropriate for noted age, affect euthymic.      Extremity:  Left Lower Extremity  Skin clean dry and intact, without signs of infection   Incision well-healed  Mild/moderate edema noted diffusely in the ankle  Some TTP noted over medial and lateral hardware  Compartments supple throughout thigh and leg  Calf supple and not tender  negative Homans  Demonstrates active motion with knee flexion, ankle DF/PF/EHL  States sensation intact to touch in sural, deep peroneal, superficial peroneal, saphenous, posterior tibial  nerve distributions to

## 2023-08-03 ENCOUNTER — HOSPITAL ENCOUNTER (OUTPATIENT)
Age: 63
Discharge: HOME | End: 2023-08-03
Payer: MEDICAID

## 2023-08-03 ENCOUNTER — TELEPHONE (OUTPATIENT)
Dept: ORTHOPEDIC SURGERY | Age: 63
End: 2023-08-03

## 2023-08-03 LAB
ALBUMIN SERPL-MCNC: 4.8 G/DL (ref 3.5–5.2)
ALP SERPL-CCNC: 114 U/L (ref 35–104)
ALT SERPL-CCNC: 28 U/L (ref 0–32)
ANION GAP SERPL CALCULATED.3IONS-SCNC: 18 MMOL/L (ref 7–16)
AST SERPL-CCNC: 58 U/L (ref 0–31)
BASOPHILS # BLD: 0.06 K/UL (ref 0–0.2)
BASOPHILS NFR BLD: 1 % (ref 0–2)
BILIRUB SERPL-MCNC: 1.6 MG/DL (ref 0–1.2)
BUN SERPL-MCNC: 6 MG/DL (ref 6–23)
CALCIUM SERPL-MCNC: 9.4 MG/DL (ref 8.6–10.2)
CHLORIDE SERPL-SCNC: 96 MMOL/L (ref 98–107)
CO2 SERPL-SCNC: 24 MMOL/L (ref 22–29)
CREAT SERPL-MCNC: 0.6 MG/DL (ref 0.5–1)
EOSINOPHIL # BLD: 0.09 K/UL (ref 0.05–0.5)
EOSINOPHILS RELATIVE PERCENT: 2 % (ref 0–6)
ERYTHROCYTE [DISTWIDTH] IN BLOOD BY AUTOMATED COUNT: 14.3 % (ref 11.5–15)
FOLATE SERPL-MCNC: 13.1 NG/ML (ref 4.8–24.2)
GFR SERPL CREATININE-BSD FRML MDRD: >60 ML/MIN/1.73M2
GLUCOSE SERPL-MCNC: 84 MG/DL (ref 74–99)
HCT VFR BLD AUTO: 42.6 % (ref 34–48)
HGB BLD-MCNC: 13.8 G/DL (ref 11.5–15.5)
IMM GRANULOCYTES # BLD AUTO: <0.03 K/UL (ref 0–0.58)
IMM GRANULOCYTES NFR BLD: 0 % (ref 0–5)
LYMPHOCYTES NFR BLD: 2.94 K/UL (ref 1.5–4)
LYMPHOCYTES RELATIVE PERCENT: 55 % (ref 20–42)
MCH RBC QN AUTO: 34.2 PG (ref 26–35)
MCHC RBC AUTO-ENTMCNC: 32.4 G/DL (ref 32–34.5)
MCV RBC AUTO: 105.7 FL (ref 80–99.9)
MONOCYTES NFR BLD: 0.72 K/UL (ref 0.1–0.95)
MONOCYTES NFR BLD: 14 % (ref 2–12)
NEUTROPHILS NFR BLD: 28 % (ref 43–80)
NEUTS SEG NFR BLD: 1.51 K/UL (ref 1.8–7.3)
PLATELET # BLD AUTO: 170 K/UL (ref 130–450)
PMV BLD AUTO: 11.1 FL (ref 7–12)
POTASSIUM SERPL-SCNC: 4 MMOL/L (ref 3.5–5)
PROT SERPL-MCNC: 7.4 G/DL (ref 6.4–8.3)
RBC # BLD AUTO: 4.03 M/UL (ref 3.5–5.5)
SODIUM SERPL-SCNC: 138 MMOL/L (ref 132–146)
VIT B12 SERPL-MCNC: 745 PG/ML (ref 211–946)
WBC OTHER # BLD: 5.3 K/UL (ref 4.5–11.5)

## 2023-08-03 PROCEDURE — 85025 COMPLETE CBC W/AUTO DIFF WBC: CPT

## 2023-08-03 PROCEDURE — 82607 VITAMIN B-12: CPT

## 2023-08-03 PROCEDURE — 82746 ASSAY OF FOLIC ACID SERUM: CPT

## 2023-08-03 PROCEDURE — 80053 COMPREHEN METABOLIC PANEL: CPT

## 2023-08-03 PROCEDURE — 36415 COLL VENOUS BLD VENIPUNCTURE: CPT

## 2023-08-03 PROCEDURE — 84630 ASSAY OF ZINC: CPT

## 2023-08-03 NOTE — TELEPHONE ENCOUNTER
Contacted [facility] Surgery Scheduling Department. Spoke with Ginger Modi (R).  Surgery date changed to 8- @ 11:30 am.

## 2023-08-03 NOTE — TELEPHONE ENCOUNTER
Spoke with patient today regarding Left Ankle surgery with Dr. Escobar Dhillon. Surgery is scheduled for 8- @ 10 am.     Patient wants to change her surgery date to a earlier date since she has another appointment scheduled for 8-. Per patient request, surgery date changed to 8- @ 11:30 am with hospital arrival time @ 9:30 am. Contact [facility] PAT Department to go over surgery instructions #[phone]. Patient verbalized understanding.

## 2023-08-06 LAB — ZINC SERPL-MCNC: 91.5 UG/DL (ref 60–120)

## 2023-08-09 NOTE — PROGRESS NOTES
[address]   PRE-ADMISSION TESTING GENERAL INSTRUCTIONS  [facility] Phone Number: [phone]      GENERAL INSTRUCTIONS:    [x] Antibacterial Soap Shower Night before or AM of surgery. [] CHG Wipes instruction sheet and wipes given. [] Hibiclens shower the night before and the morning of surgery.   -Do not use Hibiclens on your face or head. [x] Do not wear makeup, lotions, powders, deodorant. [x] Nothing by mouth after midnight; including gum, candy, mints, or water. [x] You may brush your teeth, gargle, but do NOT swallow water. [x] No tobacco products, illegal drugs, or alcohol within 24 hours of your surgery. [x] Jewelry or valuables should not be brought to the hospital. All body and/or tongue piercing's must be removed prior to arriving to hospital. No contact lens or hair pins. [] Arrange transportation with a responsible adult  to and from the hospital. Arrange for someone to be with you for the remainder of the day and for 24 hours after your procedure due to having had anesthesia. -Who will be your  for transportation? Miriam-friend        -Who will be staying with you for 24 hrs after your procedure? Miiram-friend  [x] [employer] card and photo ID.  [] Bring copy of living will or healthcare power of  papers to be placed in your electronic record. [] Urine Pregnancy test will be preformed the day of surgery. A specimen sample may be brought from home. [] Transfusion Maylin Kline) Bracelet: Please bring with you to hospital, day of surgery. PARKING INSTRUCTIONS:     [x] ARRIVAL DATE & TIME:  8/17 at 12:30 pm  [x] Times are subject to change. We will contact you the business day before surgery if that were to occur. [x] Enter into the [employer]. Two people may accompany you. Masks are not required. [x] Parking Lot \"I\" is where you will park. It is located on the corner of [address] [place] [address] and [address] [place] [address].  The entrance is registered in the [facility] by a Patient Access Representative. Please bring your photo ID and insurance card. A nurse will greet you in accordance to the time you are needed in the pre-op area to prepare you for surgery. Please do not be discouraged if you are not greeted in the order you arrive as there are many variables that are involved in patient preparation. Your patience is greatly appreciated as you wait for your nurse. Please bring in items such as: books, magazines, newspapers, electronics, or any other items  to occupy your time in the waiting area. [x]  Delays may occur with surgery and staff will make a sincere effort to keep you informed of delays. If any delays occur with your procedure, we apologize ahead of time for your inconvenience as we recognize the value of your time.

## 2023-08-16 NOTE — PROGRESS NOTES
Patient notified of new arrival time for procedure tomorrow. New arrival time is now 1100. Patient verbalized understanding.

## 2023-08-17 ENCOUNTER — HOSPITAL ENCOUNTER (INPATIENT)
Age: 63
LOS: 2 days | Discharge: HOME | DRG: 313 | End: 2023-08-19
Attending: ORTHOPAEDIC SURGERY | Admitting: STUDENT IN AN ORGANIZED HEALTH CARE EDUCATION/TRAINING PROGRAM
Payer: MEDICAID

## 2023-08-17 ENCOUNTER — TELEPHONE (OUTPATIENT)
Dept: ORTHOPEDIC SURGERY | Age: 63
End: 2023-08-17

## 2023-08-17 ENCOUNTER — ANESTHESIA (OUTPATIENT)
Dept: OPERATING ROOM | Age: 63
End: 2023-08-17
Payer: MEDICAID

## 2023-08-17 ENCOUNTER — APPOINTMENT (OUTPATIENT)
Dept: GENERAL RADIOLOGY | Age: 63
DRG: 313 | End: 2023-08-17
Attending: ORTHOPAEDIC SURGERY
Payer: MEDICAID

## 2023-08-17 ENCOUNTER — ANESTHESIA EVENT (OUTPATIENT)
Dept: OPERATING ROOM | Age: 63
End: 2023-08-17
Payer: MEDICAID

## 2023-08-17 DIAGNOSIS — Z98.890 S/P HARDWARE REMOVAL: Primary | ICD-10-CM

## 2023-08-17 PROCEDURE — 73610 X-RAY EXAM OF ANKLE: CPT

## 2023-08-17 PROCEDURE — 6370000000 HC RX 637 (ALT 250 FOR IP): Performed by: ORTHOPAEDIC SURGERY

## 2023-08-17 PROCEDURE — 20680 REMOVAL OF IMPLANT DEEP: CPT | Performed by: ORTHOPAEDIC SURGERY

## 2023-08-17 PROCEDURE — 6370000000 HC RX 637 (ALT 250 FOR IP): Performed by: STUDENT IN AN ORGANIZED HEALTH CARE EDUCATION/TRAINING PROGRAM

## 2023-08-17 PROCEDURE — 3700000001 HC ADD 15 MINUTES (ANESTHESIA): Performed by: ORTHOPAEDIC SURGERY

## 2023-08-17 PROCEDURE — 2580000003 HC RX 258: Performed by: PHYSICIAN ASSISTANT

## 2023-08-17 PROCEDURE — 3700000000 HC ANESTHESIA ATTENDED CARE: Performed by: ORTHOPAEDIC SURGERY

## 2023-08-17 PROCEDURE — 2580000003 HC RX 258

## 2023-08-17 PROCEDURE — 0SPG04Z REMOVAL OF INTERNAL FIXATION DEVICE FROM LEFT ANKLE JOINT, OPEN APPROACH: ICD-10-PCS | Performed by: ORTHOPAEDIC SURGERY

## 2023-08-17 PROCEDURE — L4386 NON-PNEUM WALK BOOT PRE CST: HCPCS

## 2023-08-17 PROCEDURE — 6360000002 HC RX W HCPCS: Performed by: PHYSICIAN ASSISTANT

## 2023-08-17 PROCEDURE — 6360000002 HC RX W HCPCS: Performed by: STUDENT IN AN ORGANIZED HEALTH CARE EDUCATION/TRAINING PROGRAM

## 2023-08-17 PROCEDURE — 3600000013 HC SURGERY LEVEL 3 ADDTL 15MIN: Performed by: ORTHOPAEDIC SURGERY

## 2023-08-17 PROCEDURE — 2500000003 HC RX 250 WO HCPCS: Performed by: ORTHOPAEDIC SURGERY

## 2023-08-17 PROCEDURE — 7100000000 HC PACU RECOVERY - FIRST 15 MIN: Performed by: ORTHOPAEDIC SURGERY

## 2023-08-17 PROCEDURE — 3600000003 HC SURGERY LEVEL 3 BASE: Performed by: ORTHOPAEDIC SURGERY

## 2023-08-17 PROCEDURE — 6360000002 HC RX W HCPCS: Performed by: ORTHOPAEDIC SURGERY

## 2023-08-17 PROCEDURE — 6360000002 HC RX W HCPCS

## 2023-08-17 PROCEDURE — 2709999900 HC NON-CHARGEABLE SUPPLY: Performed by: ORTHOPAEDIC SURGERY

## 2023-08-17 PROCEDURE — 1200000000 HC SEMI PRIVATE

## 2023-08-17 PROCEDURE — 7100000001 HC PACU RECOVERY - ADDTL 15 MIN: Performed by: ORTHOPAEDIC SURGERY

## 2023-08-17 PROCEDURE — C9399 UNCLASSIFIED DRUGS OR BIOLOG: HCPCS

## 2023-08-17 PROCEDURE — 2580000003 HC RX 258: Performed by: STUDENT IN AN ORGANIZED HEALTH CARE EDUCATION/TRAINING PROGRAM

## 2023-08-17 PROCEDURE — 2500000003 HC RX 250 WO HCPCS

## 2023-08-17 RX ORDER — OMEPRAZOLE 40 MG/1
40 CAPSULE, DELAYED RELEASE ORAL DAILY PRN
Status: DISCONTINUED | OUTPATIENT
Start: 2023-08-17 | End: 2023-08-17 | Stop reason: CLARIF

## 2023-08-17 RX ORDER — SODIUM CHLORIDE 0.9 % (FLUSH) 0.9 %
5-40 SYRINGE (ML) INJECTION PRN
Status: DISCONTINUED | OUTPATIENT
Start: 2023-08-17 | End: 2023-08-19 | Stop reason: HOSPADM

## 2023-08-17 RX ORDER — BACITRACIN ZINC 500 [USP'U]/G
OINTMENT TOPICAL PRN
Status: DISCONTINUED | OUTPATIENT
Start: 2023-08-17 | End: 2023-08-17 | Stop reason: ALTCHOICE

## 2023-08-17 RX ORDER — ALBUTEROL SULFATE 2.5 MG/3ML
2.5 SOLUTION RESPIRATORY (INHALATION) EVERY 6 HOURS PRN
Status: DISCONTINUED | OUTPATIENT
Start: 2023-08-17 | End: 2023-08-19 | Stop reason: HOSPADM

## 2023-08-17 RX ORDER — ASPIRIN 81 MG/1
81 TABLET ORAL 2 TIMES DAILY
Qty: 60 TABLET | Refills: 0 | Status: SHIPPED | OUTPATIENT
Start: 2023-08-17 | End: 2023-09-16

## 2023-08-17 RX ORDER — SODIUM CHLORIDE 0.9 % (FLUSH) 0.9 %
5-40 SYRINGE (ML) INJECTION PRN
Status: DISCONTINUED | OUTPATIENT
Start: 2023-08-17 | End: 2023-08-17 | Stop reason: HOSPADM

## 2023-08-17 RX ORDER — SODIUM CHLORIDE 0.9 % (FLUSH) 0.9 %
5-40 SYRINGE (ML) INJECTION EVERY 12 HOURS SCHEDULED
Status: DISCONTINUED | OUTPATIENT
Start: 2023-08-17 | End: 2023-08-17 | Stop reason: HOSPADM

## 2023-08-17 RX ORDER — DEXAMETHASONE SODIUM PHOSPHATE 10 MG/ML
INJECTION INTRAMUSCULAR; INTRAVENOUS PRN
Status: DISCONTINUED | OUTPATIENT
Start: 2023-08-17 | End: 2023-08-17 | Stop reason: SDUPTHER

## 2023-08-17 RX ORDER — MORPHINE SULFATE 2 MG/ML
2 INJECTION, SOLUTION INTRAMUSCULAR; INTRAVENOUS
Status: DISCONTINUED | OUTPATIENT
Start: 2023-08-17 | End: 2023-08-19 | Stop reason: HOSPADM

## 2023-08-17 RX ORDER — MIDAZOLAM HYDROCHLORIDE 1 MG/ML
INJECTION INTRAMUSCULAR; INTRAVENOUS PRN
Status: DISCONTINUED | OUTPATIENT
Start: 2023-08-17 | End: 2023-08-17 | Stop reason: SDUPTHER

## 2023-08-17 RX ORDER — PANTOPRAZOLE SODIUM 40 MG/1
40 TABLET, DELAYED RELEASE ORAL DAILY PRN
Status: DISCONTINUED | OUTPATIENT
Start: 2023-08-17 | End: 2023-08-19 | Stop reason: HOSPADM

## 2023-08-17 RX ORDER — SODIUM CHLORIDE 9 MG/ML
INJECTION, SOLUTION INTRAVENOUS PRN
Status: DISCONTINUED | OUTPATIENT
Start: 2023-08-17 | End: 2023-08-17 | Stop reason: HOSPADM

## 2023-08-17 RX ORDER — MORPHINE SULFATE 4 MG/ML
4 INJECTION, SOLUTION INTRAMUSCULAR; INTRAVENOUS
Status: DISCONTINUED | OUTPATIENT
Start: 2023-08-17 | End: 2023-08-19 | Stop reason: HOSPADM

## 2023-08-17 RX ORDER — POLYETHYLENE GLYCOL 3350 17 G/17G
17 POWDER, FOR SOLUTION ORAL DAILY
Status: DISCONTINUED | OUTPATIENT
Start: 2023-08-17 | End: 2023-08-19 | Stop reason: HOSPADM

## 2023-08-17 RX ORDER — HYDROMORPHONE HYDROCHLORIDE 1 MG/ML
0.5 INJECTION, SOLUTION INTRAMUSCULAR; INTRAVENOUS; SUBCUTANEOUS EVERY 5 MIN PRN
Status: DISCONTINUED | OUTPATIENT
Start: 2023-08-17 | End: 2023-08-17 | Stop reason: HOSPADM

## 2023-08-17 RX ORDER — KETOROLAC TROMETHAMINE 30 MG/ML
15 INJECTION, SOLUTION INTRAMUSCULAR; INTRAVENOUS ONCE
Status: DISCONTINUED | OUTPATIENT
Start: 2023-08-17 | End: 2023-08-17 | Stop reason: HOSPADM

## 2023-08-17 RX ORDER — HYDROMORPHONE HYDROCHLORIDE 1 MG/ML
0.25 INJECTION, SOLUTION INTRAMUSCULAR; INTRAVENOUS; SUBCUTANEOUS EVERY 5 MIN PRN
Status: DISCONTINUED | OUTPATIENT
Start: 2023-08-17 | End: 2023-08-17 | Stop reason: HOSPADM

## 2023-08-17 RX ORDER — PROCHLORPERAZINE EDISYLATE 5 MG/ML
5 INJECTION INTRAMUSCULAR; INTRAVENOUS
Status: DISCONTINUED | OUTPATIENT
Start: 2023-08-17 | End: 2023-08-17 | Stop reason: HOSPADM

## 2023-08-17 RX ORDER — IPRATROPIUM BROMIDE AND ALBUTEROL SULFATE 2.5; .5 MG/3ML; MG/3ML
1 SOLUTION RESPIRATORY (INHALATION)
Status: DISCONTINUED | OUTPATIENT
Start: 2023-08-17 | End: 2023-08-17 | Stop reason: HOSPADM

## 2023-08-17 RX ORDER — ONDANSETRON 4 MG/1
4 TABLET, ORALLY DISINTEGRATING ORAL EVERY 8 HOURS PRN
Status: DISCONTINUED | OUTPATIENT
Start: 2023-08-17 | End: 2023-08-19 | Stop reason: HOSPADM

## 2023-08-17 RX ORDER — AMITRIPTYLINE HYDROCHLORIDE 25 MG/1
25 TABLET, FILM COATED ORAL NIGHTLY
Status: DISCONTINUED | OUTPATIENT
Start: 2023-08-17 | End: 2023-08-19 | Stop reason: HOSPADM

## 2023-08-17 RX ORDER — PROPOFOL 10 MG/ML
INJECTION, EMULSION INTRAVENOUS PRN
Status: DISCONTINUED | OUTPATIENT
Start: 2023-08-17 | End: 2023-08-17 | Stop reason: SDUPTHER

## 2023-08-17 RX ORDER — MEPERIDINE HYDROCHLORIDE 25 MG/ML
12.5 INJECTION INTRAMUSCULAR; INTRAVENOUS; SUBCUTANEOUS EVERY 5 MIN PRN
Status: DISCONTINUED | OUTPATIENT
Start: 2023-08-17 | End: 2023-08-17 | Stop reason: HOSPADM

## 2023-08-17 RX ORDER — FENTANYL CITRATE 50 UG/ML
INJECTION, SOLUTION INTRAMUSCULAR; INTRAVENOUS PRN
Status: DISCONTINUED | OUTPATIENT
Start: 2023-08-17 | End: 2023-08-17 | Stop reason: SDUPTHER

## 2023-08-17 RX ORDER — HALOPERIDOL 5 MG/ML
1 INJECTION INTRAMUSCULAR
Status: DISCONTINUED | OUTPATIENT
Start: 2023-08-17 | End: 2023-08-17 | Stop reason: HOSPADM

## 2023-08-17 RX ORDER — ROCURONIUM BROMIDE 10 MG/ML
INJECTION, SOLUTION INTRAVENOUS PRN
Status: DISCONTINUED | OUTPATIENT
Start: 2023-08-17 | End: 2023-08-17 | Stop reason: SDUPTHER

## 2023-08-17 RX ORDER — OXYCODONE HYDROCHLORIDE 5 MG/1
5 TABLET ORAL EVERY 6 HOURS PRN
Qty: 28 TABLET | Refills: 0 | Status: SHIPPED | OUTPATIENT
Start: 2023-08-17 | End: 2023-08-24

## 2023-08-17 RX ORDER — ONDANSETRON 2 MG/ML
4 INJECTION INTRAMUSCULAR; INTRAVENOUS EVERY 6 HOURS PRN
Status: DISCONTINUED | OUTPATIENT
Start: 2023-08-17 | End: 2023-08-19 | Stop reason: HOSPADM

## 2023-08-17 RX ORDER — DIPHENHYDRAMINE HYDROCHLORIDE 50 MG/ML
12.5 INJECTION INTRAMUSCULAR; INTRAVENOUS
Status: DISCONTINUED | OUTPATIENT
Start: 2023-08-17 | End: 2023-08-17 | Stop reason: HOSPADM

## 2023-08-17 RX ORDER — SODIUM CHLORIDE, SODIUM LACTATE, POTASSIUM CHLORIDE, CALCIUM CHLORIDE 600; 310; 30; 20 MG/100ML; MG/100ML; MG/100ML; MG/100ML
INJECTION, SOLUTION INTRAVENOUS CONTINUOUS PRN
Status: DISCONTINUED | OUTPATIENT
Start: 2023-08-17 | End: 2023-08-17 | Stop reason: SDUPTHER

## 2023-08-17 RX ORDER — SODIUM CHLORIDE 0.9 % (FLUSH) 0.9 %
5-40 SYRINGE (ML) INJECTION EVERY 12 HOURS SCHEDULED
Status: DISCONTINUED | OUTPATIENT
Start: 2023-08-17 | End: 2023-08-19 | Stop reason: HOSPADM

## 2023-08-17 RX ORDER — HYDRALAZINE HYDROCHLORIDE 20 MG/ML
5 INJECTION INTRAMUSCULAR; INTRAVENOUS EVERY 6 HOURS PRN
Status: DISCONTINUED | OUTPATIENT
Start: 2023-08-17 | End: 2023-08-19 | Stop reason: HOSPADM

## 2023-08-17 RX ORDER — OXYCODONE HYDROCHLORIDE 5 MG/1
5 TABLET ORAL EVERY 4 HOURS PRN
Status: DISCONTINUED | OUTPATIENT
Start: 2023-08-17 | End: 2023-08-19 | Stop reason: HOSPADM

## 2023-08-17 RX ORDER — KETOROLAC TROMETHAMINE 30 MG/ML
INJECTION, SOLUTION INTRAMUSCULAR; INTRAVENOUS PRN
Status: DISCONTINUED | OUTPATIENT
Start: 2023-08-17 | End: 2023-08-17 | Stop reason: SDUPTHER

## 2023-08-17 RX ORDER — OXYCODONE HYDROCHLORIDE 5 MG/1
5 TABLET ORAL
Status: DISCONTINUED | OUTPATIENT
Start: 2023-08-17 | End: 2023-08-17 | Stop reason: HOSPADM

## 2023-08-17 RX ORDER — SODIUM CHLORIDE 9 MG/ML
INJECTION, SOLUTION INTRAVENOUS CONTINUOUS PRN
Status: DISCONTINUED | OUTPATIENT
Start: 2023-08-17 | End: 2023-08-17 | Stop reason: SDUPTHER

## 2023-08-17 RX ORDER — HYDRALAZINE HYDROCHLORIDE 20 MG/ML
10 INJECTION INTRAMUSCULAR; INTRAVENOUS
Status: DISCONTINUED | OUTPATIENT
Start: 2023-08-17 | End: 2023-08-17 | Stop reason: HOSPADM

## 2023-08-17 RX ORDER — SODIUM CHLORIDE 9 MG/ML
INJECTION, SOLUTION INTRAVENOUS PRN
Status: DISCONTINUED | OUTPATIENT
Start: 2023-08-17 | End: 2023-08-19 | Stop reason: HOSPADM

## 2023-08-17 RX ORDER — OXYCODONE HYDROCHLORIDE 10 MG/1
10 TABLET ORAL EVERY 4 HOURS PRN
Status: DISCONTINUED | OUTPATIENT
Start: 2023-08-17 | End: 2023-08-19 | Stop reason: HOSPADM

## 2023-08-17 RX ORDER — ASPIRIN 81 MG/1
81 TABLET ORAL 2 TIMES DAILY
Status: DISCONTINUED | OUTPATIENT
Start: 2023-08-18 | End: 2023-08-19 | Stop reason: HOSPADM

## 2023-08-17 RX ORDER — PHENYLEPHRINE HCL IN 0.9% NACL 1 MG/10 ML
SYRINGE (ML) INTRAVENOUS PRN
Status: DISCONTINUED | OUTPATIENT
Start: 2023-08-17 | End: 2023-08-17 | Stop reason: SDUPTHER

## 2023-08-17 RX ORDER — HYDROCODONE BITARTRATE AND ACETAMINOPHEN 7.5; 325 MG/1; MG/1
1 TABLET ORAL EVERY 8 HOURS PRN
Status: DISCONTINUED | OUTPATIENT
Start: 2023-08-17 | End: 2023-08-18 | Stop reason: ALTCHOICE

## 2023-08-17 RX ORDER — LABETALOL HYDROCHLORIDE 5 MG/ML
10 INJECTION, SOLUTION INTRAVENOUS
Status: DISCONTINUED | OUTPATIENT
Start: 2023-08-17 | End: 2023-08-17 | Stop reason: HOSPADM

## 2023-08-17 RX ORDER — LIDOCAINE HYDROCHLORIDE 20 MG/ML
INJECTION, SOLUTION INTRAVENOUS PRN
Status: DISCONTINUED | OUTPATIENT
Start: 2023-08-17 | End: 2023-08-17 | Stop reason: SDUPTHER

## 2023-08-17 RX ADMIN — LIDOCAINE HYDROCHLORIDE 60 MG: 20 INJECTION, SOLUTION INTRAVENOUS at 13:12

## 2023-08-17 RX ADMIN — SODIUM CHLORIDE, POTASSIUM CHLORIDE, SODIUM LACTATE AND CALCIUM CHLORIDE: 600; 310; 30; 20 INJECTION, SOLUTION INTRAVENOUS at 14:03

## 2023-08-17 RX ADMIN — MIDAZOLAM 2 MG: 1 INJECTION INTRAMUSCULAR; INTRAVENOUS at 13:12

## 2023-08-17 RX ADMIN — Medication 100 MCG: at 13:17

## 2023-08-17 RX ADMIN — DEXAMETHASONE SODIUM PHOSPHATE 10 MG: 10 INJECTION INTRAMUSCULAR; INTRAVENOUS at 13:17

## 2023-08-17 RX ADMIN — SODIUM CHLORIDE: 9 INJECTION, SOLUTION INTRAVENOUS at 13:01

## 2023-08-17 RX ADMIN — SUGAMMADEX 100 MG: 100 INJECTION, SOLUTION INTRAVENOUS at 14:07

## 2023-08-17 RX ADMIN — CEFAZOLIN 2000 MG: 2 INJECTION, POWDER, FOR SOLUTION INTRAMUSCULAR; INTRAVENOUS at 13:16

## 2023-08-17 RX ADMIN — POLYETHYLENE GLYCOL 3350 17 G: 17 POWDER, FOR SOLUTION ORAL at 21:02

## 2023-08-17 RX ADMIN — Medication 100 MCG: at 13:31

## 2023-08-17 RX ADMIN — OXYCODONE HYDROCHLORIDE 10 MG: 10 TABLET ORAL at 20:58

## 2023-08-17 RX ADMIN — ROCURONIUM BROMIDE 30 MG: 50 INJECTION INTRAVENOUS at 13:12

## 2023-08-17 RX ADMIN — KETOROLAC TROMETHAMINE 30 MG: 30 INJECTION, SOLUTION INTRAMUSCULAR; INTRAVENOUS at 13:56

## 2023-08-17 RX ADMIN — FENTANYL CITRATE 100 MCG: 50 INJECTION, SOLUTION INTRAMUSCULAR; INTRAVENOUS at 13:12

## 2023-08-17 RX ADMIN — PROPOFOL 120 MG: 10 INJECTION, EMULSION INTRAVENOUS at 13:12

## 2023-08-17 RX ADMIN — SODIUM CHLORIDE: 9 INJECTION, SOLUTION INTRAVENOUS at 11:56

## 2023-08-17 RX ADMIN — OXYCODONE HYDROCHLORIDE 10 MG: 10 TABLET ORAL at 16:37

## 2023-08-17 ASSESSMENT — PAIN DESCRIPTION - PAIN TYPE: TYPE: ACUTE PAIN

## 2023-08-17 ASSESSMENT — LIFESTYLE VARIABLES: SMOKING_STATUS: 1

## 2023-08-17 ASSESSMENT — PAIN DESCRIPTION - ORIENTATION
ORIENTATION: RIGHT;LEFT
ORIENTATION: LEFT
ORIENTATION: RIGHT;LEFT

## 2023-08-17 ASSESSMENT — PAIN DESCRIPTION - DESCRIPTORS
DESCRIPTORS: DISCOMFORT;SORE;ACHING
DESCRIPTORS: ACHING
DESCRIPTORS: ACHING

## 2023-08-17 ASSESSMENT — PAIN DESCRIPTION - LOCATION
LOCATION: ANKLE

## 2023-08-17 ASSESSMENT — COPD QUESTIONNAIRES: CAT_SEVERITY: MODERATE

## 2023-08-17 ASSESSMENT — PAIN - FUNCTIONAL ASSESSMENT: PAIN_FUNCTIONAL_ASSESSMENT: 0-10

## 2023-08-17 ASSESSMENT — PAIN SCALES - GENERAL
PAINLEVEL_OUTOF10: 7
PAINLEVEL_OUTOF10: 9
PAINLEVEL_OUTOF10: 0
PAINLEVEL_OUTOF10: 7
PAINLEVEL_OUTOF10: 9

## 2023-08-17 ASSESSMENT — PAIN DESCRIPTION - FREQUENCY: FREQUENCY: INTERMITTENT

## 2023-08-17 ASSESSMENT — PAIN DESCRIPTION - ONSET: ONSET: ON-GOING

## 2023-08-17 ASSESSMENT — ENCOUNTER SYMPTOMS: DYSPNEA ACTIVITY LEVEL: AFTER AMBULATING 1 FLIGHT OF STAIRS

## 2023-08-17 NOTE — PROGRESS NOTES
Call placed to Dr. El Almonte and notified that patient is a difficult stick and that the IV that patient has would not draw. Dr. El Almonte stated that the blood work is not needed.   Electronically signed by Sofiya Cordero RN on 8/17/2023 at 12:04 PM

## 2023-08-17 NOTE — TELEPHONE ENCOUNTER
Reviewed the chart and returned the call. Patient did not answer. [facility] stating hospital admission following Orthopedic Hardware surgery would have to be discussed with Dr. Rafita Byers, [place], [place], and her insurance company.

## 2023-08-17 NOTE — INTERVAL H&P NOTE
Update History & Physical    The patient's History and Physical of August 2, 2023 was reviewed with the patient and I examined the patient. There was no change. The surgical site was confirmed by the patient and me. Plan: The risks, benefits, expected outcome, and alternative to the recommended procedure have been discussed with the patient. Patient understands and wants to proceed with the procedure. Talked with the patient and her friend about the fact that we are going to remove all the plates of able in her left ankle. Explained that if screws would break off and abraded bone would leave those intact to not destroy more bone. Also explained that this may not alleviate all of her pain or it may not help within the pain at all. She does understand that. I think that will help especially medially. I also explained that she could still develop posttraumatic arthritis and require an ankle fusion down the road. She understands that. I explained the risks and complications of surgery with the patient including but not limited to death from anesthesia, possible neurovascular damage, possible infection, possible nonunion, possible hardware failure, possible need for further surgery, etc.  Patient understood this, asked appropriate questions and decided to go forward with the procedure.       Electronically signed by Graeme Reaves MD on 8/17/2023 at 1:01 PM

## 2023-08-17 NOTE — OP NOTE
Operative Note      Patient: Aldo Stuart  YOB: 1960  MRN: [id number]    Date of Procedure: 8/17/2023    Pre-Op Diagnosis Codes:     * Closed disp trimalleolar fracture of lower leg with routine healing, left [S82.852D]     * Pain due to internal orthopedic prosthetic device, initial encounter ([address]) Blaise Bland    Post-Op Diagnosis: Same       Procedure(s):  LEFT ANKLE HARDWARE REMOVAL of deep orthopedic implants    Surgeon(s):  Aleida Rey MD    Assistant:   Resident: Wade Hoff DO; Vandana Leiva DO    Anesthesia: General    Estimated Blood Loss (mL): Minimal    Complications: None    Specimens:   * No specimens in log *    Implants:  * No implants in log *      Drains: * No LDAs found *    Findings: Hardware safely removed from both the medial and lateral sides through separate incisions. Detailed Description of Procedure:   Patient was brought to the operating room in a supine position on a hospital bed. Patient was transferred to the operating room table by multiple individuals in a safe fashion with anesthesia in control of the patient's C-spine and airway. Once on the operating table, all points of pressure were identified and well-padded. A tourniquet was applied to the patient's left upper thigh although not used throughout the case. The patient's left lower extremity was sterilely prepped and draped in the standard orthopedic fashion. A timeout was performed indicating the appropriate identification of the patient, the procedure to be performed, and the side to be performed upon. This was agreed upon by all individuals in the room. The timeout the lateral incision was marked out. We are able to palpate the screws both distally which a small incision was made down to the screws in their CS removed in approximately with a small incision made down to the screws and there was safely removed.   We are then able to utilize a Sabin to free up the plate and remove the plate.  These incisions were copiously irrigated and closed with 2-0 Monocryl and 3-0 nylon. Attention was turned to the medial side where the previous incision was created. Dissection was carried out to identify and protect the saphenous vein. We are then able to safely remove the screws from the plate as well as the plate itself. All bony prominences were taken down with rongeurs. Was francesco irrigated sterile saline and closed with 2-0 Monocryl and 3-0 nylon. X-rays confirmed overall what appears to be a healed ankle fracture with hardware out. There is mild posttraumatic arthritis but otherwise everything appeared anatomically aligned. This point time sterile dressing as well as a well-padded soft dressing was put in position. Plan will be to transition her into a Velcro walking boot. Postoperative plan:  1. May start weightbearing as tolerated once boot obtain    2. DVT prophylaxis until mobile    3. Follow in the office in 2 weeks for suture removal and x-rays of the left ankle.       Electronically signed by Courtney Harrington MD on 8/17/2023 at 2:02 PM

## 2023-08-17 NOTE — PROGRESS NOTES
4 Eyes Skin Assessment     NAME:  Nirav Stuart  YOB: 1960  MEDICAL RECORD NUMBER:  [id number]    The patient is being assessed for  Admission    I agree that at least one RN has performed a thorough Head to Toe Skin Assessment on the patient. ALL assessment sites listed below have been assessed. Areas assessed by both nurses:    Head, Face, Ears, Shoulders, Back, Chest, Arms, Elbows, Hands, Sacrum. Buttock, Coccyx, Ischium, Legs. Feet and Heels, and Under Medical Devices         Does the Patient have a Wound? Yes wound(s) were present on assessment.  LDA wound assessment was Initiated and completed by RN       Maciej Prevention initiated by RN: Yes  Wound Care Orders initiated by RN: No    Pressure Injury (Stage 3,4, Unstageable, DTI, NWPT, and Complex wounds) if present, place Wound referral order by RN under : No    New Ostomies, if present place, Ostomy referral order under : No     Nurse 1 eSignature: Electronically signed by Rahul Rivera RN on 8/17/23 at 5:03 PM EDT    **SHARE this note so that the co-signing nurse can place an eSignature**    Nurse 2 eSignature: Electronically signed by Michael Travis LPN on 8/72/82 at 5:15 PM EDT

## 2023-08-17 NOTE — ANESTHESIA POSTPROCEDURE EVALUATION
Department of Anesthesiology  Postprocedure Note    Patient: Nikolay Hicks  MRN: [id number]  YOB: 1960  Date of evaluation: 8/17/2023      Procedure Summary     Date: 08/17/23 Room / Location: [facility]  / [facility]    Anesthesia Start: 1301 Anesthesia Stop: 9891    Procedure: LEFT ANKLE HARDWARE REMOVAL (Left: Ankle) Diagnosis:       Closed disp trimalleolar fracture of lower leg with routine healing, left      Pain due to internal orthopedic prosthetic device, initial encounter ([address])      (Closed disp trimalleolar fracture of lower leg with routine healing, left [S82.852D])      (Pain due to internal orthopedic prosthetic device, initial encounter [facility]) [K12.63EC])    Surgeons: Aleida Rey MD Responsible Provider: Edda Doyle MD    Anesthesia Type: general ASA Status: 3          Anesthesia Type: No value filed.     Marija Phase I: Marija Score: 10    Marija Phase II:        Anesthesia Post Evaluation    Patient location during evaluation: PACU  Patient participation: complete - patient participated  Level of consciousness: awake  Pain score: 3  Airway patency: patent  Nausea & Vomiting: no nausea and no vomiting  Complications: no  Cardiovascular status: blood pressure returned to baseline and hemodynamically stable  Respiratory status: acceptable  Hydration status: euvolemic  Pain management: adequate

## 2023-08-17 NOTE — PROGRESS NOTES
Floor Called, nurse to nurse given. Spoke with Cleverlize . Patients test results review, VS reported to receiving nurse. Any and all important information regarding patient disclosed. Patient transferred to floor on bed in stable condition with ancillary staff.

## 2023-08-17 NOTE — TELEPHONE ENCOUNTER
Patient left voicemail asking if Dr. Santos Josue could admit her to [facility] for a few days after her Left Ankle Orthopedic Hardware Removal surgery on 8-. She has several steps leading into her house and she is worried she will have trouble walking on the steps after her Left Ankle surgery. Requesting a call back #[phone].

## 2023-08-18 PROCEDURE — 6370000000 HC RX 637 (ALT 250 FOR IP): Performed by: STUDENT IN AN ORGANIZED HEALTH CARE EDUCATION/TRAINING PROGRAM

## 2023-08-18 PROCEDURE — 97535 SELF CARE MNGMENT TRAINING: CPT

## 2023-08-18 PROCEDURE — 2580000003 HC RX 258: Performed by: STUDENT IN AN ORGANIZED HEALTH CARE EDUCATION/TRAINING PROGRAM

## 2023-08-18 PROCEDURE — 97530 THERAPEUTIC ACTIVITIES: CPT

## 2023-08-18 PROCEDURE — 6360000002 HC RX W HCPCS: Performed by: STUDENT IN AN ORGANIZED HEALTH CARE EDUCATION/TRAINING PROGRAM

## 2023-08-18 PROCEDURE — 1200000000 HC SEMI PRIVATE

## 2023-08-18 PROCEDURE — 97161 PT EVAL LOW COMPLEX 20 MIN: CPT

## 2023-08-18 PROCEDURE — 6370000000 HC RX 637 (ALT 250 FOR IP): Performed by: FAMILY MEDICINE

## 2023-08-18 PROCEDURE — 97165 OT EVAL LOW COMPLEX 30 MIN: CPT

## 2023-08-18 RX ORDER — HYDROXYZINE HYDROCHLORIDE 10 MG/1
10 TABLET, FILM COATED ORAL 3 TIMES DAILY PRN
Status: DISCONTINUED | OUTPATIENT
Start: 2023-08-18 | End: 2023-08-18

## 2023-08-18 RX ORDER — DIPHENHYDRAMINE HCL 25 MG
25 TABLET ORAL EVERY 4 HOURS PRN
Status: DISCONTINUED | OUTPATIENT
Start: 2023-08-18 | End: 2023-08-19 | Stop reason: HOSPADM

## 2023-08-18 RX ADMIN — HYDROXYZINE HYDROCHLORIDE 10 MG: 10 TABLET ORAL at 09:54

## 2023-08-18 RX ADMIN — OXYCODONE HYDROCHLORIDE 10 MG: 10 TABLET ORAL at 14:04

## 2023-08-18 RX ADMIN — MORPHINE SULFATE 2 MG: 2 INJECTION, SOLUTION INTRAMUSCULAR; INTRAVENOUS at 20:25

## 2023-08-18 RX ADMIN — ASPIRIN 81 MG: 81 TABLET, COATED ORAL at 20:23

## 2023-08-18 RX ADMIN — OXYCODONE HYDROCHLORIDE 10 MG: 10 TABLET ORAL at 01:43

## 2023-08-18 RX ADMIN — HYDROXYZINE HYDROCHLORIDE 10 MG: 10 TABLET ORAL at 19:04

## 2023-08-18 RX ADMIN — OXYCODONE HYDROCHLORIDE 10 MG: 10 TABLET ORAL at 05:37

## 2023-08-18 RX ADMIN — OXYCODONE HYDROCHLORIDE 10 MG: 10 TABLET ORAL at 09:53

## 2023-08-18 RX ADMIN — AMITRIPTYLINE HYDROCHLORIDE 25 MG: 25 TABLET, FILM COATED ORAL at 04:54

## 2023-08-18 RX ADMIN — SODIUM CHLORIDE, PRESERVATIVE FREE 10 ML: 5 INJECTION INTRAVENOUS at 20:26

## 2023-08-18 RX ADMIN — OXYCODONE HYDROCHLORIDE 5 MG: 5 TABLET ORAL at 20:24

## 2023-08-18 RX ADMIN — ASPIRIN 81 MG: 81 TABLET, COATED ORAL at 08:09

## 2023-08-18 RX ADMIN — MORPHINE SULFATE 2 MG: 2 INJECTION, SOLUTION INTRAMUSCULAR; INTRAVENOUS at 17:34

## 2023-08-18 RX ADMIN — MORPHINE SULFATE 2 MG: 2 INJECTION, SOLUTION INTRAMUSCULAR; INTRAVENOUS at 12:07

## 2023-08-18 ASSESSMENT — PAIN SCALES - GENERAL
PAINLEVEL_OUTOF10: 8
PAINLEVEL_OUTOF10: 7
PAINLEVEL_OUTOF10: 9
PAINLEVEL_OUTOF10: 7
PAINLEVEL_OUTOF10: 8
PAINLEVEL_OUTOF10: 10
PAINLEVEL_OUTOF10: 7

## 2023-08-18 ASSESSMENT — PAIN DESCRIPTION - ORIENTATION
ORIENTATION: LEFT
ORIENTATION: LEFT
ORIENTATION: RIGHT;LEFT
ORIENTATION: LEFT

## 2023-08-18 ASSESSMENT — PAIN DESCRIPTION - LOCATION
LOCATION: LEG
LOCATION: LEG
LOCATION: ANKLE
LOCATION: ANKLE
LOCATION: FOOT
LOCATION: LEG
LOCATION: ANKLE

## 2023-08-18 ASSESSMENT — PAIN DESCRIPTION - FREQUENCY
FREQUENCY: INTERMITTENT
FREQUENCY: INTERMITTENT

## 2023-08-18 ASSESSMENT — PAIN - FUNCTIONAL ASSESSMENT
PAIN_FUNCTIONAL_ASSESSMENT: PREVENTS OR INTERFERES SOME ACTIVE ACTIVITIES AND ADLS

## 2023-08-18 ASSESSMENT — PAIN DESCRIPTION - DESCRIPTORS
DESCRIPTORS: SHOOTING;SORE;SHARP
DESCRIPTORS: ACHING
DESCRIPTORS: SHARP;STABBING;ACHING
DESCRIPTORS: STABBING;SHOOTING;DISCOMFORT
DESCRIPTORS: SHOOTING;SORE;ACHING

## 2023-08-18 ASSESSMENT — PAIN DESCRIPTION - PAIN TYPE
TYPE: ACUTE PAIN;SURGICAL PAIN
TYPE: SURGICAL PAIN

## 2023-08-18 ASSESSMENT — PAIN DESCRIPTION - ONSET
ONSET: ON-GOING
ONSET: ON-GOING

## 2023-08-18 NOTE — PROGRESS NOTES
[x] Balance      [] Vision/perception   [x]Sensation      []Gross Motor Coordination             [] ROM           [] Delirium                   [] Motor Control      OT PLAN OF CARE   OT POC based on physician orders, patient diagnosis and results of clinical assessment     Frequency/Duration: 2-4 days/wk for 2 weeks PRN   Specific OT Treatment Interventions to include: Instruction/training on adapted ADL techniques and AE recommendations to increase functional independence within precautions  Training on energy conservation strategies, correct breathing pattern and techniques to improve independence/tolerance for self-care routine  Functional transfer/mobility training/DME recommendations for increased independence, safety, and fall prevention  Patient/Family education to increase follow through with safety techniques and functional independence  Recommendation of environmental modifications for increased safety with functional transfers/mobility and ADLs  Splinting/positioning for increased function, prevention of contractures, and improve skin integrity  Therapeutic exercise to improve motor endurance, ROM, and functional strength for ADLs/functional transfers  Therapeutic activities to facilitate/challenge dynamic balance, stand tolerance for increased safety and independence with ADLs  Therapeutic activities to facilitate gross/fine motor skills for increased independence with ADLs  Positioning to improve skin integrity, interaction with environment and functional independence     Recommended Adaptive Equipment:  bsc, shower seat     Home Living: Pt lives alone in a 2 story with 4 steps to enter with 1 HR. B&B on 2nd level. Pt. States she could possible have 1st floor set up, however no bathroom on 1st.  Bathroom setup: std. Toilet, walk in shower on 2nd floor   Equipment owned: bharat ortiz     Prior Level of Function: Ind. with ADLs , Ind. with IADLs; ambulated Ind.   Driving: active but not as of late  Occupation: pt. reports on [employer]     Pain Level: 7/10; L ankle  Cognition: A&O: 4/4; Follows 2 step directions              Memory:  F              Sequencing:  F              Problem solving:  F              Judgement/safety:  F                Functional Assessment:  AM-PAC Daily Activity Raw Score: 17/24    Initial Eval Status  Date: 8-18-23 Treatment Status  Date: STGs = LTGs  Time frame: 10-14 days   Feeding Set up   Mod I/ Ind   Grooming SBA standing at sink   Modified [place]ley    UB Dressing SBA  To change gown   Modified [place]    LB Dressing Min A to hilton pants  Max A to hilton/doff CAM boot   Modified [place]    Bathing Min A  With sim. task   [address] Modified [place]    Bed Mobility  Supine to sit: SBA  Sit to supine: SBA   Supine to sit: Modified [place]   Sit to supine: Modified [place]    Functional Transfers SBA sit <> stand,  SPT with ww   Modified [place]ley    Functional Mobility SBA with short home distance using ww, CAM boot in place for transfers & mobility   Modified [place]    Balance Sitting:     Static:  Ind. Dynamic:Sup  Standing: SBA       Activity Tolerance F-   F+/G   Visual/  Perceptual Glasses: yes          Vitals WFL   WFL      Hand Dominance L    AROM (PROM) Strength Additional Info:    RUE  WFL 4/5 good  and wfl FMC/dexterity noted during ADL tasks      LUE WFL 4/5 good  and wfl FMC/dexterity noted during ADL tasks         Hearing: WFL   Sensation:  No c/o numbness or tingling B UE  Tone: WFL B UE  Edema: none noted BUE     Comments: Upon arrival patient supine in bed, agreeable to OT, cleared by Nursing & Ortho. Therapist facilitated bed mobility/ADLs/functional transfers/mobility training with focus on safety, technique & precautions. Pt. Instructed RE: safe transfers/mobility, ADLs, role of OT, treatment plan, recs. , prec.    At end of session, patient returned to

## 2023-08-18 NOTE — PROGRESS NOTES
Patient placed in physical therapy. [facility]-18. Patient okay to discharge from orthopedic standpoint.   Discharge orders in place

## 2023-08-18 NOTE — CONSULTS
Hospital Medicine  Consult History & Physical        Chief Complaint:      Date of Service: Pt seen/examined in consultation on 08/17/23    History Of Present Illness:      61 y.o. female who we are asked to see/evaluate by Aleida Rey, * for medical management of HTN, cAD    Past Medical History:        Diagnosis Date    Asthma     CAD (coronary artery disease)     Closed fracture of proximal end of left humerus with routine healing 6/11/2019    Degeneration of lumbar or lumbosacral intervertebral disc     Fall against sharp object 1960    chest tube in hospital    History of blood transfusion 1997    after vaginal delivery of baby hemmorhage    Hypertension     Hypokalemia     Insomnia     Kidney stone     Nondisplaced fracture of greater tuberosity of right humerus, initial encounter for closed fracture 1/22/2019    Orthostatic hypotension     Severe back pain 2/3/2014       Past Surgical History:        Procedure Laterality Date    ANKLE FRACTURE SURGERY Left 1/21/2023    LEFT ANKLE OPEN REDUCTION INTERNAL FIXATION performed by Aleida Rey MD at [address]      Left foot    [address]    several    COLONOSCOPY  3/26/09    KYPHOSIS SURGERY N/A 10/28/2019    KYPHOPLASTY L1 WITH VERTEBRAL BONE BIOPSY performed by Desire Edwards MD at [address] [facility]  2012    LUMBAR FUSION  11/09/2017    L3-L4 ,L4-L5  interbody fusion with [facility]      electro ablation for rectal and sigmoid polyps    TONSILLECTOMY      UPPER GASTROINTESTINAL ENDOSCOPY  3/30/15    UPPER GASTROINTESTINAL ENDOSCOPY  4/21/08       Medications Prior to Admission:    Prior to Admission medications    Medication Sig Start Date End Date Taking?  Authorizing Provider   aspirin 81 MG EC tablet Take 1 tablet by mouth in the morning and at bedtime 8/17/23 9/16/23 Yes Rogers Hayden, DO   oxyCODONE (ROXICODONE) 5 MG immediate release tablet Take 1 tablet by

## 2023-08-18 NOTE — CARE COORDINATION
8/18. Met with the pt at the bedside to discuss transition of care. The pt lives alone and will return home when medically stable. She has a walker and cane at home. Discharge order noted. The pt stated she would like to stay another night. Explained that her procedure does not warrant another night in the hospital. Her friend, Becca Boucher, will be providing transportation home.  Tabitha Jane RN

## 2023-08-18 NOTE — PLAN OF CARE
Problem: Discharge Planning  Goal: Discharge to home or other facility with appropriate resources  8/18/2023 0234 by Liyah Vazquez RN  Outcome: Progressing  8/17/2023 1629 by Eduardo Viveros RN  Outcome: Progressing     Problem: Pain  Goal: Verbalizes/displays adequate comfort level or baseline comfort level  8/18/2023 0234 by Liyah Vazquez RN  Outcome: Progressing  Flowsheets (Taken 8/17/2023 2045)  Verbalizes/displays adequate comfort level or baseline comfort level: Encourage patient to monitor pain and request assistance  8/17/2023 1629 by Eduardo Viveros RN  Outcome: Progressing     Problem: Safety - Adult  Goal: Free from fall injury  8/18/2023 0234 by Liyah Vazquez RN  Outcome: Progressing  8/17/2023 1629 by Eduardo Viveros RN  Outcome: Progressing     Problem: ABCDS Injury Assessment  Goal: Absence of physical injury  8/18/2023 0234 by Liyah Vazquez RN  Outcome: Progressing  8/17/2023 1629 by Eduardo Viveros RN  Outcome: Progressing     Problem: Skin/Tissue Integrity  Goal: Absence of new skin breakdown  Description: 1. Monitor for areas of redness and/or skin breakdown  2. Assess vascular access sites hourly  3. Every 4-6 hours minimum:  Change oxygen saturation probe site  4. Every 4-6 hours:  If on nasal continuous positive airway pressure, respiratory therapy assess nares and determine need for appliance change or resting period.   Outcome: Progressing

## 2023-08-18 NOTE — PROGRESS NOTES
Dr Ehsan coronel served that Carolina Dee doesn't feel safe going home today. She only worked with PT once and no stairs. Continues to need IV Morphine. No answer at this time.

## 2023-08-18 NOTE — PROGRESS NOTES
Physical Therapy Initial Evaluation    Name: Fidel Hitchcock  : 1960  MRN: [id number]      Date of Service: 2023    Evaluating PT:  Abby Petit, PT [id number]    Referring provider/PT Order:  PT Eval and Treat   23 1630  PT evaluation and treat      Dominic Ortiz DO     Room #:  0640/7390-L  Diagnosis:  Closed disp trimalleolar fracture of lower leg with routine healing, left [S82.852D]  Pain due to internal orthopedic prosthetic device, initial encounter [facility]) [T84.84XA]  S/P hardware removal [Z98.890]  PMHx/PSHx:     has a past medical history of Asthma, CAD (coronary artery disease), Closed fracture of proximal end of left humerus with routine healing, Degeneration of lumbar or lumbosacral intervertebral disc, Fall against sharp object, History of blood transfusion, Hypertension, Hypokalemia, Insomnia, Kidney stone, Nondisplaced fracture of greater tuberosity of right humerus, initial encounter for closed fracture, Orthostatic hypotension, and Severe back pain. has a past surgical history that includes Tonsillectomy; Bunionectomy; chest tube insertion (); Lithotripsy (); Colonoscopy (3/26/09); other surgical history; Upper gastrointestinal endoscopy (3/30/15); Upper gastrointestinal endoscopy (08); lumbar fusion (2017); Kyphosis surgery (N/A, 10/28/2019); and Ankle fracture surgery (Left, 2023). Procedure/Surgery:  S/p left ankle hardware removal   Precautions:  Falls, WBAT (weight bearing as tolerated) with CAM boot,   Equipment Needs: Patient has needed equipment ,    SUBJECTIVE:    Patient lives alone  in a two story home with potential 1st setup   with 4 steps to enter with 1Rail  Bed is on 2 floor and bath is on 2 floor. Patient ambulated independently  PTA. Equipment owned: Claudette Brink and Blaze Byers,      OBJECTIVE:   Initial Evaluation  Date: 23 Treatment Short Term/ Long Term   Goals   AM-PAC 6 Clicks 35/94     Was pt agreeable to Eval/treatment? treatment:  Increase ambulation distance  Current Treatment Recommendations:     [x] Strengthening to improve independence with functional mobility   [x] ROM to improve independence with functional mobility   [x] Balance Training to improve static/dynamic balance and to reduce fall risk  [x] Endurance Training to improve activity tolerance during functional mobility   [x] Transfer Training to improve safety and independence with all functional transfers   [x] Gait Training to improve gait mechanics, endurance and asses need for appropriate assistive device  [x] Stair Training in preparation for safe discharge home and/or into the community when appropriate  [] Positioning to prevent skin breakdown and contractures  [x] Safety and Education Training   [] Patient/Caregiver Education   [] HEP  [] Gait Team to be added to POC  [] Other     PT long term treatment goals are located in above grid    Frequency of treatments: 2-5x/week x 1-2 weeks. Time in  1030  Time out  1055    Total Treatment Time  10 minutes     Evaluation Time includes thorough review of current medical information, gathering information on past medical history/social history and prior level of function, completion of standardized testing/informal observation of tasks, assessment of data and education on plan of care and goals.     CPT codes:  [x] Low Complexity PT evaluation 08502  [] Moderate Complexity PT evaluation 30449  [] High Complexity PT evaluation 62327  [] PT Re-evaluation 07572  [] Gait training 37434 - minutes  [] Manual therapy 10942  minutes  [x] Therapeutic activities 52810 -10 minutes  [] Therapeutic exercises 05143 - minutes  [] Neuromuscular reeducation [id number] minutes     Kari Fuentes, [address] [place] [address]

## 2023-08-19 VITALS
RESPIRATION RATE: 17 BRPM | HEART RATE: 74 BPM | TEMPERATURE: 97.4 F | WEIGHT: 110 LBS | DIASTOLIC BLOOD PRESSURE: 106 MMHG | OXYGEN SATURATION: 98 % | SYSTOLIC BLOOD PRESSURE: 168 MMHG | HEIGHT: 62 IN | BODY MASS INDEX: 20.24 KG/M2

## 2023-08-19 PROCEDURE — 6370000000 HC RX 637 (ALT 250 FOR IP): Performed by: STUDENT IN AN ORGANIZED HEALTH CARE EDUCATION/TRAINING PROGRAM

## 2023-08-19 PROCEDURE — 6370000000 HC RX 637 (ALT 250 FOR IP): Performed by: FAMILY MEDICINE

## 2023-08-19 PROCEDURE — 2580000003 HC RX 258: Performed by: STUDENT IN AN ORGANIZED HEALTH CARE EDUCATION/TRAINING PROGRAM

## 2023-08-19 RX ADMIN — AMITRIPTYLINE HYDROCHLORIDE 25 MG: 25 TABLET, FILM COATED ORAL at 03:54

## 2023-08-19 RX ADMIN — DIPHENHYDRAMINE HYDROCHLORIDE 25 MG: 25 TABLET ORAL at 03:02

## 2023-08-19 RX ADMIN — SODIUM CHLORIDE, PRESERVATIVE FREE 10 ML: 5 INJECTION INTRAVENOUS at 07:47

## 2023-08-19 RX ADMIN — DIPHENHYDRAMINE HYDROCHLORIDE 25 MG: 25 TABLET ORAL at 07:46

## 2023-08-19 RX ADMIN — OXYCODONE HYDROCHLORIDE 10 MG: 10 TABLET ORAL at 07:46

## 2023-08-19 RX ADMIN — ASPIRIN 81 MG: 81 TABLET, COATED ORAL at 07:47

## 2023-08-19 ASSESSMENT — PAIN DESCRIPTION - PAIN TYPE: TYPE: SURGICAL PAIN

## 2023-08-19 ASSESSMENT — PAIN DESCRIPTION - DESCRIPTORS: DESCRIPTORS: ACHING;SHOOTING;DISCOMFORT

## 2023-08-19 ASSESSMENT — PAIN DESCRIPTION - FREQUENCY: FREQUENCY: INTERMITTENT

## 2023-08-19 ASSESSMENT — PAIN - FUNCTIONAL ASSESSMENT: PAIN_FUNCTIONAL_ASSESSMENT: PREVENTS OR INTERFERES SOME ACTIVE ACTIVITIES AND ADLS

## 2023-08-19 ASSESSMENT — PAIN SCALES - GENERAL: PAINLEVEL_OUTOF10: 7

## 2023-08-19 ASSESSMENT — PAIN DESCRIPTION - ONSET: ONSET: ON-GOING

## 2023-08-19 ASSESSMENT — PAIN DESCRIPTION - ORIENTATION: ORIENTATION: LEFT

## 2023-08-19 ASSESSMENT — PAIN DESCRIPTION - LOCATION: LOCATION: LEG

## 2023-08-19 NOTE — PROGRESS NOTES
Physical Therapy   Treatment     Name: Flora Rice  : 1960  MRN: [id number]      Date of Service: 2023    Evaluating PT:  Kailey Lamar, PT [id number]    Referring provider/PT Order:  PT Eval and Treat   23 1630  PT evaluation and treat      Favio Domingo DO     Room #:  0686/0386-Y  Diagnosis:  Closed disp trimalleolar fracture of lower leg with routine healing, left [S82.852D]  Pain due to internal orthopedic prosthetic device, initial encounter [facility]) [T84.84XA]  S/P hardware removal [Z98.890]  PMHx/PSHx:     has a past medical history of Asthma, CAD (coronary artery disease), Closed fracture of proximal end of left humerus with routine healing, Degeneration of lumbar or lumbosacral intervertebral disc, Fall against sharp object, History of blood transfusion, Hypertension, Hypokalemia, Insomnia, Kidney stone, Nondisplaced fracture of greater tuberosity of right humerus, initial encounter for closed fracture, Orthostatic hypotension, and Severe back pain. has a past surgical history that includes Tonsillectomy; Bunionectomy; chest tube insertion (); Lithotripsy (); Colonoscopy (3/26/09); other surgical history; Upper gastrointestinal endoscopy (3/30/15); Upper gastrointestinal endoscopy (08); lumbar fusion (2017); Kyphosis surgery (N/A, 10/28/2019); and Ankle fracture surgery (Left, 2023). Procedure/Surgery:  S/p left ankle hardware removal   Precautions:  Falls, WBAT (weight bearing as tolerated) with CAM boot,   Equipment Needs: Patient has needed equipment ,    SUBJECTIVE:    Patient lives alone  in a two story home with potential 1st setup   with 4 steps to enter with 1Rail  Bed is on 2 floor and bath is on 2 floor. Patient ambulated independently  PTA.  Equipment owned: Estrella Hernández and Danae Campos,      OBJECTIVE:   Initial Evaluation  Date: 23 Treatment  23 Short Term/ Long Term   Goals   AM-PAC 6 Clicks 98/90     Was pt agreeable to Eval/treatment? yes Yes     Does pt have pain? yes 8/10 L lateral ankle and foot pain     Bed Mobility  Rolling: Ind  Supine to sit:   SBA   Sit to supine: SBA   Scooting: SBA  Rolling: Independent  Supine to sit: Independent   Sit to supine: NT   Scooting: Independent  Rolling: Ind  Supine to sit: Ind  Sit to supine: Ind  Scooting: Ind   Transfers Sit to stand: SBA to fww slow rate  Stand to sit: SBA  Stand pivot: SBA with fww Sit to stand: Supervision   Stand to sit: Supervision   Stand pivot: Supervision with FWW Sit to stand: Mod Ind  to fww  Stand to sit: Mod Ind    Stand pivot: Mod Ind  with fww   Ambulation    10 feet with fww SBA difficulty bearing weight through LLE due to discomfort. Used fww appropriately 20' with FWW SBA     *pain limiting distance  25 feet with Mod Ind  fww   Stair negotiation: ascended and descended Knows sequencing to negotiate steps. NT     *pain limiting participation; see below       Strength/ROM:     RLE grossly 4+/5   LLE grossly 4+/5 except l ankle  RLE AROM WFL  LLE AROM WFL    Balance:     Static Sitting: Independent   Dynamic Sitting: Independent   Static Standing: Supervision with FWW  Dynamic Standing: SBA with FWW      Patient is Alert & Oriented x person, place, time, and situation and follows directions   Sensation:  Pt denies numbness and tingling to extremities  Edema: L ankle wrapped in ace wrap. Therapeutic Exercises:  Functional Mobility     Patient education  Pt educated on role of Physical Therapy, risks of immobility, safety and plan of care,  importance of mobility while in hospital , weight bearing status , and application of CAM boot .      Patient response to education:   Pt verbalized understanding Pt demonstrated skill Pt requires further education in this area   yes yes Reminders     ASSESSMENT:    Conditions Requiring Skilled Therapeutic Intervention:    [x]Decreased strength     [x]Decreased ROM  [x]Decreased functional mobility  [x]Decreased

## 2023-08-19 NOTE — PLAN OF CARE
Problem: Discharge Planning  Goal: Discharge to home or other facility with appropriate resources  Outcome: Progressing     Problem: Pain  Goal: Verbalizes/displays adequate comfort level or baseline comfort level  Outcome: Progressing  Flowsheets (Taken 8/18/2023 2015)  Verbalizes/displays adequate comfort level or baseline comfort level: Encourage patient to monitor pain and request assistance     Problem: Safety - Adult  Goal: Free from fall injury  Outcome: Progressing     Problem: ABCDS Injury Assessment  Goal: Absence of physical injury  Outcome: Progressing     Problem: Skin/Tissue Integrity  Goal: Absence of new skin breakdown  Description: 1. Monitor for areas of redness and/or skin breakdown  2. Assess vascular access sites hourly  3. Every 4-6 hours minimum:  Change oxygen saturation probe site  4. Every 4-6 hours:  If on nasal continuous positive airway pressure, respiratory therapy assess nares and determine need for appliance change or resting period.   Outcome: Progressing

## 2023-08-19 NOTE — CARE COORDINATION
8/19/2023social work transition of care planning  Pt plan is home no needs,friend to transport.   Electronically signed by MICHELLE Wilhelm on 8/19/2023 at 8:02 AM

## 2023-08-24 ENCOUNTER — HOSPITAL ENCOUNTER (OUTPATIENT)
Age: 63
Discharge: HOME | End: 2023-08-24
Payer: MEDICAID

## 2023-08-24 LAB
ALBUMIN SERPL-MCNC: 4.2 G/DL (ref 3.5–5.2)
ALP SERPL-CCNC: 113 U/L (ref 35–104)
ALT SERPL-CCNC: 29 U/L (ref 0–32)
AST SERPL-CCNC: 65 U/L (ref 0–31)
BILIRUB DIRECT SERPL-MCNC: 0.3 MG/DL (ref 0–0.3)
BILIRUB INDIRECT SERPL-MCNC: 0.6 MG/DL (ref 0–1)
BILIRUB SERPL-MCNC: 0.9 MG/DL (ref 0–1.2)
BUN SERPL-MCNC: 7 MG/DL (ref 6–23)
CREAT SERPL-MCNC: 0.6 MG/DL (ref 0.5–1)
GFR SERPL CREATININE-BSD FRML MDRD: >60 ML/MIN/1.73M2
PROT SERPL-MCNC: 7.1 G/DL (ref 6.4–8.3)

## 2023-08-24 PROCEDURE — 84520 ASSAY OF UREA NITROGEN: CPT

## 2023-08-24 PROCEDURE — 82565 ASSAY OF CREATININE: CPT

## 2023-08-24 PROCEDURE — 80076 HEPATIC FUNCTION PANEL: CPT

## 2023-08-24 NOTE — DISCHARGE SUMMARY
Physician Discharge Summary     Patient ID:  Toby Tracy  [id number]  61 y.o.  1960    Admit date: 8/17/2023    Discharge date and time: 8/19/2023 10:05 AM     Admitting Physician: Lorena Hart MD     Discharge Physician: Lorena Hart MD    Admission Diagnoses: Closed disp trimalleolar fracture of lower leg with routine healing, left [S82.852D]  Pain due to internal orthopedic prosthetic device, initial encounter [facility]) Gregory Paez  S/P hardware removal [Z98.890]    Discharge Diagnoses: s/p left ankle removal of deep hardware on 8/17/2023    Admission Condition: stable    Discharged Condition: stable    Hospital Course: The patient was admitted from the OR following removal of hardware from her left ankle. Prior to this surgery she was evaluated in clinic after sustaining an injury to the left ankle and being treated with open reduction internal fixation. Due to symptomatic hardware, it was agreed upon to proceed with removal of the hardware. After review of all radiographic studies and appropriate preoperative risk assessment, it was decided to complete this on 8/17/2023. The patient underwent an uneventful course of left ankle removal of deep hardware by Dr. Onelia Hunt on 8/17/23. The patient was subsequently taken to the PACU and the floor in stable condition. The patient was continued on antibiotics for 24 hours post-operatively as they received a dose of antibiotics pre-operatively as well. The patient was started on DVT prophylaxis and physical therapy with instructions to be WBAT in a walking boot. The internal medicine team was consulted for medical management and PT was also asked to evaluate and treat her. On POD 2, after the patient had made appropriate gains in regaining independent function with physical therapy, the patient was discharged to home in stable condition.     Treatments: s/p left ankle removal of deep hardware in 8/17/2023    Disposition: The patient was provided

## 2023-08-29 DIAGNOSIS — S82.852D CLOSED DISPLACED TRIMALLEOLAR FRACTURE OF LEFT ANKLE WITH ROUTINE HEALING, SUBSEQUENT ENCOUNTER: Primary | ICD-10-CM

## 2023-09-01 ENCOUNTER — HOSPITAL ENCOUNTER (OUTPATIENT)
Dept: GENERAL RADIOLOGY | Age: 63
End: 2023-09-01
Payer: MEDICAID

## 2023-09-01 ENCOUNTER — OFFICE VISIT (OUTPATIENT)
Dept: ORTHOPEDIC SURGERY | Age: 63
End: 2023-09-01
Payer: MEDICAID

## 2023-09-01 VITALS — HEIGHT: 62 IN | WEIGHT: 110 LBS | BODY MASS INDEX: 20.24 KG/M2

## 2023-09-01 DIAGNOSIS — S82.852D CLOSED DISPLACED TRIMALLEOLAR FRACTURE OF LEFT ANKLE WITH ROUTINE HEALING, SUBSEQUENT ENCOUNTER: ICD-10-CM

## 2023-09-01 DIAGNOSIS — Z98.890 S/P HARDWARE REMOVAL: Primary | ICD-10-CM

## 2023-09-01 DIAGNOSIS — T84.84XA PAINFUL ORTHOPAEDIC HARDWARE (HCC): ICD-10-CM

## 2023-09-01 PROCEDURE — 99213 OFFICE O/P EST LOW 20 MIN: CPT | Performed by: ORTHOPAEDIC SURGERY

## 2023-09-01 PROCEDURE — 73610 X-RAY EXAM OF ANKLE: CPT

## 2023-09-01 PROCEDURE — 99024 POSTOP FOLLOW-UP VISIT: CPT | Performed by: ORTHOPAEDIC SURGERY

## 2023-09-08 ENCOUNTER — OFFICE VISIT (OUTPATIENT)
Dept: ORTHOPEDIC SURGERY | Age: 63
End: 2023-09-08
Payer: MEDICAID

## 2023-09-08 DIAGNOSIS — Z98.890 S/P HARDWARE REMOVAL: Primary | ICD-10-CM

## 2023-09-08 PROCEDURE — 99024 POSTOP FOLLOW-UP VISIT: CPT | Performed by: ORTHOPAEDIC SURGERY

## 2023-09-08 PROCEDURE — 99213 OFFICE O/P EST LOW 20 MIN: CPT

## 2023-09-08 NOTE — PROGRESS NOTES
Chief Complaint   Patient presents with    Suture / Staple Removal     ASHLEY left ankle 8/17/23. OP:SURGEON: Dr. David Ruiz MD  DATE OF PROCEDURE: 8/17/2023  PROCEDURE: Left ankle hardware removal    POD: 3 weeks    Subjective:  Brian Carlos is following up from the above surgery. Patient was evaluated a week ago and recommended to follow-up today for suture removals. Denies any interval events. Patient is weightbearing as tolerated on the left lower extremity with use of a walking boot. Her pain is overall well controlled. Remain on aspirin 81 mg for DVT prophylaxis. Has not yet participated in physical therapy. Denies chest pain, shortness of breath or palpitation. No orthopedic complaint reported today. Review of Systems -  All pertinent positives/negative in HPI     Objective:    General: Alert and oriented X 3, normocephalic atraumatic, external ears and eye normal, sclera clear, no acute distress, respirations easy and unlabored with no audible wheezes, skin warm and dry, speech and dress appropriate for noted age, affect euthymic. Extremity:  Left Lower Extremity  Skin clean dry and intact, without signs of infection   Incision healing well, no significant drainage, no dehiscence, no significant erythema   mild edema noted  Compartments supple throughout thigh and leg  Calf supple and not tender  negative Homans  Active ankle range of motion within 15 degrees of dorsiflexion and 15 degrees of plantarflexion  States sensation intact to touch in sural, deep peroneal, superficial peroneal, saphenous, posterior tibial  nerve distributions to foot/ankle. Palpable dorsalis pedis and posterior tibialis pulses, cap refill brisk in toes, foot warm/perfused. There were no vitals taken for this visit. XR:   Prior imaging reviewed. Demonstrate s/p hardware removal of left ankle trimalleolar fracture.   No significant changes noted compared to postop films    Assessment:

## 2023-09-08 NOTE — PATIENT INSTRUCTIONS
Xrays reviewed with patient  Plan of care discussed in detail, all questions sought and answered to patients satisfaction at this time. Patient is 3 weeks out of hardware removal following a trimalleolar left ankle fracture. Postop course has been uneventful so far. Incisions were clean and dry with no obvious sign of active infection. Sutures were removed today. Steri-Strip applied. Ace wrap was then applied to help with swelling. Patient has a wedding in 2 weeks, and wondering if she can discontinue the walking boot. Her pain is overall well controlled and soft tissues are appropriate today. She is okay to discontinue the walking boot on the left lower extremity. .  Recommend patient started on physical therapy to help with improve range of motion and strengthening of the lower extremity muscles. Therapy prescription sent. Patient will follow-up within 4 to 6 weeks for repeat evaluation. Please call office if any question or concern prior to visit.

## 2023-09-10 ENCOUNTER — APPOINTMENT (OUTPATIENT)
Dept: GENERAL RADIOLOGY | Age: 63
End: 2023-09-10
Payer: MEDICAID

## 2023-09-10 ENCOUNTER — HOSPITAL ENCOUNTER (EMERGENCY)
Age: 63
Discharge: HOME | End: 2023-09-10
Attending: EMERGENCY MEDICINE
Payer: MEDICAID

## 2023-09-10 VITALS
BODY MASS INDEX: 20.24 KG/M2 | HEIGHT: 62 IN | TEMPERATURE: 97.5 F | HEART RATE: 89 BPM | WEIGHT: 110 LBS | OXYGEN SATURATION: 99 % | SYSTOLIC BLOOD PRESSURE: 103 MMHG | DIASTOLIC BLOOD PRESSURE: 87 MMHG

## 2023-09-10 DIAGNOSIS — S40.012A CONTUSION OF LEFT SHOULDER, INITIAL ENCOUNTER: Primary | ICD-10-CM

## 2023-09-10 DIAGNOSIS — S49.92XA INJURY OF LEFT SHOULDER, INITIAL ENCOUNTER: ICD-10-CM

## 2023-09-10 PROCEDURE — 73030 X-RAY EXAM OF SHOULDER: CPT

## 2023-09-10 PROCEDURE — 99283 EMERGENCY DEPT VISIT LOW MDM: CPT

## 2023-09-10 ASSESSMENT — PAIN DESCRIPTION - FREQUENCY: FREQUENCY: OTHER (COMMENT)

## 2023-09-10 ASSESSMENT — PAIN DESCRIPTION - LOCATION: LOCATION: ARM;SHOULDER

## 2023-09-10 ASSESSMENT — PAIN - FUNCTIONAL ASSESSMENT
PAIN_FUNCTIONAL_ASSESSMENT: PREVENTS OR INTERFERES SOME ACTIVE ACTIVITIES AND ADLS
PAIN_FUNCTIONAL_ASSESSMENT: 0-10

## 2023-09-10 ASSESSMENT — LIFESTYLE VARIABLES
HOW OFTEN DO YOU HAVE A DRINK CONTAINING ALCOHOL: 2-3 TIMES A WEEK
HOW MANY STANDARD DRINKS CONTAINING ALCOHOL DO YOU HAVE ON A TYPICAL DAY: 3 OR 4

## 2023-09-10 ASSESSMENT — PAIN DESCRIPTION - ONSET: ONSET: PROGRESSIVE

## 2023-09-10 ASSESSMENT — PAIN DESCRIPTION - PAIN TYPE: TYPE: ACUTE PAIN

## 2023-09-10 ASSESSMENT — PAIN DESCRIPTION - ORIENTATION: ORIENTATION: LEFT

## 2023-09-10 NOTE — ED PROVIDER NOTES
dictations but occasionally words are mis-transcribed.)    Anette Fox DO (electronically signed)            Anette Fox DO  09/10/23 2021

## 2023-10-10 DIAGNOSIS — Z98.890 S/P HARDWARE REMOVAL: Primary | ICD-10-CM

## 2023-10-13 ENCOUNTER — HOSPITAL ENCOUNTER (OUTPATIENT)
Dept: GENERAL RADIOLOGY | Age: 63
End: 2023-10-13
Payer: MEDICAID

## 2023-10-13 ENCOUNTER — OFFICE VISIT (OUTPATIENT)
Dept: ORTHOPEDIC SURGERY | Age: 63
End: 2023-10-13
Payer: MEDICAID

## 2023-10-13 VITALS — HEIGHT: 62 IN | BODY MASS INDEX: 18.58 KG/M2 | WEIGHT: 101 LBS

## 2023-10-13 DIAGNOSIS — Z98.890 S/P HARDWARE REMOVAL: Primary | ICD-10-CM

## 2023-10-13 DIAGNOSIS — Z98.890 S/P HARDWARE REMOVAL: ICD-10-CM

## 2023-10-13 PROCEDURE — 99212 OFFICE O/P EST SF 10 MIN: CPT | Performed by: ORTHOPAEDIC SURGERY

## 2023-10-13 PROCEDURE — 73610 X-RAY EXAM OF ANKLE: CPT

## 2023-10-13 PROCEDURE — 99024 POSTOP FOLLOW-UP VISIT: CPT | Performed by: ORTHOPAEDIC SURGERY

## 2023-10-13 NOTE — PROGRESS NOTES
Chief Complaint   Patient presents with    [address] ankle DOS 8/17/23. OP:SURGEON: Dr. Ja Sky MD  DATE OF PROCEDURE: 8/17/2023  PROCEDURE: Left ankle hardware removal    SUBJECTIVE: 71-year-old female presents to the clinic for 2-month follow-up after above listed procedure. Patient states that she has been doing very well overall and has been up and walking with no pain or issues. States that she has been able to do all of her daily activities to the level that she wishes. She states that she does not want any physical therapy. Denies any numbness or tingling down the legs. Patient has no new orthopedic complaints today. Review of Systems -   General ROS: negative for - chills, fatigue, fever or night sweats  Respiratory ROS: no cough, shortness of breath, or wheezing  Cardiovascular ROS: no chest pain or dyspnea on exertion  Gastrointestinal ROS: no abdominal pain, change in bowel habits, or black or bloody stools  Genitourinary: no hematuria, dysuria, or incontinence   Musculoskeletal ROS:see above  Neurological ROS: no TIA or stroke symptoms       OBJECTIVE:   Alert and oriented X 3, no acute distress, respirations easy and unlabored with no audible wheezes, skin warm and dry, speech and dress appropriate for noted age, affect euthymic. Extremity:  Left lower extremity:  +2/4 DP PT pulses good cap refill extremity warm perfused  Compartment soft and compressible  Noticeable incision sites all healed well no signs of infection or drainage  Positive EHL/DF/PF, dorsiflexion and plantarflexion are slightly limited but almost full range of motion when compared to opposite side  Distal sensation intact L4-S1 dermatomes    XR left ankle: 10/13/23 demonstrates status post removal of orthopedic hardware following ORIF for trimalleolar ankle fracture. Fracture appears to be healed and stable. No other acute fractures or abnormalities noted.       Impression: Status post

## 2023-10-20 DIAGNOSIS — R55 SYNCOPE AND COLLAPSE: Primary | ICD-10-CM

## 2023-11-07 ENCOUNTER — HOSPITAL ENCOUNTER (OUTPATIENT)
Dept: SLEEP CENTER | Age: 63
Discharge: HOME | End: 2023-11-07
Payer: MEDICAID

## 2023-11-07 DIAGNOSIS — R55 SYNCOPE AND COLLAPSE: ICD-10-CM

## 2023-11-07 PROCEDURE — 93246 EXT ECG>7D<15D RECORDING: CPT

## 2023-12-01 ENCOUNTER — HOSPITAL ENCOUNTER (INPATIENT)
Age: 63
LOS: 3 days | Discharge: HOME | End: 2023-12-07
Attending: EMERGENCY MEDICINE | Admitting: HOSPITALIST
Payer: MEDICAID

## 2023-12-01 ENCOUNTER — APPOINTMENT (OUTPATIENT)
Dept: CT IMAGING | Age: 63
End: 2023-12-01
Payer: MEDICAID

## 2023-12-01 DIAGNOSIS — S22.20XA STERNAL FRACTURE WITH RETROSTERNAL CONTUSION, CLOSED, INITIAL ENCOUNTER: ICD-10-CM

## 2023-12-01 DIAGNOSIS — S09.90XA CLOSED HEAD INJURY, INITIAL ENCOUNTER: ICD-10-CM

## 2023-12-01 DIAGNOSIS — R55 SYNCOPE AND COLLAPSE: Primary | ICD-10-CM

## 2023-12-01 LAB
ALBUMIN SERPL-MCNC: 3.6 G/DL (ref 3.5–5.2)
ALP SERPL-CCNC: 114 U/L (ref 35–104)
ALT SERPL-CCNC: 30 U/L (ref 0–32)
ANION GAP SERPL CALCULATED.3IONS-SCNC: 19 MMOL/L (ref 7–16)
APAP SERPL-MCNC: <5 UG/ML (ref 10–30)
AST SERPL-CCNC: 45 U/L (ref 0–31)
BASOPHILS # BLD: 0.04 K/UL (ref 0–0.2)
BASOPHILS NFR BLD: 1 % (ref 0–2)
BILIRUB SERPL-MCNC: 0.5 MG/DL (ref 0–1.2)
BILIRUB UR QL STRIP: NEGATIVE
BUN SERPL-MCNC: 6 MG/DL (ref 6–23)
CALCIUM SERPL-MCNC: 8.5 MG/DL (ref 8.6–10.2)
CHLORIDE SERPL-SCNC: 96 MMOL/L (ref 98–107)
CLARITY UR: CLEAR
CO2 SERPL-SCNC: 23 MMOL/L (ref 22–29)
COLOR UR: YELLOW
COMMENT: NORMAL
CREAT SERPL-MCNC: 0.5 MG/DL (ref 0.5–1)
EKG ATRIAL RATE: 73 BPM
EKG P AXIS: 55 DEGREES
EKG P-R INTERVAL: 150 MS
EKG Q-T INTERVAL: 558 MS
EKG QRS DURATION: 82 MS
EKG QTC CALCULATION (BAZETT): 614 MS
EKG R AXIS: 23 DEGREES
EKG T AXIS: 66 DEGREES
EKG VENTRICULAR RATE: 73 BPM
EOSINOPHIL # BLD: 0.07 K/UL (ref 0.05–0.5)
EOSINOPHILS RELATIVE PERCENT: 1 % (ref 0–6)
ERYTHROCYTE [DISTWIDTH] IN BLOOD BY AUTOMATED COUNT: 13.5 % (ref 11.5–15)
ETHANOLAMINE SERPL-MCNC: 234 MG/DL
FLUAV RNA RESP QL NAA+PROBE: NOT DETECTED
FLUBV RNA RESP QL NAA+PROBE: NOT DETECTED
GFR SERPL CREATININE-BSD FRML MDRD: >60 ML/MIN/1.73M2
GLUCOSE SERPL-MCNC: 85 MG/DL (ref 74–99)
GLUCOSE UR STRIP-MCNC: NEGATIVE MG/DL
HCT VFR BLD AUTO: 31.8 % (ref 34–48)
HGB BLD-MCNC: 11.2 G/DL (ref 11.5–15.5)
HGB UR QL STRIP.AUTO: NEGATIVE
IMM GRANULOCYTES # BLD AUTO: <0.03 K/UL (ref 0–0.58)
IMM GRANULOCYTES NFR BLD: 0 % (ref 0–5)
KETONES UR STRIP-MCNC: NEGATIVE MG/DL
LEUKOCYTE ESTERASE UR QL STRIP: NEGATIVE
LYMPHOCYTES NFR BLD: 2.42 K/UL (ref 1.5–4)
LYMPHOCYTES RELATIVE PERCENT: 48 % (ref 20–42)
MAGNESIUM SERPL-MCNC: 2.3 MG/DL (ref 1.6–2.6)
MCH RBC QN AUTO: 33.1 PG (ref 26–35)
MCHC RBC AUTO-ENTMCNC: 35.2 G/DL (ref 32–34.5)
MCV RBC AUTO: 94.1 FL (ref 80–99.9)
MONOCYTES NFR BLD: 0.56 K/UL (ref 0.1–0.95)
MONOCYTES NFR BLD: 11 % (ref 2–12)
NEUTROPHILS NFR BLD: 38 % (ref 43–80)
NEUTS SEG NFR BLD: 1.92 K/UL (ref 1.8–7.3)
NITRITE UR QL STRIP: NEGATIVE
PH UR STRIP: 6 [PH] (ref 5–9)
PLATELET, FLUORESCENCE: 128 K/UL (ref 130–450)
PMV BLD AUTO: 10.7 FL (ref 7–12)
POTASSIUM SERPL-SCNC: 3.1 MMOL/L (ref 3.5–5)
PROT SERPL-MCNC: 6.1 G/DL (ref 6.4–8.3)
PROT UR STRIP-MCNC: NEGATIVE MG/DL
RBC # BLD AUTO: 3.38 M/UL (ref 3.5–5.5)
SALICYLATES SERPL-MCNC: <0.3 MG/DL (ref 0–30)
SARS-COV-2 RNA RESP QL NAA+PROBE: NOT DETECTED
SODIUM SERPL-SCNC: 138 MMOL/L (ref 132–146)
SOURCE: NORMAL
SP GR UR STRIP: 1.01 (ref 1–1.03)
SPECIMEN DESCRIPTION: NORMAL
TOXIC TRICYCLIC SC,BLOOD: POSITIVE
TROPONIN I SERPL HS-MCNC: 10 NG/L (ref 0–9)
TROPONIN I SERPL HS-MCNC: 11 NG/L (ref 0–9)
UROBILINOGEN UR STRIP-ACNC: 1 EU/DL (ref 0–1)
WBC OTHER # BLD: 5 K/UL (ref 4.5–11.5)

## 2023-12-01 PROCEDURE — 2580000003 HC RX 258

## 2023-12-01 PROCEDURE — 6360000004 HC RX CONTRAST MEDICATION: Performed by: RADIOLOGY

## 2023-12-01 PROCEDURE — 93005 ELECTROCARDIOGRAM TRACING: CPT

## 2023-12-01 PROCEDURE — 80143 DRUG ASSAY ACETAMINOPHEN: CPT

## 2023-12-01 PROCEDURE — 99285 EMERGENCY DEPT VISIT HI MDM: CPT

## 2023-12-01 PROCEDURE — 84484 ASSAY OF TROPONIN QUANT: CPT

## 2023-12-01 PROCEDURE — 83735 ASSAY OF MAGNESIUM: CPT

## 2023-12-01 PROCEDURE — 2580000003 HC RX 258: Performed by: EMERGENCY MEDICINE

## 2023-12-01 PROCEDURE — 81003 URINALYSIS AUTO W/O SCOPE: CPT

## 2023-12-01 PROCEDURE — 93010 ELECTROCARDIOGRAM REPORT: CPT | Performed by: INTERNAL MEDICINE

## 2023-12-01 PROCEDURE — 96366 THER/PROPH/DIAG IV INF ADDON: CPT

## 2023-12-01 PROCEDURE — 70450 CT HEAD/BRAIN W/O DYE: CPT

## 2023-12-01 PROCEDURE — G0480 DRUG TEST DEF 1-7 CLASSES: HCPCS

## 2023-12-01 PROCEDURE — 96365 THER/PROPH/DIAG IV INF INIT: CPT

## 2023-12-01 PROCEDURE — 85025 COMPLETE CBC W/AUTO DIFF WBC: CPT

## 2023-12-01 PROCEDURE — 80179 DRUG ASSAY SALICYLATE: CPT

## 2023-12-01 PROCEDURE — 6360000002 HC RX W HCPCS

## 2023-12-01 PROCEDURE — 74177 CT ABD & PELVIS W/CONTRAST: CPT

## 2023-12-01 PROCEDURE — 6370000000 HC RX 637 (ALT 250 FOR IP)

## 2023-12-01 PROCEDURE — G0378 HOSPITAL OBSERVATION PER HR: HCPCS

## 2023-12-01 PROCEDURE — 87636 SARSCOV2 & INF A&B AMP PRB: CPT

## 2023-12-01 PROCEDURE — 72125 CT NECK SPINE W/O DYE: CPT

## 2023-12-01 PROCEDURE — 71260 CT THORAX DX C+: CPT

## 2023-12-01 PROCEDURE — 99222 1ST HOSP IP/OBS MODERATE 55: CPT | Performed by: SURGERY

## 2023-12-01 PROCEDURE — 80053 COMPREHEN METABOLIC PANEL: CPT

## 2023-12-01 PROCEDURE — 80307 DRUG TEST PRSMV CHEM ANLYZR: CPT

## 2023-12-01 RX ORDER — M-VIT,TX,IRON,MINS/CALC/FOLIC 27MG-0.4MG
1 TABLET ORAL DAILY
Status: DISCONTINUED | OUTPATIENT
Start: 2023-12-02 | End: 2023-12-07 | Stop reason: HOSPADM

## 2023-12-01 RX ORDER — LORAZEPAM 2 MG/ML
1 INJECTION INTRAMUSCULAR
Status: DISCONTINUED | OUTPATIENT
Start: 2023-12-01 | End: 2023-12-07 | Stop reason: HOSPADM

## 2023-12-01 RX ORDER — ACETAMINOPHEN 325 MG/1
325 TABLET ORAL EVERY 4 HOURS PRN
Status: DISCONTINUED | OUTPATIENT
Start: 2023-12-01 | End: 2023-12-01 | Stop reason: SDUPTHER

## 2023-12-01 RX ORDER — ACETAMINOPHEN 325 MG/1
650 TABLET ORAL EVERY 6 HOURS PRN
Status: DISCONTINUED | OUTPATIENT
Start: 2023-12-01 | End: 2023-12-07 | Stop reason: HOSPADM

## 2023-12-01 RX ORDER — 0.9 % SODIUM CHLORIDE 0.9 %
1000 INTRAVENOUS SOLUTION INTRAVENOUS ONCE
Status: COMPLETED | OUTPATIENT
Start: 2023-12-01 | End: 2023-12-01

## 2023-12-01 RX ORDER — ASPIRIN 81 MG/1
81 TABLET ORAL 2 TIMES DAILY
Status: DISCONTINUED | OUTPATIENT
Start: 2023-12-01 | End: 2023-12-07 | Stop reason: HOSPADM

## 2023-12-01 RX ORDER — LORAZEPAM 1 MG/1
2 TABLET ORAL
Status: DISCONTINUED | OUTPATIENT
Start: 2023-12-01 | End: 2023-12-07 | Stop reason: HOSPADM

## 2023-12-01 RX ORDER — LORAZEPAM 2 MG/ML
2 INJECTION INTRAMUSCULAR
Status: DISCONTINUED | OUTPATIENT
Start: 2023-12-01 | End: 2023-12-07 | Stop reason: HOSPADM

## 2023-12-01 RX ORDER — SODIUM CHLORIDE 9 MG/ML
INJECTION, SOLUTION INTRAVENOUS PRN
Status: DISCONTINUED | OUTPATIENT
Start: 2023-12-01 | End: 2023-12-07 | Stop reason: HOSPADM

## 2023-12-01 RX ORDER — LANOLIN ALCOHOL/MO/W.PET/CERES
100 CREAM (GRAM) TOPICAL DAILY
Status: DISCONTINUED | OUTPATIENT
Start: 2023-12-02 | End: 2023-12-07 | Stop reason: HOSPADM

## 2023-12-01 RX ORDER — ALBUTEROL SULFATE 90 UG/1
2 AEROSOL, METERED RESPIRATORY (INHALATION) EVERY 6 HOURS PRN
Status: DISCONTINUED | OUTPATIENT
Start: 2023-12-01 | End: 2023-12-01 | Stop reason: CLARIF

## 2023-12-01 RX ORDER — LORAZEPAM 2 MG/ML
4 INJECTION INTRAMUSCULAR
Status: DISCONTINUED | OUTPATIENT
Start: 2023-12-01 | End: 2023-12-07 | Stop reason: HOSPADM

## 2023-12-01 RX ORDER — POTASSIUM CHLORIDE 7.45 MG/ML
10 INJECTION INTRAVENOUS PRN
Status: DISCONTINUED | OUTPATIENT
Start: 2023-12-01 | End: 2023-12-07 | Stop reason: HOSPADM

## 2023-12-01 RX ORDER — SODIUM CHLORIDE 0.9 % (FLUSH) 0.9 %
5-40 SYRINGE (ML) INJECTION PRN
Status: DISCONTINUED | OUTPATIENT
Start: 2023-12-01 | End: 2023-12-07 | Stop reason: HOSPADM

## 2023-12-01 RX ORDER — LORAZEPAM 1 MG/1
4 TABLET ORAL
Status: DISCONTINUED | OUTPATIENT
Start: 2023-12-01 | End: 2023-12-07 | Stop reason: HOSPADM

## 2023-12-01 RX ORDER — MAGNESIUM SULFATE IN WATER 40 MG/ML
2000 INJECTION, SOLUTION INTRAVENOUS ONCE
Status: COMPLETED | OUTPATIENT
Start: 2023-12-01 | End: 2023-12-01

## 2023-12-01 RX ORDER — HYDROCODONE BITARTRATE AND ACETAMINOPHEN 7.5; 325 MG/1; MG/1
1 TABLET ORAL EVERY 8 HOURS PRN
Status: DISCONTINUED | OUTPATIENT
Start: 2023-12-01 | End: 2023-12-07 | Stop reason: HOSPADM

## 2023-12-01 RX ORDER — LORAZEPAM 1 MG/1
1 TABLET ORAL
Status: DISCONTINUED | OUTPATIENT
Start: 2023-12-01 | End: 2023-12-07 | Stop reason: HOSPADM

## 2023-12-01 RX ORDER — ACETAMINOPHEN 650 MG/1
650 SUPPOSITORY RECTAL EVERY 6 HOURS PRN
Status: DISCONTINUED | OUTPATIENT
Start: 2023-12-01 | End: 2023-12-07 | Stop reason: HOSPADM

## 2023-12-01 RX ORDER — POLYETHYLENE GLYCOL 3350 17 G/17G
17 POWDER, FOR SOLUTION ORAL DAILY PRN
Status: DISCONTINUED | OUTPATIENT
Start: 2023-12-01 | End: 2023-12-07 | Stop reason: HOSPADM

## 2023-12-01 RX ORDER — ONDANSETRON 4 MG/1
4 TABLET, ORALLY DISINTEGRATING ORAL EVERY 8 HOURS PRN
Status: DISCONTINUED | OUTPATIENT
Start: 2023-12-01 | End: 2023-12-01

## 2023-12-01 RX ORDER — ALBUTEROL SULFATE 2.5 MG/3ML
2.5 SOLUTION RESPIRATORY (INHALATION) EVERY 6 HOURS PRN
Status: DISCONTINUED | OUTPATIENT
Start: 2023-12-01 | End: 2023-12-07 | Stop reason: HOSPADM

## 2023-12-01 RX ORDER — SODIUM CHLORIDE 9 MG/ML
INJECTION, SOLUTION INTRAVENOUS CONTINUOUS
Status: DISCONTINUED | OUTPATIENT
Start: 2023-12-01 | End: 2023-12-07 | Stop reason: HOSPADM

## 2023-12-01 RX ORDER — SODIUM CHLORIDE 0.9 % (FLUSH) 0.9 %
5-40 SYRINGE (ML) INJECTION EVERY 12 HOURS SCHEDULED
Status: DISCONTINUED | OUTPATIENT
Start: 2023-12-01 | End: 2023-12-07 | Stop reason: HOSPADM

## 2023-12-01 RX ORDER — ONDANSETRON 2 MG/ML
4 INJECTION INTRAMUSCULAR; INTRAVENOUS EVERY 6 HOURS PRN
Status: DISCONTINUED | OUTPATIENT
Start: 2023-12-01 | End: 2023-12-01

## 2023-12-01 RX ORDER — MAGNESIUM SULFATE IN WATER 40 MG/ML
2000 INJECTION, SOLUTION INTRAVENOUS PRN
Status: DISCONTINUED | OUTPATIENT
Start: 2023-12-01 | End: 2023-12-07 | Stop reason: HOSPADM

## 2023-12-01 RX ORDER — SODIUM CHLORIDE 0.9 % (FLUSH) 0.9 %
10 SYRINGE (ML) INJECTION
Status: ACTIVE | OUTPATIENT
Start: 2023-12-01 | End: 2023-12-02

## 2023-12-01 RX ORDER — FOLIC ACID 1 MG/1
1 TABLET ORAL DAILY
Status: DISCONTINUED | OUTPATIENT
Start: 2023-12-02 | End: 2023-12-07 | Stop reason: HOSPADM

## 2023-12-01 RX ORDER — LORAZEPAM 1 MG/1
3 TABLET ORAL
Status: DISCONTINUED | OUTPATIENT
Start: 2023-12-01 | End: 2023-12-07 | Stop reason: HOSPADM

## 2023-12-01 RX ORDER — PANTOPRAZOLE SODIUM 40 MG/1
40 TABLET, DELAYED RELEASE ORAL
Status: DISCONTINUED | OUTPATIENT
Start: 2023-12-01 | End: 2023-12-07 | Stop reason: HOSPADM

## 2023-12-01 RX ORDER — LORAZEPAM 2 MG/ML
3 INJECTION INTRAMUSCULAR
Status: DISCONTINUED | OUTPATIENT
Start: 2023-12-01 | End: 2023-12-07 | Stop reason: HOSPADM

## 2023-12-01 RX ORDER — POTASSIUM CHLORIDE 20 MEQ/1
40 TABLET, EXTENDED RELEASE ORAL PRN
Status: DISCONTINUED | OUTPATIENT
Start: 2023-12-01 | End: 2023-12-07 | Stop reason: HOSPADM

## 2023-12-01 RX ADMIN — SODIUM CHLORIDE 1000 ML: 9 INJECTION, SOLUTION INTRAVENOUS at 12:57

## 2023-12-01 RX ADMIN — IOPAMIDOL 75 ML: 755 INJECTION, SOLUTION INTRAVENOUS at 13:59

## 2023-12-01 RX ADMIN — POTASSIUM BICARBONATE 40 MEQ: 782 TABLET, EFFERVESCENT ORAL at 14:51

## 2023-12-01 RX ADMIN — SODIUM CHLORIDE: 9 INJECTION, SOLUTION INTRAVENOUS at 20:52

## 2023-12-01 RX ADMIN — MAGNESIUM SULFATE HEPTAHYDRATE 2000 MG: 40 INJECTION, SOLUTION INTRAVENOUS at 12:58

## 2023-12-01 NOTE — ED PROVIDER NOTES
[address] [facility] [address] [facility]        Pt Name: Jose Eduardo Varma  MRN: [id number]  [address] [facility] 1960  Date of evaluation: 12/1/2023  Provider: Fidencio Vann DO  PCP: Gerri Smallwood MD  Note Started: 11:13 AM EST 12/1/23    CHIEF COMPLAINT       Chief Complaint   Patient presents with    Fall     Became dizzy and fell. Dizziness off and on x 2 wks. -loc -thinners -hit head       HISTORY OF PRESENT ILLNESS: 1 or more Elements   History From: Patient    Limitations to history : None    Jose Eduardo Varma is a 61 y.o. female who presents to the emergency department with chief complaint of fall. Patient states that she felt lightheaded as if she was going to pass out and then fell. She states that she fell towards her couch and does not think that she hit her head but is not sure. She states that she did lose consciousness briefly. Patient does not take any anticoagulation and has no pain anywhere. Patient states she is not having any other symptoms otherwise    Patient denies any headache, fever or chills, nausea or vomiting, chest pain, shortness of breath, abdominal pain, hematuria or dysuria, constipation or diarrhea. Nursing Notes were all reviewed and agreed with or any disagreements were addressed in the HPI. REVIEW OF SYSTEMS :      Positives and Pertinent negatives as per HPI. PAST MEDICAL HISTORY/Chronic Conditions Affecting Care      has a past medical history of Asthma, CAD (coronary artery disease), Closed fracture of proximal end of left humerus with routine healing (6/11/2019), Degeneration of lumbar or lumbosacral intervertebral disc, Fall against sharp object (1960), History of blood transfusion (1997), Hypertension, Hypokalemia, Insomnia, Kidney stone, Nondisplaced fracture of greater tuberosity of right humerus, initial encounter for closed fracture (1/22/2019), Orthostatic hypotension, and Severe back pain (2/3/2014). distended. No rebound, guarding, or rigidity  Musculoskeletal: Moves all extremities x 4. Warm and well perfused, no cyanosis, no edema. Capillary refill <3 seconds  Integument: skin warm and dry.  No rashes   Neurologic: GCS 15, no focal deficits, symmetric strength 5/5 in the upper and lower extremities bilaterally  Psychiatric: Normal Affect    DIAGNOSTIC RESULTS   LABS:    Labs Reviewed   COMPREHENSIVE METABOLIC PANEL - Abnormal; Notable for the following components:       Result Value    Potassium 3.1 (*)     Chloride 96 (*)     Anion Gap 19 (*)     Calcium 8.5 (*)     Total Protein 6.1 (*)     Alkaline Phosphatase 114 (*)     AST 45 (*)     All other components within normal limits   CBC WITH AUTO DIFFERENTIAL - Abnormal; Notable for the following components:    RBC 3.38 (*)     Hemoglobin 11.2 (*)     Hematocrit 31.8 (*)     MCHC 35.2 (*)     Platelet, Fluorescence 128 (*)     Neutrophils % 38 (*)     Lymphocytes % 48 (*)     All other components within normal limits   TROPONIN - Abnormal; Notable for the following components:    Troponin, High Sensitivity 10 (*)     All other components within normal limits   SERUM DRUG SCREEN - Abnormal; Notable for the following components:    Acetaminophen Level <5 (*)     Ethanol 234 (*)     Toxic Tricyclic Sc,Blood POSITIVE (*)     All other components within normal limits   TROPONIN - Abnormal; Notable for the following components:    Troponin, High Sensitivity 11 (*)     All other components within normal limits   COMPREHENSIVE METABOLIC PANEL W/ REFLEX TO MG FOR LOW K - Abnormal; Notable for the following components:    Potassium 3.3 (*)     Anion Gap 17 (*)     BUN 5 (*)     Calcium 8.0 (*)     Total Protein 5.3 (*)     Albumin 3.0 (*)     Alkaline Phosphatase 111 (*)     AST 44 (*)     All other components within normal limits   CBC WITH AUTO DIFFERENTIAL - Abnormal; Notable for the following components:    RBC 2.88 (*)     Hemoglobin 9.6 (*)     Hematocrit 27.3

## 2023-12-01 NOTE — PROGRESS NOTES
Patient's jewelry removed and placed in an envelope. Envelope given to patient. Patient left CT department with jewelry.  2 halima and sahil

## 2023-12-01 NOTE — ED NOTES
Name: Libby Sandoval  : 1960  MRN: [id number]    Date: 2023    Benefits of immediately proceeding with Radiology exam outweigh the risks and therefore the following is being waived:      [] Pregnancy test    [] Protocol for Iodine allergy    [] MRI questionnaire    [x] BUN/Creatinine        MD Young Rucker MD  23 1137

## 2023-12-02 LAB
ALBUMIN SERPL-MCNC: 3 G/DL (ref 3.5–5.2)
ALP SERPL-CCNC: 111 U/L (ref 35–104)
ALT SERPL-CCNC: 25 U/L (ref 0–32)
ANION GAP SERPL CALCULATED.3IONS-SCNC: 17 MMOL/L (ref 7–16)
AST SERPL-CCNC: 44 U/L (ref 0–31)
BASOPHILS # BLD: 0.05 K/UL (ref 0–0.2)
BASOPHILS NFR BLD: 1 % (ref 0–2)
BILIRUB SERPL-MCNC: 1.1 MG/DL (ref 0–1.2)
BUN SERPL-MCNC: 5 MG/DL (ref 6–23)
CALCIUM SERPL-MCNC: 8 MG/DL (ref 8.6–10.2)
CHLORIDE SERPL-SCNC: 101 MMOL/L (ref 98–107)
CO2 SERPL-SCNC: 22 MMOL/L (ref 22–29)
CREAT SERPL-MCNC: 0.5 MG/DL (ref 0.5–1)
EOSINOPHIL # BLD: 0.06 K/UL (ref 0.05–0.5)
EOSINOPHILS RELATIVE PERCENT: 1 % (ref 0–6)
ERYTHROCYTE [DISTWIDTH] IN BLOOD BY AUTOMATED COUNT: 13.8 % (ref 11.5–15)
GFR SERPL CREATININE-BSD FRML MDRD: >60 ML/MIN/1.73M2
GLUCOSE SERPL-MCNC: 95 MG/DL (ref 74–99)
HCT VFR BLD AUTO: 27.3 % (ref 34–48)
HGB BLD-MCNC: 9.6 G/DL (ref 11.5–15.5)
IMM GRANULOCYTES # BLD AUTO: <0.03 K/UL (ref 0–0.58)
IMM GRANULOCYTES NFR BLD: 0 % (ref 0–5)
LYMPHOCYTES NFR BLD: 1.8 K/UL (ref 1.5–4)
LYMPHOCYTES RELATIVE PERCENT: 35 % (ref 20–42)
MAGNESIUM SERPL-MCNC: 1.6 MG/DL (ref 1.6–2.6)
MCH RBC QN AUTO: 33.3 PG (ref 26–35)
MCHC RBC AUTO-ENTMCNC: 35.2 G/DL (ref 32–34.5)
MCV RBC AUTO: 94.8 FL (ref 80–99.9)
MONOCYTES NFR BLD: 0.76 K/UL (ref 0.1–0.95)
MONOCYTES NFR BLD: 15 % (ref 2–12)
NEUTROPHILS NFR BLD: 48 % (ref 43–80)
NEUTS SEG NFR BLD: 2.51 K/UL (ref 1.8–7.3)
PLATELET, FLUORESCENCE: 137 K/UL (ref 130–450)
PMV BLD AUTO: 10.5 FL (ref 7–12)
POTASSIUM SERPL-SCNC: 3.3 MMOL/L (ref 3.5–5)
PROT SERPL-MCNC: 5.3 G/DL (ref 6.4–8.3)
RBC # BLD AUTO: 2.88 M/UL (ref 3.5–5.5)
SODIUM SERPL-SCNC: 140 MMOL/L (ref 132–146)
WBC OTHER # BLD: 5.2 K/UL (ref 4.5–11.5)

## 2023-12-02 PROCEDURE — APPSS180 APP SPLIT SHARED TIME > 60 MINUTES: Performed by: CLINICAL NURSE SPECIALIST

## 2023-12-02 PROCEDURE — 83735 ASSAY OF MAGNESIUM: CPT

## 2023-12-02 PROCEDURE — 6370000000 HC RX 637 (ALT 250 FOR IP)

## 2023-12-02 PROCEDURE — 6370000000 HC RX 637 (ALT 250 FOR IP): Performed by: HOSPITALIST

## 2023-12-02 PROCEDURE — 99222 1ST HOSP IP/OBS MODERATE 55: CPT | Performed by: INTERNAL MEDICINE

## 2023-12-02 PROCEDURE — 93005 ELECTROCARDIOGRAM TRACING: CPT | Performed by: HOSPITALIST

## 2023-12-02 PROCEDURE — 2580000003 HC RX 258: Performed by: HOSPITALIST

## 2023-12-02 PROCEDURE — 96366 THER/PROPH/DIAG IV INF ADDON: CPT

## 2023-12-02 PROCEDURE — G0378 HOSPITAL OBSERVATION PER HR: HCPCS

## 2023-12-02 PROCEDURE — 80053 COMPREHEN METABOLIC PANEL: CPT

## 2023-12-02 PROCEDURE — 85025 COMPLETE CBC W/AUTO DIFF WBC: CPT

## 2023-12-02 PROCEDURE — 6360000002 HC RX W HCPCS: Performed by: CLINICAL NURSE SPECIALIST

## 2023-12-02 PROCEDURE — 6370000000 HC RX 637 (ALT 250 FOR IP): Performed by: CLINICAL NURSE SPECIALIST

## 2023-12-02 RX ORDER — POTASSIUM CHLORIDE 20 MEQ/1
40 TABLET, EXTENDED RELEASE ORAL 2 TIMES DAILY WITH MEALS
Status: DISCONTINUED | OUTPATIENT
Start: 2023-12-02 | End: 2023-12-04

## 2023-12-02 RX ORDER — DIPHENHYDRAMINE HCL 25 MG
25 TABLET ORAL EVERY 6 HOURS PRN
Status: DISCONTINUED | OUTPATIENT
Start: 2023-12-02 | End: 2023-12-07 | Stop reason: HOSPADM

## 2023-12-02 RX ORDER — MAGNESIUM SULFATE IN WATER 40 MG/ML
2000 INJECTION, SOLUTION INTRAVENOUS ONCE
Status: COMPLETED | OUTPATIENT
Start: 2023-12-02 | End: 2023-12-02

## 2023-12-02 RX ADMIN — HYDROCODONE BITARTRATE AND ACETAMINOPHEN 1 TABLET: 7.5; 325 TABLET ORAL at 07:10

## 2023-12-02 RX ADMIN — Medication 1 TABLET: at 10:29

## 2023-12-02 RX ADMIN — Medication 100 MG: at 10:29

## 2023-12-02 RX ADMIN — DIPHENHYDRAMINE HYDROCHLORIDE 25 MG: 25 TABLET ORAL at 21:02

## 2023-12-02 RX ADMIN — PANTOPRAZOLE SODIUM 40 MG: 40 TABLET, DELAYED RELEASE ORAL at 12:50

## 2023-12-02 RX ADMIN — FOLIC ACID 1 MG: 1 TABLET ORAL at 10:29

## 2023-12-02 RX ADMIN — ASPIRIN 81 MG: 81 TABLET, COATED ORAL at 10:29

## 2023-12-02 RX ADMIN — POTASSIUM CHLORIDE 40 MEQ: 1500 TABLET, EXTENDED RELEASE ORAL at 10:29

## 2023-12-02 RX ADMIN — HYDROCODONE BITARTRATE AND ACETAMINOPHEN 1 TABLET: 7.5; 325 TABLET ORAL at 20:59

## 2023-12-02 RX ADMIN — POTASSIUM CHLORIDE 40 MEQ: 1500 TABLET, EXTENDED RELEASE ORAL at 17:00

## 2023-12-02 RX ADMIN — MAGNESIUM SULFATE HEPTAHYDRATE 2000 MG: 40 INJECTION, SOLUTION INTRAVENOUS at 10:33

## 2023-12-02 RX ADMIN — DIPHENHYDRAMINE HYDROCHLORIDE 25 MG: 25 TABLET ORAL at 07:09

## 2023-12-02 RX ADMIN — Medication 10 ML: at 21:33

## 2023-12-02 RX ADMIN — SODIUM CHLORIDE: 9 INJECTION, SOLUTION INTRAVENOUS at 13:37

## 2023-12-02 ASSESSMENT — PAIN DESCRIPTION - ORIENTATION: ORIENTATION: LOWER

## 2023-12-02 ASSESSMENT — PAIN SCALES - GENERAL
PAINLEVEL_OUTOF10: 8
PAINLEVEL_OUTOF10: 5
PAINLEVEL_OUTOF10: 7
PAINLEVEL_OUTOF10: 7

## 2023-12-02 ASSESSMENT — PAIN DESCRIPTION - LOCATION
LOCATION: BACK
LOCATION: BACK
LOCATION: STERNUM

## 2023-12-02 ASSESSMENT — PAIN - FUNCTIONAL ASSESSMENT
PAIN_FUNCTIONAL_ASSESSMENT: NONE - DENIES PAIN
PAIN_FUNCTIONAL_ASSESSMENT: NONE - DENIES PAIN

## 2023-12-02 NOTE — ED NOTES
Patient had x2 episodes of emesis.  NP notified     Ryne Cannon RN  12/02/23 [address] [facility], [address] [place], [place]  12/02/23 [id number]

## 2023-12-02 NOTE — PROGRESS NOTES
Internal Medicine Progress Note    Patient's name: Artemio Portillo  : 1960  Admission date: 2023  Date of service: 2023   Room: [facility]  Primary care physician: Pilar Alaniz MD  Reason for visit: Follow-up for dizziness, fall, prolonged Qtc, hx of HTN, alcohol use disorder, nonsustained VT, and protein calorie malnutrition    Subjective  Francine Gonzalez was seen and examined at bedside. No acute event, pt on room air, oriented, no fever, chills, cough, SOB, chest pain. Review of Systems  There are no new complaints of chest pain, chest tightness, shortness of breath, cough, abdominal pain, vision change, nausea, vomiting, diarrhea, constipation.     Hospital Medications  Current Facility-Administered Medications   Medication Dose Route Frequency Provider Last Rate Last Admin    diphenhydrAMINE (BENADRYL) tablet 25 mg  25 mg Oral Q6H PRN RONAN Baker - CNP   25 mg at 23 0709    potassium chloride (KLOR-CON M) extended release tablet 40 mEq  40 mEq Oral BID WC RONAN Root - CNS   40 mEq at 23 1029    sodium chloride flush 0.9 % injection 10 mL  10 mL IntraVENous Once PRN Vero Fox MD        0.9 % sodium chloride infusion   IntraVENous Continuous Vero Fox  mL/hr at 23 New Bag at 23    aspirin EC tablet 81 mg  81 mg Oral BID Vero Fox MD   81 mg at  1525    folic acid (FOLVITE) tablet 1 mg  1 mg Oral Daily Vero Fox MD   1 mg at 23 1029    HYDROcodone-acetaminophen (Kendall English) 7.5-325 MG per tablet 1 tablet  1 tablet Oral Q8H PRN Vero Fox MD   1 tablet at 23 0710    therapeutic multivitamin-minerals 1 tablet  1 tablet Oral Daily Vero Fox MD   1 tablet at 23 1029    pantoprazole (PROTONIX) tablet 40 mg  40 mg Oral NILDA Miner MD        sodium chloride flush 0.9 % injection 5-40 mL  5-40 mL IntraVENous 2 times per day Vero Fox MD        sodium chloride flush 0.9 % injection 5-40 mL  5-40 mL

## 2023-12-02 NOTE — PROGRESS NOTES
Trauma Tertiary Survey    Admit Date: 12/1/2023  Hospital day 0    CC:  Syncopal fall    Alcohol pre-screening:  Men: How many times in the past year have you had 5 or more drinks in a day? Women: How many times in the past year have you had 4 or more drinks in a day? 1 or more  How much do you drink on a daily basis? Beer, multiple cans    Drug Pre-screening:    How many times in the past year have you used a recreational drug or used a prescription medication for non medical reasons? No    Mood Prescreening:    During the past two weeks, have you been bothered by little interest or pleasure doing things? No  During the past two weeks, have you been bothered by feeling down, depressed or hopeless? No      Scheduled Meds:   aspirin  81 mg Oral BID    folic acid  1 mg Oral Daily    therapeutic multivitamin-minerals  1 tablet Oral Daily    pantoprazole  40 mg Oral QAM AC    sodium chloride flush  5-40 mL IntraVENous 2 times per day    thiamine  100 mg Oral Daily     Continuous Infusions:   sodium chloride 100 mL/hr at 12/01/23 2052    sodium chloride       PRN Meds:sodium chloride flush, HYDROcodone-acetaminophen, sodium chloride flush, sodium chloride, potassium chloride **OR** potassium alternative oral replacement **OR** potassium chloride, magnesium sulfate, polyethylene glycol, acetaminophen **OR** acetaminophen, LORazepam **OR** LORazepam **OR** LORazepam **OR** LORazepam **OR** LORazepam **OR** LORazepam **OR** LORazepam **OR** LORazepam, albuterol    Subjective:     Patient reports she is doing well. Sternal pain is minimal. Ethanol >230 on arrival, patient has now metabolized. Reports persistent drinking, has gone into withdrawal in the past. First signs were hallucinations/Dts. No other physical injuries or complaints at this time.     Objective:   Patient Vitals for the past 8 hrs:   BP Pulse Resp SpO2   12/02/23 0021 (!) 144/99 84 15 98 %   12/01/23 2001 110/81 74 12 97 %       No intake/output data

## 2023-12-02 NOTE — PROGRESS NOTES
4 Eyes Skin Assessment     NAME:  Rosina Stuart  YOB: 1960  MEDICAL RECORD NUMBER:  [id number]    The patient is being assessed for  Admission    I agree that at least one RN has performed a thorough Head to Toe Skin Assessment on the patient. ALL assessment sites listed below have been assessed. Areas assessed by both nurses:    Head, Face, Ears, Shoulders, Back, Chest, Arms, Elbows, Hands, Sacrum. Buttock, Coccyx, Ischium, and Legs. Feet and Heels        Does the Patient have a Wound?  No noted wound(s)       Maciej Prevention initiated by RN: No  Wound Care Orders initiated by RN: No    Pressure Injury (Stage 3,4, Unstageable, DTI, NWPT, and Complex wounds) if present, place Wound referral order by RN under : No    New Ostomies, if present place, Ostomy referral order under : No     Nurse 1 eSignature: Electronically signed by Anaid Perez RN on 12/2/23 at 2:06 PM EST    **SHARE this note so that the co-signing nurse can place an eSignature**    Nurse 2 eSignature: {Esignature:[id number]}

## 2023-12-02 NOTE — CONSULTS
I saw patient this morning along with Dr. Lisa Vásquez. Per patient the amitriptyline as prescribed by her primary care doctor not by a psychiatrist.  She has medications prescribed to her for sleep. We did explain to patient that we do not recommend continuing the amitriptyline due to her prolonged QTc. Psychiatry would recommend trying melatonin in place to amitriptyline if that is clinically safe to do with her current medical condition. Patient denies any suicidal ideations intent or plan denies any homicidal ideations intent or plan she denies any auditory or visual hallucinations she does not appear to be internally stimulated internally preoccupied she denies any depressive symptoms. She does admit to drinking alcohol daily. Psychiatry would recommend having to recovery see the patient to help with her alcohol dependence. Patient not suicidal homicidal psychotic or manic does not meet criteria for inpatient level psychiatric treatment. Recommend discontinuing the amitriptyline can start melatonin if clinically safe to do so and have peer recovery see the patient for her alcohol dependence. Psychiatry will sign off please reconsult there are any further acute inpatient psychiatric needs or concerns.

## 2023-12-02 NOTE — H&P
Hospital Medicine History & Physical      PCP: Diane Chris MD    Date of Admission: 12/1/2023    Date of Service: Pt seen/examined on 12/1/2023 and is     admitted to Inpatient with expected LOS greater than two midnights due to medical therapy. Chief Complaint:  had concerns including Fall (Became dizzy and fell. Dizziness off and on x 2 wks. -loc -thinners -hit head). History Of Present Illness:    Ms. Marlena Law, a 61y.o. year old female  with PMH of HTN, alcohol use disorder, nonsustained VT, and protein calorie malnutrition    Pt presented to ED for evaluation of dizziness followed by a fall. Also complained of chest pain in the ED. CT head without acute intracranial process. CT chest showed mildly comminuted acute fracture involving the upper body of the sternal bone. CT cervical spine suggestion of a mild inferior endplate compression fracture deformity of T1. Patient is on room air, oriented, she reports she does drink beer 3 days a week, she denies shortness of breath, cough, fever, chills, nausea vomiting. Initial workups reviewed:   WBC normal, H/H stable 11.2/30, platelet normal  Renal function normal, potassium 3.1  Hepatic function   albumin 3.6, total protein 6.1, AST 45/ALT 30, total bilirubin is normal      EKG reviewed normal sinus rhythm with significantly prolonged QTc over 600, with no acute ST/T wave ischemic change.   Last ECHO in July 2020 with LVEF 50 to 55%    Past Medical History:        Diagnosis Date    Asthma     CAD (coronary artery disease)     Closed fracture of proximal end of left humerus with routine healing 6/11/2019    Degeneration of lumbar or lumbosacral intervertebral disc     Fall against sharp object 1960    chest tube in hospital    History of blood transfusion 1997    after vaginal delivery of baby hemmorhage    Hypertension     Hypokalemia     Insomnia     Kidney stone     Nondisplaced fracture of greater tuberosity of right humerus, initial

## 2023-12-02 NOTE — PROGRESS NOTES
Cards consult called to [facility] cardiology.  Daisy Currie NP taking messages at this time     Electronically signed by Iman Moore RN on 12/2/2023 at 8:53 AM

## 2023-12-02 NOTE — PROGRESS NOTES
Psych consult called to Dr Miriam Jacobs, Jaya Moore NP taking new messages     Electronically signed by Sheryl Harding RN on 12/2/2023 at 8:47 AM

## 2023-12-02 NOTE — PROGRESS NOTES
4 Eyes Skin Assessment     NAME:  Ehsan Stuart  YOB: 1960  MEDICAL RECORD NUMBER:  [id number]    The patient is being assessed for  Admission    I agree that at least one RN has performed a thorough Head to Toe Skin Assessment on the patient. ALL assessment sites listed below have been assessed. Areas assessed by both nurses:    Head, Face, Ears, Shoulders, Back, Chest, Arms, Elbows, Hands, Sacrum. Buttock, Coccyx, Ischium, and Legs. Feet and Heels        Does the Patient have a Wound?  No noted wound(s)       Maciej Prevention initiated by RN: No  Wound Care Orders initiated by RN: No    Pressure Injury (Stage 3,4, Unstageable, DTI, NWPT, and Complex wounds) if present, place Wound referral order by RN under : No    New Ostomies, if present place, Ostomy referral order under : No     Nurse 1 eSignature: Electronically signed by Kimani Arellano RN on 12/2/23 at 2:06 PM EST    **SHARE this note so that the co-signing nurse can place an eSignature**    Nurse 2 eSignature: Electronically signed by Ellen Diaz RN on 12/2/23 at 5:46 PM EST

## 2023-12-02 NOTE — DISCHARGE INSTRUCTIONS
TRAUMA SERVICES DISCHARGE INSTRUCTIONS    Call [phone], option 2, for any questions/concerns and for follow-up appointment in 2 week(s). Please follow the instructions checked below:  Please follow-up with your primary care provider. ACTIVITY INSTRUCTIONS  Increase activity as tolerated  No heavy lifting or strenuous activity  Take your incentive spirometer home and use 4-6 times/day   [x]  No driving until cleared by trauma clinic    WOUND/DRESSING INSTRUCTIONS:  You may shower. No sitting in bath tub, hot tub or swimming until cleared by physician. Ice to areas of pain for first 24 hours. Heat to areas of pain after that. Wash areas of lacerations/abrasions with soap & water. Rinse well. Pat dry with clean towel. Apply thin layer of Bacitracin, Neosporin, or triple antibiotic cream to affected area 2-3 times per day. Keep wounds clean and dry. MEDICATION INSTRUCTIONS  Take medication as prescribed. When taking pain medications, you may experience dizziness or drowsiness. Do not drink alcohol or drive when taking these medications. You may experience constipation while taking pain medication. You may take over the counter stool softeners such as docusate (Colace), sennosides S (Senokot-S), or Miralax. [x]  You may take Ibuprofen (over the counter) as directed for mild pain. --You may take up to 800mg every 8 hours for pain, please take with food or milk. [x]  You may take acetaminophen (Tylenol) products. Do NOT take more than 4000mg of Tylenol in 24h. []  Do not take any other acetaminophen (Tylenol) products if you are taking Percocet or Norco, as these contain Tylenol. --Do NOT take more than 4000mg of Tylenol in 24h. OPIOID MEDICATION INSTRUCTIONS  Read the medication guide that is included with your prescription. Take your medication exactly as prescribed. Store medication away from children and in a safe place. Do NOT share your medication with others.   Do NOT

## 2023-12-02 NOTE — ED NOTES
Nurse to nurse report given to RN on 7WE. All questions answered.  Patient placed in transport      [place], [place]  12/02/23 4884

## 2023-12-03 LAB
ALBUMIN SERPL-MCNC: 2.8 G/DL (ref 3.5–5.2)
ALP SERPL-CCNC: 107 U/L (ref 35–104)
ALT SERPL-CCNC: 21 U/L (ref 0–32)
ANION GAP SERPL CALCULATED.3IONS-SCNC: 13 MMOL/L (ref 7–16)
AST SERPL-CCNC: 34 U/L (ref 0–31)
BASOPHILS # BLD: 0.05 K/UL (ref 0–0.2)
BASOPHILS NFR BLD: 1 % (ref 0–2)
BILIRUB SERPL-MCNC: 1.1 MG/DL (ref 0–1.2)
BUN SERPL-MCNC: 4 MG/DL (ref 6–23)
CALCIUM SERPL-MCNC: 8.2 MG/DL (ref 8.6–10.2)
CHLORIDE SERPL-SCNC: 99 MMOL/L (ref 98–107)
CO2 SERPL-SCNC: 20 MMOL/L (ref 22–29)
CREAT SERPL-MCNC: 0.5 MG/DL (ref 0.5–1)
EOSINOPHIL # BLD: 0.08 K/UL (ref 0.05–0.5)
EOSINOPHILS RELATIVE PERCENT: 2 % (ref 0–6)
ERYTHROCYTE [DISTWIDTH] IN BLOOD BY AUTOMATED COUNT: 13.7 % (ref 11.5–15)
GFR SERPL CREATININE-BSD FRML MDRD: >60 ML/MIN/1.73M2
GLUCOSE SERPL-MCNC: 95 MG/DL (ref 74–99)
HCT VFR BLD AUTO: 29.2 % (ref 34–48)
HGB BLD-MCNC: 10.1 G/DL (ref 11.5–15.5)
IMM GRANULOCYTES # BLD AUTO: <0.03 K/UL (ref 0–0.58)
IMM GRANULOCYTES NFR BLD: 0 % (ref 0–5)
LYMPHOCYTES NFR BLD: 2.23 K/UL (ref 1.5–4)
LYMPHOCYTES RELATIVE PERCENT: 53 % (ref 20–42)
MCH RBC QN AUTO: 33.2 PG (ref 26–35)
MCHC RBC AUTO-ENTMCNC: 34.6 G/DL (ref 32–34.5)
MCV RBC AUTO: 96.1 FL (ref 80–99.9)
MONOCYTES NFR BLD: 0.66 K/UL (ref 0.1–0.95)
MONOCYTES NFR BLD: 16 % (ref 2–12)
NEUTROPHILS NFR BLD: 28 % (ref 43–80)
NEUTS SEG NFR BLD: 1.18 K/UL (ref 1.8–7.3)
PLATELET # BLD AUTO: 146 K/UL (ref 130–450)
PMV BLD AUTO: 10.8 FL (ref 7–12)
POTASSIUM SERPL-SCNC: 4.6 MMOL/L (ref 3.5–5)
PROT SERPL-MCNC: 5.2 G/DL (ref 6.4–8.3)
RBC # BLD AUTO: 3.04 M/UL (ref 3.5–5.5)
SODIUM SERPL-SCNC: 132 MMOL/L (ref 132–146)
WBC OTHER # BLD: 4.2 K/UL (ref 4.5–11.5)

## 2023-12-03 PROCEDURE — 93005 ELECTROCARDIOGRAM TRACING: CPT | Performed by: INTERNAL MEDICINE

## 2023-12-03 PROCEDURE — 85025 COMPLETE CBC W/AUTO DIFF WBC: CPT

## 2023-12-03 PROCEDURE — 6370000000 HC RX 637 (ALT 250 FOR IP)

## 2023-12-03 PROCEDURE — 6370000000 HC RX 637 (ALT 250 FOR IP): Performed by: INTERNAL MEDICINE

## 2023-12-03 PROCEDURE — 36415 COLL VENOUS BLD VENIPUNCTURE: CPT

## 2023-12-03 PROCEDURE — 6370000000 HC RX 637 (ALT 250 FOR IP): Performed by: CLINICAL NURSE SPECIALIST

## 2023-12-03 PROCEDURE — 80053 COMPREHEN METABOLIC PANEL: CPT

## 2023-12-03 PROCEDURE — 2580000003 HC RX 258: Performed by: HOSPITALIST

## 2023-12-03 PROCEDURE — 6370000000 HC RX 637 (ALT 250 FOR IP): Performed by: HOSPITALIST

## 2023-12-03 PROCEDURE — 99232 SBSQ HOSP IP/OBS MODERATE 35: CPT | Performed by: INTERNAL MEDICINE

## 2023-12-03 PROCEDURE — 93005 ELECTROCARDIOGRAM TRACING: CPT | Performed by: CLINICAL NURSE SPECIALIST

## 2023-12-03 PROCEDURE — G0378 HOSPITAL OBSERVATION PER HR: HCPCS

## 2023-12-03 RX ORDER — MECOBALAMIN 5000 MCG
5 TABLET,DISINTEGRATING ORAL NIGHTLY
Status: DISCONTINUED | OUTPATIENT
Start: 2023-12-03 | End: 2023-12-06

## 2023-12-03 RX ADMIN — HYDROCODONE BITARTRATE AND ACETAMINOPHEN 1 TABLET: 7.5; 325 TABLET ORAL at 22:36

## 2023-12-03 RX ADMIN — POTASSIUM CHLORIDE 40 MEQ: 1500 TABLET, EXTENDED RELEASE ORAL at 16:51

## 2023-12-03 RX ADMIN — Medication 100 MG: at 08:06

## 2023-12-03 RX ADMIN — POTASSIUM CHLORIDE 40 MEQ: 1500 TABLET, EXTENDED RELEASE ORAL at 08:06

## 2023-12-03 RX ADMIN — Medication 1 TABLET: at 08:06

## 2023-12-03 RX ADMIN — SODIUM CHLORIDE: 9 INJECTION, SOLUTION INTRAVENOUS at 16:15

## 2023-12-03 RX ADMIN — Medication 5 MG: at 20:47

## 2023-12-03 RX ADMIN — Medication 10 ML: at 20:47

## 2023-12-03 RX ADMIN — FOLIC ACID 1 MG: 1 TABLET ORAL at 08:06

## 2023-12-03 RX ADMIN — PANTOPRAZOLE SODIUM 40 MG: 40 TABLET, DELAYED RELEASE ORAL at 08:06

## 2023-12-03 RX ADMIN — DIPHENHYDRAMINE HYDROCHLORIDE 25 MG: 25 TABLET ORAL at 20:50

## 2023-12-03 NOTE — PROGRESS NOTES
Date    LABA1C 4.6 03/27/2023     No results found for: \"EAG\"  Lab Results   Component Value Date    CHOL 194 05/06/2023    CHOL 137 05/08/2017    CHOL 185 11/28/2011     Lab Results   Component Value Date    TRIG 64 05/06/2023    TRIG 131 05/08/2017    TRIG 139 11/28/2011     Lab Results   Component Value Date     05/06/2023    HDL 78 05/08/2017    HDL 64.0 11/28/2011     Lab Results   Component Value Date    LDLCALC 53 05/06/2023    LDLCALC 33 05/08/2017    LDLCALC 93 11/28/2011     Lab Results   Component Value Date    LABVLDL 13 05/06/2023    LABVLDL 26 05/08/2017     No results found for: \"CHOLHDLRATIO\"    Cardiac Tests:  Telemetry findings reviewed: SR at rate 70s, currently, off the monitor     Vitals and labs reviewed as above,. Pablo Speed nuclear stress test 6/2016:   Normal examination. In particular, there is no evidence of left ventricular myocardium at risk for stress-induced ischemia. EF 70%. TTE 6/2016:   Summary   Normal left ventricle size and systolic function   Stage I diastolic dysfunction   Trace mitral regurgitation   Trace aortic regurgitation     Lexiscan Stress 8/2020  FINDINGS:  Perfusion images demonstrate no reversible perfusion defect. Wall motion is within normal limits. The end diastolic volume is 43 ml. The end systolic volume is 11 ml. The estimated ejection fraction is greater than 70  %. IMPRESSION:  1. No reversible perfusion defect  2. Ejection fraction is greater than 70  %. 3. No significant wall motion abnormality     TTE 8/2020:   Summary   Normal left ventricular systolic function. Ejection fraction is visually estimated at 60%. Normal right ventricular size and function (TAPSE 2.5 cm). There is doppler evidence of stage I diastolic dysfunction. Mild mitral regurgitation. The aortic valve is trileaflet. Mild aortic regurgitation. Moderate tricuspid regurgitation. PASP is estimated at 53 mmHg.         TTE 7/2022:   Summary   Normal Qtc  ETOH abuse and related issues management as per primary service. Anemia evaluation per primary service. Patient was strongly encouraged to quit alcohol use  Recent stress test and echo results reviewed as above and no further ischemic work is planned at this time. Further recommendations pending above        NOTE:  This report was transcribed using voice recognition software. Every effort was made to ensure accuracy; however, inadvertent computerized transcription errors may be present. Cali Cooper MD., Lary Holliday.   [facility]) Cardiology

## 2023-12-04 ENCOUNTER — APPOINTMENT (OUTPATIENT)
Age: 63
End: 2023-12-04
Payer: MEDICAID

## 2023-12-04 LAB
ALBUMIN SERPL-MCNC: 3.3 G/DL (ref 3.5–5.2)
ALP SERPL-CCNC: 124 U/L (ref 35–104)
ALT SERPL-CCNC: 22 U/L (ref 0–32)
ANION GAP SERPL CALCULATED.3IONS-SCNC: 13 MMOL/L (ref 7–16)
ANION GAP SERPL CALCULATED.3IONS-SCNC: 13 MMOL/L (ref 7–16)
ANION GAP SERPL CALCULATED.3IONS-SCNC: 14 MMOL/L (ref 7–16)
AST SERPL-CCNC: 38 U/L (ref 0–31)
BASOPHILS # BLD: 0.07 K/UL (ref 0–0.2)
BASOPHILS NFR BLD: 1 % (ref 0–2)
BILIRUB SERPL-MCNC: 0.7 MG/DL (ref 0–1.2)
BUN SERPL-MCNC: 6 MG/DL (ref 6–23)
BUN SERPL-MCNC: 6 MG/DL (ref 6–23)
BUN SERPL-MCNC: 7 MG/DL (ref 6–23)
CALCIUM SERPL-MCNC: 9.1 MG/DL (ref 8.6–10.2)
CALCIUM SERPL-MCNC: 9.4 MG/DL (ref 8.6–10.2)
CALCIUM SERPL-MCNC: 9.5 MG/DL (ref 8.6–10.2)
CHLORIDE SERPL-SCNC: 97 MMOL/L (ref 98–107)
CHLORIDE SERPL-SCNC: 98 MMOL/L (ref 98–107)
CHLORIDE SERPL-SCNC: 99 MMOL/L (ref 98–107)
CO2 SERPL-SCNC: 15 MMOL/L (ref 22–29)
CO2 SERPL-SCNC: 18 MMOL/L (ref 22–29)
CO2 SERPL-SCNC: 21 MMOL/L (ref 22–29)
CREAT SERPL-MCNC: 0.5 MG/DL (ref 0.5–1)
CREAT SERPL-MCNC: 0.7 MG/DL (ref 0.5–1)
CREAT SERPL-MCNC: 0.8 MG/DL (ref 0.5–1)
ECHO AO ASC DIAM: 3.1 CM
ECHO AO ASCENDING AORTA INDEX: 2.12 CM/M2
ECHO AO ROOT DIAM: 2.9 CM
ECHO AO ROOT INDEX: 1.99 CM/M2
ECHO AR MAX VEL PISA: 4.9 M/S
ECHO AV AREA PEAK VELOCITY: 2.2 CM2
ECHO AV AREA VTI: 2.6 CM2
ECHO AV AREA/BSA PEAK VELOCITY: 1.5 CM2/M2
ECHO AV AREA/BSA VTI: 1.8 CM2/M2
ECHO AV CUSP MM: 2.1 CM
ECHO AV MEAN GRADIENT: 5 MMHG
ECHO AV MEAN VELOCITY: 1 M/S
ECHO AV PEAK GRADIENT: 8 MMHG
ECHO AV PEAK VELOCITY: 1.4 M/S
ECHO AV REGURGITANT PHT: 1171.1 MILLISECOND
ECHO AV VELOCITY RATIO: 0.86
ECHO AV VTI: 27.1 CM
ECHO BSA: 1.45 M2
ECHO EST RA PRESSURE: 3 MMHG
ECHO LA DIAMETER INDEX: 1.78 CM/M2
ECHO LA DIAMETER: 2.6 CM
ECHO LA TO AORTIC ROOT RATIO: 0.9
ECHO LA VOL A-L A2C: 16 ML (ref 22–52)
ECHO LA VOL A-L A4C: 43 ML (ref 22–52)
ECHO LA VOL MOD A2C: 15 ML (ref 22–52)
ECHO LA VOL MOD A4C: 40 ML (ref 22–52)
ECHO LA VOLUME AREA LENGTH: 33 ML
ECHO LA VOLUME INDEX A-L A2C: 11 ML/M2 (ref 16–34)
ECHO LA VOLUME INDEX A-L A4C: 29 ML/M2 (ref 16–34)
ECHO LA VOLUME INDEX AREA LENGTH: 23 ML/M2 (ref 16–34)
ECHO LA VOLUME INDEX MOD A2C: 10 ML/M2 (ref 16–34)
ECHO LA VOLUME INDEX MOD A4C: 27 ML/M2 (ref 16–34)
ECHO LV FRACTIONAL SHORTENING: 40 % (ref 28–44)
ECHO LV INTERNAL DIMENSION DIASTOLE INDEX: 2.4 CM/M2
ECHO LV INTERNAL DIMENSION DIASTOLIC: 3.5 CM (ref 3.9–5.3)
ECHO LV INTERNAL DIMENSION SYSTOLIC INDEX: 1.44 CM/M2
ECHO LV INTERNAL DIMENSION SYSTOLIC: 2.1 CM
ECHO LV ISOVOLUMETRIC RELAXATION TIME (IVRT): 129.2 MS
ECHO LV IVSD: 1.1 CM (ref 0.6–0.9)
ECHO LV IVSS: 1.2 CM
ECHO LV MASS 2D: 95.9 G (ref 67–162)
ECHO LV MASS INDEX 2D: 65.7 G/M2 (ref 43–95)
ECHO LV POSTERIOR WALL DIASTOLIC: 0.8 CM (ref 0.6–0.9)
ECHO LV POSTERIOR WALL SYSTOLIC: 1.3 CM
ECHO LV RELATIVE WALL THICKNESS RATIO: 0.46
ECHO LVOT AREA: 2.5 CM2
ECHO LVOT AV VTI INDEX: 1
ECHO LVOT DIAM: 1.8 CM
ECHO LVOT MEAN GRADIENT: 3 MMHG
ECHO LVOT PEAK GRADIENT: 6 MMHG
ECHO LVOT PEAK VELOCITY: 1.2 M/S
ECHO LVOT STROKE VOLUME INDEX: 47.4 ML/M2
ECHO LVOT SV: 69.2 ML
ECHO LVOT VTI: 27.2 CM
ECHO MV "A" WAVE DURATION: 129.2 MSEC
ECHO MV A VELOCITY: 0.77 M/S
ECHO MV AREA PHT: 2.9 CM2
ECHO MV AREA VTI: 3 CM2
ECHO MV E DECELERATION TIME (DT): 216.6 MS
ECHO MV E VELOCITY: 0.48 M/S
ECHO MV E/A RATIO: 0.62
ECHO MV LVOT VTI INDEX: 0.86
ECHO MV MAX VELOCITY: 1 M/S
ECHO MV MEAN GRADIENT: 1 MMHG
ECHO MV MEAN VELOCITY: 0.5 M/S
ECHO MV PEAK GRADIENT: 4 MMHG
ECHO MV PRESSURE HALF TIME (PHT): 77.1 MS
ECHO MV VTI: 23.3 CM
ECHO PV MAX VELOCITY: 0.7 M/S
ECHO PV MEAN GRADIENT: 1 MMHG
ECHO PV MEAN VELOCITY: 0.5 M/S
ECHO PV PEAK GRADIENT: 2 MMHG
ECHO PV VTI: 14.9 CM
ECHO PVEIN A DURATION: 124.6 MS
ECHO PVEIN A VELOCITY: 0.4 M/S
ECHO PVEIN PEAK D VELOCITY: 0.3 M/S
ECHO PVEIN PEAK S VELOCITY: 0.6 M/S
ECHO PVEIN S/D RATIO: 2
ECHO RIGHT VENTRICULAR SYSTOLIC PRESSURE (RVSP): 32 MMHG
ECHO RV INTERNAL DIMENSION: 2.6 CM
ECHO RV TAPSE: 1.8 CM (ref 1.7–?)
ECHO TV REGURGITANT MAX VELOCITY: 2.71 M/S
ECHO TV REGURGITANT PEAK GRADIENT: 29 MMHG
EKG ATRIAL RATE: 69 BPM
EKG ATRIAL RATE: 70 BPM
EKG ATRIAL RATE: 75 BPM
EKG P AXIS: -78 DEGREES
EKG P AXIS: 50 DEGREES
EKG P AXIS: 65 DEGREES
EKG P-R INTERVAL: 132 MS
EKG P-R INTERVAL: 136 MS
EKG P-R INTERVAL: 138 MS
EKG Q-T INTERVAL: 454 MS
EKG Q-T INTERVAL: 454 MS
EKG Q-T INTERVAL: 538 MS
EKG QRS DURATION: 72 MS
EKG QRS DURATION: 76 MS
EKG QRS DURATION: 82 MS
EKG QTC CALCULATION (BAZETT): 486 MS
EKG QTC CALCULATION (BAZETT): 490 MS
EKG QTC CALCULATION (BAZETT): 600 MS
EKG R AXIS: 18 DEGREES
EKG R AXIS: 39 DEGREES
EKG R AXIS: 46 DEGREES
EKG T AXIS: 49 DEGREES
EKG T AXIS: 58 DEGREES
EKG T AXIS: 65 DEGREES
EKG VENTRICULAR RATE: 69 BPM
EKG VENTRICULAR RATE: 70 BPM
EKG VENTRICULAR RATE: 75 BPM
EOSINOPHIL # BLD: 0.1 K/UL (ref 0.05–0.5)
EOSINOPHILS RELATIVE PERCENT: 2 % (ref 0–6)
ERYTHROCYTE [DISTWIDTH] IN BLOOD BY AUTOMATED COUNT: 13.8 % (ref 11.5–15)
GFR SERPL CREATININE-BSD FRML MDRD: >60 ML/MIN/1.73M2
GLUCOSE SERPL-MCNC: 45 MG/DL (ref 74–99)
GLUCOSE SERPL-MCNC: 76 MG/DL (ref 74–99)
GLUCOSE SERPL-MCNC: 79 MG/DL (ref 74–99)
HCT VFR BLD AUTO: 35.8 % (ref 34–48)
HGB BLD-MCNC: 12.2 G/DL (ref 11.5–15.5)
IMM GRANULOCYTES # BLD AUTO: <0.03 K/UL (ref 0–0.58)
IMM GRANULOCYTES NFR BLD: 0 % (ref 0–5)
LYMPHOCYTES NFR BLD: 2.48 K/UL (ref 1.5–4)
LYMPHOCYTES RELATIVE PERCENT: 45 % (ref 20–42)
MCH RBC QN AUTO: 33.7 PG (ref 26–35)
MCHC RBC AUTO-ENTMCNC: 34.1 G/DL (ref 32–34.5)
MCV RBC AUTO: 98.9 FL (ref 80–99.9)
MONOCYTES NFR BLD: 0.76 K/UL (ref 0.1–0.95)
MONOCYTES NFR BLD: 14 % (ref 2–12)
NEUTROPHILS NFR BLD: 38 % (ref 43–80)
NEUTS SEG NFR BLD: 2.09 K/UL (ref 1.8–7.3)
PLATELET # BLD AUTO: 178 K/UL (ref 130–450)
PMV BLD AUTO: 11.2 FL (ref 7–12)
POTASSIUM SERPL-SCNC: 5.5 MMOL/L (ref 3.5–5)
POTASSIUM SERPL-SCNC: 6 MMOL/L (ref 3.5–5)
POTASSIUM SERPL-SCNC: 6.4 MMOL/L (ref 3.5–5)
PROT SERPL-MCNC: 6 G/DL (ref 6.4–8.3)
RBC # BLD AUTO: 3.62 M/UL (ref 3.5–5.5)
SODIUM SERPL-SCNC: 128 MMOL/L (ref 132–146)
SODIUM SERPL-SCNC: 128 MMOL/L (ref 132–146)
SODIUM SERPL-SCNC: 132 MMOL/L (ref 132–146)
WBC OTHER # BLD: 5.5 K/UL (ref 4.5–11.5)

## 2023-12-04 PROCEDURE — 99233 SBSQ HOSP IP/OBS HIGH 50: CPT | Performed by: INTERNAL MEDICINE

## 2023-12-04 PROCEDURE — 99232 SBSQ HOSP IP/OBS MODERATE 35: CPT | Performed by: INTERNAL MEDICINE

## 2023-12-04 PROCEDURE — 93005 ELECTROCARDIOGRAM TRACING: CPT | Performed by: INTERNAL MEDICINE

## 2023-12-04 PROCEDURE — 93306 TTE W/DOPPLER COMPLETE: CPT

## 2023-12-04 PROCEDURE — 93010 ELECTROCARDIOGRAM REPORT: CPT | Performed by: INTERNAL MEDICINE

## 2023-12-04 PROCEDURE — 93306 TTE W/DOPPLER COMPLETE: CPT | Performed by: INTERNAL MEDICINE

## 2023-12-04 PROCEDURE — 97530 THERAPEUTIC ACTIVITIES: CPT

## 2023-12-04 PROCEDURE — 6370000000 HC RX 637 (ALT 250 FOR IP): Performed by: CLINICAL NURSE SPECIALIST

## 2023-12-04 PROCEDURE — 2580000003 HC RX 258: Performed by: HOSPITALIST

## 2023-12-04 PROCEDURE — 6370000000 HC RX 637 (ALT 250 FOR IP): Performed by: INTERNAL MEDICINE

## 2023-12-04 PROCEDURE — 6370000000 HC RX 637 (ALT 250 FOR IP)

## 2023-12-04 PROCEDURE — 85025 COMPLETE CBC W/AUTO DIFF WBC: CPT

## 2023-12-04 PROCEDURE — 97161 PT EVAL LOW COMPLEX 20 MIN: CPT

## 2023-12-04 PROCEDURE — 2580000003 HC RX 258: Performed by: INTERNAL MEDICINE

## 2023-12-04 PROCEDURE — 80053 COMPREHEN METABOLIC PANEL: CPT

## 2023-12-04 PROCEDURE — 2060000000 HC ICU INTERMEDIATE R&B

## 2023-12-04 PROCEDURE — 36415 COLL VENOUS BLD VENIPUNCTURE: CPT

## 2023-12-04 PROCEDURE — 6370000000 HC RX 637 (ALT 250 FOR IP): Performed by: HOSPITALIST

## 2023-12-04 PROCEDURE — 80048 BASIC METABOLIC PNL TOTAL CA: CPT

## 2023-12-04 RX ORDER — DEXTROSE MONOHYDRATE 100 MG/ML
INJECTION, SOLUTION INTRAVENOUS CONTINUOUS PRN
Status: DISCONTINUED | OUTPATIENT
Start: 2023-12-04 | End: 2023-12-07 | Stop reason: HOSPADM

## 2023-12-04 RX ORDER — LIDOCAINE 4 G/G
1 PATCH TOPICAL DAILY
Status: DISCONTINUED | OUTPATIENT
Start: 2023-12-04 | End: 2023-12-07 | Stop reason: HOSPADM

## 2023-12-04 RX ADMIN — HYDROCODONE BITARTRATE AND ACETAMINOPHEN 1 TABLET: 7.5; 325 TABLET ORAL at 20:28

## 2023-12-04 RX ADMIN — DIPHENHYDRAMINE HYDROCHLORIDE 25 MG: 25 TABLET ORAL at 08:29

## 2023-12-04 RX ADMIN — FOLIC ACID 1 MG: 1 TABLET ORAL at 08:29

## 2023-12-04 RX ADMIN — Medication 100 MG: at 09:45

## 2023-12-04 RX ADMIN — PANTOPRAZOLE SODIUM 40 MG: 40 TABLET, DELAYED RELEASE ORAL at 08:29

## 2023-12-04 RX ADMIN — Medication 10 ML: at 08:30

## 2023-12-04 RX ADMIN — POTASSIUM CHLORIDE 40 MEQ: 1500 TABLET, EXTENDED RELEASE ORAL at 08:28

## 2023-12-04 RX ADMIN — Medication 1 TABLET: at 08:28

## 2023-12-04 RX ADMIN — INSULIN HUMAN 10 UNITS: 100 INJECTION, SOLUTION PARENTERAL at 10:54

## 2023-12-04 RX ADMIN — ASPIRIN 81 MG: 81 TABLET, COATED ORAL at 20:28

## 2023-12-04 RX ADMIN — DEXTROSE MONOHYDRATE 250 ML: 100 INJECTION, SOLUTION INTRAVENOUS at 11:00

## 2023-12-04 RX ADMIN — Medication 5 MG: at 20:28

## 2023-12-04 RX ADMIN — DIPHENHYDRAMINE HYDROCHLORIDE 25 MG: 25 TABLET ORAL at 19:02

## 2023-12-04 RX ADMIN — HYDROCODONE BITARTRATE AND ACETAMINOPHEN 1 TABLET: 7.5; 325 TABLET ORAL at 09:46

## 2023-12-04 NOTE — PROGRESS NOTES
ejection fraction is greater than 70  %. IMPRESSION:  1. No reversible perfusion defect  2. Ejection fraction is greater than 70  %. 3. No significant wall motion abnormality     TTE 8/2020:   Summary   Normal left ventricular systolic function. Ejection fraction is visually estimated at 60%. Normal right ventricular size and function (TAPSE 2.5 cm). There is doppler evidence of stage I diastolic dysfunction. Mild mitral regurgitation. The aortic valve is trileaflet. Mild aortic regurgitation. Moderate tricuspid regurgitation. PASP is estimated at 53 mmHg. TTE 7/2022:   Summary   Normal left ventricle size and systolic function. Ejection fraction is visually estimated at 55-60%. No regional wall motion abnormalities seen. Normal left ventricle wall thickness. Indeterminate diastolic function. Normal right ventricular size and function. TAPSE 24 mm. No hemodynamically significant aortic stenosis is present. Mild to moderate tricuspid regurgitation. RVSP is 46 mmHg. Pulmonary hypertension is mild . No evidence for hemodynamically significant pericardial effusion. 14 day Zio AT 8/2022:    Alda Ranks stress 5/06/2023:   FINDINGS: The overall quality of the study was good. Left ventricular cavity size was noted to be normal.     Rotational analog analysis demonstrated breast and vertical motion  artifact     The gated SPECT stress imaging in the short, vertical long, and  horizontal long axis demonstrated normal homogeneous tracer  distribution throughout the myocardium. The resting images demonstrate no changes      Gated SPECT left ventricular ejection fraction was calculated to be  88%, with no Myocardial wall motion abnormality. IMPRESSION:  No myocardial ischemia. No myocardial infarction. Normal LV systolic function. No wall motion abnormalities. Low risk myocardial perfusion scan.      Assessment:  Recurrent episodes of syncope most likely secondary to alcohol intoxication plus sedation from amitriptyline use, no further episodes of syncope  Orthostatic hypotension. Prolonged QT interval secondary to hypokalemia, hypomagnesemia and amitriptyline toxicity. Prior history of torsade in the presence of hypokalemia and hypomagnesemia with syncope  Ischemia on the recent stress test in May 2023  Alcohol abuse  Frequent ventricular ectopy  Bronchial asthma  GERD  Recurrent falls/syncope with orthopedic injuries  Frail and undernourished. BP up today, monitor   Mild anemia, Hb 11.2>>10.1>> 12.2  Hyperkalemia potassium 6.4 sodium 128 with normal renal functions, repeat labs              Plan:   Hypokalemia corrected. Amitriptyline was stopped, repeat EKG showed improved QTc interval, repeat EKG in a.m. last QTc 490 ms. ETOH abuse and related issues management as per primary service. Anemia evaluation per primary service. Echo cardiogram is pending  Patient was strongly encouraged to quit alcohol use  Recent stress test and echo results reviewed as above and no further ischemic work is planned at this time. Apply compression stockings to bilateral legs due to orthostatic hypotension      NOTE:  This report was transcribed using voice recognition software. Every effort was made to ensure accuracy; however, inadvertent computerized transcription errors may be present. Humberto Jimenez MD., [facility].   [facility]) Cardiology

## 2023-12-04 NOTE — PROGRESS NOTES
OT SESSION ATTEMPT     Date:2023  Patient Name: Jose Eduardo Varma  MRN: [id number]  : 1960  Room: [address] [place] [address]     Occupational therapy orders received/chart review completed and OT session attempted this date. Unavailable due to other medical staff currently with pt, completing Echo. Will reattempt OT eval at a later time/date.     Rachana Cat, [address] OTR/L #[id number]

## 2023-12-04 NOTE — PROGRESS NOTES
[facility] Progress Note    Admitting Date and Time: 12/1/2023 10:37 AM  Admit Dx: Syncope and collapse [R55]  Closed head injury, initial encounter [S09.90XA]  Sternal fracture with retrosternal contusion, closed, initial encounter [S22.20XA]    Subjective:  Patient is being followed for Syncope and collapse [R55]  Closed head injury, initial encounter [S09.90XA]  Sternal fracture with retrosternal contusion, closed, initial encounter [S22.20XA]     Patient seen and examined at bedside this morning. No complaints today.     ROS: denies fever, chills, cp, sob, n/v, HA unless stated above.      lidocaine  1 patch TransDERmal Daily    insulin regular  10 Units IntraVENous Once    And    dextrose bolus  250 mL IntraVENous Once    melatonin  5 mg Oral Nightly    [Held by provider] potassium chloride  40 mEq Oral BID WC    aspirin  81 mg Oral BID    folic acid  1 mg Oral Daily    therapeutic multivitamin-minerals  1 tablet Oral Daily    pantoprazole  40 mg Oral QAM AC    sodium chloride flush  5-40 mL IntraVENous 2 times per day    thiamine  100 mg Oral Daily     glucose, 4 tablet, PRN  dextrose bolus, 125 mL, PRN   Or  dextrose bolus, 250 mL, PRN  glucagon (rDNA), 1 mg, PRN  dextrose, , Continuous PRN  diphenhydrAMINE, 25 mg, Q6H PRN  HYDROcodone-acetaminophen, 1 tablet, Q8H PRN  sodium chloride flush, 5-40 mL, PRN  sodium chloride, , PRN  potassium chloride, 40 mEq, PRN   Or  potassium alternative oral replacement, 40 mEq, PRN   Or  potassium chloride, 10 mEq, PRN  magnesium sulfate, 2,000 mg, PRN  polyethylene glycol, 17 g, Daily PRN  acetaminophen, 650 mg, Q6H PRN   Or  acetaminophen, 650 mg, Q6H PRN  LORazepam, 1 mg, Q1H PRN   Or  LORazepam, 1 mg, Q1H PRN   Or  LORazepam, 2 mg, Q1H PRN   Or  LORazepam, 2 mg, Q1H PRN   Or  LORazepam, 3 mg, Q1H PRN   Or  LORazepam, 3 mg, Q1H PRN   Or  LORazepam, 4 mg, Q1H PRN   Or  LORazepam, 4 mg, Q1H PRN  albuterol, 2.5 mg, Q6H PRN         Objective:    BP

## 2023-12-04 NOTE — CARE COORDINATION
12/4/23 CM Note  Pt admitted to IP today Dx Syncope and collapse   Echo pending. Pt lives home with friend. Has established PCP, preferred pharmacy is [facility] on [address]. Has DME-walker and cane are use on PRN basis. Plans to return home, friend will transport. No needs identified. Jordyn BOYD RN-BC  [phone]    Case Management Assessment  Initial Evaluation    Date/Time of Evaluation: 12/4/2023 11:50 AM  Assessment Completed by: Jordyn Awad RN    If patient is discharged prior to next notation, then this note serves as note for discharge by case management. Patient Name: Aggie Hardy                   YOB: 1960  Diagnosis: Syncope and collapse [R55]  Closed head injury, initial encounter [S09.90XA]  Sternal fracture with retrosternal contusion, closed, initial encounter [S22.20XA]                   Date / Time: 12/1/2023 10:37 AM    Patient Admission Status: Inpatient   Readmission Risk (Low < 19, Mod (19-27), High > 27): Readmission Risk Score: 17.9    Current PCP: Christine Kapadia MD  PCP verified by ? Yes    Chart Reviewed: Yes      History Provided by: Patient  Patient Orientation: Alert and Oriented    Patient Cognition: Alert    Hospitalization in the last 30 days (Readmission):  No    If yes, Readmission Assessment in CM Navigator will be completed. Advance Directives:      Code Status: Full Code   Patient's Primary Decision Maker is:      Primary Decision Maker: Vandana Other - Child - [phone]    Secondary Decision Maker: Toni Laen - Brother/Sister - [phone]    Secondary Decision Maker: Vic Garcia - Brother/Sister - [phone]    Supplemental (Other) Decision Maker: Danish Figueroa - Parent - [phone]    Discharge Planning:    Patient lives with:   Type of Home:    Primary Care Giver: Self  Patient Support Systems include: Friends/Neighbors       ADLS  Prior functional level: Independent in ADLs/IADLs  Current functional level:  Independent in ADLs/IADLs    PT AM-PAC:   /24  OT AM-PAC:   /24    Family can provide assistance at DC: No  Would you like Case Management to discuss the discharge plan with any other family members/significant others, and if so, who?  No  Plans to Return to Present Housing: Yes      Financial    Payor: [facility] [employer] [facility] / Plan: [address] [place], [place] [address] [facility] [address] [place] / Product Type: *No Product type* /     Does insurance require precert for SNF: Yes    Potential assistance Purchasing Medications:    Meds-to-Beds request: Yes      [facility] #[id number] - Adonis Monson, [address] [place] [id number]  Phone: [phone] Fax: [address] [place] [address], [address] [place] [address] [phone] Michael Saavedra [phone]  [facility] [address]  Phone: [phone] Fax: [phone]      Notes:    Factors facilitating achievement of predicted outcomes: Cooperative and Pleasant      Additional Case Management Notes: as above    The Plan for Transition of Care is related to the following treatment goals of Syncope and collapse [R55]  Closed head injury, initial encounter [S09.90XA]  Sternal fracture with retrosternal contusion, closed, initial encounter [S22.20XA]        The Patient and/or Patient Representative Agree with the Discharge Plan?  yes    Lucina Meza RN  Case Management Department

## 2023-12-04 NOTE — PROGRESS NOTES
Physical Therapy    Initial Assessment     Name: Marjorie Casper  : 1960  MRN: [id number]      Date of Service: 2023    Evaluating PT: Peggy Ovalles, PT, DPT [id number]      Room #:  1546/0638-K  Diagnosis:  Syncope and collapse [R55]  Closed head injury, initial encounter [S09.90XA]  Sternal fracture with retrosternal contusion, closed, initial encounter [S22.20XA]  PMHx/PSHx:   has a past medical history of Asthma, CAD (coronary artery disease), Closed fracture of proximal end of left humerus with routine healing, Degeneration of lumbar or lumbosacral intervertebral disc, Fall against sharp object, History of blood transfusion, Hypertension, Hypokalemia, Insomnia, Kidney stone, Nondisplaced fracture of greater tuberosity of right humerus, initial encounter for closed fracture, Orthostatic hypotension, and Severe back pain. Precautions:  Fall risk, Sternal fx, Chronic T1 compression fx, Orthostatic hypotension    SUBJECTIVE:    Pt lives with son in a 2 story house with 5 stair(s) and 1 rail(s) to enter. Full flight of stairs and 1 rail to second floor bed and bath. Pt ambulated with SPC vs walker prior to admission. OBJECTIVE:   Initial Evaluation  Date: 23 Treatment Date: Short Term/ Long Term   Goals   AM-PAC 6 Clicks 70/52     Was pt agreeable to Eval/treatment? Yes     Does pt have pain? 8/10 lower back pain; RN notified of pt's request for pain patch     Bed Mobility  Rolling: NT  Supine to sit: SBA  Sit to supine: SBA  Scooting: SBA to EOB  Rolling: Independent   Supine to sit: Independent   Sit to supine: Independent   Scooting: Independent    Transfers Sit to stand: Min A  Stand to sit: Min A  Stand pivot: Min A with HHA  Sit to stand: Independent   Stand to sit: Independent   Stand pivot:  Mod Independent with [employer]   Ambulation   40 feet with HHA with Min A  >200 feet with [employer] Mod Independent    Stair negotiation: ascended and descended NT  >12 step(s) with 1 rail(s) Mod Independent    ROM taken.    Pt's/family goals:  1. None stated. Prognosis is Good for reaching above PT goals. Patient and or family understand(s) diagnosis, prognosis, and plan of care. Yes. PHYSICAL THERAPY PLAN OF CARE:    PT POC is established based on physician order and patient diagnosis     Referring provider/PT Order:    Start   Ordering Provider    12/01/23 2130  PT evaluation and treat  Start:  12/01/23 2130,   End:  12/01/23 2130,   ONE TIME,   Standing Count:  1 Occurrences,   R         Natty Arnold MD      Diagnosis:  Syncope and collapse [R55]  Closed head injury, initial encounter [S09.90XA]  Sternal fracture with retrosternal contusion, closed, initial encounter [S22.20XA]  Specific instructions for next treatment:  Progress activity. Current Treatment Recommendations:     [x] Strengthening to improve independence with functional mobility   [] ROM to improve independence with functional mobility   [x] Balance Training to improve static/dynamic balance and to reduce fall risk  [x] Endurance Training to improve activity tolerance during functional mobility   [x] Transfer Training to improve safety and independence with all functional transfers   [x] Gait Training to improve gait mechanics, endurance and assess need for appropriate assistive device  [x] Stair Training in preparation for safe discharge home and/or into the community   [] Positioning to prevent skin breakdown and contractures  [x] Safety and Education Training   [x] Patient/Caregiver Education   [] HEP  [] Other     PT long term treatment goals are located in above grid    Frequency of treatments: 2-5x/week x 1-2 weeks.     Time in  0906  Time out  0930    Total Treatment Time  10 minutes     Evaluation Time includes thorough review of current medical information, gathering information on past medical history/social history and prior level of function, completion of standardized testing/informal observation of tasks, assessment of data and

## 2023-12-05 LAB
ANION GAP SERPL CALCULATED.3IONS-SCNC: 13 MMOL/L (ref 7–16)
BUN SERPL-MCNC: 7 MG/DL (ref 6–23)
CALCIUM SERPL-MCNC: 9.2 MG/DL (ref 8.6–10.2)
CHLORIDE SERPL-SCNC: 98 MMOL/L (ref 98–107)
CO2 SERPL-SCNC: 22 MMOL/L (ref 22–29)
CREAT SERPL-MCNC: 0.6 MG/DL (ref 0.5–1)
EKG ATRIAL RATE: 70 BPM
EKG P AXIS: 59 DEGREES
EKG P-R INTERVAL: 138 MS
EKG Q-T INTERVAL: 456 MS
EKG QRS DURATION: 76 MS
EKG QTC CALCULATION (BAZETT): 492 MS
EKG R AXIS: 43 DEGREES
EKG T AXIS: 62 DEGREES
EKG VENTRICULAR RATE: 70 BPM
GFR SERPL CREATININE-BSD FRML MDRD: >60 ML/MIN/1.73M2
GLUCOSE BLD-MCNC: 101 MG/DL (ref 74–99)
GLUCOSE BLD-MCNC: 98 MG/DL (ref 74–99)
GLUCOSE SERPL-MCNC: 97 MG/DL (ref 74–99)
POTASSIUM SERPL-SCNC: 5.1 MMOL/L (ref 3.5–5)
SODIUM SERPL-SCNC: 133 MMOL/L (ref 132–146)

## 2023-12-05 PROCEDURE — 93010 ELECTROCARDIOGRAM REPORT: CPT | Performed by: INTERNAL MEDICINE

## 2023-12-05 PROCEDURE — 97535 SELF CARE MNGMENT TRAINING: CPT

## 2023-12-05 PROCEDURE — 2580000003 HC RX 258: Performed by: HOSPITALIST

## 2023-12-05 PROCEDURE — 99232 SBSQ HOSP IP/OBS MODERATE 35: CPT | Performed by: INTERNAL MEDICINE

## 2023-12-05 PROCEDURE — 99222 1ST HOSP IP/OBS MODERATE 55: CPT | Performed by: STUDENT IN AN ORGANIZED HEALTH CARE EDUCATION/TRAINING PROGRAM

## 2023-12-05 PROCEDURE — 97165 OT EVAL LOW COMPLEX 30 MIN: CPT

## 2023-12-05 PROCEDURE — 6370000000 HC RX 637 (ALT 250 FOR IP): Performed by: INTERNAL MEDICINE

## 2023-12-05 PROCEDURE — 36415 COLL VENOUS BLD VENIPUNCTURE: CPT

## 2023-12-05 PROCEDURE — 6370000000 HC RX 637 (ALT 250 FOR IP)

## 2023-12-05 PROCEDURE — 2060000000 HC ICU INTERMEDIATE R&B

## 2023-12-05 PROCEDURE — 82962 GLUCOSE BLOOD TEST: CPT

## 2023-12-05 PROCEDURE — 6370000000 HC RX 637 (ALT 250 FOR IP): Performed by: HOSPITALIST

## 2023-12-05 PROCEDURE — 80048 BASIC METABOLIC PNL TOTAL CA: CPT

## 2023-12-05 RX ORDER — MIDODRINE HYDROCHLORIDE 2.5 MG/1
2.5 TABLET ORAL
Status: DISCONTINUED | OUTPATIENT
Start: 2023-12-05 | End: 2023-12-07 | Stop reason: HOSPADM

## 2023-12-05 RX ADMIN — ASPIRIN 81 MG: 81 TABLET, COATED ORAL at 20:39

## 2023-12-05 RX ADMIN — Medication 100 MG: at 09:17

## 2023-12-05 RX ADMIN — ASPIRIN 81 MG: 81 TABLET, COATED ORAL at 09:17

## 2023-12-05 RX ADMIN — HYDROCODONE BITARTRATE AND ACETAMINOPHEN 1 TABLET: 7.5; 325 TABLET ORAL at 20:39

## 2023-12-05 RX ADMIN — Medication 5 MG: at 20:41

## 2023-12-05 RX ADMIN — DIPHENHYDRAMINE HYDROCHLORIDE 25 MG: 25 TABLET ORAL at 17:39

## 2023-12-05 RX ADMIN — Medication 1 TABLET: at 09:17

## 2023-12-05 RX ADMIN — MIDODRINE HYDROCHLORIDE 2.5 MG: 2.5 TABLET ORAL at 18:42

## 2023-12-05 RX ADMIN — DIPHENHYDRAMINE HYDROCHLORIDE 25 MG: 25 TABLET ORAL at 09:25

## 2023-12-05 RX ADMIN — Medication 10 ML: at 09:18

## 2023-12-05 RX ADMIN — PANTOPRAZOLE SODIUM 40 MG: 40 TABLET, DELAYED RELEASE ORAL at 06:42

## 2023-12-05 ASSESSMENT — PAIN SCALES - GENERAL
PAINLEVEL_OUTOF10: 7
PAINLEVEL_OUTOF10: 0

## 2023-12-05 NOTE — CARE COORDINATION
12/5/23 CM Note  Pt admitted to IP today Dx Syncope and collapse, prolonged QT and history of alcohol abuse. Offered Peer Recovery Services. Pt declined. States had made arrangement for counseling service that are set up for 2x/week. (Unable to recall name of service. )   Plans to return home when medically stable, friend will transport. No needs identified.    Junior Chin BSN RN-BC  [phone]

## 2023-12-05 NOTE — PROGRESS NOTES
INPATIENT CARDIOLOGY FOLLOW-UP    Name: Aishwarya Shankar    Age: 61 y.o. Date of Admission: 12/1/2023 10:37 AM    Date of Service: 12/5/2023    Chief Complaint: Follow-up for Syncope with recurrent falls and prolonged QT and history of alcohol abuse    Interim History:  No new overnight cardiac complaints. No further episodes of syncope, denies any alcohol withdrawal seizure. Today, she got dizzy and lightheaded on standing up. Orthostatics blood pressure was positive. Currently with no complaints of CP, SOB, palpitations, dizziness, or lightheadedness. SR on telemetry.     Review of Systems:   Cardiac: As per HPI  General: No fever, chills  Pulmonary: As per HPI  HEENT: No visual disturbances, difficult swallowing  GI: No nausea, vomiting  Endocrine: No thyroid disease or DM  Musculoskeletal: QUINTANILLA x 4, no focal motor deficits  Skin: Intact, no rashes  Neuro/Psych: No headache or seizures    Problem List:  Patient Active Problem List   Diagnosis    Osteoarthritis of left knee    Osteoarthritis of right knee    Low back pain    Lumbar disc displacement at L3-4, L4-5    Acute sinusitis    Syncope and collapse    Prolonged QT interval    Hx of Torsades de pointes (HCC)    Hypokalemia    Hypomagnesemia    NSVT (nonsustained ventricular tachycardia) (HCC)    Spondylolisthesis of lumbar region, L4 on L5    Acute alcoholic intoxication ([address])    Ileus ([address])    Delirium tremens ([address])    Closed nondisplaced fracture of greater tuberosity of right humerus with routine healing    Closed fracture of proximal end of left humerus with routine healing    Multiple closed fractures of metatarsal bone of left foot    Gait abnormality    Lumbar compression fracture, closed, initial encounter ([address])    Alcohol withdrawal with perceptual disturbances ([address])    Alcohol withdrawal delirium ([address])    Severe protein-calorie malnutrition ([address])    Nonrheumatic mitral valve regurgitation    Nonrheumatic tricuspid valve regurgitation

## 2023-12-05 NOTE — PROGRESS NOTES
[address]        VYKK:                                                  Patient Name: Rosalind Reynoso    MRN: [id number]    : 1960    Room: [address] [place] [address]          Evaluating OT: Amee Ortiz OTR/L; [id number]       Referring Provider: Demetrio Back MD    Specific Provider Orders/Date: OT Eval and Treat 23      Diagnosis: Syncope and collapse    Surgery: None this admission     Pertinent Medical History:  has a past medical history of Asthma, CAD (coronary artery disease), Closed fracture of proximal end of left humerus with routine healing, Degeneration of lumbar or lumbosacral intervertebral disc, Fall against sharp object, History of blood transfusion, Hypertension, Hypokalemia, Insomnia, Kidney stone, Nondisplaced fracture of greater tuberosity of right humerus, initial encounter for closed fracture, Orthostatic hypotension, and Severe back pain.      Recommended Adaptive Equipment: Extended tub bench     Precautions:  Fall Risk, +orthostatic hypotension, seizure precautions, sternal fx, chronic T1 compression fx, +alarms     Assessment of current deficits    [x] Functional mobility  [x]ADLs  [x] Strength               []Cognition    [x] Functional transfers   [x] IADLs         [x] Safety Awareness   [x]Endurance    [x] Fine Coordination              [x] Balance      [] Vision/perception   []Sensation     []Gross Motor Coordination  [] ROM  [] Delirium                   [] Motor Control     OT PLAN OF CARE   OT POC based on physician orders, patient diagnosis and results of clinical assessment    Frequency/Duration 1-3 days/wk for 2 weeks PRN   Specific OT Treatment Interventions to include:   * Instruction/training on adapted ADL techniques and AE recommendations to increase functional independence within precautions       * Training on energy conservation tie gown posteriorly seated EOB  Modified [place]    LB Dressing Moderate Assist   To don pants seated EOB w/ assist to manage over BLEs  Modified [place]    Bathing Minimal Assist  Simulated seated EOB  Modified [place]    Toileting Minimal Assist   Modified [place]    Bed Mobility  Supine to sit: NT   Sit to supine: Stand by Assist   Supine to sit: Independent   Sit to supine: Independent    Functional Transfers Sit to stand:SBA   Stand to sit:SBA  Stand pivot: NT  Commode: NT  Sit to stand:Modified [place]    Stand to sit:Modified [place]   Stand pivot: Modified [place]   Commode: Modified [place]     Functional Mobility Minimal Assist  HHA to take ~4 steps towards [facility]  Modified [place] w/ use of Appropriate AD   Balance Sitting:     Static - Supervision     Dynamic - SBA  Standing: Minimal Assist  Sitting:     Static: Independent     Dynamic: Independent  Standing: Modified [place]    Activity Tolerance Fair. +orthostatic hypotension - pt c/o mild dizziness w/ positional changes/  Good   Visual/  Perceptual Glasses: Yes  Appears WFL     Safety Fair  Good  during ADL completion   Vitals BP seated /86, 102 HR. BP standing 71/52, 110 HR. RN made aware. Hand Dominance Left   AROM (PROM) Strength Additional Info:  Goal: (PRN)   RUE  WFL 4/5 grossly tested good  and wfl FMC/dexterity noted during ADL tasks   Improve overall RUE strength WFL for participation in functional tasks       LUE WFL 4/5 grossly tested Good  and wfl FMC/dexterity noted during ADL tasks   Improve overall LUE strength WFL for participation in functional tasks        Hearing: [facility]  Sensation: No c/o numbness or tingling  Tone: WFL  Edema: Unremarkable    Comment: Cleared by RN to see pt. Upon arrival patient seated EOB and agreeable to OT session. At end of session, patient supine in bed with +bed alarms, call light and phone within reach, all lines and tubes intact.

## 2023-12-05 NOTE — PROGRESS NOTES
recognition software. Every effort was made to ensure accuracy; however, inadvertent computerized transcription errors may be present.   Electronically signed by Mak Mohamud MD on 12/5/2023 at 3:12 PM

## 2023-12-06 PROBLEM — R41.0 DELIRIUM: Status: ACTIVE | Noted: 2023-12-06

## 2023-12-06 PROBLEM — F10.10 ALCOHOL ABUSE: Status: ACTIVE | Noted: 2023-12-06

## 2023-12-06 LAB
ANION GAP SERPL CALCULATED.3IONS-SCNC: 13 MMOL/L (ref 7–16)
BUN SERPL-MCNC: 9 MG/DL (ref 6–23)
CALCIUM SERPL-MCNC: 9.4 MG/DL (ref 8.6–10.2)
CHLORIDE SERPL-SCNC: 97 MMOL/L (ref 98–107)
CO2 SERPL-SCNC: 24 MMOL/L (ref 22–29)
CREAT SERPL-MCNC: 0.5 MG/DL (ref 0.5–1)
GFR SERPL CREATININE-BSD FRML MDRD: >60 ML/MIN/1.73M2
GLUCOSE BLD-MCNC: 72 MG/DL (ref 74–99)
GLUCOSE BLD-MCNC: 96 MG/DL (ref 74–99)
GLUCOSE SERPL-MCNC: 96 MG/DL (ref 74–99)
POTASSIUM SERPL-SCNC: 4.2 MMOL/L (ref 3.5–5)
SODIUM SERPL-SCNC: 134 MMOL/L (ref 132–146)

## 2023-12-06 PROCEDURE — 82962 GLUCOSE BLOOD TEST: CPT

## 2023-12-06 PROCEDURE — 6360000002 HC RX W HCPCS: Performed by: STUDENT IN AN ORGANIZED HEALTH CARE EDUCATION/TRAINING PROGRAM

## 2023-12-06 PROCEDURE — 36415 COLL VENOUS BLD VENIPUNCTURE: CPT

## 2023-12-06 PROCEDURE — 6370000000 HC RX 637 (ALT 250 FOR IP)

## 2023-12-06 PROCEDURE — 99232 SBSQ HOSP IP/OBS MODERATE 35: CPT | Performed by: STUDENT IN AN ORGANIZED HEALTH CARE EDUCATION/TRAINING PROGRAM

## 2023-12-06 PROCEDURE — 2060000000 HC ICU INTERMEDIATE R&B

## 2023-12-06 PROCEDURE — 6370000000 HC RX 637 (ALT 250 FOR IP): Performed by: INTERNAL MEDICINE

## 2023-12-06 PROCEDURE — 80048 BASIC METABOLIC PNL TOTAL CA: CPT

## 2023-12-06 PROCEDURE — 99233 SBSQ HOSP IP/OBS HIGH 50: CPT | Performed by: NURSE PRACTITIONER

## 2023-12-06 PROCEDURE — 6370000000 HC RX 637 (ALT 250 FOR IP): Performed by: NURSE PRACTITIONER

## 2023-12-06 PROCEDURE — 6370000000 HC RX 637 (ALT 250 FOR IP): Performed by: HOSPITALIST

## 2023-12-06 PROCEDURE — 99232 SBSQ HOSP IP/OBS MODERATE 35: CPT | Performed by: INTERNAL MEDICINE

## 2023-12-06 RX ORDER — LORAZEPAM 2 MG/ML
1 INJECTION INTRAMUSCULAR ONCE
Status: DISCONTINUED | OUTPATIENT
Start: 2023-12-06 | End: 2023-12-06 | Stop reason: ALTCHOICE

## 2023-12-06 RX ORDER — HALOPERIDOL 5 MG/ML
2 INJECTION INTRAMUSCULAR ONCE
Status: COMPLETED | OUTPATIENT
Start: 2023-12-06 | End: 2023-12-06

## 2023-12-06 RX ORDER — HALOPERIDOL 5 MG/ML
5 INJECTION INTRAMUSCULAR ONCE
Status: DISCONTINUED | OUTPATIENT
Start: 2023-12-06 | End: 2023-12-06

## 2023-12-06 RX ORDER — ACAMPROSATE CALCIUM 333 MG/1
333 TABLET, DELAYED RELEASE ORAL 3 TIMES DAILY
Status: DISCONTINUED | OUTPATIENT
Start: 2023-12-06 | End: 2023-12-07 | Stop reason: HOSPADM

## 2023-12-06 RX ORDER — DIVALPROEX SODIUM 125 MG/1
125 CAPSULE, COATED PELLETS ORAL EVERY 8 HOURS SCHEDULED
Status: DISCONTINUED | OUTPATIENT
Start: 2023-12-06 | End: 2023-12-07 | Stop reason: HOSPADM

## 2023-12-06 RX ORDER — LANOLIN ALCOHOL/MO/W.PET/CERES
3 CREAM (GRAM) TOPICAL EVERY EVENING
Status: DISCONTINUED | OUTPATIENT
Start: 2023-12-06 | End: 2023-12-07 | Stop reason: HOSPADM

## 2023-12-06 RX ORDER — OLANZAPINE 5 MG/1
2.5 TABLET ORAL NIGHTLY
Status: DISCONTINUED | OUTPATIENT
Start: 2023-12-06 | End: 2023-12-07 | Stop reason: HOSPADM

## 2023-12-06 RX ORDER — DIAZEPAM 5 MG/ML
5 INJECTION, SOLUTION INTRAMUSCULAR; INTRAVENOUS ONCE
Status: COMPLETED | OUTPATIENT
Start: 2023-12-06 | End: 2023-12-06

## 2023-12-06 RX ADMIN — FOLIC ACID 1 MG: 1 TABLET ORAL at 08:13

## 2023-12-06 RX ADMIN — DIPHENHYDRAMINE HYDROCHLORIDE 25 MG: 25 TABLET ORAL at 04:09

## 2023-12-06 RX ADMIN — HYDROCODONE BITARTRATE AND ACETAMINOPHEN 1 TABLET: 7.5; 325 TABLET ORAL at 05:14

## 2023-12-06 RX ADMIN — DIVALPROEX SODIUM 125 MG: 125 CAPSULE, COATED PELLETS ORAL at 21:32

## 2023-12-06 RX ADMIN — OLANZAPINE 2.5 MG: 5 TABLET, FILM COATED ORAL at 21:32

## 2023-12-06 RX ADMIN — ACAMPROSATE CALCIUM 333 MG: 333 TABLET, DELAYED RELEASE ORAL at 14:38

## 2023-12-06 RX ADMIN — Medication 1 TABLET: at 08:13

## 2023-12-06 RX ADMIN — HALOPERIDOL LACTATE 2 MG: 5 INJECTION, SOLUTION INTRAMUSCULAR at 08:13

## 2023-12-06 RX ADMIN — MIDODRINE HYDROCHLORIDE 2.5 MG: 2.5 TABLET ORAL at 11:20

## 2023-12-06 RX ADMIN — ACAMPROSATE CALCIUM 333 MG: 333 TABLET, DELAYED RELEASE ORAL at 21:33

## 2023-12-06 RX ADMIN — DIAZEPAM 5 MG: 10 INJECTION, SOLUTION INTRAMUSCULAR; INTRAVENOUS at 11:20

## 2023-12-06 RX ADMIN — MIDODRINE HYDROCHLORIDE 2.5 MG: 2.5 TABLET ORAL at 16:32

## 2023-12-06 RX ADMIN — PANTOPRAZOLE SODIUM 40 MG: 40 TABLET, DELAYED RELEASE ORAL at 05:14

## 2023-12-06 RX ADMIN — DIVALPROEX SODIUM 125 MG: 125 CAPSULE, COATED PELLETS ORAL at 14:38

## 2023-12-06 RX ADMIN — MIDODRINE HYDROCHLORIDE 2.5 MG: 2.5 TABLET ORAL at 08:13

## 2023-12-06 RX ADMIN — LORAZEPAM 2 MG: 1 TABLET ORAL at 11:21

## 2023-12-06 RX ADMIN — ASPIRIN 81 MG: 81 TABLET, COATED ORAL at 21:32

## 2023-12-06 RX ADMIN — ASPIRIN 81 MG: 81 TABLET, COATED ORAL at 08:13

## 2023-12-06 RX ADMIN — Medication 100 MG: at 08:13

## 2023-12-06 ASSESSMENT — PAIN SCALES - GENERAL
PAINLEVEL_OUTOF10: 0

## 2023-12-06 NOTE — CONSULTS
to person, place, and time   Fund of Knowledge: Vocabulary intact, pt is aware of current events and past history  Attention and Concentration intact  Insight fair   Judgement fair      DIAGNOSIS:  Alcohol abuse      LABS: REVIEWED TODAY:  Recent Labs     12/04/23  0658   WBC 5.5   HGB 12.2        Recent Labs     12/04/23  1745 12/05/23  1102 12/06/23  0647   * 133 134   K 5.5* 5.1* 4.2   CL 97* 98 97*   CO2 18* 22 24   BUN 7 7 9   CREATININE 0.8 0.6 0.5   GLUCOSE 76 97 96     Recent Labs     12/04/23  0658   BILITOT 0.7   ALKPHOS 124*   AST 38*   ALT 22     Lab Results   Component Value Date/Time    LABAMPH NOT DETECTED 01/23/2023 05:15 AM    BARBSCNU NOT DETECTED 01/23/2023 05:15 AM    LABBENZ NOT DETECTED 01/23/2023 05:15 AM    LABMETH NOT DETECTED 01/23/2023 05:15 AM    OPIATESCREENURINE NOT DETECTED 01/23/2023 05:15 AM    PHENCYCLIDINESCREENURINE NOT DETECTED 01/23/2023 05:15 AM    PPXUR NEG 05/06/2015 01:27 PM    ETOH 294 12/12/2020 10:24 AM     Lab Results   Component Value Date/Time    TSH 0.768 03/27/2023 01:12 PM     No results found for: \"LITHIUM\"  No results found for: \"VALPROATE\", \"CBMZ\"  No results found for: \"LITHIUM\", \"VALPROATE\"    FURTHER LABS ORDERED :      Radiology   Echo (TTE) complete (PRN contrast/bubble/strain/3D)    Result Date: 12/4/2023    Left Ventricle: Normal left ventricular systolic function with a visually estimated EF of 60 - 65%. Left ventricle size is normal. Normal wall thickness. Ventricular mass is normal. Normal wall motion. Grade I diastolic dysfunction with normal LAP. Right Ventricle: Right ventricle size is normal. Normal systolic function. TAPSE is 1.8 cm. Aortic Valve: Mild regurgitation with a centrally directed jet. No stenosis. Tricuspid Valve: Mild to moderate regurgitation with a centrally directed jet. Normal RVSP. The estimated RVSP is 32 mmHg. Pericardium: No pericardial effusion.      CT CHEST W CONTRAST    Result Date: 12/1/2023  EXAM: CT noted. 7.  Otherwise no acute pathology noted. CT CSpine W/O Contrast    Result Date: 12/1/2023  EXAM: CT Cervical Spine Without Intravenous Contrast EXAM DATE/TIME: 12/1/2023 1:52 pm CLINICAL HISTORY: ORDERING SYSTEM PROVIDED HISTORY: fall with unknown head injury  TECHNOLOGIST PROVIDED HISTORY:  Reason for exam:->fall with unknown head injury  Decision Support Exception - unselect if not a suspected or confirmed emergency medical condition->Emergency Medical Condition (MA)  What reading provider will be dictating this exam?->CRC TECHNIQUE: Axial computed tomography images of the cervical spine without intravenous contrast.  This CT exam was performed using one or more of the following dose reduction techniques:  automated exposure control, adjustment of the mA and/or kV according to patient size, and/or use of iterative reconstruction technique. COMPARISON: 12/12/2020 FINDINGS: Vertebrae:  Suggestion of a mild inferior endplate compression fracture deformity of T1 when compared to the prior study but of indeterminate age, correlate clinically. Multilevel degenerative joint disease within the facets. Prominent left anterior osteophyte formation C5-T1. Discs/spinal canal/neural foramina:  C5-C6 and to a much lesser degree C4-C5 and C6-C7 degenerative disc disease. No spinal canal stenosis. Soft tissues:  No acute findings. 1.  Suggestion of a mild inferior endplate compression fracture deformity of T1 when compared to the prior study but of indeterminate age, correlate clinically. If further evaluation is warranted, an MRI examination could be considered. 2.  Degenerative changes as described.      CT Head W/O Contrast    Result Date: 12/1/2023  EXAMINATION: CT OF THE HEAD WITHOUT CONTRAST  12/1/2023 1:52 pm TECHNIQUE: CT of the head was performed without the administration of intravenous contrast. Automated exposure control, iterative reconstruction, and/or weight based adjustment of the mA/kV was

## 2023-12-06 NOTE — PROGRESS NOTES
Patient pulled out peripheral iv(see flowsheets), allowed this nurse to try to obtain another peripheral iv site. Attempts to the left forearm unsuccessful, patient does not want another iv site, stating \" I just want go outside and wait for the bus, I want to go home\".

## 2023-12-06 NOTE — CARE COORDINATION
12/06/23 CM Update:  LOS day 2 Pt admitted from ED for syncope and collapse Psych has been consulted for increasing delirium Pt plan is to return home when medically stable with a friend to transport CM to follow Electronically signed by Maia Augustin RN CM on 12/6/2023 at 2:21 PM

## 2023-12-07 VITALS
SYSTOLIC BLOOD PRESSURE: 102 MMHG | TEMPERATURE: 98.4 F | DIASTOLIC BLOOD PRESSURE: 66 MMHG | WEIGHT: 106 LBS | OXYGEN SATURATION: 97 % | RESPIRATION RATE: 16 BRPM | HEART RATE: 82 BPM | BODY MASS INDEX: 19.51 KG/M2 | HEIGHT: 62 IN

## 2023-12-07 LAB
ANION GAP SERPL CALCULATED.3IONS-SCNC: 11 MMOL/L (ref 7–16)
BUN SERPL-MCNC: 10 MG/DL (ref 6–23)
CALCIUM SERPL-MCNC: 9.3 MG/DL (ref 8.6–10.2)
CHLORIDE SERPL-SCNC: 100 MMOL/L (ref 98–107)
CO2 SERPL-SCNC: 25 MMOL/L (ref 22–29)
CREAT SERPL-MCNC: 0.6 MG/DL (ref 0.5–1)
GFR SERPL CREATININE-BSD FRML MDRD: >60 ML/MIN/1.73M2
GLUCOSE SERPL-MCNC: 97 MG/DL (ref 74–99)
POTASSIUM SERPL-SCNC: 4.5 MMOL/L (ref 3.5–5)
SODIUM SERPL-SCNC: 136 MMOL/L (ref 132–146)

## 2023-12-07 PROCEDURE — 99239 HOSP IP/OBS DSCHRG MGMT >30: CPT | Performed by: STUDENT IN AN ORGANIZED HEALTH CARE EDUCATION/TRAINING PROGRAM

## 2023-12-07 PROCEDURE — 6370000000 HC RX 637 (ALT 250 FOR IP): Performed by: INTERNAL MEDICINE

## 2023-12-07 PROCEDURE — 6370000000 HC RX 637 (ALT 250 FOR IP): Performed by: HOSPITALIST

## 2023-12-07 PROCEDURE — 36415 COLL VENOUS BLD VENIPUNCTURE: CPT

## 2023-12-07 PROCEDURE — 6370000000 HC RX 637 (ALT 250 FOR IP): Performed by: NURSE PRACTITIONER

## 2023-12-07 PROCEDURE — 80048 BASIC METABOLIC PNL TOTAL CA: CPT

## 2023-12-07 RX ORDER — LIDOCAINE 4 G/G
1 PATCH TOPICAL DAILY
Qty: 7 EACH | Refills: 0 | Status: SHIPPED | OUTPATIENT
Start: 2023-12-08 | End: 2023-12-15

## 2023-12-07 RX ORDER — ACAMPROSATE CALCIUM 333 MG/1
333 TABLET, DELAYED RELEASE ORAL 3 TIMES DAILY
Qty: 90 TABLET | Refills: 3 | Status: SHIPPED | OUTPATIENT
Start: 2023-12-07

## 2023-12-07 RX ORDER — LANOLIN ALCOHOL/MO/W.PET/CERES
100 CREAM (GRAM) TOPICAL DAILY
Qty: 30 TABLET | Refills: 3 | Status: SHIPPED | OUTPATIENT
Start: 2023-12-08

## 2023-12-07 RX ORDER — LANOLIN ALCOHOL/MO/W.PET/CERES
3 CREAM (GRAM) TOPICAL EVERY EVENING
Qty: 60 TABLET | Refills: 0 | Status: SHIPPED | OUTPATIENT
Start: 2023-12-07 | End: 2024-02-05

## 2023-12-07 RX ORDER — DIVALPROEX SODIUM 125 MG/1
125 CAPSULE, COATED PELLETS ORAL EVERY 8 HOURS SCHEDULED
Qty: 6 CAPSULE | Refills: 0 | Status: SHIPPED | OUTPATIENT
Start: 2023-12-07 | End: 2023-12-09

## 2023-12-07 RX ORDER — MIDODRINE HYDROCHLORIDE 2.5 MG/1
2.5 TABLET ORAL
Qty: 90 TABLET | Refills: 3 | Status: SHIPPED | OUTPATIENT
Start: 2023-12-07

## 2023-12-07 RX ORDER — OLANZAPINE 2.5 MG/1
2.5 TABLET, FILM COATED ORAL NIGHTLY
Qty: 2 TABLET | Refills: 0 | Status: SHIPPED | OUTPATIENT
Start: 2023-12-07 | End: 2023-12-09

## 2023-12-07 RX ADMIN — Medication 100 MG: at 08:40

## 2023-12-07 RX ADMIN — ASPIRIN 81 MG: 81 TABLET, COATED ORAL at 08:40

## 2023-12-07 RX ADMIN — FOLIC ACID 1 MG: 1 TABLET ORAL at 08:41

## 2023-12-07 RX ADMIN — DIVALPROEX SODIUM 125 MG: 125 CAPSULE, COATED PELLETS ORAL at 06:50

## 2023-12-07 RX ADMIN — PANTOPRAZOLE SODIUM 40 MG: 40 TABLET, DELAYED RELEASE ORAL at 06:50

## 2023-12-07 RX ADMIN — ACAMPROSATE CALCIUM 333 MG: 333 TABLET, DELAYED RELEASE ORAL at 08:45

## 2023-12-07 RX ADMIN — Medication 1 TABLET: at 08:40

## 2023-12-07 RX ADMIN — MIDODRINE HYDROCHLORIDE 2.5 MG: 2.5 TABLET ORAL at 08:40

## 2023-12-07 ASSESSMENT — PAIN SCALES - GENERAL: PAINLEVEL_OUTOF10: 0

## 2023-12-07 NOTE — CARE COORDINATION
12/7/23 Update CM Note  Pt admitted to IP to 12/4/23 Dx Syncope and collapse, prolonged QT and history of alcohol abuse. Discharge order noted. Plan to return home, friend will provide transportation. Pt to follow up with outpatient counseling services twice weekly that was scheduled prior to admission. No additional needs.    Rishi Reyes BSN RN-BC  [phone]

## 2023-12-07 NOTE — DISCHARGE SUMMARY
THE HEAD WITHOUT CONTRAST  12/1/2023 1:52 pm TECHNIQUE: CT of the head was performed without the administration of intravenous contrast. Automated exposure control, iterative reconstruction, and/or weight based adjustment of the mA/kV was utilized to reduce the radiation dose to as low as reasonably achievable. COMPARISON: 07/21/2022 HISTORY: ORDERING SYSTEM PROVIDED HISTORY: fall with unknown head injury TECHNOLOGIST PROVIDED HISTORY: Reason for exam:->fall with unknown head injury Has a \"code stroke\" or \"stroke alert\" been called? ->No Decision Support Exception - unselect if not a suspected or confirmed emergency medical condition->Emergency Medical Condition (MA) What reading provider will be dictating this exam?->CRC FINDINGS: BRAIN/VENTRICLES: There is no acute intracranial hemorrhage, mass effect or midline shift. No abnormal extra-axial fluid collection. The gray-white differentiation is maintained without evidence of an acute infarct. There is no evidence of hydrocephalus. Atherosclerotic vascular calcifications. Age related changes of brain volume loss and nonspecific white matter hypodensity most often ascribed to chronic small vessel ischemia. ORBITS: The visualized portion of the orbits demonstrate no acute abnormality. SINUSES: The visualized paranasal sinuses and mastoid air cells demonstrate no acute abnormality. SOFT TISSUES/SKULL:  No acute abnormality of the visualized skull or soft tissues. No evidence of acute intracranial hemorrhage or mass effect.        Discharge Medications:      Medication List        START taking these medications      acamprosate 333 MG tablet  Commonly known as: CAMPRAL  Take 1 tablet by mouth 3 times daily     divalproex 125 MG DR capsule  Commonly known as: DEPAKOTE SPRINKLE  Take 1 capsule by mouth every 8 hours for 6 doses     folic acid 1 MG tablet  Commonly known as: FOLVITE  Take 1 tablet by mouth daily     lidocaine 4 % external patch  Place 1 patch onto the skin daily for 7 days  Start taking on: December 8, 2023     melatonin 3 MG Tabs tablet  Take 1 tablet by mouth every evening     midodrine 2.5 MG tablet  Commonly known as: PROAMATINE  Take 1 tablet by mouth 3 times daily (with meals)     OLANZapine 2.5 MG tablet  Commonly known as: ZYPREXA  Take 1 tablet by mouth nightly for 2 doses     thiamine 100 MG tablet  Take 1 tablet by mouth daily  Start taking on: December 8, 2023            Randall Odell taking these medications      acetaminophen 325 MG tablet  Commonly known as: TYLENOL     albuterol sulfate  (90 Base) MCG/ACT inhaler  Commonly known as: PROVENTIL;VENTOLIN;PROAIR     aspirin 81 MG EC tablet  Take 1 tablet by mouth in the morning and at bedtime     eucerin lotion     HYDROcodone-acetaminophen 7.5-325 MG per tablet  Commonly known as: Ronny Lozano            STOP taking these medications      amitriptyline 25 MG tablet  Commonly known as: ELAVIL            ASK your doctor about these medications      omeprazole 40 MG delayed release capsule  Commonly known as: PRILOSEC     therapeutic multivitamin-minerals tablet               Where to Get Your Medications        These medications were sent to [id number] [place], [address] [phone] [place] [id number] [facility] [address]      Phone: [phone]   acamprosate 333 MG tablet  divalproex 125 MG DR capsule  lidocaine 4 % external patch  melatonin 3 MG Tabs tablet  midodrine 2.5 MG tablet  OLANZapine 2.5 MG tablet  thiamine 100 MG tablet         Time Spent on discharge is more than 45 minutes in the examination, evaluation, counseling and review of medications and discharge plan.    +++++++++++++++++++++++++++++++++++++++++++++++++  Bina Reich MD  [address], [place]  +++++++++++++++++++++++++++++++++++++++++++++++++  NOTE: This report was transcribed using voice recognition

## 2023-12-07 NOTE — PLAN OF CARE
Problem: Discharge Planning  Goal: Discharge to home or other facility with appropriate resources  Outcome: Progressing     Problem: Pain  Goal: Verbalizes/displays adequate comfort level or baseline comfort level  Outcome: Progressing     Problem: Skin/Tissue Integrity  Goal: Absence of new skin breakdown  Description: 1. Monitor for areas of redness and/or skin breakdown  2. Assess vascular access sites hourly  3. Every 4-6 hours minimum:  Change oxygen saturation probe site  4. Every 4-6 hours:  If on nasal continuous positive airway pressure, respiratory therapy assess nares and determine need for appliance change or resting period.   Outcome: Progressing     Problem: ABCDS Injury Assessment  Goal: Absence of physical injury  12/7/2023 0840 by Reva Escalera RN  Outcome: Progressing     Problem: Safety - Adult  Goal: Free from fall injury  12/7/2023 0840 by Reva Escalera RN  Outcome: Progressing

## 2023-12-07 NOTE — PLAN OF CARE
Problem: ABCDS Injury Assessment  Goal: Absence of physical injury  12/7/2023 0143 by Linford Bence, RN  Outcome: Progressing     Problem: Safety - Adult  Goal: Free from fall injury  12/7/2023 0143 by Linford Bence, RN  Outcome: Progressing

## 2023-12-07 NOTE — PROGRESS NOTES
Reviewed discharge instructions with patient.  All questions answered to patient satisfaction     Electronically signed by Igor Remy RN on 12/7/2023 at 11:33 AM

## 2023-12-12 DIAGNOSIS — S82.852D CLOSED DISPLACED TRIMALLEOLAR FRACTURE OF LEFT ANKLE WITH ROUTINE HEALING, SUBSEQUENT ENCOUNTER: Primary | ICD-10-CM

## 2023-12-13 ENCOUNTER — HOSPITAL ENCOUNTER (OUTPATIENT)
Dept: GENERAL RADIOLOGY | Age: 63
Discharge: HOME | End: 2023-12-15
Payer: MEDICAID

## 2023-12-13 ENCOUNTER — OFFICE VISIT (OUTPATIENT)
Dept: ORTHOPEDIC SURGERY | Age: 63
End: 2023-12-13
Payer: MEDICAID

## 2023-12-13 VITALS — HEIGHT: 62 IN | WEIGHT: 100 LBS | BODY MASS INDEX: 18.4 KG/M2

## 2023-12-13 DIAGNOSIS — Z98.890 S/P HARDWARE REMOVAL: Primary | ICD-10-CM

## 2023-12-13 DIAGNOSIS — S82.852D CLOSED DISPLACED TRIMALLEOLAR FRACTURE OF LEFT ANKLE WITH ROUTINE HEALING, SUBSEQUENT ENCOUNTER: ICD-10-CM

## 2023-12-13 DIAGNOSIS — R60.9 SWELLING: ICD-10-CM

## 2023-12-13 DIAGNOSIS — R52 PAIN: ICD-10-CM

## 2023-12-13 PROCEDURE — L4350 ANKLE CONTROL ORTHO PRE OTS: HCPCS | Performed by: PHYSICIAN ASSISTANT

## 2023-12-13 PROCEDURE — 73610 X-RAY EXAM OF ANKLE: CPT

## 2023-12-13 PROCEDURE — 99213 OFFICE O/P EST LOW 20 MIN: CPT | Performed by: PHYSICIAN ASSISTANT

## 2023-12-13 PROCEDURE — 73630 X-RAY EXAM OF FOOT: CPT

## 2023-12-13 RX ORDER — METHOCARBAMOL 750 MG/1
750 TABLET, FILM COATED ORAL 3 TIMES DAILY
COMMUNITY
Start: 2023-11-15

## 2023-12-13 NOTE — PROGRESS NOTES
Chief Complaint   Patient presents with    Follow-up     Patient here for follow up on [facility] Left ankle, DOS 08/17/2023. Patient states that she fell recently, within the last month and she landed on the left ankle causing instant pain to the left foot/ankle area. Patient is wearing compression stockings due to swelling in the lowe extremities. Patient does have present swelling in left ankle/foot today. OP:SURGEON: Dr. Kortney Rodriguez MD  DATE OF PROCEDURE: 8/17/2023  PROCEDURE: Left ankle hardware removal    Subjective:  Nikolay Hicks is f/u for 3 week history of a fall. States she is seeing her PCP in the near future, as she has experienced multiple syncopal events recently. She had a syncopal event and fell, injuring her L ankle 3 weeks ago. She is not using compression or CAM boot. She is in normal shoes today, FWB without AD. States she is still very edematous, but this has mildly improved recently. Pain mostly inferior and posterior to lateral malleolus. Review of Systems -  all pertinent positives and negatives in HPI. Objective:    General: Alert and oriented X 3, normocephalic atraumatic, external ears and eye normal, sclera clear, no acute distress, respirations easy and unlabored with no audible wheezes, skin warm and dry, speech and dress appropriate for noted age, affect euthymic. Extremity:  Left Lower Extremity  Skin clean dry and intact, without signs of infection  Significant diffuse ankle and foot edema with moderate diffuse ankle TTP   Compartments supple throughout thigh and leg  Calf supple and nontender  Demonstrates active knee flexion/extension, ankle plantar/dorsiflexion/great toe extension. States sensation intact to touch in sural/deep peroneal/superficial peroneal/saphenous/posterior tibial nerve distributions to foot/ankle. Palpable dorsalis pedis and posterior tibialis pulses, cap refill brisk in toes, foot warm/perfused.            XR:     EXAMINATION:  THREE

## 2023-12-13 NOTE — PATIENT INSTRUCTIONS
WBAT with CAM boot. CAM boot only needs to be worn when ambulating  Patient may come out of boot for sleep and hygiene.   If no improvement over the next 3-4 weeks, patient to call the ortho office and we can order MRI

## 2023-12-26 ENCOUNTER — TELEPHONE (OUTPATIENT)
Dept: ORTHOPEDIC SURGERY | Age: 63
End: 2023-12-26

## 2023-12-26 ENCOUNTER — APPOINTMENT (OUTPATIENT)
Dept: GENERAL RADIOLOGY | Age: 63
End: 2023-12-26
Payer: MEDICAID

## 2023-12-26 ENCOUNTER — APPOINTMENT (OUTPATIENT)
Dept: ULTRASOUND IMAGING | Age: 63
End: 2023-12-26
Payer: MEDICAID

## 2023-12-26 VITALS
BODY MASS INDEX: 20.24 KG/M2 | HEART RATE: 98 BPM | OXYGEN SATURATION: 96 % | RESPIRATION RATE: 20 BRPM | WEIGHT: 110 LBS | HEIGHT: 62 IN | SYSTOLIC BLOOD PRESSURE: 157 MMHG | DIASTOLIC BLOOD PRESSURE: 104 MMHG

## 2023-12-26 DIAGNOSIS — Z98.890 S/P HARDWARE REMOVAL: Primary | ICD-10-CM

## 2023-12-26 LAB
ALBUMIN SERPL-MCNC: 3.3 G/DL (ref 3.5–5.2)
ALP SERPL-CCNC: 127 U/L (ref 35–104)
ALT SERPL-CCNC: 40 U/L (ref 0–32)
ANION GAP SERPL CALCULATED.3IONS-SCNC: 11 MMOL/L (ref 7–16)
AST SERPL-CCNC: 24 U/L (ref 0–31)
BASOPHILS # BLD: 0.11 K/UL (ref 0–0.2)
BASOPHILS NFR BLD: 1 % (ref 0–2)
BILIRUB SERPL-MCNC: 0.5 MG/DL (ref 0–1.2)
BNP SERPL-MCNC: 389 PG/ML (ref 0–125)
BUN SERPL-MCNC: 11 MG/DL (ref 6–23)
CALCIUM SERPL-MCNC: 8.9 MG/DL (ref 8.6–10.2)
CHLORIDE SERPL-SCNC: 108 MMOL/L (ref 98–107)
CO2 SERPL-SCNC: 23 MMOL/L (ref 22–29)
CREAT SERPL-MCNC: 0.5 MG/DL (ref 0.5–1)
EOSINOPHIL # BLD: 0.18 K/UL (ref 0.05–0.5)
EOSINOPHILS RELATIVE PERCENT: 2 % (ref 0–6)
ERYTHROCYTE [DISTWIDTH] IN BLOOD BY AUTOMATED COUNT: 17.3 % (ref 11.5–15)
GFR SERPL CREATININE-BSD FRML MDRD: >60 ML/MIN/1.73M2
GLUCOSE SERPL-MCNC: 89 MG/DL (ref 74–99)
HCT VFR BLD AUTO: 28.8 % (ref 34–48)
HGB BLD-MCNC: 9.4 G/DL (ref 11.5–15.5)
IMM GRANULOCYTES # BLD AUTO: 0.03 K/UL (ref 0–0.58)
IMM GRANULOCYTES NFR BLD: 0 % (ref 0–5)
INR PPP: 1
LYMPHOCYTES NFR BLD: 3.57 K/UL (ref 1.5–4)
LYMPHOCYTES RELATIVE PERCENT: 42 % (ref 20–42)
MCH RBC QN AUTO: 32.3 PG (ref 26–35)
MCHC RBC AUTO-ENTMCNC: 32.6 G/DL (ref 32–34.5)
MCV RBC AUTO: 99 FL (ref 80–99.9)
MONOCYTES NFR BLD: 1.01 K/UL (ref 0.1–0.95)
MONOCYTES NFR BLD: 12 % (ref 2–12)
NEUTROPHILS NFR BLD: 42 % (ref 43–80)
NEUTS SEG NFR BLD: 3.57 K/UL (ref 1.8–7.3)
PARTIAL THROMBOPLASTIN TIME: 36.6 SEC (ref 24.5–35.1)
PLATELET # BLD AUTO: 253 K/UL (ref 130–450)
PMV BLD AUTO: 11.2 FL (ref 7–12)
POTASSIUM SERPL-SCNC: 3.7 MMOL/L (ref 3.5–5)
PROT SERPL-MCNC: 6 G/DL (ref 6.4–8.3)
PROTHROMBIN TIME: 11.2 SEC (ref 9.3–12.4)
RBC # BLD AUTO: 2.91 M/UL (ref 3.5–5.5)
SODIUM SERPL-SCNC: 142 MMOL/L (ref 132–146)
WBC OTHER # BLD: 8.5 K/UL (ref 4.5–11.5)

## 2023-12-26 PROCEDURE — 80053 COMPREHEN METABOLIC PANEL: CPT

## 2023-12-26 PROCEDURE — 93971 EXTREMITY STUDY: CPT

## 2023-12-26 PROCEDURE — 71045 X-RAY EXAM CHEST 1 VIEW: CPT

## 2023-12-26 PROCEDURE — 83880 ASSAY OF NATRIURETIC PEPTIDE: CPT

## 2023-12-26 PROCEDURE — 85025 COMPLETE CBC W/AUTO DIFF WBC: CPT

## 2023-12-26 PROCEDURE — 6370000000 HC RX 637 (ALT 250 FOR IP): Performed by: NURSE PRACTITIONER

## 2023-12-26 PROCEDURE — 85610 PROTHROMBIN TIME: CPT

## 2023-12-26 PROCEDURE — 73630 X-RAY EXAM OF FOOT: CPT

## 2023-12-26 PROCEDURE — 73610 X-RAY EXAM OF ANKLE: CPT

## 2023-12-26 PROCEDURE — 99284 EMERGENCY DEPT VISIT MOD MDM: CPT

## 2023-12-26 PROCEDURE — 85730 THROMBOPLASTIN TIME PARTIAL: CPT

## 2023-12-26 RX ORDER — HYDROCODONE BITARTRATE AND ACETAMINOPHEN 5; 325 MG/1; MG/1
1 TABLET ORAL ONCE
Status: COMPLETED | OUTPATIENT
Start: 2023-12-26 | End: 2023-12-26

## 2023-12-26 RX ADMIN — HYDROCODONE BITARTRATE AND ACETAMINOPHEN 1 TABLET: 5; 325 TABLET ORAL at 21:05

## 2023-12-26 ASSESSMENT — PAIN DESCRIPTION - DESCRIPTORS: DESCRIPTORS: ACHING

## 2023-12-26 ASSESSMENT — PAIN DESCRIPTION - ORIENTATION: ORIENTATION: LEFT

## 2023-12-26 ASSESSMENT — LIFESTYLE VARIABLES
HOW OFTEN DO YOU HAVE A DRINK CONTAINING ALCOHOL: NEVER
HOW MANY STANDARD DRINKS CONTAINING ALCOHOL DO YOU HAVE ON A TYPICAL DAY: PATIENT DOES NOT DRINK
HOW OFTEN DO YOU HAVE A DRINK CONTAINING ALCOHOL: NEVER

## 2023-12-26 ASSESSMENT — PAIN DESCRIPTION - LOCATION: LOCATION: ANKLE

## 2023-12-26 ASSESSMENT — PAIN SCALES - GENERAL: PAINLEVEL_OUTOF10: 10

## 2023-12-26 ASSESSMENT — PAIN - FUNCTIONAL ASSESSMENT: PAIN_FUNCTIONAL_ASSESSMENT: 0-10

## 2023-12-26 NOTE — TELEPHONE ENCOUNTER
If there is concern for DVT sxs, I agree she should seek urgent evaluation for ultrasound. We can see her in the next couple of weeks for re-evaluation. I discussed with her during her appt 2 weeks ago that if she was not seeing any improvement in the next few weeks, we could order L ankle MRI for further evaluation.

## 2023-12-26 NOTE — TELEPHONE ENCOUNTER
Pt called office and left voice message stating that she still has swelling and bruising of LLE. Pt requesting advice on how to follow-up. Routed to provider for review. Returned call to pt and she stated she is having pain at the back of knee and calf, increased swelling, and leg feels warm to touch. Informed pt those are s&s of clot. Advised her to seek treatment at [facility] ED. Pt agreeable and stated she would come into the ER for treatment & eval when she gets off work at 1700.      Last office visit:    Plan:  Reviewed imaging with patient today in office   Otc analgesics, RICE tx prn  WBAT with CAM boot   If no improvement over the next 3-4 weeks, patient to call the ortho office and we can order MRI     Follow up prn      Electronically signed by Devon Gomez PA-C on 12/15/2023 at 9:57 AM

## 2023-12-27 ENCOUNTER — HOSPITAL ENCOUNTER (EMERGENCY)
Age: 63
Discharge: HOME | End: 2023-12-27
Payer: MEDICAID

## 2023-12-27 ENCOUNTER — APPOINTMENT (OUTPATIENT)
Dept: CT IMAGING | Age: 63
End: 2023-12-27
Payer: MEDICAID

## 2023-12-27 DIAGNOSIS — M79.89 LEFT LEG SWELLING: Primary | ICD-10-CM

## 2023-12-27 DIAGNOSIS — D64.9 ANEMIA, UNSPECIFIED TYPE: ICD-10-CM

## 2023-12-27 DIAGNOSIS — I10 HYPERTENSION, UNSPECIFIED TYPE: ICD-10-CM

## 2023-12-27 DIAGNOSIS — R51.9 NONINTRACTABLE HEADACHE, UNSPECIFIED CHRONICITY PATTERN, UNSPECIFIED HEADACHE TYPE: ICD-10-CM

## 2023-12-27 PROCEDURE — 70450 CT HEAD/BRAIN W/O DYE: CPT

## 2023-12-27 NOTE — TELEPHONE ENCOUNTER
Pt was seen in Ed yesterday for leg swelling. Do you still want to place an MRI order for the ankle. Office will also call and schedule patient for 2-3 week follow up.

## 2023-12-27 NOTE — TELEPHONE ENCOUNTER
I attempted to contact the patient but it went straight to voicemail, I left a detailed message stating to give the office a call to schedule 3-4 weeks per providers MRI Result; L leg swelling, possible DVT. I also made her aware in the message MRI will be ordered.

## 2024-01-11 ENCOUNTER — HOSPITAL ENCOUNTER (OUTPATIENT)
Dept: MRI IMAGING | Age: 64
Discharge: HOME | End: 2024-01-13
Payer: MEDICAID

## 2024-01-11 DIAGNOSIS — Z98.890 S/P HARDWARE REMOVAL: ICD-10-CM

## 2024-01-11 PROCEDURE — 73721 MRI JNT OF LWR EXTRE W/O DYE: CPT

## 2024-01-17 ENCOUNTER — OFFICE VISIT (OUTPATIENT)
Dept: ORTHOPEDIC SURGERY | Age: 64
End: 2024-01-17
Payer: MEDICAID

## 2024-01-17 DIAGNOSIS — S82.852D CLOSED DISPLACED TRIMALLEOLAR FRACTURE OF LEFT ANKLE WITH ROUTINE HEALING, SUBSEQUENT ENCOUNTER: Primary | ICD-10-CM

## 2024-01-17 PROCEDURE — 3017F COLORECTAL CA SCREEN DOC REV: CPT | Performed by: ORTHOPAEDIC SURGERY

## 2024-01-17 PROCEDURE — G8427 DOCREV CUR MEDS BY ELIG CLIN: HCPCS | Performed by: ORTHOPAEDIC SURGERY

## 2024-01-17 PROCEDURE — 99213 OFFICE O/P EST LOW 20 MIN: CPT | Performed by: ORTHOPAEDIC SURGERY

## 2024-01-17 PROCEDURE — G8420 CALC BMI NORM PARAMETERS: HCPCS | Performed by: ORTHOPAEDIC SURGERY

## 2024-01-17 PROCEDURE — 4004F PT TOBACCO SCREEN RCVD TLK: CPT | Performed by: ORTHOPAEDIC SURGERY

## 2024-01-17 PROCEDURE — 99212 OFFICE O/P EST SF 10 MIN: CPT | Performed by: ORTHOPAEDIC SURGERY

## 2024-01-17 PROCEDURE — G8484 FLU IMMUNIZE NO ADMIN: HCPCS | Performed by: ORTHOPAEDIC SURGERY

## 2024-01-17 RX ORDER — HYDROCODONE BITARTRATE AND IBUPROFEN 7.5; 2 MG/1; MG/1
1 TABLET, FILM COATED ORAL EVERY 8 HOURS PRN
COMMUNITY
Start: 2023-10-18

## 2024-01-17 RX ORDER — MULTIVITAMIN WITH FOLIC ACID 400 MCG
1 TABLET ORAL DAILY
COMMUNITY
Start: 2023-12-04

## 2024-01-17 RX ORDER — MELOXICAM 15 MG/1
15 TABLET ORAL DAILY
Qty: 30 TABLET | Refills: 0 | Status: SHIPPED | OUTPATIENT
Start: 2024-01-17

## 2024-01-17 NOTE — PATIENT INSTRUCTIONS
Activity as tolerated with ice and elevation left lower extremity    Mobic sent for anti-inflammatory    Follow-up here in 1 month, call sooner with any worsening of symptoms

## 2024-01-18 NOTE — PROGRESS NOTES
Chief Complaint   Patient presents with    Follow-up     left ankle MRI review for ASHLEY. Pt ambulating w/o assist w/ CAM boot. Pt states no pain at this time.        SUBJECTIVE: Patient is a very pleasant 63-year-old female comes in today with left ankle pain.  We will following her she had ankle fracture approximately a year ago then hardware removal in August of last year.  She then recently twisted her ankle having significant pain and swelling once again.  She has been able to walk on it with and without a boot.  Her swelling is coming down slightly.  The pain is improving.  We ordered an MRI to check ligamentous structures showing no significant ligamentous damage.  She does have some Achilles tendinosis and some fraying of her posterior tibial tendon.  It also showed an incompletely healed fibular fracture although she is having no pain on the lateral side of her ankle.  I talked her in detail today explaining these findings can cause issues down the road but currently there is no operative indication.  She would like to hold off on therapy for now due to the fact she is improving.  She is going to start weaning out of the boot.  Will start her on an anti-inflammatory and see her back in about a month.  She is agreeable with the plan.  Is also going to continue icing and elevating in the evenings.          Past Medical History:   Diagnosis Date    Alcohol abuse 12/6/2023    Asthma     CAD (coronary artery disease)     Closed fracture of proximal end of left humerus with routine healing 6/11/2019    Degeneration of lumbar or lumbosacral intervertebral disc     Fall against sharp object 1960    chest tube in hospital    History of blood transfusion 1997    after vaginal delivery of baby hemmorhage    Hypertension     Hypokalemia     Insomnia     Kidney stone     Nondisplaced fracture of greater tuberosity of right humerus, initial encounter for closed fracture 1/22/2019    Orthostatic hypotension     Severe back

## 2024-02-21 ENCOUNTER — HOSPITAL ENCOUNTER (OUTPATIENT)
Dept: GENERAL RADIOLOGY | Age: 64
Discharge: HOME | End: 2024-02-23
Payer: MEDICAID

## 2024-02-21 ENCOUNTER — OFFICE VISIT (OUTPATIENT)
Dept: ORTHOPEDIC SURGERY | Age: 64
End: 2024-02-21
Payer: MEDICAID

## 2024-02-21 DIAGNOSIS — Z98.890 S/P HARDWARE REMOVAL: Primary | ICD-10-CM

## 2024-02-21 DIAGNOSIS — Z98.890 S/P HARDWARE REMOVAL: ICD-10-CM

## 2024-02-21 PROCEDURE — 73610 X-RAY EXAM OF ANKLE: CPT

## 2024-02-21 PROCEDURE — 99213 OFFICE O/P EST LOW 20 MIN: CPT | Performed by: ORTHOPAEDIC SURGERY

## 2024-02-21 PROCEDURE — 29405 APPL SHORT LEG CAST: CPT | Performed by: ORTHOPAEDIC SURGERY

## 2024-02-21 RX ORDER — HYDROXYZINE PAMOATE 25 MG/1
25 CAPSULE ORAL 3 TIMES DAILY PRN
Qty: 21 CAPSULE | Refills: 0 | Status: SHIPPED | OUTPATIENT
Start: 2024-02-21 | End: 2024-02-28

## 2024-02-21 RX ORDER — HYDROXYZINE HYDROCHLORIDE 25 MG/1
25 TABLET, FILM COATED ORAL 3 TIMES DAILY PRN
COMMUNITY
Start: 2024-02-15

## 2024-02-21 RX ORDER — AZELASTINE HYDROCHLORIDE 137 UG/1
1 SPRAY, METERED NASAL DAILY PRN
COMMUNITY
Start: 2024-02-01

## 2024-02-21 RX ORDER — FLUTICASONE PROPIONATE 50 MCG
1 SPRAY, SUSPENSION (ML) NASAL DAILY PRN
COMMUNITY
Start: 2024-02-01

## 2024-02-21 NOTE — PROGRESS NOTES
Chief Complaint   Patient presents with    Follow-up     Here for 4 wk follow up.  MRI 1/11/24. Had ASHLEY left ankle DOS 8/17/23. Ambulating slowly without assist. States pain 7/10.       SUBJECTIVE: Patient is a very pleasant 63-year-old female here for follow-up after appointment approximately a month ago.  She had a twisting injury to her ankle that had previously been fixing the hardware removal.  She continues to have inflammation and swelling in the ankle.  She has pain when weightbearing.  She has pain when she is on her boot.  She denies any further injury.  She said the anti-inflammatory was minimally helpful.  She is able to weight-bear although with pain.          Past Medical History:   Diagnosis Date    Alcohol abuse 12/06/2023    Asthma     CAD (coronary artery disease)     Closed fracture of proximal end of left humerus with routine healing 06/11/2019    Degeneration of lumbar or lumbosacral intervertebral disc     Fall against sharp object 1960    chest tube in hospital    History of blood transfusion 1997    after vaginal delivery of baby hemmorhage    Hypertension     Hypokalemia     Insomnia     Kidney stone     Nondisplaced fracture of greater tuberosity of right humerus, initial encounter for closed fracture 01/22/2019    Orthostatic hypotension     Severe back pain 02/03/2014    TIA (transient ischemic attack) 04/13/2021       Past Surgical History:   Procedure Laterality Date    ANKLE FRACTURE SURGERY Left 1/21/2023    LEFT ANKLE OPEN REDUCTION INTERNAL FIXATION performed by Memo Conde MD at [facility] [place]    ANKLE SURGERY Left 8/17/2023    LEFT ANKLE HARDWARE REMOVAL performed by Memo Conde MD at [facility]    BUNIONECTOMY      Left foot    CHEST TUBE INSERTION  1990    several    COLONOSCOPY  3/26/09    KYPHOSIS SURGERY N/A 10/28/2019    KYPHOPLASTY L1 WITH VERTEBRAL BONE BIOPSY performed by Jeanne Donahue MD at [facility]    LITHOTRIPSY  2012    LUMBAR FUSION

## 2024-02-21 NOTE — PROGRESS NOTES
A well padded, short leg cast was applied to his/her Left LE.  Neurovascular status was checked pre and post application.  Patient was neurovascularly intact after the application process.  The patient denied any issues with fit or comfort of the cast/splint.  Patient insrtucted to keep cast/splint clean and dry, do not get wet, and NO activities that put them at risk for falling.  Patient instructed to call our office if there are any issues with the cast/splint.      Electronically signed by Woo Alvarenga MA on 2/21/2024 at 3:49 PM

## 2024-02-21 NOTE — PATIENT INSTRUCTIONS
Keep cast clean and dry    Weight-bear as tolerated left lower extremity in cast with cast shoe on    Follow-up in 3 weeks for cast removal and x-rays of left ankle    Call sooner with any questions, concerns, or irritation  Cast    Voltaren sent to replace meloxicam, do not take meloxicam once Voltaren started.

## 2024-02-22 ENCOUNTER — NURSE ONLY (OUTPATIENT)
Dept: ORTHOPEDIC SURGERY | Age: 64
End: 2024-02-22
Payer: MEDICAID

## 2024-02-22 DIAGNOSIS — Z98.890 S/P HARDWARE REMOVAL: Primary | ICD-10-CM

## 2024-02-22 PROCEDURE — 99211 OFF/OP EST MAY X REQ PHY/QHP: CPT | Performed by: PHYSICIAN ASSISTANT

## 2024-02-22 PROCEDURE — L4350 ANKLE CONTROL ORTHO PRE OTS: HCPCS

## 2024-02-22 PROCEDURE — 99212 OFFICE O/P EST SF 10 MIN: CPT

## 2024-02-22 NOTE — PROGRESS NOTES
Patient presents the office 1 day after last being seen, yesterday.  Yesterday she was placed into a walking cast for an ankle sprain.  States that the cast was very uncomfortable and presents today to transition from this cast into a walking boot.  Patient was fitted for the boot today.  She is weightbearing as tolerated on the left lower extremity.  NVI before and after cast removal and boot placement.  Call with any questions or concerns, she can follow-up on 3/13    Future Appointments   Date Time Provider Department Center   3/13/2024  2:15 PM Memo Conde MD  [facility]

## 2024-02-22 NOTE — PROGRESS NOTES
Patient  came in stating cast was to tight after  further eval cast was crack at the bottom and was proper fitting. Cast was removed and place in a walking boot per. Provider

## 2024-03-08 DIAGNOSIS — Z98.890 S/P HARDWARE REMOVAL: Primary | ICD-10-CM

## 2024-03-13 ENCOUNTER — HOSPITAL ENCOUNTER (OUTPATIENT)
Dept: GENERAL RADIOLOGY | Age: 64
Discharge: HOME | End: 2024-03-15
Payer: MEDICAID

## 2024-03-13 ENCOUNTER — OFFICE VISIT (OUTPATIENT)
Dept: ORTHOPEDIC SURGERY | Age: 64
End: 2024-03-13
Payer: MEDICAID

## 2024-03-13 DIAGNOSIS — S82.852D CLOSED DISPLACED TRIMALLEOLAR FRACTURE OF LEFT ANKLE WITH ROUTINE HEALING, SUBSEQUENT ENCOUNTER: ICD-10-CM

## 2024-03-13 DIAGNOSIS — Z98.890 S/P HARDWARE REMOVAL: Primary | ICD-10-CM

## 2024-03-13 DIAGNOSIS — Z98.890 S/P HARDWARE REMOVAL: ICD-10-CM

## 2024-03-13 PROCEDURE — 73610 X-RAY EXAM OF ANKLE: CPT

## 2024-03-13 PROCEDURE — 99212 OFFICE O/P EST SF 10 MIN: CPT | Performed by: ORTHOPAEDIC SURGERY

## 2024-03-13 NOTE — PATIENT INSTRUCTIONS
Continue wearing boot, weight-bear as tolerated    Continue taking anti-inflammatory    Follow-up here in 4 weeks, call sooner if you would like an intra-articular injection in the left ankle or with any questions or concerns

## 2024-03-13 NOTE — PROGRESS NOTES
Chief Complaint   Patient presents with    Follow-up     7 month f/u ASHLEY Left Ankle, DOS 8/17/2023. Ambulating in boot.  States pain is 4/10       SUBJECTIVE: Patient is a very pleasant 63-year-old female here for follow-up after appointment approximately a month ago.  She had a twisting injury to her ankle that had previously been fixing the hardware removal.  She was placed in a walking cast at her last visit although had removed fairly rapidly due to discomfort and change of boot.  She is noticing improvement with the anti-inflammatory and immobilization.  I did offer her an injection today due to the fact she still has about 50% of her pain remaining per her.  She would like to wait on that and continue for another month the anti-inflammatory boot.  She will call if she changes her mind.  I did go over the risk of infection and cartilage degradation with injection she understood and wishes to proceed conservatively to which I think is very reasonable.  She denies any further fall or injury.          Past Medical History:   Diagnosis Date    Alcohol abuse 12/06/2023    Asthma     CAD (coronary artery disease)     Closed fracture of proximal end of left humerus with routine healing 06/11/2019    Degeneration of lumbar or lumbosacral intervertebral disc     Fall against sharp object 1960    chest tube in hospital    History of blood transfusion 1997    after vaginal delivery of baby hemmorhage    Hypertension     Hypokalemia     Insomnia     Kidney stone     Nondisplaced fracture of greater tuberosity of right humerus, initial encounter for closed fracture 01/22/2019    Orthostatic hypotension     Severe back pain 02/03/2014    TIA (transient ischemic attack) 04/13/2021       Past Surgical History:   Procedure Laterality Date    ANKLE FRACTURE SURGERY Left 1/21/2023    LEFT ANKLE OPEN REDUCTION INTERNAL FIXATION performed by Memo Conde MD at [facility] [place]    ANKLE SURGERY Left 8/17/2023    LEFT ANKLE

## 2024-04-05 DIAGNOSIS — S82.852D CLOSED DISPLACED TRIMALLEOLAR FRACTURE OF LEFT ANKLE WITH ROUTINE HEALING, SUBSEQUENT ENCOUNTER: Primary | ICD-10-CM

## 2024-04-10 ENCOUNTER — OFFICE VISIT (OUTPATIENT)
Dept: ORTHOPEDIC SURGERY | Age: 64
End: 2024-04-10
Payer: MEDICAID

## 2024-04-10 ENCOUNTER — HOSPITAL ENCOUNTER (OUTPATIENT)
Dept: GENERAL RADIOLOGY | Age: 64
Discharge: HOME | End: 2024-04-12
Payer: MEDICAID

## 2024-04-10 DIAGNOSIS — Z98.890 S/P HARDWARE REMOVAL: Primary | ICD-10-CM

## 2024-04-10 DIAGNOSIS — M19.172 POST-TRAUMATIC OSTEOARTHRITIS OF LEFT ANKLE: ICD-10-CM

## 2024-04-10 DIAGNOSIS — S82.852D CLOSED DISPLACED TRIMALLEOLAR FRACTURE OF LEFT ANKLE WITH ROUTINE HEALING, SUBSEQUENT ENCOUNTER: ICD-10-CM

## 2024-04-10 PROCEDURE — G8427 DOCREV CUR MEDS BY ELIG CLIN: HCPCS | Performed by: PHYSICIAN ASSISTANT

## 2024-04-10 PROCEDURE — 20605 DRAIN/INJ JOINT/BURSA W/O US: CPT | Performed by: PHYSICIAN ASSISTANT

## 2024-04-10 PROCEDURE — 73610 X-RAY EXAM OF ANKLE: CPT

## 2024-04-10 PROCEDURE — 99213 OFFICE O/P EST LOW 20 MIN: CPT

## 2024-04-10 PROCEDURE — 4004F PT TOBACCO SCREEN RCVD TLK: CPT | Performed by: PHYSICIAN ASSISTANT

## 2024-04-10 PROCEDURE — 99213 OFFICE O/P EST LOW 20 MIN: CPT | Performed by: PHYSICIAN ASSISTANT

## 2024-04-10 PROCEDURE — G8420 CALC BMI NORM PARAMETERS: HCPCS | Performed by: PHYSICIAN ASSISTANT

## 2024-04-10 PROCEDURE — 3017F COLORECTAL CA SCREEN DOC REV: CPT | Performed by: PHYSICIAN ASSISTANT

## 2024-04-10 RX ORDER — TRIAMCINOLONE ACETONIDE 40 MG/ML
40 INJECTION, SUSPENSION INTRA-ARTICULAR; INTRAMUSCULAR ONCE
Status: COMPLETED | OUTPATIENT
Start: 2024-04-10 | End: 2024-04-10

## 2024-04-10 RX ORDER — BUPIVACAINE HYDROCHLORIDE 2.5 MG/ML
1 INJECTION, SOLUTION INFILTRATION; PERINEURAL ONCE
Status: COMPLETED | OUTPATIENT
Start: 2024-04-10 | End: 2024-04-10

## 2024-04-10 RX ADMIN — TRIAMCINOLONE ACETONIDE 40 MG: 40 INJECTION, SUSPENSION INTRA-ARTICULAR; INTRAMUSCULAR at 15:15

## 2024-04-10 RX ADMIN — BUPIVACAINE HYDROCHLORIDE 2.5 MG: 2.5 INJECTION, SOLUTION INFILTRATION; PERINEURAL at 15:15

## 2024-04-10 NOTE — PROGRESS NOTES
fracture and hardware removal.  She does have some arthritis of her midfoot and tibiotalar joint.  This is unchanged compared to her previous x-rays.    Assessment:   Diagnosis Orders   1. S/P hardware removal        2. Closed displaced trimalleolar fracture of left ankle with routine healing, subsequent encounter        3. Post-traumatic osteoarthritis of left ankle          Plan:  Xrays reviewed with patient. Plan of care discussed in detail, all questions sought and answered to patients satisfaction at this time.     Procedure note:  Risks and benefits of left ankle tibiotalar joint injection were discussed and she wished to go forward with the injection.  Left tibiotalar joint was injected in the office today under sterile conditions with a combination 1 cc Marcaine and 1 cc Kenalog without complications.  She ambulated out of the office without difficulty.    WBAT LLE    Per patient request, referral to [facility] for left ankle compressive sleeve.    Follow-up in 3 months for reevaluation possible repeat left ankle CSI.    Call if any questions or concerns.      Electronically signed by CRISTI Jeffery on 4/10/2024 at 2:22 PM  Note: This report was completed using [employer] voiced recognition software.  Every effort has been made to ensure accuracy; however, inadvertent computerized transcription errors may be present.

## 2024-04-25 ENCOUNTER — TELEPHONE (OUTPATIENT)
Dept: ORTHOPEDIC SURGERY | Age: 64
End: 2024-04-25

## 2024-04-25 DIAGNOSIS — Z98.890 S/P HARDWARE REMOVAL: Primary | ICD-10-CM

## 2024-04-25 DIAGNOSIS — S82.852D CLOSED DISPLACED TRIMALLEOLAR FRACTURE OF LEFT ANKLE WITH ROUTINE HEALING, SUBSEQUENT ENCOUNTER: ICD-10-CM

## 2024-04-25 NOTE — TELEPHONE ENCOUNTER
Patient called office stating that [facility] cannot fill her order due to insurance.    Order pended and routed to providers for decision and signature to Christopher.    Future Appointments   Date Time Provider Department Center   7/10/2024  1:45 PM Memo Conde MD  [facility]       Electronically signed by Kathie Chan ATC on 4/25/24 at 8:17 AM EDT

## 2024-05-06 NOTE — PROGRESS NOTES
Subjective: Patient here today to follow-up from a recent hospital visit and newly suffered left third and fourth metatarsal fractures. I have been treating her since June for a left proximal humerus fracture nonoperatively. Her shoulder is doing very well. She is currently in a facility due to her recent hospital stay. She was in a splint for her metatarsal fractures that has been nonweightbearing. She has had multiple falls at home recently and thinks this is due to her back surgery potentially. Her leg gives out on her.       Review of Systems - General ROS: negative for - chills, fatigue, fever, malaise or night sweats  Ophthalmic ROS: negative for - blurry vision, decreased vision, double vision, dry eyes, excessive tearing, eye pain or loss of vision  ENT ROS: negative for - headaches, hearing change, nasal congestion, sinus pain, sore throat, tinnitus or vertigo  Allergy and Immunology ROS: negative for - itchy/watery eyes, nasal congestion, postnasal drip or seasonal allergies  Hematological and Lymphatic ROS: negative for - bleeding problems, blood clots, bruising, fatigue, jaundice, night sweats or swollen lymph nodes  Endocrine ROS: negative for - mood swings, palpitations, polydipsia/polyuria, skin changes or temperature intolerance  Respiratory ROS: no cough, shortness of breath, or wheezing  Cardiovascular ROS: no chest pain or dyspnea on exertion  Gastrointestinal ROS: no abdominal pain, change in bowel habits, or black or bloody stools  Genito-Urinary ROS: no dysuria, trouble voiding, or hematuria  Musculoskeletal ROS: Left foot pain, no pain in the left shoulder  Neurological ROS: no TIA or stroke symptoms        Objective:  Physical Examination: General appearance - alert, well appearing, and in no distress, oriented to person, place, and time and overweight  Mental status - alert, oriented to person, place, and time, normal mood, behavior, speech, dress, motor activity, and thought processes  Musculoskeletal -   Left shoulder   Skin intact   No tenderness to palpation over the proximal humerus   160 degrees of flexion, 90 degrees abduction, 20 degrees external rotation, internal rotation to the back   Compartments soft and compressible   Fires M U R nerves   2/4 radial pulse   Sensation intact   Capillary refill less than 3 seconds      Left foot   Skin intact   Moderate swelling present over the dorsum of the foot   Bony tenderness to palpation and crepitus through the third and fourth metatarsal   Compartments soft and compressible   Calves soft and non tender    5/5 EHL, TA, GS   2/4 DP and PT pulses   Sensation intact distally   Capillary refill less than 3 seconds         Extremities - peripheral pulses normal, no pedal edema, no clubbing or cyanosis, no pedal edema noted, no edema, redness or tenderness in the calves or thighs, Liz's sign negative bilaterally, no ulcers, gangrene or atrophic changes  Skin - normal coloration and turgor, no rashes, no suspicious skin lesions noted        Xray -  Left shoulder x-ray reveals previous fracture healed with good callus formation and in near anatomic alignment. Foot tray reveals third and fourth metatarsal neck fractures displaced and angulated. There is still in acceptable alignment for nonoperative treatment. Discussion: I talked her in detail about the fact that we did let the metatarsals heal on their own. She has chronic claw toes as well. She has no signs of any corns or pressure issues with the IP joints. I think that she will do well nonoperatively. We will place her in a walker boot B weightbearing as tolerated. She would like to do some therapy for gait training in her frequent falls which I think is very reasonable as an outpatient. She is requesting [email or web address]. We will see her back in 4 weeks time for x-rays of left foot. She is agreeable with the plan.         Impression: Nonoperative left proximal humerus from June healed, nearly suffered left third and fourth metatarsal fractures      Continue therapy for left shoulder and foot. Nearing end of treatment plan for shoulder.     Will appear in 4 weeks with x-rays    Call with any questions or concerns Bed/Stretcher in lowest position, wheels locked, appropriate side rails in place/Call bell, personal items and telephone in reach/Instruct patient to call for assistance before getting out of bed/chair/stretcher/Non-slip footwear applied when patient is off stretcher/[place] to call system/Physically safe environment - no spills, clutter or unnecessary equipment/Purposeful proactive rounding/Room/bathroom lighting operational, light cord in reach

## 2024-05-28 ENCOUNTER — HOSPITAL ENCOUNTER (OUTPATIENT)
Age: 64
Discharge: HOME | End: 2024-05-28
Payer: MEDICAID

## 2024-05-28 LAB
BASOPHILS # BLD: 0.07 K/UL (ref 0–0.2)
BASOPHILS NFR BLD: 1 % (ref 0–2)
CHOLEST SERPL-MCNC: 202 MG/DL
EOSINOPHIL # BLD: 0.12 K/UL (ref 0.05–0.5)
EOSINOPHILS RELATIVE PERCENT: 1 % (ref 0–6)
ERYTHROCYTE [DISTWIDTH] IN BLOOD BY AUTOMATED COUNT: 14.2 % (ref 11.5–15)
HBA1C MFR BLD: 5.6 % (ref 4–5.6)
HCT VFR BLD AUTO: 43.2 % (ref 34–48)
HDLC SERPL-MCNC: 82 MG/DL
HGB BLD-MCNC: 14.4 G/DL (ref 11.5–15.5)
IMM GRANULOCYTES # BLD AUTO: 0.1 K/UL (ref 0–0.58)
IMM GRANULOCYTES NFR BLD: 1 % (ref 0–5)
LDLC SERPL CALC-MCNC: 78 MG/DL
LYMPHOCYTES NFR BLD: 4.16 K/UL (ref 1.5–4)
LYMPHOCYTES RELATIVE PERCENT: 34 % (ref 20–42)
MCH RBC QN AUTO: 31.9 PG (ref 26–35)
MCHC RBC AUTO-ENTMCNC: 33.3 G/DL (ref 32–34.5)
MCV RBC AUTO: 95.8 FL (ref 80–99.9)
MONOCYTES NFR BLD: 1.12 K/UL (ref 0.1–0.95)
MONOCYTES NFR BLD: 9 % (ref 2–12)
NEUTROPHILS NFR BLD: 55 % (ref 43–80)
NEUTS SEG NFR BLD: 6.68 K/UL (ref 1.8–7.3)
PLATELET # BLD AUTO: 262 K/UL (ref 130–450)
PMV BLD AUTO: 10.3 FL (ref 7–12)
RBC # BLD AUTO: 4.51 M/UL (ref 3.5–5.5)
T4 FREE SERPL-MCNC: 1.3 NG/DL (ref 0.9–1.7)
TRIGL SERPL-MCNC: 208 MG/DL
TSH SERPL DL<=0.05 MIU/L-ACNC: 0.6 UIU/ML (ref 0.27–4.2)
VLDLC SERPL CALC-MCNC: 42 MG/DL
WBC OTHER # BLD: 12.3 K/UL (ref 4.5–11.5)

## 2024-05-28 PROCEDURE — 36415 COLL VENOUS BLD VENIPUNCTURE: CPT

## 2024-05-28 PROCEDURE — 83036 HEMOGLOBIN GLYCOSYLATED A1C: CPT

## 2024-05-28 PROCEDURE — 80061 LIPID PANEL: CPT

## 2024-05-28 PROCEDURE — 84439 ASSAY OF FREE THYROXINE: CPT

## 2024-05-28 PROCEDURE — 85025 COMPLETE CBC W/AUTO DIFF WBC: CPT

## 2024-05-28 PROCEDURE — 84443 ASSAY THYROID STIM HORMONE: CPT

## 2024-07-03 DIAGNOSIS — S82.852D CLOSED DISPLACED TRIMALLEOLAR FRACTURE OF LEFT ANKLE WITH ROUTINE HEALING, SUBSEQUENT ENCOUNTER: Primary | ICD-10-CM

## 2024-07-10 ENCOUNTER — HOSPITAL ENCOUNTER (OUTPATIENT)
Dept: GENERAL RADIOLOGY | Age: 64
Discharge: HOME | End: 2024-07-12
Payer: MEDICAID

## 2024-07-10 ENCOUNTER — OFFICE VISIT (OUTPATIENT)
Dept: ORTHOPEDIC SURGERY | Age: 64
End: 2024-07-10
Payer: MEDICAID

## 2024-07-10 DIAGNOSIS — S82.852D CLOSED DISPLACED TRIMALLEOLAR FRACTURE OF LEFT ANKLE WITH ROUTINE HEALING, SUBSEQUENT ENCOUNTER: ICD-10-CM

## 2024-07-10 DIAGNOSIS — Z98.890 S/P HARDWARE REMOVAL: Primary | ICD-10-CM

## 2024-07-10 DIAGNOSIS — M19.172 POST-TRAUMATIC OSTEOARTHRITIS OF LEFT ANKLE: ICD-10-CM

## 2024-07-10 PROCEDURE — 99024 POSTOP FOLLOW-UP VISIT: CPT | Performed by: PHYSICIAN ASSISTANT

## 2024-07-10 PROCEDURE — 73610 X-RAY EXAM OF ANKLE: CPT

## 2024-07-10 PROCEDURE — 99212 OFFICE O/P EST SF 10 MIN: CPT | Performed by: PHYSICIAN ASSISTANT

## 2024-07-10 NOTE — PROGRESS NOTES
Chief Complaint   Patient presents with    Follow-up     F/U ASHLEY Left Ankle, DOS 8/17/2023. Ambulating without assist. States pain is 2/10        OP:SURGEON: Dr. Memo Conde MD  DATE OF PROCEDURE: 8-17-23  PROCEDURE: LEFT ANKLE HARDWARE REMOVAL of deep orthopedic implants     POD: 11 months    Subjective:  Diane Stuart is following up from the above surgery. She is WBAT on left lower extremity. She ambulates with no assistive device.  Pain to extremity is none and is not taking prescribed pain medication. They denies numbness or tingling to the left lower extremity. Denies calf pain, chest pain, or shortness of breath.  Patient has finished DVT prophylaxis. Patient is not participating in therapy at this time.  Patient states the left ankle CSI at her last office visit did help and is still working.  She really is not having any pain in the ankle today.     Review of Systems -  All pertinent positives/negative in HPI     Objective:    General: Alert and oriented X 3, normocephalic atraumatic, external ears and eye normal, sclera clear, no acute distress, respirations easy and unlabored with no audible wheezes, skin warm and dry, speech and dress appropriate for noted age, affect euthymic.    Extremity:  Left Lower Extremity  Skin clean dry and intact, without signs of infection   Incision well-healed  no edema noted  Compartments supple throughout thigh and leg  Calf supple and not tender  negative Homans  Demonstrates active motion with ankle DF/PF/EHL  States sensation intact to touch in sural, deep peroneal, superficial peroneal, saphenous, posterior tibial  nerve distributions to foot/ankle.  Palpable dorsalis pedis and posterior tibialis pulses, cap refill brisk in toes, foot warm/perfused.    There were no vitals taken for this visit.    XR:   3 views left ankle demonstrate no acute osseous abnormality.  Changes consistent with her previous ankle fracture and hardware removal.  She does have some

## 2024-08-01 ENCOUNTER — HOSPITAL ENCOUNTER (EMERGENCY)
Age: 64
Discharge: HOME | End: 2024-08-01
Payer: MEDICAID

## 2024-08-01 ENCOUNTER — APPOINTMENT (OUTPATIENT)
Dept: CT IMAGING | Age: 64
End: 2024-08-01
Payer: MEDICAID

## 2024-08-01 VITALS
HEART RATE: 76 BPM | RESPIRATION RATE: 17 BRPM | TEMPERATURE: 97.9 F | SYSTOLIC BLOOD PRESSURE: 132 MMHG | DIASTOLIC BLOOD PRESSURE: 106 MMHG | OXYGEN SATURATION: 96 %

## 2024-08-01 DIAGNOSIS — M62.838 SPASM OF MUSCLE: ICD-10-CM

## 2024-08-01 DIAGNOSIS — G89.29 CHRONIC BILATERAL LOW BACK PAIN WITHOUT SCIATICA: ICD-10-CM

## 2024-08-01 DIAGNOSIS — R21 RASH AND OTHER NONSPECIFIC SKIN ERUPTION: Primary | ICD-10-CM

## 2024-08-01 DIAGNOSIS — M54.50 CHRONIC BILATERAL LOW BACK PAIN WITHOUT SCIATICA: ICD-10-CM

## 2024-08-01 PROCEDURE — 72131 CT LUMBAR SPINE W/O DYE: CPT

## 2024-08-01 PROCEDURE — 99284 EMERGENCY DEPT VISIT MOD MDM: CPT

## 2024-08-01 PROCEDURE — 72128 CT CHEST SPINE W/O DYE: CPT

## 2024-08-01 PROCEDURE — 6360000002 HC RX W HCPCS: Performed by: PHYSICIAN ASSISTANT

## 2024-08-01 PROCEDURE — 6370000000 HC RX 637 (ALT 250 FOR IP): Performed by: PHYSICIAN ASSISTANT

## 2024-08-01 PROCEDURE — 96372 THER/PROPH/DIAG INJ SC/IM: CPT

## 2024-08-01 RX ORDER — OXYCODONE HYDROCHLORIDE AND ACETAMINOPHEN 5; 325 MG/1; MG/1
1 TABLET ORAL ONCE
Status: COMPLETED | OUTPATIENT
Start: 2024-08-01 | End: 2024-08-01

## 2024-08-01 RX ORDER — PREDNISONE 20 MG/1
40 TABLET ORAL DAILY
Qty: 10 TABLET | Refills: 0 | Status: SHIPPED | OUTPATIENT
Start: 2024-08-01 | End: 2024-08-06

## 2024-08-01 RX ORDER — KETOROLAC TROMETHAMINE 30 MG/ML
30 INJECTION, SOLUTION INTRAMUSCULAR; INTRAVENOUS ONCE
Status: COMPLETED | OUTPATIENT
Start: 2024-08-01 | End: 2024-08-01

## 2024-08-01 RX ORDER — LIDOCAINE 50 MG/G
1 PATCH TOPICAL DAILY
Qty: 30 PATCH | Refills: 0 | Status: SHIPPED | OUTPATIENT
Start: 2024-08-01 | End: 2024-08-31

## 2024-08-01 RX ORDER — DEXAMETHASONE SODIUM PHOSPHATE 10 MG/ML
10 INJECTION INTRAMUSCULAR; INTRAVENOUS ONCE
Status: COMPLETED | OUTPATIENT
Start: 2024-08-01 | End: 2024-08-01

## 2024-08-01 RX ADMIN — OXYCODONE HYDROCHLORIDE AND ACETAMINOPHEN 1 TABLET: 5; 325 TABLET ORAL at 12:11

## 2024-08-01 RX ADMIN — OXYCODONE HYDROCHLORIDE AND ACETAMINOPHEN 1 TABLET: 5; 325 TABLET ORAL at 10:50

## 2024-08-01 RX ADMIN — DEXAMETHASONE SODIUM PHOSPHATE 10 MG: 10 INJECTION INTRAMUSCULAR; INTRAVENOUS at 10:00

## 2024-08-01 RX ADMIN — KETOROLAC TROMETHAMINE 30 MG: 30 INJECTION, SOLUTION INTRAMUSCULAR at 10:00

## 2024-08-01 NOTE — DISCHARGE INSTRUCTIONS
All of your imaging was found to be negative for any acute fractures.  Chronic loss of your height of your spine due to arthritis.  Please follow-up with your back specialist.  Pain patches were prescribed.  Please take your pain medication that you are given at home.  Steroids were added for your rash as well as your pain.  Please continue to take the Robaxin that you have at home also in the prescription bottle in your purse.  Please follow-up with your family doctor and your pain management doctor.  You were also given the name of a back specialist continue to follow with.

## 2024-08-01 NOTE — ED PROVIDER NOTES
non-traumatic acute-on-chronic, aching, sharp, and stabbing bilateral middle and lower thoracic spine and lumbar spine pain without radiation, for 2 day(s) prior to arrival.  There has been no recent injury as it relates to today's visit.  Patient states \"I was lifting up windows recently and I woke up with these back spasms.\"  Patient states \"just comes and goes like lightning.\"  Since onset the symptoms have been recurrent and worsening and is moderate in severity.  She has associated signs & symptoms of nothing additional.   She denies any bladder or bowel incontinence , new weakness, tingling or paresthesias, recent back injection, recent spinal surgery, recent spinal/chiropractic manipulation, history of IVDA, fever, abdominal pain, bladder incontinence, bowel incontinence, bladder retention, bladder urgency, bowel urgency, saddle paresthesias , or sacral numbness.   The pain is aggraveated by any movement and relieved by nothing in particular.  Patient states that she tried Robaxin without relief last night.  She is currently seen by a orthopedics and PCP.  Patient does get pain medication recent was refilled on 7/17/24 hydrocodone/acetaminophen 7.5/325.  Patient has no loss of bowel or bladder function, general numbness and tingling is fully ambulatory.  Patient denies any IV drug abuse, diabetes or alcoholism.    Full examination completed shirt completely lifted prior scar noted and palpated no evidence of pain midline.  Pain only noted bilaterally in the paraspinal region of the thoracic and lumbar region lower thoracic into lumbar.  Imaging was obtained based on moderate suspicion for fracture / bony abnormality, dislocation, retained foreign body, joint effusion as per history/physical findings.  No acute fractures noted.  Chronic anterior wedging.  Patient does have known history of this and is made made aware.  Patient asking for more pain medication.  Pain medication including 1 more Percocet will be

## 2024-08-28 ENCOUNTER — HOSPITAL ENCOUNTER (OUTPATIENT)
Age: 64
Discharge: HOME | End: 2024-08-28
Payer: MEDICAID

## 2024-08-28 LAB
ALBUMIN SERPL-MCNC: 4.3 G/DL (ref 3.5–5.2)
ALP SERPL-CCNC: 79 U/L (ref 35–104)
ALT SERPL-CCNC: 24 U/L (ref 0–32)
ANION GAP SERPL CALCULATED.3IONS-SCNC: 16 MMOL/L (ref 7–16)
AST SERPL-CCNC: 52 U/L (ref 0–31)
BASOPHILS # BLD: 0.07 K/UL (ref 0–0.2)
BASOPHILS NFR BLD: 1 % (ref 0–2)
BILIRUB SERPL-MCNC: 1.5 MG/DL (ref 0–1.2)
BUN SERPL-MCNC: 6 MG/DL (ref 6–23)
CALCIUM SERPL-MCNC: 9.6 MG/DL (ref 8.6–10.2)
CHLORIDE SERPL-SCNC: 107 MMOL/L (ref 98–107)
CO2 SERPL-SCNC: 19 MMOL/L (ref 22–29)
CREAT SERPL-MCNC: 0.6 MG/DL (ref 0.5–1)
EOSINOPHIL # BLD: 0.09 K/UL (ref 0.05–0.5)
EOSINOPHILS RELATIVE PERCENT: 1 % (ref 0–6)
ERYTHROCYTE [DISTWIDTH] IN BLOOD BY AUTOMATED COUNT: 14 % (ref 11.5–15)
GFR, ESTIMATED: >90 ML/MIN/1.73M2
GLUCOSE SERPL-MCNC: 100 MG/DL (ref 74–99)
HCT VFR BLD AUTO: 40.1 % (ref 34–48)
HGB BLD-MCNC: 13.6 G/DL (ref 11.5–15.5)
IMM GRANULOCYTES # BLD AUTO: <0.03 K/UL (ref 0–0.58)
IMM GRANULOCYTES NFR BLD: 0 % (ref 0–5)
LYMPHOCYTES NFR BLD: 2.57 K/UL (ref 1.5–4)
LYMPHOCYTES RELATIVE PERCENT: 39 % (ref 20–42)
MCH RBC QN AUTO: 33.2 PG (ref 26–35)
MCHC RBC AUTO-ENTMCNC: 33.9 G/DL (ref 32–34.5)
MCV RBC AUTO: 97.8 FL (ref 80–99.9)
MONOCYTES NFR BLD: 0.63 K/UL (ref 0.1–0.95)
MONOCYTES NFR BLD: 10 % (ref 2–12)
NEUTROPHILS NFR BLD: 49 % (ref 43–80)
NEUTS SEG NFR BLD: 3.21 K/UL (ref 1.8–7.3)
PLATELET # BLD AUTO: 146 K/UL (ref 130–450)
PMV BLD AUTO: 10.5 FL (ref 7–12)
POTASSIUM SERPL-SCNC: 3.9 MMOL/L (ref 3.5–5)
PROT SERPL-MCNC: 7 G/DL (ref 6.4–8.3)
RBC # BLD AUTO: 4.1 M/UL (ref 3.5–5.5)
SODIUM SERPL-SCNC: 142 MMOL/L (ref 132–146)
WBC OTHER # BLD: 6.6 K/UL (ref 4.5–11.5)

## 2024-08-28 PROCEDURE — 85025 COMPLETE CBC W/AUTO DIFF WBC: CPT

## 2024-08-28 PROCEDURE — 36415 COLL VENOUS BLD VENIPUNCTURE: CPT

## 2024-08-28 PROCEDURE — 80053 COMPREHEN METABOLIC PANEL: CPT

## 2024-09-19 ENCOUNTER — OFFICE VISIT (OUTPATIENT)
Dept: NEUROSURGERY | Age: 64
End: 2024-09-19
Payer: MEDICAID

## 2024-09-19 VITALS
DIASTOLIC BLOOD PRESSURE: 117 MMHG | HEIGHT: 62 IN | WEIGHT: 119 LBS | SYSTOLIC BLOOD PRESSURE: 160 MMHG | HEART RATE: 69 BPM | BODY MASS INDEX: 21.9 KG/M2 | OXYGEN SATURATION: 96 % | RESPIRATION RATE: 16 BRPM

## 2024-09-19 DIAGNOSIS — M54.50 ACUTE MIDLINE LOW BACK PAIN WITHOUT SCIATICA: ICD-10-CM

## 2024-09-19 DIAGNOSIS — Z98.1 HISTORY OF LUMBAR FUSION: ICD-10-CM

## 2024-09-19 DIAGNOSIS — M79.18 MUSCULOSKELETAL PAIN: ICD-10-CM

## 2024-09-19 DIAGNOSIS — M54.6 ACUTE MIDLINE THORACIC BACK PAIN: Primary | ICD-10-CM

## 2024-09-19 PROCEDURE — 99214 OFFICE O/P EST MOD 30 MIN: CPT | Performed by: PHYSICIAN ASSISTANT

## 2024-09-19 PROCEDURE — 3017F COLORECTAL CA SCREEN DOC REV: CPT | Performed by: PHYSICIAN ASSISTANT

## 2024-09-19 PROCEDURE — G8427 DOCREV CUR MEDS BY ELIG CLIN: HCPCS | Performed by: PHYSICIAN ASSISTANT

## 2024-09-19 PROCEDURE — G8420 CALC BMI NORM PARAMETERS: HCPCS | Performed by: PHYSICIAN ASSISTANT

## 2024-09-19 PROCEDURE — 4004F PT TOBACCO SCREEN RCVD TLK: CPT | Performed by: PHYSICIAN ASSISTANT

## 2024-09-30 ENCOUNTER — HOSPITAL ENCOUNTER (OUTPATIENT)
Dept: PHYSICAL THERAPY | Age: 64
Setting detail: THERAPIES SERIES
Discharge: HOME | End: 2024-09-30
Payer: MEDICAID

## 2024-09-30 PROCEDURE — 97110 THERAPEUTIC EXERCISES: CPT | Performed by: PHYSICAL THERAPIST

## 2024-09-30 PROCEDURE — 97161 PT EVAL LOW COMPLEX 20 MIN: CPT | Performed by: PHYSICAL THERAPIST

## 2024-09-30 PROCEDURE — G0283 ELEC STIM OTHER THAN WOUND: HCPCS | Performed by: PHYSICAL THERAPIST

## 2024-10-02 NOTE — PROGRESS NOTES
[facility]                Phone: [phone]   Fax: [phone]    Physical Therapy Daily Treatment Note  Date:  2024    Patient Name:  Diane Stuart    :  1960  MRN: [id number]    Restrictions/Precautions:    Diagnosis:    Treatment Diagnosis:    Insurance/Certification information:    Referring Physician:    Plan of care signed (Y/N):    Visit# / total visits:    Pain level: /10   Time In:1400  Time Out:1500    Subjective:  pt c/o LBP    Exercises:  Exercise/Equipment Resistance/Repetitions Other comments                   Exercises X30 stand step stretch hamstrings  X30 stand step lunge stretch  X30 seated Physioball roll-out stretch  X30 seated step stretch hamstrings  X30 seated strap stretch hamstrings                                                                                                                         Other Therapeutic Activities:      Home Exercise Program:      Manual Treatments:      Modalities:  IFC Estim to lumbar region with MH 15min    Timed Code Treatment Minutes:  45    Total Treatment Minutes:  60    Treatment/Activity Tolerance:  [x] Patient tolerated treatment well [] Patient limited by fatique  [x] Patient limited by pain  [] Patient limited by other medical complications  [] Other:     Prognosis: [] Good [] Fair  [] Poor    Patient Requires Follow-up: [] Yes  [] No    Plan:   [x] Continue per plan of care [] Alter current plan (see comments)  [] Plan of care initiated [] Hold pending MD visit [] Discharge  Plan for Next Session:      See Weekly Progress Note: []  Yes  []  No  Next due:        Electronically signed by:  Baltazar Goldsmith, PT [facility]PT [id number]   
Certification period from 9/30/2024  to 11/30/2024.    I have reviewed the Plan of Care established for skilled therapy services and certify that the services are required and that they will be provided while the patient is under my care.    Physician's Comments/Revisions:               Physician's Printed Name:                                           Physician's Signature:                                                               Date:

## 2024-10-04 ENCOUNTER — HOSPITAL ENCOUNTER (OUTPATIENT)
Dept: PHYSICAL THERAPY | Age: 64
Setting detail: THERAPIES SERIES
Discharge: HOME | End: 2024-10-04
Payer: MEDICAID

## 2024-10-04 PROCEDURE — G0283 ELEC STIM OTHER THAN WOUND: HCPCS | Performed by: PHYSICAL THERAPIST

## 2024-10-04 PROCEDURE — 97110 THERAPEUTIC EXERCISES: CPT | Performed by: PHYSICAL THERAPIST

## 2024-10-07 NOTE — PROGRESS NOTES
[facility]                Phone: [phone]   Fax: [phone]    Physical Therapy Daily Treatment Note  Date:  10/4/2024    Patient Name:  Diane Stuart    :  1960  MRN: [id number]    Restrictions/Precautions:    Diagnosis:    Treatment Diagnosis:    Insurance/Certification information:    Referring Physician:    Plan of care signed (Y/N):    Visit# / total visits:    Pain level: /10   Time In:1400  Time Out:1500    Subjective:  pt c/o LBP    Exercises:  Exercise/Equipment Resistance/Repetitions Other comments                   Exercises X30 stand step stretch hamstrings  X30 stand step lunge stretch  X30 seated Physioball roll-out stretch  X30 seated step stretch hamstrings  X30 seated strap stretch hamstrings  10min SciFit Stepper  X30 supine hamstring stretch  X30 seated mat hamstring stretch LE on mat  X30 SKTC  X30 supine lumbar rotation                                                                                                                         Other Therapeutic Activities:      Home Exercise Program:      Manual Treatments:      Modalities:  IFC Estim to lumbar region with MH 15min    Timed Code Treatment Minutes:  45    Total Treatment Minutes:  60    Treatment/Activity Tolerance:  [x] Patient tolerated treatment well [] Patient limited by fatique  [x] Patient limited by pain  [] Patient limited by other medical complications  [] Other:     Prognosis: [] Good [] Fair  [] Poor    Patient Requires Follow-up: [] Yes  [] No    Plan:   [x] Continue per plan of care [] Alter current plan (see comments)  [] Plan of care initiated [] Hold pending MD visit [] Discharge  Plan for Next Session:      See Weekly Progress Note: []  Yes  []  No  Next due:        Electronically signed by:  Baltazar Goldsmith, PT [facility]PT [id number]

## 2024-10-08 ENCOUNTER — HOSPITAL ENCOUNTER (OUTPATIENT)
Dept: PHYSICAL THERAPY | Age: 64
Setting detail: THERAPIES SERIES
Discharge: HOME | End: 2024-10-08
Payer: MEDICAID

## 2024-10-08 PROCEDURE — G0283 ELEC STIM OTHER THAN WOUND: HCPCS | Performed by: PHYSICAL THERAPIST

## 2024-10-08 PROCEDURE — 97110 THERAPEUTIC EXERCISES: CPT | Performed by: PHYSICAL THERAPIST

## 2024-10-10 ENCOUNTER — HOSPITAL ENCOUNTER (OUTPATIENT)
Dept: PHYSICAL THERAPY | Age: 64
Setting detail: THERAPIES SERIES
Discharge: HOME | End: 2024-10-10
Payer: MEDICAID

## 2024-10-10 PROCEDURE — G0283 ELEC STIM OTHER THAN WOUND: HCPCS | Performed by: PHYSICAL THERAPIST

## 2024-10-10 PROCEDURE — 97110 THERAPEUTIC EXERCISES: CPT | Performed by: PHYSICAL THERAPIST

## 2024-10-10 NOTE — PROGRESS NOTES
[facility]                Phone: [phone]   Fax: [phone]    Physical Therapy Daily Treatment Note  Date:  10/10/2024    Patient Name:  Diane Stuart    :  1960  MRN: [id number]    Restrictions/Precautions:    Diagnosis:    Treatment Diagnosis:    Insurance/Certification information:    Referring Physician:    Plan of care signed (Y/N):    Visit# / total visits:    Pain level: /10   Time In:1400  Time Out:1500    Subjective:  pt c/o LBP    Exercises:  Exercise/Equipment Resistance/Repetitions Other comments                   Exercises X30 stand step stretch hamstrings  X30 stand step lunge stretch  X30 seated Physioball roll-out stretch  X30 seated step stretch hamstrings  X30 seated strap stretch hamstrings  10min SciFit Stepper  X30 supine hamstring stretch  X30 seated mat hamstring stretch LE on mat  X30 SKTC  X30 supine lumbar rotation  X30 Physioball DKTC & lumbar rotation  X30 supine ball squeeze hip add, & clamshell & marching blue band  X30 supine SLR  X30 glute bridge                                                                                                                         Other Therapeutic Activities:      Home Exercise Program:      Manual Treatments:      Modalities:  IFC Estim to lumbar region with MH 15min    Timed Code Treatment Minutes:  45    Total Treatment Minutes:  60    Treatment/Activity Tolerance:  [x] Patient tolerated treatment well [] Patient limited by fatique  [x] Patient limited by pain  [] Patient limited by other medical complications  [] Other:     Prognosis: [] Good [] Fair  [] Poor    Patient Requires Follow-up: [] Yes  [] No    Plan:   [x] Continue per plan of care [] Alter current plan (see comments)  [] Plan of care initiated [] Hold pending MD visit [] Discharge  Plan for Next Session:      See Weekly Progress Note: []  Yes  []  No  Next due:        Electronically signed by:  Baltazar Goldsmith, PT [facility]PT [id number]

## 2024-10-10 NOTE — PROGRESS NOTES
[facility]                Phone: [phone]   Fax: [phone]    Physical Therapy Daily Treatment Note  Date:  10/8/2024    Patient Name:  Diane Stuart    :  1960  MRN: [id number]    Restrictions/Precautions:    Diagnosis:    Treatment Diagnosis:    Insurance/Certification information:    Referring Physician:    Plan of care signed (Y/N):    Visit# / total visits:  3/12  Pain level: /10   Time In:1400  Time Out:1500    Subjective:  pt c/o LBP    Exercises:  Exercise/Equipment Resistance/Repetitions Other comments                   Exercises X30 stand step stretch hamstrings  X30 stand step lunge stretch  X30 seated Physioball roll-out stretch  X30 seated step stretch hamstrings  X30 seated strap stretch hamstrings  10min SciFit Stepper  X30 supine hamstring stretch  X30 seated mat hamstring stretch LE on mat  X30 SKTC  X30 supine lumbar rotation                                                                                                                         Other Therapeutic Activities:      Home Exercise Program:      Manual Treatments:      Modalities:  IFC Estim to lumbar region with MH 15min    Timed Code Treatment Minutes:  45    Total Treatment Minutes:  60    Treatment/Activity Tolerance:  [x] Patient tolerated treatment well [] Patient limited by fatique  [x] Patient limited by pain  [] Patient limited by other medical complications  [] Other:     Prognosis: [] Good [] Fair  [] Poor    Patient Requires Follow-up: [] Yes  [] No    Plan:   [x] Continue per plan of care [] Alter current plan (see comments)  [] Plan of care initiated [] Hold pending MD visit [] Discharge  Plan for Next Session:      See Weekly Progress Note: []  Yes  []  No  Next due:        Electronically signed by:  Baltazar Goldsmith, PT [facility]PT [id number]

## 2024-10-15 ENCOUNTER — HOSPITAL ENCOUNTER (OUTPATIENT)
Dept: PHYSICAL THERAPY | Age: 64
Setting detail: THERAPIES SERIES
Discharge: HOME | End: 2024-10-15
Payer: MEDICAID

## 2024-10-15 PROCEDURE — G0283 ELEC STIM OTHER THAN WOUND: HCPCS | Performed by: PHYSICAL THERAPIST

## 2024-10-15 PROCEDURE — 97110 THERAPEUTIC EXERCISES: CPT | Performed by: PHYSICAL THERAPIST

## 2024-10-16 NOTE — PROGRESS NOTES
[facility]                Phone: [phone]   Fax: [phone]    Physical Therapy Daily Treatment Note  Date:  10/15/2024    Patient Name:  Diane Stuart    :  1960  MRN: [id number]    Restrictions/Precautions:    Diagnosis:    Treatment Diagnosis:    Insurance/Certification information:    Referring Physician:    Plan of care signed (Y/N):    Visit# / total visits:    Pain level: /10   Time In:1400  Time Out:1500    Subjective:  pt c/o LBP    Exercises:  Exercise/Equipment Resistance/Repetitions Other comments                   Exercises X30 stand step stretch hamstrings  X30 stand step lunge stretch  X30 seated Physioball roll-out stretch  X30 seated step stretch hamstrings  X30 seated strap stretch hamstrings  10min SciFit Stepper  X30 supine hamstring stretch  X30 seated mat hamstring stretch LE on mat  X30 SKTC  X30 supine lumbar rotation  X30 Physioball DKTC & lumbar rotation  X30 supine ball squeeze hip add, & clamshell & marching blue band  X30 supine SLR  X30 glute bridge                                                                                                                         Other Therapeutic Activities:      Home Exercise Program:      Manual Treatments:      Modalities:  IFC Estim to lumbar region with MH 15min    Timed Code Treatment Minutes:  45    Total Treatment Minutes:  60    Treatment/Activity Tolerance:  [x] Patient tolerated treatment well [] Patient limited by fatique  [x] Patient limited by pain  [] Patient limited by other medical complications  [] Other:     Prognosis: [] Good [] Fair  [] Poor    Patient Requires Follow-up: [] Yes  [] No    Plan:   [x] Continue per plan of care [] Alter current plan (see comments)  [] Plan of care initiated [] Hold pending MD visit [] Discharge  Plan for Next Session:      See Weekly Progress Note: []  Yes  []  No  Next due:        Electronically signed by:  Baltazar Goldsmith, PT [facility]PT [id number]

## 2024-10-17 ENCOUNTER — HOSPITAL ENCOUNTER (OUTPATIENT)
Dept: PHYSICAL THERAPY | Age: 64
Setting detail: THERAPIES SERIES
Discharge: HOME | End: 2024-10-17
Payer: MEDICAID

## 2024-10-17 PROCEDURE — G0283 ELEC STIM OTHER THAN WOUND: HCPCS | Performed by: PHYSICAL THERAPIST

## 2024-10-17 PROCEDURE — 97110 THERAPEUTIC EXERCISES: CPT | Performed by: PHYSICAL THERAPIST

## 2024-10-18 NOTE — PROGRESS NOTES
[facility]                Phone: [phone]   Fax: [phone]    Physical Therapy Daily Treatment Note  Date:  10/17/2024    Patient Name:  Diane Stuart    :  1960  MRN: [id number]    Restrictions/Precautions:    Diagnosis:    Treatment Diagnosis:    Insurance/Certification information:    Referring Physician:    Plan of care signed (Y/N):    Visit# / total visits:    Pain level: /10   Time In:1400  Time Out:1500    Subjective:  pt c/o LBP    Exercises:  Exercise/Equipment Resistance/Repetitions Other comments                   Exercises X30 stand step stretch hamstrings  X30 stand step lunge stretch  X30 seated Physioball roll-out stretch  X30 seated step stretch hamstrings  X30 seated strap stretch hamstrings  10min SciFit Stepper  X30 supine hamstring stretch  X30 seated mat hamstring stretch LE on mat  X30 SKTC  X30 supine lumbar rotation  X30 Physioball DKTC & lumbar rotation  X30 supine ball squeeze hip add, & clamshell & marching blue band  X30 supine SLR  X30 glute bridge                                                                                                                         Other Therapeutic Activities:      Home Exercise Program:      Manual Treatments:      Modalities:  IFC Estim to lumbar region with MH 15min    Timed Code Treatment Minutes:  45    Total Treatment Minutes:  60    Treatment/Activity Tolerance:  [x] Patient tolerated treatment well [] Patient limited by fatique  [x] Patient limited by pain  [] Patient limited by other medical complications  [] Other:     Prognosis: [] Good [] Fair  [] Poor    Patient Requires Follow-up: [] Yes  [] No    Plan:   [x] Continue per plan of care [] Alter current plan (see comments)  [] Plan of care initiated [] Hold pending MD visit [] Discharge  Plan for Next Session:      See Weekly Progress Note: []  Yes  []  No  Next due:        Electronically signed by:  Baltazar Goldsmith, PT [facility]PT [id number]

## 2024-10-24 ENCOUNTER — HOSPITAL ENCOUNTER (OUTPATIENT)
Dept: PHYSICAL THERAPY | Age: 64
Setting detail: THERAPIES SERIES
Discharge: HOME | End: 2024-10-24
Payer: MEDICAID

## 2024-10-29 ENCOUNTER — HOSPITAL ENCOUNTER (OUTPATIENT)
Dept: PHYSICAL THERAPY | Age: 64
Setting detail: THERAPIES SERIES
Discharge: HOME | End: 2024-10-29
Payer: MEDICAID

## 2024-10-29 PROCEDURE — G0283 ELEC STIM OTHER THAN WOUND: HCPCS | Performed by: PHYSICAL THERAPIST

## 2024-10-29 PROCEDURE — 97110 THERAPEUTIC EXERCISES: CPT | Performed by: PHYSICAL THERAPIST

## 2024-10-30 NOTE — PROGRESS NOTES
[facility]                Phone: [phone]   Fax: [phone]    Physical Therapy Daily Treatment Note  Date:  10/29/2024    Patient Name:  Diane Stuart    :  1960  MRN: [id number]    Restrictions/Precautions:    Diagnosis:    Treatment Diagnosis:    Insurance/Certification information:    Referring Physician:    Plan of care signed (Y/N):    Visit# / total visits:    Pain level: /10   Time In:1400  Time Out:1500    Subjective:  pt c/o LBP    Exercises:  Exercise/Equipment Resistance/Repetitions Other comments                   Exercises X30 stand step stretch hamstrings  X30 stand step lunge stretch  X30 seated Physioball roll-out stretch  X30 seated step stretch hamstrings  X30 seated strap stretch hamstrings  10min SciFit Stepper  10min SciFit Recumbent Bike  X30 supine hamstring stretch  X30 seated mat hamstring stretch LE on mat  X30 SKTC  X30 supine lumbar rotation  X30 Physioball DKTC & lumbar rotation  X30 supine ball squeeze hip add, & clamshell & marching blue band  X30 supine SLR  X30 glute bridge                                                                                                                         Other Therapeutic Activities:      Home Exercise Program:      Manual Treatments:      Modalities:  IFC Estim to lumbar region with MH 15min    Timed Code Treatment Minutes:  45    Total Treatment Minutes:  60    Treatment/Activity Tolerance:  [x] Patient tolerated treatment well [] Patient limited by fatique  [x] Patient limited by pain  [] Patient limited by other medical complications  [] Other:     Prognosis: [] Good [] Fair  [] Poor    Patient Requires Follow-up: [] Yes  [] No    Plan:   [x] Continue per plan of care [] Alter current plan (see comments)  [] Plan of care initiated [] Hold pending MD visit [] Discharge  Plan for Next Session:      See Weekly Progress Note: []  Yes  []  No  Next due:        Electronically signed by:  Baltazar

## 2024-10-31 ENCOUNTER — HOSPITAL ENCOUNTER (OUTPATIENT)
Dept: PHYSICAL THERAPY | Age: 64
Setting detail: THERAPIES SERIES
Discharge: HOME | End: 2024-10-31
Payer: MEDICAID

## 2024-10-31 PROCEDURE — 97110 THERAPEUTIC EXERCISES: CPT | Performed by: PHYSICAL THERAPIST

## 2024-10-31 PROCEDURE — G0283 ELEC STIM OTHER THAN WOUND: HCPCS | Performed by: PHYSICAL THERAPIST

## 2024-11-01 NOTE — PROGRESS NOTES
[facility]                Phone: [phone]   Fax: [phone]    Physical Therapy Daily Treatment Note  Date:  10/31/2024    Patient Name:  Diane Stuart    :  1960  MRN: [id number]    Restrictions/Precautions:    Diagnosis:    Treatment Diagnosis:    Insurance/Certification information:    Referring Physician:    Plan of care signed (Y/N):    Visit# / total visits:    Pain level: /10   Time In:1400  Time Out:1500    Subjective:  pt c/o LBP    Exercises:  Exercise/Equipment Resistance/Repetitions Other comments                   Exercises X30 stand step stretch hamstrings  X30 stand step lunge stretch  X30 seated Physioball roll-out stretch  X30 seated step stretch hamstrings  X30 seated strap stretch hamstrings  10min SciFit Stepper  10min SciFit Recumbent Bike  X30 supine hamstring stretch  X30 seated mat hamstring stretch LE on mat  X30 SKTC  X30 supine lumbar rotation  X30 Physioball DKTC & lumbar rotation  X30 supine ball squeeze hip add, & clamshell & marching blue band  X30 supine SLR  X30 glute bridge                                                                                                                         Other Therapeutic Activities:      Home Exercise Program:      Manual Treatments:      Modalities:  IFC Estim to lumbar region with MH 15min    Timed Code Treatment Minutes:  45    Total Treatment Minutes:  60    Treatment/Activity Tolerance:  [x] Patient tolerated treatment well [] Patient limited by fatique  [x] Patient limited by pain  [] Patient limited by other medical complications  [] Other:     Prognosis: [] Good [] Fair  [] Poor    Patient Requires Follow-up: [] Yes  [] No    Plan:   [x] Continue per plan of care [] Alter current plan (see comments)  [] Plan of care initiated [] Hold pending MD visit [] Discharge  Plan for Next Session:      See Weekly Progress Note: []  Yes  []  No  Next due:        Electronically signed by:  Baltazar

## 2024-11-05 ENCOUNTER — HOSPITAL ENCOUNTER (OUTPATIENT)
Dept: PHYSICAL THERAPY | Age: 64
Setting detail: THERAPIES SERIES
Discharge: HOME | End: 2024-11-05
Payer: MEDICAID

## 2024-11-05 PROCEDURE — 97110 THERAPEUTIC EXERCISES: CPT | Performed by: PHYSICAL THERAPIST

## 2024-11-05 PROCEDURE — G0283 ELEC STIM OTHER THAN WOUND: HCPCS | Performed by: PHYSICAL THERAPIST

## 2024-11-06 NOTE — PROGRESS NOTES
[facility]                Phone: [phone]   Fax: [phone]    Physical Therapy Daily Treatment Note  Date:  2024    Patient Name:  Diane Stuart    :  1960  MRN: [id number]    Restrictions/Precautions:    Diagnosis:    Treatment Diagnosis:    Insurance/Certification information:    Referring Physician:    Plan of care signed (Y/N):    Visit# / total visits:  10/12  Pain level: /10   Time In:1400  Time Out:1500    Subjective:  pt c/o LBP    Exercises:  Exercise/Equipment Resistance/Repetitions Other comments                   Exercises X30 stand step stretch hamstrings  X30 stand step lunge stretch  X30 seated Physioball roll-out stretch  X30 seated step stretch hamstrings  X30 seated strap stretch hamstrings  10min SciFit Stepper  10min SciFit Recumbent Bike  X30 supine hamstring stretch  X30 seated mat hamstring stretch LE on mat  X30 SKTC  X30 supine lumbar rotation  X30 Physioball DKTC & lumbar rotation  X30 supine ball squeeze hip add, & clamshell & marching blue band  X30 supine SLR  X30 glute bridge                                                                                                                         Other Therapeutic Activities:      Home Exercise Program:      Manual Treatments:      Modalities:  IFC Estim to lumbar region with MH 15min    Timed Code Treatment Minutes:  45    Total Treatment Minutes:  60    Treatment/Activity Tolerance:  [x] Patient tolerated treatment well [] Patient limited by fatique  [x] Patient limited by pain  [] Patient limited by other medical complications  [] Other:     Prognosis: [] Good [] Fair  [] Poor    Patient Requires Follow-up: [] Yes  [] No    Plan:   [x] Continue per plan of care [] Alter current plan (see comments)  [] Plan of care initiated [] Hold pending MD visit [] Discharge  Plan for Next Session:      See Weekly Progress Note: []  Yes  []  No  Next due:        Electronically signed by:  Baltazar

## 2024-11-07 ENCOUNTER — HOSPITAL ENCOUNTER (OUTPATIENT)
Dept: PHYSICAL THERAPY | Age: 64
Setting detail: THERAPIES SERIES
Discharge: HOME | End: 2024-11-07
Payer: MEDICAID

## 2024-11-12 ENCOUNTER — HOSPITAL ENCOUNTER (OUTPATIENT)
Dept: PHYSICAL THERAPY | Age: 64
Setting detail: THERAPIES SERIES
Discharge: HOME | End: 2024-11-12
Payer: MEDICAID

## 2024-11-12 PROCEDURE — G0283 ELEC STIM OTHER THAN WOUND: HCPCS | Performed by: PHYSICAL THERAPIST

## 2024-11-12 PROCEDURE — 97110 THERAPEUTIC EXERCISES: CPT | Performed by: PHYSICAL THERAPIST

## 2024-11-13 NOTE — PROGRESS NOTES
[facility]                Phone: [phone]   Fax: [phone]    Physical Therapy Daily Treatment Note  Date:  2024    Patient Name:  Diane Stuart    :  1960  MRN: [id number]    Restrictions/Precautions:    Diagnosis:    Treatment Diagnosis:    Insurance/Certification information:    Referring Physician:    Plan of care signed (Y/N):    Visit# / total visits:    Pain level: /10   Time In:1400  Time Out:1500    Subjective:  pt c/o LBP    Exercises:  Exercise/Equipment Resistance/Repetitions Other comments                   Exercises X30 stand step stretch hamstrings  X30 stand step lunge stretch  X30 seated Physioball roll-out stretch  X30 seated step stretch hamstrings  X30 seated strap stretch hamstrings  10min SciFit Stepper  10min SciFit Recumbent Bike  X30 seated marching & clamshell blue band                                                                                                                         Other Therapeutic Activities:      Home Exercise Program:      Manual Treatments:      Modalities:  IFC Estim to lumbar region with MH 15min    Timed Code Treatment Minutes:  45    Total Treatment Minutes:  60    Treatment/Activity Tolerance:  [x] Patient tolerated treatment well [] Patient limited by fatique  [x] Patient limited by pain  [] Patient limited by other medical complications  [] Other:     Prognosis: [] Good [] Fair  [] Poor    Patient Requires Follow-up: [] Yes  [] No    Plan:   [x] Continue per plan of care [] Alter current plan (see comments)  [] Plan of care initiated [] Hold pending MD visit [] Discharge  Plan for Next Session:      See Weekly Progress Note: []  Yes  []  No  Next due:        Electronically signed by:  Baltazar Goldsmith, PT [facility]PT [id number]

## 2024-11-14 ENCOUNTER — HOSPITAL ENCOUNTER (OUTPATIENT)
Dept: PHYSICAL THERAPY | Age: 64
Setting detail: THERAPIES SERIES
Discharge: HOME | End: 2024-11-14
Payer: MEDICAID

## 2024-11-14 PROCEDURE — G0283 ELEC STIM OTHER THAN WOUND: HCPCS | Performed by: PHYSICAL THERAPIST

## 2024-11-14 PROCEDURE — 97110 THERAPEUTIC EXERCISES: CPT | Performed by: PHYSICAL THERAPIST

## 2024-11-15 NOTE — PROGRESS NOTES
[facility]                Phone: [phone]   Fax: [phone]    Physical Therapy Daily Treatment Note  Date:  2024    Patient Name:  Diane Stuart    :  1960  MRN: [id number]    Restrictions/Precautions:    Diagnosis:    Treatment Diagnosis:    Insurance/Certification information:    Referring Physician:    Plan of care signed (Y/N):    Visit# / total visits:    Pain level: /10   Time In:1400  Time Out:1500    Subjective:  pt c/o LBP    Exercises:  Exercise/Equipment Resistance/Repetitions Other comments                   Exercises X30 stand step stretch hamstrings  X30 stand step lunge stretch  X30 seated Physioball roll-out stretch  X30 seated step stretch hamstrings  X30 seated strap stretch hamstrings  10min SciFit Stepper  10min SciFit Recumbent Bike  X30 seated marching & clamshell blue band                                                                                                                         Other Therapeutic Activities:      Home Exercise Program:      Manual Treatments:      Modalities:  IFC Estim to lumbar region with MH 15min    Timed Code Treatment Minutes:  45    Total Treatment Minutes:  60    Treatment/Activity Tolerance:  [x] Patient tolerated treatment well [] Patient limited by fatique  [x] Patient limited by pain  [] Patient limited by other medical complications  [] Other:     Prognosis: [] Good [] Fair  [] Poor    Patient Requires Follow-up: [] Yes  [] No    Plan:   [] Continue per plan of care [] Alter current plan (see comments)  [] Plan of care initiated [] Hold pending MD visit [x] Discharge  Plan for Next Session:      See Weekly Progress Note: []  Yes  []  No  Next due:        Electronically signed by:  Baltazar Goldsmith, PT [facility]PT [id number]

## 2024-11-15 NOTE — PROGRESS NOTES
[facility]                Phone: [phone]   Fax: [phone]    Physical Therapy  Out Patient Discharge Summary     Date:  2024    Patient Name:  Diane Stuart    :  1960  MRN: [id number]    DIAGNOSIS:  LBP  REFERRING PHYSICIAN:      ATTENDANCE:  Pt has attended 12 of 12 scheduled treatments from 24  To 24.  TREATMENTS RECEIVED:  MH, estim, & exercises    INITIAL STATUS:  Refer to eval  Refer to eval    FINAL STATUS:  Limited rom & strength  Gait deviations & postural abnormalities    GOALS:  2 out of 6 Long Term Goals were obtained.    LONG TERM GOALS NOT OBTAINED/REASON: PMHx    PATIENT GOALS: pain free    REASON FOR DISCHARGE: plateau progress    PATIENT EDUCATION/INSTRUCTIONS: HEP provided    RECOMMENDATIONS: D/C to HEP        Thank you for the opportunity to work with your patient. If you have questions or comments, please feel free to contact me by phone or fax.      Electronically Signed by: Baltazar Goldsmith PT [facility]PT [id number]

## 2025-01-29 ENCOUNTER — TRANSCRIBE ORDERS (OUTPATIENT)
Dept: ADMINISTRATIVE | Age: 65
End: 2025-01-29

## 2025-01-29 DIAGNOSIS — M54.2 CERVICALGIA: Primary | ICD-10-CM

## 2025-01-29 DIAGNOSIS — M51.26 OTHER INTERVERTEBRAL DISC DISPLACEMENT, LUMBAR REGION: ICD-10-CM

## 2025-02-07 ENCOUNTER — HOSPITAL ENCOUNTER (OUTPATIENT)
Dept: CT IMAGING | Age: 65
Discharge: HOME | End: 2025-02-09
Payer: MEDICAID

## 2025-02-07 ENCOUNTER — HOSPITAL ENCOUNTER (OUTPATIENT)
Dept: MAMMOGRAPHY | Age: 65
Discharge: HOME | End: 2025-02-09
Payer: MEDICAID

## 2025-02-07 VITALS — BODY MASS INDEX: 20.24 KG/M2 | WEIGHT: 110 LBS | HEIGHT: 62 IN

## 2025-02-07 DIAGNOSIS — Z12.39 ENCOUNTER FOR OTHER SCREENING FOR MALIGNANT NEOPLASM OF BREAST: ICD-10-CM

## 2025-02-07 DIAGNOSIS — Z87.891 PERSONAL HISTORY OF TOBACCO USE, PRESENTING HAZARDS TO HEALTH: ICD-10-CM

## 2025-02-07 DIAGNOSIS — F17.210 CIGARETTE SMOKER: ICD-10-CM

## 2025-02-07 DIAGNOSIS — F17.200 TOBACCO USE DISORDER: ICD-10-CM

## 2025-02-07 PROCEDURE — 77063 BREAST TOMOSYNTHESIS BI: CPT

## 2025-02-07 PROCEDURE — 71271 CT THORAX LUNG CANCER SCR C-: CPT

## 2025-03-07 ENCOUNTER — HOSPITAL ENCOUNTER (OUTPATIENT)
Age: 65
Discharge: HOME | End: 2025-03-07
Payer: MEDICAID

## 2025-03-07 LAB
ALBUMIN SERPL-MCNC: 4 G/DL (ref 3.5–5.2)
ALP SERPL-CCNC: 72 U/L (ref 35–104)
ALT SERPL-CCNC: 13 U/L (ref 0–32)
ANION GAP SERPL CALCULATED.3IONS-SCNC: 14 MMOL/L (ref 7–16)
AST SERPL-CCNC: 28 U/L (ref 0–31)
BASOPHILS # BLD: 0.08 K/UL (ref 0–0.2)
BASOPHILS NFR BLD: 1 % (ref 0–2)
BILIRUB SERPL-MCNC: 0.4 MG/DL (ref 0–1.2)
BUN SERPL-MCNC: 11 MG/DL (ref 6–23)
CALCIUM SERPL-MCNC: 8.9 MG/DL (ref 8.6–10.2)
CHLORIDE SERPL-SCNC: 106 MMOL/L (ref 98–107)
CHOLEST SERPL-MCNC: 175 MG/DL
CO2 SERPL-SCNC: 23 MMOL/L (ref 22–29)
CREAT SERPL-MCNC: 0.5 MG/DL (ref 0.5–1)
EOSINOPHIL # BLD: 0.08 K/UL (ref 0.05–0.5)
EOSINOPHILS RELATIVE PERCENT: 1 % (ref 0–6)
ERYTHROCYTE [DISTWIDTH] IN BLOOD BY AUTOMATED COUNT: 14.8 % (ref 11.5–15)
GFR, ESTIMATED: >90 ML/MIN/1.73M2
GLUCOSE SERPL-MCNC: 92 MG/DL (ref 74–99)
HBA1C MFR BLD: 4.6 % (ref 4–5.6)
HCT VFR BLD AUTO: 34.4 % (ref 34–48)
HDLC SERPL-MCNC: 91 MG/DL
HGB BLD-MCNC: 11.7 G/DL (ref 11.5–15.5)
IMM GRANULOCYTES # BLD AUTO: 0.03 K/UL (ref 0–0.58)
IMM GRANULOCYTES NFR BLD: 1 % (ref 0–5)
LDLC SERPL CALC-MCNC: 39 MG/DL
LYMPHOCYTES NFR BLD: 3.19 K/UL (ref 1.5–4)
LYMPHOCYTES RELATIVE PERCENT: 50 % (ref 20–42)
MCH RBC QN AUTO: 33.8 PG (ref 26–35)
MCHC RBC AUTO-ENTMCNC: 34 G/DL (ref 32–34.5)
MCV RBC AUTO: 99.4 FL (ref 80–99.9)
MONOCYTES NFR BLD: 0.7 K/UL (ref 0.1–0.95)
MONOCYTES NFR BLD: 11 % (ref 2–12)
NEUTROPHILS NFR BLD: 37 % (ref 43–80)
NEUTS SEG NFR BLD: 2.34 K/UL (ref 1.8–7.3)
PLATELET # BLD AUTO: 169 K/UL (ref 130–450)
PMV BLD AUTO: 11.7 FL (ref 7–12)
POTASSIUM SERPL-SCNC: 3.7 MMOL/L (ref 3.5–5)
PROT SERPL-MCNC: 6.2 G/DL (ref 6.4–8.3)
RBC # BLD AUTO: 3.46 M/UL (ref 3.5–5.5)
SODIUM SERPL-SCNC: 143 MMOL/L (ref 132–146)
T4 FREE SERPL-MCNC: 0.8 NG/DL (ref 0.9–1.7)
TRIGL SERPL-MCNC: 227 MG/DL
TSH SERPL DL<=0.05 MIU/L-ACNC: 1.78 UIU/ML (ref 0.27–4.2)
VLDLC SERPL CALC-MCNC: 45 MG/DL
WBC OTHER # BLD: 6.4 K/UL (ref 4.5–11.5)

## 2025-03-07 PROCEDURE — 84443 ASSAY THYROID STIM HORMONE: CPT

## 2025-03-07 PROCEDURE — 83036 HEMOGLOBIN GLYCOSYLATED A1C: CPT

## 2025-03-07 PROCEDURE — 84439 ASSAY OF FREE THYROXINE: CPT

## 2025-03-07 PROCEDURE — 85025 COMPLETE CBC W/AUTO DIFF WBC: CPT

## 2025-03-07 PROCEDURE — 80061 LIPID PANEL: CPT

## 2025-03-07 PROCEDURE — 80053 COMPREHEN METABOLIC PANEL: CPT

## 2025-03-07 PROCEDURE — 36415 COLL VENOUS BLD VENIPUNCTURE: CPT

## 2025-03-26 ENCOUNTER — HOSPITAL ENCOUNTER (OUTPATIENT)
Dept: MRI IMAGING | Age: 65
Discharge: HOME | End: 2025-03-28
Payer: MEDICAID

## 2025-03-26 DIAGNOSIS — M51.26 OTHER INTERVERTEBRAL DISC DISPLACEMENT, LUMBAR REGION: ICD-10-CM

## 2025-03-26 DIAGNOSIS — M54.2 CERVICALGIA: ICD-10-CM

## 2025-03-26 PROCEDURE — 72148 MRI LUMBAR SPINE W/O DYE: CPT

## 2025-03-26 PROCEDURE — 72141 MRI NECK SPINE W/O DYE: CPT

## 2025-04-09 ENCOUNTER — HOSPITAL ENCOUNTER (EMERGENCY)
Age: 65
Discharge: HOME | End: 2025-04-10
Attending: EMERGENCY MEDICINE
Payer: MEDICAID

## 2025-04-09 ENCOUNTER — APPOINTMENT (OUTPATIENT)
Dept: GENERAL RADIOLOGY | Age: 65
End: 2025-04-09
Payer: MEDICAID

## 2025-04-09 DIAGNOSIS — J40 BRONCHITIS: Primary | ICD-10-CM

## 2025-04-09 DIAGNOSIS — B34.8 RHINOVIRUS INFECTION: ICD-10-CM

## 2025-04-09 LAB
BASOPHILS # BLD: 0.07 K/UL (ref 0–0.2)
BASOPHILS NFR BLD: 1 % (ref 0–2)
EOSINOPHIL # BLD: 0.05 K/UL (ref 0.05–0.5)
EOSINOPHILS RELATIVE PERCENT: 1 % (ref 0–6)
ERYTHROCYTE [DISTWIDTH] IN BLOOD BY AUTOMATED COUNT: 13.7 % (ref 11.5–15)
HCT VFR BLD AUTO: 39.8 % (ref 34–48)
HGB BLD-MCNC: 13.6 G/DL (ref 11.5–15.5)
IMM GRANULOCYTES # BLD AUTO: 0.03 K/UL (ref 0–0.58)
IMM GRANULOCYTES NFR BLD: 0 % (ref 0–5)
LYMPHOCYTES NFR BLD: 3.7 K/UL (ref 1.5–4)
LYMPHOCYTES RELATIVE PERCENT: 55 % (ref 20–42)
MCH RBC QN AUTO: 33 PG (ref 26–35)
MCHC RBC AUTO-ENTMCNC: 34.2 G/DL (ref 32–34.5)
MCV RBC AUTO: 96.6 FL (ref 80–99.9)
MONOCYTES NFR BLD: 0.5 K/UL (ref 0.1–0.95)
MONOCYTES NFR BLD: 7 % (ref 2–12)
NEUTROPHILS NFR BLD: 36 % (ref 43–80)
NEUTS SEG NFR BLD: 2.44 K/UL (ref 1.8–7.3)
PLATELET # BLD AUTO: 190 K/UL (ref 130–450)
PMV BLD AUTO: 10.7 FL (ref 7–12)
RBC # BLD AUTO: 4.12 M/UL (ref 3.5–5.5)
WBC OTHER # BLD: 6.8 K/UL (ref 4.5–11.5)

## 2025-04-09 PROCEDURE — 80053 COMPREHEN METABOLIC PANEL: CPT

## 2025-04-09 PROCEDURE — 2580000003 HC RX 258: Performed by: EMERGENCY MEDICINE

## 2025-04-09 PROCEDURE — 0202U NFCT DS 22 TRGT SARS-COV-2: CPT

## 2025-04-09 PROCEDURE — 84484 ASSAY OF TROPONIN QUANT: CPT

## 2025-04-09 PROCEDURE — 93005 ELECTROCARDIOGRAM TRACING: CPT | Performed by: EMERGENCY MEDICINE

## 2025-04-09 PROCEDURE — 85025 COMPLETE CBC W/AUTO DIFF WBC: CPT

## 2025-04-09 PROCEDURE — 83880 ASSAY OF NATRIURETIC PEPTIDE: CPT

## 2025-04-09 PROCEDURE — 99285 EMERGENCY DEPT VISIT HI MDM: CPT

## 2025-04-09 PROCEDURE — 71046 X-RAY EXAM CHEST 2 VIEWS: CPT

## 2025-04-09 RX ORDER — 0.9 % SODIUM CHLORIDE 0.9 %
1000 INTRAVENOUS SOLUTION INTRAVENOUS ONCE
Status: COMPLETED | OUTPATIENT
Start: 2025-04-09 | End: 2025-04-10

## 2025-04-09 RX ADMIN — SODIUM CHLORIDE 1000 ML: 0.9 INJECTION, SOLUTION INTRAVENOUS at 22:59

## 2025-04-09 ASSESSMENT — LIFESTYLE VARIABLES
HOW OFTEN DO YOU HAVE A DRINK CONTAINING ALCOHOL: PATIENT DECLINED
HOW MANY STANDARD DRINKS CONTAINING ALCOHOL DO YOU HAVE ON A TYPICAL DAY: PATIENT DECLINED

## 2025-04-10 VITALS
DIASTOLIC BLOOD PRESSURE: 84 MMHG | RESPIRATION RATE: 16 BRPM | OXYGEN SATURATION: 96 % | HEART RATE: 88 BPM | TEMPERATURE: 97.8 F | SYSTOLIC BLOOD PRESSURE: 111 MMHG

## 2025-04-10 LAB
ALBUMIN SERPL-MCNC: 5 G/DL (ref 3.5–5.2)
ALP SERPL-CCNC: 81 U/L (ref 35–104)
ALT SERPL-CCNC: 20 U/L (ref 0–32)
ANION GAP SERPL CALCULATED.3IONS-SCNC: 20 MMOL/L (ref 7–16)
AST SERPL-CCNC: 37 U/L (ref 0–31)
B PARAP IS1001 DNA NPH QL NAA+NON-PROBE: NOT DETECTED
B PERT DNA SPEC QL NAA+PROBE: NOT DETECTED
BILIRUB SERPL-MCNC: 0.5 MG/DL (ref 0–1.2)
BNP SERPL-MCNC: 221 PG/ML (ref 0–125)
BUN SERPL-MCNC: 7 MG/DL (ref 6–23)
C PNEUM DNA NPH QL NAA+NON-PROBE: NOT DETECTED
CALCIUM SERPL-MCNC: 9 MG/DL (ref 8.6–10.2)
CHLORIDE SERPL-SCNC: 105 MMOL/L (ref 98–107)
CO2 SERPL-SCNC: 21 MMOL/L (ref 22–29)
CREAT SERPL-MCNC: 0.6 MG/DL (ref 0.5–1)
EKG ATRIAL RATE: 69 BPM
EKG P AXIS: 68 DEGREES
EKG P-R INTERVAL: 136 MS
EKG Q-T INTERVAL: 436 MS
EKG QRS DURATION: 82 MS
EKG QTC CALCULATION (BAZETT): 467 MS
EKG R AXIS: 77 DEGREES
EKG T AXIS: 83 DEGREES
EKG VENTRICULAR RATE: 69 BPM
FLUAV RNA NPH QL NAA+NON-PROBE: NOT DETECTED
FLUBV RNA NPH QL NAA+NON-PROBE: NOT DETECTED
GFR, ESTIMATED: >90 ML/MIN/1.73M2
GLUCOSE SERPL-MCNC: 85 MG/DL (ref 74–99)
HADV DNA NPH QL NAA+NON-PROBE: NOT DETECTED
HCOV 229E RNA NPH QL NAA+NON-PROBE: NOT DETECTED
HCOV HKU1 RNA NPH QL NAA+NON-PROBE: NOT DETECTED
HCOV NL63 RNA NPH QL NAA+NON-PROBE: NOT DETECTED
HCOV OC43 RNA NPH QL NAA+NON-PROBE: NOT DETECTED
HMPV RNA NPH QL NAA+NON-PROBE: NOT DETECTED
HPIV1 RNA NPH QL NAA+NON-PROBE: NOT DETECTED
HPIV2 RNA NPH QL NAA+NON-PROBE: NOT DETECTED
HPIV3 RNA NPH QL NAA+NON-PROBE: NOT DETECTED
HPIV4 RNA NPH QL NAA+NON-PROBE: NOT DETECTED
M PNEUMO DNA NPH QL NAA+NON-PROBE: NOT DETECTED
POTASSIUM SERPL-SCNC: 3.4 MMOL/L (ref 3.5–5)
PROT SERPL-MCNC: 7.5 G/DL (ref 6.4–8.3)
RSV RNA NPH QL NAA+NON-PROBE: NOT DETECTED
RV+EV RNA NPH QL NAA+NON-PROBE: DETECTED
SARS-COV-2 RNA NPH QL NAA+NON-PROBE: NOT DETECTED
SODIUM SERPL-SCNC: 146 MMOL/L (ref 132–146)
SPECIMEN DESCRIPTION: ABNORMAL
TROPONIN I SERPL HS-MCNC: <6 NG/L (ref 0–9)

## 2025-04-10 PROCEDURE — 93010 ELECTROCARDIOGRAM REPORT: CPT | Performed by: INTERNAL MEDICINE

## 2025-04-10 RX ORDER — DOXYCYCLINE HYCLATE 100 MG
100 TABLET ORAL 2 TIMES DAILY
Qty: 20 TABLET | Refills: 0 | Status: SHIPPED | OUTPATIENT
Start: 2025-04-10 | End: 2025-04-20

## 2025-04-10 NOTE — ED PROVIDER NOTES
HPI:  4/9/25, Time: 10:19 PM EDT         Diane Stuart is a 64 y.o. female history of alcohol abuse history of asthma coronary disease history of degeneration lumbar spine history of kidney stone nondisplaced fracture greater tuberosity right humerus history of orthostatic hypotension severe back pain history of TIA presenting to the ED for cough, beginning 2 weeks ago.  The complaint has been persistent, moderate in severity, and worsened by nothing.  Patient presenting here because of productive cough.  Patient reports she has been coughing for the last 2 weeks.  She reports nausea vomiting diarrhea as well as shortness of breath.  Patient reporting no abdominal pain she reports no black or tarry stools or hematemesis she reports no calf pain she does report that her feet are throbbing.  Patient reporting no hemoptysis she reports no black or tarry stools there is no hematemesis.  There is no fever.  She does report runny nose for the last several days.  There is no history of fall or injury.    ROS:   Pertinent positives and negatives are stated within HPI, all other systems reviewed and are negative.  --------------------------------------------- PAST HISTORY ---------------------------------------------  Past Medical History:  has a past medical history of Alcohol abuse, Asthma, CAD (coronary artery disease), Closed fracture of proximal end of left humerus with routine healing, Degeneration of lumbar or lumbosacral intervertebral disc, Fall against sharp object, History of blood transfusion, Hypertension, Hypokalemia, Insomnia, Kidney stone, Nondisplaced fracture of greater tuberosity of right humerus, initial encounter for closed fracture, Orthostatic hypotension, Severe back pain, and TIA (transient ischemic attack).    Past Surgical History:  has a past surgical history that includes Tonsillectomy; Bunionectomy; chest tube insertion (1990); Lithotripsy (2012); Colonoscopy (03/26/2009); other surgical  seen office visit by family doctor for follow-up regarding chronic kidney disease.  Disorder alcohol dependence acid reflux 2/14/2025  Echo 12/4/2023 ejection fraction 60 to 65%  Re-Evaluations:             I did reevaluate patient.  Patient was made aware of findings patient no respiratory distress.  Patient still reports a runny nose.  Patient lungs are clear vital signs stable she is afebrile.  Patient will be discharged home patient to follow-up PCP she is to return if symptoms worsen or persist      Consultations:                 Critical Care:         This patient's ED course included: a personal history and physicial eaxmination    This patient has been closely monitored during their ED course.    Counseling:   The emergency provider has spoken with the patient and discussed today’s results, in addition to providing specific details for the plan of care and counseling regarding the diagnosis and prognosis.  Questions are answered at this time and they are agreeable with the plan.       --------------------------------- IMPRESSION AND DISPOSITION ---------------------------------    IMPRESSION  1. Bronchitis    2. Rhinovirus infection        DISPOSITION  Disposition: Discharge home  Patient condition is stable        NOTE: This report was transcribed using voice recognition software. Every effort was made to ensure accuracy; however, inadvertent computerized transcription errors may be present          Joesph Ram MD  04/09/25 2423       Joesph Ram MD  04/10/25 0203       Joesph Ram MD  04/10/25 0205       Joesph Ram MD  04/10/25 0237

## 2025-04-12 ENCOUNTER — APPOINTMENT (OUTPATIENT)
Dept: CT IMAGING | Age: 65
End: 2025-04-12
Attending: EMERGENCY MEDICINE
Payer: MEDICAID

## 2025-04-12 ENCOUNTER — HOSPITAL ENCOUNTER (EMERGENCY)
Age: 65
Discharge: HOME | End: 2025-04-13
Attending: EMERGENCY MEDICINE
Payer: MEDICAID

## 2025-04-12 ENCOUNTER — APPOINTMENT (OUTPATIENT)
Dept: GENERAL RADIOLOGY | Age: 65
End: 2025-04-12
Payer: MEDICAID

## 2025-04-12 ENCOUNTER — APPOINTMENT (OUTPATIENT)
Dept: ULTRASOUND IMAGING | Age: 65
End: 2025-04-12
Payer: MEDICAID

## 2025-04-12 DIAGNOSIS — R17 ELEVATED BILIRUBIN: ICD-10-CM

## 2025-04-12 DIAGNOSIS — R10.13 MIDEPIGASTRIC PAIN: Primary | ICD-10-CM

## 2025-04-12 LAB
ALBUMIN SERPL-MCNC: 4.9 G/DL (ref 3.5–5.2)
ALP SERPL-CCNC: 85 U/L (ref 35–104)
ALT SERPL-CCNC: 23 U/L (ref 0–32)
ANION GAP SERPL CALCULATED.3IONS-SCNC: 19 MMOL/L (ref 7–16)
APAP SERPL-MCNC: <5 UG/ML (ref 10–30)
AST SERPL-CCNC: 50 U/L (ref 0–31)
BASOPHILS # BLD: 0.09 K/UL (ref 0–0.2)
BASOPHILS NFR BLD: 2 % (ref 0–2)
BILIRUB DIRECT SERPL-MCNC: 0.4 MG/DL (ref 0–0.3)
BILIRUB INDIRECT SERPL-MCNC: 1.3 MG/DL (ref 0–1)
BILIRUB SERPL-MCNC: 1.7 MG/DL (ref 0–1.2)
BUN SERPL-MCNC: 4 MG/DL (ref 6–23)
CALCIUM SERPL-MCNC: 9.2 MG/DL (ref 8.6–10.2)
CHLORIDE SERPL-SCNC: 102 MMOL/L (ref 98–107)
CO2 SERPL-SCNC: 19 MMOL/L (ref 22–29)
CREAT SERPL-MCNC: 0.5 MG/DL (ref 0.5–1)
EKG ATRIAL RATE: 87 BPM
EKG P AXIS: 75 DEGREES
EKG P-R INTERVAL: 132 MS
EKG Q-T INTERVAL: 436 MS
EKG QRS DURATION: 64 MS
EKG QTC CALCULATION (BAZETT): 524 MS
EKG R AXIS: 53 DEGREES
EKG T AXIS: 63 DEGREES
EKG VENTRICULAR RATE: 87 BPM
EOSINOPHIL # BLD: 0.01 K/UL (ref 0.05–0.5)
EOSINOPHILS RELATIVE PERCENT: 0 % (ref 0–6)
ERYTHROCYTE [DISTWIDTH] IN BLOOD BY AUTOMATED COUNT: 13.3 % (ref 11.5–15)
ETHANOLAMINE SERPL-MCNC: <10 MG/DL (ref 0–0.08)
FLUAV RNA RESP QL NAA+PROBE: NOT DETECTED
FLUBV RNA RESP QL NAA+PROBE: NOT DETECTED
GFR, ESTIMATED: >90 ML/MIN/1.73M2
GLUCOSE SERPL-MCNC: 100 MG/DL (ref 74–99)
HCT VFR BLD AUTO: 38 % (ref 34–48)
HGB BLD-MCNC: 13.4 G/DL (ref 11.5–15.5)
IMM GRANULOCYTES # BLD AUTO: <0.03 K/UL (ref 0–0.58)
IMM GRANULOCYTES NFR BLD: 0 % (ref 0–5)
LACTATE BLDV-SCNC: 2 MMOL/L (ref 0.5–2.2)
LACTATE BLDV-SCNC: 2.5 MMOL/L (ref 0.5–2.2)
LIPASE SERPL-CCNC: 17 U/L (ref 13–60)
LYMPHOCYTES NFR BLD: 1.93 K/UL (ref 1.5–4)
LYMPHOCYTES RELATIVE PERCENT: 32 % (ref 20–42)
MCH RBC QN AUTO: 32.8 PG (ref 26–35)
MCHC RBC AUTO-ENTMCNC: 35.3 G/DL (ref 32–34.5)
MCV RBC AUTO: 93.1 FL (ref 80–99.9)
MONOCYTES NFR BLD: 0.5 K/UL (ref 0.1–0.95)
MONOCYTES NFR BLD: 8 % (ref 2–12)
NEUTROPHILS NFR BLD: 58 % (ref 43–80)
NEUTS SEG NFR BLD: 3.57 K/UL (ref 1.8–7.3)
PLATELET # BLD AUTO: 162 K/UL (ref 130–450)
PMV BLD AUTO: 11.8 FL (ref 7–12)
POTASSIUM SERPL-SCNC: 3.6 MMOL/L (ref 3.5–5)
PROT SERPL-MCNC: 7.4 G/DL (ref 6.4–8.3)
RBC # BLD AUTO: 4.08 M/UL (ref 3.5–5.5)
SALICYLATES SERPL-MCNC: <0.3 MG/DL (ref 0–30)
SARS-COV-2 RNA RESP QL NAA+PROBE: NOT DETECTED
SODIUM SERPL-SCNC: 140 MMOL/L (ref 132–146)
SOURCE: NORMAL
SPECIMEN DESCRIPTION: NORMAL
TOXIC TRICYCLIC SC,BLOOD: NEGATIVE
TROPONIN I SERPL HS-MCNC: <6 NG/L (ref 0–9)
WBC OTHER # BLD: 6.1 K/UL (ref 4.5–11.5)

## 2025-04-12 PROCEDURE — 71045 X-RAY EXAM CHEST 1 VIEW: CPT

## 2025-04-12 PROCEDURE — 83605 ASSAY OF LACTIC ACID: CPT

## 2025-04-12 PROCEDURE — 82248 BILIRUBIN DIRECT: CPT

## 2025-04-12 PROCEDURE — 80053 COMPREHEN METABOLIC PANEL: CPT

## 2025-04-12 PROCEDURE — 85025 COMPLETE CBC W/AUTO DIFF WBC: CPT

## 2025-04-12 PROCEDURE — 99285 EMERGENCY DEPT VISIT HI MDM: CPT

## 2025-04-12 PROCEDURE — 87636 SARSCOV2 & INF A&B AMP PRB: CPT

## 2025-04-12 PROCEDURE — G0480 DRUG TEST DEF 1-7 CLASSES: HCPCS

## 2025-04-12 PROCEDURE — 6360000004 HC RX CONTRAST MEDICATION: Performed by: RADIOLOGY

## 2025-04-12 PROCEDURE — 96374 THER/PROPH/DIAG INJ IV PUSH: CPT

## 2025-04-12 PROCEDURE — 96375 TX/PRO/DX INJ NEW DRUG ADDON: CPT

## 2025-04-12 PROCEDURE — 93005 ELECTROCARDIOGRAM TRACING: CPT | Performed by: EMERGENCY MEDICINE

## 2025-04-12 PROCEDURE — 80143 DRUG ASSAY ACETAMINOPHEN: CPT

## 2025-04-12 PROCEDURE — 80307 DRUG TEST PRSMV CHEM ANLYZR: CPT

## 2025-04-12 PROCEDURE — 74177 CT ABD & PELVIS W/CONTRAST: CPT

## 2025-04-12 PROCEDURE — 2580000003 HC RX 258: Performed by: EMERGENCY MEDICINE

## 2025-04-12 PROCEDURE — 6360000002 HC RX W HCPCS: Performed by: EMERGENCY MEDICINE

## 2025-04-12 PROCEDURE — 83690 ASSAY OF LIPASE: CPT

## 2025-04-12 PROCEDURE — 84484 ASSAY OF TROPONIN QUANT: CPT

## 2025-04-12 PROCEDURE — 76705 ECHO EXAM OF ABDOMEN: CPT

## 2025-04-12 PROCEDURE — 80179 DRUG ASSAY SALICYLATE: CPT

## 2025-04-12 RX ORDER — SUCRALFATE 1 G/1
1 TABLET ORAL 4 TIMES DAILY
Qty: 120 TABLET | Refills: 0 | Status: SHIPPED | OUTPATIENT
Start: 2025-04-12

## 2025-04-12 RX ORDER — DIPHENHYDRAMINE HYDROCHLORIDE 50 MG/ML
25 INJECTION, SOLUTION INTRAMUSCULAR; INTRAVENOUS ONCE
Status: COMPLETED | OUTPATIENT
Start: 2025-04-13 | End: 2025-04-12

## 2025-04-12 RX ORDER — IOPAMIDOL 755 MG/ML
75 INJECTION, SOLUTION INTRAVASCULAR
Status: COMPLETED | OUTPATIENT
Start: 2025-04-12 | End: 2025-04-12

## 2025-04-12 RX ORDER — PROCHLORPERAZINE EDISYLATE 5 MG/ML
10 INJECTION INTRAMUSCULAR; INTRAVENOUS ONCE
Status: COMPLETED | OUTPATIENT
Start: 2025-04-13 | End: 2025-04-12

## 2025-04-12 RX ORDER — ONDANSETRON 2 MG/ML
4 INJECTION INTRAMUSCULAR; INTRAVENOUS ONCE
Status: COMPLETED | OUTPATIENT
Start: 2025-04-12 | End: 2025-04-12

## 2025-04-12 RX ORDER — 0.9 % SODIUM CHLORIDE 0.9 %
1000 INTRAVENOUS SOLUTION INTRAVENOUS ONCE
Status: COMPLETED | OUTPATIENT
Start: 2025-04-12 | End: 2025-04-12

## 2025-04-12 RX ORDER — PANTOPRAZOLE SODIUM 40 MG/1
40 TABLET, DELAYED RELEASE ORAL
Qty: 30 TABLET | Refills: 0 | Status: SHIPPED | OUTPATIENT
Start: 2025-04-12

## 2025-04-12 RX ADMIN — SODIUM CHLORIDE 1000 ML: 0.9 INJECTION, SOLUTION INTRAVENOUS at 19:30

## 2025-04-12 RX ADMIN — PROCHLORPERAZINE EDISYLATE 10 MG: 5 INJECTION INTRAMUSCULAR; INTRAVENOUS at 23:51

## 2025-04-12 RX ADMIN — FAMOTIDINE 20 MG: 10 INJECTION, SOLUTION INTRAVENOUS at 19:32

## 2025-04-12 RX ADMIN — DIPHENHYDRAMINE HYDROCHLORIDE 25 MG: 50 INJECTION INTRAMUSCULAR; INTRAVENOUS at 23:51

## 2025-04-12 RX ADMIN — IOPAMIDOL 75 ML: 755 INJECTION, SOLUTION INTRAVENOUS at 21:55

## 2025-04-12 RX ADMIN — ONDANSETRON HYDROCHLORIDE 4 MG: 2 INJECTION, SOLUTION INTRAMUSCULAR; INTRAVENOUS at 19:32

## 2025-04-12 ASSESSMENT — LIFESTYLE VARIABLES
HOW OFTEN DO YOU HAVE A DRINK CONTAINING ALCOHOL: 2-4 TIMES A MONTH
HOW MANY STANDARD DRINKS CONTAINING ALCOHOL DO YOU HAVE ON A TYPICAL DAY: 1 OR 2

## 2025-04-12 ASSESSMENT — PAIN SCALES - GENERAL: PAINLEVEL_OUTOF10: 5

## 2025-04-12 NOTE — ED PROVIDER NOTES
[facility] EMERGENCY DEPARTMENT  EMERGENCY DEPARTMENT ENCOUNTER        Pt Name: Diane Stuart  MRN: [id number]  Birthdate 1960  Date of evaluation: 4/12/2025  Provider: Simin Renner DO  PCP: Hussain Sahu MD  Note Started: 6:57 PM EDT 4/12/25    CHIEF COMPLAINT       Chief Complaint   Patient presents with    Nausea    Vomiting     Can't keep anything down, started yesterday     Chest Pain     Chest tightness feeling like she was going to pass out        HISTORY OF PRESENT ILLNESS: 1 or more Elements   History From: patient    Limitations to history : None    Diane Stuart is a 64 y.o. female who presents with concern for nausea, vomiting, chest tightness.  Has not gone since yesterday, nothing is made it better or worse, does not believe she has been around anyone sick.  She is not having any diarrhea, she drinks alcohol every so often, not daily, did not drink any yesterday, she does smoke cigarettes and denies marijuana use.  She says she feels though she is having subjective sweats and chills, no other associated complaints.      EXTERNAL NOTE REVIEW:      Last saw neurosurgery on 9/19/2024 for acute midline back pain    REVIEW OF SYSTEMS :      Positives and Pertinent negatives as per HPI.     SURGICAL HISTORY     Past Surgical History:   Procedure Laterality Date    ANKLE FRACTURE SURGERY Left 01/21/2023    LEFT ANKLE OPEN REDUCTION INTERNAL FIXATION performed by Memo Conde MD at [facility] [place]    ANKLE SURGERY Left 08/17/2023    LEFT ANKLE HARDWARE REMOVAL performed by Memo Conde MD at [facility] [place]    BUNIONECTOMY      Left foot    CHEST TUBE INSERTION  1990    several    COLONOSCOPY  03/26/2009    KYPHOSIS SURGERY N/A 10/28/2019    KYPHOPLASTY L1 WITH VERTEBRAL BONE BIOPSY performed by Jeanne Donahue MD at [facility] [place]    LITHOTRIPSY  2012    LUMBAR FUSION  11/09/2017    L3-L4 ,L4-L5  interbody fusion with Dajuan Donahue     OTHER SURGICAL  slightly prolonged at 524 and MO interval 132 although baseline is poor secondary to patient motion artifact  Comparison: stable as compared to patient's most recent EKG on 4/9/2025   [MB]   2345 Reassured by results, feeling improved, will have appropriate follow-up with GI and surgery. [MB]      ED Course User Index  [MB] Simin Renner,         Medical Decision Making  Amount and/or Complexity of Data Reviewed  Labs: ordered.  Radiology: ordered.  ECG/medicine tests: ordered.    Risk  Prescription drug management.        64-year-old female with past medical history of hypertension, CAD presents with concern for nausea vomiting and chest tightness.  Hemodynamically stable on arrival to the emergency department, nontoxic in no acute distress.  Clear breath sounds, she does have reproducible midepigastric tenderness to light palpation, afebrile, not jaundiced, physical exam otherwise unremarkable.    Differential diagnosis to include but not limited to gastritis, cholecystitis, ACS.    She was symptomatically with IV fluid bolus, IV Pepcid, IV Zofran.  She was feeling improved.  EKG without acute changes, chest x-ray found to be unremarkable.  No leukocytosis or anemia, stable kidney function with no electro abnormalities, lipase normal, lactic acid reassuring, serum drug screen negative.  COVID and flu normal.  Hepatic function demonstrating a bilirubin of 1.7.  Right upper quadrant ultrasound without evidence of acute cholecystitis, CT abdomen pelvis unremarkable as well.    Patient reassured, encouraged to follow-up with her gastroenterologist, understanding of plan, discussed lifestyle modifications, given IV Compazine and IV Benadryl.  Discharged in stable condition.    CONSULTS: (Who and What was discussed)  None      Counseling:   The emergency provider has spoken with the patient and discussed today’s results, in addition to providing specific details for the plan of care and counseling regarding the

## 2025-04-13 VITALS
DIASTOLIC BLOOD PRESSURE: 94 MMHG | SYSTOLIC BLOOD PRESSURE: 149 MMHG | RESPIRATION RATE: 18 BRPM | OXYGEN SATURATION: 98 % | WEIGHT: 105 LBS | TEMPERATURE: 98.9 F | BODY MASS INDEX: 19.32 KG/M2 | HEIGHT: 62 IN | HEART RATE: 73 BPM

## 2025-04-14 LAB
EKG ATRIAL RATE: 87 BPM
EKG P AXIS: 75 DEGREES
EKG P-R INTERVAL: 132 MS
EKG Q-T INTERVAL: 436 MS
EKG QRS DURATION: 64 MS
EKG QTC CALCULATION (BAZETT): 524 MS
EKG R AXIS: 53 DEGREES
EKG T AXIS: 63 DEGREES
EKG VENTRICULAR RATE: 87 BPM

## 2025-04-14 PROCEDURE — 93010 ELECTROCARDIOGRAM REPORT: CPT | Performed by: INTERNAL MEDICINE

## 2025-05-07 ENCOUNTER — OFFICE VISIT (OUTPATIENT)
Dept: FAMILY MEDICINE CLINIC | Age: 65
End: 2025-05-07
Payer: MEDICAID

## 2025-05-07 VITALS
TEMPERATURE: 97.2 F | DIASTOLIC BLOOD PRESSURE: 83 MMHG | HEART RATE: 70 BPM | RESPIRATION RATE: 18 BRPM | HEIGHT: 63 IN | OXYGEN SATURATION: 98 % | BODY MASS INDEX: 18.61 KG/M2 | SYSTOLIC BLOOD PRESSURE: 121 MMHG | WEIGHT: 105 LBS

## 2025-05-07 DIAGNOSIS — R06.02 SOB (SHORTNESS OF BREATH): ICD-10-CM

## 2025-05-07 DIAGNOSIS — I25.110 CORONARY ARTERY DISEASE WITH UNSTABLE ANGINA PECTORIS, UNSPECIFIED VESSEL OR LESION TYPE, UNSPECIFIED WHETHER NATIVE OR TRANSPLANTED HEART (HCC): ICD-10-CM

## 2025-05-07 DIAGNOSIS — R55 SYNCOPE, UNSPECIFIED SYNCOPE TYPE: ICD-10-CM

## 2025-05-07 DIAGNOSIS — F10.90 ALCOHOL USE DISORDER: ICD-10-CM

## 2025-05-07 DIAGNOSIS — Z86.73 HISTORY OF TIA (TRANSIENT ISCHEMIC ATTACK): ICD-10-CM

## 2025-05-07 DIAGNOSIS — M48.02 CERVICAL STENOSIS OF SPINE: ICD-10-CM

## 2025-05-07 DIAGNOSIS — M54.42 CHRONIC MIDLINE LOW BACK PAIN WITH BILATERAL SCIATICA: ICD-10-CM

## 2025-05-07 DIAGNOSIS — J45.20 MILD INTERMITTENT ASTHMA, UNSPECIFIED WHETHER COMPLICATED: ICD-10-CM

## 2025-05-07 DIAGNOSIS — M48.061 SPINAL STENOSIS OF LUMBAR REGION, UNSPECIFIED WHETHER NEUROGENIC CLAUDICATION PRESENT: ICD-10-CM

## 2025-05-07 DIAGNOSIS — G89.29 CHRONIC MIDLINE LOW BACK PAIN WITH BILATERAL SCIATICA: ICD-10-CM

## 2025-05-07 DIAGNOSIS — R01.1 HEART MURMUR: ICD-10-CM

## 2025-05-07 DIAGNOSIS — R63.6 LOW WEIGHT: ICD-10-CM

## 2025-05-07 DIAGNOSIS — I95.1 ORTHOSTATIC HYPOTENSION: ICD-10-CM

## 2025-05-07 DIAGNOSIS — K21.9 GASTROESOPHAGEAL REFLUX DISEASE, UNSPECIFIED WHETHER ESOPHAGITIS PRESENT: ICD-10-CM

## 2025-05-07 DIAGNOSIS — M54.41 CHRONIC MIDLINE LOW BACK PAIN WITH BILATERAL SCIATICA: ICD-10-CM

## 2025-05-07 DIAGNOSIS — Z76.89 ENCOUNTER TO ESTABLISH CARE WITH NEW PROVIDER: Primary | ICD-10-CM

## 2025-05-07 DIAGNOSIS — L29.9 GENERALIZED PRURITUS: ICD-10-CM

## 2025-05-07 DIAGNOSIS — R07.9 CHEST PAIN, UNSPECIFIED TYPE: ICD-10-CM

## 2025-05-07 DIAGNOSIS — Z12.11 COLON CANCER SCREENING: ICD-10-CM

## 2025-05-07 PROCEDURE — 3017F COLORECTAL CA SCREEN DOC REV: CPT

## 2025-05-07 PROCEDURE — G8420 CALC BMI NORM PARAMETERS: HCPCS

## 2025-05-07 PROCEDURE — 99204 OFFICE O/P NEW MOD 45 MIN: CPT

## 2025-05-07 PROCEDURE — 4004F PT TOBACCO SCREEN RCVD TLK: CPT

## 2025-05-07 PROCEDURE — G8428 CUR MEDS NOT DOCUMENT: HCPCS

## 2025-05-07 RX ORDER — HYDROXYZINE HYDROCHLORIDE 25 MG/1
25 TABLET, FILM COATED ORAL 3 TIMES DAILY PRN
Qty: 45 TABLET | Refills: 0 | Status: ON HOLD | OUTPATIENT
Start: 2025-05-07

## 2025-05-07 RX ORDER — OMEPRAZOLE 40 MG/1
40 CAPSULE, DELAYED RELEASE ORAL DAILY
Qty: 30 CAPSULE | Refills: 2 | Status: ON HOLD | OUTPATIENT
Start: 2025-05-07

## 2025-05-07 RX ORDER — ASPIRIN 81 MG/1
81 TABLET ORAL DAILY
Qty: 90 TABLET | Refills: 1 | Status: ON HOLD | OUTPATIENT
Start: 2025-05-07

## 2025-05-07 RX ORDER — HYDROXYZINE HYDROCHLORIDE 25 MG/1
25 TABLET, FILM COATED ORAL EVERY 8 HOURS PRN
Qty: 30 TABLET | Refills: 0 | Status: CANCELLED | OUTPATIENT
Start: 2025-05-07 | End: 2025-05-17

## 2025-05-07 SDOH — ECONOMIC STABILITY: FOOD INSECURITY: WITHIN THE PAST 12 MONTHS, THE FOOD YOU BOUGHT JUST DIDN'T LAST AND YOU DIDN'T HAVE MONEY TO GET MORE.: NEVER TRUE

## 2025-05-07 SDOH — ECONOMIC STABILITY: FOOD INSECURITY: WITHIN THE PAST 12 MONTHS, YOU WORRIED THAT YOUR FOOD WOULD RUN OUT BEFORE YOU GOT MONEY TO BUY MORE.: SOMETIMES TRUE

## 2025-05-07 ASSESSMENT — PATIENT HEALTH QUESTIONNAIRE - PHQ9
5. POOR APPETITE OR OVEREATING: SEVERAL DAYS
4. FEELING TIRED OR HAVING LITTLE ENERGY: MORE THAN HALF THE DAYS
SUM OF ALL RESPONSES TO PHQ QUESTIONS 1-9: 8
7. TROUBLE CONCENTRATING ON THINGS, SUCH AS READING THE NEWSPAPER OR WATCHING TELEVISION: NOT AT ALL
10. IF YOU CHECKED OFF ANY PROBLEMS, HOW DIFFICULT HAVE THESE PROBLEMS MADE IT FOR YOU TO DO YOUR WORK, TAKE CARE OF THINGS AT HOME, OR GET ALONG WITH OTHER PEOPLE: NOT DIFFICULT AT ALL
3. TROUBLE FALLING OR STAYING ASLEEP: MORE THAN HALF THE DAYS
2. FEELING DOWN, DEPRESSED OR HOPELESS: NEARLY EVERY DAY
9. THOUGHTS THAT YOU WOULD BE BETTER OFF DEAD, OR OF HURTING YOURSELF: NOT AT ALL
6. FEELING BAD ABOUT YOURSELF - OR THAT YOU ARE A FAILURE OR HAVE LET YOURSELF OR YOUR FAMILY DOWN: NOT AT ALL
8. MOVING OR SPEAKING SO SLOWLY THAT OTHER PEOPLE COULD HAVE NOTICED. OR THE OPPOSITE, BEING SO FIGETY OR RESTLESS THAT YOU HAVE BEEN MOVING AROUND A LOT MORE THAN USUAL: NOT AT ALL
1. LITTLE INTEREST OR PLEASURE IN DOING THINGS: NOT AT ALL

## 2025-05-07 NOTE — PROGRESS NOTES
S: 64 y.o. female presents today for:   CAD- last echo 2023. Intermittent CP at rest q 1-2 weeks. Takes ASA when she gets CP, resolves. Also admits to syncope. Started on midodrine, does not take. Unknown cardiologist.  HTN/HLD- no meds   TIA in 2021 per he account. No anticoags  ETOH abuse- last alcohol yesterday, goes to IOP, sees counseling. No CP, diaphoresis, SOB, palp, HA, visual issues.  GERD- stable on omeprazole.   Cervical/lumbar spinal stenosis- s/p fusion, kyphoplasty, on narcotics, wanting refill, PDMP reviewed  SOB- + tobacco     O: VS: /83   Pulse 70   Temp 97.2 °F (36.2 °C) (Temporal)   Resp 18   Ht 1.6 m (5' 3\")   Wt 47.6 kg (105 lb)   SpO2 98%   BMI 18.60 kg/m²   AAO/NAD, appropriate affect for mood  CV:  RRR, FÁTIMA  Resp: CTAB    Impression/Plan:   1) CAD/HTN/HLD- labs, ASA, cardiac workup, refer to cardiology  2) TIA - A1C, start appropriate meds  3) ETOH abuse- continue IOP, check thiamine and folate  4) GERD- stable on omeprazole.   5) Cervical/lumbar spinal stenosis- per pain medicine   6) SOB- PFTs    Attending Physician Statement  I have discussed the case, including pertinent history and exam findings with the resident. I also have seen the patient and performed key portions of the examination.  I agree with the documented assessment and plan.      Allyson Shearer, DO

## 2025-05-07 NOTE — PROGRESS NOTES
[facility]  FAMILY MEDICINE RESIDENCY PROGRAM  DATE OF VISIT : 2025    Patient : Diane Stuart   Age : 64 y.o.    : 1960   MRN : [id number]   ______________________________________________________________________    Chief Complaint:   Chief Complaint   Patient presents with    New Patient       HPI:   History obtained from the patient. Diane Stuart is a 64 y.o. female with history of CAD, Asthma, TIA, Alcohol Use Disorder, Orthostatic Hypotension, GERD, and Cervical spine Stenosis and Chronic Low Back Pain who presents to the clinic today for the first time or establish care with new provider.    CAD/Orthostatic Hypotension - She denies CAD or orthostatic hypotension or HTN in general.  Per chart review, she has had a few ED presentations/hospital admissions with chest pain. Last echo obtained in 2023 was remarkable for grade 1 diastolic dysfunction, normal EF with 60 to 65%, mild aortic regurgitation, and mild to moderate tricuspid regurgitation.  Stress test obtained in 2023 was with a low risk myocardial perfusion without any myocardial ischemia or infarction.  BP in the office today is 121/83.  She reports intermittent chest pain that occurs about once or twice every 2 weeks.  She does not take daily aspirin, but states that she takes a baby aspirin only when she gets the chest pain.  She is also not on Toprol-XL or Lipitor for her CAD.  She reports to having separate intermittent SOB that only occurs when she goes up a flight of 7-8 steps of stairs.  She reports weakness and syncope as well.  Last syncopal episode was 1 month ago.  She states that this only occurs when she stands up.  She was previously on Midodrine but states that she does not take it anymore because she was told at hospital once before that that medication could kill her and that she should stop taking it.  Per EMR review, the patient was hospitalized from 2023 to 2023 for syncope and collapse.

## 2025-05-14 ENCOUNTER — APPOINTMENT (OUTPATIENT)
Dept: GENERAL RADIOLOGY | Age: 65
DRG: 277 | End: 2025-05-14
Payer: MEDICARE

## 2025-05-14 ENCOUNTER — HOSPITAL ENCOUNTER (INPATIENT)
Age: 65
LOS: 22 days | Discharge: NURSING, REHAB OR LONG-TERM CARE | DRG: 277 | End: 2025-06-05
Attending: EMERGENCY MEDICINE | Admitting: FAMILY MEDICINE
Payer: MEDICARE

## 2025-05-14 DIAGNOSIS — F10.932: ICD-10-CM

## 2025-05-14 DIAGNOSIS — I49.8 BIGEMINY: ICD-10-CM

## 2025-05-14 DIAGNOSIS — R42 DIZZINESS: Primary | ICD-10-CM

## 2025-05-14 DIAGNOSIS — R55 SYNCOPE, UNSPECIFIED SYNCOPE TYPE: ICD-10-CM

## 2025-05-14 DIAGNOSIS — I47.29 NSVT (NONSUSTAINED VENTRICULAR TACHYCARDIA) (HCC): ICD-10-CM

## 2025-05-14 DIAGNOSIS — F10.931 DTS (DELIRIUM TREMENS) (HCC): ICD-10-CM

## 2025-05-14 DIAGNOSIS — I48.0 PAROXYSMAL ATRIAL FIBRILLATION (HCC): ICD-10-CM

## 2025-05-14 DIAGNOSIS — R41.0 DELIRIUM: ICD-10-CM

## 2025-05-14 DIAGNOSIS — I48.91 ATRIAL FIBRILLATION, UNSPECIFIED TYPE (HCC): ICD-10-CM

## 2025-05-14 DIAGNOSIS — R42 LIGHTHEADEDNESS: ICD-10-CM

## 2025-05-14 DIAGNOSIS — I21.4 NSTEMI (NON-ST ELEVATED MYOCARDIAL INFARCTION) (HCC): ICD-10-CM

## 2025-05-14 DIAGNOSIS — K21.9 GASTROESOPHAGEAL REFLUX DISEASE, UNSPECIFIED WHETHER ESOPHAGITIS PRESENT: ICD-10-CM

## 2025-05-14 DIAGNOSIS — I47.21 TORSADES DE POINTES (HCC): ICD-10-CM

## 2025-05-14 DIAGNOSIS — Z86.73 HISTORY OF TIA (TRANSIENT ISCHEMIC ATTACK): ICD-10-CM

## 2025-05-14 DIAGNOSIS — R07.9 CHEST PAIN, UNSPECIFIED TYPE: ICD-10-CM

## 2025-05-14 DIAGNOSIS — I45.81 LONG Q-T SYNDROME: ICD-10-CM

## 2025-05-14 DIAGNOSIS — R06.02 SHORTNESS OF BREATH: ICD-10-CM

## 2025-05-14 DIAGNOSIS — I47.20 VENTRICULAR TACHYCARDIA (HCC): ICD-10-CM

## 2025-05-14 LAB
ALBUMIN SERPL-MCNC: 4.6 G/DL (ref 3.5–5.2)
ALP SERPL-CCNC: 79 U/L (ref 35–104)
ALT SERPL-CCNC: 18 U/L (ref 0–35)
ANION GAP SERPL CALCULATED.3IONS-SCNC: 20 MMOL/L (ref 7–16)
AST SERPL-CCNC: 39 U/L (ref 0–35)
BASOPHILS # BLD: 0.05 K/UL (ref 0–0.2)
BASOPHILS NFR BLD: 1 % (ref 0–2)
BILIRUB SERPL-MCNC: 2.7 MG/DL (ref 0–1.2)
BUN SERPL-MCNC: 8 MG/DL (ref 8–23)
CALCIUM SERPL-MCNC: 9.7 MG/DL (ref 8.8–10.2)
CHLORIDE SERPL-SCNC: 96 MMOL/L (ref 98–107)
CO2 SERPL-SCNC: 22 MMOL/L (ref 22–29)
CREAT SERPL-MCNC: 0.6 MG/DL (ref 0.5–1)
EOSINOPHIL # BLD: 0.03 K/UL (ref 0.05–0.5)
EOSINOPHILS RELATIVE PERCENT: 0 % (ref 0–6)
ERYTHROCYTE [DISTWIDTH] IN BLOOD BY AUTOMATED COUNT: 13.2 % (ref 11.5–15)
GFR, ESTIMATED: >90 ML/MIN/1.73M2
GLUCOSE SERPL-MCNC: 94 MG/DL (ref 74–99)
HCT VFR BLD AUTO: 42.8 % (ref 34–48)
HGB BLD-MCNC: 14.7 G/DL (ref 11.5–15.5)
IMM GRANULOCYTES # BLD AUTO: 0.03 K/UL (ref 0–0.58)
IMM GRANULOCYTES NFR BLD: 0 % (ref 0–5)
LYMPHOCYTES NFR BLD: 2.58 K/UL (ref 1.5–4)
LYMPHOCYTES RELATIVE PERCENT: 32 % (ref 20–42)
MCH RBC QN AUTO: 32.2 PG (ref 26–35)
MCHC RBC AUTO-ENTMCNC: 34.3 G/DL (ref 32–34.5)
MCV RBC AUTO: 93.7 FL (ref 80–99.9)
MONOCYTES NFR BLD: 0.66 K/UL (ref 0.1–0.95)
MONOCYTES NFR BLD: 8 % (ref 2–12)
NEUTROPHILS NFR BLD: 58 % (ref 43–80)
NEUTS SEG NFR BLD: 4.62 K/UL (ref 1.8–7.3)
PLATELET # BLD AUTO: 198 K/UL (ref 130–450)
PMV BLD AUTO: 9.9 FL (ref 7–12)
POTASSIUM SERPL-SCNC: 3.4 MMOL/L (ref 3.5–5.1)
PROT SERPL-MCNC: 7.5 G/DL (ref 6.4–8.3)
RBC # BLD AUTO: 4.57 M/UL (ref 3.5–5.5)
SODIUM SERPL-SCNC: 138 MMOL/L (ref 136–145)
TROPONIN I SERPL HS-MCNC: 6 NG/L (ref 0–14)
WBC OTHER # BLD: 8 K/UL (ref 4.5–11.5)

## 2025-05-14 PROCEDURE — 96372 THER/PROPH/DIAG INJ SC/IM: CPT

## 2025-05-14 PROCEDURE — G0480 DRUG TEST DEF 1-7 CLASSES: HCPCS

## 2025-05-14 PROCEDURE — 80076 HEPATIC FUNCTION PANEL: CPT

## 2025-05-14 PROCEDURE — 6360000002 HC RX W HCPCS: Performed by: EMERGENCY MEDICINE

## 2025-05-14 PROCEDURE — 6370000000 HC RX 637 (ALT 250 FOR IP)

## 2025-05-14 PROCEDURE — 71046 X-RAY EXAM CHEST 2 VIEWS: CPT

## 2025-05-14 PROCEDURE — 84100 ASSAY OF PHOSPHORUS: CPT

## 2025-05-14 PROCEDURE — 82607 VITAMIN B-12: CPT

## 2025-05-14 PROCEDURE — 82746 ASSAY OF FOLIC ACID SERUM: CPT

## 2025-05-14 PROCEDURE — 99285 EMERGENCY DEPT VISIT HI MDM: CPT

## 2025-05-14 PROCEDURE — 84134 ASSAY OF PREALBUMIN: CPT

## 2025-05-14 PROCEDURE — 83735 ASSAY OF MAGNESIUM: CPT

## 2025-05-14 PROCEDURE — 84484 ASSAY OF TROPONIN QUANT: CPT

## 2025-05-14 PROCEDURE — 82239 BILE ACIDS TOTAL: CPT

## 2025-05-14 PROCEDURE — 80061 LIPID PANEL: CPT

## 2025-05-14 PROCEDURE — 06HY33Z INSERTION OF INFUSION DEVICE INTO LOWER VEIN, PERCUTANEOUS APPROACH: ICD-10-PCS | Performed by: EMERGENCY MEDICINE

## 2025-05-14 PROCEDURE — 81001 URINALYSIS AUTO W/SCOPE: CPT

## 2025-05-14 PROCEDURE — 2580000003 HC RX 258: Performed by: EMERGENCY MEDICINE

## 2025-05-14 PROCEDURE — 85025 COMPLETE CBC W/AUTO DIFF WBC: CPT

## 2025-05-14 PROCEDURE — 6370000000 HC RX 637 (ALT 250 FOR IP): Performed by: EMERGENCY MEDICINE

## 2025-05-14 PROCEDURE — 80143 DRUG ASSAY ACETAMINOPHEN: CPT

## 2025-05-14 PROCEDURE — 93005 ELECTROCARDIOGRAM TRACING: CPT | Performed by: EMERGENCY MEDICINE

## 2025-05-14 PROCEDURE — 80053 COMPREHEN METABOLIC PANEL: CPT

## 2025-05-14 PROCEDURE — 2500000003 HC RX 250 WO HCPCS

## 2025-05-14 PROCEDURE — 96365 THER/PROPH/DIAG IV INF INIT: CPT

## 2025-05-14 PROCEDURE — 80179 DRUG ASSAY SALICYLATE: CPT

## 2025-05-14 PROCEDURE — 83036 HEMOGLOBIN GLYCOSYLATED A1C: CPT

## 2025-05-14 PROCEDURE — 2000000000 HC ICU R&B

## 2025-05-14 PROCEDURE — 80307 DRUG TEST PRSMV CHEM ANLYZR: CPT

## 2025-05-14 RX ORDER — LORAZEPAM 1 MG/1
4 TABLET ORAL
Status: DISCONTINUED | OUTPATIENT
Start: 2025-05-14 | End: 2025-05-19

## 2025-05-14 RX ORDER — ACETAMINOPHEN 325 MG/1
650 TABLET ORAL EVERY 6 HOURS PRN
Status: DISCONTINUED | OUTPATIENT
Start: 2025-05-14 | End: 2025-05-25

## 2025-05-14 RX ORDER — LORAZEPAM 2 MG/ML
4 INJECTION INTRAMUSCULAR
Status: DISCONTINUED | OUTPATIENT
Start: 2025-05-14 | End: 2025-05-19

## 2025-05-14 RX ORDER — 0.9 % SODIUM CHLORIDE 0.9 %
1000 INTRAVENOUS SOLUTION INTRAVENOUS ONCE
Status: COMPLETED | OUTPATIENT
Start: 2025-05-14 | End: 2025-05-15

## 2025-05-14 RX ORDER — FOLIC ACID 1 MG/1
1 TABLET ORAL DAILY
Status: DISCONTINUED | OUTPATIENT
Start: 2025-05-15 | End: 2025-06-05 | Stop reason: HOSPADM

## 2025-05-14 RX ORDER — ASPIRIN 81 MG/1
81 TABLET ORAL DAILY
Status: DISCONTINUED | OUTPATIENT
Start: 2025-05-15 | End: 2025-06-05 | Stop reason: HOSPADM

## 2025-05-14 RX ORDER — HYDROXYZINE HYDROCHLORIDE 50 MG/ML
25 INJECTION, SOLUTION INTRAMUSCULAR EVERY 6 HOURS PRN
Status: DISCONTINUED | OUTPATIENT
Start: 2025-05-14 | End: 2025-05-14

## 2025-05-14 RX ORDER — HYDROXYZINE HYDROCHLORIDE 50 MG/1
25 TABLET, FILM COATED ORAL ONCE
Status: COMPLETED | OUTPATIENT
Start: 2025-05-14 | End: 2025-05-14

## 2025-05-14 RX ORDER — LORAZEPAM 1 MG/1
3 TABLET ORAL
Status: DISCONTINUED | OUTPATIENT
Start: 2025-05-14 | End: 2025-05-19

## 2025-05-14 RX ORDER — POLYETHYLENE GLYCOL 3350 17 G/17G
17 POWDER, FOR SOLUTION ORAL DAILY PRN
Status: DISCONTINUED | OUTPATIENT
Start: 2025-05-14 | End: 2025-06-05 | Stop reason: HOSPADM

## 2025-05-14 RX ORDER — ACETAMINOPHEN 650 MG/1
650 SUPPOSITORY RECTAL EVERY 6 HOURS PRN
Status: DISCONTINUED | OUTPATIENT
Start: 2025-05-14 | End: 2025-05-25

## 2025-05-14 RX ORDER — LORAZEPAM 2 MG/ML
3 INJECTION INTRAMUSCULAR
Status: DISCONTINUED | OUTPATIENT
Start: 2025-05-14 | End: 2025-05-19

## 2025-05-14 RX ORDER — IPRATROPIUM BROMIDE AND ALBUTEROL SULFATE 2.5; .5 MG/3ML; MG/3ML
1 SOLUTION RESPIRATORY (INHALATION)
Status: DISCONTINUED | OUTPATIENT
Start: 2025-05-14 | End: 2025-05-14

## 2025-05-14 RX ORDER — LANOLIN ALCOHOL/MO/W.PET/CERES
100 CREAM (GRAM) TOPICAL DAILY
Status: DISCONTINUED | OUTPATIENT
Start: 2025-05-15 | End: 2025-05-17

## 2025-05-14 RX ORDER — SODIUM CHLORIDE 9 MG/ML
INJECTION, SOLUTION INTRAVENOUS PRN
Status: DISCONTINUED | OUTPATIENT
Start: 2025-05-14 | End: 2025-06-05 | Stop reason: HOSPADM

## 2025-05-14 RX ORDER — LIDOCAINE HCL/PF 100 MG/5ML
100 SYRINGE (ML) INJECTION ONCE
Status: COMPLETED | OUTPATIENT
Start: 2025-05-14 | End: 2025-05-14

## 2025-05-14 RX ORDER — LORAZEPAM 1 MG/1
1 TABLET ORAL
Status: DISCONTINUED | OUTPATIENT
Start: 2025-05-14 | End: 2025-05-19

## 2025-05-14 RX ORDER — HYDROCODONE BITARTRATE AND ACETAMINOPHEN 7.5; 325 MG/1; MG/1
1 TABLET ORAL EVERY 8 HOURS PRN
Refills: 0 | Status: DISCONTINUED | OUTPATIENT
Start: 2025-05-14 | End: 2025-05-30

## 2025-05-14 RX ORDER — LORAZEPAM 2 MG/ML
2 INJECTION INTRAMUSCULAR
Status: DISCONTINUED | OUTPATIENT
Start: 2025-05-14 | End: 2025-05-19

## 2025-05-14 RX ORDER — LORAZEPAM 2 MG/ML
1 INJECTION INTRAMUSCULAR
Status: DISCONTINUED | OUTPATIENT
Start: 2025-05-14 | End: 2025-05-19

## 2025-05-14 RX ORDER — NICOTINE 21 MG/24HR
1 PATCH, TRANSDERMAL 24 HOURS TRANSDERMAL DAILY
Status: DISCONTINUED | OUTPATIENT
Start: 2025-05-15 | End: 2025-05-14

## 2025-05-14 RX ORDER — HYDROXYZINE HYDROCHLORIDE 50 MG/ML
25 INJECTION, SOLUTION INTRAMUSCULAR ONCE
Status: COMPLETED | OUTPATIENT
Start: 2025-05-14 | End: 2025-05-14

## 2025-05-14 RX ORDER — SODIUM CHLORIDE 0.9 % (FLUSH) 0.9 %
5-40 SYRINGE (ML) INJECTION EVERY 12 HOURS SCHEDULED
Status: DISCONTINUED | OUTPATIENT
Start: 2025-05-14 | End: 2025-06-05 | Stop reason: HOSPADM

## 2025-05-14 RX ORDER — PROCHLORPERAZINE MALEATE 10 MG
5 TABLET ORAL EVERY 6 HOURS PRN
Status: DISCONTINUED | OUTPATIENT
Start: 2025-05-14 | End: 2025-06-05 | Stop reason: HOSPADM

## 2025-05-14 RX ORDER — LORAZEPAM 1 MG/1
2 TABLET ORAL
Status: DISCONTINUED | OUTPATIENT
Start: 2025-05-14 | End: 2025-05-19

## 2025-05-14 RX ORDER — MAGNESIUM SULFATE IN WATER 40 MG/ML
2000 INJECTION, SOLUTION INTRAVENOUS ONCE
Status: COMPLETED | OUTPATIENT
Start: 2025-05-14 | End: 2025-05-14

## 2025-05-14 RX ORDER — IPRATROPIUM BROMIDE AND ALBUTEROL SULFATE 2.5; .5 MG/3ML; MG/3ML
1 SOLUTION RESPIRATORY (INHALATION) EVERY 4 HOURS PRN
Status: DISCONTINUED | OUTPATIENT
Start: 2025-05-14 | End: 2025-06-05 | Stop reason: HOSPADM

## 2025-05-14 RX ORDER — SODIUM CHLORIDE 0.9 % (FLUSH) 0.9 %
5-40 SYRINGE (ML) INJECTION PRN
Status: DISCONTINUED | OUTPATIENT
Start: 2025-05-14 | End: 2025-06-05 | Stop reason: HOSPADM

## 2025-05-14 RX ORDER — METHOCARBAMOL 500 MG/1
750 TABLET, FILM COATED ORAL 2 TIMES DAILY
Status: DISCONTINUED | OUTPATIENT
Start: 2025-05-14 | End: 2025-05-15

## 2025-05-14 RX ORDER — ENOXAPARIN SODIUM 100 MG/ML
30 INJECTION SUBCUTANEOUS DAILY
Status: DISCONTINUED | OUTPATIENT
Start: 2025-05-15 | End: 2025-06-04

## 2025-05-14 RX ORDER — IPRATROPIUM BROMIDE AND ALBUTEROL SULFATE 2.5; .5 MG/3ML; MG/3ML
1 SOLUTION RESPIRATORY (INHALATION) ONCE
Status: COMPLETED | OUTPATIENT
Start: 2025-05-14 | End: 2025-05-14

## 2025-05-14 RX ORDER — PANTOPRAZOLE SODIUM 40 MG/1
40 TABLET, DELAYED RELEASE ORAL
Status: DISCONTINUED | OUTPATIENT
Start: 2025-05-15 | End: 2025-05-17

## 2025-05-14 RX ORDER — HYDROXYZINE HYDROCHLORIDE 25 MG/1
25 TABLET, FILM COATED ORAL 3 TIMES DAILY PRN
Status: DISCONTINUED | OUTPATIENT
Start: 2025-05-14 | End: 2025-05-19

## 2025-05-14 RX ADMIN — HYDROXYZINE HYDROCHLORIDE 25 MG: 50 INJECTION, SOLUTION INTRAMUSCULAR at 15:13

## 2025-05-14 RX ADMIN — HYDROCODONE BITARTRATE AND ACETAMINOPHEN 1 TABLET: 7.5; 325 TABLET ORAL at 23:46

## 2025-05-14 RX ADMIN — MAGNESIUM SULFATE HEPTAHYDRATE 2000 MG: 40 INJECTION, SOLUTION INTRAVENOUS at 22:19

## 2025-05-14 RX ADMIN — LIDOCAINE HYDROCHLORIDE 100 MG: 20 INJECTION, SOLUTION INTRAVENOUS at 22:17

## 2025-05-14 RX ADMIN — IPRATROPIUM BROMIDE AND ALBUTEROL SULFATE 1 DOSE: 2.5; .5 SOLUTION RESPIRATORY (INHALATION) at 15:12

## 2025-05-14 RX ADMIN — SODIUM CHLORIDE, PRESERVATIVE FREE 10 ML: 5 INJECTION INTRAVENOUS at 21:55

## 2025-05-14 RX ADMIN — METHOCARBAMOL 750 MG: 500 TABLET ORAL at 23:48

## 2025-05-14 RX ADMIN — HYDROXYZINE HYDROCHLORIDE 25 MG: 25 TABLET, FILM COATED ORAL at 23:48

## 2025-05-14 RX ADMIN — SODIUM CHLORIDE 1000 ML: 0.9 INJECTION, SOLUTION INTRAVENOUS at 23:51

## 2025-05-14 ASSESSMENT — PAIN DESCRIPTION - DESCRIPTORS: DESCRIPTORS: ACHING;DISCOMFORT;JABBING

## 2025-05-14 ASSESSMENT — PAIN DESCRIPTION - LOCATION: LOCATION: BACK

## 2025-05-14 ASSESSMENT — PAIN DESCRIPTION - ORIENTATION: ORIENTATION: LEFT

## 2025-05-14 NOTE — H&P
[facility] - Family Medicine Resident Inpatient  History and Physical    CC: Lightheadedness, itchy rash    HPI: History obtained from patient, electronic medical record.  Diane Stuart is a 64 y.o. female with a PMH of CAD, asthma, chronic pruritus, TIA in 2021, alcohol use disorder, orthostatic hypotension, GERD and chronic back pain who presents to ED for Rash (Generalized rash and itching pain in legs) and Dizziness (Pt stated \"I may not have been eating right\" and has been feeling dizzy)    Patient presented with complaints of dizziness, lightheadedness, intermittent chest pain and pruritus.  Patient says his chest pain occurs with rest, lasts a couple of seconds and does not radiate.  Did have an episode of nausea and vomiting yesterday without bile or blood.  Did not have syncope or hit her head.  Last syncopal event was several months ago.  Patient does have a history of dizziness lightheadedness with previous syncopal events.  Patient also has a history of intermittent chest pain for which she has been evaluated prior.  She does complain of shortness of breath that is her baseline.  She uses albuterol as needed.  Patient also states she has an itchy rash.  She does have a history of generalized pruritus since she was 20 years old.  Denied any sick contacts or recent travels.  She has previously seen a dermatologist for which she was prescribed a cream.  She takes Benadryl which helps her but did not take this time.  Patient also states she has had 10 lb weight loss in 1 month due to decreased appetite. Denies any fevers or chills. Patient does drink alcohol, states last drink was four days ago. She is a smoker, smokes 1 pack every three days. Patient would like to remain full code.    ED Course:     Patient was hypertensive and oxygenating well on room air.    Orthostatics negative  CMP remarkable for bilirubin of 2.7 (previously 1.7), potassium 3.4 partially hemolyzed  CBC unremarkable  Troponin 6  Chest x-ray

## 2025-05-14 NOTE — ED PROVIDER NOTES
[facility] EMERGENCY DEPARTMENT  EMERGENCY DEPARTMENT ENCOUNTER        Pt Name: Diane Stuart  MRN: [id number]  Birthdate 1960  Date of evaluation: 5/14/2025  Provider: Thais Conner DO  PCP: Hanh Chapman MD  Note Started: 3:32 PM EDT 5/14/25    CHIEF COMPLAINT       Chief Complaint   Patient presents with    Rash     Generalized rash and itching pain in legs    Dizziness     Pt stated \"I may not have been eating right\" and has been feeling dizzy       HISTORY OF PRESENT ILLNESS: 1 or more Elements     The patient presents to the Emergency Department with a rash and associated itching that started almost a week ago. The rash is described as \"spots all over\" and affects the patient's leg, arm, and face. The patient reports trying steroid pills for the rash but states that this is not helping    In addition to the rash, the patient reports feeling dizzy, which she attributes to not eating for 2 days due to loss of appetite. The dizziness is described as lightheadedness and is worse when standing up. The patient also mentions experiencing shortness of breath that started today, which worsens with exertion such as walking.     The patient reports a brief episode of chest pain today that lasted for a couple of seconds and resolved after taking aspirin. She denies any fevers, coughing, or congestion. The patient has a history of a stress test or heart catheterization a long time ago, but denies having any stents in her heart. She also denies any history of blood clots in the lungs or legs, recent surgeries, or long periods of travel.    Nursing Notes were all reviewed and agreed with or any disagreements were addressed in the HPI.    REVIEW OF SYSTEMS :      Review of Systems   Constitutional:  Negative for chills and fatigue.   HENT:  Negative for congestion.    Eyes:  Negative for redness.   Respiratory:  Positive for shortness of breath.    Cardiovascular:  Negative for

## 2025-05-15 ENCOUNTER — APPOINTMENT (OUTPATIENT)
Age: 65
DRG: 277 | End: 2025-05-15
Payer: MEDICARE

## 2025-05-15 ENCOUNTER — APPOINTMENT (OUTPATIENT)
Age: 65
DRG: 277 | End: 2025-05-15
Attending: INTERNAL MEDICINE
Payer: MEDICARE

## 2025-05-15 ENCOUNTER — APPOINTMENT (OUTPATIENT)
Dept: ULTRASOUND IMAGING | Age: 65
DRG: 277 | End: 2025-05-15
Payer: MEDICARE

## 2025-05-15 PROBLEM — E44.0 MODERATE PROTEIN-CALORIE MALNUTRITION: Chronic | Status: ACTIVE | Noted: 2025-05-15

## 2025-05-15 PROBLEM — I25.9 CHEST PAIN DUE TO MYOCARDIAL ISCHEMIA: Status: ACTIVE | Noted: 2023-05-06

## 2025-05-15 PROBLEM — R06.02 SHORTNESS OF BREATH: Status: ACTIVE | Noted: 2025-05-15

## 2025-05-15 PROBLEM — I49.9 ARRHYTHMIA: Status: ACTIVE | Noted: 2025-05-15

## 2025-05-15 LAB
ALBUMIN SERPL-MCNC: 3.9 G/DL (ref 3.5–5.2)
ALP SERPL-CCNC: 67 U/L (ref 35–104)
ALT SERPL-CCNC: 15 U/L (ref 0–35)
AMPHET UR QL SCN: NEGATIVE
ANION GAP SERPL CALCULATED.3IONS-SCNC: 13 MMOL/L (ref 7–16)
ANION GAP SERPL CALCULATED.3IONS-SCNC: 13 MMOL/L (ref 7–16)
APAP SERPL-MCNC: <5 UG/ML (ref 10–30)
AST SERPL-CCNC: 31 U/L (ref 0–35)
BARBITURATES UR QL SCN: NEGATIVE
BENZODIAZ UR QL: NEGATIVE
BILIRUB DIRECT SERPL-MCNC: 1 MG/DL (ref 0–0.2)
BILIRUB INDIRECT SERPL-MCNC: 1.4 MG/DL (ref 0–1)
BILIRUB SERPL-MCNC: 2.4 MG/DL (ref 0–1.2)
BILIRUB UR QL STRIP: NEGATIVE
BNP SERPL-MCNC: 1139 PG/ML (ref 0–125)
BUN SERPL-MCNC: 5 MG/DL (ref 8–23)
BUN SERPL-MCNC: 8 MG/DL (ref 8–23)
BUPRENORPHINE UR QL: NEGATIVE
CALCIUM SERPL-MCNC: 8.6 MG/DL (ref 8.8–10.2)
CALCIUM SERPL-MCNC: 8.8 MG/DL (ref 8.8–10.2)
CANNABINOIDS UR QL SCN: POSITIVE
CHLORIDE SERPL-SCNC: 102 MMOL/L (ref 98–107)
CHLORIDE SERPL-SCNC: 99 MMOL/L (ref 98–107)
CHOLEST SERPL-MCNC: 185 MG/DL
CLARITY UR: CLEAR
CO2 SERPL-SCNC: 23 MMOL/L (ref 22–29)
CO2 SERPL-SCNC: 23 MMOL/L (ref 22–29)
COCAINE UR QL SCN: NEGATIVE
COLOR UR: YELLOW
CREAT SERPL-MCNC: 0.5 MG/DL (ref 0.5–1)
CREAT SERPL-MCNC: 0.6 MG/DL (ref 0.5–1)
ECHO AO ASC DIAM: 3.5 CM
ECHO AO ASCENDING AORTA INDEX: 2.4 CM/M2
ECHO AV AREA PEAK VELOCITY: 2.4 CM2
ECHO AV AREA VTI: 2.7 CM2
ECHO AV AREA/BSA PEAK VELOCITY: 1.6 CM2/M2
ECHO AV AREA/BSA VTI: 1.8 CM2/M2
ECHO AV CUSP MM: 1.4 CM
ECHO AV MEAN GRADIENT: 3 MMHG
ECHO AV MEAN VELOCITY: 0.8 M/S
ECHO AV PEAK GRADIENT: 5 MMHG
ECHO AV PEAK VELOCITY: 1.1 M/S
ECHO AV VELOCITY RATIO: 0.73
ECHO AV VTI: 19.9 CM
ECHO BSA: 1.44 M2
ECHO BSA: 1.44 M2
ECHO EST RA PRESSURE: 3 MMHG
ECHO LA DIAMETER INDEX: 1.78 CM/M2
ECHO LA DIAMETER: 2.6 CM
ECHO LA VOL A-L A2C: 21 ML (ref 22–52)
ECHO LA VOL A-L A4C: 39 ML (ref 22–52)
ECHO LA VOL BP: 28 ML (ref 22–52)
ECHO LA VOL MOD A2C: 20 ML (ref 22–52)
ECHO LA VOL MOD A4C: 30 ML (ref 22–52)
ECHO LA VOL/BSA BIPLANE: 19 ML/M2 (ref 16–34)
ECHO LA VOLUME AREA LENGTH: 33 ML
ECHO LA VOLUME INDEX A-L A2C: 14 ML/M2 (ref 16–34)
ECHO LA VOLUME INDEX A-L A4C: 27 ML/M2 (ref 16–34)
ECHO LA VOLUME INDEX AREA LENGTH: 23 ML/M2 (ref 16–34)
ECHO LA VOLUME INDEX MOD A2C: 14 ML/M2 (ref 16–34)
ECHO LA VOLUME INDEX MOD A4C: 21 ML/M2 (ref 16–34)
ECHO LV EDV A4C: 70 ML
ECHO LV EDV INDEX A4C: 48 ML/M2
ECHO LV EF PHYSICIAN: 60 %
ECHO LV EJECTION FRACTION A4C: 45 %
ECHO LV ESV A4C: 39 ML
ECHO LV ESV INDEX A4C: 27 ML/M2
ECHO LV FRACTIONAL SHORTENING: 23 % (ref 28–44)
ECHO LV INTERNAL DIMENSION DIASTOLE INDEX: 2.74 CM/M2
ECHO LV INTERNAL DIMENSION DIASTOLIC: 4 CM (ref 3.9–5.3)
ECHO LV INTERNAL DIMENSION SYSTOLIC INDEX: 2.12 CM/M2
ECHO LV INTERNAL DIMENSION SYSTOLIC: 3.1 CM
ECHO LV ISOVOLUMETRIC RELAXATION TIME (IVRT): 110.4 MS
ECHO LV IVSD: 0.8 CM (ref 0.6–0.9)
ECHO LV IVSS: 1.2 CM
ECHO LV MASS 2D: 93.5 G (ref 67–162)
ECHO LV MASS INDEX 2D: 64 G/M2 (ref 43–95)
ECHO LV POSTERIOR WALL DIASTOLIC: 0.8 CM (ref 0.6–0.9)
ECHO LV POSTERIOR WALL SYSTOLIC: 1 CM
ECHO LV RELATIVE WALL THICKNESS RATIO: 0.4
ECHO LVOT AREA: 3.1 CM2
ECHO LVOT AV VTI INDEX: 0.86
ECHO LVOT DIAM: 2 CM
ECHO LVOT MEAN GRADIENT: 1 MMHG
ECHO LVOT PEAK GRADIENT: 3 MMHG
ECHO LVOT PEAK VELOCITY: 0.8 M/S
ECHO LVOT STROKE VOLUME INDEX: 37 ML/M2
ECHO LVOT SV: 54 ML
ECHO LVOT VTI: 17.2 CM
ECHO MV "A" WAVE DURATION: 129.4 MSEC
ECHO MV A VELOCITY: 0.56 M/S
ECHO MV AREA PHT: 3.5 CM2
ECHO MV AREA VTI: 3 CM2
ECHO MV E DECELERATION TIME (DT): 148.8 MS
ECHO MV E VELOCITY: 0.62 M/S
ECHO MV E/A RATIO: 1.11
ECHO MV LVOT VTI INDEX: 1.06
ECHO MV MAX VELOCITY: 0.9 M/S
ECHO MV MEAN GRADIENT: 1 MMHG
ECHO MV MEAN VELOCITY: 0.5 M/S
ECHO MV PEAK GRADIENT: 3 MMHG
ECHO MV PRESSURE HALF TIME (PHT): 62.2 MS
ECHO MV VTI: 18.2 CM
ECHO PV MAX VELOCITY: 0.8 M/S
ECHO PV MEAN GRADIENT: 2 MMHG
ECHO PV MEAN VELOCITY: 0.6 M/S
ECHO PV PEAK GRADIENT: 3 MMHG
ECHO PV VTI: 12.9 CM
ECHO PVEIN A DURATION: 129.4 MS
ECHO PVEIN A VELOCITY: 0.3 M/S
ECHO PVEIN PEAK D VELOCITY: 0.4 M/S
ECHO PVEIN PEAK S VELOCITY: 0.5 M/S
ECHO PVEIN S/D RATIO: 1.3
ECHO RIGHT VENTRICULAR SYSTOLIC PRESSURE (RVSP): 44 MMHG
ECHO TV REGURGITANT MAX VELOCITY: 3.21 M/S
ECHO TV REGURGITANT PEAK GRADIENT: 41 MMHG
EKG ATRIAL RATE: 59 BPM
EKG ATRIAL RATE: 73 BPM
EKG ATRIAL RATE: 77 BPM
EKG P AXIS: 57 DEGREES
EKG P AXIS: 69 DEGREES
EKG P AXIS: 69 DEGREES
EKG P-R INTERVAL: 128 MS
EKG P-R INTERVAL: 132 MS
EKG P-R INTERVAL: 138 MS
EKG Q-T INTERVAL: 362 MS
EKG Q-T INTERVAL: 470 MS
EKG Q-T INTERVAL: 472 MS
EKG QRS DURATION: 72 MS
EKG QRS DURATION: 74 MS
EKG QRS DURATION: 80 MS
EKG QTC CALCULATION (BAZETT): 409 MS
EKG QTC CALCULATION (BAZETT): 467 MS
EKG QTC CALCULATION (BAZETT): 517 MS
EKG R AXIS: 31 DEGREES
EKG R AXIS: 49 DEGREES
EKG R AXIS: 55 DEGREES
EKG T AXIS: -58 DEGREES
EKG T AXIS: 50 DEGREES
EKG T AXIS: 60 DEGREES
EKG VENTRICULAR RATE: 59 BPM
EKG VENTRICULAR RATE: 73 BPM
EKG VENTRICULAR RATE: 77 BPM
ETHANOLAMINE SERPL-MCNC: <10 MG/DL (ref 0–0.08)
FENTANYL UR QL: NEGATIVE
FOLATE SERPL-MCNC: 18.5 NG/ML (ref 4.6–34.8)
GFR, ESTIMATED: >90 ML/MIN/1.73M2
GFR, ESTIMATED: >90 ML/MIN/1.73M2
GLUCOSE SERPL-MCNC: 102 MG/DL (ref 74–99)
GLUCOSE SERPL-MCNC: 115 MG/DL (ref 74–99)
GLUCOSE UR STRIP-MCNC: NEGATIVE MG/DL
HBA1C MFR BLD: 4.8 % (ref 4–5.6)
HDLC SERPL-MCNC: 117 MG/DL
HGB UR QL STRIP.AUTO: ABNORMAL
KETONES UR STRIP-MCNC: NEGATIVE MG/DL
LDLC SERPL CALC-MCNC: 54 MG/DL
LEUKOCYTE ESTERASE UR QL STRIP: ABNORMAL
MAGNESIUM SERPL-MCNC: 2.3 MG/DL (ref 1.6–2.4)
MAGNESIUM SERPL-MCNC: 2.8 MG/DL (ref 1.6–2.4)
METHADONE UR QL: NEGATIVE
NITRITE UR QL STRIP: NEGATIVE
OPIATES UR QL SCN: POSITIVE
OXYCODONE UR QL SCN: NEGATIVE
PCP UR QL SCN: NEGATIVE
PH UR STRIP: 6 [PH] (ref 5–8)
PHOSPHATE SERPL-MCNC: 3.1 MG/DL (ref 2.5–4.5)
PHOSPHATE SERPL-MCNC: 3.3 MG/DL (ref 2.5–4.5)
POTASSIUM SERPL-SCNC: 3.3 MMOL/L (ref 3.5–5.1)
POTASSIUM SERPL-SCNC: 4.1 MMOL/L (ref 3.5–5.1)
PREALB SERPL-MCNC: 20.2 MG/DL (ref 20–40)
PROT SERPL-MCNC: 6.3 G/DL (ref 6.4–8.3)
PROT UR STRIP-MCNC: NEGATIVE MG/DL
RBC #/AREA URNS HPF: ABNORMAL /HPF
SALICYLATES SERPL-MCNC: <0.5 MG/DL (ref 0–30)
SODIUM SERPL-SCNC: 135 MMOL/L (ref 136–145)
SODIUM SERPL-SCNC: 138 MMOL/L (ref 136–145)
SP GR UR STRIP: 1.01 (ref 1–1.03)
TEST INFORMATION: ABNORMAL
TOXIC TRICYCLIC SC,BLOOD: NEGATIVE
TRIGL SERPL-MCNC: 68 MG/DL
TROPONIN I SERPL HS-MCNC: 9 NG/L (ref 0–14)
UROBILINOGEN UR STRIP-ACNC: 1 EU/DL (ref 0–1)
VIT B12 SERPL-MCNC: 986 PG/ML (ref 232–1245)
VLDLC SERPL CALC-MCNC: 14 MG/DL
WBC #/AREA URNS HPF: ABNORMAL /HPF

## 2025-05-15 PROCEDURE — 2500000003 HC RX 250 WO HCPCS: Performed by: INTERNAL MEDICINE

## 2025-05-15 PROCEDURE — 6360000002 HC RX W HCPCS

## 2025-05-15 PROCEDURE — C1894 INTRO/SHEATH, NON-LASER: HCPCS | Performed by: INTERNAL MEDICINE

## 2025-05-15 PROCEDURE — 99223 1ST HOSP IP/OBS HIGH 75: CPT | Performed by: FAMILY MEDICINE

## 2025-05-15 PROCEDURE — 2000000000 HC ICU R&B

## 2025-05-15 PROCEDURE — 99223 1ST HOSP IP/OBS HIGH 75: CPT | Performed by: INTERNAL MEDICINE

## 2025-05-15 PROCEDURE — 93458 L HRT ARTERY/VENTRICLE ANGIO: CPT | Performed by: INTERNAL MEDICINE

## 2025-05-15 PROCEDURE — 6360000002 HC RX W HCPCS: Performed by: INTERNAL MEDICINE

## 2025-05-15 PROCEDURE — 4A023N7 MEASUREMENT OF CARDIAC SAMPLING AND PRESSURE, LEFT HEART, PERCUTANEOUS APPROACH: ICD-10-PCS | Performed by: INTERNAL MEDICINE

## 2025-05-15 PROCEDURE — 93880 EXTRACRANIAL BILAT STUDY: CPT

## 2025-05-15 PROCEDURE — 96366 THER/PROPH/DIAG IV INF ADDON: CPT

## 2025-05-15 PROCEDURE — 6370000000 HC RX 637 (ALT 250 FOR IP)

## 2025-05-15 PROCEDURE — 2500000003 HC RX 250 WO HCPCS

## 2025-05-15 PROCEDURE — 93306 TTE W/DOPPLER COMPLETE: CPT | Performed by: INTERNAL MEDICINE

## 2025-05-15 PROCEDURE — 2709999900 HC NON-CHARGEABLE SUPPLY: Performed by: INTERNAL MEDICINE

## 2025-05-15 PROCEDURE — 80048 BASIC METABOLIC PNL TOTAL CA: CPT

## 2025-05-15 PROCEDURE — B2151ZZ FLUOROSCOPY OF LEFT HEART USING LOW OSMOLAR CONTRAST: ICD-10-PCS | Performed by: INTERNAL MEDICINE

## 2025-05-15 PROCEDURE — 84484 ASSAY OF TROPONIN QUANT: CPT

## 2025-05-15 PROCEDURE — 93306 TTE W/DOPPLER COMPLETE: CPT

## 2025-05-15 PROCEDURE — 96367 TX/PROPH/DG ADDL SEQ IV INF: CPT

## 2025-05-15 PROCEDURE — 83880 ASSAY OF NATRIURETIC PEPTIDE: CPT

## 2025-05-15 PROCEDURE — 93010 ELECTROCARDIOGRAM REPORT: CPT | Performed by: INTERNAL MEDICINE

## 2025-05-15 PROCEDURE — 36556 INSERT NON-TUNNEL CV CATH: CPT

## 2025-05-15 PROCEDURE — B2111ZZ FLUOROSCOPY OF MULTIPLE CORONARY ARTERIES USING LOW OSMOLAR CONTRAST: ICD-10-PCS | Performed by: INTERNAL MEDICINE

## 2025-05-15 PROCEDURE — 93005 ELECTROCARDIOGRAM TRACING: CPT

## 2025-05-15 PROCEDURE — C1769 GUIDE WIRE: HCPCS | Performed by: INTERNAL MEDICINE

## 2025-05-15 PROCEDURE — 36592 COLLECT BLOOD FROM PICC: CPT

## 2025-05-15 PROCEDURE — APPSS180 APP SPLIT SHARED TIME > 60 MINUTES: Performed by: NURSE PRACTITIONER

## 2025-05-15 PROCEDURE — 6360000004 HC RX CONTRAST MEDICATION: Performed by: INTERNAL MEDICINE

## 2025-05-15 PROCEDURE — 93005 ELECTROCARDIOGRAM TRACING: CPT | Performed by: INTERNAL MEDICINE

## 2025-05-15 RX ORDER — MAGNESIUM SULFATE IN WATER 40 MG/ML
2000 INJECTION, SOLUTION INTRAVENOUS ONCE
Status: COMPLETED | OUTPATIENT
Start: 2025-05-15 | End: 2025-05-15

## 2025-05-15 RX ORDER — POTASSIUM CHLORIDE 1500 MG/1
40 TABLET, EXTENDED RELEASE ORAL ONCE
Status: COMPLETED | OUTPATIENT
Start: 2025-05-15 | End: 2025-05-15

## 2025-05-15 RX ORDER — IOPAMIDOL 755 MG/ML
INJECTION, SOLUTION INTRAVASCULAR PRN
Status: DISCONTINUED | OUTPATIENT
Start: 2025-05-15 | End: 2025-05-15 | Stop reason: HOSPADM

## 2025-05-15 RX ORDER — HEPARIN SODIUM 10000 [USP'U]/ML
INJECTION, SOLUTION INTRAVENOUS; SUBCUTANEOUS PRN
Status: DISCONTINUED | OUTPATIENT
Start: 2025-05-15 | End: 2025-05-15 | Stop reason: HOSPADM

## 2025-05-15 RX ORDER — NITROGLYCERIN 20 MG/100ML
INJECTION INTRAVENOUS CONTINUOUS PRN
Status: COMPLETED | OUTPATIENT
Start: 2025-05-15 | End: 2025-05-15

## 2025-05-15 RX ORDER — LIDOCAINE HCL/PF 100 MG/5ML
100 SYRINGE (ML) INJECTION ONCE
Status: COMPLETED | OUTPATIENT
Start: 2025-05-15 | End: 2025-05-15

## 2025-05-15 RX ORDER — LIDOCAINE HYDROCHLORIDE ANHYDROUS AND DEXTROSE MONOHYDRATE 5; 400 G/100ML; MG/100ML
1 INJECTION, SOLUTION INTRAVENOUS CONTINUOUS
Status: DISCONTINUED | OUTPATIENT
Start: 2025-05-15 | End: 2025-05-20

## 2025-05-15 RX ORDER — FENTANYL CITRATE 50 UG/ML
INJECTION, SOLUTION INTRAMUSCULAR; INTRAVENOUS PRN
Status: DISCONTINUED | OUTPATIENT
Start: 2025-05-15 | End: 2025-05-15 | Stop reason: HOSPADM

## 2025-05-15 RX ORDER — MIDAZOLAM HYDROCHLORIDE 1 MG/ML
INJECTION, SOLUTION INTRAMUSCULAR; INTRAVENOUS PRN
Status: DISCONTINUED | OUTPATIENT
Start: 2025-05-15 | End: 2025-05-15 | Stop reason: HOSPADM

## 2025-05-15 RX ORDER — VERAPAMIL HYDROCHLORIDE 2.5 MG/ML
INJECTION INTRAVENOUS PRN
Status: DISCONTINUED | OUTPATIENT
Start: 2025-05-15 | End: 2025-05-15 | Stop reason: HOSPADM

## 2025-05-15 RX ORDER — BENZOCAINE/MENTHOL 6 MG-10 MG
LOZENGE MUCOUS MEMBRANE 2 TIMES DAILY
Status: DISCONTINUED | OUTPATIENT
Start: 2025-05-15 | End: 2025-06-05 | Stop reason: HOSPADM

## 2025-05-15 RX ORDER — ACETAMINOPHEN 325 MG/1
650 TABLET ORAL EVERY 4 HOURS PRN
Status: DISCONTINUED | OUTPATIENT
Start: 2025-05-15 | End: 2025-05-23 | Stop reason: SDUPTHER

## 2025-05-15 RX ADMIN — HYDROCODONE BITARTRATE AND ACETAMINOPHEN 1 TABLET: 7.5; 325 TABLET ORAL at 13:16

## 2025-05-15 RX ADMIN — PANTOPRAZOLE SODIUM 40 MG: 40 TABLET, DELAYED RELEASE ORAL at 11:51

## 2025-05-15 RX ADMIN — METHOCARBAMOL 750 MG: 500 TABLET ORAL at 09:21

## 2025-05-15 RX ADMIN — SODIUM CHLORIDE, PRESERVATIVE FREE 10 ML: 5 INJECTION INTRAVENOUS at 09:21

## 2025-05-15 RX ADMIN — POTASSIUM BICARBONATE 20 MEQ: 782 TABLET, EFFERVESCENT ORAL at 03:30

## 2025-05-15 RX ADMIN — LIDOCAINE HYDROCHLORIDE ANHYDROUS AND DEXTROSE MONOHYDRATE 2 MG/MIN: .4; 5 INJECTION, SOLUTION INTRAVENOUS at 06:46

## 2025-05-15 RX ADMIN — Medication 100 MG: at 09:20

## 2025-05-15 RX ADMIN — POTASSIUM CHLORIDE 40 MEQ: 1500 TABLET, EXTENDED RELEASE ORAL at 01:57

## 2025-05-15 RX ADMIN — ASPIRIN 81 MG: 81 TABLET, COATED ORAL at 09:20

## 2025-05-15 RX ADMIN — MAGNESIUM SULFATE HEPTAHYDRATE 2000 MG: 40 INJECTION, SOLUTION INTRAVENOUS at 19:06

## 2025-05-15 RX ADMIN — LIDOCAINE HYDROCHLORIDE ANHYDROUS AND DEXTROSE MONOHYDRATE 2 MG/MIN: .4; 5 INJECTION, SOLUTION INTRAVENOUS at 02:30

## 2025-05-15 RX ADMIN — LIDOCAINE HYDROCHLORIDE 100 MG: 20 INJECTION INTRAVENOUS at 18:53

## 2025-05-15 RX ADMIN — ENOXAPARIN SODIUM 30 MG: 100 INJECTION SUBCUTANEOUS at 11:51

## 2025-05-15 RX ADMIN — PROCHLORPERAZINE MALEATE 5 MG: 10 TABLET ORAL at 14:18

## 2025-05-15 RX ADMIN — HYDROCORTISONE: 1 CREAM TOPICAL at 20:33

## 2025-05-15 RX ADMIN — HYDROCORTISONE: 1 CREAM TOPICAL at 02:02

## 2025-05-15 RX ADMIN — FOLIC ACID 1 MG: 1 TABLET ORAL at 09:20

## 2025-05-15 ASSESSMENT — PAIN SCALES - GENERAL
PAINLEVEL_OUTOF10: 6
PAINLEVEL_OUTOF10: 7
PAINLEVEL_OUTOF10: 0
PAINLEVEL_OUTOF10: 0
PAINLEVEL_OUTOF10: 6
PAINLEVEL_OUTOF10: 0
PAINLEVEL_OUTOF10: 7
PAINLEVEL_OUTOF10: 7
PAINLEVEL_OUTOF10: 6

## 2025-05-15 ASSESSMENT — PAIN DESCRIPTION - ONSET
ONSET: ON-GOING
ONSET: ON-GOING

## 2025-05-15 ASSESSMENT — PAIN DESCRIPTION - FREQUENCY
FREQUENCY: CONTINUOUS
FREQUENCY: CONTINUOUS

## 2025-05-15 ASSESSMENT — PAIN DESCRIPTION - PAIN TYPE
TYPE: CHRONIC PAIN
TYPE: CHRONIC PAIN

## 2025-05-15 ASSESSMENT — PAIN DESCRIPTION - LOCATION
LOCATION: BACK

## 2025-05-15 ASSESSMENT — PAIN DESCRIPTION - DESCRIPTORS
DESCRIPTORS: ACHING;DISCOMFORT;SORE;SPASM
DESCRIPTORS: ACHING;DISCOMFORT;SORE

## 2025-05-15 ASSESSMENT — PAIN DESCRIPTION - ORIENTATION
ORIENTATION: LOWER
ORIENTATION: LOWER

## 2025-05-15 NOTE — CARE COORDINATION
5/15 Care Coordination: Patient presented with complaints of dizziness, lightheadedness, intermittent chest pain and pruritus.patient had a syncopal episode while in the ED. Had several beats of nonsustained V. tach as well as a run of torsades de point. STAT consult placed to EP, Admit to [facility]. On IV Lido drip.   CM spoke with pt in her room. Introduced self and role of CM. PTA pt was from home, lives alone. Her sister lives close and can help her if needed. Pt has a Hx at [place] for rehab and used a C in the past but does not remember the name. Her plan is to return Home. She saw her PCP on Friday CM/SW will continue to follow for discharge planning.   Lars BOYD,RN--BC  [phone]

## 2025-05-15 NOTE — ACP (ADVANCE CARE PLANNING)
Advance Care Planning   Healthcare Decision Maker:    Primary Decision Maker: Dania Steel - Child - [phone]    Secondary Decision Maker: Brenna Nugent - Brother/Sister - [phone]    Secondary Decision Maker: Eugenie Schneider - Brother/Sister - [phone]    Supplemental (Other) Decision Maker: Randi Sims - Parent - [phone]    Click here to complete Healthcare Decision Makers including selection of the Healthcare Decision Maker Relationship (ie \"Primary\").

## 2025-05-15 NOTE — CARE COORDINATION
Case Management Assessment  Initial Evaluation    Date/Time of Evaluation: 5/15/2025 11:00 AM  Assessment Completed by: Lars Lozoya RN    If patient is discharged prior to next notation, then this note serves as note for discharge by case management.    Patient Name: Diane Stuart                   YOB: 1960  Diagnosis: Shortness of breath [R06.02]  Dizziness [R42]  Lightheadedness [R42]  Arrhythmia [I49.9]  Ventricular tachycardia (HCC) [I47.20]  Bigeminy [I49.8]  History of TIA (transient ischemic attack) [Z86.73]  Chest pain, unspecified type [R07.9]                   Date / Time: 5/14/2025  1:46 PM    Patient Admission Status: Inpatient   Readmission Risk (Low < 19, Mod (19-27), High > 27): Readmission Risk Score: 11.2    Current PCP: Hanh Chapman MD  PCP verified by CM? Yes    Chart Reviewed: Yes      History Provided by: Patient  Patient Orientation: Alert and Oriented    Patient Cognition: Alert    Hospitalization in the last 30 days (Readmission):  No    If yes, Readmission Assessment in CM Navigator will be completed.    Advance Directives:      Code Status: Full Code   Patient's Primary Decision Maker is: Legal Next of Kin    Primary Decision Maker: Dania Steel - Child - [phone]    Secondary Decision Maker: Brenna Nugent - Brother/Sister - [phone]    Secondary Decision Maker: Eugenie Schneider - Brother/Sister - [phone]    Supplemental (Other) Decision Maker: Randi Sims - Parent - [phone]    Discharge Planning:    Patient lives with:   Type of Home:    Primary Care Giver: Self  Patient Support Systems include: Family Members   Current Financial resources:    Current community resources:    Current services prior to admission:              Current DME:              Type of Home Care services:       ADLS  Prior functional level: Independent in ADLs/IADLs  Current functional level: Independent in ADLs/IADLs    PT AM-PAC:   /24  OT AM-PAC:   /24    Family can provide  74 y/o male PMH chronic A.fib on apixaban, HTN, HLD, DANIELLE on CPAP, ERCP with sphincterotomy, physical debility (non-ambulatory)    Initially admitted with acute cholecystitis and sepsis, refused surgery, treated with abx with plan for HIDA     On 9/11, he developed high fever, A.fib with RVR, hypotension, and resp distress - transferred to ICU       Problem list:     Severe sepsis with septic shock (POA) due to acute cholecystitis   Acute hypoxic respiratory failure   A.fib with RVR  Prerenal SUKUMAR   Metabolic acidosis       S/p perc cholecystostomy 9/12       -improving   -neuro/mental status stable, AAOx4, interactive   -off levo, BP stable on midodrine   -A.fib with RVR 9/13, HR better controlled today, continue metoprolol   -on iv heparin for AC, no immediate invasive procedure planned, will transition to apixaban   -TTE from 9/11 shows normal EF, dilated LA, moderate pulm HTN  -resp stable, continue nocturnal CPAP   -abdi fluid growing Kleb and E. coli, f/u sens, continue meropenem for now. BCx neg. WBC improved, no fever   -tolerating diet   -renal fn normal, replete PO4 for hypophosphatemia, d/c IVF and Monet   -DVT ppx with iv heparin/apixaban   -PT, OOB       Will transfer to tele if no further RVR  72 y/o male PMH chronic A.fib on apixaban, HTN, HLD, DANIELLE on CPAP, ERCP with sphincterotomy, physical debility (non-ambulatory)    Initially admitted with acute cholecystitis and sepsis, refused surgery, treated with abx with plan for HIDA     On 9/11, he developed high fever, A.fib with RVR, hypotension, and resp distress - transferred to ICU       Problem list:     Severe sepsis with septic shock (POA) due to acute cholecystitis   Acute hypoxic respiratory failure   A.fib with RVR  Prerenal SUKUMAR   Metabolic acidosis       S/p perc cholecystostomy 9/12       -improving   -neuro/mental status stable, AAOx4, interactive   -off levo, BP stable on midodrine   -A.fib with RVR 9/13, HR better controlled today, continue metoprolol   -on iv heparin for AC, no immediate invasive procedure planned, will transition to apixaban   -TTE from 9/11 shows normal EF, dilated LA, moderate pulm HTN  -resp stable, continue nocturnal CPAP   -abdi fluid growing Kleb and E. coli, f/u sens, continue meropenem for now. BCx neg. WBC improved, no fever   -IR drain care, surgery f/u   -tolerating diet   -no ERCP per GI   -renal fn normal, replete PO4 for hypophosphatemia, d/c IVF and Monet   -DVT ppx with iv heparin/apixaban   -PT, OOB       Will transfer to tele if no further RVR  74 y/o male PMH chronic A.fib on apixaban, HTN, HLD, DANIELLE on CPAP, ERCP with sphincterotomy, physical debility (non-ambulatory)    Initially admitted with acute cholecystitis and sepsis, refused surgery, treated with abx with plan for HIDA     On 9/11, he developed high fever, A.fib with RVR, hypotension, and resp distress - transferred to ICU       Problem list:     Severe sepsis with septic shock (POA) due to acute cholecystitis   Acute hypoxic respiratory failure   A.fib with RVR  Prerenal SUKUMAR   Metabolic acidosis       S/p perc cholecystostomy 9/12       -improving   -neuro/mental status stable, AAOx4, interactive   -off levo, BP stable on midodrine   -A.fib with RVR 9/13, HR better controlled today, continue metoprolol   -on iv heparin for AC, no immediate invasive procedure planned, will transition to apixaban   -TTE from 9/11 shows normal EF, dilated LA, moderate pulm HTN  -resp stable, continue nocturnal CPAP   -abdi fluid growing Kleb and E. coli, f/u sens, continue meropenem for now. BCx neg. WBC improved, no fever   -IR drain care, surgery f/u   -tolerating diet   -no ERCP per GI   -renal fn normal, replete PO4 for hypophosphatemia, d/c IVF and Monet   -DVT ppx with iv heparin/apixaban   -PT, OOB       Will transfer to tele if no further RVR       Addendum: patient tolerated getting OOB, HR 100s, BP stable, will transfer to tele, sign out given to Dr. Summers

## 2025-05-15 NOTE — PROCEDURES
PROCEDURE NOTE  Date: 5/15/2025   Name: Diane Stuart  YOB: 1960    Central Line Placement Procedure Note    Indication: vascular access, poor peripheral access, centrally administered medications, and need for frequent blood draws    Consent: The patient provided verbal consent for this procedure.    Procedure: The patient was positioned appropriately and the skin over the right femoral vein was prepped with Chloraprep and draped in a sterile fashion. Local anesthesia was obtained by infiltration using 1% Lidocaine without epinephrine.  A large bore needle was used to identify the vein.  A guide wire was then inserted into the vein through the needle. A triple lumen catheter was then inserted into the vessel over the guide wire using the Seldinger technique.  All ports showed good, free flowing blood return and were flushed with saline solution.   The catheter was then securely fastened to the skin with suture at 20 cm.  Two sutures were placed into the kit included tube clamp, proximal eyelets, and a suture end from each of the securing sutures was extended around the catheter and tied to the proximal eyelets as an added measure to prevent dislodgement. An antibiotic disk was placed and the site was then covered with a sterile dressing.  A post procedure X-ray was not indicated.    The patient tolerated the procedure well.    Complications: None    Pedro Odonnell MD, PGY2 on 5/15/2025 at 3:31 AM    ATTENDING PROVIDER ATTESTATION:     I have personally performed and/or participated in the history, exam, medical decision making, and procedures and agree with all pertinent clinical information.      I have also reviewed and agree with the past medical, family and social history unless otherwise noted.    I have discussed this patient in detail with the resident, and provided the instruction and education regarding central line for poor peripheral access.    My findings/Plan: Patient here in the

## 2025-05-16 LAB
ANION GAP SERPL CALCULATED.3IONS-SCNC: 14 MMOL/L (ref 7–16)
BASOPHILS # BLD: 0.04 K/UL (ref 0–0.2)
BASOPHILS NFR BLD: 1 % (ref 0–2)
BUN SERPL-MCNC: 4 MG/DL (ref 8–23)
CALCIUM SERPL-MCNC: 8.7 MG/DL (ref 8.8–10.2)
CHLORIDE SERPL-SCNC: 99 MMOL/L (ref 98–107)
CO2 SERPL-SCNC: 21 MMOL/L (ref 22–29)
CREAT SERPL-MCNC: 0.6 MG/DL (ref 0.5–1)
EOSINOPHIL # BLD: 0.05 K/UL (ref 0.05–0.5)
EOSINOPHILS RELATIVE PERCENT: 1 % (ref 0–6)
ERYTHROCYTE [DISTWIDTH] IN BLOOD BY AUTOMATED COUNT: 13.1 % (ref 11.5–15)
GFR, ESTIMATED: >90 ML/MIN/1.73M2
GLUCOSE SERPL-MCNC: 121 MG/DL (ref 74–99)
HCT VFR BLD AUTO: 32.8 % (ref 34–48)
HGB BLD-MCNC: 11.3 G/DL (ref 11.5–15.5)
IMM GRANULOCYTES # BLD AUTO: <0.03 K/UL (ref 0–0.58)
IMM GRANULOCYTES NFR BLD: 0 % (ref 0–5)
LYMPHOCYTES NFR BLD: 2.75 K/UL (ref 1.5–4)
LYMPHOCYTES RELATIVE PERCENT: 37 % (ref 20–42)
MAGNESIUM SERPL-MCNC: 1.7 MG/DL (ref 1.6–2.4)
MCH RBC QN AUTO: 32.2 PG (ref 26–35)
MCHC RBC AUTO-ENTMCNC: 34.5 G/DL (ref 32–34.5)
MCV RBC AUTO: 93.4 FL (ref 80–99.9)
MONOCYTES NFR BLD: 0.67 K/UL (ref 0.1–0.95)
MONOCYTES NFR BLD: 9 % (ref 2–12)
NEUTROPHILS NFR BLD: 52 % (ref 43–80)
NEUTS SEG NFR BLD: 3.89 K/UL (ref 1.8–7.3)
PHOSPHATE SERPL-MCNC: 2.9 MG/DL (ref 2.5–4.5)
PLATELET, FLUORESCENCE: 129 K/UL (ref 130–450)
PMV BLD AUTO: 10.6 FL (ref 7–12)
POTASSIUM SERPL-SCNC: 3.9 MMOL/L (ref 3.5–5.1)
RBC # BLD AUTO: 3.51 M/UL (ref 3.5–5.5)
SODIUM SERPL-SCNC: 134 MMOL/L (ref 136–145)
WBC OTHER # BLD: 7.4 K/UL (ref 4.5–11.5)

## 2025-05-16 PROCEDURE — 99232 SBSQ HOSP IP/OBS MODERATE 35: CPT | Performed by: FAMILY MEDICINE

## 2025-05-16 PROCEDURE — 6370000000 HC RX 637 (ALT 250 FOR IP): Performed by: INTERNAL MEDICINE

## 2025-05-16 PROCEDURE — 85025 COMPLETE CBC W/AUTO DIFF WBC: CPT

## 2025-05-16 PROCEDURE — 84100 ASSAY OF PHOSPHORUS: CPT

## 2025-05-16 PROCEDURE — 6360000002 HC RX W HCPCS: Performed by: INTERNAL MEDICINE

## 2025-05-16 PROCEDURE — 2500000003 HC RX 250 WO HCPCS: Performed by: INTERNAL MEDICINE

## 2025-05-16 PROCEDURE — 83735 ASSAY OF MAGNESIUM: CPT

## 2025-05-16 PROCEDURE — 2000000000 HC ICU R&B

## 2025-05-16 PROCEDURE — 99232 SBSQ HOSP IP/OBS MODERATE 35: CPT | Performed by: INTERNAL MEDICINE

## 2025-05-16 PROCEDURE — 80048 BASIC METABOLIC PNL TOTAL CA: CPT

## 2025-05-16 PROCEDURE — 36415 COLL VENOUS BLD VENIPUNCTURE: CPT

## 2025-05-16 RX ADMIN — SODIUM CHLORIDE, PRESERVATIVE FREE 10 ML: 5 INJECTION INTRAVENOUS at 22:15

## 2025-05-16 RX ADMIN — Medication 100 MG: at 08:19

## 2025-05-16 RX ADMIN — LORAZEPAM 3 MG: 2 INJECTION INTRAMUSCULAR; INTRAVENOUS at 22:05

## 2025-05-16 RX ADMIN — LORAZEPAM 2 MG: 1 TABLET ORAL at 14:11

## 2025-05-16 RX ADMIN — HYDROCORTISONE: 1 CREAM TOPICAL at 08:19

## 2025-05-16 RX ADMIN — LORAZEPAM 4 MG: 2 INJECTION INTRAMUSCULAR; INTRAVENOUS at 23:08

## 2025-05-16 RX ADMIN — LORAZEPAM 2 MG: 1 TABLET ORAL at 18:44

## 2025-05-16 RX ADMIN — HYDROCORTISONE: 1 CREAM TOPICAL at 22:15

## 2025-05-16 RX ADMIN — LORAZEPAM 3 MG: 1 TABLET ORAL at 12:26

## 2025-05-16 RX ADMIN — ASPIRIN 81 MG: 81 TABLET, COATED ORAL at 08:18

## 2025-05-16 RX ADMIN — FOLIC ACID 1 MG: 1 TABLET ORAL at 08:19

## 2025-05-16 RX ADMIN — LORAZEPAM 3 MG: 2 INJECTION INTRAMUSCULAR; INTRAVENOUS at 05:21

## 2025-05-16 RX ADMIN — SODIUM CHLORIDE, PRESERVATIVE FREE 10 ML: 5 INJECTION INTRAVENOUS at 08:19

## 2025-05-16 RX ADMIN — LORAZEPAM 3 MG: 2 INJECTION INTRAMUSCULAR; INTRAVENOUS at 20:53

## 2025-05-16 RX ADMIN — ENOXAPARIN SODIUM 30 MG: 100 INJECTION SUBCUTANEOUS at 08:19

## 2025-05-16 RX ADMIN — LORAZEPAM 2 MG: 1 TABLET ORAL at 08:43

## 2025-05-16 RX ADMIN — LIDOCAINE HYDROCHLORIDE ANHYDROUS AND DEXTROSE MONOHYDRATE 1 MG/MIN: .4; 5 INJECTION, SOLUTION INTRAVENOUS at 17:33

## 2025-05-16 ASSESSMENT — PAIN SCALES - GENERAL
PAINLEVEL_OUTOF10: 0

## 2025-05-16 NOTE — CARE COORDINATION
5/16 Care Coordination:Pt remains in [facility]. Pt became disoriented, agitated, and aggressive with auditory and visual hallucinations today. Pt Hx ETOH, She stated to CM 5/15 that she did start drinking again.Pt on CIWA scale. On 5/15  her discharge plan was to Home. With Current status unclear on discharge needs. Does have a Hx at [facility] ( [facility]).  CM/SW will continue to follow for discharge planning.   Lars PALN,RN--BC  [phone]

## 2025-05-17 LAB
ALBUMIN SERPL-MCNC: 3.7 G/DL (ref 3.5–5.2)
ALP SERPL-CCNC: 68 U/L (ref 35–104)
ALT SERPL-CCNC: 12 U/L (ref 0–35)
ANION GAP SERPL CALCULATED.3IONS-SCNC: 13 MMOL/L (ref 7–16)
AST SERPL-CCNC: 27 U/L (ref 0–35)
BASOPHILS # BLD: 0.06 K/UL (ref 0–0.2)
BASOPHILS NFR BLD: 1 % (ref 0–2)
BILE AC SERPL-SCNC: 4 UMOL/L (ref 0–10)
BILIRUB DIRECT SERPL-MCNC: 0.6 MG/DL (ref 0–0.2)
BILIRUB INDIRECT SERPL-MCNC: 0.6 MG/DL (ref 0–1)
BILIRUB SERPL-MCNC: 1.2 MG/DL (ref 0–1.2)
BUN SERPL-MCNC: 4 MG/DL (ref 8–23)
CALCIUM SERPL-MCNC: 9.1 MG/DL (ref 8.8–10.2)
CHLORIDE SERPL-SCNC: 100 MMOL/L (ref 98–107)
CO2 SERPL-SCNC: 23 MMOL/L (ref 22–29)
CREAT SERPL-MCNC: 0.6 MG/DL (ref 0.5–1)
EKG ATRIAL RATE: 66 BPM
EKG P AXIS: 60 DEGREES
EKG P-R INTERVAL: 134 MS
EKG Q-T INTERVAL: 442 MS
EKG QRS DURATION: 76 MS
EKG QTC CALCULATION (BAZETT): 463 MS
EKG R AXIS: 39 DEGREES
EKG T AXIS: 70 DEGREES
EKG VENTRICULAR RATE: 66 BPM
EOSINOPHIL # BLD: 0.08 K/UL (ref 0.05–0.5)
EOSINOPHILS RELATIVE PERCENT: 1 % (ref 0–6)
ERYTHROCYTE [DISTWIDTH] IN BLOOD BY AUTOMATED COUNT: 12.7 % (ref 11.5–15)
GFR, ESTIMATED: >90 ML/MIN/1.73M2
GLUCOSE SERPL-MCNC: 107 MG/DL (ref 74–99)
HCT VFR BLD AUTO: 34.4 % (ref 34–48)
HGB BLD-MCNC: 12 G/DL (ref 11.5–15.5)
IMM GRANULOCYTES # BLD AUTO: 0.05 K/UL (ref 0–0.58)
IMM GRANULOCYTES NFR BLD: 0 % (ref 0–5)
INR PPP: 1.1
LYMPHOCYTES NFR BLD: 2.66 K/UL (ref 1.5–4)
LYMPHOCYTES RELATIVE PERCENT: 22 % (ref 20–42)
MAGNESIUM SERPL-MCNC: 1.2 MG/DL (ref 1.6–2.4)
MAGNESIUM SERPL-MCNC: 2.4 MG/DL (ref 1.6–2.4)
MCH RBC QN AUTO: 32.8 PG (ref 26–35)
MCHC RBC AUTO-ENTMCNC: 34.9 G/DL (ref 32–34.5)
MCV RBC AUTO: 94 FL (ref 80–99.9)
MONOCYTES NFR BLD: 1.12 K/UL (ref 0.1–0.95)
MONOCYTES NFR BLD: 9 % (ref 2–12)
NEUTROPHILS NFR BLD: 67 % (ref 43–80)
NEUTS SEG NFR BLD: 8.17 K/UL (ref 1.8–7.3)
PARTIAL THROMBOPLASTIN TIME: 35.2 SEC (ref 24.5–35.1)
PHOSPHATE SERPL-MCNC: 3.5 MG/DL (ref 2.5–4.5)
PLATELET, FLUORESCENCE: 108 K/UL (ref 130–450)
PMV BLD AUTO: 11.5 FL (ref 7–12)
POTASSIUM SERPL-SCNC: 3.7 MMOL/L (ref 3.5–5.1)
PROT SERPL-MCNC: 6.1 G/DL (ref 6.4–8.3)
PROTHROMBIN TIME: 12.1 SEC (ref 9.3–12.4)
RBC # BLD AUTO: 3.66 M/UL (ref 3.5–5.5)
SODIUM SERPL-SCNC: 135 MMOL/L (ref 136–145)
WBC OTHER # BLD: 12.1 K/UL (ref 4.5–11.5)

## 2025-05-17 PROCEDURE — 2000000000 HC ICU R&B

## 2025-05-17 PROCEDURE — 99232 SBSQ HOSP IP/OBS MODERATE 35: CPT | Performed by: INTERNAL MEDICINE

## 2025-05-17 PROCEDURE — 84100 ASSAY OF PHOSPHORUS: CPT

## 2025-05-17 PROCEDURE — 85610 PROTHROMBIN TIME: CPT

## 2025-05-17 PROCEDURE — 36556 INSERT NON-TUNNEL CV CATH: CPT | Performed by: SURGERY

## 2025-05-17 PROCEDURE — 99291 CRITICAL CARE FIRST HOUR: CPT | Performed by: SURGERY

## 2025-05-17 PROCEDURE — 2500000003 HC RX 250 WO HCPCS

## 2025-05-17 PROCEDURE — 85730 THROMBOPLASTIN TIME PARTIAL: CPT

## 2025-05-17 PROCEDURE — 6360000002 HC RX W HCPCS: Performed by: SURGERY

## 2025-05-17 PROCEDURE — 6360000002 HC RX W HCPCS: Performed by: INTERNAL MEDICINE

## 2025-05-17 PROCEDURE — 99232 SBSQ HOSP IP/OBS MODERATE 35: CPT | Performed by: FAMILY MEDICINE

## 2025-05-17 PROCEDURE — 93010 ELECTROCARDIOGRAM REPORT: CPT | Performed by: INTERNAL MEDICINE

## 2025-05-17 PROCEDURE — 80053 COMPREHEN METABOLIC PANEL: CPT

## 2025-05-17 PROCEDURE — 6360000002 HC RX W HCPCS

## 2025-05-17 PROCEDURE — 83735 ASSAY OF MAGNESIUM: CPT

## 2025-05-17 PROCEDURE — 2700000000 HC OXYGEN THERAPY PER DAY

## 2025-05-17 PROCEDURE — 36592 COLLECT BLOOD FROM PICC: CPT

## 2025-05-17 PROCEDURE — 2500000003 HC RX 250 WO HCPCS: Performed by: INTERNAL MEDICINE

## 2025-05-17 PROCEDURE — 82248 BILIRUBIN DIRECT: CPT

## 2025-05-17 PROCEDURE — 6370000000 HC RX 637 (ALT 250 FOR IP): Performed by: INTERNAL MEDICINE

## 2025-05-17 PROCEDURE — 02HV33Z INSERTION OF INFUSION DEVICE INTO SUPERIOR VENA CAVA, PERCUTANEOUS APPROACH: ICD-10-PCS | Performed by: SURGERY

## 2025-05-17 PROCEDURE — 85025 COMPLETE CBC W/AUTO DIFF WBC: CPT

## 2025-05-17 RX ORDER — LORAZEPAM 2 MG/ML
4 INJECTION INTRAMUSCULAR EVERY 8 HOURS
Status: DISCONTINUED | OUTPATIENT
Start: 2025-05-18 | End: 2025-05-17

## 2025-05-17 RX ORDER — THIAMINE HYDROCHLORIDE 100 MG/ML
500 INJECTION, SOLUTION INTRAMUSCULAR; INTRAVENOUS EVERY 8 HOURS
Status: COMPLETED | OUTPATIENT
Start: 2025-05-17 | End: 2025-05-18

## 2025-05-17 RX ORDER — MAGNESIUM SULFATE IN WATER 40 MG/ML
4000 INJECTION, SOLUTION INTRAVENOUS ONCE
Status: COMPLETED | OUTPATIENT
Start: 2025-05-17 | End: 2025-05-17

## 2025-05-17 RX ORDER — LORAZEPAM 2 MG/ML
4 INJECTION INTRAMUSCULAR EVERY 12 HOURS
Status: DISCONTINUED | OUTPATIENT
Start: 2025-05-19 | End: 2025-05-17

## 2025-05-17 RX ORDER — SODIUM CHLORIDE 9 MG/ML
INJECTION, SOLUTION INTRAVENOUS PRN
Status: DISCONTINUED | OUTPATIENT
Start: 2025-05-17 | End: 2025-06-04 | Stop reason: SDUPTHER

## 2025-05-17 RX ORDER — THIAMINE HYDROCHLORIDE 100 MG/ML
250 INJECTION, SOLUTION INTRAMUSCULAR; INTRAVENOUS DAILY
Status: CANCELLED | OUTPATIENT
Start: 2025-05-19 | End: 2025-05-24

## 2025-05-17 RX ORDER — SODIUM CHLORIDE 0.9 % (FLUSH) 0.9 %
5-40 SYRINGE (ML) INJECTION EVERY 12 HOURS SCHEDULED
Status: DISCONTINUED | OUTPATIENT
Start: 2025-05-17 | End: 2025-06-04 | Stop reason: SDUPTHER

## 2025-05-17 RX ORDER — LORAZEPAM 2 MG/ML
4 INJECTION INTRAMUSCULAR EVERY 6 HOURS
Status: DISCONTINUED | OUTPATIENT
Start: 2025-05-17 | End: 2025-05-17

## 2025-05-17 RX ORDER — LORAZEPAM 2 MG/ML
4 INJECTION INTRAMUSCULAR EVERY 12 HOURS
Status: CANCELLED | OUTPATIENT
Start: 2025-05-19 | End: 2025-05-20

## 2025-05-17 RX ORDER — SODIUM CHLORIDE 0.9 % (FLUSH) 0.9 %
5-40 SYRINGE (ML) INJECTION PRN
Status: DISCONTINUED | OUTPATIENT
Start: 2025-05-17 | End: 2025-06-04 | Stop reason: SDUPTHER

## 2025-05-17 RX ORDER — LORAZEPAM 2 MG/ML
4 INJECTION INTRAMUSCULAR EVERY 8 HOURS
Status: CANCELLED | OUTPATIENT
Start: 2025-05-18 | End: 2025-05-19

## 2025-05-17 RX ORDER — THIAMINE HYDROCHLORIDE 100 MG/ML
100 INJECTION, SOLUTION INTRAMUSCULAR; INTRAVENOUS DAILY
Status: DISCONTINUED | OUTPATIENT
Start: 2025-05-25 | End: 2025-06-04 | Stop reason: SDUPTHER

## 2025-05-17 RX ORDER — LIDOCAINE HYDROCHLORIDE 10 MG/ML
50 INJECTION, SOLUTION EPIDURAL; INFILTRATION; INTRACAUDAL; PERINEURAL ONCE
Status: DISCONTINUED | OUTPATIENT
Start: 2025-05-17 | End: 2025-06-05 | Stop reason: HOSPADM

## 2025-05-17 RX ORDER — HALOPERIDOL DECANOATE 50 MG/ML
5 INJECTION INTRAMUSCULAR ONCE
Status: DISCONTINUED | OUTPATIENT
Start: 2025-05-17 | End: 2025-05-17

## 2025-05-17 RX ORDER — HALOPERIDOL 5 MG/ML
5 INJECTION INTRAMUSCULAR ONCE
Status: COMPLETED | OUTPATIENT
Start: 2025-05-17 | End: 2025-05-17

## 2025-05-17 RX ORDER — LORAZEPAM 2 MG/ML
4 INJECTION INTRAMUSCULAR EVERY 6 HOURS
Status: CANCELLED | OUTPATIENT
Start: 2025-05-17 | End: 2025-05-18

## 2025-05-17 RX ORDER — THIAMINE HYDROCHLORIDE 100 MG/ML
500 INJECTION, SOLUTION INTRAMUSCULAR; INTRAVENOUS EVERY 8 HOURS
Status: CANCELLED | OUTPATIENT
Start: 2025-05-17 | End: 2025-05-19

## 2025-05-17 RX ORDER — THIAMINE HYDROCHLORIDE 100 MG/ML
100 INJECTION, SOLUTION INTRAMUSCULAR; INTRAVENOUS DAILY
Status: CANCELLED | OUTPATIENT
Start: 2025-05-25

## 2025-05-17 RX ORDER — PHENOBARBITAL SODIUM 65 MG/ML
260 INJECTION, SOLUTION INTRAMUSCULAR; INTRAVENOUS EVERY 6 HOURS
Status: DISPENSED | OUTPATIENT
Start: 2025-05-17 | End: 2025-05-18

## 2025-05-17 RX ORDER — THIAMINE HYDROCHLORIDE 100 MG/ML
250 INJECTION, SOLUTION INTRAMUSCULAR; INTRAVENOUS DAILY
Status: COMPLETED | OUTPATIENT
Start: 2025-05-19 | End: 2025-05-23

## 2025-05-17 RX ORDER — PHENOBARBITAL SODIUM 65 MG/ML
130 INJECTION, SOLUTION INTRAMUSCULAR; INTRAVENOUS EVERY 6 HOURS
Status: DISCONTINUED | OUTPATIENT
Start: 2025-05-18 | End: 2025-05-18

## 2025-05-17 RX ADMIN — THIAMINE HYDROCHLORIDE 500 MG: 100 INJECTION, SOLUTION INTRAMUSCULAR; INTRAVENOUS at 17:19

## 2025-05-17 RX ADMIN — HYDROCORTISONE: 1 CREAM TOPICAL at 10:26

## 2025-05-17 RX ADMIN — LORAZEPAM 4 MG: 2 INJECTION INTRAMUSCULAR; INTRAVENOUS at 03:00

## 2025-05-17 RX ADMIN — HALOPERIDOL LACTATE 5 MG: 5 INJECTION, SOLUTION INTRAMUSCULAR at 10:20

## 2025-05-17 RX ADMIN — PHENOBARBITAL SODIUM 260 MG: 65 INJECTION INTRAMUSCULAR; INTRAVENOUS at 10:49

## 2025-05-17 RX ADMIN — LORAZEPAM 4 MG: 2 INJECTION INTRAMUSCULAR; INTRAVENOUS at 01:03

## 2025-05-17 RX ADMIN — SODIUM CHLORIDE, PRESERVATIVE FREE 10 ML: 5 INJECTION INTRAVENOUS at 22:38

## 2025-05-17 RX ADMIN — SODIUM CHLORIDE, PRESERVATIVE FREE 10 ML: 5 INJECTION INTRAVENOUS at 10:28

## 2025-05-17 RX ADMIN — PHENOBARBITAL SODIUM 260 MG: 65 INJECTION INTRAMUSCULAR; INTRAVENOUS at 22:37

## 2025-05-17 RX ADMIN — SODIUM CHLORIDE, PRESERVATIVE FREE 10 ML: 5 INJECTION INTRAVENOUS at 08:00

## 2025-05-17 RX ADMIN — THIAMINE HYDROCHLORIDE 500 MG: 100 INJECTION, SOLUTION INTRAMUSCULAR; INTRAVENOUS at 10:51

## 2025-05-17 RX ADMIN — LORAZEPAM 4 MG: 2 INJECTION INTRAMUSCULAR; INTRAVENOUS at 04:04

## 2025-05-17 RX ADMIN — LORAZEPAM 4 MG: 2 INJECTION INTRAMUSCULAR; INTRAVENOUS at 06:00

## 2025-05-17 RX ADMIN — PANTOPRAZOLE SODIUM 40 MG: 40 TABLET, DELAYED RELEASE ORAL at 07:08

## 2025-05-17 RX ADMIN — MAGNESIUM SULFATE HEPTAHYDRATE 4000 MG: 40 INJECTION, SOLUTION INTRAVENOUS at 11:14

## 2025-05-17 RX ADMIN — LORAZEPAM 4 MG: 2 INJECTION INTRAMUSCULAR; INTRAVENOUS at 07:38

## 2025-05-17 RX ADMIN — LORAZEPAM 4 MG: 2 INJECTION INTRAMUSCULAR; INTRAVENOUS at 09:54

## 2025-05-17 RX ADMIN — ENOXAPARIN SODIUM 30 MG: 100 INJECTION SUBCUTANEOUS at 10:24

## 2025-05-17 ASSESSMENT — PAIN SCALES - GENERAL
PAINLEVEL_OUTOF10: 0

## 2025-05-18 LAB
ALBUMIN SERPL-MCNC: 3.6 G/DL (ref 3.5–5.2)
ALP SERPL-CCNC: 96 U/L (ref 35–104)
ALT SERPL-CCNC: 14 U/L (ref 0–35)
ANION GAP SERPL CALCULATED.3IONS-SCNC: 9 MMOL/L (ref 7–16)
AST SERPL-CCNC: 24 U/L (ref 0–35)
BASOPHILS # BLD: 0.05 K/UL (ref 0–0.2)
BASOPHILS NFR BLD: 0 % (ref 0–2)
BILIRUB DIRECT SERPL-MCNC: 0.5 MG/DL (ref 0–0.2)
BILIRUB INDIRECT SERPL-MCNC: 0.4 MG/DL (ref 0–1)
BILIRUB SERPL-MCNC: 0.9 MG/DL (ref 0–1.2)
BUN SERPL-MCNC: 6 MG/DL (ref 8–23)
CALCIUM SERPL-MCNC: 8.9 MG/DL (ref 8.8–10.2)
CHLORIDE SERPL-SCNC: 97 MMOL/L (ref 98–107)
CO2 SERPL-SCNC: 28 MMOL/L (ref 22–29)
CREAT SERPL-MCNC: 0.7 MG/DL (ref 0.5–1)
EOSINOPHIL # BLD: 0.07 K/UL (ref 0.05–0.5)
EOSINOPHILS RELATIVE PERCENT: 1 % (ref 0–6)
ERYTHROCYTE [DISTWIDTH] IN BLOOD BY AUTOMATED COUNT: 12.9 % (ref 11.5–15)
GFR, ESTIMATED: >90 ML/MIN/1.73M2
GLUCOSE SERPL-MCNC: 113 MG/DL (ref 74–99)
HCT VFR BLD AUTO: 35.8 % (ref 34–48)
HGB BLD-MCNC: 12.1 G/DL (ref 11.5–15.5)
IMM GRANULOCYTES # BLD AUTO: 0.08 K/UL (ref 0–0.58)
IMM GRANULOCYTES NFR BLD: 1 % (ref 0–5)
LYMPHOCYTES NFR BLD: 2.23 K/UL (ref 1.5–4)
LYMPHOCYTES RELATIVE PERCENT: 15 % (ref 20–42)
MAGNESIUM SERPL-MCNC: 1.9 MG/DL (ref 1.6–2.4)
MCH RBC QN AUTO: 32.3 PG (ref 26–35)
MCHC RBC AUTO-ENTMCNC: 33.8 G/DL (ref 32–34.5)
MCV RBC AUTO: 95.5 FL (ref 80–99.9)
MONOCYTES NFR BLD: 1.13 K/UL (ref 0.1–0.95)
MONOCYTES NFR BLD: 8 % (ref 2–12)
NEUTROPHILS NFR BLD: 76 % (ref 43–80)
NEUTS SEG NFR BLD: 11.21 K/UL (ref 1.8–7.3)
PHOSPHATE SERPL-MCNC: 3.6 MG/DL (ref 2.5–4.5)
PLATELET # BLD AUTO: 103 K/UL (ref 130–450)
PMV BLD AUTO: 12.4 FL (ref 7–12)
POTASSIUM SERPL-SCNC: 3.9 MMOL/L (ref 3.5–5.1)
PROT SERPL-MCNC: 6.2 G/DL (ref 6.4–8.3)
RBC # BLD AUTO: 3.75 M/UL (ref 3.5–5.5)
SODIUM SERPL-SCNC: 134 MMOL/L (ref 136–145)
WBC OTHER # BLD: 14.8 K/UL (ref 4.5–11.5)

## 2025-05-18 PROCEDURE — 2000000000 HC ICU R&B

## 2025-05-18 PROCEDURE — 83735 ASSAY OF MAGNESIUM: CPT

## 2025-05-18 PROCEDURE — 2700000000 HC OXYGEN THERAPY PER DAY

## 2025-05-18 PROCEDURE — 6360000002 HC RX W HCPCS: Performed by: INTERNAL MEDICINE

## 2025-05-18 PROCEDURE — 82248 BILIRUBIN DIRECT: CPT

## 2025-05-18 PROCEDURE — 2580000003 HC RX 258

## 2025-05-18 PROCEDURE — 51798 US URINE CAPACITY MEASURE: CPT

## 2025-05-18 PROCEDURE — 93005 ELECTROCARDIOGRAM TRACING: CPT | Performed by: INTERNAL MEDICINE

## 2025-05-18 PROCEDURE — 99232 SBSQ HOSP IP/OBS MODERATE 35: CPT | Performed by: INTERNAL MEDICINE

## 2025-05-18 PROCEDURE — 99291 CRITICAL CARE FIRST HOUR: CPT | Performed by: SURGERY

## 2025-05-18 PROCEDURE — 2500000003 HC RX 250 WO HCPCS

## 2025-05-18 PROCEDURE — 80053 COMPREHEN METABOLIC PANEL: CPT

## 2025-05-18 PROCEDURE — 36592 COLLECT BLOOD FROM PICC: CPT

## 2025-05-18 PROCEDURE — 84100 ASSAY OF PHOSPHORUS: CPT

## 2025-05-18 PROCEDURE — 6360000002 HC RX W HCPCS: Performed by: SURGERY

## 2025-05-18 PROCEDURE — 6360000002 HC RX W HCPCS

## 2025-05-18 PROCEDURE — 99232 SBSQ HOSP IP/OBS MODERATE 35: CPT | Performed by: FAMILY MEDICINE

## 2025-05-18 PROCEDURE — 2500000003 HC RX 250 WO HCPCS: Performed by: INTERNAL MEDICINE

## 2025-05-18 PROCEDURE — 85025 COMPLETE CBC W/AUTO DIFF WBC: CPT

## 2025-05-18 RX ORDER — PHENOBARBITAL SODIUM 65 MG/ML
65 INJECTION, SOLUTION INTRAMUSCULAR; INTRAVENOUS EVERY 6 HOURS
Status: DISCONTINUED | OUTPATIENT
Start: 2025-05-18 | End: 2025-05-20

## 2025-05-18 RX ADMIN — THIAMINE HYDROCHLORIDE 500 MG: 100 INJECTION, SOLUTION INTRAMUSCULAR; INTRAVENOUS at 23:26

## 2025-05-18 RX ADMIN — SODIUM CHLORIDE, PRESERVATIVE FREE 10 ML: 5 INJECTION INTRAVENOUS at 09:01

## 2025-05-18 RX ADMIN — ENOXAPARIN SODIUM 30 MG: 100 INJECTION SUBCUTANEOUS at 08:59

## 2025-05-18 RX ADMIN — SODIUM CHLORIDE, PRESERVATIVE FREE 10 ML: 5 INJECTION INTRAVENOUS at 19:56

## 2025-05-18 RX ADMIN — THIAMINE HYDROCHLORIDE 500 MG: 100 INJECTION, SOLUTION INTRAMUSCULAR; INTRAVENOUS at 00:20

## 2025-05-18 RX ADMIN — PHENOBARBITAL SODIUM 65 MG: 65 INJECTION INTRAMUSCULAR; INTRAVENOUS at 22:53

## 2025-05-18 RX ADMIN — THIAMINE HYDROCHLORIDE 500 MG: 100 INJECTION, SOLUTION INTRAMUSCULAR; INTRAVENOUS at 17:56

## 2025-05-18 RX ADMIN — PHENOBARBITAL SODIUM 65 MG: 65 INJECTION INTRAMUSCULAR; INTRAVENOUS at 18:34

## 2025-05-18 RX ADMIN — LIDOCAINE HYDROCHLORIDE ANHYDROUS AND DEXTROSE MONOHYDRATE 1 MG/MIN: .4; 5 INJECTION, SOLUTION INTRAVENOUS at 00:20

## 2025-05-18 RX ADMIN — SODIUM CHLORIDE, PRESERVATIVE FREE 40 MG: 5 INJECTION INTRAVENOUS at 08:59

## 2025-05-18 RX ADMIN — PHENOBARBITAL SODIUM 65 MG: 65 INJECTION INTRAMUSCULAR; INTRAVENOUS at 12:52

## 2025-05-18 RX ADMIN — THIAMINE HYDROCHLORIDE 500 MG: 100 INJECTION, SOLUTION INTRAMUSCULAR; INTRAVENOUS at 09:00

## 2025-05-18 ASSESSMENT — PAIN SCALES - GENERAL: PAINLEVEL_OUTOF10: 0

## 2025-05-18 NOTE — PROCEDURES
PROCEDURE NOTE  Date: 5/18/2025   Name: Diane Stuart  YOB: 1960    CENTRAL LINE    Date/Time: 5/17/2025 11:00 AM    Performed by: Abdi Higgins DO  Authorized by: Abdi Higgins DO  Consent: Verbal consent obtained. Written consent obtained.  Consent given by: power of   Indications: vascular access  Preparation: skin prepped with 2% chlorhexidine  Skin prep agent dried: skin prep agent completely dried prior to procedure  Sterile barriers: all five maximum sterile barriers used - cap, mask, sterile gown, sterile gloves, and large sterile sheet  Hand hygiene: hand hygiene performed prior to central venous catheter insertion  Location details: right femoral  Patient position: flat  Catheter size: 7 Fr  Pre-procedure: landmarks identified  Ultrasound guidance: yes  Sterile ultrasound techniques: sterile gel and sterile probe covers were used  Number of attempts: 1  Successful placement: yes  Post-procedure: line sutured and dressing applied  Assessment: blood return through all ports and free fluid flow  Patient tolerance: patient tolerated the procedure well with no immediate complications  Comments: Dr. Phillips present for procedure.

## 2025-05-19 PROBLEM — I45.81 LONG Q-T SYNDROME: Status: ACTIVE | Noted: 2025-05-19

## 2025-05-19 LAB
ALBUMIN SERPL-MCNC: 3.5 G/DL (ref 3.5–5.2)
ALP SERPL-CCNC: 90 U/L (ref 35–104)
ALT SERPL-CCNC: 11 U/L (ref 0–35)
ANION GAP SERPL CALCULATED.3IONS-SCNC: 11 MMOL/L (ref 7–16)
AST SERPL-CCNC: 19 U/L (ref 0–35)
BASOPHILS # BLD: 0.05 K/UL (ref 0–0.2)
BASOPHILS NFR BLD: 1 % (ref 0–2)
BILIRUB DIRECT SERPL-MCNC: 0.5 MG/DL (ref 0–0.2)
BILIRUB INDIRECT SERPL-MCNC: 0.3 MG/DL (ref 0–1)
BILIRUB SERPL-MCNC: 0.8 MG/DL (ref 0–1.2)
BUN SERPL-MCNC: 8 MG/DL (ref 8–23)
CALCIUM SERPL-MCNC: 9 MG/DL (ref 8.8–10.2)
CHLORIDE SERPL-SCNC: 96 MMOL/L (ref 98–107)
CO2 SERPL-SCNC: 27 MMOL/L (ref 22–29)
CREAT SERPL-MCNC: 0.7 MG/DL (ref 0.5–1)
EKG ATRIAL RATE: 67 BPM
EKG ATRIAL RATE: 69 BPM
EKG P AXIS: 50 DEGREES
EKG P AXIS: 54 DEGREES
EKG P-R INTERVAL: 130 MS
EKG P-R INTERVAL: 134 MS
EKG Q-T INTERVAL: 580 MS
EKG Q-T INTERVAL: 590 MS
EKG QRS DURATION: 70 MS
EKG QRS DURATION: 72 MS
EKG QTC CALCULATION (BAZETT): 621 MS
EKG QTC CALCULATION (BAZETT): 623 MS
EKG R AXIS: 12 DEGREES
EKG R AXIS: 7 DEGREES
EKG T AXIS: 67 DEGREES
EKG T AXIS: 72 DEGREES
EKG VENTRICULAR RATE: 67 BPM
EKG VENTRICULAR RATE: 69 BPM
EOSINOPHIL # BLD: 0.1 K/UL (ref 0.05–0.5)
EOSINOPHILS RELATIVE PERCENT: 1 % (ref 0–6)
ERYTHROCYTE [DISTWIDTH] IN BLOOD BY AUTOMATED COUNT: 12.4 % (ref 11.5–15)
GFR, ESTIMATED: >90 ML/MIN/1.73M2
GLUCOSE SERPL-MCNC: 116 MG/DL (ref 74–99)
HCT VFR BLD AUTO: 35.8 % (ref 34–48)
HGB BLD-MCNC: 12 G/DL (ref 11.5–15.5)
IMM GRANULOCYTES # BLD AUTO: 0.06 K/UL (ref 0–0.58)
IMM GRANULOCYTES NFR BLD: 1 % (ref 0–5)
LYMPHOCYTES NFR BLD: 2.04 K/UL (ref 1.5–4)
LYMPHOCYTES RELATIVE PERCENT: 22 % (ref 20–42)
MAGNESIUM SERPL-MCNC: 1.5 MG/DL (ref 1.6–2.4)
MAGNESIUM SERPL-MCNC: 3.2 MG/DL (ref 1.6–2.4)
MCH RBC QN AUTO: 31.9 PG (ref 26–35)
MCHC RBC AUTO-ENTMCNC: 33.5 G/DL (ref 32–34.5)
MCV RBC AUTO: 95.2 FL (ref 80–99.9)
MONOCYTES NFR BLD: 1.16 K/UL (ref 0.1–0.95)
MONOCYTES NFR BLD: 12 % (ref 2–12)
NEUTROPHILS NFR BLD: 64 % (ref 43–80)
NEUTS SEG NFR BLD: 5.99 K/UL (ref 1.8–7.3)
PHOSPHATE SERPL-MCNC: 3.4 MG/DL (ref 2.5–4.5)
PLATELET # BLD AUTO: 126 K/UL (ref 130–450)
PMV BLD AUTO: 12.1 FL (ref 7–12)
POTASSIUM SERPL-SCNC: 3.9 MMOL/L (ref 3.5–5.1)
PROT SERPL-MCNC: 6.4 G/DL (ref 6.4–8.3)
RBC # BLD AUTO: 3.76 M/UL (ref 3.5–5.5)
SODIUM SERPL-SCNC: 133 MMOL/L (ref 136–145)
WBC OTHER # BLD: 9.4 K/UL (ref 4.5–11.5)

## 2025-05-19 PROCEDURE — 6360000002 HC RX W HCPCS

## 2025-05-19 PROCEDURE — 6370000000 HC RX 637 (ALT 250 FOR IP): Performed by: INTERNAL MEDICINE

## 2025-05-19 PROCEDURE — 6360000002 HC RX W HCPCS: Performed by: SURGERY

## 2025-05-19 PROCEDURE — 99232 SBSQ HOSP IP/OBS MODERATE 35: CPT | Performed by: FAMILY MEDICINE

## 2025-05-19 PROCEDURE — 2500000003 HC RX 250 WO HCPCS: Performed by: INTERNAL MEDICINE

## 2025-05-19 PROCEDURE — 99291 CRITICAL CARE FIRST HOUR: CPT | Performed by: INTERNAL MEDICINE

## 2025-05-19 PROCEDURE — 85025 COMPLETE CBC W/AUTO DIFF WBC: CPT

## 2025-05-19 PROCEDURE — 80053 COMPREHEN METABOLIC PANEL: CPT

## 2025-05-19 PROCEDURE — 84100 ASSAY OF PHOSPHORUS: CPT

## 2025-05-19 PROCEDURE — 93010 ELECTROCARDIOGRAM REPORT: CPT | Performed by: INTERNAL MEDICINE

## 2025-05-19 PROCEDURE — 99232 SBSQ HOSP IP/OBS MODERATE 35: CPT | Performed by: SURGERY

## 2025-05-19 PROCEDURE — 51798 US URINE CAPACITY MEASURE: CPT

## 2025-05-19 PROCEDURE — 2500000003 HC RX 250 WO HCPCS

## 2025-05-19 PROCEDURE — 6360000002 HC RX W HCPCS: Performed by: INTERNAL MEDICINE

## 2025-05-19 PROCEDURE — 93005 ELECTROCARDIOGRAM TRACING: CPT | Performed by: NURSE PRACTITIONER

## 2025-05-19 PROCEDURE — 82248 BILIRUBIN DIRECT: CPT

## 2025-05-19 PROCEDURE — 51701 INSERT BLADDER CATHETER: CPT

## 2025-05-19 PROCEDURE — 2580000003 HC RX 258

## 2025-05-19 PROCEDURE — 2000000000 HC ICU R&B

## 2025-05-19 PROCEDURE — 83735 ASSAY OF MAGNESIUM: CPT

## 2025-05-19 PROCEDURE — 36592 COLLECT BLOOD FROM PICC: CPT

## 2025-05-19 RX ORDER — PROPRANOLOL HYDROCHLORIDE 10 MG/1
10 TABLET ORAL 3 TIMES DAILY
Status: DISCONTINUED | OUTPATIENT
Start: 2025-05-19 | End: 2025-05-30

## 2025-05-19 RX ORDER — SODIUM CHLORIDE 9 MG/ML
INJECTION, SOLUTION INTRAVENOUS CONTINUOUS
Status: DISCONTINUED | OUTPATIENT
Start: 2025-05-19 | End: 2025-05-26

## 2025-05-19 RX ORDER — MAGNESIUM SULFATE IN WATER 40 MG/ML
2000 INJECTION, SOLUTION INTRAVENOUS ONCE
Status: COMPLETED | OUTPATIENT
Start: 2025-05-19 | End: 2025-05-19

## 2025-05-19 RX ADMIN — MAGNESIUM SULFATE HEPTAHYDRATE 2000 MG: 40 INJECTION, SOLUTION INTRAVENOUS at 10:23

## 2025-05-19 RX ADMIN — SODIUM CHLORIDE, PRESERVATIVE FREE 40 MG: 5 INJECTION INTRAVENOUS at 07:58

## 2025-05-19 RX ADMIN — LIDOCAINE HYDROCHLORIDE ANHYDROUS AND DEXTROSE MONOHYDRATE 1 MG/MIN: .4; 5 INJECTION, SOLUTION INTRAVENOUS at 09:17

## 2025-05-19 RX ADMIN — PHENOBARBITAL SODIUM 65 MG: 65 INJECTION INTRAMUSCULAR; INTRAVENOUS at 12:39

## 2025-05-19 RX ADMIN — FOLIC ACID 1 MG: 1 TABLET ORAL at 08:12

## 2025-05-19 RX ADMIN — SODIUM CHLORIDE, PRESERVATIVE FREE 10 ML: 5 INJECTION INTRAVENOUS at 09:17

## 2025-05-19 RX ADMIN — THIAMINE HYDROCHLORIDE 250 MG: 100 INJECTION, SOLUTION INTRAMUSCULAR; INTRAVENOUS at 07:58

## 2025-05-19 RX ADMIN — SODIUM CHLORIDE, PRESERVATIVE FREE 10 ML: 5 INJECTION INTRAVENOUS at 09:19

## 2025-05-19 RX ADMIN — PHENOBARBITAL SODIUM 65 MG: 65 INJECTION INTRAMUSCULAR; INTRAVENOUS at 17:55

## 2025-05-19 RX ADMIN — MAGNESIUM SULFATE HEPTAHYDRATE 2000 MG: 40 INJECTION, SOLUTION INTRAVENOUS at 08:11

## 2025-05-19 RX ADMIN — HYDROCORTISONE: 1 CREAM TOPICAL at 09:00

## 2025-05-19 RX ADMIN — SODIUM CHLORIDE: 9 INJECTION, SOLUTION INTRAVENOUS at 05:11

## 2025-05-19 RX ADMIN — SODIUM CHLORIDE: 9 INJECTION, SOLUTION INTRAVENOUS at 23:12

## 2025-05-19 RX ADMIN — PHENOBARBITAL SODIUM 65 MG: 65 INJECTION INTRAMUSCULAR; INTRAVENOUS at 23:07

## 2025-05-19 RX ADMIN — PROPRANOLOL HYDROCHLORIDE 10 MG: 10 TABLET ORAL at 17:58

## 2025-05-19 RX ADMIN — SODIUM CHLORIDE, PRESERVATIVE FREE 10 ML: 5 INJECTION INTRAVENOUS at 20:39

## 2025-05-19 RX ADMIN — SODIUM CHLORIDE, PRESERVATIVE FREE 10 ML: 5 INJECTION INTRAVENOUS at 20:40

## 2025-05-19 RX ADMIN — PHENOBARBITAL SODIUM 65 MG: 65 INJECTION INTRAMUSCULAR; INTRAVENOUS at 05:23

## 2025-05-19 RX ADMIN — ENOXAPARIN SODIUM 30 MG: 100 INJECTION SUBCUTANEOUS at 09:03

## 2025-05-19 ASSESSMENT — PAIN SCALES - PAIN ASSESSMENT IN ADVANCED DEMENTIA (PAINAD)
FACIALEXPRESSION: FACIAL GRIMACING
TOTALSCORE: 7
BREATHING: NORMAL
CONSOLABILITY: UNABLE TO CONSOLE, DISTRACT OR REASSURE
NEGVOCALIZATION: REPEATED TROUBLED CALLING OUT, LOUD MOANING/GROANING, CRYING
BODYLANGUAGE: TENSE, DISTRESSED PACING, FIDGETING

## 2025-05-19 ASSESSMENT — PAIN SCALES - GENERAL
PAINLEVEL_OUTOF10: 0
PAINLEVEL_OUTOF10: 7
PAINLEVEL_OUTOF10: 0

## 2025-05-19 ASSESSMENT — PAIN DESCRIPTION - LOCATION: LOCATION: BACK

## 2025-05-19 NOTE — CARE COORDINATION
5/19 Care Coordination: Pt remains in [facility]IC. EP consulted for recurrent polymorphic VT and syncope, prolonged QT interval.  Pt agitated an confused but much improved per GS note. She was started on phenobarb taper for alcohol withdrawal 05/17. CM was able to talk with pt prior to having DT's. PTA pt was from home, lives alone. Her sister lives close and can help her if needed. Pt has a Hx at [place] for rehab and used a HHC in the past but does not remember the name. Her plan is to return Home. Will need PT/OT once able. Pt cont's with [facility].  With Current status unclear on discharge needs.    CM/SW will continue to follow for discharge planning.   Lars BOYD,RN--BC  [phone]

## 2025-05-20 ENCOUNTER — APPOINTMENT (OUTPATIENT)
Dept: CT IMAGING | Age: 65
DRG: 277 | End: 2025-05-20
Payer: MEDICARE

## 2025-05-20 LAB
AMMONIA PLAS-SCNC: 11 UMOL/L (ref 11–51)
ANION GAP SERPL CALCULATED.3IONS-SCNC: 9 MMOL/L (ref 7–16)
BASOPHILS # BLD: 0 K/UL (ref 0–0.2)
BASOPHILS NFR BLD: 0 % (ref 0–2)
BUN SERPL-MCNC: 6 MG/DL (ref 8–23)
CALCIUM SERPL-MCNC: 8.5 MG/DL (ref 8.8–10.2)
CHLORIDE SERPL-SCNC: 98 MMOL/L (ref 98–107)
CO2 SERPL-SCNC: 24 MMOL/L (ref 22–29)
CREAT SERPL-MCNC: 0.6 MG/DL (ref 0.5–1)
CRP SERPL HS-MCNC: 148 MG/L (ref 0–5)
EKG ATRIAL RATE: 55 BPM
EKG P AXIS: 62 DEGREES
EKG P-R INTERVAL: 142 MS
EKG Q-T INTERVAL: 522 MS
EKG QRS DURATION: 74 MS
EKG QTC CALCULATION (BAZETT): 499 MS
EKG R AXIS: 29 DEGREES
EKG T AXIS: 66 DEGREES
EKG VENTRICULAR RATE: 55 BPM
EOSINOPHIL # BLD: 0.08 K/UL (ref 0.05–0.5)
EOSINOPHILS RELATIVE PERCENT: 1 % (ref 0–6)
ERYTHROCYTE [DISTWIDTH] IN BLOOD BY AUTOMATED COUNT: 12.5 % (ref 11.5–15)
ERYTHROCYTE [SEDIMENTATION RATE] IN BLOOD BY WESTERGREN METHOD: 12 MM/HR (ref 0–20)
GFR, ESTIMATED: >90 ML/MIN/1.73M2
GLUCOSE SERPL-MCNC: 100 MG/DL (ref 74–99)
HCT VFR BLD AUTO: 30.6 % (ref 34–48)
HGB BLD-MCNC: 10.3 G/DL (ref 11.5–15.5)
LACTATE BLDV-SCNC: 1.3 MMOL/L (ref 0.5–2.2)
LYMPHOCYTES NFR BLD: 1.53 K/UL (ref 1.5–4)
LYMPHOCYTES RELATIVE PERCENT: 18 % (ref 20–42)
MAGNESIUM SERPL-MCNC: 1.6 MG/DL (ref 1.6–2.4)
MCH RBC QN AUTO: 32 PG (ref 26–35)
MCHC RBC AUTO-ENTMCNC: 33.7 G/DL (ref 32–34.5)
MCV RBC AUTO: 95 FL (ref 80–99.9)
MONOCYTES NFR BLD: 0.99 K/UL (ref 0.1–0.95)
MONOCYTES NFR BLD: 11 % (ref 2–12)
NEUTROPHILS NFR BLD: 70 % (ref 43–80)
NEUTS SEG NFR BLD: 6.11 K/UL (ref 1.8–7.3)
PHENOBARBITAL DATE LAST DOSE: NORMAL
PHENOBARBITAL DOSE AMOUNT: NORMAL
PHENOBARBITAL TIME LAST DOSE: NORMAL
PHENOBARBITAL: 29.2 UG/ML (ref 10–30)
PHOSPHATE SERPL-MCNC: 3.3 MG/DL (ref 2.5–4.5)
PLATELET # BLD AUTO: 158 K/UL (ref 130–450)
PMV BLD AUTO: 11 FL (ref 7–12)
POTASSIUM SERPL-SCNC: 4 MMOL/L (ref 3.5–5.1)
RBC # BLD AUTO: 3.22 M/UL (ref 3.5–5.5)
RBC # BLD: ABNORMAL 10*6/UL
SODIUM SERPL-SCNC: 132 MMOL/L (ref 136–145)
WBC # BLD: ABNORMAL 10*3/UL
WBC OTHER # BLD: 8.7 K/UL (ref 4.5–11.5)

## 2025-05-20 PROCEDURE — 6360000002 HC RX W HCPCS

## 2025-05-20 PROCEDURE — 2500000003 HC RX 250 WO HCPCS

## 2025-05-20 PROCEDURE — 85652 RBC SED RATE AUTOMATED: CPT

## 2025-05-20 PROCEDURE — G0480 DRUG TEST DEF 1-7 CLASSES: HCPCS

## 2025-05-20 PROCEDURE — 93005 ELECTROCARDIOGRAM TRACING: CPT | Performed by: NURSE PRACTITIONER

## 2025-05-20 PROCEDURE — 2000000000 HC ICU R&B

## 2025-05-20 PROCEDURE — 99232 SBSQ HOSP IP/OBS MODERATE 35: CPT | Performed by: SURGERY

## 2025-05-20 PROCEDURE — 99232 SBSQ HOSP IP/OBS MODERATE 35: CPT | Performed by: FAMILY MEDICINE

## 2025-05-20 PROCEDURE — 82140 ASSAY OF AMMONIA: CPT

## 2025-05-20 PROCEDURE — 80184 ASSAY OF PHENOBARBITAL: CPT

## 2025-05-20 PROCEDURE — 6370000000 HC RX 637 (ALT 250 FOR IP): Performed by: INTERNAL MEDICINE

## 2025-05-20 PROCEDURE — 83735 ASSAY OF MAGNESIUM: CPT

## 2025-05-20 PROCEDURE — 86140 C-REACTIVE PROTEIN: CPT

## 2025-05-20 PROCEDURE — 80048 BASIC METABOLIC PNL TOTAL CA: CPT

## 2025-05-20 PROCEDURE — 6360000002 HC RX W HCPCS: Performed by: SURGERY

## 2025-05-20 PROCEDURE — 2580000003 HC RX 258

## 2025-05-20 PROCEDURE — 2500000003 HC RX 250 WO HCPCS: Performed by: INTERNAL MEDICINE

## 2025-05-20 PROCEDURE — 80176 ASSAY OF LIDOCAINE: CPT

## 2025-05-20 PROCEDURE — 6360000002 HC RX W HCPCS: Performed by: INTERNAL MEDICINE

## 2025-05-20 PROCEDURE — 84100 ASSAY OF PHOSPHORUS: CPT

## 2025-05-20 PROCEDURE — 85025 COMPLETE CBC W/AUTO DIFF WBC: CPT

## 2025-05-20 PROCEDURE — 99291 CRITICAL CARE FIRST HOUR: CPT | Performed by: INTERNAL MEDICINE

## 2025-05-20 PROCEDURE — 6370000000 HC RX 637 (ALT 250 FOR IP)

## 2025-05-20 PROCEDURE — 70450 CT HEAD/BRAIN W/O DYE: CPT

## 2025-05-20 PROCEDURE — 93010 ELECTROCARDIOGRAM REPORT: CPT | Performed by: INTERNAL MEDICINE

## 2025-05-20 PROCEDURE — 36592 COLLECT BLOOD FROM PICC: CPT

## 2025-05-20 PROCEDURE — 83605 ASSAY OF LACTIC ACID: CPT

## 2025-05-20 RX ORDER — PHENOBARBITAL 32.4 MG/1
97.2 TABLET ORAL 2 TIMES DAILY
Status: DISCONTINUED | OUTPATIENT
Start: 2025-05-20 | End: 2025-05-20

## 2025-05-20 RX ORDER — LIDOCAINE 4 G/G
1 PATCH TOPICAL DAILY
Status: DISCONTINUED | OUTPATIENT
Start: 2025-05-20 | End: 2025-06-05 | Stop reason: HOSPADM

## 2025-05-20 RX ORDER — PHENOBARBITAL 32.4 MG/1
97.2 TABLET ORAL 2 TIMES DAILY
Status: DISCONTINUED | OUTPATIENT
Start: 2025-05-20 | End: 2025-05-23

## 2025-05-20 RX ORDER — LIDOCAINE 4 G/G
1 PATCH TOPICAL DAILY
Status: DISCONTINUED | OUTPATIENT
Start: 2025-05-20 | End: 2025-05-20

## 2025-05-20 RX ORDER — PHENOBARBITAL SODIUM 65 MG/ML
97.2 INJECTION, SOLUTION INTRAMUSCULAR; INTRAVENOUS 2 TIMES DAILY
Status: DISCONTINUED | OUTPATIENT
Start: 2025-05-20 | End: 2025-05-23

## 2025-05-20 RX ORDER — MAGNESIUM SULFATE IN WATER 40 MG/ML
2000 INJECTION, SOLUTION INTRAVENOUS ONCE
Status: COMPLETED | OUTPATIENT
Start: 2025-05-20 | End: 2025-05-20

## 2025-05-20 RX ORDER — METHOCARBAMOL 750 MG/1
1500 TABLET, FILM COATED ORAL 3 TIMES DAILY
Status: DISCONTINUED | OUTPATIENT
Start: 2025-05-20 | End: 2025-05-25

## 2025-05-20 RX ADMIN — PHENOBARBITAL SODIUM 65 MG: 65 INJECTION INTRAMUSCULAR; INTRAVENOUS at 05:52

## 2025-05-20 RX ADMIN — PHENOBARBITAL SODIUM 65 MG: 65 INJECTION INTRAMUSCULAR; INTRAVENOUS at 17:32

## 2025-05-20 RX ADMIN — SODIUM CHLORIDE, PRESERVATIVE FREE 40 MG: 5 INJECTION INTRAVENOUS at 13:40

## 2025-05-20 RX ADMIN — HYDROMORPHONE HYDROCHLORIDE 0.5 MG: 1 INJECTION, SOLUTION INTRAMUSCULAR; INTRAVENOUS; SUBCUTANEOUS at 12:09

## 2025-05-20 RX ADMIN — HYDROMORPHONE HYDROCHLORIDE 0.25 MG: 1 INJECTION, SOLUTION INTRAMUSCULAR; INTRAVENOUS; SUBCUTANEOUS at 23:15

## 2025-05-20 RX ADMIN — HYDROMORPHONE HYDROCHLORIDE 0.25 MG: 1 INJECTION, SOLUTION INTRAMUSCULAR; INTRAVENOUS; SUBCUTANEOUS at 17:32

## 2025-05-20 RX ADMIN — SODIUM CHLORIDE, PRESERVATIVE FREE 10 ML: 5 INJECTION INTRAVENOUS at 20:53

## 2025-05-20 RX ADMIN — HYDROCODONE BITARTRATE AND ACETAMINOPHEN 1 TABLET: 7.5; 325 TABLET ORAL at 02:01

## 2025-05-20 RX ADMIN — SODIUM CHLORIDE, PRESERVATIVE FREE 10 ML: 5 INJECTION INTRAVENOUS at 13:41

## 2025-05-20 RX ADMIN — HYDROCORTISONE: 1 CREAM TOPICAL at 13:42

## 2025-05-20 RX ADMIN — MAGNESIUM SULFATE HEPTAHYDRATE 2000 MG: 40 INJECTION, SOLUTION INTRAVENOUS at 13:01

## 2025-05-20 RX ADMIN — PHENOBARBITAL SODIUM 97.2 MG: 65 INJECTION INTRAMUSCULAR; INTRAVENOUS at 20:52

## 2025-05-20 RX ADMIN — VALPROATE SODIUM 500 MG: 100 INJECTION, SOLUTION INTRAVENOUS at 09:34

## 2025-05-20 RX ADMIN — THIAMINE HYDROCHLORIDE 250 MG: 100 INJECTION, SOLUTION INTRAMUSCULAR; INTRAVENOUS at 13:40

## 2025-05-20 RX ADMIN — VALPROATE SODIUM 500 MG: 100 INJECTION, SOLUTION INTRAVENOUS at 17:43

## 2025-05-20 RX ADMIN — ENOXAPARIN SODIUM 30 MG: 100 INJECTION SUBCUTANEOUS at 13:40

## 2025-05-20 RX ADMIN — PHENOBARBITAL SODIUM 65 MG: 65 INJECTION INTRAMUSCULAR; INTRAVENOUS at 12:11

## 2025-05-20 RX ADMIN — HYDROCORTISONE: 1 CREAM TOPICAL at 20:54

## 2025-05-20 ASSESSMENT — PAIN SCALES - PAIN ASSESSMENT IN ADVANCED DEMENTIA (PAINAD)
NEGVOCALIZATION: REPEATED TROUBLED CALLING OUT, LOUD MOANING/GROANING, CRYING
BREATHING: NORMAL
BODYLANGUAGE: TENSE, DISTRESSED PACING, FIDGETING
CONSOLABILITY: UNABLE TO CONSOLE, DISTRACT OR REASSURE
TOTALSCORE: 7
BREATHING: NORMAL
NEGVOCALIZATION: REPEATED TROUBLED CALLING OUT, LOUD MOANING/GROANING, CRYING
FACIALEXPRESSION: FACIAL GRIMACING
BODYLANGUAGE: TENSE, DISTRESSED PACING, FIDGETING
FACIALEXPRESSION: FACIAL GRIMACING
TOTALSCORE: 7
CONSOLABILITY: UNABLE TO CONSOLE, DISTRACT OR REASSURE

## 2025-05-20 ASSESSMENT — PAIN SCALES - GENERAL
PAINLEVEL_OUTOF10: 0
PAINLEVEL_OUTOF10: 7

## 2025-05-21 PROBLEM — R44.1 HALLUCINATIONS, VISUAL: Status: ACTIVE | Noted: 2025-05-21

## 2025-05-21 LAB
ANION GAP SERPL CALCULATED.3IONS-SCNC: 11 MMOL/L (ref 7–16)
BASOPHILS # BLD: 0.05 K/UL (ref 0–0.2)
BASOPHILS NFR BLD: 1 % (ref 0–2)
BUN SERPL-MCNC: 4 MG/DL (ref 8–23)
CALCIUM SERPL-MCNC: 8.6 MG/DL (ref 8.8–10.2)
CHLORIDE SERPL-SCNC: 104 MMOL/L (ref 98–107)
CO2 SERPL-SCNC: 25 MMOL/L (ref 22–29)
CREAT SERPL-MCNC: 0.5 MG/DL (ref 0.5–1)
EOSINOPHIL # BLD: 0.14 K/UL (ref 0.05–0.5)
EOSINOPHILS RELATIVE PERCENT: 2 % (ref 0–6)
ERYTHROCYTE [DISTWIDTH] IN BLOOD BY AUTOMATED COUNT: 12.3 % (ref 11.5–15)
GFR, ESTIMATED: >90 ML/MIN/1.73M2
GLUCOSE BLD-MCNC: 172 MG/DL (ref 74–99)
GLUCOSE BLD-MCNC: 48 MG/DL (ref 74–99)
GLUCOSE SERPL-MCNC: 68 MG/DL (ref 74–99)
HCT VFR BLD AUTO: 28.9 % (ref 34–48)
HGB BLD-MCNC: 9.8 G/DL (ref 11.5–15.5)
IMM GRANULOCYTES # BLD AUTO: 0.03 K/UL (ref 0–0.58)
IMM GRANULOCYTES NFR BLD: 1 % (ref 0–5)
LYMPHOCYTES NFR BLD: 1.91 K/UL (ref 1.5–4)
LYMPHOCYTES RELATIVE PERCENT: 31 % (ref 20–42)
MAGNESIUM SERPL-MCNC: 1.6 MG/DL (ref 1.6–2.4)
MAGNESIUM SERPL-MCNC: 1.7 MG/DL (ref 1.6–2.4)
MCH RBC QN AUTO: 32.1 PG (ref 26–35)
MCHC RBC AUTO-ENTMCNC: 33.9 G/DL (ref 32–34.5)
MCV RBC AUTO: 94.8 FL (ref 80–99.9)
MONOCYTES NFR BLD: 1.22 K/UL (ref 0.1–0.95)
MONOCYTES NFR BLD: 20 % (ref 2–12)
NEUTROPHILS NFR BLD: 45 % (ref 43–80)
NEUTS SEG NFR BLD: 2.75 K/UL (ref 1.8–7.3)
PHOSPHATE SERPL-MCNC: 3.1 MG/DL (ref 2.5–4.5)
PLATELET # BLD AUTO: 176 K/UL (ref 130–450)
PMV BLD AUTO: 10.5 FL (ref 7–12)
POTASSIUM SERPL-SCNC: 3.1 MMOL/L (ref 3.5–5.1)
POTASSIUM SERPL-SCNC: 3.5 MMOL/L (ref 3.5–5.1)
POTASSIUM SERPL-SCNC: 4.9 MMOL/L (ref 3.5–5.1)
RBC # BLD AUTO: 3.05 M/UL (ref 3.5–5.5)
SODIUM SERPL-SCNC: 140 MMOL/L (ref 136–145)
WBC OTHER # BLD: 6.1 K/UL (ref 4.5–11.5)

## 2025-05-21 PROCEDURE — 6370000000 HC RX 637 (ALT 250 FOR IP)

## 2025-05-21 PROCEDURE — 84132 ASSAY OF SERUM POTASSIUM: CPT

## 2025-05-21 PROCEDURE — 51798 US URINE CAPACITY MEASURE: CPT

## 2025-05-21 PROCEDURE — 6360000002 HC RX W HCPCS

## 2025-05-21 PROCEDURE — 99291 CRITICAL CARE FIRST HOUR: CPT | Performed by: INTERNAL MEDICINE

## 2025-05-21 PROCEDURE — 2000000000 HC ICU R&B

## 2025-05-21 PROCEDURE — 80048 BASIC METABOLIC PNL TOTAL CA: CPT

## 2025-05-21 PROCEDURE — 2500000003 HC RX 250 WO HCPCS

## 2025-05-21 PROCEDURE — 36592 COLLECT BLOOD FROM PICC: CPT

## 2025-05-21 PROCEDURE — 6360000002 HC RX W HCPCS: Performed by: INTERNAL MEDICINE

## 2025-05-21 PROCEDURE — 82962 GLUCOSE BLOOD TEST: CPT

## 2025-05-21 PROCEDURE — 99233 SBSQ HOSP IP/OBS HIGH 50: CPT | Performed by: SURGERY

## 2025-05-21 PROCEDURE — 85025 COMPLETE CBC W/AUTO DIFF WBC: CPT

## 2025-05-21 PROCEDURE — 6360000002 HC RX W HCPCS: Performed by: NURSE PRACTITIONER

## 2025-05-21 PROCEDURE — 51701 INSERT BLADDER CATHETER: CPT

## 2025-05-21 PROCEDURE — 2580000003 HC RX 258

## 2025-05-21 PROCEDURE — 84100 ASSAY OF PHOSPHORUS: CPT

## 2025-05-21 PROCEDURE — 2500000003 HC RX 250 WO HCPCS: Performed by: INTERNAL MEDICINE

## 2025-05-21 PROCEDURE — 36415 COLL VENOUS BLD VENIPUNCTURE: CPT

## 2025-05-21 PROCEDURE — 99222 1ST HOSP IP/OBS MODERATE 55: CPT | Performed by: PSYCHIATRY & NEUROLOGY

## 2025-05-21 PROCEDURE — 93005 ELECTROCARDIOGRAM TRACING: CPT | Performed by: NURSE PRACTITIONER

## 2025-05-21 PROCEDURE — 83735 ASSAY OF MAGNESIUM: CPT

## 2025-05-21 PROCEDURE — 99232 SBSQ HOSP IP/OBS MODERATE 35: CPT | Performed by: FAMILY MEDICINE

## 2025-05-21 RX ORDER — GLUCAGON 1 MG/ML
1 KIT INJECTION PRN
Status: DISCONTINUED | OUTPATIENT
Start: 2025-05-21 | End: 2025-06-05 | Stop reason: HOSPADM

## 2025-05-21 RX ORDER — POTASSIUM CHLORIDE 29.8 MG/ML
20 INJECTION INTRAVENOUS ONCE
Status: COMPLETED | OUTPATIENT
Start: 2025-05-21 | End: 2025-05-21

## 2025-05-21 RX ORDER — MAGNESIUM SULFATE IN WATER 40 MG/ML
2000 INJECTION, SOLUTION INTRAVENOUS ONCE
Status: COMPLETED | OUTPATIENT
Start: 2025-05-21 | End: 2025-05-21

## 2025-05-21 RX ORDER — POTASSIUM CHLORIDE 29.8 MG/ML
20 INJECTION INTRAVENOUS
Status: COMPLETED | OUTPATIENT
Start: 2025-05-21 | End: 2025-05-21

## 2025-05-21 RX ORDER — PROPRANOLOL HYDROCHLORIDE 1 MG/ML
1 INJECTION, SOLUTION INTRAVENOUS 3 TIMES DAILY
Status: DISCONTINUED | OUTPATIENT
Start: 2025-05-21 | End: 2025-05-27

## 2025-05-21 RX ORDER — DEXTROSE MONOHYDRATE 100 MG/ML
INJECTION, SOLUTION INTRAVENOUS CONTINUOUS PRN
Status: DISCONTINUED | OUTPATIENT
Start: 2025-05-21 | End: 2025-06-05 | Stop reason: HOSPADM

## 2025-05-21 RX ORDER — DEXTROSE MONOHYDRATE 25 G/50ML
INJECTION, SOLUTION INTRAVENOUS
Status: COMPLETED
Start: 2025-05-21 | End: 2025-05-21

## 2025-05-21 RX ADMIN — PHENOBARBITAL SODIUM 97.2 MG: 65 INJECTION INTRAMUSCULAR; INTRAVENOUS at 20:53

## 2025-05-21 RX ADMIN — SODIUM CHLORIDE, PRESERVATIVE FREE 10 ML: 5 INJECTION INTRAVENOUS at 10:00

## 2025-05-21 RX ADMIN — VALPROATE SODIUM 500 MG: 100 INJECTION, SOLUTION INTRAVENOUS at 00:42

## 2025-05-21 RX ADMIN — THIAMINE HYDROCHLORIDE 250 MG: 100 INJECTION, SOLUTION INTRAMUSCULAR; INTRAVENOUS at 09:45

## 2025-05-21 RX ADMIN — MAGNESIUM SULFATE HEPTAHYDRATE 2000 MG: 40 INJECTION, SOLUTION INTRAVENOUS at 06:30

## 2025-05-21 RX ADMIN — HYDROCORTISONE: 1 CREAM TOPICAL at 09:52

## 2025-05-21 RX ADMIN — SODIUM CHLORIDE: 9 INJECTION, SOLUTION INTRAVENOUS at 18:57

## 2025-05-21 RX ADMIN — HYDROMORPHONE HYDROCHLORIDE 0.25 MG: 1 INJECTION, SOLUTION INTRAMUSCULAR; INTRAVENOUS; SUBCUTANEOUS at 12:05

## 2025-05-21 RX ADMIN — VALPROATE SODIUM 750 MG: 100 INJECTION, SOLUTION INTRAVENOUS at 17:31

## 2025-05-21 RX ADMIN — PROPRANOLOL HYDROCHLORIDE 1 MG: 1 INJECTION, SOLUTION INTRAVENOUS at 13:55

## 2025-05-21 RX ADMIN — SODIUM CHLORIDE: 9 INJECTION, SOLUTION INTRAVENOUS at 00:38

## 2025-05-21 RX ADMIN — ENOXAPARIN SODIUM 30 MG: 100 INJECTION SUBCUTANEOUS at 09:44

## 2025-05-21 RX ADMIN — PHENOBARBITAL SODIUM 97.2 MG: 65 INJECTION INTRAMUSCULAR; INTRAVENOUS at 10:09

## 2025-05-21 RX ADMIN — DEXTROSE MONOHYDRATE 50 ML: 25 INJECTION, SOLUTION INTRAVENOUS at 10:10

## 2025-05-21 RX ADMIN — POTASSIUM CHLORIDE 20 MEQ: 29.8 INJECTION, SOLUTION INTRAVENOUS at 16:28

## 2025-05-21 RX ADMIN — POTASSIUM CHLORIDE 20 MEQ: 29.8 INJECTION, SOLUTION INTRAVENOUS at 15:22

## 2025-05-21 RX ADMIN — SODIUM CHLORIDE, PRESERVATIVE FREE 40 MG: 5 INJECTION INTRAVENOUS at 09:44

## 2025-05-21 RX ADMIN — METHOCARBAMOL 1500 MG: 750 TABLET ORAL at 20:52

## 2025-05-21 RX ADMIN — VALPROATE SODIUM 500 MG: 100 INJECTION, SOLUTION INTRAVENOUS at 10:09

## 2025-05-21 RX ADMIN — HYDROMORPHONE HYDROCHLORIDE 0.25 MG: 1 INJECTION, SOLUTION INTRAMUSCULAR; INTRAVENOUS; SUBCUTANEOUS at 03:33

## 2025-05-21 RX ADMIN — POTASSIUM CHLORIDE 20 MEQ: 29.8 INJECTION, SOLUTION INTRAVENOUS at 14:35

## 2025-05-21 RX ADMIN — POTASSIUM CHLORIDE 20 MEQ: 29.8 INJECTION, SOLUTION INTRAVENOUS at 06:37

## 2025-05-21 NOTE — CARE COORDINATION
5/21 Care Coordination: Pt remains in [facility]. Admitted for CP, syncope, and episodes of polymorphic VT, she has hx of multiple syncopal episodes and excessive etoh intake. Stop IV lidocaine.drip.Remains agitated, in soft restraints. Hx of ETOH abuse.Pt has been unable to take any oral due to behavior . EP checking with pharmacy about possible IV propanolol dosing.  CM was able to talk with pt prior to having DT's. PTA pt was from home, lives alone. Her sister lives close and can help her if needed. Pt has a Hx at [place] for rehab and used a C in the past but does not remember the name. Her plan was to return Home.With Current status unclear on discharge needs.    CM/SAKINA will continue to follow for discharge planning.   Lars BOYD,RN--BC  [phone]

## 2025-05-21 NOTE — FLOWSHEET NOTE
0800 Continues to pull on lines and tubes while restraints are off during patient care.Yelling,screaming and fighting.Unable to redirect therefore bilateral wrist restraints continued for patient safety.

## 2025-05-22 LAB
ANION GAP SERPL CALCULATED.3IONS-SCNC: 10 MMOL/L (ref 7–16)
BASOPHILS # BLD: 0 K/UL (ref 0–0.2)
BASOPHILS NFR BLD: 0 % (ref 0–2)
BUN SERPL-MCNC: <2 MG/DL (ref 8–23)
CALCIUM SERPL-MCNC: 8.7 MG/DL (ref 8.8–10.2)
CHLORIDE SERPL-SCNC: 102 MMOL/L (ref 98–107)
CO2 SERPL-SCNC: 23 MMOL/L (ref 22–29)
CREAT SERPL-MCNC: 0.5 MG/DL (ref 0.5–1)
EKG ATRIAL RATE: 62 BPM
EKG ATRIAL RATE: 77 BPM
EKG P AXIS: 72 DEGREES
EKG P-R INTERVAL: 120 MS
EKG P-R INTERVAL: 138 MS
EKG Q-T INTERVAL: 566 MS
EKG Q-T INTERVAL: 608 MS
EKG QRS DURATION: 68 MS
EKG QRS DURATION: 76 MS
EKG QTC CALCULATION (BAZETT): 617 MS
EKG QTC CALCULATION (BAZETT): 640 MS
EKG R AXIS: 51 DEGREES
EKG R AXIS: 54 DEGREES
EKG T AXIS: 66 DEGREES
EKG T AXIS: 78 DEGREES
EKG VENTRICULAR RATE: 62 BPM
EKG VENTRICULAR RATE: 77 BPM
EOSINOPHIL # BLD: 0.13 K/UL (ref 0.05–0.5)
EOSINOPHILS RELATIVE PERCENT: 2 % (ref 0–6)
ERYTHROCYTE [DISTWIDTH] IN BLOOD BY AUTOMATED COUNT: 12.2 % (ref 11.5–15)
GFR, ESTIMATED: >90 ML/MIN/1.73M2
GLUCOSE SERPL-MCNC: 89 MG/DL (ref 74–99)
HCT VFR BLD AUTO: 28.6 % (ref 34–48)
HGB BLD-MCNC: 9.7 G/DL (ref 11.5–15.5)
LYMPHOCYTES NFR BLD: 2.6 K/UL (ref 1.5–4)
LYMPHOCYTES RELATIVE PERCENT: 36 % (ref 20–42)
MAGNESIUM SERPL-MCNC: 2.5 MG/DL (ref 1.6–2.4)
MCH RBC QN AUTO: 32 PG (ref 26–35)
MCHC RBC AUTO-ENTMCNC: 33.9 G/DL (ref 32–34.5)
MCV RBC AUTO: 94.4 FL (ref 80–99.9)
MONOCYTES NFR BLD: 1.02 K/UL (ref 0.1–0.95)
MONOCYTES NFR BLD: 14 % (ref 2–12)
NEUTROPHILS NFR BLD: 49 % (ref 43–80)
NEUTS SEG NFR BLD: 3.55 K/UL (ref 1.8–7.3)
NUCLEATED RED BLOOD CELLS: 1 PER 100 WBC
PHOSPHATE SERPL-MCNC: 2.2 MG/DL (ref 2.5–4.5)
PLATELET # BLD AUTO: 217 K/UL (ref 130–450)
PMV BLD AUTO: 11 FL (ref 7–12)
POTASSIUM SERPL-SCNC: 4.5 MMOL/L (ref 3.5–5.1)
RBC # BLD AUTO: 3.03 M/UL (ref 3.5–5.5)
RBC # BLD: ABNORMAL 10*6/UL
SODIUM SERPL-SCNC: 135 MMOL/L (ref 136–145)
WBC OTHER # BLD: 7.3 K/UL (ref 4.5–11.5)

## 2025-05-22 PROCEDURE — 93010 ELECTROCARDIOGRAM REPORT: CPT | Performed by: INTERNAL MEDICINE

## 2025-05-22 PROCEDURE — 2500000003 HC RX 250 WO HCPCS: Performed by: INTERNAL MEDICINE

## 2025-05-22 PROCEDURE — 6370000000 HC RX 637 (ALT 250 FOR IP)

## 2025-05-22 PROCEDURE — 93005 ELECTROCARDIOGRAM TRACING: CPT | Performed by: INTERNAL MEDICINE

## 2025-05-22 PROCEDURE — 99291 CRITICAL CARE FIRST HOUR: CPT | Performed by: INTERNAL MEDICINE

## 2025-05-22 PROCEDURE — 6360000002 HC RX W HCPCS

## 2025-05-22 PROCEDURE — 2500000003 HC RX 250 WO HCPCS

## 2025-05-22 PROCEDURE — 83735 ASSAY OF MAGNESIUM: CPT

## 2025-05-22 PROCEDURE — 6360000002 HC RX W HCPCS: Performed by: NURSE PRACTITIONER

## 2025-05-22 PROCEDURE — 99233 SBSQ HOSP IP/OBS HIGH 50: CPT | Performed by: SURGERY

## 2025-05-22 PROCEDURE — 36592 COLLECT BLOOD FROM PICC: CPT

## 2025-05-22 PROCEDURE — 85025 COMPLETE CBC W/AUTO DIFF WBC: CPT

## 2025-05-22 PROCEDURE — 80048 BASIC METABOLIC PNL TOTAL CA: CPT

## 2025-05-22 PROCEDURE — 2060000000 HC ICU INTERMEDIATE R&B

## 2025-05-22 PROCEDURE — 2580000003 HC RX 258

## 2025-05-22 PROCEDURE — 6370000000 HC RX 637 (ALT 250 FOR IP): Performed by: INTERNAL MEDICINE

## 2025-05-22 PROCEDURE — 84100 ASSAY OF PHOSPHORUS: CPT

## 2025-05-22 PROCEDURE — 6360000002 HC RX W HCPCS: Performed by: INTERNAL MEDICINE

## 2025-05-22 PROCEDURE — 99232 SBSQ HOSP IP/OBS MODERATE 35: CPT | Performed by: FAMILY MEDICINE

## 2025-05-22 RX ORDER — MAGNESIUM SULFATE IN WATER 40 MG/ML
4000 INJECTION, SOLUTION INTRAVENOUS ONCE
Status: COMPLETED | OUTPATIENT
Start: 2025-05-22 | End: 2025-05-22

## 2025-05-22 RX ORDER — MIDAZOLAM HYDROCHLORIDE 2 MG/2ML
1 INJECTION, SOLUTION INTRAMUSCULAR; INTRAVENOUS
Status: COMPLETED | OUTPATIENT
Start: 2025-05-22 | End: 2025-05-22

## 2025-05-22 RX ORDER — WATER 10 ML/10ML
INJECTION INTRAMUSCULAR; INTRAVENOUS; SUBCUTANEOUS
Status: DISPENSED
Start: 2025-05-22 | End: 2025-05-22

## 2025-05-22 RX ADMIN — MIDAZOLAM 1 MG: 1 INJECTION INTRAMUSCULAR; INTRAVENOUS at 15:13

## 2025-05-22 RX ADMIN — HYDROMORPHONE HYDROCHLORIDE 0.25 MG: 1 INJECTION, SOLUTION INTRAMUSCULAR; INTRAVENOUS; SUBCUTANEOUS at 11:12

## 2025-05-22 RX ADMIN — MAGNESIUM SULFATE HEPTAHYDRATE 4000 MG: 40 INJECTION, SOLUTION INTRAVENOUS at 02:39

## 2025-05-22 RX ADMIN — PROPRANOLOL HYDROCHLORIDE 1 MG: 1 INJECTION, SOLUTION INTRAVENOUS at 14:15

## 2025-05-22 RX ADMIN — PROPRANOLOL HYDROCHLORIDE 1 MG: 1 INJECTION, SOLUTION INTRAVENOUS at 22:52

## 2025-05-22 RX ADMIN — METHOCARBAMOL 1500 MG: 750 TABLET ORAL at 20:38

## 2025-05-22 RX ADMIN — SODIUM CHLORIDE, PRESERVATIVE FREE 10 ML: 5 INJECTION INTRAVENOUS at 20:47

## 2025-05-22 RX ADMIN — PROCHLORPERAZINE MALEATE 5 MG: 10 TABLET ORAL at 03:18

## 2025-05-22 RX ADMIN — PROPRANOLOL HYDROCHLORIDE 1 MG: 1 INJECTION, SOLUTION INTRAVENOUS at 08:51

## 2025-05-22 RX ADMIN — VALPROATE SODIUM 750 MG: 100 INJECTION, SOLUTION INTRAVENOUS at 01:05

## 2025-05-22 RX ADMIN — HYDROCODONE BITARTRATE AND ACETAMINOPHEN 1 TABLET: 7.5; 325 TABLET ORAL at 17:23

## 2025-05-22 RX ADMIN — VALPROATE SODIUM 750 MG: 100 INJECTION, SOLUTION INTRAVENOUS at 17:19

## 2025-05-22 RX ADMIN — SODIUM CHLORIDE, PRESERVATIVE FREE 10 ML: 5 INJECTION INTRAVENOUS at 08:52

## 2025-05-22 RX ADMIN — SODIUM CHLORIDE, PRESERVATIVE FREE 40 MG: 5 INJECTION INTRAVENOUS at 08:38

## 2025-05-22 RX ADMIN — THIAMINE HYDROCHLORIDE 250 MG: 100 INJECTION, SOLUTION INTRAMUSCULAR; INTRAVENOUS at 08:38

## 2025-05-22 RX ADMIN — FOLIC ACID 1 MG: 1 TABLET ORAL at 08:35

## 2025-05-22 RX ADMIN — METHOCARBAMOL 1500 MG: 750 TABLET ORAL at 08:35

## 2025-05-22 RX ADMIN — PHENOBARBITAL SODIUM 97.2 MG: 65 INJECTION INTRAMUSCULAR; INTRAVENOUS at 08:31

## 2025-05-22 RX ADMIN — ENOXAPARIN SODIUM 30 MG: 100 INJECTION SUBCUTANEOUS at 08:39

## 2025-05-22 RX ADMIN — PHENOBARBITAL SODIUM 97.2 MG: 65 INJECTION INTRAMUSCULAR; INTRAVENOUS at 20:38

## 2025-05-22 RX ADMIN — VALPROATE SODIUM 750 MG: 100 INJECTION, SOLUTION INTRAVENOUS at 08:51

## 2025-05-22 RX ADMIN — SODIUM CHLORIDE: 9 INJECTION, SOLUTION INTRAVENOUS at 14:37

## 2025-05-22 RX ADMIN — SODIUM CHLORIDE: 9 INJECTION, SOLUTION INTRAVENOUS at 19:04

## 2025-05-22 ASSESSMENT — PAIN DESCRIPTION - FREQUENCY: FREQUENCY: CONTINUOUS

## 2025-05-22 ASSESSMENT — PAIN DESCRIPTION - LOCATION
LOCATION: BACK
LOCATION: BACK

## 2025-05-22 ASSESSMENT — PAIN DESCRIPTION - ONSET: ONSET: ON-GOING

## 2025-05-22 ASSESSMENT — PAIN SCALES - GENERAL
PAINLEVEL_OUTOF10: 0
PAINLEVEL_OUTOF10: 8
PAINLEVEL_OUTOF10: 8

## 2025-05-22 ASSESSMENT — PAIN DESCRIPTION - PAIN TYPE: TYPE: CHRONIC PAIN

## 2025-05-22 ASSESSMENT — PAIN DESCRIPTION - ORIENTATION: ORIENTATION: MID

## 2025-05-22 ASSESSMENT — PAIN DESCRIPTION - DESCRIPTORS: DESCRIPTORS: ACHING;DISCOMFORT

## 2025-05-22 ASSESSMENT — PAIN - FUNCTIONAL ASSESSMENT: PAIN_FUNCTIONAL_ASSESSMENT: ACTIVITIES ARE NOT PREVENTED

## 2025-05-22 NOTE — CARE COORDINATION
5/22 Care Coordination: [pt cont in [facility]. Remains confused and agitated. Remains in 4 point restraints and a Sitter cont. CM was able to talk with pt's sister and sister-in-law. Discussed pt's discharge plan. Discussed possible need for [facility].They both agree, no one would be able to stay with her all the time. CM gave them a [facility] list. They will review and get back with CM. CM/SW will continue to follow for discharge planning.   Lars BOYD,RN--BC  [phone]

## 2025-05-23 LAB
EKG ATRIAL RATE: 61 BPM
EKG P AXIS: 69 DEGREES
EKG P-R INTERVAL: 134 MS
EKG Q-T INTERVAL: 492 MS
EKG QRS DURATION: 82 MS
EKG QTC CALCULATION (BAZETT): 495 MS
EKG R AXIS: 48 DEGREES
EKG T AXIS: 74 DEGREES
EKG VENTRICULAR RATE: 61 BPM
GLUCOSE BLD-MCNC: 112 MG/DL (ref 74–99)
GLUCOSE BLD-MCNC: 69 MG/DL (ref 74–99)
LABORATORY REPORT: NORMAL
PETH INTERPRETATION: NORMAL
PLPETH BLD-MCNC: 254 NG/ML
POPETH BLD-MCNC: 597 NG/ML

## 2025-05-23 PROCEDURE — 2500000003 HC RX 250 WO HCPCS

## 2025-05-23 PROCEDURE — 6360000002 HC RX W HCPCS

## 2025-05-23 PROCEDURE — 99233 SBSQ HOSP IP/OBS HIGH 50: CPT | Performed by: INTERNAL MEDICINE

## 2025-05-23 PROCEDURE — 6360000002 HC RX W HCPCS: Performed by: NURSE PRACTITIONER

## 2025-05-23 PROCEDURE — 6370000000 HC RX 637 (ALT 250 FOR IP): Performed by: INTERNAL MEDICINE

## 2025-05-23 PROCEDURE — 6360000002 HC RX W HCPCS: Performed by: INTERNAL MEDICINE

## 2025-05-23 PROCEDURE — 6370000000 HC RX 637 (ALT 250 FOR IP)

## 2025-05-23 PROCEDURE — 36415 COLL VENOUS BLD VENIPUNCTURE: CPT

## 2025-05-23 PROCEDURE — 2580000003 HC RX 258

## 2025-05-23 PROCEDURE — 2060000000 HC ICU INTERMEDIATE R&B

## 2025-05-23 PROCEDURE — 2500000003 HC RX 250 WO HCPCS: Performed by: INTERNAL MEDICINE

## 2025-05-23 PROCEDURE — 84425 ASSAY OF VITAMIN B-1: CPT

## 2025-05-23 PROCEDURE — 99232 SBSQ HOSP IP/OBS MODERATE 35: CPT | Performed by: FAMILY MEDICINE

## 2025-05-23 PROCEDURE — 86592 SYPHILIS TEST NON-TREP QUAL: CPT

## 2025-05-23 PROCEDURE — 82962 GLUCOSE BLOOD TEST: CPT

## 2025-05-23 RX ORDER — PHENOBARBITAL SODIUM 65 MG/ML
32.5 INJECTION, SOLUTION INTRAMUSCULAR; INTRAVENOUS EVERY 6 HOURS
Status: DISPENSED | OUTPATIENT
Start: 2025-05-24 | End: 2025-05-25

## 2025-05-23 RX ORDER — PHENOBARBITAL SODIUM 65 MG/ML
16.2 INJECTION, SOLUTION INTRAMUSCULAR; INTRAVENOUS EVERY 6 HOURS PRN
Status: DISCONTINUED | OUTPATIENT
Start: 2025-05-26 | End: 2025-05-26

## 2025-05-23 RX ORDER — LORAZEPAM 1 MG/1
4 TABLET ORAL
Status: DISCONTINUED | OUTPATIENT
Start: 2025-05-23 | End: 2025-05-29

## 2025-05-23 RX ORDER — PHENOBARBITAL SODIUM 65 MG/ML
65 INJECTION, SOLUTION INTRAMUSCULAR; INTRAVENOUS EVERY 6 HOURS
Status: COMPLETED | OUTPATIENT
Start: 2025-05-23 | End: 2025-05-24

## 2025-05-23 RX ORDER — LORAZEPAM 1 MG/1
1 TABLET ORAL
Status: DISCONTINUED | OUTPATIENT
Start: 2025-05-23 | End: 2025-05-29

## 2025-05-23 RX ORDER — LORAZEPAM 1 MG/1
2 TABLET ORAL
Status: DISCONTINUED | OUTPATIENT
Start: 2025-05-23 | End: 2025-05-29

## 2025-05-23 RX ORDER — LORAZEPAM 2 MG/ML
1 INJECTION INTRAMUSCULAR ONCE
Status: COMPLETED | OUTPATIENT
Start: 2025-05-23 | End: 2025-05-23

## 2025-05-23 RX ORDER — PHENOBARBITAL SODIUM 65 MG/ML
65 INJECTION, SOLUTION INTRAMUSCULAR; INTRAVENOUS EVERY 6 HOURS PRN
Status: ACTIVE | OUTPATIENT
Start: 2025-05-23 | End: 2025-05-24

## 2025-05-23 RX ORDER — LORAZEPAM 1 MG/1
3 TABLET ORAL
Status: DISCONTINUED | OUTPATIENT
Start: 2025-05-23 | End: 2025-05-29

## 2025-05-23 RX ORDER — PHENOBARBITAL SODIUM 65 MG/ML
16.2 INJECTION, SOLUTION INTRAMUSCULAR; INTRAVENOUS EVERY 12 HOURS
Status: DISCONTINUED | OUTPATIENT
Start: 2025-05-26 | End: 2025-05-26

## 2025-05-23 RX ORDER — LORAZEPAM 2 MG/ML
4 INJECTION INTRAMUSCULAR
Status: DISCONTINUED | OUTPATIENT
Start: 2025-05-23 | End: 2025-05-29

## 2025-05-23 RX ORDER — LORAZEPAM 2 MG/ML
1 INJECTION INTRAMUSCULAR
Status: DISCONTINUED | OUTPATIENT
Start: 2025-05-23 | End: 2025-05-29

## 2025-05-23 RX ORDER — PHENOBARBITAL SODIUM 65 MG/ML
32.5 INJECTION, SOLUTION INTRAMUSCULAR; INTRAVENOUS EVERY 12 HOURS
Status: COMPLETED | OUTPATIENT
Start: 2025-05-25 | End: 2025-05-26

## 2025-05-23 RX ORDER — LORAZEPAM 2 MG/ML
2 INJECTION INTRAMUSCULAR
Status: DISCONTINUED | OUTPATIENT
Start: 2025-05-23 | End: 2025-05-29

## 2025-05-23 RX ORDER — PHENOBARBITAL SODIUM 65 MG/ML
32.5 INJECTION, SOLUTION INTRAMUSCULAR; INTRAVENOUS EVERY 6 HOURS PRN
Status: DISCONTINUED | OUTPATIENT
Start: 2025-05-24 | End: 2025-05-26

## 2025-05-23 RX ORDER — LORAZEPAM 2 MG/ML
3 INJECTION INTRAMUSCULAR
Status: DISCONTINUED | OUTPATIENT
Start: 2025-05-23 | End: 2025-05-29

## 2025-05-23 RX ADMIN — SODIUM CHLORIDE, PRESERVATIVE FREE 40 MG: 5 INJECTION INTRAVENOUS at 09:24

## 2025-05-23 RX ADMIN — ENOXAPARIN SODIUM 30 MG: 100 INJECTION SUBCUTANEOUS at 09:26

## 2025-05-23 RX ADMIN — HYDROCORTISONE: 1 CREAM TOPICAL at 14:41

## 2025-05-23 RX ADMIN — THIAMINE HYDROCHLORIDE 250 MG: 100 INJECTION, SOLUTION INTRAMUSCULAR; INTRAVENOUS at 09:24

## 2025-05-23 RX ADMIN — LORAZEPAM 2 MG: 2 INJECTION INTRAMUSCULAR; INTRAVENOUS at 17:07

## 2025-05-23 RX ADMIN — VALPROATE SODIUM 750 MG: 100 INJECTION, SOLUTION INTRAVENOUS at 00:28

## 2025-05-23 RX ADMIN — PROPRANOLOL HYDROCHLORIDE 1 MG: 1 INJECTION, SOLUTION INTRAVENOUS at 09:42

## 2025-05-23 RX ADMIN — METHOCARBAMOL 1500 MG: 750 TABLET ORAL at 09:25

## 2025-05-23 RX ADMIN — SODIUM CHLORIDE, PRESERVATIVE FREE 10 ML: 5 INJECTION INTRAVENOUS at 09:27

## 2025-05-23 RX ADMIN — VALPROATE SODIUM 750 MG: 100 INJECTION, SOLUTION INTRAVENOUS at 17:10

## 2025-05-23 RX ADMIN — PROPRANOLOL HYDROCHLORIDE 1 MG: 1 INJECTION, SOLUTION INTRAVENOUS at 20:57

## 2025-05-23 RX ADMIN — DEXTROSE MONOHYDRATE 125 ML: 100 INJECTION, SOLUTION INTRAVENOUS at 21:05

## 2025-05-23 RX ADMIN — PHENOBARBITAL SODIUM 97.2 MG: 65 INJECTION INTRAMUSCULAR; INTRAVENOUS at 09:24

## 2025-05-23 RX ADMIN — LORAZEPAM 1 MG: 2 INJECTION INTRAMUSCULAR; INTRAVENOUS at 09:49

## 2025-05-23 RX ADMIN — PHENOBARBITAL SODIUM 65 MG: 65 INJECTION INTRAMUSCULAR; INTRAVENOUS at 23:54

## 2025-05-23 RX ADMIN — HYDROCORTISONE: 1 CREAM TOPICAL at 21:00

## 2025-05-23 RX ADMIN — VALPROATE SODIUM 750 MG: 100 INJECTION, SOLUTION INTRAVENOUS at 10:01

## 2025-05-23 RX ADMIN — PHENOBARBITAL SODIUM 65 MG: 65 INJECTION INTRAMUSCULAR; INTRAVENOUS at 18:45

## 2025-05-23 RX ADMIN — SODIUM CHLORIDE, PRESERVATIVE FREE 10 ML: 5 INJECTION INTRAVENOUS at 09:28

## 2025-05-23 ASSESSMENT — PAIN SCALES - GENERAL: PAINLEVEL_OUTOF10: 5

## 2025-05-23 ASSESSMENT — PAIN DESCRIPTION - ORIENTATION: ORIENTATION: RIGHT;LEFT

## 2025-05-23 ASSESSMENT — PAIN DESCRIPTION - LOCATION: LOCATION: BACK

## 2025-05-23 ASSESSMENT — PAIN DESCRIPTION - DESCRIPTORS: DESCRIPTORS: ACHING;DISCOMFORT;DULL

## 2025-05-23 NOTE — CARE COORDINATION
Received case in transfer, family has SNF list. Pt remains confused, 2 soft point restraints. Will need therapy when more appropriate. Pt will need precert once choices and accepting facility.Beatriz Galdamez, MSW, LSW

## 2025-05-24 ENCOUNTER — APPOINTMENT (OUTPATIENT)
Dept: MRI IMAGING | Age: 65
DRG: 277 | End: 2025-05-24
Payer: MEDICARE

## 2025-05-24 LAB
ANION GAP SERPL CALCULATED.3IONS-SCNC: 12 MMOL/L (ref 7–16)
BASOPHILS # BLD: 0.03 K/UL (ref 0–0.2)
BASOPHILS NFR BLD: 1 % (ref 0–2)
BUN SERPL-MCNC: <2 MG/DL (ref 8–23)
CALCIUM SERPL-MCNC: 8.9 MG/DL (ref 8.8–10.2)
CHLORIDE SERPL-SCNC: 104 MMOL/L (ref 98–107)
CO2 SERPL-SCNC: 22 MMOL/L (ref 22–29)
CREAT SERPL-MCNC: 0.4 MG/DL (ref 0.5–1)
EOSINOPHIL # BLD: 0.08 K/UL (ref 0.05–0.5)
EOSINOPHILS RELATIVE PERCENT: 2 % (ref 0–6)
ERYTHROCYTE [DISTWIDTH] IN BLOOD BY AUTOMATED COUNT: 12.6 % (ref 11.5–15)
GFR, ESTIMATED: >90 ML/MIN/1.73M2
GLUCOSE BLD-MCNC: 70 MG/DL (ref 74–99)
GLUCOSE SERPL-MCNC: 74 MG/DL (ref 74–99)
HCT VFR BLD AUTO: 31 % (ref 34–48)
HGB BLD-MCNC: 10.4 G/DL (ref 11.5–15.5)
IMM GRANULOCYTES # BLD AUTO: <0.03 K/UL (ref 0–0.58)
IMM GRANULOCYTES NFR BLD: 1 % (ref 0–5)
LYMPHOCYTES NFR BLD: 2.05 K/UL (ref 1.5–4)
LYMPHOCYTES RELATIVE PERCENT: 50 % (ref 20–42)
MCH RBC QN AUTO: 31.3 PG (ref 26–35)
MCHC RBC AUTO-ENTMCNC: 33.5 G/DL (ref 32–34.5)
MCV RBC AUTO: 93.4 FL (ref 80–99.9)
MONOCYTES NFR BLD: 0.39 K/UL (ref 0.1–0.95)
MONOCYTES NFR BLD: 10 % (ref 2–12)
NEUTROPHILS NFR BLD: 38 % (ref 43–80)
NEUTS SEG NFR BLD: 1.54 K/UL (ref 1.8–7.3)
PLATELET # BLD AUTO: 273 K/UL (ref 130–450)
PMV BLD AUTO: 10.8 FL (ref 7–12)
POTASSIUM SERPL-SCNC: 4.1 MMOL/L (ref 3.5–5.1)
RBC # BLD AUTO: 3.32 M/UL (ref 3.5–5.5)
SODIUM SERPL-SCNC: 137 MMOL/L (ref 136–145)
WBC OTHER # BLD: 4.1 K/UL (ref 4.5–11.5)

## 2025-05-24 PROCEDURE — 2500000003 HC RX 250 WO HCPCS: Performed by: INTERNAL MEDICINE

## 2025-05-24 PROCEDURE — 93005 ELECTROCARDIOGRAM TRACING: CPT | Performed by: NURSE PRACTITIONER

## 2025-05-24 PROCEDURE — 2500000003 HC RX 250 WO HCPCS

## 2025-05-24 PROCEDURE — 70551 MRI BRAIN STEM W/O DYE: CPT

## 2025-05-24 PROCEDURE — 87476 LYME DIS DNA AMP PROBE: CPT

## 2025-05-24 PROCEDURE — 80048 BASIC METABOLIC PNL TOTAL CA: CPT

## 2025-05-24 PROCEDURE — 36415 COLL VENOUS BLD VENIPUNCTURE: CPT

## 2025-05-24 PROCEDURE — 2580000003 HC RX 258

## 2025-05-24 PROCEDURE — 2060000000 HC ICU INTERMEDIATE R&B

## 2025-05-24 PROCEDURE — 6360000002 HC RX W HCPCS: Performed by: INTERNAL MEDICINE

## 2025-05-24 PROCEDURE — 6370000000 HC RX 637 (ALT 250 FOR IP)

## 2025-05-24 PROCEDURE — 6360000002 HC RX W HCPCS

## 2025-05-24 PROCEDURE — 85025 COMPLETE CBC W/AUTO DIFF WBC: CPT

## 2025-05-24 PROCEDURE — 99233 SBSQ HOSP IP/OBS HIGH 50: CPT | Performed by: INTERNAL MEDICINE

## 2025-05-24 PROCEDURE — 6360000002 HC RX W HCPCS: Performed by: NURSE PRACTITIONER

## 2025-05-24 PROCEDURE — 86618 LYME DISEASE ANTIBODY: CPT

## 2025-05-24 PROCEDURE — 99232 SBSQ HOSP IP/OBS MODERATE 35: CPT | Performed by: FAMILY MEDICINE

## 2025-05-24 PROCEDURE — 6370000000 HC RX 637 (ALT 250 FOR IP): Performed by: INTERNAL MEDICINE

## 2025-05-24 PROCEDURE — 82962 GLUCOSE BLOOD TEST: CPT

## 2025-05-24 RX ADMIN — SODIUM CHLORIDE, PRESERVATIVE FREE 10 ML: 5 INJECTION INTRAVENOUS at 09:19

## 2025-05-24 RX ADMIN — HYDROCORTISONE: 1 CREAM TOPICAL at 09:04

## 2025-05-24 RX ADMIN — SODIUM CHLORIDE: 9 INJECTION, SOLUTION INTRAVENOUS at 00:57

## 2025-05-24 RX ADMIN — SODIUM CHLORIDE 500 MG: 9 INJECTION, SOLUTION INTRAVENOUS at 21:43

## 2025-05-24 RX ADMIN — FOLIC ACID 1 MG: 1 TABLET ORAL at 09:04

## 2025-05-24 RX ADMIN — PHENOBARBITAL SODIUM 32.5 MG: 65 INJECTION INTRAMUSCULAR at 21:47

## 2025-05-24 RX ADMIN — SODIUM CHLORIDE, PRESERVATIVE FREE 40 MG: 5 INJECTION INTRAVENOUS at 09:04

## 2025-05-24 RX ADMIN — SODIUM CHLORIDE, PRESERVATIVE FREE 10 ML: 5 INJECTION INTRAVENOUS at 21:34

## 2025-05-24 RX ADMIN — VALPROATE SODIUM 750 MG: 100 INJECTION, SOLUTION INTRAVENOUS at 09:34

## 2025-05-24 RX ADMIN — PHENOBARBITAL SODIUM 32.5 MG: 65 INJECTION INTRAMUSCULAR at 17:19

## 2025-05-24 RX ADMIN — METHOCARBAMOL 1500 MG: 750 TABLET ORAL at 17:19

## 2025-05-24 RX ADMIN — METHOCARBAMOL 1500 MG: 750 TABLET ORAL at 09:03

## 2025-05-24 RX ADMIN — PROPRANOLOL HYDROCHLORIDE 1 MG: 1 INJECTION, SOLUTION INTRAVENOUS at 12:58

## 2025-05-24 RX ADMIN — METHOCARBAMOL 1500 MG: 750 TABLET ORAL at 21:34

## 2025-05-24 RX ADMIN — PROPRANOLOL HYDROCHLORIDE 1 MG: 1 INJECTION, SOLUTION INTRAVENOUS at 17:19

## 2025-05-24 RX ADMIN — VALPROATE SODIUM 750 MG: 100 INJECTION, SOLUTION INTRAVENOUS at 00:57

## 2025-05-24 RX ADMIN — SODIUM CHLORIDE: 9 INJECTION, SOLUTION INTRAVENOUS at 21:43

## 2025-05-24 RX ADMIN — PHENOBARBITAL SODIUM 65 MG: 65 INJECTION INTRAMUSCULAR; INTRAVENOUS at 12:58

## 2025-05-24 RX ADMIN — ENOXAPARIN SODIUM 30 MG: 100 INJECTION SUBCUTANEOUS at 09:04

## 2025-05-24 RX ADMIN — PROPRANOLOL HYDROCHLORIDE 1 MG: 1 INJECTION, SOLUTION INTRAVENOUS at 21:35

## 2025-05-24 RX ADMIN — PHENOBARBITAL SODIUM 65 MG: 65 INJECTION INTRAMUSCULAR; INTRAVENOUS at 05:45

## 2025-05-24 ASSESSMENT — PAIN DESCRIPTION - DESCRIPTORS
DESCRIPTORS: ACHING;DISCOMFORT;NAGGING
DESCRIPTORS: ACHING;DISCOMFORT;DULL

## 2025-05-24 ASSESSMENT — PAIN DESCRIPTION - ORIENTATION
ORIENTATION: RIGHT;LEFT
ORIENTATION: RIGHT;LEFT;MID

## 2025-05-24 ASSESSMENT — PAIN SCALES - GENERAL
PAINLEVEL_OUTOF10: 5
PAINLEVEL_OUTOF10: 5
PAINLEVEL_OUTOF10: 0

## 2025-05-24 ASSESSMENT — PAIN DESCRIPTION - LOCATION
LOCATION: BACK
LOCATION: BACK

## 2025-05-25 LAB
GLUCOSE BLD-MCNC: 110 MG/DL (ref 74–99)
GLUCOSE BLD-MCNC: 126 MG/DL (ref 74–99)
GLUCOSE BLD-MCNC: 64 MG/DL (ref 74–99)
GLUCOSE BLD-MCNC: 66 MG/DL (ref 74–99)
GLUCOSE BLD-MCNC: 69 MG/DL (ref 74–99)
GLUCOSE BLD-MCNC: 78 MG/DL (ref 74–99)

## 2025-05-25 PROCEDURE — 6370000000 HC RX 637 (ALT 250 FOR IP)

## 2025-05-25 PROCEDURE — 6360000002 HC RX W HCPCS: Performed by: INTERNAL MEDICINE

## 2025-05-25 PROCEDURE — 2580000003 HC RX 258

## 2025-05-25 PROCEDURE — 2500000003 HC RX 250 WO HCPCS

## 2025-05-25 PROCEDURE — 6360000002 HC RX W HCPCS

## 2025-05-25 PROCEDURE — 99232 SBSQ HOSP IP/OBS MODERATE 35: CPT | Performed by: FAMILY MEDICINE

## 2025-05-25 PROCEDURE — 6360000002 HC RX W HCPCS: Performed by: NURSE PRACTITIONER

## 2025-05-25 PROCEDURE — 82962 GLUCOSE BLOOD TEST: CPT

## 2025-05-25 PROCEDURE — 93005 ELECTROCARDIOGRAM TRACING: CPT | Performed by: NURSE PRACTITIONER

## 2025-05-25 PROCEDURE — 2060000000 HC ICU INTERMEDIATE R&B

## 2025-05-25 PROCEDURE — 2500000003 HC RX 250 WO HCPCS: Performed by: INTERNAL MEDICINE

## 2025-05-25 PROCEDURE — 99233 SBSQ HOSP IP/OBS HIGH 50: CPT | Performed by: INTERNAL MEDICINE

## 2025-05-25 RX ORDER — ACETAMINOPHEN 650 MG/1
650 SUPPOSITORY RECTAL 4 TIMES DAILY
Status: DISCONTINUED | OUTPATIENT
Start: 2025-05-25 | End: 2025-05-30

## 2025-05-25 RX ORDER — ACETAMINOPHEN 325 MG/1
650 TABLET ORAL 4 TIMES DAILY
Status: DISCONTINUED | OUTPATIENT
Start: 2025-05-25 | End: 2025-05-30

## 2025-05-25 RX ORDER — METHOCARBAMOL 500 MG/1
500 TABLET, FILM COATED ORAL 3 TIMES DAILY
Status: DISCONTINUED | OUTPATIENT
Start: 2025-05-25 | End: 2025-05-26

## 2025-05-25 RX ADMIN — DEXTROSE MONOHYDRATE 125 ML: 100 INJECTION, SOLUTION INTRAVENOUS at 17:17

## 2025-05-25 RX ADMIN — PHENOBARBITAL SODIUM 32.5 MG: 65 INJECTION INTRAMUSCULAR at 20:10

## 2025-05-25 RX ADMIN — SODIUM CHLORIDE 500 MG: 9 INJECTION, SOLUTION INTRAVENOUS at 10:13

## 2025-05-25 RX ADMIN — PHENOBARBITAL SODIUM 32.5 MG: 65 INJECTION INTRAMUSCULAR at 11:53

## 2025-05-25 RX ADMIN — HYDROCORTISONE: 1 CREAM TOPICAL at 10:11

## 2025-05-25 RX ADMIN — SODIUM CHLORIDE, PRESERVATIVE FREE 10 ML: 5 INJECTION INTRAVENOUS at 10:14

## 2025-05-25 RX ADMIN — METHOCARBAMOL 500 MG: 500 TABLET ORAL at 20:11

## 2025-05-25 RX ADMIN — DEXTROSE MONOHYDRATE 125 ML: 100 INJECTION, SOLUTION INTRAVENOUS at 15:52

## 2025-05-25 RX ADMIN — SODIUM CHLORIDE 250 MG: 9 INJECTION, SOLUTION INTRAVENOUS at 21:35

## 2025-05-25 RX ADMIN — LORAZEPAM 2 MG: 2 INJECTION INTRAMUSCULAR; INTRAVENOUS at 22:13

## 2025-05-25 RX ADMIN — PROPRANOLOL HYDROCHLORIDE 1 MG: 1 INJECTION, SOLUTION INTRAVENOUS at 20:10

## 2025-05-25 RX ADMIN — DEXTROSE MONOHYDRATE 125 ML: 100 INJECTION, SOLUTION INTRAVENOUS at 08:35

## 2025-05-25 RX ADMIN — PROPRANOLOL HYDROCHLORIDE 1 MG: 1 INJECTION, SOLUTION INTRAVENOUS at 11:56

## 2025-05-25 RX ADMIN — HYDROCODONE BITARTRATE AND ACETAMINOPHEN 1 TABLET: 7.5; 325 TABLET ORAL at 17:08

## 2025-05-25 RX ADMIN — ENOXAPARIN SODIUM 30 MG: 100 INJECTION SUBCUTANEOUS at 10:05

## 2025-05-25 RX ADMIN — ACETAMINOPHEN 650 MG: 325 TABLET ORAL at 20:10

## 2025-05-25 RX ADMIN — HYDROCORTISONE: 1 CREAM TOPICAL at 20:10

## 2025-05-25 RX ADMIN — THIAMINE HYDROCHLORIDE 100 MG: 100 INJECTION, SOLUTION INTRAMUSCULAR; INTRAVENOUS at 10:06

## 2025-05-25 RX ADMIN — SODIUM CHLORIDE, PRESERVATIVE FREE 40 MG: 5 INJECTION INTRAVENOUS at 10:05

## 2025-05-25 RX ADMIN — SODIUM CHLORIDE, PRESERVATIVE FREE 10 ML: 5 INJECTION INTRAVENOUS at 10:13

## 2025-05-25 RX ADMIN — LORAZEPAM 3 MG: 2 INJECTION INTRAMUSCULAR; INTRAVENOUS at 08:24

## 2025-05-25 RX ADMIN — SODIUM CHLORIDE: 9 INJECTION, SOLUTION INTRAVENOUS at 03:58

## 2025-05-25 RX ADMIN — SODIUM CHLORIDE: 9 INJECTION, SOLUTION INTRAVENOUS at 21:34

## 2025-05-25 RX ADMIN — PHENOBARBITAL SODIUM 32.5 MG: 65 INJECTION INTRAMUSCULAR at 03:58

## 2025-05-25 ASSESSMENT — PAIN SCALES - GENERAL
PAINLEVEL_OUTOF10: 0
PAINLEVEL_OUTOF10: 10
PAINLEVEL_OUTOF10: 0
PAINLEVEL_OUTOF10: 3
PAINLEVEL_OUTOF10: 0
PAINLEVEL_OUTOF10: 0

## 2025-05-25 ASSESSMENT — PAIN SCALES - WONG BAKER: WONGBAKER_NUMERICALRESPONSE: NO HURT

## 2025-05-25 ASSESSMENT — PAIN DESCRIPTION - PAIN TYPE: TYPE: CHRONIC PAIN

## 2025-05-25 ASSESSMENT — PAIN DESCRIPTION - ONSET: ONSET: PROGRESSIVE

## 2025-05-25 ASSESSMENT — PAIN DESCRIPTION - DESCRIPTORS: DESCRIPTORS: ACHING;SORE;TENDER

## 2025-05-25 ASSESSMENT — PAIN DESCRIPTION - LOCATION
LOCATION: BACK
LOCATION: BACK

## 2025-05-25 ASSESSMENT — PAIN - FUNCTIONAL ASSESSMENT: PAIN_FUNCTIONAL_ASSESSMENT: PREVENTS OR INTERFERES SOME ACTIVE ACTIVITIES AND ADLS

## 2025-05-25 ASSESSMENT — PAIN DESCRIPTION - ORIENTATION: ORIENTATION: RIGHT;LEFT

## 2025-05-25 ASSESSMENT — PAIN DESCRIPTION - FREQUENCY: FREQUENCY: INTERMITTENT

## 2025-05-26 LAB
AMMONIA PLAS-SCNC: 37 UMOL/L (ref 11–51)
ANION GAP SERPL CALCULATED.3IONS-SCNC: 10 MMOL/L (ref 7–16)
ATYPICAL LYMPHOCYTE ABSOLUTE COUNT: 0.1 K/UL (ref 0–0.46)
ATYPICAL LYMPHOCYTES: 2 % (ref 0–4)
BASOPHILS # BLD: 0 K/UL (ref 0–0.2)
BASOPHILS NFR BLD: 0 % (ref 0–2)
BUN SERPL-MCNC: <2 MG/DL (ref 8–23)
CALCIUM SERPL-MCNC: 8.7 MG/DL (ref 8.8–10.2)
CHLORIDE SERPL-SCNC: 103 MMOL/L (ref 98–107)
CO2 SERPL-SCNC: 25 MMOL/L (ref 22–29)
CREAT SERPL-MCNC: 0.5 MG/DL (ref 0.5–1)
EOSINOPHIL # BLD: 0.1 K/UL (ref 0.05–0.5)
EOSINOPHILS RELATIVE PERCENT: 2 % (ref 0–6)
ERYTHROCYTE [DISTWIDTH] IN BLOOD BY AUTOMATED COUNT: 12.8 % (ref 11.5–15)
GFR, ESTIMATED: >90 ML/MIN/1.73M2
GLUCOSE BLD-MCNC: 69 MG/DL (ref 74–99)
GLUCOSE BLD-MCNC: 72 MG/DL (ref 74–99)
GLUCOSE BLD-MCNC: 73 MG/DL (ref 74–99)
GLUCOSE BLD-MCNC: 86 MG/DL (ref 74–99)
GLUCOSE SERPL-MCNC: 76 MG/DL (ref 74–99)
HCT VFR BLD AUTO: 31.1 % (ref 34–48)
HGB BLD-MCNC: 10.5 G/DL (ref 11.5–15.5)
LYMPHOCYTES NFR BLD: 2.65 K/UL (ref 1.5–4)
LYMPHOCYTES RELATIVE PERCENT: 52 % (ref 20–42)
MAGNESIUM SERPL-MCNC: 1.4 MG/DL (ref 1.6–2.4)
MCH RBC QN AUTO: 31.1 PG (ref 26–35)
MCHC RBC AUTO-ENTMCNC: 33.8 G/DL (ref 32–34.5)
MCV RBC AUTO: 92 FL (ref 80–99.9)
MONOCYTES NFR BLD: 0.31 K/UL (ref 0.1–0.95)
MONOCYTES NFR BLD: 6 % (ref 2–12)
NEUTROPHILS NFR BLD: 38 % (ref 43–80)
NEUTS SEG NFR BLD: 1.94 K/UL (ref 1.8–7.3)
PHOSPHATE SERPL-MCNC: 3.2 MG/DL (ref 2.5–4.5)
PLATELET # BLD AUTO: 345 K/UL (ref 130–450)
PMV BLD AUTO: 11.1 FL (ref 7–12)
POTASSIUM SERPL-SCNC: 4.1 MMOL/L (ref 3.5–5.1)
RBC # BLD AUTO: 3.38 M/UL (ref 3.5–5.5)
RBC # BLD: ABNORMAL 10*6/UL
SODIUM SERPL-SCNC: 137 MMOL/L (ref 136–145)
WBC OTHER # BLD: 5.1 K/UL (ref 4.5–11.5)

## 2025-05-26 PROCEDURE — 6370000000 HC RX 637 (ALT 250 FOR IP): Performed by: INTERNAL MEDICINE

## 2025-05-26 PROCEDURE — 2500000003 HC RX 250 WO HCPCS

## 2025-05-26 PROCEDURE — 2580000003 HC RX 258

## 2025-05-26 PROCEDURE — 83735 ASSAY OF MAGNESIUM: CPT

## 2025-05-26 PROCEDURE — 6360000002 HC RX W HCPCS

## 2025-05-26 PROCEDURE — 82140 ASSAY OF AMMONIA: CPT

## 2025-05-26 PROCEDURE — 6360000002 HC RX W HCPCS: Performed by: NURSE PRACTITIONER

## 2025-05-26 PROCEDURE — 6370000000 HC RX 637 (ALT 250 FOR IP)

## 2025-05-26 PROCEDURE — 85025 COMPLETE CBC W/AUTO DIFF WBC: CPT

## 2025-05-26 PROCEDURE — 80048 BASIC METABOLIC PNL TOTAL CA: CPT

## 2025-05-26 PROCEDURE — 99232 SBSQ HOSP IP/OBS MODERATE 35: CPT | Performed by: FAMILY MEDICINE

## 2025-05-26 PROCEDURE — 82962 GLUCOSE BLOOD TEST: CPT

## 2025-05-26 PROCEDURE — 99233 SBSQ HOSP IP/OBS HIGH 50: CPT | Performed by: INTERNAL MEDICINE

## 2025-05-26 PROCEDURE — 93005 ELECTROCARDIOGRAM TRACING: CPT | Performed by: INTERNAL MEDICINE

## 2025-05-26 PROCEDURE — 81001 URINALYSIS AUTO W/SCOPE: CPT

## 2025-05-26 PROCEDURE — 2500000003 HC RX 250 WO HCPCS: Performed by: INTERNAL MEDICINE

## 2025-05-26 PROCEDURE — 84100 ASSAY OF PHOSPHORUS: CPT

## 2025-05-26 PROCEDURE — 2060000000 HC ICU INTERMEDIATE R&B

## 2025-05-26 PROCEDURE — 6360000002 HC RX W HCPCS: Performed by: INTERNAL MEDICINE

## 2025-05-26 RX ORDER — INSULIN LISPRO 100 [IU]/ML
0-4 INJECTION, SOLUTION INTRAVENOUS; SUBCUTANEOUS
Status: DISCONTINUED | OUTPATIENT
Start: 2025-05-26 | End: 2025-05-29

## 2025-05-26 RX ORDER — DEXTROSE MONOHYDRATE, SODIUM CHLORIDE, AND POTASSIUM CHLORIDE 50; 1.49; 9 G/1000ML; G/1000ML; G/1000ML
INJECTION, SOLUTION INTRAVENOUS ONCE
Status: DISCONTINUED | OUTPATIENT
Start: 2025-05-26 | End: 2025-05-26

## 2025-05-26 RX ORDER — MAGNESIUM SULFATE IN WATER 40 MG/ML
2000 INJECTION, SOLUTION INTRAVENOUS ONCE
Status: COMPLETED | OUTPATIENT
Start: 2025-05-26 | End: 2025-05-26

## 2025-05-26 RX ORDER — DEXTROSE MONOHYDRATE, SODIUM CHLORIDE, AND POTASSIUM CHLORIDE 50; 1.49; 4.5 G/1000ML; G/1000ML; G/1000ML
INJECTION, SOLUTION INTRAVENOUS CONTINUOUS
Status: DISPENSED | OUTPATIENT
Start: 2025-05-26 | End: 2025-05-27

## 2025-05-26 RX ORDER — DEXTROSE MONOHYDRATE, SODIUM CHLORIDE, AND POTASSIUM CHLORIDE 50; 1.49; 9 G/1000ML; G/1000ML; G/1000ML
INJECTION, SOLUTION INTRAVENOUS CONTINUOUS
Status: DISPENSED | OUTPATIENT
Start: 2025-05-26 | End: 2025-05-26

## 2025-05-26 RX ORDER — METHOCARBAMOL 500 MG/1
500 TABLET, FILM COATED ORAL DAILY
Status: DISCONTINUED | OUTPATIENT
Start: 2025-05-27 | End: 2025-05-30

## 2025-05-26 RX ADMIN — THIAMINE HYDROCHLORIDE 100 MG: 100 INJECTION, SOLUTION INTRAMUSCULAR; INTRAVENOUS at 10:07

## 2025-05-26 RX ADMIN — SODIUM CHLORIDE, PRESERVATIVE FREE 40 MG: 5 INJECTION INTRAVENOUS at 10:03

## 2025-05-26 RX ADMIN — HYDROCORTISONE: 1 CREAM TOPICAL at 20:53

## 2025-05-26 RX ADMIN — HYDROCODONE BITARTRATE AND ACETAMINOPHEN 1 TABLET: 7.5; 325 TABLET ORAL at 10:24

## 2025-05-26 RX ADMIN — LORAZEPAM 2 MG: 2 INJECTION INTRAMUSCULAR; INTRAVENOUS at 07:52

## 2025-05-26 RX ADMIN — LORAZEPAM 2 MG: 2 INJECTION INTRAMUSCULAR; INTRAVENOUS at 15:24

## 2025-05-26 RX ADMIN — PHENOBARBITAL SODIUM 32.5 MG: 65 INJECTION INTRAMUSCULAR at 10:08

## 2025-05-26 RX ADMIN — PROPRANOLOL HYDROCHLORIDE 1 MG: 1 INJECTION, SOLUTION INTRAVENOUS at 20:52

## 2025-05-26 RX ADMIN — PROPRANOLOL HYDROCHLORIDE 1 MG: 1 INJECTION, SOLUTION INTRAVENOUS at 10:03

## 2025-05-26 RX ADMIN — ACETAMINOPHEN 650 MG: 325 TABLET ORAL at 20:47

## 2025-05-26 RX ADMIN — SODIUM CHLORIDE: 9 INJECTION, SOLUTION INTRAVENOUS at 16:06

## 2025-05-26 RX ADMIN — LORAZEPAM 2 MG: 2 INJECTION INTRAMUSCULAR; INTRAVENOUS at 13:59

## 2025-05-26 RX ADMIN — METHOCARBAMOL 500 MG: 500 TABLET ORAL at 10:08

## 2025-05-26 RX ADMIN — FOLIC ACID 1 MG: 1 TABLET ORAL at 10:24

## 2025-05-26 RX ADMIN — PROPRANOLOL HYDROCHLORIDE 1 MG: 1 INJECTION, SOLUTION INTRAVENOUS at 06:23

## 2025-05-26 RX ADMIN — SODIUM CHLORIDE, PRESERVATIVE FREE 10 ML: 5 INJECTION INTRAVENOUS at 20:52

## 2025-05-26 RX ADMIN — DEXTROSE MONOHYDRATE, SODIUM CHLORIDE, AND POTASSIUM CHLORIDE: 50; 4.5; 1.49 INJECTION, SOLUTION INTRAVENOUS at 17:26

## 2025-05-26 RX ADMIN — VALPROATE SODIUM 250 MG: 100 INJECTION, SOLUTION INTRAVENOUS at 22:50

## 2025-05-26 RX ADMIN — SODIUM CHLORIDE, PRESERVATIVE FREE 10 ML: 5 INJECTION INTRAVENOUS at 10:27

## 2025-05-26 RX ADMIN — SODIUM CHLORIDE 250 MG: 9 INJECTION, SOLUTION INTRAVENOUS at 10:14

## 2025-05-26 RX ADMIN — MAGNESIUM SULFATE HEPTAHYDRATE 2000 MG: 40 INJECTION, SOLUTION INTRAVENOUS at 16:04

## 2025-05-26 RX ADMIN — SODIUM CHLORIDE, PRESERVATIVE FREE 10 ML: 5 INJECTION INTRAVENOUS at 10:26

## 2025-05-26 ASSESSMENT — PAIN DESCRIPTION - LOCATION
LOCATION: GENERALIZED
LOCATION: BACK

## 2025-05-26 ASSESSMENT — PAIN SCALES - WONG BAKER: WONGBAKER_NUMERICALRESPONSE: NO HURT

## 2025-05-26 ASSESSMENT — PAIN DESCRIPTION - FREQUENCY: FREQUENCY: INTERMITTENT

## 2025-05-26 ASSESSMENT — PAIN SCALES - GENERAL
PAINLEVEL_OUTOF10: 0
PAINLEVEL_OUTOF10: 3
PAINLEVEL_OUTOF10: 5
PAINLEVEL_OUTOF10: 0

## 2025-05-26 ASSESSMENT — PAIN DESCRIPTION - PAIN TYPE: TYPE: CHRONIC PAIN

## 2025-05-26 ASSESSMENT — PAIN DESCRIPTION - ORIENTATION: ORIENTATION: RIGHT;LEFT

## 2025-05-26 ASSESSMENT — PAIN DESCRIPTION - DESCRIPTORS
DESCRIPTORS: ACHING;SORE;TENDER
DESCRIPTORS: ACHING;DISCOMFORT;DULL

## 2025-05-27 ENCOUNTER — APPOINTMENT (OUTPATIENT)
Dept: MRI IMAGING | Age: 65
DRG: 277 | End: 2025-05-27
Payer: MEDICARE

## 2025-05-27 ENCOUNTER — APPOINTMENT (OUTPATIENT)
Dept: GENERAL RADIOLOGY | Age: 65
DRG: 277 | End: 2025-05-27
Payer: MEDICARE

## 2025-05-27 LAB
ALBUMIN SERPL-MCNC: 3.3 G/DL (ref 3.5–5.2)
ALP SERPL-CCNC: 72 U/L (ref 35–104)
ALT SERPL-CCNC: 14 U/L (ref 0–35)
ANION GAP SERPL CALCULATED.3IONS-SCNC: 14 MMOL/L (ref 7–16)
APPEARANCE CSF: ABNORMAL
AST SERPL-CCNC: 22 U/L (ref 0–35)
BASOPHILS # BLD: 0.06 K/UL (ref 0–0.2)
BASOPHILS NFR BLD: 1 % (ref 0–2)
BILIRUB DIRECT SERPL-MCNC: 0.2 MG/DL (ref 0–0.2)
BILIRUB INDIRECT SERPL-MCNC: 0 MG/DL (ref 0–1)
BILIRUB SERPL-MCNC: 0.2 MG/DL (ref 0–1.2)
BUN SERPL-MCNC: 2 MG/DL (ref 8–23)
C GATTII+NEOFOR DNA CSF QL NAA+NON-PROBE: NOT DETECTED
CALCIUM SERPL-MCNC: 8.9 MG/DL (ref 8.8–10.2)
CHLORIDE SERPL-SCNC: 100 MMOL/L (ref 98–107)
CLOT CHECK: ABNORMAL
CMV DNA CSF QL NAA+NON-PROBE: NOT DETECTED
CO2 SERPL-SCNC: 25 MMOL/L (ref 22–29)
COLOR CSF: ABNORMAL
CORTIS SERPL-MCNC: 18.2 UG/DL (ref 2.7–18.4)
CORTISOL COLLECTION INFO: NORMAL
CREAT SERPL-MCNC: 0.4 MG/DL (ref 0.5–1)
CRP SERPL HS-MCNC: 22.5 MG/L (ref 0–5)
DATE LAST DOSE: ABNORMAL
E COLI K1 DNA CSF QL NAA+NON-PROBE: NOT DETECTED
EKG ATRIAL RATE: 61 BPM
EKG ATRIAL RATE: 63 BPM
EKG ATRIAL RATE: 64 BPM
EKG ATRIAL RATE: 65 BPM
EKG P AXIS: 61 DEGREES
EKG P AXIS: 63 DEGREES
EKG P AXIS: 68 DEGREES
EKG P AXIS: 68 DEGREES
EKG P-R INTERVAL: 138 MS
EKG P-R INTERVAL: 142 MS
EKG P-R INTERVAL: 142 MS
EKG P-R INTERVAL: 146 MS
EKG Q-T INTERVAL: 458 MS
EKG Q-T INTERVAL: 492 MS
EKG Q-T INTERVAL: 500 MS
EKG Q-T INTERVAL: 580 MS
EKG QRS DURATION: 66 MS
EKG QRS DURATION: 68 MS
EKG QRS DURATION: 68 MS
EKG QRS DURATION: 74 MS
EKG QTC CALCULATION (BAZETT): 472 MS
EKG QTC CALCULATION (BAZETT): 503 MS
EKG QTC CALCULATION (BAZETT): 511 MS
EKG QTC CALCULATION (BAZETT): 594 MS
EKG R AXIS: 24 DEGREES
EKG R AXIS: 29 DEGREES
EKG R AXIS: 34 DEGREES
EKG R AXIS: 52 DEGREES
EKG T AXIS: 37 DEGREES
EKG T AXIS: 51 DEGREES
EKG T AXIS: 56 DEGREES
EKG T AXIS: 69 DEGREES
EKG VENTRICULAR RATE: 61 BPM
EKG VENTRICULAR RATE: 63 BPM
EKG VENTRICULAR RATE: 64 BPM
EKG VENTRICULAR RATE: 65 BPM
EOSINOPHIL # BLD: 0.06 K/UL (ref 0.05–0.5)
EOSINOPHILS RELATIVE PERCENT: 1 % (ref 0–6)
ERYTHROCYTE [DISTWIDTH] IN BLOOD BY AUTOMATED COUNT: 12.8 % (ref 11.5–15)
ERYTHROCYTE [SEDIMENTATION RATE] IN BLOOD BY WESTERGREN METHOD: 6 MM/HR (ref 0–20)
EV RNA CSF QL NAA+NON-PROBE: NOT DETECTED
GFR, ESTIMATED: >90 ML/MIN/1.73M2
GLUCOSE BLD-MCNC: 113 MG/DL (ref 74–99)
GLUCOSE BLD-MCNC: 57 MG/DL (ref 74–99)
GLUCOSE BLD-MCNC: 59 MG/DL (ref 74–99)
GLUCOSE BLD-MCNC: 60 MG/DL (ref 74–99)
GLUCOSE BLD-MCNC: 68 MG/DL (ref 74–99)
GLUCOSE BLD-MCNC: 89 MG/DL (ref 74–99)
GLUCOSE BLD-MCNC: <40 MG/DL (ref 74–99)
GLUCOSE CSF-MCNC: 55 MG/DL (ref 40–70)
GLUCOSE SERPL-MCNC: 74 MG/DL (ref 74–99)
GP B STREP DNA CSF QL NAA+NON-PROBE: NOT DETECTED
HAEM INFLU DNA CSF QL NAA+NON-PROBE: NOT DETECTED
HCT VFR BLD AUTO: 30.4 % (ref 34–48)
HGB BLD-MCNC: 10.4 G/DL (ref 11.5–15.5)
HHV6 DNA CSF QL NAA+NON-PROBE: NOT DETECTED
HSV1 DNA CSF QL NAA+NON-PROBE: NOT DETECTED
HSV2 DNA CSF QL NAA+NON-PROBE: NOT DETECTED
IMM GRANULOCYTES # BLD AUTO: 0.03 K/UL (ref 0–0.58)
IMM GRANULOCYTES NFR BLD: 1 % (ref 0–5)
INR PPP: 1.1
L MONOCYTOG DNA CSF QL NAA+NON-PROBE: NOT DETECTED
LACTATE BLDV-SCNC: 0.8 MMOL/L (ref 0.5–2.2)
LYMPHOCYTES NFR BLD: 1.83 K/UL (ref 1.5–4)
LYMPHOCYTES RELATIVE PERCENT: 29 % (ref 20–42)
MAGNESIUM SERPL-MCNC: 1.6 MG/DL (ref 1.6–2.4)
MCH RBC QN AUTO: 31.5 PG (ref 26–35)
MCHC RBC AUTO-ENTMCNC: 34.2 G/DL (ref 32–34.5)
MCV RBC AUTO: 92.1 FL (ref 80–99.9)
MONOCYTES NFR BLD: 0.72 K/UL (ref 0.1–0.95)
MONOCYTES NFR BLD: 12 % (ref 2–12)
MONOCYTES, CSF: 47 % (ref 15–45)
N MEN DNA CSF QL NAA+NON-PROBE: NOT DETECTED
NEUTROPHILS NFR BLD: 57 % (ref 43–80)
NEUTROPHILS NFR CSF: 53 % (ref 0–6)
NEUTS SEG NFR BLD: 3.52 K/UL (ref 1.8–7.3)
NUC CELL # FLD MANUAL: 34 CELLS/UL (ref 0–5)
PARECHOVIRUS A RNA CSF QL NAA+NON-PROBE: NOT DETECTED
PARTIAL THROMBOPLASTIN TIME: 39.7 SEC (ref 24.5–35.1)
PLATELET # BLD AUTO: 420 K/UL (ref 130–450)
PMV BLD AUTO: 11.1 FL (ref 7–12)
POTASSIUM SERPL-SCNC: 3.7 MMOL/L (ref 3.5–5.1)
PROT CSF-MCNC: 53.8 MG/DL (ref 15–45)
PROT SERPL-MCNC: 6 G/DL (ref 6.4–8.3)
PROTHROMBIN TIME: 11.9 SEC (ref 9.3–12.4)
RBC # BLD AUTO: 3.3 M/UL (ref 3.5–5.5)
RBC # FLD MANUAL: ABNORMAL CELLS/UL
RPR SER QL: NONREACTIVE
S PNEUM DNA CSF QL NAA+NON-PROBE: NOT DETECTED
SODIUM SERPL-SCNC: 139 MMOL/L (ref 136–145)
SPECIMEN DESCRIPTION: NORMAL
SPECIMEN VOL CSF: ABNORMAL ML
TME LAST DOSE: ABNORMAL H
TUBE # CSF: 3
VALPROATE SERPL-MCNC: 30 UG/ML (ref 50–100)
VANCOMYCIN DOSE: ABNORMAL MG
VIT B1 PYROPHOSHATE BLD-SCNC: 364 NMOL/L (ref 70–180)
VZV DNA CSF QL NAA+NON-PROBE: NOT DETECTED
WBC OTHER # BLD: 6.2 K/UL (ref 4.5–11.5)

## 2025-05-27 PROCEDURE — 82384 ASSAY THREE CATECHOLAMINES: CPT

## 2025-05-27 PROCEDURE — 83735 ASSAY OF MAGNESIUM: CPT

## 2025-05-27 PROCEDURE — 93005 ELECTROCARDIOGRAM TRACING: CPT | Performed by: NURSE PRACTITIONER

## 2025-05-27 PROCEDURE — 80053 COMPREHEN METABOLIC PANEL: CPT

## 2025-05-27 PROCEDURE — A9579 GAD-BASE MR CONTRAST NOS,1ML: HCPCS | Performed by: RADIOLOGY

## 2025-05-27 PROCEDURE — 85730 THROMBOPLASTIN TIME PARTIAL: CPT

## 2025-05-27 PROCEDURE — 93010 ELECTROCARDIOGRAM REPORT: CPT | Performed by: INTERNAL MEDICINE

## 2025-05-27 PROCEDURE — 82533 TOTAL CORTISOL: CPT

## 2025-05-27 PROCEDURE — 06HY33Z INSERTION OF INFUSION DEVICE INTO LOWER VEIN, PERCUTANEOUS APPROACH: ICD-10-PCS | Performed by: SURGERY

## 2025-05-27 PROCEDURE — 86140 C-REACTIVE PROTEIN: CPT

## 2025-05-27 PROCEDURE — 009U3ZX DRAINAGE OF SPINAL CANAL, PERCUTANEOUS APPROACH, DIAGNOSTIC: ICD-10-PCS

## 2025-05-27 PROCEDURE — 85025 COMPLETE CBC W/AUTO DIFF WBC: CPT

## 2025-05-27 PROCEDURE — 62328 DX LMBR SPI PNXR W/FLUOR/CT: CPT

## 2025-05-27 PROCEDURE — 36556 INSERT NON-TUNNEL CV CATH: CPT | Performed by: SURGERY

## 2025-05-27 PROCEDURE — 6370000000 HC RX 637 (ALT 250 FOR IP): Performed by: NURSE PRACTITIONER

## 2025-05-27 PROCEDURE — 89051 BODY FLUID CELL COUNT: CPT

## 2025-05-27 PROCEDURE — 80164 ASSAY DIPROPYLACETIC ACD TOT: CPT

## 2025-05-27 PROCEDURE — 6360000002 HC RX W HCPCS

## 2025-05-27 PROCEDURE — 99232 SBSQ HOSP IP/OBS MODERATE 35: CPT | Performed by: PHYSICIAN ASSISTANT

## 2025-05-27 PROCEDURE — 6360000004 HC RX CONTRAST MEDICATION: Performed by: RADIOLOGY

## 2025-05-27 PROCEDURE — 87205 SMEAR GRAM STAIN: CPT

## 2025-05-27 PROCEDURE — 84207 ASSAY OF VITAMIN B-6: CPT

## 2025-05-27 PROCEDURE — 84157 ASSAY OF PROTEIN OTHER: CPT

## 2025-05-27 PROCEDURE — 82962 GLUCOSE BLOOD TEST: CPT

## 2025-05-27 PROCEDURE — 87070 CULTURE OTHR SPECIMN AEROBIC: CPT

## 2025-05-27 PROCEDURE — 36556 INSERT NON-TUNNEL CV CATH: CPT

## 2025-05-27 PROCEDURE — 70552 MRI BRAIN STEM W/DYE: CPT

## 2025-05-27 PROCEDURE — 88108 CYTOPATH CONCENTRATE TECH: CPT

## 2025-05-27 PROCEDURE — 99232 SBSQ HOSP IP/OBS MODERATE 35: CPT | Performed by: FAMILY MEDICINE

## 2025-05-27 PROCEDURE — 85610 PROTHROMBIN TIME: CPT

## 2025-05-27 PROCEDURE — 87483 CNS DNA AMP PROBE TYPE 12-25: CPT

## 2025-05-27 PROCEDURE — 83525 ASSAY OF INSULIN: CPT

## 2025-05-27 PROCEDURE — 82945 GLUCOSE OTHER FLUID: CPT

## 2025-05-27 PROCEDURE — 83605 ASSAY OF LACTIC ACID: CPT

## 2025-05-27 PROCEDURE — 85652 RBC SED RATE AUTOMATED: CPT

## 2025-05-27 PROCEDURE — 2060000000 HC ICU INTERMEDIATE R&B

## 2025-05-27 PROCEDURE — 87040 BLOOD CULTURE FOR BACTERIA: CPT

## 2025-05-27 PROCEDURE — 82248 BILIRUBIN DIRECT: CPT

## 2025-05-27 PROCEDURE — 6370000000 HC RX 637 (ALT 250 FOR IP)

## 2025-05-27 RX ORDER — PROPRANOLOL HYDROCHLORIDE 1 MG/ML
1 INJECTION, SOLUTION INTRAVENOUS 3 TIMES DAILY PRN
Status: DISCONTINUED | OUTPATIENT
Start: 2025-05-27 | End: 2025-06-04

## 2025-05-27 RX ORDER — MIDAZOLAM HYDROCHLORIDE 2 MG/2ML
1 INJECTION, SOLUTION INTRAMUSCULAR; INTRAVENOUS ONCE
Status: DISCONTINUED | OUTPATIENT
Start: 2025-05-27 | End: 2025-06-05 | Stop reason: HOSPADM

## 2025-05-27 RX ORDER — LANOLIN ALCOHOL/MO/W.PET/CERES
400 CREAM (GRAM) TOPICAL DAILY
Status: DISCONTINUED | OUTPATIENT
Start: 2025-05-27 | End: 2025-05-28

## 2025-05-27 RX ORDER — LORAZEPAM 2 MG/ML
1 INJECTION INTRAMUSCULAR ONCE
Status: COMPLETED | OUTPATIENT
Start: 2025-05-27 | End: 2025-05-27

## 2025-05-27 RX ADMIN — LORAZEPAM 3 MG: 2 INJECTION INTRAMUSCULAR; INTRAVENOUS at 21:14

## 2025-05-27 RX ADMIN — PROPRANOLOL HYDROCHLORIDE 10 MG: 10 TABLET ORAL at 17:59

## 2025-05-27 RX ADMIN — GADOTERIDOL 10 ML: 279.3 INJECTION, SOLUTION INTRAVENOUS at 17:31

## 2025-05-27 RX ADMIN — GLUCAGON 1 MG: KIT at 06:15

## 2025-05-27 RX ADMIN — LORAZEPAM 1 MG: 2 INJECTION INTRAMUSCULAR; INTRAVENOUS at 15:19

## 2025-05-27 ASSESSMENT — PAIN SCALES - WONG BAKER
WONGBAKER_NUMERICALRESPONSE: NO HURT
WONGBAKER_NUMERICALRESPONSE: NO HURT

## 2025-05-27 ASSESSMENT — PAIN SCALES - GENERAL: PAINLEVEL_OUTOF10: 0

## 2025-05-27 NOTE — PROCEDURES
PROCEDURE NOTE  Date: 5/27/2025   Name: Diane Stuart  YOB: 1960    CENTRAL LINE    Date/Time: 5/27/2025 4:32 PM    Performed by: Eugenia Amos MD  Authorized by: Fay Patton MD  Consent: Verbal consent obtained. Written consent obtained.  Risks and benefits: risks, benefits and alternatives were discussed  Consent given by: power of   Patient identity confirmed: arm band  Time out: Immediately prior to procedure a \"time out\" was called to verify the correct patient, procedure, equipment, support staff and site/side marked as required.  Indications: vascular access    Anesthesia:  Local Anesthetic: lidocaine 1% without epinephrine  Preparation: skin prepped with 2% chlorhexidine  Skin prep agent dried: skin prep agent completely dried prior to procedure  Sterile barriers: all five maximum sterile barriers used - cap, mask, sterile gown, sterile gloves, and large sterile sheet  Hand hygiene: hand hygiene performed prior to central venous catheter insertion  Location details: right femoral  Patient position: flat  Catheter size: 7 Fr  Pre-procedure: landmarks identified  Ultrasound guidance: yes  Sterile ultrasound techniques: sterile gel and sterile probe covers were used  Number of attempts: 2  Successful placement: yes  Post-procedure: line sutured  Assessment: blood return through all ports and free fluid flow  Patient tolerance: patient tolerated the procedure well with no immediate complications  Comments: Dr. Patton was present

## 2025-05-27 NOTE — PROCEDURES
Procedure Note  ______________________________________________________________      IR/FL  LUMBAR PUNCTURE  [facility] 7WE IMCU    Patient Name: Diane Stuart   YOB: 1960  Medical Record Number: [id number]  Date of Procedure: 5/27/25  Room/Bed: [facility]/74-A    Preoperative diagnosis: Altered Mental Status and Possible Meningitis    Postoperative diagnosis: Same    CONSENT:  Informed consent was obtained from the daughter prior to the procedure. The details of the procedure, as well is its risks, benefits, and alternatives, were explained. These risks include, but are not limited to, nerve root injury, bleeding, infection (including meningitis), dural leak (possibly requiring blood patch treatment), and spinal headache. Opportunity was provided to ask questions, which were answered.     Procedure: Fluoroscopic-guided lumbar puncture    Anesthesia: Local    Performed by: CRISTI Cardona under on-site supervision by Nathaniel Oneill MD.    Estimated blood loss: None    Complications: None.    Implants: None    Procedure details:   A preprocedure timeout was performed. With the patient in a left anterior oblique prone position on the exam table, fluoroscopy was used to select a suitable interlaminar space for lumbar puncture. The site was marked on the skin. The area was sterilely prepped and draped. Local anesthesia was administered by infiltration using 1% Lidocaine without epinephrine administered subcutaneously. Under intermittent fluoroscopic guidance, a 20-gauge spinal needle was percutaneously advanced through the interlaminar space at L3-L4 and advanced into the thecal sac, with return of blood tinged cerebral spinal fluid. The stylet was reinserted and the needle was removed. There were no complications. See radiology dictation in PACs for image review and additional procedural information.         A total of  13mL of CSF was serially collected in four sterile tubes. The specimens were submitted for

## 2025-05-28 LAB
ANION GAP SERPL CALCULATED.3IONS-SCNC: 12 MMOL/L (ref 7–16)
BASOPHILS # BLD: 0.06 K/UL (ref 0–0.2)
BASOPHILS NFR BLD: 1 % (ref 0–2)
BUN SERPL-MCNC: 2 MG/DL (ref 8–23)
CALCIUM SERPL-MCNC: 8.9 MG/DL (ref 8.8–10.2)
CHLORIDE SERPL-SCNC: 102 MMOL/L (ref 98–107)
CO2 SERPL-SCNC: 27 MMOL/L (ref 22–29)
CREAT SERPL-MCNC: 0.5 MG/DL (ref 0.5–1)
EKG ATRIAL RATE: 54 BPM
EKG ATRIAL RATE: 63 BPM
EKG P AXIS: 56 DEGREES
EKG P AXIS: 67 DEGREES
EKG P-R INTERVAL: 128 MS
EKG P-R INTERVAL: 142 MS
EKG Q-T INTERVAL: 378 MS
EKG Q-T INTERVAL: 502 MS
EKG QRS DURATION: 68 MS
EKG QRS DURATION: 76 MS
EKG QTC CALCULATION (BAZETT): 386 MS
EKG QTC CALCULATION (BAZETT): 476 MS
EKG R AXIS: 34 DEGREES
EKG R AXIS: 41 DEGREES
EKG T AXIS: 56 DEGREES
EKG T AXIS: 61 DEGREES
EKG VENTRICULAR RATE: 54 BPM
EKG VENTRICULAR RATE: 63 BPM
EOSINOPHIL # BLD: 0.07 K/UL (ref 0.05–0.5)
EOSINOPHILS RELATIVE PERCENT: 1 % (ref 0–6)
ERYTHROCYTE [DISTWIDTH] IN BLOOD BY AUTOMATED COUNT: 12.9 % (ref 11.5–15)
GFR, ESTIMATED: >90 ML/MIN/1.73M2
GLUCOSE BLD-MCNC: 103 MG/DL (ref 74–99)
GLUCOSE BLD-MCNC: 155 MG/DL (ref 74–99)
GLUCOSE BLD-MCNC: 63 MG/DL (ref 74–99)
GLUCOSE BLD-MCNC: 72 MG/DL (ref 74–99)
GLUCOSE BLD-MCNC: 77 MG/DL (ref 74–99)
GLUCOSE SERPL-MCNC: 98 MG/DL (ref 74–99)
HCT VFR BLD AUTO: 29.8 % (ref 34–48)
HGB BLD-MCNC: 10.2 G/DL (ref 11.5–15.5)
IMM GRANULOCYTES # BLD AUTO: 0.03 K/UL (ref 0–0.58)
IMM GRANULOCYTES NFR BLD: 1 % (ref 0–5)
LYMPHOCYTES NFR BLD: 2.49 K/UL (ref 1.5–4)
LYMPHOCYTES RELATIVE PERCENT: 39 % (ref 20–42)
MAGNESIUM SERPL-MCNC: 1.5 MG/DL (ref 1.6–2.4)
MCH RBC QN AUTO: 31.2 PG (ref 26–35)
MCHC RBC AUTO-ENTMCNC: 34.2 G/DL (ref 32–34.5)
MCV RBC AUTO: 91.1 FL (ref 80–99.9)
MONOCYTES NFR BLD: 0.66 K/UL (ref 0.1–0.95)
MONOCYTES NFR BLD: 10 % (ref 2–12)
NEUTROPHILS NFR BLD: 48 % (ref 43–80)
NEUTS SEG NFR BLD: 3.01 K/UL (ref 1.8–7.3)
PHOSPHATE SERPL-MCNC: 3.5 MG/DL (ref 2.5–4.5)
PLATELET # BLD AUTO: 414 K/UL (ref 130–450)
PMV BLD AUTO: 10.9 FL (ref 7–12)
POTASSIUM SERPL-SCNC: 3.7 MMOL/L (ref 3.5–5.1)
RBC # BLD AUTO: 3.27 M/UL (ref 3.5–5.5)
SODIUM SERPL-SCNC: 140 MMOL/L (ref 136–145)
WBC OTHER # BLD: 6.3 K/UL (ref 4.5–11.5)

## 2025-05-28 PROCEDURE — 97530 THERAPEUTIC ACTIVITIES: CPT

## 2025-05-28 PROCEDURE — 99232 SBSQ HOSP IP/OBS MODERATE 35: CPT | Performed by: NURSE PRACTITIONER

## 2025-05-28 PROCEDURE — 2500000003 HC RX 250 WO HCPCS

## 2025-05-28 PROCEDURE — 97161 PT EVAL LOW COMPLEX 20 MIN: CPT

## 2025-05-28 PROCEDURE — 6370000000 HC RX 637 (ALT 250 FOR IP)

## 2025-05-28 PROCEDURE — 6370000000 HC RX 637 (ALT 250 FOR IP): Performed by: NURSE PRACTITIONER

## 2025-05-28 PROCEDURE — 2580000003 HC RX 258

## 2025-05-28 PROCEDURE — 6370000000 HC RX 637 (ALT 250 FOR IP): Performed by: INTERNAL MEDICINE

## 2025-05-28 PROCEDURE — 99232 SBSQ HOSP IP/OBS MODERATE 35: CPT | Performed by: PHYSICIAN ASSISTANT

## 2025-05-28 PROCEDURE — 80048 BASIC METABOLIC PNL TOTAL CA: CPT

## 2025-05-28 PROCEDURE — 36415 COLL VENOUS BLD VENIPUNCTURE: CPT

## 2025-05-28 PROCEDURE — 85025 COMPLETE CBC W/AUTO DIFF WBC: CPT

## 2025-05-28 PROCEDURE — 82962 GLUCOSE BLOOD TEST: CPT

## 2025-05-28 PROCEDURE — 2060000000 HC ICU INTERMEDIATE R&B

## 2025-05-28 PROCEDURE — 6360000002 HC RX W HCPCS: Performed by: INTERNAL MEDICINE

## 2025-05-28 PROCEDURE — 2500000003 HC RX 250 WO HCPCS: Performed by: INTERNAL MEDICINE

## 2025-05-28 PROCEDURE — 6360000002 HC RX W HCPCS

## 2025-05-28 PROCEDURE — 99232 SBSQ HOSP IP/OBS MODERATE 35: CPT | Performed by: FAMILY MEDICINE

## 2025-05-28 PROCEDURE — 93005 ELECTROCARDIOGRAM TRACING: CPT | Performed by: NURSE PRACTITIONER

## 2025-05-28 PROCEDURE — 97165 OT EVAL LOW COMPLEX 30 MIN: CPT

## 2025-05-28 PROCEDURE — 84100 ASSAY OF PHOSPHORUS: CPT

## 2025-05-28 PROCEDURE — 83735 ASSAY OF MAGNESIUM: CPT

## 2025-05-28 PROCEDURE — 87040 BLOOD CULTURE FOR BACTERIA: CPT

## 2025-05-28 PROCEDURE — 6360000002 HC RX W HCPCS: Performed by: NURSE PRACTITIONER

## 2025-05-28 RX ORDER — POTASSIUM CHLORIDE 7.45 MG/ML
10 INJECTION INTRAVENOUS PRN
Status: DISCONTINUED | OUTPATIENT
Start: 2025-05-28 | End: 2025-06-05 | Stop reason: HOSPADM

## 2025-05-28 RX ORDER — POTASSIUM CHLORIDE 29.8 MG/ML
20 INJECTION INTRAVENOUS PRN
Status: DISCONTINUED | OUTPATIENT
Start: 2025-05-28 | End: 2025-06-05 | Stop reason: HOSPADM

## 2025-05-28 RX ORDER — MAGNESIUM SULFATE IN WATER 40 MG/ML
2000 INJECTION, SOLUTION INTRAVENOUS PRN
Status: DISCONTINUED | OUTPATIENT
Start: 2025-05-28 | End: 2025-06-05 | Stop reason: HOSPADM

## 2025-05-28 RX ORDER — LANOLIN ALCOHOL/MO/W.PET/CERES
400 CREAM (GRAM) TOPICAL 2 TIMES DAILY
Status: DISCONTINUED | OUTPATIENT
Start: 2025-05-28 | End: 2025-06-05 | Stop reason: HOSPADM

## 2025-05-28 RX ORDER — MAGNESIUM SULFATE IN WATER 40 MG/ML
2000 INJECTION, SOLUTION INTRAVENOUS ONCE
Status: COMPLETED | OUTPATIENT
Start: 2025-05-28 | End: 2025-05-28

## 2025-05-28 RX ORDER — POTASSIUM CHLORIDE 29.8 MG/ML
40 INJECTION INTRAVENOUS ONCE
Status: COMPLETED | OUTPATIENT
Start: 2025-05-28 | End: 2025-05-28

## 2025-05-28 RX ADMIN — LORAZEPAM 2 MG: 2 INJECTION INTRAMUSCULAR; INTRAVENOUS at 16:43

## 2025-05-28 RX ADMIN — POTASSIUM CHLORIDE 40 MEQ: 29.8 INJECTION, SOLUTION INTRAVENOUS at 10:26

## 2025-05-28 RX ADMIN — SODIUM CHLORIDE, PRESERVATIVE FREE 40 MG: 5 INJECTION INTRAVENOUS at 09:54

## 2025-05-28 RX ADMIN — SODIUM CHLORIDE, PRESERVATIVE FREE 10 ML: 5 INJECTION INTRAVENOUS at 09:54

## 2025-05-28 RX ADMIN — ACETAMINOPHEN 650 MG: 325 TABLET ORAL at 21:25

## 2025-05-28 RX ADMIN — PROPRANOLOL HYDROCHLORIDE 10 MG: 10 TABLET ORAL at 21:25

## 2025-05-28 RX ADMIN — ACETAMINOPHEN 650 MG: 325 TABLET ORAL at 09:53

## 2025-05-28 RX ADMIN — VALPROATE SODIUM 250 MG: 100 INJECTION, SOLUTION INTRAVENOUS at 00:01

## 2025-05-28 RX ADMIN — SODIUM CHLORIDE, PRESERVATIVE FREE 10 ML: 5 INJECTION INTRAVENOUS at 10:30

## 2025-05-28 RX ADMIN — Medication 400 MG: at 21:25

## 2025-05-28 RX ADMIN — METHOCARBAMOL 500 MG: 500 TABLET ORAL at 09:54

## 2025-05-28 RX ADMIN — SODIUM CHLORIDE, PRESERVATIVE FREE 10 ML: 5 INJECTION INTRAVENOUS at 01:06

## 2025-05-28 RX ADMIN — LORAZEPAM 2 MG: 2 INJECTION INTRAMUSCULAR; INTRAVENOUS at 12:05

## 2025-05-28 RX ADMIN — SODIUM CHLORIDE, PRESERVATIVE FREE 10 ML: 5 INJECTION INTRAVENOUS at 01:07

## 2025-05-28 RX ADMIN — HYDROCORTISONE: 1 CREAM TOPICAL at 21:30

## 2025-05-28 RX ADMIN — FOLIC ACID 1 MG: 1 TABLET ORAL at 09:54

## 2025-05-28 RX ADMIN — PROPRANOLOL HYDROCHLORIDE 1 MG: 1 INJECTION, SOLUTION INTRAVENOUS at 00:34

## 2025-05-28 RX ADMIN — HYDROCODONE BITARTRATE AND ACETAMINOPHEN 1 TABLET: 7.5; 325 TABLET ORAL at 06:16

## 2025-05-28 RX ADMIN — SODIUM CHLORIDE, PRESERVATIVE FREE 10 ML: 5 INJECTION INTRAVENOUS at 21:30

## 2025-05-28 RX ADMIN — THIAMINE HYDROCHLORIDE 100 MG: 100 INJECTION, SOLUTION INTRAMUSCULAR; INTRAVENOUS at 09:53

## 2025-05-28 RX ADMIN — MAGNESIUM SULFATE HEPTAHYDRATE 2000 MG: 40 INJECTION, SOLUTION INTRAVENOUS at 10:25

## 2025-05-28 RX ADMIN — PROPRANOLOL HYDROCHLORIDE 10 MG: 10 TABLET ORAL at 09:56

## 2025-05-28 RX ADMIN — ENOXAPARIN SODIUM 30 MG: 100 INJECTION SUBCUTANEOUS at 16:42

## 2025-05-28 RX ADMIN — VALPROATE SODIUM 250 MG: 100 INJECTION, SOLUTION INTRAVENOUS at 10:25

## 2025-05-28 ASSESSMENT — PAIN SCALES - WONG BAKER
WONGBAKER_NUMERICALRESPONSE: NO HURT
WONGBAKER_NUMERICALRESPONSE: NO HURT

## 2025-05-28 ASSESSMENT — PAIN SCALES - GENERAL: PAINLEVEL_OUTOF10: 0

## 2025-05-28 NOTE — CARE COORDINATION
Reviewed chart, EP following, possible ICD if mentation improves. MRI with contrast, LP 5/27. Currently pt has sitter, awaiting therapy for recommendations.Beatriz Galdamez, MSW, LSW

## 2025-05-28 NOTE — PROCEDURES
Special Procedures/Interventional Radiology Pre-Procedure Evaluation    Patient Name: Diane Stuart  MRN: [id number]  : 1960  Date of Procedure: 25  Planned Procedure: Tunneled CVC     Is the patient alert, oriented, and capable of providing informed procedural consent? No   Has the patient adhered to NPO status since midnight? Yes  Are recent lab results within acceptable parameters to safely proceed with the procedure? Yes  Has medical/surgical/allergy history been reviewed? Yes  Have anticoagulant medications been appropriately withheld as per protocol? Yes Pt has been refusing   Is the patient receiving intravenous antibiotic therapy? No    I have discussed and reviewed this information with the patient's primary RN,  . Plan to proceed with the procedure today. However, the exact timing of the procedure cannot be specified at this moment. There is also a possibility that the procedure may be rescheduled due to the department's caseload and prioritization of cases.

## 2025-05-28 NOTE — PROCEDURES
PROCEDURE NOTE  Date: 5/28/2025   Name: Diane Stuart  YOB: 1960    Procedures  Floor called notified that we can't proceed with procedure today. Plan for tomorrow.  Pt NPO at midnight

## 2025-05-29 ENCOUNTER — APPOINTMENT (OUTPATIENT)
Dept: INTERVENTIONAL RADIOLOGY/VASCULAR | Age: 65
DRG: 277 | End: 2025-05-29
Payer: MEDICARE

## 2025-05-29 LAB
ANION GAP SERPL CALCULATED.3IONS-SCNC: 9 MMOL/L (ref 7–16)
B BURGDOR AB SER IA-ACNC: 0.33 IV
B BURGDOR DNA SPEC QL NAA+PROBE: NOT DETECTED
BASOPHILS # BLD: 0.1 K/UL (ref 0–0.2)
BASOPHILS NFR BLD: 1 % (ref 0–2)
BUN SERPL-MCNC: 5 MG/DL (ref 8–23)
CALCIUM SERPL-MCNC: 8.8 MG/DL (ref 8.8–10.2)
CHLORIDE SERPL-SCNC: 104 MMOL/L (ref 98–107)
CO2 SERPL-SCNC: 27 MMOL/L (ref 22–29)
CREAT SERPL-MCNC: 0.7 MG/DL (ref 0.5–1)
EKG ATRIAL RATE: 60 BPM
EKG P AXIS: 71 DEGREES
EKG P-R INTERVAL: 136 MS
EKG Q-T INTERVAL: 498 MS
EKG QRS DURATION: 68 MS
EKG QTC CALCULATION (BAZETT): 498 MS
EKG R AXIS: 38 DEGREES
EKG T AXIS: 57 DEGREES
EKG VENTRICULAR RATE: 60 BPM
EOSINOPHIL # BLD: 0.09 K/UL (ref 0.05–0.5)
EOSINOPHILS RELATIVE PERCENT: 1 % (ref 0–6)
ERYTHROCYTE [DISTWIDTH] IN BLOOD BY AUTOMATED COUNT: 13.4 % (ref 11.5–15)
GFR, ESTIMATED: >90 ML/MIN/1.73M2
GLUCOSE BLD-MCNC: 75 MG/DL (ref 74–99)
GLUCOSE BLD-MCNC: 78 MG/DL (ref 74–99)
GLUCOSE BLD-MCNC: 88 MG/DL (ref 74–99)
GLUCOSE SERPL-MCNC: 75 MG/DL (ref 74–99)
HCT VFR BLD AUTO: 28.5 % (ref 34–48)
HGB BLD-MCNC: 9.5 G/DL (ref 11.5–15.5)
IMM GRANULOCYTES # BLD AUTO: 0.04 K/UL (ref 0–0.58)
IMM GRANULOCYTES NFR BLD: 1 % (ref 0–5)
INSULIN COMMENT: NORMAL
INSULIN REFERENCE RANGE:: NORMAL
INSULIN: 1.5 MU/L
LYMPHOCYTES NFR BLD: 3.38 K/UL (ref 1.5–4)
LYMPHOCYTES RELATIVE PERCENT: 49 % (ref 20–42)
MAGNESIUM SERPL-MCNC: 1.9 MG/DL (ref 1.6–2.4)
MCH RBC QN AUTO: 31.1 PG (ref 26–35)
MCHC RBC AUTO-ENTMCNC: 33.3 G/DL (ref 32–34.5)
MCV RBC AUTO: 93.4 FL (ref 80–99.9)
MONOCYTES NFR BLD: 0.78 K/UL (ref 0.1–0.95)
MONOCYTES NFR BLD: 11 % (ref 2–12)
NEUTROPHILS NFR BLD: 37 % (ref 43–80)
NEUTS SEG NFR BLD: 2.54 K/UL (ref 1.8–7.3)
NON-GYN CYTOLOGY REPORT: NORMAL
PHOSPHATE SERPL-MCNC: 3.4 MG/DL (ref 2.5–4.5)
PLATELET # BLD AUTO: 429 K/UL (ref 130–450)
PMV BLD AUTO: 11.2 FL (ref 7–12)
POTASSIUM SERPL-SCNC: 4.4 MMOL/L (ref 3.5–5.1)
RBC # BLD AUTO: 3.05 M/UL (ref 3.5–5.5)
SODIUM SERPL-SCNC: 140 MMOL/L (ref 136–145)
SPECIMEN SOURCE: NORMAL
WBC OTHER # BLD: 6.9 K/UL (ref 4.5–11.5)

## 2025-05-29 PROCEDURE — 6370000000 HC RX 637 (ALT 250 FOR IP): Performed by: INTERNAL MEDICINE

## 2025-05-29 PROCEDURE — 6360000002 HC RX W HCPCS: Performed by: RADIOLOGY

## 2025-05-29 PROCEDURE — 6370000000 HC RX 637 (ALT 250 FOR IP)

## 2025-05-29 PROCEDURE — 2500000003 HC RX 250 WO HCPCS

## 2025-05-29 PROCEDURE — 83735 ASSAY OF MAGNESIUM: CPT

## 2025-05-29 PROCEDURE — 84100 ASSAY OF PHOSPHORUS: CPT

## 2025-05-29 PROCEDURE — 2060000000 HC ICU INTERMEDIATE R&B

## 2025-05-29 PROCEDURE — 93010 ELECTROCARDIOGRAM REPORT: CPT | Performed by: INTERNAL MEDICINE

## 2025-05-29 PROCEDURE — 76937 US GUIDE VASCULAR ACCESS: CPT

## 2025-05-29 PROCEDURE — 99221 1ST HOSP IP/OBS SF/LOW 40: CPT | Performed by: PSYCHIATRY & NEUROLOGY

## 2025-05-29 PROCEDURE — 87077 CULTURE AEROBIC IDENTIFY: CPT

## 2025-05-29 PROCEDURE — 2500000003 HC RX 250 WO HCPCS: Performed by: INTERNAL MEDICINE

## 2025-05-29 PROCEDURE — 99232 SBSQ HOSP IP/OBS MODERATE 35: CPT | Performed by: NURSE PRACTITIONER

## 2025-05-29 PROCEDURE — 2580000003 HC RX 258

## 2025-05-29 PROCEDURE — 85025 COMPLETE CBC W/AUTO DIFF WBC: CPT

## 2025-05-29 PROCEDURE — 6360000002 HC RX W HCPCS

## 2025-05-29 PROCEDURE — 36556 INSERT NON-TUNNEL CV CATH: CPT

## 2025-05-29 PROCEDURE — 05HY33Z INSERTION OF INFUSION DEVICE INTO UPPER VEIN, PERCUTANEOUS APPROACH: ICD-10-PCS | Performed by: RADIOLOGY

## 2025-05-29 PROCEDURE — C1894 INTRO/SHEATH, NON-LASER: HCPCS

## 2025-05-29 PROCEDURE — 6370000000 HC RX 637 (ALT 250 FOR IP): Performed by: NURSE PRACTITIONER

## 2025-05-29 PROCEDURE — 82962 GLUCOSE BLOOD TEST: CPT

## 2025-05-29 PROCEDURE — 80048 BASIC METABOLIC PNL TOTAL CA: CPT

## 2025-05-29 PROCEDURE — 87086 URINE CULTURE/COLONY COUNT: CPT

## 2025-05-29 PROCEDURE — 77001 FLUOROGUIDE FOR VEIN DEVICE: CPT

## 2025-05-29 RX ORDER — LORAZEPAM 2 MG/ML
1 INJECTION INTRAMUSCULAR EVERY 6 HOURS PRN
Status: DISCONTINUED | OUTPATIENT
Start: 2025-05-29 | End: 2025-05-30

## 2025-05-29 RX ORDER — 0.9 % SODIUM CHLORIDE 0.9 %
1000 INTRAVENOUS SOLUTION INTRAVENOUS ONCE
Status: DISCONTINUED | OUTPATIENT
Start: 2025-05-29 | End: 2025-05-30

## 2025-05-29 RX ORDER — LIDOCAINE HYDROCHLORIDE 20 MG/ML
INJECTION, SOLUTION INFILTRATION; PERINEURAL PRN
Status: COMPLETED | OUTPATIENT
Start: 2025-05-29 | End: 2025-05-29

## 2025-05-29 RX ADMIN — PROPRANOLOL HYDROCHLORIDE 10 MG: 10 TABLET ORAL at 17:20

## 2025-05-29 RX ADMIN — SODIUM CHLORIDE, PRESERVATIVE FREE 10 ML: 5 INJECTION INTRAVENOUS at 10:01

## 2025-05-29 RX ADMIN — SODIUM CHLORIDE, PRESERVATIVE FREE 10 ML: 5 INJECTION INTRAVENOUS at 04:20

## 2025-05-29 RX ADMIN — DIPHENHYDRAMINE HYDROCHLORIDE AND ZINC ACETATE: 10; 1 CREAM TOPICAL at 17:51

## 2025-05-29 RX ADMIN — ACETAMINOPHEN 650 MG: 325 TABLET ORAL at 10:00

## 2025-05-29 RX ADMIN — VALPROATE SODIUM 250 MG: 100 INJECTION, SOLUTION INTRAVENOUS at 23:27

## 2025-05-29 RX ADMIN — PROPRANOLOL HYDROCHLORIDE 10 MG: 10 TABLET ORAL at 21:20

## 2025-05-29 RX ADMIN — METHOCARBAMOL 500 MG: 500 TABLET ORAL at 09:59

## 2025-05-29 RX ADMIN — LIDOCAINE HYDROCHLORIDE 10 ML: 20 INJECTION, SOLUTION INFILTRATION; PERINEURAL at 12:07

## 2025-05-29 RX ADMIN — HYDROCORTISONE: 1 CREAM TOPICAL at 10:01

## 2025-05-29 RX ADMIN — PROPRANOLOL HYDROCHLORIDE 10 MG: 10 TABLET ORAL at 10:00

## 2025-05-29 RX ADMIN — SODIUM CHLORIDE, PRESERVATIVE FREE 40 MG: 5 INJECTION INTRAVENOUS at 09:59

## 2025-05-29 RX ADMIN — HYDROCORTISONE: 1 CREAM TOPICAL at 21:22

## 2025-05-29 RX ADMIN — ACETAMINOPHEN 650 MG: 325 TABLET ORAL at 17:20

## 2025-05-29 RX ADMIN — VALPROATE SODIUM 250 MG: 100 INJECTION, SOLUTION INTRAVENOUS at 10:22

## 2025-05-29 RX ADMIN — VALPROATE SODIUM 250 MG: 100 INJECTION, SOLUTION INTRAVENOUS at 00:51

## 2025-05-29 RX ADMIN — Medication 400 MG: at 10:00

## 2025-05-29 RX ADMIN — THIAMINE HYDROCHLORIDE 100 MG: 100 INJECTION, SOLUTION INTRAMUSCULAR; INTRAVENOUS at 09:59

## 2025-05-29 RX ADMIN — ACETAMINOPHEN 650 MG: 325 TABLET ORAL at 21:19

## 2025-05-29 RX ADMIN — HYDROCODONE BITARTRATE AND ACETAMINOPHEN 1 TABLET: 7.5; 325 TABLET ORAL at 17:32

## 2025-05-29 RX ADMIN — SODIUM CHLORIDE, PRESERVATIVE FREE 10 ML: 5 INJECTION INTRAVENOUS at 21:22

## 2025-05-29 RX ADMIN — FOLIC ACID 1 MG: 1 TABLET ORAL at 10:00

## 2025-05-29 RX ADMIN — SODIUM CHLORIDE, PRESERVATIVE FREE 10 ML: 5 INJECTION INTRAVENOUS at 20:04

## 2025-05-29 RX ADMIN — Medication 400 MG: at 19:50

## 2025-05-29 ASSESSMENT — PAIN SCALES - WONG BAKER: WONGBAKER_NUMERICALRESPONSE: NO HURT

## 2025-05-29 ASSESSMENT — PAIN SCALES - GENERAL: PAINLEVEL_OUTOF10: 0

## 2025-05-29 NOTE — CONSULTS
Inpatient Cardiology Consultation      Reason for Consult:  Frequent PVC and PAC on EKG, mitral regurgitation, lightheadedness, chest pain     Consulting Physician: Dr. Martel    Requesting Physician:  Dr. Leal    Date of Consultation: 5/15/2025    HISTORY OF PRESENT ILLNESS: 64 y.o. AA female, known to [facility] cardiology as inpatient, last seen by Dr. Abebe on 12/2/2023, on consult for syncope, recurrent falls, prolonged QT, and history of ETOH use, recurrent episodes of syncope thought to be most likely secondary to alcohol intoxication plus sedation from amitriptyline use, no further episodes of syncope, and even had low BP while hospitalized. Hypokalemia corrected. Amitriptyline was stopped, repeat EKG showed improved QTc interval, last QTc 492 ms. ETOH abuse and related issues management as per primary service, patient was strongly encouraged to quit alcohol use. . ECHO noted normal LV and RV function, mild to moderate AI mild MR RVSP 32. Continue compression stockings to bilateral legs due to orthostatic hypotension, and continue Midodrine 2.5 mg PO TID due to low BP and orthostatic hypotension.    5/14/2025: Presented to the ED for evaluation rash with itchiness, dizziness/lightheadedness, and brief episodes of chest pain.  Reportedly had very brief episode of chest pain x 1, lasting several seconds, then resolved after taking aspirin.  She reports 1 week history of rash, associated with itching, described as \"spots all over\", affecting patient's leg, arm, and face, had trialed oral steroids, with no significant improvement.  Additionally, patient reported dizziness/lightheadedness, that is worse with standing, which patient attributed to 2 days of not eating due to loss of appetite, with associated shortness of breath with exertion.    In the ED, patient had syncopal episode, with several beats of nonsustained V. tach, lasting approximately 5/10 seconds that spontaneously resolved, as well as a run of 
  Consulted for triple-lumen catheter placement.  IV team reportedly unable to place PICC line due to patient uncooperativeness.  Patient is currently without access and on CIWA protocol.  Per nursing staff, patient requiring multiple people to restrain for medical treatments.  Patient is to go to IR for LP.  Will defer to IR for central venous catheter placement due to patient requiring sedation for line placement.  
Chart reviewed. History obtained from the nursing staff. Patient is significantly altered and uncooperative for examination. Curled up in fetal position and not able to participate. Apparently walked into the hospital about 11-12 days ago with V-tach and got coded. Received Phenobarb for acute ETOH withdrawal. Today received two doses of Ativan for agitation. Positive for Rhino virus about a month ago. Blood work up in the past shows significant Eosinophilia and currently has Lymphocytosis. MRI brain without contrast shows significant mastoiditis and mild sinus disease. EKG with prolonged Qt/QTc and followed by cardiology.    Impression:  AMS, uncertain etiology. Doubt ETOH withdrawal after this many days. Unable to find BAL in the lab work here. UDS positive for opiates and cannabinoids    Rec:  - Lumbar puncture  - Repeat MRI brain with contrast  - EEG if available  
[facility] [place] [place] Electrophysiology  Consultation Report  PATIENT: Diane Stuart  MEDICAL RECORD NUMBER: [id number]  DATE OF SERVICE:  5/15/2025  ATTENDING ELECTROPHYSIOLOGIST: Mylene Jay MD  PRIMARY ELECTROPHYSIOLOGIST: Mylene Jay MD  REFERRING PHYSICIAN: No ref. provider found and Hanh Chapman MD  CHIEF COMPLAINT: Chest pain, diaphoresis, lightheadedness    HPI: This is a 64 y.o. female with a history of osteoarthritis, status post L3-L4 and L4-L5 intervertebral, kyphoplasty of L1 in 2019 and has been on pain medications/Norco as well as Neurontin in the past.  The patient says that she has not been under the care of a primary care physician for many years until she went to see Dr. Chapman on 5/7/2025.  She denies any cardiology evaluations in the past.  Review of her records details history of coronary artery disease , orthostatic hypotension, hypertension which the patient denies.  On further review of her records it also appears that the patient was hospitalized from 12/1/2023 to 12/7/2023 for syncope and collapse which was felt to be secondary to alcohol intoxication plus amitriptyline use.  Cardiology was consulted at the time and the amitriptyline that she was previously taking for depression was stopped.  She was then started on Midodrine 2.5 mg 3 times daily and the dizziness had improved with no further syncopal episodes.  There was also mention of a TIA in 2021 with no residual neurological deficits.  She is on no medications for the same.  She has a history of excessive alcohol use with currently drinks about 4 drinks every other weekend.  Ongoing smoking habit-5 to 10 cigarettes a day.  She also admits to marijuana use regularly in the recent past.  On extensive review of all her admissions in the hospital since 2019, it appears that the patient has had multiple hospitalizations for falls and syncopal episodes which have been felt to be secondary to 
Reason for consult: Auditory visual hallucinations    64-year-old female with unknown psychiatric history, alcohol dependence initially presented on May 14 for lightheadedness and rash.  She has a history of TIA in 2021, prolonged QTc.  Patient has had an extended hospital course where she had to go to the ICU at one point for V. Tach and concerns for torsades as well as complications with alcohol withdrawal.  Patient is now stepped down to the floors.  She is still receiving as needed oxycodone as well as Ativan.  Psychiatry was consulted due to patient having hallucinations still with altered mental status.  MRI was done and was within normal limits.  Neurology was also on board and made some recommendations.    I went to see the patient today she had her sister at bedside who gave most of the history.  Patient was laying in bed and turned to the side rolled up in the bed.  She appeared confused.  She was largely unable to answer any of her questions or participate in the evaluationb.  She did appear slightly internally preoccupied.  Her sister told us that she normally lives alone and is able to take care of herself, she even drove here by herself and her cars in the driving lot and she has deteriorated significantly since being in the hospital.  Sister also said that she has seen her grabbing at the air however she has not was not doing that today.  Patient was unable to answer any of her questions she was alert but not oriented she was not even able to answer the orientation questions.  Sister did report that she does not have a psychiatric history has never been on psychotropic medications or hospitalizations however she does have a history of alcohol dependence and would usually drink every day.        Mental Status Examination:      Appearance: discheveled, thin  Behavior: uncooperative, poor eye contact  Mood: Unable to obtain secondary to clinical status  Affect: blunted   Thought process: disorganized 
SURGICAL INTENSIVE CARE  CONSULT NOTE      Date of admission:  5/14/2025  Reason for ICU transfer:      Physician Consulted: Dr. Phillips  Reason for Consult: Manage possible DT versus withdrawal symptoms from chronic alcohol abuse, central line placement    SUBJECTIVE:  Diane Stuart is a 64 y.o. female who presents to the CVICU for syncope, polymorphic VT and prolonged QT as well as withdrawal from alchohol. Patient having torsades this admission, but also very agitated and hard to redirect. Patient had critically low Mg but kept removing her PIV's. Our service was consulted for venous access and management of DT vs etoh withdrawal, as she was on CIWA protocol and had not been able to meet goals.     Past Medical History:   Diagnosis Date    Alcohol abuse 12/06/2023    Asthma     CAD (coronary artery disease)     Closed fracture of proximal end of left humerus with routine healing 06/11/2019    Degeneration of lumbar or lumbosacral intervertebral disc     Fall against sharp object 1960    chest tube in hospital    Gastroesophageal reflux disease 5/7/2025    History of blood transfusion 1997    after vaginal delivery of baby hemmorhage    Hypertension     Hypokalemia     Insomnia     Kidney stone     Nondisplaced fracture of greater tuberosity of right humerus, initial encounter for closed fracture 01/22/2019    Orthostatic hypotension     Severe back pain 02/03/2014    TIA (transient ischemic attack) 04/13/2021       Past Surgical History:   Procedure Laterality Date    ANKLE FRACTURE SURGERY Left 01/21/2023    LEFT ANKLE OPEN REDUCTION INTERNAL FIXATION performed by [facility]o Conde [place] [facility]    ANKLE SURGERY Left 08/17/2023    LEFT ANKLE HARDWARE REMOVAL performed by Memo Conde MD at [facility]    BUNIONECTOMY      Left foot    CARDIAC PROCEDURE N/A 5/15/2025    Left heart cath / coronary angiography performed by Brenton Martel DO at [facility]    CHEST TUBE INSERTION  1990    
(DILAUDID) injection 0.25 mg, Q4H PRN  PHENobarbital tablet 97.2 mg, BID   Or  PHENobarbital (LUMINAL) injection 97.2 mg, BID  0.9 % sodium chloride infusion, Continuous  [Held by provider] propranolol (INDERAL) tablet 10 mg, TID  thiamine (B-1) injection 250 mg, Daily  [START ON 5/25/2025] thiamine (B-1) injection 100 mg, Daily  pantoprazole (PROTONIX) 40 mg in sodium chloride (PF) 0.9 % 10 mL injection, Daily  sodium chloride flush 0.9 % injection 5-40 mL, 2 times per day  sodium chloride flush 0.9 % injection 5-40 mL, PRN  0.9 % sodium chloride infusion, PRN  lidocaine PF 1 % injection 50 mg, Once  sulfur hexafluoride microspheres (LUMASON) 60.7-25 MG injection 2 mL, ONCE PRN  hydrocortisone 1 % cream, BID  sulfur hexafluoride microspheres (LUMASON) 60.7-25 MG injection 2 mL, ONCE PRN  sulfur hexafluoride microspheres (LUMASON) 60.7-25 MG injection 2 mL, ONCE PRN  acetaminophen (TYLENOL) tablet 650 mg, Q4H PRN  ipratropium 0.5 mg-albuterol 2.5 mg (DUONEB) nebulizer solution 1 Dose, Q4H PRN  sodium chloride flush 0.9 % injection 5-40 mL, 2 times per day  sodium chloride flush 0.9 % injection 5-40 mL, PRN  0.9 % sodium chloride infusion, PRN  enoxaparin Sodium (LOVENOX) injection 30 mg, Daily  polyethylene glycol (GLYCOLAX) packet 17 g, Daily PRN  acetaminophen (TYLENOL) tablet 650 mg, Q6H PRN   Or  acetaminophen (TYLENOL) suppository 650 mg, Q6H PRN  prochlorperazine (COMPAZINE) tablet 5 mg, Q6H PRN  folic acid (FOLVITE) tablet 1 mg, Daily  [Held by provider] aspirin EC tablet 81 mg, Daily  [Held by provider] HYDROcodone-acetaminophen (NORCO) 7.5-325 MG per tablet 1 tablet, Q8H PRN         Physical Exam:  Patient Vitals for the past 24 hrs:   BP Temp Temp src Pulse Resp SpO2 Weight   05/21/25 1500 (!) 143/97 -- -- 58 17 96 % --   05/21/25 1400 (!) 120/93 -- -- 68 16 96 % --   05/21/25 1355 (!) 150/97 -- -- 65 -- -- --   05/21/25 1300 112/73 -- -- 61 13 97 % --   05/21/25 1235 -- -- -- 63 14 96 % --   05/21/25 1200

## 2025-05-29 NOTE — CARE COORDINATION
Reviewed chart, spoke with sister Radha in room and daughter Emily speaker phone, explained to them that [facility] is not an option, [facility] declined. Daughter Emily will be coming to visit later today. Discharge plan TBD. MRI brain, LP, and Cxray all negative. Pt laying in bed, restless. PT 8/24 OT 8/24. Discharge plan TBD.Beatriz Galdamez, MSW, LSW

## 2025-05-29 NOTE — PROCEDURES
PROCEDURE NOTE  Date: 5/29/2025   Name: Diane Stuart  YOB: 1960    Procedures: PICC placement    Spoke with bedside RN, IR will send for patient when schedule allows.

## 2025-05-29 NOTE — BRIEF OP NOTE
Brief Postoperative Note      Patient: Diane Stuart  YOB: 1960  MRN: [id number]    Date of Procedure: 5/29/2025    Alcohol withdrawal    Post-Op Diagnosis: Same       RUE PICC placement    Surgeon(s):  Nathaniel Oneill MD      Anesthesia: local    Estimated Blood Loss (mL): Minimal    Complications: None       Other Findings: RUE PICC placement. Ok to use.     Electronically signed by Nathaniel Oneill MD on 5/29/2025 at 4:18 PM

## 2025-05-29 NOTE — PROCEDURES
1115Patient arrived from floor for tunneled PICC insertion.  Chart/vs/medications reviewed.  PIV flushed. VSS    1120Dr Nino at bedside to review procedure and obtain consent from pts daughter via phone d/t pt confusion.

## 2025-05-30 LAB
ALBUMIN SERPL-MCNC: 3.2 G/DL (ref 3.5–5.2)
ALP SERPL-CCNC: 73 U/L (ref 35–104)
ALT SERPL-CCNC: 10 U/L (ref 0–35)
ANION GAP SERPL CALCULATED.3IONS-SCNC: 10 MMOL/L (ref 7–16)
AST SERPL-CCNC: 23 U/L (ref 0–35)
BASOPHILS # BLD: 0.11 K/UL (ref 0–0.2)
BASOPHILS NFR BLD: 2 % (ref 0–2)
BILIRUB DIRECT SERPL-MCNC: 0.2 MG/DL (ref 0–0.2)
BILIRUB INDIRECT SERPL-MCNC: 0.1 MG/DL (ref 0–1)
BILIRUB SERPL-MCNC: 0.3 MG/DL (ref 0–1.2)
BUN SERPL-MCNC: 6 MG/DL (ref 8–23)
CALCIUM SERPL-MCNC: 9 MG/DL (ref 8.8–10.2)
CHLORIDE SERPL-SCNC: 101 MMOL/L (ref 98–107)
CO2 SERPL-SCNC: 27 MMOL/L (ref 22–29)
CREAT SERPL-MCNC: 0.6 MG/DL (ref 0.5–1)
EOSINOPHIL # BLD: 0.07 K/UL (ref 0.05–0.5)
EOSINOPHILS RELATIVE PERCENT: 1 % (ref 0–6)
ERYTHROCYTE [DISTWIDTH] IN BLOOD BY AUTOMATED COUNT: 13.9 % (ref 11.5–15)
GFR, ESTIMATED: >90 ML/MIN/1.73M2
GLUCOSE BLD-MCNC: 74 MG/DL (ref 74–99)
GLUCOSE BLD-MCNC: 90 MG/DL (ref 74–99)
GLUCOSE SERPL-MCNC: 74 MG/DL (ref 74–99)
HCT VFR BLD AUTO: 28.4 % (ref 34–48)
HGB BLD-MCNC: 9.7 G/DL (ref 11.5–15.5)
IMM GRANULOCYTES # BLD AUTO: 0.04 K/UL (ref 0–0.58)
IMM GRANULOCYTES NFR BLD: 1 % (ref 0–5)
LYMPHOCYTES NFR BLD: 2.87 K/UL (ref 1.5–4)
LYMPHOCYTES RELATIVE PERCENT: 41 % (ref 20–42)
MAGNESIUM SERPL-MCNC: 1.4 MG/DL (ref 1.6–2.4)
MCH RBC QN AUTO: 31.9 PG (ref 26–35)
MCHC RBC AUTO-ENTMCNC: 34.2 G/DL (ref 32–34.5)
MCV RBC AUTO: 93.4 FL (ref 80–99.9)
MONOCYTES NFR BLD: 0.93 K/UL (ref 0.1–0.95)
MONOCYTES NFR BLD: 13 % (ref 2–12)
NEUTROPHILS NFR BLD: 42 % (ref 43–80)
NEUTS SEG NFR BLD: 2.94 K/UL (ref 1.8–7.3)
PHOSPHATE SERPL-MCNC: 3.3 MG/DL (ref 2.5–4.5)
PLATELET # BLD AUTO: 440 K/UL (ref 130–450)
PMV BLD AUTO: 11.3 FL (ref 7–12)
POTASSIUM SERPL-SCNC: 4.2 MMOL/L (ref 3.5–5.1)
PROT SERPL-MCNC: 5.8 G/DL (ref 6.4–8.3)
RBC # BLD AUTO: 3.04 M/UL (ref 3.5–5.5)
RBC # BLD: ABNORMAL 10*6/UL
SODIUM SERPL-SCNC: 137 MMOL/L (ref 136–145)
WBC OTHER # BLD: 7 K/UL (ref 4.5–11.5)

## 2025-05-30 PROCEDURE — 2060000000 HC ICU INTERMEDIATE R&B

## 2025-05-30 PROCEDURE — 80053 COMPREHEN METABOLIC PANEL: CPT

## 2025-05-30 PROCEDURE — 93005 ELECTROCARDIOGRAM TRACING: CPT | Performed by: NURSE PRACTITIONER

## 2025-05-30 PROCEDURE — 2500000003 HC RX 250 WO HCPCS

## 2025-05-30 PROCEDURE — 83735 ASSAY OF MAGNESIUM: CPT

## 2025-05-30 PROCEDURE — 6360000002 HC RX W HCPCS

## 2025-05-30 PROCEDURE — 85025 COMPLETE CBC W/AUTO DIFF WBC: CPT

## 2025-05-30 PROCEDURE — 2580000003 HC RX 258

## 2025-05-30 PROCEDURE — 99232 SBSQ HOSP IP/OBS MODERATE 35: CPT | Performed by: FAMILY MEDICINE

## 2025-05-30 PROCEDURE — 82248 BILIRUBIN DIRECT: CPT

## 2025-05-30 PROCEDURE — 82962 GLUCOSE BLOOD TEST: CPT

## 2025-05-30 PROCEDURE — 84100 ASSAY OF PHOSPHORUS: CPT

## 2025-05-30 PROCEDURE — 6360000002 HC RX W HCPCS: Performed by: INTERNAL MEDICINE

## 2025-05-30 PROCEDURE — 6370000000 HC RX 637 (ALT 250 FOR IP): Performed by: INTERNAL MEDICINE

## 2025-05-30 PROCEDURE — 6370000000 HC RX 637 (ALT 250 FOR IP): Performed by: NURSE PRACTITIONER

## 2025-05-30 PROCEDURE — 6370000000 HC RX 637 (ALT 250 FOR IP)

## 2025-05-30 PROCEDURE — 2500000003 HC RX 250 WO HCPCS: Performed by: INTERNAL MEDICINE

## 2025-05-30 RX ORDER — NADOLOL 20 MG/1
40 TABLET ORAL DAILY
Status: DISCONTINUED | OUTPATIENT
Start: 2025-05-31 | End: 2025-06-05 | Stop reason: HOSPADM

## 2025-05-30 RX ORDER — MECOBALAMIN 5000 MCG
5 TABLET,DISINTEGRATING ORAL NIGHTLY
Status: DISCONTINUED | OUTPATIENT
Start: 2025-05-30 | End: 2025-06-05 | Stop reason: HOSPADM

## 2025-05-30 RX ORDER — HYDROCODONE BITARTRATE AND ACETAMINOPHEN 5; 325 MG/1; MG/1
1 TABLET ORAL EVERY 8 HOURS
Status: DISCONTINUED | OUTPATIENT
Start: 2025-05-30 | End: 2025-05-31

## 2025-05-30 RX ORDER — 0.9 % SODIUM CHLORIDE 0.9 %
1000 INTRAVENOUS SOLUTION INTRAVENOUS ONCE
Status: COMPLETED | OUTPATIENT
Start: 2025-05-30 | End: 2025-05-30

## 2025-05-30 RX ORDER — MAGNESIUM SULFATE IN WATER 40 MG/ML
2000 INJECTION, SOLUTION INTRAVENOUS ONCE
Status: COMPLETED | OUTPATIENT
Start: 2025-05-30 | End: 2025-05-30

## 2025-05-30 RX ADMIN — VALPROATE SODIUM 250 MG: 100 INJECTION, SOLUTION INTRAVENOUS at 10:53

## 2025-05-30 RX ADMIN — SODIUM CHLORIDE 1000 ML: 0.9 INJECTION, SOLUTION INTRAVENOUS at 17:20

## 2025-05-30 RX ADMIN — PROPRANOLOL HYDROCHLORIDE 10 MG: 10 TABLET ORAL at 14:23

## 2025-05-30 RX ADMIN — FOLIC ACID 1 MG: 1 TABLET ORAL at 09:20

## 2025-05-30 RX ADMIN — HYDROCODONE BITARTRATE AND ACETAMINOPHEN 1 TABLET: 7.5; 325 TABLET ORAL at 01:29

## 2025-05-30 RX ADMIN — ENOXAPARIN SODIUM 30 MG: 100 INJECTION SUBCUTANEOUS at 10:00

## 2025-05-30 RX ADMIN — SODIUM CHLORIDE, PRESERVATIVE FREE 10 ML: 5 INJECTION INTRAVENOUS at 10:05

## 2025-05-30 RX ADMIN — VALPROATE SODIUM 250 MG: 100 INJECTION, SOLUTION INTRAVENOUS at 22:42

## 2025-05-30 RX ADMIN — SODIUM CHLORIDE, PRESERVATIVE FREE 20 MG: 5 INJECTION INTRAVENOUS at 09:20

## 2025-05-30 RX ADMIN — MAGNESIUM SULFATE HEPTAHYDRATE 2000 MG: 40 INJECTION, SOLUTION INTRAVENOUS at 10:46

## 2025-05-30 RX ADMIN — HYDROCODONE BITARTRATE AND ACETAMINOPHEN 1 TABLET: 7.5; 325 TABLET ORAL at 09:26

## 2025-05-30 RX ADMIN — METHOCARBAMOL 500 MG: 500 TABLET ORAL at 09:22

## 2025-05-30 RX ADMIN — PROPRANOLOL HYDROCHLORIDE 10 MG: 10 TABLET ORAL at 09:19

## 2025-05-30 RX ADMIN — HYDROCODONE BITARTRATE AND ACETAMINOPHEN 1 TABLET: 5; 325 TABLET ORAL at 14:25

## 2025-05-30 RX ADMIN — Medication 400 MG: at 09:19

## 2025-05-30 RX ADMIN — HYDROCORTISONE: 1 CREAM TOPICAL at 10:05

## 2025-05-30 RX ADMIN — ACETAMINOPHEN 650 MG: 325 TABLET ORAL at 09:20

## 2025-05-30 RX ADMIN — THIAMINE HYDROCHLORIDE 100 MG: 100 INJECTION, SOLUTION INTRAMUSCULAR; INTRAVENOUS at 09:20

## 2025-05-30 ASSESSMENT — PAIN SCALES - GENERAL: PAINLEVEL_OUTOF10: 8

## 2025-05-30 NOTE — CARE COORDINATION
Reviewed chart, met with daughter Emily, pt lives alone, can not go home and care for self. Daughter interested in SNF. Discussed options for taking pt's with addiction and need for therapy. Daughter is in agreement for any facilities that would look at pt. Referrals to [facility], [facility], and [facility] via care port. Pt not ready. Envelope started. Pt will possibly need an ICD prior to discharge per EP.  11:45am [facility], [facility], [facility] and [facility] have accepted pending, restraint free and sitter free for 24hrs, will need precert. Updated daughter in room of accepting facilities, she will speak to family and give choices at a later time.Beatriz Galdamez, MSW, LSW

## 2025-05-31 LAB
ALBUMIN SERPL-MCNC: 3.2 G/DL (ref 3.5–5.2)
ALP SERPL-CCNC: 73 U/L (ref 35–104)
ALT SERPL-CCNC: 10 U/L (ref 0–35)
ANION GAP SERPL CALCULATED.3IONS-SCNC: 11 MMOL/L (ref 7–16)
AST SERPL-CCNC: 27 U/L (ref 0–35)
BACTERIA URNS QL MICRO: ABNORMAL
BASOPHILS # BLD: 0.12 K/UL (ref 0–0.2)
BASOPHILS NFR BLD: 1 % (ref 0–2)
BILIRUB DIRECT SERPL-MCNC: 0.2 MG/DL (ref 0–0.2)
BILIRUB INDIRECT SERPL-MCNC: 0.1 MG/DL (ref 0–1)
BILIRUB SERPL-MCNC: 0.3 MG/DL (ref 0–1.2)
BILIRUB UR QL STRIP: NEGATIVE
BUN SERPL-MCNC: 3 MG/DL (ref 8–23)
CALCIUM SERPL-MCNC: 9 MG/DL (ref 8.8–10.2)
CATECHOLS PLAS-IMP: NORMAL
CHLORIDE SERPL-SCNC: 103 MMOL/L (ref 98–107)
CLARITY UR: ABNORMAL
CO2 SERPL-SCNC: 25 MMOL/L (ref 22–29)
COLOR UR: YELLOW
CREAT SERPL-MCNC: 0.7 MG/DL (ref 0.5–1)
DOPAMINE SERPL-MCNC: 163 PMOL/L
EOSINOPHIL # BLD: 0.06 K/UL (ref 0.05–0.5)
EOSINOPHILS RELATIVE PERCENT: 1 % (ref 0–6)
EPI CELLS #/AREA URNS HPF: ABNORMAL /HPF
EPINEPHRINE PLASMA: 232 PMOL/L
ERYTHROCYTE [DISTWIDTH] IN BLOOD BY AUTOMATED COUNT: 14 % (ref 11.5–15)
FERRITIN SERPL-MCNC: 790 NG/ML
GFR, ESTIMATED: >90 ML/MIN/1.73M2
GLUCOSE BLD-MCNC: 112 MG/DL (ref 74–99)
GLUCOSE BLD-MCNC: 158 MG/DL (ref 74–99)
GLUCOSE BLD-MCNC: 77 MG/DL (ref 74–99)
GLUCOSE SERPL-MCNC: 108 MG/DL (ref 74–99)
GLUCOSE UR STRIP-MCNC: NEGATIVE MG/DL
HAPTOGLOB SERPL-MCNC: 29 MG/DL (ref 30–200)
HCT VFR BLD AUTO: 28.1 % (ref 34–48)
HGB BLD-MCNC: 9.4 G/DL (ref 11.5–15.5)
HGB UR QL STRIP.AUTO: ABNORMAL
IMM GRANULOCYTES # BLD AUTO: 0.04 K/UL (ref 0–0.58)
IMM GRANULOCYTES NFR BLD: 0 % (ref 0–5)
IMM RETICS NFR: 21.8 % (ref 3–15.9)
IRON SATN MFR SERPL: 29 % (ref 15–50)
IRON SERPL-MCNC: 53 UG/DL (ref 37–145)
KETONES UR STRIP-MCNC: NEGATIVE MG/DL
LDH SERPL-CCNC: 375 U/L (ref 135–214)
LEUKOCYTE ESTERASE UR QL STRIP: ABNORMAL
LYMPHOCYTES NFR BLD: 3.16 K/UL (ref 1.5–4)
LYMPHOCYTES RELATIVE PERCENT: 35 % (ref 20–42)
MAGNESIUM SERPL-MCNC: 1.6 MG/DL (ref 1.6–2.4)
MCH RBC QN AUTO: 31.4 PG (ref 26–35)
MCHC RBC AUTO-ENTMCNC: 33.5 G/DL (ref 32–34.5)
MCV RBC AUTO: 94 FL (ref 80–99.9)
MONOCYTES NFR BLD: 1.4 K/UL (ref 0.1–0.95)
MONOCYTES NFR BLD: 16 % (ref 2–12)
NEUTROPHILS NFR BLD: 47 % (ref 43–80)
NEUTS SEG NFR BLD: 4.23 K/UL (ref 1.8–7.3)
NITRITE UR QL STRIP: NEGATIVE
NOREPINEPHRINE: 2893 PMOL/L (ref 1050–4800)
PH UR STRIP: 7.5 [PH] (ref 5–8)
PHOSPHATE SERPL-MCNC: 2.8 MG/DL (ref 2.5–4.5)
PLATELET # BLD AUTO: 418 K/UL (ref 130–450)
PMV BLD AUTO: 11.1 FL (ref 7–12)
POTASSIUM SERPL-SCNC: 4.1 MMOL/L (ref 3.5–5.1)
PROT SERPL-MCNC: 5.8 G/DL (ref 6.4–8.3)
PROT UR STRIP-MCNC: 30 MG/DL
PYRIDOXAL PHOS SERPL-SCNC: 10 NMOL/L (ref 20–125)
RBC # BLD AUTO: 2.99 M/UL (ref 3.5–5.5)
RBC #/AREA URNS HPF: ABNORMAL /HPF
RETIC HEMOGLOBIN: 35.8 PG (ref 28.2–36.6)
RETICS # AUTO: 0.07 M/UL
RETICS/RBC NFR AUTO: 2.4 % (ref 0.4–1.9)
SODIUM SERPL-SCNC: 139 MMOL/L (ref 136–145)
SP GR UR STRIP: 1.01 (ref 1–1.03)
TIBC SERPL-MCNC: 181 UG/DL (ref 250–450)
TSH SERPL DL<=0.05 MIU/L-ACNC: 2.44 UIU/ML (ref 0.27–4.2)
UROBILINOGEN UR STRIP-ACNC: 1 EU/DL (ref 0–1)
WBC #/AREA URNS HPF: ABNORMAL /HPF
WBC OTHER # BLD: 9 K/UL (ref 4.5–11.5)

## 2025-05-31 PROCEDURE — 82248 BILIRUBIN DIRECT: CPT

## 2025-05-31 PROCEDURE — 2580000003 HC RX 258

## 2025-05-31 PROCEDURE — 82728 ASSAY OF FERRITIN: CPT

## 2025-05-31 PROCEDURE — 84443 ASSAY THYROID STIM HORMONE: CPT

## 2025-05-31 PROCEDURE — 6370000000 HC RX 637 (ALT 250 FOR IP)

## 2025-05-31 PROCEDURE — 99232 SBSQ HOSP IP/OBS MODERATE 35: CPT | Performed by: NURSE PRACTITIONER

## 2025-05-31 PROCEDURE — 2500000003 HC RX 250 WO HCPCS

## 2025-05-31 PROCEDURE — 2500000003 HC RX 250 WO HCPCS: Performed by: INTERNAL MEDICINE

## 2025-05-31 PROCEDURE — 6370000000 HC RX 637 (ALT 250 FOR IP): Performed by: INTERNAL MEDICINE

## 2025-05-31 PROCEDURE — 6360000002 HC RX W HCPCS

## 2025-05-31 PROCEDURE — 83540 ASSAY OF IRON: CPT

## 2025-05-31 PROCEDURE — 83615 LACTATE (LD) (LDH) ENZYME: CPT

## 2025-05-31 PROCEDURE — 80053 COMPREHEN METABOLIC PANEL: CPT

## 2025-05-31 PROCEDURE — 93005 ELECTROCARDIOGRAM TRACING: CPT | Performed by: NURSE PRACTITIONER

## 2025-05-31 PROCEDURE — 6370000000 HC RX 637 (ALT 250 FOR IP): Performed by: NURSE PRACTITIONER

## 2025-05-31 PROCEDURE — 99232 SBSQ HOSP IP/OBS MODERATE 35: CPT | Performed by: FAMILY MEDICINE

## 2025-05-31 PROCEDURE — 83550 IRON BINDING TEST: CPT

## 2025-05-31 PROCEDURE — 6370000000 HC RX 637 (ALT 250 FOR IP): Performed by: STUDENT IN AN ORGANIZED HEALTH CARE EDUCATION/TRAINING PROGRAM

## 2025-05-31 PROCEDURE — 82962 GLUCOSE BLOOD TEST: CPT

## 2025-05-31 PROCEDURE — 85045 AUTOMATED RETICULOCYTE COUNT: CPT

## 2025-05-31 PROCEDURE — 85025 COMPLETE CBC W/AUTO DIFF WBC: CPT

## 2025-05-31 PROCEDURE — 83010 ASSAY OF HAPTOGLOBIN QUANT: CPT

## 2025-05-31 PROCEDURE — 2060000000 HC ICU INTERMEDIATE R&B

## 2025-05-31 PROCEDURE — 83735 ASSAY OF MAGNESIUM: CPT

## 2025-05-31 PROCEDURE — 84100 ASSAY OF PHOSPHORUS: CPT

## 2025-05-31 RX ORDER — NICOTINE 21 MG/24HR
1 PATCH, TRANSDERMAL 24 HOURS TRANSDERMAL DAILY
Status: DISCONTINUED | OUTPATIENT
Start: 2025-05-31 | End: 2025-06-05 | Stop reason: HOSPADM

## 2025-05-31 RX ORDER — HYDROCODONE BITARTRATE AND ACETAMINOPHEN 5; 325 MG/1; MG/1
1 TABLET ORAL EVERY 8 HOURS
Status: DISCONTINUED | OUTPATIENT
Start: 2025-05-31 | End: 2025-06-04

## 2025-05-31 RX ORDER — TRIAMCINOLONE ACETONIDE 1 MG/G
CREAM TOPICAL 2 TIMES DAILY
Status: DISCONTINUED | OUTPATIENT
Start: 2025-05-31 | End: 2025-06-05 | Stop reason: HOSPADM

## 2025-05-31 RX ADMIN — SODIUM CHLORIDE, PRESERVATIVE FREE 10 ML: 5 INJECTION INTRAVENOUS at 06:21

## 2025-05-31 RX ADMIN — SODIUM CHLORIDE, PRESERVATIVE FREE 10 ML: 5 INJECTION INTRAVENOUS at 10:57

## 2025-05-31 RX ADMIN — HYDROCORTISONE: 1 CREAM TOPICAL at 22:19

## 2025-05-31 RX ADMIN — VALPROATE SODIUM 250 MG: 100 INJECTION, SOLUTION INTRAVENOUS at 22:19

## 2025-05-31 RX ADMIN — Medication 400 MG: at 10:56

## 2025-05-31 RX ADMIN — NADOLOL 40 MG: 20 TABLET ORAL at 10:56

## 2025-05-31 RX ADMIN — SODIUM CHLORIDE, PRESERVATIVE FREE 10 ML: 5 INJECTION INTRAVENOUS at 22:20

## 2025-05-31 RX ADMIN — FOLIC ACID 1 MG: 1 TABLET ORAL at 10:56

## 2025-05-31 RX ADMIN — HYDROCODONE BITARTRATE AND ACETAMINOPHEN 1 TABLET: 5; 325 TABLET ORAL at 11:54

## 2025-05-31 RX ADMIN — ASPIRIN 81 MG: 81 TABLET, COATED ORAL at 10:56

## 2025-05-31 RX ADMIN — DEXTROSE MONOHYDRATE 125 ML: 100 INJECTION, SOLUTION INTRAVENOUS at 06:01

## 2025-05-31 RX ADMIN — THIAMINE HYDROCHLORIDE 100 MG: 100 INJECTION, SOLUTION INTRAMUSCULAR; INTRAVENOUS at 10:56

## 2025-05-31 RX ADMIN — VALPROATE SODIUM 250 MG: 100 INJECTION, SOLUTION INTRAVENOUS at 11:35

## 2025-05-31 RX ADMIN — HYDROCODONE BITARTRATE AND ACETAMINOPHEN 1 TABLET: 5; 325 TABLET ORAL at 19:15

## 2025-05-31 RX ADMIN — SODIUM CHLORIDE, PRESERVATIVE FREE 20 MG: 5 INJECTION INTRAVENOUS at 10:56

## 2025-05-31 ASSESSMENT — PAIN DESCRIPTION - ONSET: ONSET: ON-GOING

## 2025-05-31 ASSESSMENT — PAIN DESCRIPTION - PAIN TYPE: TYPE: CHRONIC PAIN

## 2025-05-31 ASSESSMENT — PAIN - FUNCTIONAL ASSESSMENT: PAIN_FUNCTIONAL_ASSESSMENT: ACTIVITIES ARE NOT PREVENTED

## 2025-05-31 ASSESSMENT — PAIN SCALES - GENERAL: PAINLEVEL_OUTOF10: 6

## 2025-05-31 ASSESSMENT — PAIN DESCRIPTION - DESCRIPTORS: DESCRIPTORS: ACHING;DISCOMFORT;SORE

## 2025-05-31 ASSESSMENT — PAIN DESCRIPTION - LOCATION: LOCATION: GENERALIZED

## 2025-05-31 ASSESSMENT — PAIN DESCRIPTION - FREQUENCY: FREQUENCY: CONTINUOUS

## 2025-05-31 NOTE — TELEPHONE ENCOUNTER
Received perfect serve message that patient continues to be agitated and violent while on the floor. Had been asked to order 4 point restraints, however 4 point restraints are not able to be used on the floor and patient would have to be transferred to ICU. Assessed patient on the floor. She appears to be hallucinating, speaking to herself. Made attempts to redirect patient's but was unsuccessful. Attempted to call on-call hospital  to clarify hospital policy regarding strain use. Left voice message as I was unable to get through. Discussed with daughter Emily patient's continued agitation. Emily again reiterated that she did not want the patient to receive any sedating medications at this time. She does not want the patient to receive benzodiazepines. She also expressed that she wanted to talk to Dr. Pierre JOHNSON regarding discontinuing propranolol as she believes the propranolol is the reason for patient's continued agitation.

## 2025-06-01 LAB
ANION GAP SERPL CALCULATED.3IONS-SCNC: 11 MMOL/L (ref 7–16)
BASOPHILS # BLD: 0.09 K/UL (ref 0–0.2)
BASOPHILS NFR BLD: 1 % (ref 0–2)
BUN SERPL-MCNC: 5 MG/DL (ref 6–23)
CALCIUM SERPL-MCNC: 8.9 MG/DL (ref 8.6–10.2)
CHLORIDE SERPL-SCNC: 102 MMOL/L (ref 98–107)
CO2 SERPL-SCNC: 23 MMOL/L (ref 22–29)
CREAT SERPL-MCNC: 0.6 MG/DL (ref 0.5–1)
EOSINOPHIL # BLD: 0.09 K/UL (ref 0.05–0.5)
EOSINOPHILS RELATIVE PERCENT: 1 % (ref 0–6)
ERYTHROCYTE [DISTWIDTH] IN BLOOD BY AUTOMATED COUNT: 14.3 % (ref 11.5–15)
GFR, ESTIMATED: >90 ML/MIN/1.73M2
GLUCOSE BLD-MCNC: 78 MG/DL (ref 74–99)
GLUCOSE BLD-MCNC: 94 MG/DL (ref 74–99)
GLUCOSE SERPL-MCNC: 123 MG/DL (ref 74–99)
HCT VFR BLD AUTO: 28.7 % (ref 34–48)
HGB BLD-MCNC: 9.5 G/DL (ref 11.5–15.5)
LYMPHOCYTES NFR BLD: 3.41 K/UL (ref 1.5–4)
LYMPHOCYTES RELATIVE PERCENT: 33 % (ref 20–42)
MAGNESIUM SERPL-MCNC: 1.7 MG/DL (ref 1.6–2.6)
MCH RBC QN AUTO: 31.4 PG (ref 26–35)
MCHC RBC AUTO-ENTMCNC: 33.1 G/DL (ref 32–34.5)
MCV RBC AUTO: 94.7 FL (ref 80–99.9)
MICROORGANISM SPEC CULT: NO GROWTH
MICROORGANISM SPEC CULT: NORMAL
MICROORGANISM/AGENT SPEC: NORMAL
MONOCYTES NFR BLD: 1.01 K/UL (ref 0.1–0.95)
MONOCYTES NFR BLD: 10 % (ref 2–12)
NEUTROPHILS NFR BLD: 56 % (ref 43–80)
NEUTS SEG NFR BLD: 5.89 K/UL (ref 1.8–7.3)
PHOSPHATE SERPL-MCNC: 2.6 MG/DL (ref 2.5–4.5)
PLATELET # BLD AUTO: 409 K/UL (ref 130–450)
PMV BLD AUTO: 11.2 FL (ref 7–12)
POTASSIUM SERPL-SCNC: 4.9 MMOL/L (ref 3.5–5)
RBC # BLD AUTO: 3.03 M/UL (ref 3.5–5.5)
RBC # BLD: ABNORMAL 10*6/UL
SERVICE CMNT-IMP: NORMAL
SODIUM SERPL-SCNC: 136 MMOL/L (ref 132–146)
SPECIMEN DESCRIPTION: NORMAL
SPECIMEN DESCRIPTION: NORMAL
WBC OTHER # BLD: 10.5 K/UL (ref 4.5–11.5)

## 2025-06-01 PROCEDURE — 83735 ASSAY OF MAGNESIUM: CPT

## 2025-06-01 PROCEDURE — 6370000000 HC RX 637 (ALT 250 FOR IP)

## 2025-06-01 PROCEDURE — 2580000003 HC RX 258

## 2025-06-01 PROCEDURE — 80048 BASIC METABOLIC PNL TOTAL CA: CPT

## 2025-06-01 PROCEDURE — 2500000003 HC RX 250 WO HCPCS

## 2025-06-01 PROCEDURE — 6360000002 HC RX W HCPCS

## 2025-06-01 PROCEDURE — 99232 SBSQ HOSP IP/OBS MODERATE 35: CPT | Performed by: FAMILY MEDICINE

## 2025-06-01 PROCEDURE — 6370000000 HC RX 637 (ALT 250 FOR IP): Performed by: INTERNAL MEDICINE

## 2025-06-01 PROCEDURE — 2060000000 HC ICU INTERMEDIATE R&B

## 2025-06-01 PROCEDURE — 6370000000 HC RX 637 (ALT 250 FOR IP): Performed by: NURSE PRACTITIONER

## 2025-06-01 PROCEDURE — 85025 COMPLETE CBC W/AUTO DIFF WBC: CPT

## 2025-06-01 PROCEDURE — 84100 ASSAY OF PHOSPHORUS: CPT

## 2025-06-01 PROCEDURE — 93005 ELECTROCARDIOGRAM TRACING: CPT | Performed by: NURSE PRACTITIONER

## 2025-06-01 PROCEDURE — 6370000000 HC RX 637 (ALT 250 FOR IP): Performed by: STUDENT IN AN ORGANIZED HEALTH CARE EDUCATION/TRAINING PROGRAM

## 2025-06-01 PROCEDURE — 82962 GLUCOSE BLOOD TEST: CPT

## 2025-06-01 RX ORDER — MAGNESIUM SULFATE IN WATER 40 MG/ML
2000 INJECTION, SOLUTION INTRAVENOUS ONCE
Status: COMPLETED | OUTPATIENT
Start: 2025-06-01 | End: 2025-06-01

## 2025-06-01 RX ORDER — 0.9 % SODIUM CHLORIDE 0.9 %
1000 INTRAVENOUS SOLUTION INTRAVENOUS ONCE
Status: COMPLETED | OUTPATIENT
Start: 2025-06-01 | End: 2025-06-01

## 2025-06-01 RX ORDER — LANOLIN ALCOHOL/MO/W.PET/CERES
50 CREAM (GRAM) TOPICAL DAILY
Status: DISCONTINUED | OUTPATIENT
Start: 2025-06-01 | End: 2025-06-05 | Stop reason: HOSPADM

## 2025-06-01 RX ADMIN — WATER 1000 MG: 1 INJECTION INTRAMUSCULAR; INTRAVENOUS; SUBCUTANEOUS at 10:54

## 2025-06-01 RX ADMIN — SODIUM CHLORIDE 1000 ML: 0.9 INJECTION, SOLUTION INTRAVENOUS at 07:38

## 2025-06-01 RX ADMIN — VALPROATE SODIUM 250 MG: 100 INJECTION, SOLUTION INTRAVENOUS at 09:49

## 2025-06-01 RX ADMIN — HYDROCODONE BITARTRATE AND ACETAMINOPHEN 1 TABLET: 5; 325 TABLET ORAL at 04:06

## 2025-06-01 RX ADMIN — VALPROATE SODIUM 250 MG: 100 INJECTION, SOLUTION INTRAVENOUS at 22:01

## 2025-06-01 RX ADMIN — Medication 5 MG: at 20:42

## 2025-06-01 RX ADMIN — MAGNESIUM SULFATE HEPTAHYDRATE 2000 MG: 40 INJECTION, SOLUTION INTRAVENOUS at 11:00

## 2025-06-01 RX ADMIN — ASPIRIN 81 MG: 81 TABLET, COATED ORAL at 09:32

## 2025-06-01 RX ADMIN — HYDROCODONE BITARTRATE AND ACETAMINOPHEN 1 TABLET: 5; 325 TABLET ORAL at 20:42

## 2025-06-01 RX ADMIN — NADOLOL 40 MG: 20 TABLET ORAL at 09:31

## 2025-06-01 RX ADMIN — SODIUM CHLORIDE, PRESERVATIVE FREE 20 MG: 5 INJECTION INTRAVENOUS at 09:31

## 2025-06-01 RX ADMIN — Medication 400 MG: at 09:37

## 2025-06-01 RX ADMIN — SODIUM CHLORIDE, PRESERVATIVE FREE 10 ML: 5 INJECTION INTRAVENOUS at 09:39

## 2025-06-01 RX ADMIN — HYDROCORTISONE: 1 CREAM TOPICAL at 09:39

## 2025-06-01 RX ADMIN — THIAMINE HYDROCHLORIDE 100 MG: 100 INJECTION, SOLUTION INTRAMUSCULAR; INTRAVENOUS at 09:31

## 2025-06-01 RX ADMIN — Medication 400 MG: at 20:42

## 2025-06-01 RX ADMIN — HYDROCODONE BITARTRATE AND ACETAMINOPHEN 1 TABLET: 5; 325 TABLET ORAL at 12:03

## 2025-06-01 RX ADMIN — PYRIDOXINE HCL TAB 50 MG 50 MG: 50 TAB at 10:55

## 2025-06-01 RX ADMIN — FOLIC ACID 1 MG: 1 TABLET ORAL at 09:37

## 2025-06-01 ASSESSMENT — PAIN SCALES - GENERAL
PAINLEVEL_OUTOF10: 5
PAINLEVEL_OUTOF10: 4
PAINLEVEL_OUTOF10: 6
PAINLEVEL_OUTOF10: 6
PAINLEVEL_OUTOF10: 7

## 2025-06-01 ASSESSMENT — PAIN DESCRIPTION - DESCRIPTORS
DESCRIPTORS: ACHING;DISCOMFORT;SORE
DESCRIPTORS: ACHING;SORE
DESCRIPTORS: ACHING

## 2025-06-01 ASSESSMENT — PAIN DESCRIPTION - LOCATION
LOCATION: BACK
LOCATION: BACK
LOCATION: GENERALIZED

## 2025-06-01 ASSESSMENT — PAIN - FUNCTIONAL ASSESSMENT
PAIN_FUNCTIONAL_ASSESSMENT: ACTIVITIES ARE NOT PREVENTED
PAIN_FUNCTIONAL_ASSESSMENT: PREVENTS OR INTERFERES WITH MANY ACTIVE NOT PASSIVE ACTIVITIES
PAIN_FUNCTIONAL_ASSESSMENT: ACTIVITIES ARE NOT PREVENTED

## 2025-06-01 ASSESSMENT — PAIN DESCRIPTION - FREQUENCY: FREQUENCY: CONTINUOUS

## 2025-06-01 ASSESSMENT — PAIN DESCRIPTION - ORIENTATION: ORIENTATION: LOWER

## 2025-06-01 ASSESSMENT — PAIN DESCRIPTION - ONSET: ONSET: ON-GOING

## 2025-06-02 ENCOUNTER — ANESTHESIA EVENT (OUTPATIENT)
Age: 65
DRG: 277 | End: 2025-06-02
Payer: MEDICARE

## 2025-06-02 LAB
ANION GAP SERPL CALCULATED.3IONS-SCNC: 9 MMOL/L (ref 7–16)
BASOPHILS # BLD: 0.27 K/UL (ref 0–0.2)
BASOPHILS NFR BLD: 3 % (ref 0–2)
BUN SERPL-MCNC: 7 MG/DL (ref 8–23)
CALCIUM SERPL-MCNC: 8.5 MG/DL (ref 8.8–10.2)
CHLORIDE SERPL-SCNC: 105 MMOL/L (ref 98–107)
CO2 SERPL-SCNC: 25 MMOL/L (ref 22–29)
CREAT SERPL-MCNC: 0.5 MG/DL (ref 0.5–1)
EKG ATRIAL RATE: 54 BPM
EKG ATRIAL RATE: 57 BPM
EKG ATRIAL RATE: 61 BPM
EKG ATRIAL RATE: 61 BPM
EKG P AXIS: 63 DEGREES
EKG P AXIS: 63 DEGREES
EKG P AXIS: 64 DEGREES
EKG P AXIS: 78 DEGREES
EKG P-R INTERVAL: 136 MS
EKG P-R INTERVAL: 138 MS
EKG P-R INTERVAL: 140 MS
EKG P-R INTERVAL: 140 MS
EKG Q-T INTERVAL: 470 MS
EKG Q-T INTERVAL: 488 MS
EKG Q-T INTERVAL: 490 MS
EKG Q-T INTERVAL: 492 MS
EKG QRS DURATION: 66 MS
EKG QRS DURATION: 66 MS
EKG QRS DURATION: 68 MS
EKG QRS DURATION: 72 MS
EKG QTC CALCULATION (BAZETT): 466 MS
EKG QTC CALCULATION (BAZETT): 473 MS
EKG QTC CALCULATION (BAZETT): 476 MS
EKG QTC CALCULATION (BAZETT): 491 MS
EKG R AXIS: 25 DEGREES
EKG R AXIS: 28 DEGREES
EKG R AXIS: 40 DEGREES
EKG R AXIS: 58 DEGREES
EKG T AXIS: 48 DEGREES
EKG T AXIS: 54 DEGREES
EKG T AXIS: 59 DEGREES
EKG T AXIS: 66 DEGREES
EKG VENTRICULAR RATE: 54 BPM
EKG VENTRICULAR RATE: 57 BPM
EKG VENTRICULAR RATE: 61 BPM
EKG VENTRICULAR RATE: 61 BPM
EOSINOPHIL # BLD: 0 K/UL (ref 0.05–0.5)
EOSINOPHILS RELATIVE PERCENT: 0 % (ref 0–6)
ERYTHROCYTE [DISTWIDTH] IN BLOOD BY AUTOMATED COUNT: 15.2 % (ref 11.5–15)
GFR, ESTIMATED: >90 ML/MIN/1.73M2
GLUCOSE SERPL-MCNC: 99 MG/DL (ref 74–99)
HCT VFR BLD AUTO: 27.7 % (ref 34–48)
HGB BLD-MCNC: 9.1 G/DL (ref 11.5–15.5)
LYMPHOCYTES NFR BLD: 3.61 K/UL (ref 1.5–4)
LYMPHOCYTES RELATIVE PERCENT: 35 % (ref 20–42)
MAGNESIUM SERPL-MCNC: 1.8 MG/DL (ref 1.6–2.4)
MCH RBC QN AUTO: 31.4 PG (ref 26–35)
MCHC RBC AUTO-ENTMCNC: 32.9 G/DL (ref 32–34.5)
MCV RBC AUTO: 95.5 FL (ref 80–99.9)
MICROORGANISM SPEC CULT: ABNORMAL
MICROORGANISM SPEC CULT: NORMAL
MICROORGANISM SPEC CULT: NORMAL
MONOCYTES NFR BLD: 1.45 K/UL (ref 0.1–0.95)
MONOCYTES NFR BLD: 14 % (ref 2–12)
NEUTROPHILS NFR BLD: 48 % (ref 43–80)
NEUTS SEG NFR BLD: 4.97 K/UL (ref 1.8–7.3)
PHOSPHATE SERPL-MCNC: 3.2 MG/DL (ref 2.5–4.5)
PLATELET # BLD AUTO: 374 K/UL (ref 130–450)
PMV BLD AUTO: 11 FL (ref 7–12)
POTASSIUM SERPL-SCNC: 4.5 MMOL/L (ref 3.5–5.1)
RBC # BLD AUTO: 2.9 M/UL (ref 3.5–5.5)
RBC # BLD: ABNORMAL 10*6/UL
SERVICE CMNT-IMP: ABNORMAL
SERVICE CMNT-IMP: NORMAL
SERVICE CMNT-IMP: NORMAL
SODIUM SERPL-SCNC: 138 MMOL/L (ref 136–145)
SPECIMEN DESCRIPTION: ABNORMAL
SPECIMEN DESCRIPTION: NORMAL
SPECIMEN DESCRIPTION: NORMAL
WBC # BLD: ABNORMAL 10*3/UL
WBC OTHER # BLD: 10.3 K/UL (ref 4.5–11.5)

## 2025-06-02 PROCEDURE — 6370000000 HC RX 637 (ALT 250 FOR IP)

## 2025-06-02 PROCEDURE — 2500000003 HC RX 250 WO HCPCS: Performed by: INTERNAL MEDICINE

## 2025-06-02 PROCEDURE — 2580000003 HC RX 258

## 2025-06-02 PROCEDURE — 6360000002 HC RX W HCPCS

## 2025-06-02 PROCEDURE — 97535 SELF CARE MNGMENT TRAINING: CPT

## 2025-06-02 PROCEDURE — 80048 BASIC METABOLIC PNL TOTAL CA: CPT

## 2025-06-02 PROCEDURE — 99232 SBSQ HOSP IP/OBS MODERATE 35: CPT | Performed by: FAMILY MEDICINE

## 2025-06-02 PROCEDURE — 85025 COMPLETE CBC W/AUTO DIFF WBC: CPT

## 2025-06-02 PROCEDURE — 2500000003 HC RX 250 WO HCPCS

## 2025-06-02 PROCEDURE — 97530 THERAPEUTIC ACTIVITIES: CPT

## 2025-06-02 PROCEDURE — 6370000000 HC RX 637 (ALT 250 FOR IP): Performed by: INTERNAL MEDICINE

## 2025-06-02 PROCEDURE — 83735 ASSAY OF MAGNESIUM: CPT

## 2025-06-02 PROCEDURE — 99233 SBSQ HOSP IP/OBS HIGH 50: CPT | Performed by: STUDENT IN AN ORGANIZED HEALTH CARE EDUCATION/TRAINING PROGRAM

## 2025-06-02 PROCEDURE — 6370000000 HC RX 637 (ALT 250 FOR IP): Performed by: STUDENT IN AN ORGANIZED HEALTH CARE EDUCATION/TRAINING PROGRAM

## 2025-06-02 PROCEDURE — 84100 ASSAY OF PHOSPHORUS: CPT

## 2025-06-02 PROCEDURE — 6360000002 HC RX W HCPCS: Performed by: INTERNAL MEDICINE

## 2025-06-02 PROCEDURE — 6370000000 HC RX 637 (ALT 250 FOR IP): Performed by: NURSE PRACTITIONER

## 2025-06-02 PROCEDURE — 93005 ELECTROCARDIOGRAM TRACING: CPT | Performed by: NURSE PRACTITIONER

## 2025-06-02 PROCEDURE — 2060000000 HC ICU INTERMEDIATE R&B

## 2025-06-02 PROCEDURE — 6370000000 HC RX 637 (ALT 250 FOR IP): Performed by: FAMILY MEDICINE

## 2025-06-02 RX ORDER — DIPHENHYDRAMINE HYDROCHLORIDE, ZINC ACETATE 2; .1 G/100G; G/100G
CREAM TOPICAL 3 TIMES DAILY PRN
Status: DISCONTINUED | OUTPATIENT
Start: 2025-06-02 | End: 2025-06-05 | Stop reason: HOSPADM

## 2025-06-02 RX ADMIN — VALPROATE SODIUM 250 MG: 100 INJECTION, SOLUTION INTRAVENOUS at 21:31

## 2025-06-02 RX ADMIN — VALPROATE SODIUM 250 MG: 100 INJECTION, SOLUTION INTRAVENOUS at 09:54

## 2025-06-02 RX ADMIN — ASPIRIN 81 MG: 81 TABLET, COATED ORAL at 09:26

## 2025-06-02 RX ADMIN — HYDROCODONE BITARTRATE AND ACETAMINOPHEN 1 TABLET: 5; 325 TABLET ORAL at 05:45

## 2025-06-02 RX ADMIN — Medication 5 MG: at 21:24

## 2025-06-02 RX ADMIN — SODIUM CHLORIDE, PRESERVATIVE FREE 10 ML: 5 INJECTION INTRAVENOUS at 21:32

## 2025-06-02 RX ADMIN — THIAMINE HYDROCHLORIDE 100 MG: 100 INJECTION, SOLUTION INTRAMUSCULAR; INTRAVENOUS at 09:25

## 2025-06-02 RX ADMIN — SODIUM CHLORIDE, PRESERVATIVE FREE 10 ML: 5 INJECTION INTRAVENOUS at 21:25

## 2025-06-02 RX ADMIN — Medication 400 MG: at 21:24

## 2025-06-02 RX ADMIN — Medication 400 MG: at 09:25

## 2025-06-02 RX ADMIN — HYDROCORTISONE: 1 CREAM TOPICAL at 21:33

## 2025-06-02 RX ADMIN — SODIUM CHLORIDE, PRESERVATIVE FREE 10 ML: 5 INJECTION INTRAVENOUS at 09:27

## 2025-06-02 RX ADMIN — ENOXAPARIN SODIUM 30 MG: 100 INJECTION SUBCUTANEOUS at 09:43

## 2025-06-02 RX ADMIN — WATER 1000 MG: 1 INJECTION INTRAMUSCULAR; INTRAVENOUS; SUBCUTANEOUS at 09:42

## 2025-06-02 RX ADMIN — HYDROCODONE BITARTRATE AND ACETAMINOPHEN 1 TABLET: 5; 325 TABLET ORAL at 21:24

## 2025-06-02 RX ADMIN — DIPHENHYDRAMINE HYDROCHLORIDE, ZINC ACETATE: 2; .1 CREAM TOPICAL at 21:28

## 2025-06-02 RX ADMIN — SODIUM CHLORIDE, PRESERVATIVE FREE 20 MG: 5 INJECTION INTRAVENOUS at 09:25

## 2025-06-02 RX ADMIN — PETROLATUM: 420 OINTMENT TOPICAL at 21:27

## 2025-06-02 RX ADMIN — FOLIC ACID 1 MG: 1 TABLET ORAL at 09:26

## 2025-06-02 RX ADMIN — HYDROCODONE BITARTRATE AND ACETAMINOPHEN 1 TABLET: 5; 325 TABLET ORAL at 12:27

## 2025-06-02 RX ADMIN — TRIAMCINOLONE ACETONIDE: 1 CREAM TOPICAL at 09:27

## 2025-06-02 RX ADMIN — PYRIDOXINE HCL TAB 50 MG 50 MG: 50 TAB at 09:26

## 2025-06-02 RX ADMIN — TRIAMCINOLONE ACETONIDE: 1 CREAM TOPICAL at 21:28

## 2025-06-02 ASSESSMENT — PAIN DESCRIPTION - DESCRIPTORS
DESCRIPTORS: ACHING;DISCOMFORT
DESCRIPTORS: ACHING

## 2025-06-02 ASSESSMENT — PAIN DESCRIPTION - LOCATION
LOCATION: BACK;GENERALIZED
LOCATION: BACK
LOCATION: BACK

## 2025-06-02 ASSESSMENT — PAIN SCALES - GENERAL
PAINLEVEL_OUTOF10: 8
PAINLEVEL_OUTOF10: 0
PAINLEVEL_OUTOF10: 5
PAINLEVEL_OUTOF10: 6

## 2025-06-02 NOTE — DISCHARGE INSTR - COC
systolic function,  EF of 60% and indeterminate diastolic function. LHC/Coronary angiogram showed LAD and RCA severely tortuous, no significant stenosis, and no intervention required. Patient had a round of Torsades on 5/15 lasting about 30 seconds before reverting to NSR. She also started to have AMS believed to be due to alcohol withdrawal. A phenobarbital taper and ativan was started for withdrawal. She was started on Inderal 10 mg TID for the arrhythmia with recommendations for an ICD before discharge. Neurology was consulted for delirium and agitation. MRI wwo Contrast and a CT Head did not show any acute abnormalities. Psychiatry were consulted, recommended continuing Depakote, decreasing Ativan and opiates and starting melatonin nightly. Neurology and Psychiatry signed off. Daughter requested that patient stop receiving any benzodiazepines or phenobarbital for agitation. She was transitioned from propanolol to nadolol. UA and UC were positive for Klesbiella pneumonia sensitive to Cetfriaxone. She was started on IV antibiotics. Her mentation slowly returned to baseline. An ICD was placed and was found to be functioning appropriately. She was transitioned to po antibiotics. AMPAC was 16/24. Decision was made by patient and family to discharge to [facility] for alcohol use and SNF.       PHYSICIAN SIGNATURE:  Electronically signed by Stephie Ramsay MD on 6/4/25 at 1:36 PM EDT

## 2025-06-02 NOTE — CARE COORDINATION
Reviewed chart, [facility], [facility], [facility], [facility] and [facility] have all accepted. Met with pt's daughter at bedside, she would like [facility]. Spoke with Paz at [facility] and updated. Updated Care port. Per Paz at [facility], they can accept precert pending.  Envelope and completed 7000 in soft chart.Beatriz Galdamez, MSW, LSW

## 2025-06-03 ENCOUNTER — APPOINTMENT (OUTPATIENT)
Dept: GENERAL RADIOLOGY | Age: 65
DRG: 277 | End: 2025-06-03
Payer: MEDICARE

## 2025-06-03 ENCOUNTER — ANESTHESIA (OUTPATIENT)
Age: 65
DRG: 277 | End: 2025-06-03
Payer: MEDICARE

## 2025-06-03 LAB
ANION GAP SERPL CALCULATED.3IONS-SCNC: 11 MMOL/L (ref 7–16)
BASOPHILS # BLD: 0.17 K/UL (ref 0–0.2)
BASOPHILS NFR BLD: 2 % (ref 0–2)
BUN SERPL-MCNC: 10 MG/DL (ref 8–23)
CALCIUM SERPL-MCNC: 9.4 MG/DL (ref 8.8–10.2)
CHLORIDE SERPL-SCNC: 101 MMOL/L (ref 98–107)
CO2 SERPL-SCNC: 27 MMOL/L (ref 22–29)
CREAT SERPL-MCNC: 0.7 MG/DL (ref 0.5–1)
ECHO BSA: 1.44 M2
EKG ATRIAL RATE: 52 BPM
EKG ATRIAL RATE: 52 BPM
EKG P AXIS: 59 DEGREES
EKG P AXIS: 59 DEGREES
EKG P-R INTERVAL: 140 MS
EKG P-R INTERVAL: 140 MS
EKG Q-T INTERVAL: 466 MS
EKG Q-T INTERVAL: 486 MS
EKG QRS DURATION: 68 MS
EKG QRS DURATION: 70 MS
EKG QTC CALCULATION (BAZETT): 433 MS
EKG QTC CALCULATION (BAZETT): 451 MS
EKG R AXIS: 37 DEGREES
EKG R AXIS: 44 DEGREES
EKG T AXIS: 63 DEGREES
EKG T AXIS: 66 DEGREES
EKG VENTRICULAR RATE: 52 BPM
EKG VENTRICULAR RATE: 52 BPM
EOSINOPHIL # BLD: 0.09 K/UL (ref 0.05–0.5)
EOSINOPHILS RELATIVE PERCENT: 1 % (ref 0–6)
ERYTHROCYTE [DISTWIDTH] IN BLOOD BY AUTOMATED COUNT: 15.2 % (ref 11.5–15)
GFR, ESTIMATED: >90 ML/MIN/1.73M2
GLUCOSE SERPL-MCNC: 100 MG/DL (ref 74–99)
HCT VFR BLD AUTO: 28.5 % (ref 34–48)
HGB BLD-MCNC: 9.4 G/DL (ref 11.5–15.5)
LYMPHOCYTES NFR BLD: 5.88 K/UL (ref 1.5–4)
LYMPHOCYTES RELATIVE PERCENT: 60 % (ref 20–42)
MAGNESIUM SERPL-MCNC: 1.6 MG/DL (ref 1.6–2.4)
MCH RBC QN AUTO: 31.6 PG (ref 26–35)
MCHC RBC AUTO-ENTMCNC: 33 G/DL (ref 32–34.5)
MCV RBC AUTO: 96 FL (ref 80–99.9)
MONOCYTES NFR BLD: 0.77 K/UL (ref 0.1–0.95)
MONOCYTES NFR BLD: 8 % (ref 2–12)
MYELOCYTES ABSOLUTE COUNT: 0.09 K/UL
MYELOCYTES: 1 %
NEUTROPHILS NFR BLD: 29 % (ref 43–80)
NEUTS SEG NFR BLD: 2.81 K/UL (ref 1.8–7.3)
NUCLEATED RED BLOOD CELLS: 1 PER 100 WBC
PHOSPHATE SERPL-MCNC: 3.3 MG/DL (ref 2.5–4.5)
PLATELET # BLD AUTO: 387 K/UL (ref 130–450)
PMV BLD AUTO: 11.4 FL (ref 7–12)
POTASSIUM SERPL-SCNC: 4.8 MMOL/L (ref 3.5–5.1)
RBC # BLD AUTO: 2.97 M/UL (ref 3.5–5.5)
RBC # BLD: ABNORMAL 10*6/UL
SODIUM SERPL-SCNC: 138 MMOL/L (ref 136–145)
WBC OTHER # BLD: 9.8 K/UL (ref 4.5–11.5)

## 2025-06-03 PROCEDURE — 6370000000 HC RX 637 (ALT 250 FOR IP)

## 2025-06-03 PROCEDURE — 2709999900 HC NON-CHARGEABLE SUPPLY: Performed by: INTERNAL MEDICINE

## 2025-06-03 PROCEDURE — 6360000002 HC RX W HCPCS

## 2025-06-03 PROCEDURE — 2500000003 HC RX 250 WO HCPCS

## 2025-06-03 PROCEDURE — 93010 ELECTROCARDIOGRAM REPORT: CPT | Performed by: INTERNAL MEDICINE

## 2025-06-03 PROCEDURE — 6370000000 HC RX 637 (ALT 250 FOR IP): Performed by: STUDENT IN AN ORGANIZED HEALTH CARE EDUCATION/TRAINING PROGRAM

## 2025-06-03 PROCEDURE — 33249 INSJ/RPLCMT DEFIB W/LEAD(S): CPT | Performed by: INTERNAL MEDICINE

## 2025-06-03 PROCEDURE — 2060000000 HC ICU INTERMEDIATE R&B

## 2025-06-03 PROCEDURE — C1898 LEAD, PMKR, OTHER THAN TRANS: HCPCS | Performed by: INTERNAL MEDICINE

## 2025-06-03 PROCEDURE — 6360000002 HC RX W HCPCS: Performed by: NURSE ANESTHETIST, CERTIFIED REGISTERED

## 2025-06-03 PROCEDURE — C1892 INTRO/SHEATH,FIXED,PEEL-AWAY: HCPCS | Performed by: INTERNAL MEDICINE

## 2025-06-03 PROCEDURE — 85025 COMPLETE CBC W/AUTO DIFF WBC: CPT

## 2025-06-03 PROCEDURE — 93005 ELECTROCARDIOGRAM TRACING: CPT | Performed by: NURSE PRACTITIONER

## 2025-06-03 PROCEDURE — 3700000001 HC ADD 15 MINUTES (ANESTHESIA): Performed by: INTERNAL MEDICINE

## 2025-06-03 PROCEDURE — 6370000000 HC RX 637 (ALT 250 FOR IP): Performed by: FAMILY MEDICINE

## 2025-06-03 PROCEDURE — 71045 X-RAY EXAM CHEST 1 VIEW: CPT

## 2025-06-03 PROCEDURE — 80048 BASIC METABOLIC PNL TOTAL CA: CPT

## 2025-06-03 PROCEDURE — 6360000002 HC RX W HCPCS: Performed by: INTERNAL MEDICINE

## 2025-06-03 PROCEDURE — 99232 SBSQ HOSP IP/OBS MODERATE 35: CPT | Performed by: FAMILY MEDICINE

## 2025-06-03 PROCEDURE — 2580000003 HC RX 258

## 2025-06-03 PROCEDURE — 93005 ELECTROCARDIOGRAM TRACING: CPT | Performed by: INTERNAL MEDICINE

## 2025-06-03 PROCEDURE — 6370000000 HC RX 637 (ALT 250 FOR IP): Performed by: INTERNAL MEDICINE

## 2025-06-03 PROCEDURE — 7100000010 HC PHASE II RECOVERY - FIRST 15 MIN: Performed by: INTERNAL MEDICINE

## 2025-06-03 PROCEDURE — 3700000000 HC ANESTHESIA ATTENDED CARE: Performed by: INTERNAL MEDICINE

## 2025-06-03 PROCEDURE — 6360000004 HC RX CONTRAST MEDICATION: Performed by: INTERNAL MEDICINE

## 2025-06-03 PROCEDURE — C1721 AICD, DUAL CHAMBER: HCPCS | Performed by: INTERNAL MEDICINE

## 2025-06-03 PROCEDURE — 84100 ASSAY OF PHOSPHORUS: CPT

## 2025-06-03 PROCEDURE — 2500000003 HC RX 250 WO HCPCS: Performed by: NURSE ANESTHETIST, CERTIFIED REGISTERED

## 2025-06-03 PROCEDURE — 83735 ASSAY OF MAGNESIUM: CPT

## 2025-06-03 PROCEDURE — 2720000010 HC SURG SUPPLY STERILE: Performed by: INTERNAL MEDICINE

## 2025-06-03 PROCEDURE — 6370000000 HC RX 637 (ALT 250 FOR IP): Performed by: NURSE PRACTITIONER

## 2025-06-03 PROCEDURE — 02H63KZ INSERTION OF DEFIBRILLATOR LEAD INTO RIGHT ATRIUM, PERCUTANEOUS APPROACH: ICD-10-PCS | Performed by: INTERNAL MEDICINE

## 2025-06-03 PROCEDURE — C1777 LEAD, AICD, ENDO SINGLE COIL: HCPCS | Performed by: INTERNAL MEDICINE

## 2025-06-03 PROCEDURE — 2500000003 HC RX 250 WO HCPCS: Performed by: INTERNAL MEDICINE

## 2025-06-03 PROCEDURE — 97530 THERAPEUTIC ACTIVITIES: CPT

## 2025-06-03 PROCEDURE — C1889 IMPLANT/INSERT DEVICE, NOC: HCPCS | Performed by: INTERNAL MEDICINE

## 2025-06-03 PROCEDURE — 02HK3KZ INSERTION OF DEFIBRILLATOR LEAD INTO RIGHT VENTRICLE, PERCUTANEOUS APPROACH: ICD-10-PCS | Performed by: INTERNAL MEDICINE

## 2025-06-03 PROCEDURE — 0JH608Z INSERTION OF DEFIBRILLATOR GENERATOR INTO CHEST SUBCUTANEOUS TISSUE AND FASCIA, OPEN APPROACH: ICD-10-PCS | Performed by: INTERNAL MEDICINE

## 2025-06-03 PROCEDURE — 2580000003 HC RX 258: Performed by: NURSE ANESTHETIST, CERTIFIED REGISTERED

## 2025-06-03 DEVICE — LEAD 6935M62 QUATTRO SECURE S MRI US
Type: IMPLANTABLE DEVICE | Status: FUNCTIONAL
Brand: SPRINT QUATTRO SECURE S MRI™ SURESCAN™

## 2025-06-03 DEVICE — ENVELOPE CMRM6133 ABSORB LRG MR
Type: IMPLANTABLE DEVICE | Site: CHEST | Status: FUNCTIONAL
Brand: TYRX™

## 2025-06-03 DEVICE — ICD DDPA2D4 COBALT XT DR MRI DF4 USA
Type: IMPLANTABLE DEVICE | Status: FUNCTIONAL
Brand: COBALT™ XT DR MRI SURESCAN™

## 2025-06-03 DEVICE — LEAD 5076-52 MRI US RCMCRD
Type: IMPLANTABLE DEVICE | Status: FUNCTIONAL
Brand: CAPSUREFIX NOVUS MRI™ SURESCAN®

## 2025-06-03 RX ORDER — SODIUM CHLORIDE 9 MG/ML
INJECTION, SOLUTION INTRAVENOUS
Status: DISCONTINUED | OUTPATIENT
Start: 2025-06-03 | End: 2025-06-03 | Stop reason: SDUPTHER

## 2025-06-03 RX ORDER — MAGNESIUM SULFATE IN WATER 40 MG/ML
2000 INJECTION, SOLUTION INTRAVENOUS ONCE
Status: COMPLETED | OUTPATIENT
Start: 2025-06-03 | End: 2025-06-03

## 2025-06-03 RX ORDER — FENTANYL CITRATE 50 UG/ML
INJECTION, SOLUTION INTRAMUSCULAR; INTRAVENOUS
Status: DISCONTINUED | OUTPATIENT
Start: 2025-06-03 | End: 2025-06-03 | Stop reason: SDUPTHER

## 2025-06-03 RX ORDER — MIDAZOLAM HYDROCHLORIDE 1 MG/ML
INJECTION, SOLUTION INTRAMUSCULAR; INTRAVENOUS
Status: DISCONTINUED | OUTPATIENT
Start: 2025-06-03 | End: 2025-06-03 | Stop reason: SDUPTHER

## 2025-06-03 RX ORDER — VANCOMYCIN HYDROCHLORIDE 1 G/20ML
INJECTION, POWDER, LYOPHILIZED, FOR SOLUTION INTRAVENOUS
Status: DISCONTINUED | OUTPATIENT
Start: 2025-06-03 | End: 2025-06-03 | Stop reason: SDUPTHER

## 2025-06-03 RX ORDER — CEFAZOLIN SODIUM 1 G/3ML
INJECTION, POWDER, FOR SOLUTION INTRAMUSCULAR; INTRAVENOUS
Status: DISCONTINUED | OUTPATIENT
Start: 2025-06-03 | End: 2025-06-03 | Stop reason: SDUPTHER

## 2025-06-03 RX ORDER — SODIUM CHLORIDE 9 MG/ML
INJECTION, SOLUTION INTRAVENOUS
Status: DISCONTINUED | OUTPATIENT
Start: 2025-06-03 | End: 2025-06-03

## 2025-06-03 RX ORDER — IOPAMIDOL 755 MG/ML
INJECTION, SOLUTION INTRAVASCULAR PRN
Status: DISCONTINUED | OUTPATIENT
Start: 2025-06-03 | End: 2025-06-03 | Stop reason: HOSPADM

## 2025-06-03 RX ORDER — EPHEDRINE SULFATE/0.9% NACL/PF 25 MG/5 ML
SYRINGE (ML) INTRAVENOUS
Status: DISCONTINUED | OUTPATIENT
Start: 2025-06-03 | End: 2025-06-03 | Stop reason: SDUPTHER

## 2025-06-03 RX ADMIN — Medication 5 MG: at 21:07

## 2025-06-03 RX ADMIN — SODIUM CHLORIDE, PRESERVATIVE FREE 20 MG: 5 INJECTION INTRAVENOUS at 09:19

## 2025-06-03 RX ADMIN — PROCHLORPERAZINE MALEATE 5 MG: 10 TABLET ORAL at 11:54

## 2025-06-03 RX ADMIN — MIDAZOLAM 1 MG: 1 INJECTION INTRAMUSCULAR; INTRAVENOUS at 15:30

## 2025-06-03 RX ADMIN — SODIUM CHLORIDE, PRESERVATIVE FREE 10 ML: 5 INJECTION INTRAVENOUS at 09:22

## 2025-06-03 RX ADMIN — SODIUM CHLORIDE: 9 INJECTION, SOLUTION INTRAVENOUS at 15:23

## 2025-06-03 RX ADMIN — Medication 400 MG: at 09:20

## 2025-06-03 RX ADMIN — VALPROATE SODIUM 250 MG: 100 INJECTION, SOLUTION INTRAVENOUS at 21:10

## 2025-06-03 RX ADMIN — HYDROCODONE BITARTRATE AND ACETAMINOPHEN 1 TABLET: 5; 325 TABLET ORAL at 03:33

## 2025-06-03 RX ADMIN — PYRIDOXINE HCL TAB 50 MG 50 MG: 50 TAB at 09:20

## 2025-06-03 RX ADMIN — PETROLATUM: 420 OINTMENT TOPICAL at 09:21

## 2025-06-03 RX ADMIN — HYDROCORTISONE: 1 CREAM TOPICAL at 22:42

## 2025-06-03 RX ADMIN — EPHEDRINE SULFATE 10 MG: 5 INJECTION INTRAVENOUS at 15:52

## 2025-06-03 RX ADMIN — Medication 400 MG: at 21:07

## 2025-06-03 RX ADMIN — DIPHENHYDRAMINE HYDROCHLORIDE, ZINC ACETATE: 2; .1 CREAM TOPICAL at 09:21

## 2025-06-03 RX ADMIN — HYDROCORTISONE: 1 CREAM TOPICAL at 12:27

## 2025-06-03 RX ADMIN — HYDROCODONE BITARTRATE AND ACETAMINOPHEN 1 TABLET: 5; 325 TABLET ORAL at 21:07

## 2025-06-03 RX ADMIN — EPHEDRINE SULFATE 10 MG: 5 INJECTION INTRAVENOUS at 15:58

## 2025-06-03 RX ADMIN — MIDAZOLAM 1 MG: 1 INJECTION INTRAMUSCULAR; INTRAVENOUS at 15:39

## 2025-06-03 RX ADMIN — SODIUM CHLORIDE: 9 INJECTION, SOLUTION INTRAVENOUS at 15:36

## 2025-06-03 RX ADMIN — WATER 1000 MG: 1 INJECTION INTRAMUSCULAR; INTRAVENOUS; SUBCUTANEOUS at 09:19

## 2025-06-03 RX ADMIN — FENTANYL CITRATE 25 MCG: 50 INJECTION, SOLUTION INTRAMUSCULAR; INTRAVENOUS at 15:30

## 2025-06-03 RX ADMIN — CEFAZOLIN 2 G: 1 INJECTION, POWDER, FOR SOLUTION INTRAMUSCULAR; INTRAVENOUS at 15:32

## 2025-06-03 RX ADMIN — ASPIRIN 81 MG: 81 TABLET, COATED ORAL at 09:21

## 2025-06-03 RX ADMIN — MAGNESIUM SULFATE HEPTAHYDRATE 2000 MG: 40 INJECTION, SOLUTION INTRAVENOUS at 11:51

## 2025-06-03 RX ADMIN — HYDROCODONE BITARTRATE AND ACETAMINOPHEN 1 TABLET: 5; 325 TABLET ORAL at 11:43

## 2025-06-03 RX ADMIN — TRIAMCINOLONE ACETONIDE: 1 CREAM TOPICAL at 09:21

## 2025-06-03 RX ADMIN — THIAMINE HYDROCHLORIDE 100 MG: 100 INJECTION, SOLUTION INTRAMUSCULAR; INTRAVENOUS at 09:20

## 2025-06-03 RX ADMIN — EPHEDRINE SULFATE 5 MG: 5 INJECTION INTRAVENOUS at 15:49

## 2025-06-03 RX ADMIN — NADOLOL 40 MG: 20 TABLET ORAL at 09:20

## 2025-06-03 RX ADMIN — FENTANYL CITRATE 50 MCG: 50 INJECTION, SOLUTION INTRAMUSCULAR; INTRAVENOUS at 15:39

## 2025-06-03 RX ADMIN — VALPROATE SODIUM 250 MG: 100 INJECTION, SOLUTION INTRAVENOUS at 09:42

## 2025-06-03 RX ADMIN — TRIAMCINOLONE ACETONIDE: 1 CREAM TOPICAL at 22:42

## 2025-06-03 RX ADMIN — VANCOMYCIN HYDROCHLORIDE 1000 MG: 1 INJECTION, POWDER, LYOPHILIZED, FOR SOLUTION INTRAVENOUS at 15:36

## 2025-06-03 RX ADMIN — FENTANYL CITRATE 25 MCG: 50 INJECTION, SOLUTION INTRAMUSCULAR; INTRAVENOUS at 15:35

## 2025-06-03 RX ADMIN — FOLIC ACID 1 MG: 1 TABLET ORAL at 09:20

## 2025-06-03 ASSESSMENT — PAIN SCALES - GENERAL: PAINLEVEL_OUTOF10: 6

## 2025-06-03 ASSESSMENT — PAIN DESCRIPTION - LOCATION: LOCATION: BACK

## 2025-06-03 ASSESSMENT — PAIN DESCRIPTION - ORIENTATION: ORIENTATION: LOWER

## 2025-06-03 ASSESSMENT — ENCOUNTER SYMPTOMS: SHORTNESS OF BREATH: 1

## 2025-06-03 NOTE — ANESTHESIA PRE PROCEDURE
Department of Anesthesiology  Preprocedure Note       Name:  Diane Stuart   Age:  64 y.o.  :  1960                                          MRN:  [id number]         Date:  6/3/2025      Surgeon: Surgeon(s):  Tamiko Schuster MD    Procedure: Procedure(s):  Insert ICD dual    Medications prior to admission:   Prior to Admission medications    Medication Sig Start Date End Date Taking? Authorizing Provider   aspirin 81 MG EC tablet Take 1 tablet by mouth daily 25   Hanh Chapman MD   omeprazole (PRILOSEC) 40 MG delayed release capsule Take 1 capsule by mouth daily 25   Hanh Chapman MD   hydrOXYzine HCl (ATARAX) 25 MG tablet Take 1 tablet by mouth 3 times daily as needed for Itching 25   Hanh Chapman MD   methocarbamol (ROBAXIN) 750 MG tablet Take 1 tablet by mouth 2 times daily 11/15/23   ProviderMayur MD   HYDROcodone-acetaminophen (NORCO) 7.5-325 MG per tablet Take 1 tablet by mouth every 8 hours as needed for Pain. 23   ProviderMayur MD   Multiple Vitamins-Minerals (THERAPEUTIC MULTIVITAMIN-MINERALS) tablet Take 1 tablet by mouth daily    ProviderMayur MD   albuterol sulfate HFA (PROVENTIL;VENTOLIN;PROAIR) 108 (90 Base) MCG/ACT inhaler Inhale 2 puffs into the lungs every 6 hours as needed for Wheezing    ProviderMayur MD       Current medications:    Current Facility-Administered Medications   Medication Dose Route Frequency Provider Last Rate Last Admin    diphenhydrAMINE-zinc acetate 2-0.1 % cream   Topical TID PRN Job Tavera MD   Given at 25 0921    vitamin B-6 (PYRIDOXINE) tablet 50 mg  50 mg Oral Daily Stephie Ramsay MD   50 mg at 25 0920    cefTRIAXone (ROCEPHIN) 1,000 mg in sterile water 10 mL IV syringe  1,000 mg IntraVENous Q24H Stephie Ramsay MD   1,000 mg at 25 0919    HYDROcodone-acetaminophen (NORCO) 5-325 MG per tablet 1 tablet  1 tablet Oral q8h Deshaun Chambers MD   1 tablet at 25 1143

## 2025-06-03 NOTE — DISCHARGE INSTRUCTIONS
[facility] Electrophysiology ICD Discharge Instructions      Medications: Start nadolol 40 mg tablet, take 1 tablet by mouth daily.  Prescription sent to your [facility] pharmacy.  Stop hydroxyzine (Atarax).    Incision Care:  Brown (Aquacel) bandage: Leave this dressing on for 7 days. Remove this dressing on Tuesday 6/10/25.  Steri Strips: Located underneath the brown bandage. Do NOT remove the steri strips, they will either fall off on their own, or be removed at your follow up appointment.   Bathing: Keep the incision dry. You may shower tomorrow evening, but do NOT spray the water directly at the site or scrub at the site. Pat dry only. Do NOT submerge incision site for at least 2 weeks and until cleared by [facility].  Daily monitoring: Check the area daily. Notify the office at [phone] or [phone] if you develop any redness, swelling, drainage, warmth, or fever greater than 100 degrees.  No lotions, powders, or creams to site.       Activity:  You may continue regular activity; but limit strenuous movements or stretching of the arm closest to your ICD.    Wear the arm immobilizer at all times for 48 hours and then at night for 4 weeks if you feel you may not be able to follow arm restrictions at night.    Avoid pulling yourself up with that arm.    Do not raise elbow higher than shoulder height, do not lift anything weighing more than 3 pounds with your arm, and do not performing any stretching motions with your arm for 6 weeks.    Do not do any activities such as golfing, vacuuming, or mowing the lawn for 6 weeks.    Prevent any hard blows to the ICD.      Driving: no driving or operating heavy machinery until cleared by Dr Schuster after 6 months.    Lifestyle changes:  Avoid QT prolonging medications ([email or web address]).  Maintain normal electrolyte levels, particularly potassium and magnesium; via avoiding imbalanced/weight loss diets and diuretic therapy, as well as drinking electrolyte

## 2025-06-03 NOTE — CARE COORDINATION
Reviewed chart, pt for ICD today. Precert starting today([facility] started since they will take precert pending), [facility] can take precert pending. Envelope, ambullete form and 7000 in soft chart..Beatriz Galdamez, MSW, LSW

## 2025-06-04 ENCOUNTER — APPOINTMENT (OUTPATIENT)
Dept: GENERAL RADIOLOGY | Age: 65
DRG: 277 | End: 2025-06-04
Payer: MEDICARE

## 2025-06-04 VITALS
TEMPERATURE: 97.5 F | RESPIRATION RATE: 20 BRPM | WEIGHT: 108.25 LBS | BODY MASS INDEX: 19.92 KG/M2 | OXYGEN SATURATION: 100 % | HEIGHT: 62 IN | HEART RATE: 59 BPM | DIASTOLIC BLOOD PRESSURE: 79 MMHG | SYSTOLIC BLOOD PRESSURE: 103 MMHG

## 2025-06-04 LAB
ANION GAP SERPL CALCULATED.3IONS-SCNC: 14 MMOL/L (ref 7–16)
BASOPHILS # BLD: 0 K/UL (ref 0–0.2)
BASOPHILS NFR BLD: 0 % (ref 0–2)
BUN SERPL-MCNC: 12 MG/DL (ref 6–23)
CALCIUM SERPL-MCNC: 9.1 MG/DL (ref 8.6–10.2)
CHLORIDE SERPL-SCNC: 103 MMOL/L (ref 98–107)
CO2 SERPL-SCNC: 25 MMOL/L (ref 22–29)
CREAT SERPL-MCNC: 0.8 MG/DL (ref 0.5–1)
EOSINOPHIL # BLD: 0.09 K/UL (ref 0.05–0.5)
EOSINOPHILS RELATIVE PERCENT: 1 % (ref 0–6)
ERYTHROCYTE [DISTWIDTH] IN BLOOD BY AUTOMATED COUNT: 15.7 % (ref 11.5–15)
GFR, ESTIMATED: 85 ML/MIN/1.73M2
GLUCOSE SERPL-MCNC: 94 MG/DL (ref 74–99)
HCT VFR BLD AUTO: 29.4 % (ref 34–48)
HGB BLD-MCNC: 9.9 G/DL (ref 11.5–15.5)
LIDOCAIN SERPL-MCNC: NORMAL UG/ML
LYMPHOCYTES NFR BLD: 2.81 K/UL (ref 1.5–4)
LYMPHOCYTES RELATIVE PERCENT: 29 % (ref 20–42)
MAGNESIUM SERPL-MCNC: 1.6 MG/DL (ref 1.6–2.6)
MCH RBC QN AUTO: 31.9 PG (ref 26–35)
MCHC RBC AUTO-ENTMCNC: 33.7 G/DL (ref 32–34.5)
MCV RBC AUTO: 94.8 FL (ref 80–99.9)
METAMYELOCYTES ABSOLUTE COUNT: 0.09 K/UL (ref 0–0.12)
METAMYELOCYTES: 1 % (ref 0–1)
MONOCYTES NFR BLD: 1.19 K/UL (ref 0.1–0.95)
MONOCYTES NFR BLD: 12 % (ref 2–12)
NEUTROPHILS NFR BLD: 57 % (ref 43–80)
NEUTS SEG NFR BLD: 5.53 K/UL (ref 1.8–7.3)
PHOSPHATE SERPL-MCNC: 4.1 MG/DL (ref 2.5–4.5)
PLATELET # BLD AUTO: 332 K/UL (ref 130–450)
PMV BLD AUTO: 10.6 FL (ref 7–12)
POTASSIUM SERPL-SCNC: 4.7 MMOL/L (ref 3.5–5)
RBC # BLD AUTO: 3.1 M/UL (ref 3.5–5.5)
RBC # BLD: ABNORMAL 10*6/UL
SODIUM SERPL-SCNC: 142 MMOL/L (ref 132–146)
WBC # BLD: ABNORMAL 10*3/UL
WBC OTHER # BLD: 9.7 K/UL (ref 4.5–11.5)

## 2025-06-04 PROCEDURE — 85025 COMPLETE CBC W/AUTO DIFF WBC: CPT

## 2025-06-04 PROCEDURE — 99239 HOSP IP/OBS DSCHRG MGMT >30: CPT | Performed by: FAMILY MEDICINE

## 2025-06-04 PROCEDURE — 6370000000 HC RX 637 (ALT 250 FOR IP): Performed by: INTERNAL MEDICINE

## 2025-06-04 PROCEDURE — 2500000003 HC RX 250 WO HCPCS: Performed by: INTERNAL MEDICINE

## 2025-06-04 PROCEDURE — 36592 COLLECT BLOOD FROM PICC: CPT

## 2025-06-04 PROCEDURE — 80048 BASIC METABOLIC PNL TOTAL CA: CPT

## 2025-06-04 PROCEDURE — 2580000003 HC RX 258: Performed by: INTERNAL MEDICINE

## 2025-06-04 PROCEDURE — 99232 SBSQ HOSP IP/OBS MODERATE 35: CPT | Performed by: NURSE PRACTITIONER

## 2025-06-04 PROCEDURE — 97530 THERAPEUTIC ACTIVITIES: CPT

## 2025-06-04 PROCEDURE — 84100 ASSAY OF PHOSPHORUS: CPT

## 2025-06-04 PROCEDURE — 6360000002 HC RX W HCPCS: Performed by: INTERNAL MEDICINE

## 2025-06-04 PROCEDURE — 83735 ASSAY OF MAGNESIUM: CPT

## 2025-06-04 PROCEDURE — 97535 SELF CARE MNGMENT TRAINING: CPT

## 2025-06-04 PROCEDURE — 6370000000 HC RX 637 (ALT 250 FOR IP)

## 2025-06-04 PROCEDURE — 71045 X-RAY EXAM CHEST 1 VIEW: CPT

## 2025-06-04 PROCEDURE — 2060000000 HC ICU INTERMEDIATE R&B

## 2025-06-04 PROCEDURE — 97164 PT RE-EVAL EST PLAN CARE: CPT

## 2025-06-04 PROCEDURE — 97168 OT RE-EVAL EST PLAN CARE: CPT

## 2025-06-04 RX ORDER — SODIUM CHLORIDE 0.9 % (FLUSH) 0.9 %
5-40 SYRINGE (ML) INJECTION PRN
Status: DISCONTINUED | OUTPATIENT
Start: 2025-06-04 | End: 2025-06-04 | Stop reason: SDUPTHER

## 2025-06-04 RX ORDER — DIVALPROEX SODIUM 250 MG/1
250 TABLET, DELAYED RELEASE ORAL 2 TIMES DAILY
Qty: 60 TABLET | Refills: 0 | DISCHARGE
Start: 2025-06-04 | End: 2025-07-04

## 2025-06-04 RX ORDER — OMEPRAZOLE 20 MG/1
20 CAPSULE, DELAYED RELEASE ORAL DAILY
DISCHARGE
Start: 2025-06-04

## 2025-06-04 RX ORDER — SODIUM CHLORIDE 9 MG/ML
INJECTION, SOLUTION INTRAVENOUS PRN
Status: DISCONTINUED | OUTPATIENT
Start: 2025-06-04 | End: 2025-06-04 | Stop reason: SDUPTHER

## 2025-06-04 RX ORDER — HYDROCODONE BITARTRATE AND ACETAMINOPHEN 7.5; 325 MG/1; MG/1
1 TABLET ORAL 3 TIMES DAILY
Refills: 0 | Status: DISCONTINUED | OUTPATIENT
Start: 2025-06-04 | End: 2025-06-05 | Stop reason: HOSPADM

## 2025-06-04 RX ORDER — LANOLIN ALCOHOL/MO/W.PET/CERES
400 CREAM (GRAM) TOPICAL 2 TIMES DAILY
Qty: 60 TABLET | Refills: 0 | DISCHARGE
Start: 2025-06-04 | End: 2025-07-04

## 2025-06-04 RX ORDER — HYDROCODONE BITARTRATE AND ACETAMINOPHEN 7.5; 325 MG/1; MG/1
1 TABLET ORAL 3 TIMES DAILY
Status: SHIPPED | DISCHARGE
Start: 2025-06-04 | End: 2025-06-04

## 2025-06-04 RX ORDER — LANOLIN ALCOHOL/MO/W.PET/CERES
100 CREAM (GRAM) TOPICAL DAILY
DISCHARGE
Start: 2025-06-05

## 2025-06-04 RX ORDER — PYRIDOXINE HCL (VITAMIN B6) 50 MG
50 TABLET ORAL DAILY
DISCHARGE
Start: 2025-06-05

## 2025-06-04 RX ORDER — DIPHENHYDRAMINE HYDROCHLORIDE, ZINC ACETATE 2; .1 G/100G; G/100G
CREAM TOPICAL
DISCHARGE
Start: 2025-06-04

## 2025-06-04 RX ORDER — POLYETHYLENE GLYCOL 3350 17 G/17G
17 POWDER, FOR SOLUTION ORAL DAILY PRN
DISCHARGE
Start: 2025-06-04 | End: 2025-07-04

## 2025-06-04 RX ORDER — ACETAMINOPHEN 500 MG
1000 TABLET ORAL ONCE
Status: COMPLETED | OUTPATIENT
Start: 2025-06-04 | End: 2025-06-04

## 2025-06-04 RX ORDER — FOLIC ACID 1 MG/1
1 TABLET ORAL DAILY
Qty: 30 TABLET | Refills: 0 | DISCHARGE
Start: 2025-06-05 | End: 2025-07-05

## 2025-06-04 RX ORDER — HYDROCODONE BITARTRATE AND ACETAMINOPHEN 7.5; 325 MG/1; MG/1
1 TABLET ORAL 3 TIMES DAILY
Qty: 9 TABLET | Refills: 0 | Status: SHIPPED | OUTPATIENT
Start: 2025-06-04 | End: 2025-06-07

## 2025-06-04 RX ORDER — ASPIRIN 81 MG/1
81 TABLET ORAL DAILY
DISCHARGE
Start: 2025-06-05

## 2025-06-04 RX ORDER — BENZOCAINE/MENTHOL 6 MG-10 MG
LOZENGE MUCOUS MEMBRANE
DISCHARGE
Start: 2025-06-04 | End: 2025-06-11

## 2025-06-04 RX ORDER — LIDOCAINE 4 G/G
1 PATCH TOPICAL DAILY
DISCHARGE
Start: 2025-06-04

## 2025-06-04 RX ORDER — SODIUM CHLORIDE 0.9 % (FLUSH) 0.9 %
5-40 SYRINGE (ML) INJECTION EVERY 12 HOURS SCHEDULED
Status: DISCONTINUED | OUTPATIENT
Start: 2025-06-04 | End: 2025-06-04 | Stop reason: SDUPTHER

## 2025-06-04 RX ORDER — NADOLOL 40 MG/1
40 TABLET ORAL DAILY
Qty: 90 TABLET | Refills: 1 | Status: SHIPPED | OUTPATIENT
Start: 2025-06-05 | End: 2025-09-03

## 2025-06-04 RX ORDER — LANOLIN ALCOHOL/MO/W.PET/CERES
100 CREAM (GRAM) TOPICAL DAILY
Status: DISCONTINUED | OUTPATIENT
Start: 2025-06-05 | End: 2025-06-05 | Stop reason: HOSPADM

## 2025-06-04 RX ORDER — PROCHLORPERAZINE MALEATE 5 MG/1
5 TABLET ORAL EVERY 6 HOURS PRN
Refills: 0 | DISCHARGE
Start: 2025-06-04 | End: 2025-07-04

## 2025-06-04 RX ORDER — MECOBALAMIN 5000 MCG
5 TABLET,DISINTEGRATING ORAL NIGHTLY
Qty: 30 TABLET | Refills: 0 | DISCHARGE
Start: 2025-06-04 | End: 2025-07-04

## 2025-06-04 RX ORDER — CEFDINIR 300 MG/1
300 CAPSULE ORAL 2 TIMES DAILY
Qty: 6 CAPSULE | Refills: 0 | DISCHARGE
Start: 2025-06-05 | End: 2025-06-08

## 2025-06-04 RX ADMIN — HYDROCODONE BITARTRATE AND ACETAMINOPHEN 1 TABLET: 7.5; 325 TABLET ORAL at 12:13

## 2025-06-04 RX ADMIN — Medication 400 MG: at 20:49

## 2025-06-04 RX ADMIN — VALPROATE SODIUM 250 MG: 100 INJECTION, SOLUTION INTRAVENOUS at 12:18

## 2025-06-04 RX ADMIN — Medication 400 MG: at 10:39

## 2025-06-04 RX ADMIN — TRIAMCINOLONE ACETONIDE: 1 CREAM TOPICAL at 12:19

## 2025-06-04 RX ADMIN — ACETAMINOPHEN 1000 MG: 500 TABLET ORAL at 00:54

## 2025-06-04 RX ADMIN — HYDROCORTISONE: 1 CREAM TOPICAL at 12:19

## 2025-06-04 RX ADMIN — SODIUM CHLORIDE, PRESERVATIVE FREE 20 MG: 5 INJECTION INTRAVENOUS at 10:38

## 2025-06-04 RX ADMIN — NADOLOL 40 MG: 20 TABLET ORAL at 10:39

## 2025-06-04 RX ADMIN — HYDROCODONE BITARTRATE AND ACETAMINOPHEN 1 TABLET: 7.5; 325 TABLET ORAL at 20:48

## 2025-06-04 RX ADMIN — FOLIC ACID 1 MG: 1 TABLET ORAL at 10:39

## 2025-06-04 RX ADMIN — SODIUM CHLORIDE, PRESERVATIVE FREE 10 ML: 5 INJECTION INTRAVENOUS at 20:50

## 2025-06-04 RX ADMIN — VALPROATE SODIUM 250 MG: 100 INJECTION, SOLUTION INTRAVENOUS at 20:54

## 2025-06-04 RX ADMIN — SODIUM CHLORIDE, PRESERVATIVE FREE 10 ML: 5 INJECTION INTRAVENOUS at 10:40

## 2025-06-04 RX ADMIN — WATER 1000 MG: 1 INJECTION INTRAMUSCULAR; INTRAVENOUS; SUBCUTANEOUS at 10:39

## 2025-06-04 RX ADMIN — HYDROCODONE BITARTRATE AND ACETAMINOPHEN 1 TABLET: 5; 325 TABLET ORAL at 04:16

## 2025-06-04 RX ADMIN — PYRIDOXINE HCL TAB 50 MG 50 MG: 50 TAB at 10:41

## 2025-06-04 RX ADMIN — VANCOMYCIN HYDROCHLORIDE 1000 MG: 1 INJECTION, POWDER, LYOPHILIZED, FOR SOLUTION INTRAVENOUS at 04:09

## 2025-06-04 RX ADMIN — Medication 5 MG: at 20:48

## 2025-06-04 RX ADMIN — THIAMINE HYDROCHLORIDE 100 MG: 100 INJECTION, SOLUTION INTRAMUSCULAR; INTRAVENOUS at 10:40

## 2025-06-04 RX ADMIN — SODIUM CHLORIDE, PRESERVATIVE FREE 10 ML: 5 INJECTION INTRAVENOUS at 10:41

## 2025-06-04 RX ADMIN — ASPIRIN 81 MG: 81 TABLET, COATED ORAL at 10:39

## 2025-06-04 ASSESSMENT — PAIN SCALES - GENERAL
PAINLEVEL_OUTOF10: 0
PAINLEVEL_OUTOF10: 6
PAINLEVEL_OUTOF10: 8
PAINLEVEL_OUTOF10: 8
PAINLEVEL_OUTOF10: 0
PAINLEVEL_OUTOF10: 6
PAINLEVEL_OUTOF10: 1
PAINLEVEL_OUTOF10: 6

## 2025-06-04 ASSESSMENT — PAIN DESCRIPTION - LOCATION
LOCATION: INCISION;CHEST
LOCATION: BACK;SHOULDER
LOCATION: BACK
LOCATION: CHEST;INCISION

## 2025-06-04 ASSESSMENT — PAIN DESCRIPTION - DESCRIPTORS
DESCRIPTORS: ACHING;DISCOMFORT;DULL
DESCRIPTORS: ACHING;DISCOMFORT;SORE
DESCRIPTORS: ACHING;DISCOMFORT;DULL

## 2025-06-04 ASSESSMENT — PAIN DESCRIPTION - FREQUENCY
FREQUENCY: INTERMITTENT
FREQUENCY: INTERMITTENT

## 2025-06-04 ASSESSMENT — PAIN DESCRIPTION - ORIENTATION
ORIENTATION: LEFT;MID;LOWER
ORIENTATION: LEFT
ORIENTATION: LEFT

## 2025-06-04 ASSESSMENT — PAIN - FUNCTIONAL ASSESSMENT
PAIN_FUNCTIONAL_ASSESSMENT: ACTIVITIES ARE NOT PREVENTED
PAIN_FUNCTIONAL_ASSESSMENT: ACTIVITIES ARE NOT PREVENTED
PAIN_FUNCTIONAL_ASSESSMENT: PREVENTS OR INTERFERES SOME ACTIVE ACTIVITIES AND ADLS

## 2025-06-04 ASSESSMENT — PAIN DESCRIPTION - PAIN TYPE
TYPE: ACUTE PAIN;SURGICAL PAIN
TYPE: ACUTE PAIN;SURGICAL PAIN

## 2025-06-04 ASSESSMENT — PAIN DESCRIPTION - ONSET
ONSET: AWAKENED FROM SLEEP
ONSET: AWAKENED FROM SLEEP

## 2025-06-04 NOTE — PROGRESS NOTES
PROGRESS NOTE     CARDIOLOGY    Chief complaint: Seen today for follow up, management & recommendations for chest pain    She was seen earlier this morning.  She was sedated due to going through DTs.  She was comfortable and in no distress.    Wt Readings from Last 3 Encounters:   05/15/25 48.1 kg (106 lb 1.6 oz)   05/07/25 47.6 kg (105 lb)   04/12/25 47.6 kg (105 lb)     Temp Readings from Last 3 Encounters:   05/16/25 98.4 °F (36.9 °C) (Temporal)   05/07/25 97.2 °F (36.2 °C) (Temporal)   04/13/25 98.9 °F (37.2 °C) (Oral)     BP Readings from Last 3 Encounters:   05/16/25 (!) 135/105   05/07/25 121/83   04/13/25 (!) 149/94     Pulse Readings from Last 3 Encounters:   05/16/25 78   05/07/25 70   04/13/25 73         Intake/Output Summary (Last 24 hours) at 5/16/2025 1317  Last data filed at 5/16/2025 0600  Gross per 24 hour   Intake 919.12 ml   Output 910 ml   Net 9.12 ml       Recent Labs     05/14/25  1635 05/16/25  0425   WBC 8.0 7.4   HGB 14.7 11.3*   HCT 42.8 32.8*   MCV 93.7 93.4     --      Recent Labs     05/14/25  2143 05/14/25  2355 05/15/25  1323 05/16/25  0425   NA  --  135* 138 134*   K  --  3.3* 4.1 3.9   CL  --  99 102 99   CO2  --  23 23 21*   PHOS 3.3 3.1  --  2.9   BUN  --  8 5* 4*   CREATININE  --  0.5 0.6 0.6   MG 2.8* 2.3  --  1.7     No results for input(s): \"PROTIME\", \"INR\" in the last 72 hours.  No results for input(s): \"CKTOTAL\", \"CKMB\", \"CKMBINDEX\", \"TROPONINI\" in the last 72 hours.  No results for input(s): \"BNP\" in the last 72 hours.  Recent Labs     05/14/25  2143   CHOL 185      TRIG 68     Recent Labs     05/14/25  1635 05/15/25  1323   TROPHS 6 9         sulfur hexafluoride microspheres (LUMASON) 60.7-25 MG injection 2 mL, ONCE PRN  hydrocortisone 1 % cream, BID  lidocaine 2000 mg in dextrose 5% 500 mL infusion, Continuous  sulfur hexafluoride microspheres (LUMASON) 60.7-25 MG injection 2 mL, ONCE PRN  sulfur hexafluoride microspheres (LUMASON) 60.7-25 MG injection 2 
0800 & 1000 Attempted to call for consent for PICC, unable to reach contact Sylvester  
4 Eyes Skin Assessment     NAME:  Diane Stuart  YOB: 1960  MEDICAL RECORD NUMBER:  [id number]    The patient is being assessed for  Admission    I agree that at least one RN has performed a thorough Head to Toe Skin Assessment on the patient. ALL assessment sites listed below have been assessed.      Areas assessed by both nurses:    Head, Face, Ears, Shoulders, Back, Chest, Arms, Elbows, Hands, Sacrum. Buttock, Coccyx, Ischium, and Legs. Feet and Heels        Does the Patient have a Wound? No noted wound(s)       Maciej Prevention initiated by RN: No  Wound Care Orders initiated by RN: No    Pressure Injury (Stage 3,4, Unstageable, DTI, NWPT, and Complex wounds) if present, place Wound referral order by RN under : No    New Ostomies, if present place, Ostomy referral order under : No     Nurse 1 eSignature: Electronically signed by Kb Alcantara RN on 5/15/25 at 6:36 AM EDT    **SHARE this note so that the co-signing nurse can place an eSignature**    Nurse 2 eSignature: Electronically signed by Gina Hoang RN on 5/15/25 at 8:50 AM EDT  
4 Eyes Skin Assessment     NAME:  Diane Stuart  YOB: 1960  MEDICAL RECORD NUMBER:  [id number]    The patient is being assessed for  Transfer to New Unit    I agree that at least one RN has performed a thorough Head to Toe Skin Assessment on the patient. ALL assessment sites listed below have been assessed.      Areas assessed by both nurses:    Head, Face, Ears, Shoulders, Back, Chest, Arms, Elbows, Hands, Sacrum. Buttock, Coccyx, Ischium, and Legs. Feet and Heels        Does the Patient have a Wound? Yes       Maciej Prevention initiated by RN: Yes  Wound Care Orders initiated by RN: No    Pressure Injury (Stage 3,4, Unstageable, DTI, NWPT, and Complex wounds) if present, place Wound referral order by RN under : No    New Ostomies, if present place, Ostomy referral order under : No     Nurse 1 eSignature: Electronically signed by Fay Lowe RN on 5/22/25 at 7:29 PM EDT    **SHARE this note so that the co-signing nurse can place an eSignature**    Nurse 2 eSignature: Electronically signed by Natividad Carrillo RN on 5/23/25 at 4:24 AM EDT    
[facility] PHYSICIANS- [facility] [place]- Cardiac Electrophysiology  Inpatient Progress  Report  PATIENT: Diane Stuart  MEDICAL RECORD NUMBER: [id number]  DATE OF SERVICE:  5/29/2025  ATTENDING ELECTROPHYSIOLOGIST: Zeferino Loya DO   PRIMARY ELECTROPHYSIOLOGIST: Mylene Jay MD  REFERRING PHYSICIAN:  Hanh Chapman MD  CHIEF COMPLAINT: Chest pain, diaphoresis, lightheadedness    HPI: This is a 64 y.o. female with a history of osteoarthritis, status post L3-L4 and L4-L5 intervertebral, kyphoplasty of L1 in 2019 and has been on pain medications/Norco as well as Neurontin in the past. The patient says that she has not been under the care of a primary care physician for many years until she went to see Dr. Chapman on 5/7/2025.  She denies any cardiology evaluations in the past. Review of her records details history of coronary artery disease , orthostatic hypotension, hypertension which the patient denies.  On further review of her records it also appears that the patient was hospitalized from 12/1/2023 to 12/7/2023 for syncope and collapse which was felt to be secondary to alcohol intoxication plus amitriptyline use.  Cardiology was consulted at the time and the amitriptyline that she was previously taking for depression was stopped.  She was then started on Midodrine 2.5 mg 3 times daily and the dizziness had improved with no further syncopal episodes.  There was also mention of a TIA in 2021 with no residual neurological deficits.  She is on no medications for the same. She has a history of excessive alcohol use with currently drinks about 4 drinks every other weekend.  Ongoing smoking habit-5 to 10 cigarettes a day.  She also admits to marijuana use regularly in the recent past. On extensive review of all her admissions in the hospital since 2019, it appears that the patient has had multiple hospitalizations for falls and syncopal episodes which have been felt to be secondary to alcohol 
[facility] CARDIOLOGY  CARDIAC ELECTROPHYSIOLOGY DEPARTMENT/DIVISION OF CARDIOLOGY  Inpatient progress report  PATIENT: Diane Stuart  MEDICAL RECORD NUMBER: [id number]  DATE OF SERVICE:  6/4/2025  ATTENDING ELECTROPHYSIOLOGIST:  RONAN Hinojosa CNP   REFERRING PHYSICIAN: No ref. provider found and Hanh Chapman MD  CHIEF COMPLAINT: Polymorphic ventricular tachycardia    HPI: RONAN Hinojosa CNP  is a 64 y.o. female with a history of recurrent syncope, TIA, OH, HTN, OA, chronic LBP/spinal stenosis sp kyphoplasty (2019) and L3-L4/L4-L5 fusion (2017), EtOH abuse/DT, GERD, and kidney stones.  She is managed by PCP with aspirin 81 mg daily and PPI.  She is also on hydroxyzine which is associated with QTc prolongation.  In 12/2023, she was evaluated by cardiology for recurrent syncope.  She was noted to have prolonged QTc at that time which was suspected to be related to EtOH abuse and amitriptyline use.  Amitriptyline was discontinued and QTc reportedly improved.  Additionally, patient was reportedly diagnosed with orthostatic hypotension, which was treated with midodrine.  On 5/14/2025, patient presented with chief complaint of rash and dizziness.  On telemetry, she was noted to have sinus with frequent PVCs.        On 5/15/2025, she had syncope due to polymorphic VT initiated by PVC and T wave.  She was treated with lidocaine and magnesium, which resolved PMVT.         Hydroxyzine was discontinued.  EP service was consulted and propranolol was initiated.  QTc prolongation persisted despite avoidance of QT prolonging medications and no other clear cause of QTc prolongation.  Patient was given diagnosis of long QT syndrome.  Patient struggled with delirium/hallucinations/attacking the staff for several days after the event.  Psychiatry was consulted and felt confusion was related to benzodiazepines, opiates, EtOH withdrawal, and deconditioning.  Benzodiazepine and opiate dosing was 
Asked by IV team to ask gen surg to place central line since patient did not get a line in IR today and IV team unable to establish access.   
Assumed care of patient at 2300. When assessing patient a tourniquet was found on patients R arm up underneath the sleeve of her gown. It was immediatly removed. Also found the patients IV in the left arm to be infiltrated. Arm was elevated and a ice pack was applied. Pharmacy was notified and they said to keep a eye on it. Dr Dominguez also notified. No new orders at this time.  Will continue to monitor.   
Attempted a midline in left arm unable to obtain access. No veins noted in right arm. Patient also uncooperative. No labs except blood cultures obtained recommend a triple lumen at this time for access and labs.  
Attempted to get vitals as well as blood sugar, patient kicked and started swinging arms.   
Attempted to release bilateral soft wrist and ankle restraints when patient attempts to get out of bed despite 1:1 and less restrictive measures used.  Bilateral soft wrist and ankle restraints reapplied.  
Attempted to replace heart monitor leads on patient, pt refused and attempted to bite, kick, and grab nurse and student nurse.   
Attempts made to redirect pt- pt confused calling out to her friend and to her mother- attempted to get oob sitting up and putting legs over the side rail- reoriented to place and situation often, safety sitter is at bedside. Pt medicated per CIWA for agitation and ETOH withdrawal.  
[facility] - Family Medicine Inpatient   Resident Progress Note    S:  Hospital day: 9   Brief Synopsis: Diane Stuart is a 64 y.o. female with a PMH of CAD, asthma, chronic pruritus, TIA in 2021, alcohol use disorder, orthostatic hypotension, GERD and chronic back pain who presents to ED for Rash and Dizziness. Patient also has recurrent intermittent chest pain, dizziness and syncope. Last syncopal event several months ago. Patient was previously evaluated with a Zio patch in 2023 which showed NSR with occasional PVC and PACs. Patient also has a chronic recurrent history of generalized pruritus for which she was evaluated by dermatology and was prescribed a cream. Also complains of recent weight loss. Patient is an alcohol drinker, smokes cigarettes daily and marijuana occasionally. ED work-up was remarkable for elevated bilirubin and EKG which showed NSR with frequent PVCs and PACs, left atrial enlargement and ST and T wave abnormality. While in the ED, patient developed several bouts of non-sustained Vtach. EP was consulted and patient was started on lidocaine drip and magnesium. Cardiology was consulted.  Patient was admitted to CVICU for further management. Repeat ECHO showed normal left ventricular systolic function,  EF of 60% and indeterminate diastolic function. LHC/Coronary angiogram showed LAD and RCA severely tortuous, no significant stenosis, and no intervention required. Patient had a round of Torsades on 5/15 lasting about 30 seconds before reverting to NSR. Phenobarbital taper started for withdrawal. Patient was started on Inderal 10 mg TID. Per EP, patient may need ICD before discharge. Neurology was consulted for delirium and agitation.    Overnight/interim:   Patient seen at bedside. Patient confused, slurring words. When asked where she is located, she started screaming. Laying across the bed without a blanket covering her up  Patient was agressive towards nurses, given Ativan 1 mg  Patient 
[facility] - Family Medicine Inpatient   Resident Progress Note    S:  Hospital day: 15   Brief Synopsis: Diane Stuart is a 64 y.o. female with a PMH of CAD, asthma, chronic pruritus, TIA in 2021, alcohol use disorder, orthostatic hypotension, GERD and chronic back pain who presents to ED for Rash and Dizziness. Patient also has recurrent intermittent chest pain, dizziness and syncope. Last syncopal event several months ago. Patient was previously evaluated with a Zio patch in 2023 which showed NSR with occasional PVC and PACs. Patient also has a chronic recurrent history of generalized pruritus for which she was evaluated by dermatology and was prescribed a cream. Also complains of recent weight loss. Patient is an alcohol drinker, smokes cigarettes daily and marijuana occasionally. ED work-up was remarkable for elevated bilirubin and EKG which showed NSR with frequent PVCs and PACs, left atrial enlargement and ST and T wave abnormality. While in the ED, patient developed several bouts of non-sustained Vtach. EP was consulted and patient was started on lidocaine drip and magnesium. Cardiology was consulted.  Patient was admitted to CVICU for further management. Repeat ECHO showed normal left ventricular systolic function,  EF of 60% and indeterminate diastolic function. LHC/Coronary angiogram showed LAD and RCA severely tortuous, no significant stenosis, and no intervention required. Patient had a round of Torsades on 5/15 lasting about 30 seconds before reverting to NSR. Phenobarbital taper started for withdrawal. Patient was started on Inderal 10 mg TID. Per EP, patient will need ICD before discharge. Neurology was consulted for delirium and agitation. MRI WO Contrast did not show any acute abnormalities.    Overnight/interim:   No acute overnight events. Received 4 mg Ativan in 24 hours  Spoke with [facility] yesterday, they do not feel the need to transfer right now as there is nothing new they can offer. If 
[facility] PHYSICIANS- [facility] [place]- Cardiac Electrophysiology  Inpatient Progress  Report  PATIENT: Diane Stuart  MEDICAL RECORD NUMBER: [id number]  DATE OF SERVICE:  5/25/2025  ATTENDING ELECTROPHYSIOLOGIST: Tamiko Schuster MD   PRIMARY ELECTROPHYSIOLOGIST: Mylene Jay MD  REFERRING PHYSICIAN:  Hanh Chapman MD  CHIEF COMPLAINT: Chest pain, diaphoresis, lightheadedness    HPI: This is a 64 y.o. female with a history of osteoarthritis, status post L3-L4 and L4-L5 intervertebral, kyphoplasty of L1 in 2019 and has been on pain medications/Norco as well as Neurontin in the past. The patient says that she has not been under the care of a primary care physician for many years until she went to see Dr. Chapman on 5/7/2025.  She denies any cardiology evaluations in the past. Review of her records details history of coronary artery disease , orthostatic hypotension, hypertension which the patient denies.  On further review of her records it also appears that the patient was hospitalized from 12/1/2023 to 12/7/2023 for syncope and collapse which was felt to be secondary to alcohol intoxication plus amitriptyline use.  Cardiology was consulted at the time and the amitriptyline that she was previously taking for depression was stopped.  She was then started on Midodrine 2.5 mg 3 times daily and the dizziness had improved with no further syncopal episodes.  There was also mention of a TIA in 2021 with no residual neurological deficits.  She is on no medications for the same. She has a history of excessive alcohol use with currently drinks about 4 drinks every other weekend.  Ongoing smoking habit-5 to 10 cigarettes a day.  She also admits to marijuana use regularly in the recent past. On extensive review of all her admissions in the hospital since 2019, it appears that the patient has had multiple hospitalizations for falls and syncopal episodes which have been felt to be secondary to alcohol 
[facility] PHYSICIANS- The Heart and Vascular [place] Electrophysiology  Inpatient Progress  Report  PATIENT: Diane Sutart  MEDICAL RECORD NUMBER: [id number]  DATE OF SERVICE:  5/22/2025  ATTENDING ELECTROPHYSIOLOGIST: Tamiko Schuster MD   PRIMARY ELECTROPHYSIOLOGIST: Mylene Jay MD  REFERRING PHYSICIAN:  Hanh Chapman MD  CHIEF COMPLAINT: Chest pain, diaphoresis, lightheadedness    HPI: This is a 64 y.o. female with a history of osteoarthritis, status post L3-L4 and L4-L5 intervertebral, kyphoplasty of L1 in 2019 and has been on pain medications/Norco as well as Neurontin in the past.  The patient says that she has not been under the care of a primary care physician for many years until she went to see Dr. Chapman on 5/7/2025.  She denies any cardiology evaluations in the past.  Review of her records details history of coronary artery disease , orthostatic hypotension, hypertension which the patient denies.  On further review of her records it also appears that the patient was hospitalized from 12/1/2023 to 12/7/2023 for syncope and collapse which was felt to be secondary to alcohol intoxication plus amitriptyline use.  Cardiology was consulted at the time and the amitriptyline that she was previously taking for depression was stopped.  She was then started on Midodrine 2.5 mg 3 times daily and the dizziness had improved with no further syncopal episodes.  There was also mention of a TIA in 2021 with no residual neurological deficits.  She is on no medications for the same.  She has a history of excessive alcohol use with currently drinks about 4 drinks every other weekend.  Ongoing smoking habit-5 to 10 cigarettes a day.  She also admits to marijuana use regularly in the recent past.  On extensive review of all her admissions in the hospital since 2019, it appears that the patient has had multiple hospitalizations for falls and syncopal episodes which have been felt to be secondary to alcohol 
Brief EP note    Patient remains in sinus on telemetry.  She is tolerating nadolol.  We are seeing some improvement in her cognition.  She is alert and oriented x 1, but does respond appropriately to questions.    Tomorrow I will plan to reevaluate the patient and have a conversation with family about possible ICD implant prior to discharge.    - Zeferino Loya, DO   
CC notified of consult per family practice. Ordered to keep patient NPO at this time per family practice.   
Call placed to [place] nurse to nurse report given to [place]  
[facility] med resident messaged via perfect serve for a new restraint order.  Dr Leal taking messages  
Chart accessed for Charge RN  
Chart accessed for report  
[facility] PHYSICIANS- The Heart and Vascular [place] Electrophysiology  Inpatient Progress  Report  PATIENT: Diane Stuart  MEDICAL RECORD NUMBER: [id number]  DATE OF SERVICE:  5/19/2025  ATTENDING ELECTROPHYSIOLOGIST: Tamiko Schuster MD   PRIMARY ELECTROPHYSIOLOGIST: Mylene Jay MD  REFERRING PHYSICIAN:  Hanh Chapman MD  CHIEF COMPLAINT: Chest pain, diaphoresis, lightheadedness    HPI: This is a 64 y.o. female with a history of osteoarthritis, status post L3-L4 and L4-L5 intervertebral, kyphoplasty of L1 in 2019 and has been on pain medications/Norco as well as Neurontin in the past.  The patient says that she has not been under the care of a primary care physician for many years until she went to see Dr. Chapman on 5/7/2025.  She denies any cardiology evaluations in the past.  Review of her records details history of coronary artery disease , orthostatic hypotension, hypertension which the patient denies.  On further review of her records it also appears that the patient was hospitalized from 12/1/2023 to 12/7/2023 for syncope and collapse which was felt to be secondary to alcohol intoxication plus amitriptyline use.  Cardiology was consulted at the time and the amitriptyline that she was previously taking for depression was stopped.  She was then started on Midodrine 2.5 mg 3 times daily and the dizziness had improved with no further syncopal episodes.  There was also mention of a TIA in 2021 with no residual neurological deficits.  She is on no medications for the same.  She has a history of excessive alcohol use with currently drinks about 4 drinks every other weekend.  Ongoing smoking habit-5 to 10 cigarettes a day.  She also admits to marijuana use regularly in the recent past.  On extensive review of all her admissions in the hospital since 2019, it appears that the patient has had multiple hospitalizations for falls and syncopal episodes which have been felt to be secondary to alcohol 
Clarified with Dr. Tavera IR order is for tunneled CVC.  
[facility] - Family Medicine Inpatient   Resident Progress Note    S:  Hospital day: 14   Brief Synopsis: Diane Stuart is a 64 y.o. female with a PMH of CAD, asthma, chronic pruritus, TIA in 2021, alcohol use disorder, orthostatic hypotension, GERD and chronic back pain who presents to ED for Rash and Dizziness. Patient also has recurrent intermittent chest pain, dizziness and syncope. Last syncopal event several months ago. Patient was previously evaluated with a Zio patch in 2023 which showed NSR with occasional PVC and PACs. Patient also has a chronic recurrent history of generalized pruritus for which she was evaluated by dermatology and was prescribed a cream. Also complains of recent weight loss. Patient is an alcohol drinker, smokes cigarettes daily and marijuana occasionally. ED work-up was remarkable for elevated bilirubin and EKG which showed NSR with frequent PVCs and PACs, left atrial enlargement and ST and T wave abnormality. While in the ED, patient developed several bouts of non-sustained Vtach. EP was consulted and patient was started on lidocaine drip and magnesium. Cardiology was consulted.  Patient was admitted to CVICU for further management. Repeat ECHO showed normal left ventricular systolic function,  EF of 60% and indeterminate diastolic function. LHC/Coronary angiogram showed LAD and RCA severely tortuous, no significant stenosis, and no intervention required. Patient had a round of Torsades on 5/15 lasting about 30 seconds before reverting to NSR. Phenobarbital taper started for withdrawal. Patient was started on Inderal 10 mg TID. Per EP, patient will need ICD before discharge. Neurology was consulted for delirium and agitation. MRI WO Contrast did not show any acute abnormalities.    Overnight/interim:   No acute overnight events. Received 3 mg Ativan in 24 hours  Per nurse, she was hallucinating and speaking to her son then crying.   Nurse states that she got some food when daughter was 
Comprehensive Nutrition Assessment    Type and Reason for Visit:  Initial, Positive nutrition screen    Nutrition Recommendations/Plan:     Encourage / educated patient on the benefits of an ONS, although patient declined ONS. Will continue to monitor meal intakes and will follow.       Malnutrition Assessment:  Malnutrition Status:  Moderate malnutrition (05/15/25 1107)    Context:  Chronic Illness     Findings of the 6 clinical characteristics of malnutrition:  Energy Intake:  75% or less estimated energy requirements for 1 month or longer  Weight Loss:  Unable to assess (2/2 poor EMR wt hx pta)     Body Fat Loss:  Mild body fat loss Orbital   Muscle Mass Loss:  Mild muscle mass loss Clavicles (pectoralis & deltoids), Temples (temporalis)  Fluid Accumulation:  No fluid accumulation     Strength:  Not Performed    Nutrition Assessment:    Pt admit d/t generalized rash x ~1wk pta w/ dizziness 2/2 poor appetite/intake x ~2d pta. Pt admit w/ bigeminy and syncope 2/2 arrhythmia and hyperbilirubinemia; Noted N/V episode just pta as well as syncopal episode in ER. Noted NSVT 5/15. PMHx CAD, GERD, HTN, orthostatic hypotension, TIA (21'), syncope, chronic pruritus, chronic back pain, ETOHA; Pt reported reduced appetite and weight loss PTA (unable to quantify at this time); Pt currently meets criteria for moderate malnutrition; Encouraged/educated pt on benefits of ONS, although pt declined as she stated it causes GI issues for her; Will monitor meal intakes and follow.    Nutrition Related Findings:    No I/O data at this time, A&Ox 4, abd/BS WDL, no edema, elevated BGL/bili 2.4, low Na/K Wound Type: None       Current Nutrition Intake & Therapies:    Average Meal Intake: Unable to assess (no recorded meal intakes on file; pt states she hasn't received food since adm)  Average Supplements Intake: None Ordered  ADULT DIET; Regular    Anthropometric Measures:  Height: 157.5 cm (5' 2.01\")  Ideal Body Weight (IBW): 110 lbs 
Comprehensive Nutrition Assessment    Type and Reason for Visit:  Reassess    Nutrition Recommendations/Plan:   Continue Current Diet as tolerated/appropriate; consider NPO however d/t current AMS.  Recommend to Start EN support d/t ongoing minimal PO intake since pta; Start at trickle rate d/t ReFeeding Risk, monitor/replace lytes PRN.    Goal TF Recommendations:  Standard w/ Fiber (Jevity 1.5) @ 40ml/hr (Goal) Continuous x 24hrs/d= 960ml TV, 1440kcal, 61gm Pro, 730ml freewater.     Flush per critical care mgmt; please consult for updated rec's PRN.    Pt at high risk for Re-Feeding Syndrome d/t minimal intake x >7 days - Highly recommend starting at no more than half of goal rate x first 24hrs and increase slowly as tolerated to goal w/ close monitoring of BGL/Lytes Labs (x first ~3-5d post-nutrition initiation) and correct PRN both prior to and post initiation of nutrition support.      Malnutrition Assessment:  Malnutrition Status:  Moderate malnutrition (05/15/25 1107)    Context:  Chronic Illness     Findings of the 6 clinical characteristics of malnutrition:  Energy Intake:  75% or less estimated energy requirements for 1 month or longer  Weight Loss:  Unable to assess (2/2 poor EMR wt hx pta)     Body Fat Loss:  Mild body fat loss Orbital   Muscle Mass Loss:  Mild muscle mass loss Clavicles (pectoralis & deltoids), Temples (temporalis)  Fluid Accumulation:  No fluid accumulation     Strength:  Not Performed    Nutrition Assessment:    Pt admit d/t generalized rash x ~1wk pta w/ dizziness 2/2 poor appetite/intake x ~2d pta. Pt admit w/ bigeminy and syncope 2/2 arrhythmia and hyperbilirubinemia; Noted N/V episode just pta as well as syncopal episode in ER. Noted NSVT 5/15; +recurrent polymorphic Vtach/Torsades. PMHx CAD, GERD, HTN, orthostatic hypotension, TIA (21'), syncope, chronic pruritus, chronic back pain, ETOHA; Pt w/ noted severe withdrawal/DT's starting 5/16 w/ ongoing 
Comprehensive Nutrition Assessment    Type and Reason for Visit:  Reassess    Nutrition Recommendations/Plan:   Continue NPO  ADAT and add ONS when able     Malnutrition Assessment:  Malnutrition Status:  Moderate malnutrition (05/15/25 1107)    Context:  Chronic Illness     Findings of the 6 clinical characteristics of malnutrition:  Energy Intake:  75% or less estimated energy requirements for 1 month or longer  Weight Loss:  Unable to assess (2/2 poor EMR wt hx pta)     Body Fat Loss:  Mild body fat loss Orbital   Muscle Mass Loss:  Mild muscle mass loss Clavicles (pectoralis & deltoids), Temples (temporalis)  Fluid Accumulation:  No fluid accumulation     Strength:  Not Performed    Nutrition Assessment:    Pt remains at risk now NPO for procedure. Pt admit d/t generalized rash, chest pain & pruritis. noted subjective 10# wt loss x1mo d/t poor appetite. Noted recurrent Vtach/torsades. PMHx CAD, TIA, GERD, HTN, ETOH abuse, chronic pruritis. Noted severe ETOH withdrawal/DTs 5/16 w/ hallucinations/delirium/agitation. Pt meets criteria for moderate malnutrition. Poor PO intake noted since admit, corpak was cancelled & PO dysphagia diet was advanced. Pt w/ ongoing AMS s/p LP. Will monitor for nutrition progression and add ONS when able.    Nutrition Related Findings:    disoriented x4, active BS, soft abd, no edema, +I/Os 9L, hypoglycemia, hypomagnesemia Wound Type:  (puncture site)       Current Nutrition Intake & Therapies:    Average Meal Intake: NPO  Average Supplements Intake: NPO  Diet NPO    Anthropometric Measures:  Height: 157.5 cm (5' 2.01\")  Ideal Body Weight (IBW): 110 lbs (50 kg)    Admission Body Weight: 48.1 kg (106 lb 0.7 oz) (5/15, bedscale; first measured wt since admit)  Current Body Weight: 49 kg (108 lb) (5/21 bed scale), 96.4 % IBW. Weight Source: Bed scale  Current BMI (kg/m2): 19.7  Usual Body Weight:  (UTO UBW 2/2 poor EMR wt hx pta)        Weight Adjustment For: No Adjustment            
Comprehensive Nutrition Assessment    Type and Reason for Visit:  Reassess    Nutrition Recommendations/Plan:   Continue current diet and ONS     Malnutrition Assessment:  Malnutrition Status:  Moderate malnutrition (05/15/25 1107)    Context:  Chronic Illness     Findings of the 6 clinical characteristics of malnutrition:  Energy Intake:  75% or less estimated energy requirements for 1 month or longer  Weight Loss:  Unable to assess (2/2 poor EMR wt hx pta)     Body Fat Loss:  Mild body fat loss Orbital   Muscle Mass Loss:  Mild muscle mass loss Clavicles (pectoralis & deltoids), Temples (temporalis)  Fluid Accumulation:  No fluid accumulation     Strength:  Not Performed    Nutrition Assessment:    Pt improving from a nutritional standpoint w/ good PO intakes documented at this time. Admit d/t generalized rash, chest pain & pruritis. Noted subjective 10# wt loss x1 mo d/t poor appetite. Noted recurrent Vtach/torsades. PMHx CAD, TIA, GERD, HTN, ETOH abuse, chronic pruritis. Noted severe ETOH withdrawal/DTs 5/16 w/ hallucinations/delirium/agitation. Pt s/p LP d/t ongoing AMS. Pt meets criteria for moderate malnutrition. Will continue current ONS and monitor.    Nutrition Related Findings:    pt alert/disoriented at times, active BS, loss of appetite, trace edema, +I/Os 8L Wound Type: Surgical Incision       Current Nutrition Intake & Therapies:    Average Meal Intake: % (per doc flow)  Average Supplements Intake: Unable to assess (intakes not documented)  ADULT DIET; Regular; Low Fat/Low Chol/High Fiber/2 gm Na  ADULT ORAL NUTRITION SUPPLEMENT; Breakfast, Dinner; Standard High Calorie/High Protein Oral Supplement    Anthropometric Measures:  Height: 157.5 cm (5' 2.01\")  Ideal Body Weight (IBW): 110 lbs (50 kg)    Admission Body Weight: 48.1 kg (106 lb 0.7 oz) (5/15, bedscale; first measured wt since admit)  Current Body Weight: 49 kg (108 lb) (5/21 bed scale), 96.4 % IBW. Weight Source: Bed scale  Current 
Consult placed to cardiology ( Dr. Ricky Price) & EP (Dr. Mylene Jay) via perfect serve  
Consulted for small bowel feeding tube. Spoke to adilene huggins. Pt is starting to eat. We will hold off on placing today and recheck with rn tomorrow to see if pt will still need tube placed.   
Could not assess.  Not able to communicate.    Ivis.  
[facility] - Family Medicine Inpatient   Resident Progress Note    S:  Hospital day: 16   Brief Synopsis: Diane Stuart is a 64 y.o. female with a PMH of CAD, asthma, chronic pruritus, TIA in 2021, alcohol use disorder, orthostatic hypotension, GERD and chronic back pain who presents to ED for Rash and Dizziness. Patient also has recurrent intermittent chest pain, dizziness and syncope. Last syncopal event several months ago. Patient was previously evaluated with a Zio patch in 2023 which showed NSR with occasional PVC and PACs. Patient also has a chronic recurrent history of generalized pruritus for which she was evaluated by dermatology and was prescribed a cream. Also complains of recent weight loss. Patient is an alcohol drinker, smokes cigarettes daily and marijuana occasionally. ED work-up was remarkable for elevated bilirubin and EKG which showed NSR with frequent PVCs and PACs, left atrial enlargement and ST and T wave abnormality. While in the ED, patient developed several bouts of non-sustained Vtach. EP was consulted and patient was started on lidocaine drip and magnesium. Cardiology was consulted.  Patient was admitted to CVICU for further management. Repeat ECHO showed normal left ventricular systolic function,  EF of 60% and indeterminate diastolic function. LHC/Coronary angiogram showed LAD and RCA severely tortuous, no significant stenosis, and no intervention required. Patient had a round of Torsades on 5/15 lasting about 30 seconds before reverting to NSR. Phenobarbital taper started for withdrawal. Patient was started on Inderal 10 mg TID. Per EP, patient will need ICD before discharge. Neurology was consulted for delirium and agitation. MRI WO Contrast did not show any acute abnormalities. Psychiatry were consulted, recommended continuing Depakote, decreasing Ativan and opiates and starting melatonin nightly.    Overnight/interim:   No acute overnight events.   PICC line placed 5/29     Cont meds:    
[facility] PHYSICIANS- [facility] [place]- Cardiac Electrophysiology  Inpatient Progress  Report  PATIENT: Diane Stuart  MEDICAL RECORD NUMBER: [id number]  DATE OF SERVICE:  5/27/2025  ATTENDING ELECTROPHYSIOLOGIST: Zeferino Loya DO   PRIMARY ELECTROPHYSIOLOGIST: Mylene Jay MD  REFERRING PHYSICIAN:  Hanh Chapman MD  CHIEF COMPLAINT: Chest pain, diaphoresis, lightheadedness    HPI: This is a 64 y.o. female with a history of osteoarthritis, status post L3-L4 and L4-L5 intervertebral, kyphoplasty of L1 in 2019 and has been on pain medications/Norco as well as Neurontin in the past. The patient says that she has not been under the care of a primary care physician for many years until she went to see Dr. Chapman on 5/7/2025.  She denies any cardiology evaluations in the past. Review of her records details history of coronary artery disease , orthostatic hypotension, hypertension which the patient denies.  On further review of her records it also appears that the patient was hospitalized from 12/1/2023 to 12/7/2023 for syncope and collapse which was felt to be secondary to alcohol intoxication plus amitriptyline use.  Cardiology was consulted at the time and the amitriptyline that she was previously taking for depression was stopped.  She was then started on Midodrine 2.5 mg 3 times daily and the dizziness had improved with no further syncopal episodes.  There was also mention of a TIA in 2021 with no residual neurological deficits.  She is on no medications for the same. She has a history of excessive alcohol use with currently drinks about 4 drinks every other weekend.  Ongoing smoking habit-5 to 10 cigarettes a day.  She also admits to marijuana use regularly in the recent past. On extensive review of all her admissions in the hospital since 2019, it appears that the patient has had multiple hospitalizations for falls and syncopal episodes which have been felt to be secondary to alcohol 
Discussed concern for IV access for patient with family practice at bedside d/t being on lidocaine gtt. Also, notified patient's behavior continues with restlessness/agitation and increased CIWA scores.    
FM resident at bedside.   
FM resident notified of r/p blood sugar of 59. Patient extremely combative refusing more juice. Also informed him patient refusing all oral medications since this morning.   
[facility] - Family Medicine Inpatient   Resident Progress Note    S:  Hospital day: 18   Brief Synopsis: Diane Stuart is a 64 y.o. female with a PMH of CAD, asthma, chronic pruritus, TIA in 2021, alcohol use disorder, orthostatic hypotension, GERD and chronic back pain who presents to ED for Rash and Dizziness. Patient also has recurrent intermittent chest pain, dizziness and syncope. Last syncopal event several months ago. Patient was previously evaluated with a Zio patch in 2023 which showed NSR with occasional PVC and PACs. Patient also has a chronic recurrent history of generalized pruritus for which she was evaluated by dermatology and was prescribed a cream. Also complains of recent weight loss. Patient is an alcohol drinker, smokes cigarettes daily and marijuana occasionally. ED work-up was remarkable for elevated bilirubin and EKG which showed NSR with frequent PVCs and PACs, left atrial enlargement and ST and T wave abnormality. While in the ED, patient developed several bouts of non-sustained Vtach. EP was consulted and patient was started on lidocaine drip and magnesium. Cardiology was consulted.  Patient was admitted to CVICU for further management. Repeat ECHO showed normal left ventricular systolic function,  EF of 60% and indeterminate diastolic function. LHC/Coronary angiogram showed LAD and RCA severely tortuous, no significant stenosis, and no intervention required. Patient had a round of Torsades on 5/15 lasting about 30 seconds before reverting to NSR. Phenobarbital taper started for withdrawal. Patient was started on Inderal 10 mg TID. Per EP, patient will need ICD before discharge. Neurology was consulted for delirium and agitation. MRI WO Contrast did not show any acute abnormalities. Psychiatry were consulted, recommended continuing Depakote, decreasing Ativan and opiates and starting melatonin nightly. Neurology and Psychiatry signed off. Daughter requested that patient stop receiving any 
General surgery consult called to  per perfect serv patient added to census.    
[facility] Neurology    Date:  5/27/2025  Patient Name:  Diane Stuart  YOB: 1960  MRN: [id number]     PCP:  Hanh Chapman MD   Referring:  No ref. provider found      Chief Complaint: AMS    History obtained from: chart     Assessment    AMS etiology uncertain. Doubt ETOH withdrawal after this many days. MRI brain WO was unrevealing. Medications have been continued to be reduced/stopped including Depacon, Robaxin. Phenobarb taper stopped. Patient remains altered, not following commands.       Plan  Follow up repeat MRI brain W contrast   LP         History of Present Illness:  Diane Stuart is a 64 y.o.  female presenting for evaluation of AMS.    PMH significant for CAD, asthma, TIA, alcohol use disorder, orthostatic hypotension, GERD, chronic back pain.     She initially presented for rash and dizziness.     Apparently walked into the hospital about 11-12 days ago with V-tach and got coded. Received Phenobarb for acute ETOH withdrawal. Today received two doses of Ativan for agitation. Positive for Rhino virus about a month ago. Blood work up in the past shows significant Eosinophilia and currently has Lymphocytosis. MRI brain without contrast shows significant mastoiditis and mild sinus disease. EKG with prolonged Qt/QTc and followed by cardiology.     On CIWA protocol.  Neurology was reconsulted due to somnolence and persistent hallucinations and agitation. MRI brain WO was without acute abnormality.     Has been decreased on Depakote and Robaxin     5/27 telesitter in room. No family at bedside. Patient laying in fetal position on L side, crying and moaning-not participating in exam.       Allergies:      No Known Allergies     Physical Examination  Vitals   Vitals:    05/26/25 2045 05/27/25 0000 05/27/25 0345 05/27/25 0834   BP:  (!) 125/96 (!) 127/90    Pulse: 77 52 58    Resp: 20  18    Temp: 97.5 °F (36.4 °C) 97.6 °F (36.4 °C) 97.4 °F (36.3 °C) 97 °F (36.1 °C)   TempSrc: 
[facility] Neurology    Date:  5/28/2025  Patient Name:  Diane Stuart  YOB: 1960  MRN: [id number]     PCP:  Hanh Chapman MD   Referring:  No ref. provider found      Chief Complaint: AMS    History obtained from: chart     Assessment    AMS initially concern for alcohol withdrawal vs opioid withdrawal.  Doubt ETOH withdrawal after this many days. MRI brain WO was unrevealing. As well as MRI chepe W and CSF analysis.  Medications have been continued to be reduced/adjusted. Patient is somewhat improved compared to yesterday-- she was able to tell me her name, place and year today. Limited cooperation otherwise       Plan  EEG not available inpatient at this time- though seizures are not suspected   On  mg BID for agitation   Discussed with Dr. Mccormick- no further neurologic testing planned at this time  Continue supportive care  Will sign off       History of Present Illness:  Diane Stuart is a 64 y.o.  female presenting for evaluation of AMS.    PMH significant for CAD, asthma, TIA, alcohol use disorder, orthostatic hypotension, GERD, chronic back pain.     She initially presented for rash and dizziness.     Apparently walked into the hospital about 11-12 days ago with V-tach and got coded. Received Phenobarb for acute ETOH withdrawal. Today received two doses of Ativan for agitation. Positive for Rhino virus about a month ago. Blood work up in the past shows significant Eosinophilia and currently has Lymphocytosis. MRI brain without contrast shows significant mastoiditis and mild sinus disease. EKG with prolonged Qt/QTc and followed by cardiology.     On CIWA protocol.  Neurology was reconsulted due to somnolence and persistent hallucinations and agitation. MRI brain WO was without acute abnormality.     Has been decreased on Depakote and Robaxin     5/27 telesitter in room. No family at bedside. Patient laying in fetal position on L side, crying and moaning-not 
[facility] PHYSICIANS- The Heart and Vascular [place]- [place] Electrophysiology  Consultation Report  PATIENT: Diane Stuart  MEDICAL RECORD NUMBER: [id number]  DATE OF SERVICE:  5/17/2025  ATTENDING ELECTROPHYSIOLOGIST: Mylene Jay MD  PRIMARY ELECTROPHYSIOLOGIST: Mylene Jay MD  REFERRING PHYSICIAN: No ref. provider found and Hanh Chapman MD  CHIEF COMPLAINT: Chest pain, diaphoresis, lightheadedness    HPI: This is a 64 y.o. female with a history of osteoarthritis, status post L3-L4 and L4-L5 intervertebral, kyphoplasty of L1 in 2019 and has been on pain medications/Norco as well as Neurontin in the past.  The patient says that she has not been under the care of a primary care physician for many years until she went to see Dr. Chapman on 5/7/2025.  She denies any cardiology evaluations in the past.  Review of her records details history of coronary artery disease , orthostatic hypotension, hypertension which the patient denies.  On further review of her records it also appears that the patient was hospitalized from 12/1/2023 to 12/7/2023 for syncope and collapse which was felt to be secondary to alcohol intoxication plus amitriptyline use.  Cardiology was consulted at the time and the amitriptyline that she was previously taking for depression was stopped.  She was then started on Midodrine 2.5 mg 3 times daily and the dizziness had improved with no further syncopal episodes.  There was also mention of a TIA in 2021 with no residual neurological deficits.  She is on no medications for the same.  She has a history of excessive alcohol use with currently drinks about 4 drinks every other weekend.  Ongoing smoking habit-5 to 10 cigarettes a day.  She also admits to marijuana use regularly in the recent past.  On extensive review of all her admissions in the hospital since 2019, it appears that the patient has had multiple hospitalizations for falls and syncopal episodes which have been felt to be secondary to 
IV team messaged via perfect serve to notify that the patient needs labs drawn  
Ice applied to right hand as per order.  
In room with patient, asked if she had any pain. She stated that she did not have any pain. Then the patinent continued talking to someone that was not in room. Patient scored a 11 on CWAL. Gave 2mg of Ativan per CWAL.  
Iv started to right hand - lido gtt resumed at 15cc/1mg/hr. Iv to right upper arm removed dsg applied.  
[facility] PHYSICIANS- The Heart and [facility] [place] [place] Electrophysiology  Inpatient Progress  Report  PATIENT: Diane Stuart  MEDICAL RECORD NUMBER: [id number]  DATE OF SERVICE:  5/21/2025  ATTENDING ELECTROPHYSIOLOGIST: Tamiko Schuster MD   PRIMARY ELECTROPHYSIOLOGIST: Mlyene Jay MD  REFERRING PHYSICIAN:  Hanh Chapman MD  CHIEF COMPLAINT: Chest pain, diaphoresis, lightheadedness    HPI: This is a 64 y.o. female with a history of osteoarthritis, status post L3-L4 and L4-L5 intervertebral, kyphoplasty of L1 in 2019 and has been on pain medications/Norco as well as Neurontin in the past.  The patient says that she has not been under the care of a primary care physician for many years until she went to see Dr. Chapman on 5/7/2025.  She denies any cardiology evaluations in the past.  Review of her records details history of coronary artery disease , orthostatic hypotension, hypertension which the patient denies.  On further review of her records it also appears that the patient was hospitalized from 12/1/2023 to 12/7/2023 for syncope and collapse which was felt to be secondary to alcohol intoxication plus amitriptyline use.  Cardiology was consulted at the time and the amitriptyline that she was previously taking for depression was stopped.  She was then started on Midodrine 2.5 mg 3 times daily and the dizziness had improved with no further syncopal episodes.  There was also mention of a TIA in 2021 with no residual neurological deficits.  She is on no medications for the same.  She has a history of excessive alcohol use with currently drinks about 4 drinks every other weekend.  Ongoing smoking habit-5 to 10 cigarettes a day.  She also admits to marijuana use regularly in the recent past.  On extensive review of all her admissions in the hospital since 2019, it appears that the patient has had multiple hospitalizations for falls and syncopal episodes which have been felt to be secondary to alcohol 
LOVENOX PROPHYLAXIS EVALUATION  (Populations not addressed in this protocol: trauma, obstetrics, or COVID-19)    Wt Readings from Last 3 Encounters:   05/07/25 47.6 kg (105 lb)   04/12/25 47.6 kg (105 lb)   02/07/25 49.9 kg (110 lb)       CrCl cannot be calculated (Unknown ideal weight.).  Recent Labs     05/14/25  1635 05/14/25  2355   BUN 8 8   CREATININE 0.6 0.5     --    HGB 14.7  --    HCT 42.8  --        Weight Range: 50.9 kg and below    CRCL = 30 or greater    []  50.9 kg   and below     []  .9  kg   []  101-150.9 kg   []  151-174.9  kg   []  175 kg  or greater     30mg subq  daily     40mg subq daily    (or 30mg subq BID orthopedic)     30mg subq  BID   40mg subq  BID   60mg subq BID       Per P/T protocol for appropriate subq anticoagulation by weight and CRCL change to:    Enoxparin 30mg subq daily      Jose Milner RPH  1:36 AM  05/15/25        
[facility] PHYSICIANS- The Heart and Vascular [place]- Cardiac Electrophysiology  Inpatient Progress  Report  PATIENT: Diane Stuart  MEDICAL RECORD NUMBER: [id number]  DATE OF SERVICE:  5/31/2025  ATTENDING ELECTROPHYSIOLOGIST: Zeferino Loya DO   PRIMARY ELECTROPHYSIOLOGIST: Mylene Jay MD  REFERRING PHYSICIAN:  Hanh Chapman MD  CHIEF COMPLAINT: Chest pain, diaphoresis, lightheadedness    HPI: This is a 64 y.o. female with a history of osteoarthritis, status post L3-L4 and L4-L5 intervertebral, kyphoplasty of L1 in 2019 and has been on pain medications/Norco as well as Neurontin in the past. The patient says that she has not been under the care of a primary care physician for many years until she went to see Dr. Chapman on 5/7/2025.  She denies any cardiology evaluations in the past. Review of her records details history of coronary artery disease , orthostatic hypotension, hypertension which the patient denies.  On further review of her records it also appears that the patient was hospitalized from 12/1/2023 to 12/7/2023 for syncope and collapse which was felt to be secondary to alcohol intoxication plus amitriptyline use.  Cardiology was consulted at the time and the amitriptyline that she was previously taking for depression was stopped.  She was then started on Midodrine 2.5 mg 3 times daily and the dizziness had improved with no further syncopal episodes.  There was also mention of a TIA in 2021 with no residual neurological deficits.  She is on no medications for the same. She has a history of excessive alcohol use with currently drinks about 4 drinks every other weekend.  Ongoing smoking habit-5 to 10 cigarettes a day.  She also admits to marijuana use regularly in the recent past. On extensive review of all her admissions in the hospital since 2019, it appears that the patient has had multiple hospitalizations for falls and syncopal episodes which have been felt to be secondary to alcohol 
[facility]  SURGICAL INTENSIVE CARE UNIT    CRITICAL CARE ATTENDING PROGRESS NOTE    I have examined the patient, reviewed the record, and discussed the case with the APN/  Resident. I have reviewed all relevant labs and imaging data.    Please refer to the  APN/ resident's note. I agree with the  assessment and plan with the following corrections/ additions. The following summarizes my clinical findings and independent assessment.     CC: Critical care management for delirium tremens    HOSPITAL COURSE:  5/17-critical care consulted for delirium tremens and poor IV access  5/18-since during phenobarbital, patient is much more calm  EXAM:  Awake, calm  No apparent distress  Heart regular rate rhythm  Lungs coarse  Abdomen soft nontender nondistended    LABS/ IMAGING:  -I personally reviewed the patient's Labs from 5/18/2025  CBC with Differential:    Lab Results   Component Value Date/Time    WBC 14.8 05/18/2025 04:42 AM    RBC 3.75 05/18/2025 04:42 AM    HGB 12.1 05/18/2025 04:42 AM    HCT 35.8 05/18/2025 04:42 AM     05/18/2025 04:42 AM    MCV 95.5 05/18/2025 04:42 AM    MCH 32.3 05/18/2025 04:42 AM    MCHC 33.8 05/18/2025 04:42 AM    RDW 12.9 05/18/2025 04:42 AM    NRBC 1.7 01/21/2023 11:56 AM    LYMPHOPCT 15 05/18/2025 04:42 AM    MONOPCT 8 05/18/2025 04:42 AM    MYELOPCT 0.9 11/12/2021 04:09 PM    EOSPCT 1 05/18/2025 04:42 AM    BASOPCT 0 05/18/2025 04:42 AM    MONOSABS 1.13 05/18/2025 04:42 AM    LYMPHSABS 2.23 05/18/2025 04:42 AM    EOSABS 0.07 05/18/2025 04:42 AM    BASOSABS 0.05 05/18/2025 04:42 AM     CMP:    Lab Results   Component Value Date/Time     05/18/2025 04:42 AM    K 3.9 05/18/2025 04:42 AM    K 3.9 05/05/2023 10:29 PM    CL 97 05/18/2025 04:42 AM    CO2 28 05/18/2025 04:42 AM    BUN 6 05/18/2025 04:42 AM    CREATININE 0.7 05/18/2025 04:42 AM    GFRAA >60 08/05/2022 03:05 PM    LABGLOM >90 05/18/2025 04:42 AM    LABGLOM >60 12/26/2023 09:05 PM    GLUCOSE 113 
MRI check list complete, information provided by daughter Emily.  
MRI screening needs to be completed.    Please make sure your patient can lay flat onto their back. (not kyphotic/SOB/contracted, etc) -if not patient cannot come down to MRI.    If it is stated on screening that they need medicated for claustrophobia/pain/holding still -have the doctor put med orders in.    If patient is a prisoner- check with patients security they have the correct MRI accommodations done as in correct flex/plastic cuffs. Also let MRI know on screening patient is a prisoner.    If patient is on a IV drip that they cannot come off of for MRI- please inform MRI so we can coordinate with the RN that will come down to switch to MRI safe pump.    Thank you from your MRI team.    
[facility]- [facility] [place] [place] [facility]  Consultation Report  PATIENT: Diane Stuart  MEDICAL RECORD NUMBER: [id number]  DATE OF SERVICE:  5/16/2025  ATTENDING ELECTROPHYSIOLOGIST: Mylene Jay MD  PRIMARY ELECTROPHYSIOLOGIST: Mylene Jay MD  REFERRING PHYSICIAN: No ref. provider found and Hanh Chapman MD  CHIEF COMPLAINT: Chest pain, diaphoresis, lightheadedness    HPI: This is a 64 y.o. female with a history of osteoarthritis, status post L3-L4 and L4-L5 intervertebral, kyphoplasty of L1 in 2019 and has been on pain medications/Norco as well as Neurontin in the past.  The patient says that she has not been under the care of a primary care physician for many years until she went to see Dr. Chapman on 5/7/2025.  She denies any cardiology evaluations in the past.  Review of her records details history of coronary artery disease , orthostatic hypotension, hypertension which the patient denies.  On further review of her records it also appears that the patient was hospitalized from 12/1/2023 to 12/7/2023 for syncope and collapse which was felt to be secondary to alcohol intoxication plus amitriptyline use.  Cardiology was consulted at the time and the amitriptyline that she was previously taking for depression was stopped.  She was then started on Midodrine 2.5 mg 3 times daily and the dizziness had improved with no further syncopal episodes.  There was also mention of a TIA in 2021 with no residual neurological deficits.  She is on no medications for the same.  She has a history of excessive alcohol use with currently drinks about 4 drinks every other weekend.  Ongoing smoking habit-5 to 10 cigarettes a day.  She also admits to marijuana use regularly in the recent past.  On extensive review of all her admissions in the hospital since 2019, it appears that the patient has had multiple hospitalizations for falls and syncopal episodes which have been felt to be secondary to 
[facility] - Family Medicine Inpatient   Resident Progress Note    S:  Hospital day: 13   Brief Synopsis: Diane Stuart is a 64 y.o. female with a PMH of CAD, asthma, chronic pruritus, TIA in 2021, alcohol use disorder, orthostatic hypotension, GERD and chronic back pain who presents to ED for Rash and Dizziness. Patient also has recurrent intermittent chest pain, dizziness and syncope. Last syncopal event several months ago. Patient was previously evaluated with a Zio patch in 2023 which showed NSR with occasional PVC and PACs. Patient also has a chronic recurrent history of generalized pruritus for which she was evaluated by dermatology and was prescribed a cream. Also complains of recent weight loss. Patient is an alcohol drinker, smokes cigarettes daily and marijuana occasionally. ED work-up was remarkable for elevated bilirubin and EKG which showed NSR with frequent PVCs and PACs, left atrial enlargement and ST and T wave abnormality. While in the ED, patient developed several bouts of non-sustained Vtach. EP was consulted and patient was started on lidocaine drip and magnesium. Cardiology was consulted.  Patient was admitted to CVICU for further management. Repeat ECHO showed normal left ventricular systolic function,  EF of 60% and indeterminate diastolic function. LHC/Coronary angiogram showed LAD and RCA severely tortuous, no significant stenosis, and no intervention required. Patient had a round of Torsades on 5/15 lasting about 30 seconds before reverting to NSR. Phenobarbital taper started for withdrawal. Patient was started on Inderal 10 mg TID. Per EP, patient will need ICD before discharge. Neurology was consulted for delirium and agitation. MRI WO Contrast did not show any acute abnormalities.    Overnight/interim:   No acute overnight events. Received 6 mg Ativan in 24 hours  Awake, disoriented, hallucinating, not following commands.  Patient lost IV access overnight, will need new access   LP and MRI W 
Message sent to Dr. Clayton Bland due to patients daughter at bedside requesting to talk to doctor.  
Message sent to Dr. Clayton Tobias due patients daughter asking about blood cultures. Received message that blood cultures were not done due there was no indication for blood cultures thus far.  
Message sent to Family medicine regarding IV access and a possible midline.  
Message sent to family medicine resident regarding pts LP results and ID consult. Family also requesting transferred to [facility], message also relayed to Family medicine.   
Message sent to family medicine resident requesting PRN mag and potassium replacement.   
Messaged Dr Jones regarding patient being more agitated, and family wanting to discuss plan of care with attending. I also asked her to review medications with family because they are adamantly refusing Ativan.     1015 Dr. Jones and residents at bedside wit family and patient   
Messaged Dr. Loya via [employer] to notify that patient's heart rate is intermittently in the upper 50's but primarily sustains in the 60's. Nadolol okay to give still at this time.  
Messaged Dr. Ramsay via [employer] to notify patient requesting something else for itchiness. Also notified that patient's daughter is requesting to speak with someone at bedside.  
Messaged IV team via secure message for new access.   
Messaged Soraya Edwards NP via [employer] to clarify if Nadolol should be given at this time due to heart rate sustaining in the 50's. Okay to hold per NP.  
Messaged [facility] to request additional order for 3rd pt restraint    Electronically signed by Zena Tierney RN on 5/31/2025 at 7:14 AM    
Messaged family medicine resident regarding patient being very agitated, unable to follow commands and to be redirected.     Also spoke to resident that daughter is asking to speak to attending to review plan of care.   
[facility] - Family Medicine Inpatient   Resident Progress Note    S:  Hospital day: 17   Brief Synopsis: Diane Stuart is a 64 y.o. female with a PMH of CAD, asthma, chronic pruritus, TIA in 2021, alcohol use disorder, orthostatic hypotension, GERD and chronic back pain who presents to ED for Rash and Dizziness. Patient also has recurrent intermittent chest pain, dizziness and syncope. Last syncopal event several months ago. Patient was previously evaluated with a Zio patch in 2023 which showed NSR with occasional PVC and PACs. Patient also has a chronic recurrent history of generalized pruritus for which she was evaluated by dermatology and was prescribed a cream. Also complains of recent weight loss. Patient is an alcohol drinker, smokes cigarettes daily and marijuana occasionally. ED work-up was remarkable for elevated bilirubin and EKG which showed NSR with frequent PVCs and PACs, left atrial enlargement and ST and T wave abnormality. While in the ED, patient developed several bouts of non-sustained Vtach. EP was consulted and patient was started on lidocaine drip and magnesium. Cardiology was consulted.  Patient was admitted to CVICU for further management. Repeat ECHO showed normal left ventricular systolic function,  EF of 60% and indeterminate diastolic function. LHC/Coronary angiogram showed LAD and RCA severely tortuous, no significant stenosis, and no intervention required. Patient had a round of Torsades on 5/15 lasting about 30 seconds before reverting to NSR. Phenobarbital taper started for withdrawal. Patient was started on Inderal 10 mg TID. Per EP, patient will need ICD before discharge. Neurology was consulted for delirium and agitation. MRI WO Contrast did not show any acute abnormalities. Psychiatry were consulted, recommended continuing Depakote, decreasing Ativan and opiates and starting melatonin nightly. Neurology and Psychiatry signed off. Daughter requested that patient stop receiving any 
[facility] - Family Medicine Inpatient   Resident Progress Note    S:  Hospital day: 19   Brief Synopsis: Diane Stuart is a 64 y.o. female with a PMH of CAD, asthma, chronic pruritus, TIA in 2021, alcohol use disorder, orthostatic hypotension, GERD and chronic back pain who presents to ED for Rash and Dizziness. Patient also has recurrent intermittent chest pain, dizziness and syncope. Last syncopal event several months ago. Patient was previously evaluated with a Zio patch in 2023 which showed NSR with occasional PVC and PACs. Patient also has a chronic recurrent history of generalized pruritus for which she was evaluated by dermatology and was prescribed a cream. Also complains of recent weight loss. Patient is an alcohol drinker, smokes cigarettes daily and marijuana occasionally. ED work-up was remarkable for elevated bilirubin and EKG which showed NSR with frequent PVCs and PACs, left atrial enlargement and ST and T wave abnormality. While in the ED, patient developed several bouts of non-sustained Vtach. EP was consulted and patient was started on lidocaine drip and magnesium. Cardiology was consulted.  Patient was admitted to CVICU for further management. Repeat ECHO showed normal left ventricular systolic function,  EF of 60% and indeterminate diastolic function. LHC/Coronary angiogram showed LAD and RCA severely tortuous, no significant stenosis, and no intervention required. Patient had a round of Torsades on 5/15 lasting about 30 seconds before reverting to NSR. Phenobarbital taper started for withdrawal. Patient was started on Inderal 10 mg TID. Per EP, patient will need ICD before discharge. Neurology was consulted for delirium and agitation. MRI WO Contrast did not show any acute abnormalities. Psychiatry were consulted, recommended continuing Depakote, decreasing Ativan and opiates and starting melatonin nightly. Neurology and Psychiatry signed off. Daughter requested that patient stop receiving any 
Neurology consult placed via perfect serve.  Dr Mccormick taking messages  
No void x8 hrs, multiple bladder scans performed through out shift. Bladder scan showing 433 @ 0400. Patient was straight cathed per protocol utilizing sterile technique, 230 mL return of dark karin urine.  Notifed SROC of low OP, see new orders.      
[facility] - Family Medicine Inpatient   Resident Progress Note    S:  Hospital day: 2   Brief Synopsis: Diane Stuart is a 64 y.o. female with a PMH of CAD, asthma, chronic pruritus, TIA in 2021, alcohol use disorder, orthostatic hypotension, GERD and chronic back pain who presents to ED for Rash and Dizziness. Patient also has recurrent intermittent chest pain, dizziness and syncope. Last syncopal event several months ago. Patient was previously evaluated with a Zio patch in 2023 which showed NSR with occasional PVC and PACs. Patient also has a chronic recurrent history of generalized pruritus for which she was evaluated by dermatology and was prescribed a cream. Denies recent travels or sick contacts. Also complains of recent weight loss. Patient is an alcohol drinker, smokes cigarettes daily and marijuana occasionally. ED work-up was remarkable for elevated bilirubin and EKG which showed NSR with frequent PVCs and PACs, left atrial enlargement and ST and T wave abnormality. While in the ED, patient developed several bouts of non-sustained Vtach. EP was consulted and patient was started on lidocaine drip and magnesium. Cardiology was consulted.  Patient was admitted to CVICU for further management. Repeat ECHO showed normal left ventricular systolic function,  EF of 60% and indeterminate diastolic function. LHC/Coronary angiogram showed LAD and RCA severely tortuous, no significant stenosis, and no intervention required. Patient had a round of Torsades on 5/15 lasting about 30 seconds before reverting to NSR.    Overnight/interim:   Patient is disoriented, mumbling and writhing on her bed. She received 3 mg of Ativan for withdrawal. Patient is oriented to person, place and time   Patient is hypertensive   Patient had Torsade de pointes lasting about 30 sec before reverting to NSR overnight   Continues on the lidocaine ggt    Cont meds:    lidocaine 2 mg/min (05/15/25 1930)    sodium chloride       Scheduled meds:    
Notified Dr. Jay of patient having a run of torsades. Patient converted back to NSR. Patient continues to be restless and confused. Dr. Jay coming to bedside  
Nurses working.        
OCCUPATIONAL THERAPY INITIAL RE-EVALUATION    [facility] [address] [place], [place]     Date:2025                                                Patient Name: Diane Stuart  MRN: [id number]  : 1960  Room: [address] [place] [address]    Re-Evaluating OT: Nyasia Mayen OTR/L; [id number]   Initial Evaluating OT: Tory Cardoso OTR/L [id number]     Reason for Re-Eval: Pt underwent ICD placement 6/3/25 resulting in change of medical status.    Referring Provider: Diane Bella MD   Specific Provider Orders/Date: OT Eval and Treat 25     Diagnosis: Bigeminy; Dizziness; Syncope;  Prolonged Q-T interval on ECG; DTs (delirium tremens); Moderate protein-calorie malnutrition (Chronic); Chest pain due to myocardial ischemia; Arrhythmia; Shortness of breath; Long Q-T syndrome; Hallucinations, visual.    Surgery:  6/3/25 Insert ICD dual.   25 RUE PICC placement.    Pertinent Medical History:  has a past medical history of Alcohol abuse, Asthma, CAD (coronary artery disease), Closed fracture of proximal end of left humerus with routine healing, Degeneration of lumbar or lumbosacral intervertebral disc, Fall against sharp object, Gastroesophageal reflux disease, History of blood transfusion, Hypertension, Hypokalemia, ICD (implantable cardioverter-defibrillator) in place, Insomnia, Kidney stone, Nondisplaced fracture of greater tuberosity of right humerus, initial encounter for closed fracture, Orthostatic hypotension, Severe back pain, and TIA (transient ischemic attack).     Recommended Adaptive Equipment: Shower chair    Precautions:  Fall Risk, +alarms, cognition, virtual , contact isolation, PPM precautions, LUE sling/swathe 24 hours s/p ICD insertion (active order placed 25 0045)     Assessment of current deficits   [x] Functional mobility  [x]ADLs  [x] Strength               [x]Cognition   [x] Functional transfers   [x] IADLs      
Occupational Therapy  OCCUPATIONAL THERAPY INITIAL EVALUATION    [facility]   [address] [facility]        Date:2025                                                  Patient Name: Diane Stuart    MRN: [id number]    : 1960    Room: [address] [place] [address]      Evaluating OT: Tory Cardoso OTR/L [id number]       Referring Provider:Diane Bella MD      Specific Provider Orders/Date: OT eval and treat 25      Diagnosis: Dizziness , Bigeminy     Surgery: none      Pertinent Medical History:       Past Medical History:   Diagnosis Date    Alcohol abuse 2023    Asthma     CAD (coronary artery disease)     Closed fracture of proximal end of left humerus with routine healing 2019    Degeneration of lumbar or lumbosacral intervertebral disc     Fall against sharp object 1960    chest tube in hospital    Gastroesophageal reflux disease 2025    History of blood transfusion     after vaginal delivery of baby hemmorhage    Hypertension     Hypokalemia     Insomnia     Kidney stone     Nondisplaced fracture of greater tuberosity of right humerus, initial encounter for closed fracture 2019    Orthostatic hypotension     Severe back pain 2014    TIA (transient ischemic attack) 2021         Past Surgical History:   Procedure Laterality Date    ANKLE FRACTURE SURGERY Left 2023    LEFT ANKLE OPEN REDUCTION INTERNAL FIXATION performed by Memo Conde MD at [facility]    ANKLE SURGERY Left 2023    LEFT ANKLE HARDWARE REMOVAL performed by Memo Conde MD at [facility] OR    BUNIONECTOMY      Left foot    CARDIAC PROCEDURE N/A 5/15/2025    Left heart cath / coronary angiography performed by Brenton Martel DO at [facility] CARDIAC CATH LAB    CHEST TUBE INSERTION  1990    several    COLONOSCOPY  2009    KYPHOSIS SURGERY N/A 10/28/2019    KYPHOPLASTY L1 WITH VERTEBRAL BONE BIOPSY performed 
[facility] PHYSICIANS- The Heart and Vascular [place]- [place] Electrophysiology  Inpatient Progress  Report  PATIENT: Diane Stuart  MEDICAL RECORD NUMBER: [id number]  DATE OF SERVICE:  5/23/2025  ATTENDING ELECTROPHYSIOLOGIST: Tamiko Schuster MD   PRIMARY ELECTROPHYSIOLOGIST: Mylene Jay MD  REFERRING PHYSICIAN:  Hanh Chapman MD  CHIEF COMPLAINT: Chest pain, diaphoresis, lightheadedness    HPI: This is a 64 y.o. female with a history of osteoarthritis, status post L3-L4 and L4-L5 intervertebral, kyphoplasty of L1 in 2019 and has been on pain medications/Norco as well as Neurontin in the past.  The patient says that she has not been under the care of a primary care physician for many years until she went to see Dr. Chapman on 5/7/2025.  She denies any cardiology evaluations in the past.  Review of her records details history of coronary artery disease , orthostatic hypotension, hypertension which the patient denies.  On further review of her records it also appears that the patient was hospitalized from 12/1/2023 to 12/7/2023 for syncope and collapse which was felt to be secondary to alcohol intoxication plus amitriptyline use.  Cardiology was consulted at the time and the amitriptyline that she was previously taking for depression was stopped.  She was then started on Midodrine 2.5 mg 3 times daily and the dizziness had improved with no further syncopal episodes.  There was also mention of a TIA in 2021 with no residual neurological deficits.  She is on no medications for the same.  She has a history of excessive alcohol use with currently drinks about 4 drinks every other weekend.  Ongoing smoking habit-5 to 10 cigarettes a day.  She also admits to marijuana use regularly in the recent past.  On extensive review of all her admissions in the hospital since 2019, it appears that the patient has had multiple hospitalizations for falls and syncopal episodes which have been felt to be secondary to alcohol 
Patient admitted to [facility] with the following belongings: Wallet, Purse, Pants, Shoes, and Jacket. The following belongings were sent home with the patient's family:  None - belongings noted on admission remain in patient's room..  
Patient becoming very agitated.  Messaged [facility] Med resident via [employer] for something else d/t already havinig IV Ativan.  Dr Dominguez taking messages  
Patient continuing to refuse all meds, vitals, and care.   
Patient could not follow demands for MRI- kept wanting turn milind on side with head down. Used straps/cushions etc. Already medicated-Limited study best images obtainable  
Patient currently sleeping, when awake patient agitated and restless. Patient had a episode of kicking and trying to get out of bed. Still requires restraints at this time.  
Patient had a run of Torsades lasting aprox. 30 seconds before converting back to NSR. Dr. Jay notified. See new orders.   
Patient had been incontinent of urine after refusing to use a bedpan. While trying to provide incontinence care, patient became extremely combative, kicking out with both legs and yelling obscenities at staff. Bilateral soft ankle restraints were applied to prevent harm to caregivers and to patient. She did not respond to redirection or education.  
Patient okay to come off monitor to get shower per Dr. Ramsay.   
Patient physically abusive towards staff and daughter at bedside. Pt had an episode of incontinence of urine and when staff attempted to changer her and provide skin care patient started kicking and swinging, pulled off her monitor and clothes, screaming out inappropriate conversation. After 30 minutes at the bedside the patient calmed down unexpectedly for unknown reasons and is resting comfortably  
Patient physically and verbally aggressive towards her daughter and staff members. Patient kicking, hitting, spitting, attempting to climb oob pulling off clothes and monitor. 4 point soft restraints required for patient, family, and staff safety. Order obtained. Resident to bedside to speak with patients daughter/poa  
Patient redirected by telesitter as she continues to attempt to climb out of bed. Attempted to redirect patient and she became physically aggressive towards staff again hitting and kicking stating she is going to kill everyone here at the . Unable to redirect or reorient patient she continues to scream out verbal threats. 4 point soft restraints initiated for patient and staff safety. JOSEPH messaged via perfect serve for orders.  
Patient refusing all meds at this time    
Patient refusing repeat blood sugar check after drinking juice, attempted to kick and swing. Resident notified.   
Patient returned from lumbar puncture, currently lying flat at this time on right side. Dressing C/D/I. Spoke with IR department about timing of tunneled cath and they stated they will probably not get to patient today. Resident notified via [employer] serve message.   
Patient returned to CVIC from CVL. TR band to R Radial. Lido gtt infusing. Vital signs stable.   
Patient screaming out in pain but will still not cooperate and take any water.  Critical care resident notified via perfect serve. Awaiting orders   
Patient still requiring bilateral soft wrist nonviolent restraints and left ankle due to patient being agitated, aggressive to staff verbally, and trying to remove medical devices. Alternatives such as family support, medication, and verbal redirection were attempted. Patient continues to meet criteria for restraints.  
Patient to CVL at this time.   
[facility] - Family Medicine Inpatient   Resident Progress Note    S:  Hospital day: 1   Brief Synopsis: Diane Stuart is a 64 y.o. female with a PMH of CAD, asthma, chronic pruritus, TIA in 2021, alcohol use disorder, orthostatic hypotension, GERD and chronic back pain who presents to ED for Rash and Dizziness. Patient also has recurrent intermittent chest pain, dizziness and syncope. Last syncopal event several months ago. Patient was previously evaluated with a Zio patch in 2023 which showed NSR with occasional PVC and PACs. Patient also has a chronic recurrent history of generalized pruritus for which she was evaluated by dermatology and was prescribed a cream. Denies recent travels or sick contacts. Also complains of recent weight loss. Patient is an alcohol drinker, smokes cigarettes daily and marijuana occasionally. ED work-up was remarkable for elevated bilirubin and EKG which showed NSR with frequent PVCs and PACs, left atrial enlargement and ST and T wave abnormality. While in the ED, patient developed several bouts of non-sustained Vtach. EP was consulted and patient was started on lidocaine drip and magnesium. Cardiology was consulted.  Patient was admitted to CVICU for further management.     Overnight/interim:   Patient had a syncopal episode overnight. Additionally, she had several beats of non-sustained V tach. It was aborted with lidocaine and mag. She had a second round of V Tach and was started on lidocaine ggt. EP was consulted and patient was transferred to CVICU  Patient states she is feeling better and her chest pain is gone  States her itchiness improved as well.      Cont meds:    lidocaine 2 mg/min (05/15/25 0230)    sodium chloride       Scheduled meds:    hydrocortisone   Topical BID    thiamine  100 mg Oral Daily    sodium chloride flush  5-40 mL IntraVENous 2 times per day    enoxaparin  30 mg SubCUTAneous Daily    folic acid  1 mg Oral Daily    aspirin  81 mg Oral Daily    methocarbamol  
Perfect serve message sent to family [facility] resident to notify of need for npo order if patient needs to go to IR. As well as blood sugar being 57 with no IV access.       Returned message from resident to give juice.   
Physical Therapy  Initial Assessment     Name: Diane Stuart  : 1960  MRN: [id number]      Date of Service: 2025    Evaluating PT: Dev Stinson, PT, DPT [id number]      Room #:  7424/7424-A  Diagnosis:  Shortness of breath [R06.02]  Dizziness [R42]  Lightheadedness [R42]  Arrhythmia [I49.9]  Ventricular tachycardia (HCC) [I47.20]  Bigeminy [I49.8]  History of TIA (transient ischemic attack) [Z86.73]  Chest pain, unspecified type [R07.9]  PMHx/PSHx:   has a past medical history of Alcohol abuse, Asthma, CAD (coronary artery disease), Closed fracture of proximal end of left humerus with routine healing, Degeneration of lumbar or lumbosacral intervertebral disc, Fall against sharp object, Gastroesophageal reflux disease, History of blood transfusion, Hypertension, Hypokalemia, Insomnia, Kidney stone, Nondisplaced fracture of greater tuberosity of right humerus, initial encounter for closed fracture, Orthostatic hypotension, Severe back pain, and TIA (transient ischemic attack).  Procedure/Surgery:  none this admission  Precautions:  Fall risk, alarms, Bedside   Equipment Needs:  TBD    SUBJECTIVE:  Unable to obtain subjective interview due to pt cognition/ inability to verbalize answers to questions. Per chart, pt lives alone.    OBJECTIVE:   Initial Evaluation  Date: 25 Treatment Date: Short Term/ Long Term   Goals   AM-PAC 6 Clicks      Was pt agreeable to Eval/treatment? Yes      Does pt have pain? Unable to answer; no indications of pain     Bed Mobility  Rolling: MaxA x2   Supine to sit: MaxA x2   Sit to supine: MaxA x2   Scooting: Dependent to HOB  Rolling: Fritz  Supine to sit: Fritz  Sit to supine: Fritz  Scooting: Fritz   Transfers Unable  Sit to stand: Fritz  Stand to sit: Fritz  Stand pivot: Fritz with no AD   Ambulation   Unable  100 feet with Fritz with no AD   Stair negotiation: ascended and descended NT  12 step(s) with 1 rail(s) with Fritz with no AD   ROM BUE: Refer to OT 
Physical Therapy  Reevaluation    Name: Diane Stuart  : 1960  MRN: [id number]      Date of Service: 2025    Evaluating PT: Dev Stinson, PT, DPT [id number]      Room #:  7416/7416-A  Diagnosis:  Shortness of breath [R06.02]  Dizziness [R42]  Lightheadedness [R42]  Arrhythmia [I49.9]  Ventricular tachycardia (HCC) [I47.20]  Bigeminy [I49.8]  History of TIA (transient ischemic attack) [Z86.73]  Chest pain, unspecified type [R07.9]  PMHx/PSHx:   has a past medical history of Alcohol abuse, Asthma, CAD (coronary artery disease), Closed fracture of proximal end of left humerus with routine healing, Degeneration of lumbar or lumbosacral intervertebral disc, Fall against sharp object, Gastroesophageal reflux disease, History of blood transfusion, Hypertension, Hypokalemia, ICD (implantable cardioverter-defibrillator) in place, Insomnia, Kidney stone, Nondisplaced fracture of greater tuberosity of right humerus, initial encounter for closed fracture, Orthostatic hypotension, Severe back pain, and TIA (transient ischemic attack).  Procedure/Surgery:  ICD placement 6/3  Precautions:  Fall Risk, Pacemaker precautions, Sling and swathe, TSM, Alarm  Equipment Needs:  NA    SUBJECTIVE:    Pt lives alone. Pt ambulated without AD prior to admission.    Reason for reevaluation: ICD placement on 6/3    OBJECTIVE:   Reevaluation  Date: 25 Treatment Date: Short Term/ Long Term   Goals   AM-PAC 6 Clicks      Was pt agreeable to Eval/treatment? Yes     Does pt have pain? LUE pain s/p ICD placement     Bed Mobility  Rolling: NT  Supine to sit: Fritz  Sit to supine: NT  Scooting: Fritz  Rolling: Independent   Supine to sit: Independent   Sit to supine: Independent   Scooting: Independent    Transfers Sit to stand: Fritz  Stand to sit: Fritz  Stand pivot: Fritz with HHA  Sit to stand: Independent   Stand to sit: Independent   Stand pivot: Supervision with AAD   Ambulation   25 feet x2 with HHA with Fritz  >200 feet with AAD 
Physical Therapy  Treatment    Name: Diane Stuart  : 1960  MRN: [id number]      Date of Service: 2025    Evaluating PT: Dev Stinson, PT, DPT [id number]      Room #:  7416/7416-A  Diagnosis:  Shortness of breath [R06.02]  Dizziness [R42]  Lightheadedness [R42]  Arrhythmia [I49.9]  Ventricular tachycardia (HCC) [I47.20]  Bigeminy [I49.8]  History of TIA (transient ischemic attack) [Z86.73]  Chest pain, unspecified type [R07.9]  PMHx/PSHx:   has a past medical history of Alcohol abuse, Asthma, CAD (coronary artery disease), Closed fracture of proximal end of left humerus with routine healing, Degeneration of lumbar or lumbosacral intervertebral disc, Fall against sharp object, Gastroesophageal reflux disease, History of blood transfusion, Hypertension, Hypokalemia, Insomnia, Kidney stone, Nondisplaced fracture of greater tuberosity of right humerus, initial encounter for closed fracture, Orthostatic hypotension, Severe back pain, and TIA (transient ischemic attack).  Procedure/Surgery:  none this admission  Precautions:  Fall risk, alarms, Bedside   Equipment Needs:  TBD    SUBJECTIVE:  Unable to obtain subjective interview due to pt cognition/ inability to verbalize answers to questions. Per chart, pt lives alone.    OBJECTIVE:   Initial Evaluation  Date: 25 Treatment Date: 25 Short Term/ Long Term   Goals   AM-PAC 6 Clicks 8/24 15/24    Was pt agreeable to Eval/treatment? Yes  Yes    Does pt have pain? Unable to answer; no indications of pain No complaints of pain    Bed Mobility  Rolling: MaxA x2   Supine to sit: MaxA x2   Sit to supine: MaxA x2   Scooting: Dependent to HOB Rolling: NT  Supine to sit: NT  Sit to supine: NT  Scooting: NT Rolling: Fritz  Supine to sit: Fritz  Sit to supine: Fritz  Scooting: Fritz   Transfers Unable Sit to stand: Fritz  Stand to sit: Fritz  Stand pivot: Fritz with WW Sit to stand: Fritz  Stand to sit: Fritz  Stand pivot: Fritz with no AD   Ambulation   Unable  
Physical Therapy  Treatment    Name: Diane Stuart  : 1960  MRN: [id number]      Date of Service: 6/3/2025    Evaluating PT: Dev Stinson, PT, DPT [id number]      Room #:  7416/7416-A  Diagnosis:  Shortness of breath [R06.02]  Dizziness [R42]  Lightheadedness [R42]  Arrhythmia [I49.9]  Ventricular tachycardia (HCC) [I47.20]  Bigeminy [I49.8]  History of TIA (transient ischemic attack) [Z86.73]  Chest pain, unspecified type [R07.9]  PMHx/PSHx:   has a past medical history of Alcohol abuse, Asthma, CAD (coronary artery disease), Closed fracture of proximal end of left humerus with routine healing, Degeneration of lumbar or lumbosacral intervertebral disc, Fall against sharp object, Gastroesophageal reflux disease, History of blood transfusion, Hypertension, Hypokalemia, Insomnia, Kidney stone, Nondisplaced fracture of greater tuberosity of right humerus, initial encounter for closed fracture, Orthostatic hypotension, Severe back pain, and TIA (transient ischemic attack).  Procedure/Surgery:  none this admission  Precautions:  Fall risk, alarms, TSM  Equipment Needs:  TBD    SUBJECTIVE:  Unable to obtain subjective interview due to pt cognition/ inability to verbalize answers to questions. Per chart, pt lives alone.    OBJECTIVE:   Initial Evaluation  Date: 25 Treatment Date: 6/3/25 Short Term/ Long Term   Goals   AM-PAC 6 Clicks     Was pt agreeable to Eval/treatment? Yes  Yes    Does pt have pain? Unable to answer; no indications of pain No complaints of pain    Bed Mobility  Rolling: MaxA x2   Supine to sit: MaxA x2   Sit to supine: MaxA x2   Scooting: Dependent to HOB Rolling: NT  Supine to sit: SBA  Sit to supine: NT  Scooting: SBA Rolling: Fritz  Supine to sit: Fritz  Sit to supine: Fritz  Scooting: Fritz   Transfers Unable Sit to stand: Fritz  Stand to sit: Fritz  Stand pivot: Fritz with WW Sit to stand: Fritz  Stand to sit: Fritz  Stand pivot: Fritz with no AD   Ambulation   Unable 25 feet x2 with 
Pi became disoriented, agitated, and aggressive  with auditory and visual hallucinations. CIWA scale was assessed. Administered Ativan 3 mg. Plan of care ongoing.   
Psychiatry consult called to Luci per perfect serv patient added to census.   
Pt admitted to cvicu w lido gtt post vtach. Connected to monitor, stable VS, AxOx4  
Pt continues to call out in pain, attempted again to get pt to take PO pill and was successful   
Pt continues to pull at lines and tubes when restraints are removed.  
Pt crying out in pain, history of chronic back pain, attempted to give pt norco for pain but pt would not even take water, not following any commands just keeps trying to chew and suck on thumb.  Repositioned pt to try to alleviate pain. Critical care resident notified via perfect serve.   
[facility] - Family Medicine Inpatient   Resident Progress Note    S:  Hospital day: 10   Brief Synopsis: Diane Stuart is a 64 y.o. female with a PMH of CAD, asthma, chronic pruritus, TIA in 2021, alcohol use disorder, orthostatic hypotension, GERD and chronic back pain who presents to ED for Rash and Dizziness. Patient also has recurrent intermittent chest pain, dizziness and syncope. Last syncopal event several months ago. Patient was previously evaluated with a Zio patch in 2023 which showed NSR with occasional PVC and PACs. Patient also has a chronic recurrent history of generalized pruritus for which she was evaluated by dermatology and was prescribed a cream. Also complains of recent weight loss. Patient is an alcohol drinker, smokes cigarettes daily and marijuana occasionally. ED work-up was remarkable for elevated bilirubin and EKG which showed NSR with frequent PVCs and PACs, left atrial enlargement and ST and T wave abnormality. While in the ED, patient developed several bouts of non-sustained Vtach. EP was consulted and patient was started on lidocaine drip and magnesium. Cardiology was consulted.  Patient was admitted to CVICU for further management. Repeat ECHO showed normal left ventricular systolic function,  EF of 60% and indeterminate diastolic function. LHC/Coronary angiogram showed LAD and RCA severely tortuous, no significant stenosis, and no intervention required. Patient had a round of Torsades on 5/15 lasting about 30 seconds before reverting to NSR. Phenobarbital taper started for withdrawal. Patient was started on Inderal 10 mg TID. Per EP, patient may need ICD before discharge. Neurology was consulted for delirium and agitation.    Overnight/interim:   No acute overnight events. Received Ativan 2 mg overnight  Patient seen at bedside. Somnolent, awakens to voice, follows commands minimally. In soft wrist restraints      Cont meds:    dextrose      sodium chloride 50 mL/hr at 05/24/25 0057    
Received call from general surgery resident and after review of the patient chart, and her behaviors, suggested that patient have a tunneled catheter placed in IR. Message sent to family medicine and order was placed for IR  
[facility]- [facility] [place] [place] [facility]  Consultation Report  PATIENT: Diane Stuart  MEDICAL RECORD NUMBER: [id number]  DATE OF SERVICE:  5/18/2025  ATTENDING ELECTROPHYSIOLOGIST: Mylene Jay MD  PRIMARY ELECTROPHYSIOLOGIST: Mylene Jay MD  REFERRING PHYSICIAN: No ref. provider found and Hanh Chapman MD  CHIEF COMPLAINT: Chest pain, diaphoresis, lightheadedness    HPI: This is a 64 y.o. female with a history of osteoarthritis, status post L3-L4 and L4-L5 intervertebral, kyphoplasty of L1 in 2019 and has been on pain medications/Norco as well as Neurontin in the past.  The patient says that she has not been under the care of a primary care physician for many years until she went to see Dr. Chapman on 5/7/2025.  She denies any cardiology evaluations in the past.  Review of her records details history of coronary artery disease , orthostatic hypotension, hypertension which the patient denies.  On further review of her records it also appears that the patient was hospitalized from 12/1/2023 to 12/7/2023 for syncope and collapse which was felt to be secondary to alcohol intoxication plus amitriptyline use.  Cardiology was consulted at the time and the amitriptyline that she was previously taking for depression was stopped.  She was then started on Midodrine 2.5 mg 3 times daily and the dizziness had improved with no further syncopal episodes.  There was also mention of a TIA in 2021 with no residual neurological deficits.  She is on no medications for the same.  She has a history of excessive alcohol use with currently drinks about 4 drinks every other weekend.  Ongoing smoking habit-5 to 10 cigarettes a day.  She also admits to marijuana use regularly in the recent past.  On extensive review of all her admissions in the hospital since 2019, it appears that the patient has had multiple hospitalizations for falls and syncopal episodes which have been felt to be secondary to 
Report called to Magalie on [phone].  
Resting. Could not assess.    Reached out to family by phone.  Could not connect with anyone.    Will continue to pray.    
Resting. Unable to assess.    
Restless.   Emotive.  Not in a place to assess.    Offered prayer.  
Restraints discontinued @ 8 am, patient asleep in in bed.  
S:  Hospital day: 3    HPI:  Diane Stuart is a 65 yo female with PMH of CAD, asthma, chronic pruritus, TIA in 2021, alcohol use disorder, tobacco use disorder, orthostatic hypotension, GERD, and chronic back pain who presented to the ED on 5/14 for rash and dizziness. She reported intermittent chest pain, dizziness, and syncope over the past few months. In the ED on 5/14, patient developed several episodes of nonsustained Vtach and was started on lidocaine drip and magnesium. Repeat echo on 5/15 revealed EF 60%, normal LV systolic function, indeterminate diastolic function. Coronary angiogram showed no significant stenosis and no intervention required. Patient also had a round of Torsades on 5/15 lasting ~30 seconds before reverting to normal sinus rhythm.     Overnight/Today:  Upon evaluation of the patient today, she is confused, agitated, and unable to answer questions. She is being restrained by staff due to agitation, IV lines were pulled out. She is hypertensive (/103). Patient did not have any seizures but did appear to be having hallucinations. Midline was being placed during rounding. She is receiving lidocaine 50mg and was made NPO. We discussed the case with the SICU attending Dr. Phillips regarding the case.    O:   Blood pressure (!) 144/103, pulse 92, temperature 97.4 °F (36.3 °C), temperature source Temporal, resp. rate 17, height 1.575 m (5' 2.01\"), weight 48.1 kg (106 lb 1.6 oz), SpO2 96%.     Heart: normal rate and regular rhythm  Lungs: pulmonary effort normal, normal bowel sounds  Abdomen: soft, nontender, nondistended, normoactive bowel sounds  Neurological: patient is confused, agitated, and unable to answer questions    Plan:     Alcohol use disorder with severe withdrawal, altered mental status  - Family states patient is dependent on alcohol everyday  - Patient is confused, agitated, disoriented, and unable to answer questions. She is not actively having seizures. Hallucinations seem to 
SURGICAL INTENSIVE CARE  PROGRESS NOTE    Date of admission:  5/14/2025  Reason for ICU transfer:  Critical access hospital, DT      HOSPITAL COURSE:  Admitted for CP, syncope, and episodes of polymorphic VT, she has hx of multiple syncopal episodes and excessive etoh intake.   5/-35-adontxaj care consulted for delirium tremens on CIWA and poor IV access due to removal of lines by patient in DT.  5/18-25-since during phenobarbital, patient is much more calm, QT is prolonged. Monitoring closely.  5/19/25  - Still seems agitated and confused but much improved.Denies any pain, palpitation or any symptoms. Can't recall what brought her in to the hospital. QT remains prolonged.  5/20/25- Qtc improved to 499. patient has episodes of agitation. CT head stable. Phenobarb within normal range.  5/21-25- Overnight, vitals normal. Awaiting lidocaine concentration. EP stopped lidocaine. QTC today to 600. Labs improved. net +1.1L. Patient alert today. Did not appear to be agitated this morning. She was confused but answered questions. Initially stated she does not drink everyday and then said she drinks 3 beers daily. Denies chest pain and shortness of breath.  5/22/25- overnight, continues to be agitated and verbally aggressive towards staff. Does not respond to redirection. Neuro dc phenobarb and decreased depakote. Vitals normal.     PHYSICAL EXAM:  BP (!) 129/92   Pulse 69   Temp 97.7 °F (36.5 °C) (Temporal)   Resp 21   Ht 1.575 m (5' 2.01\")   Wt 49.1 kg (108 lb 3.9 oz)   SpO2 97%   BMI 19.79 kg/m²     GENERAL:  NAD. Asleep but answers simple questions  HEAD:  Normocephalic, atraumatic.   EYES:   No scleral icterus. PERRLA.  LUNGS:  No increased work of breathing. CTAB.  CARDIOVASCULAR: Warm throughout. Regular rate  ABDOMEN:  Soft, non distended, non tender. No guarding, rigidity, rebound.  EXTREMITIES:   MAEx4. Atraumatic. No LE edema.  SKIN:  Warm and dry  NEUROLOGIC:  appears confused. Wakes to name, converses breifly, follows 
SURGICAL INTENSIVE CARE  PROGRESS NOTE    Date of admission:  5/14/2025  Reason for ICU transfer:  [facility],       HOSPITAL COURSE:  Admitted for CP, syncope, and episodes of polymorphic VT, she has hx of multiple syncopal episodes and excessive etoh intake.   5/-78-nolfebpi care consulted for delirium tremens on CIWA and poor IV access due to removal of lines by patient in DT.  5/18-25-since during phenobarbital, patient is much more calm, QT is prolonged. Monitoring closely.  5/19/25  - Still seems agitated and confused but much improved.Denies any pain, palpitation or any symptoms. Can't recall what brought her in to the hospital. QT remains prolonged.    5/20/25- Overnight, vitals fully normal. C/o exacerbation of chronic back pain. But improved confusion per nurse. Pt received oral pain meds overnight. Today the patient appeared to be less confused. Recalled that her sister brought her in to the hospital but couldn't recall what for. Complained of back pain. Noted it was in the lower back. Pointed to the right lumbar region and fell asleep. Area was not tender to palpation and did not wake her or add distress.   On repeat examination, patient was agitated and physically aggressive. CT head ordered. Awaiting phenobarb lvl.      PHYSICAL EXAM:  BP (!) 138/100   Pulse 57   Temp 97.8 °F (36.6 °C) (Axillary)   Resp 19   Ht 1.575 m (5' 2.01\")   Wt 48.1 kg (106 lb 1.6 oz)   SpO2 99%   BMI 19.40 kg/m²     GENERAL:  NAD. Asleep but answers simple questions  HEAD:  Normocephalic, atraumatic.   EYES:   No scleral icterus. PERRLA.  LUNGS:  No increased work of breathing. CTAB.  CARDIOVASCULAR: Warm throughout. Regular rate  ABDOMEN:  Soft, non distended, non tender. No guarding, rigidity, rebound.  EXTREMITIES:   MAEx4. Atraumatic. No LE edema.  SKIN:  Warm and dry  NEUROLOGIC:  appears confused. Wakes to name, follows commands.     ASSESSMENT / PLAN:  Neuro:  Delirium tremens  Improved with pheobarb - lvl 
SURGICAL INTENSIVE CARE  PROGRESS NOTE    Date of admission:  5/14/2025  Reason for ICU transfer:  [facility],       HOSPITAL COURSE:  Admitted for CP, syncope, and episodes of polymorphic VT, she has hx of multiple syncopal episodes and excessive etoh intake.   5/-80-iesivrib care consulted for delirium tremens on CIWA and poor IV access due to removal of lines by patient in DT.  5/18-25-since during phenobarbital, patient is much more calm, QT is prolonged. Monitoring closely.  5/19/25  - Still seems agitated and confused but much improved.Denies any pain, palpitation or any symptoms. Can't recall what brought her in to the hospital. QT remains prolonged.  5/20/25- Qtc improved to 499. patient has episodes of agitation. CT head stable. Phenobarb within normal range.  5/21-25- Overnight, vitals normal. Awaiting lidocaine concentration. EP stopped lidocaine. QTC today to 600. Labs improved. net +1.1L. Patient alert today. Did not appear to be agitated this morning. She was confused but answered questions. Initially stated she does not drink everyday and then said she drinks 3 beers daily. Denies chest pain and shortness of breath.    PHYSICAL EXAM:  BP 95/66   Pulse 60   Temp 97.9 °F (36.6 °C) (Temporal)   Resp 20   Ht 1.575 m (5' 2.01\")   Wt 49.1 kg (108 lb 3.9 oz)   SpO2 95%   BMI 19.79 kg/m²     GENERAL:  NAD. Asleep but answers simple questions  HEAD:  Normocephalic, atraumatic.   EYES:   No scleral icterus. PERRLA.  LUNGS:  No increased work of breathing. CTAB.  CARDIOVASCULAR: Warm throughout. Regular rate  ABDOMEN:  Soft, non distended, non tender. No guarding, rigidity, rebound.  EXTREMITIES:   MAEx4. Atraumatic. No LE edema.  SKIN:  Warm and dry  NEUROLOGIC:  appears confused. Wakes to name, converses breifly, follows commands.     ASSESSMENT / PLAN:  Neuro:  Delirium tremens  Improved with pheobarb - lvl today  Dose 65 mg q6h.- phrnobarb lvl 29  ETOH dependance- pETOH in process  Continue thiamine 
SURGICAL INTENSIVE CARE  PROGRESS NOTE    Date of admission:  5/14/2025  Reason for ICU transfer:  VTACH, DT.      HOSPITAL COURSE:  Admitted for CP, syncope, and episodes of polymorphic VT, she has hx of multiple syncopal episodes and excessive etoh intake.   5/-86-nyxablux care consulted for delirium tremens on CIWA and poor IV access due to removal of lines by patient in DT.  5/18-25-since during phenobarbital, patient is much more calm, QT is prolonged. Monitoring closely.  5/19/25  - Overnight, vitals largely normal. Still seems agitated an confused but much improved.Denies any pain, palpitation or any symptoms. Can't recall what brought her in to the hospital.      PHYSICAL EXAM:  /84   Pulse 78   Temp 98.4 °F (36.9 °C) (Temporal)   Resp 23   Ht 1.575 m (5' 2.01\")   Wt 48.1 kg (106 lb 1.6 oz)   SpO2 96%   BMI 19.40 kg/m²     GENERAL:  NAD. Asleep but answers simple questions  HEAD:  Normocephalic, atraumatic.   EYES:   No scleral icterus. PERRLA.  LUNGS:  No increased work of breathing. CTAB.  CARDIOVASCULAR: Warm throughout. Regular rate  ABDOMEN:  Soft, non distended, non tender. No guarding, rigidity, rebound.  EXTREMITIES:   MAEx4. Atraumatic. No LE edema.  SKIN:  Warm and dry  NEUROLOGIC:  appears confused. Wakes to name, follows commands.     ASSESSMENT / PLAN:  Neuro:  Delirium tremens   Improved with pheobarb  Dose now 65 mg q6 after loading.   Continue thiamine and folate    CV: Syncope, Vtach, Prolonged QTc- 600  In CVICU, on lidocaine drip managed by EP  Keep Mg >2 and K >4.   Keep close cardiac monitoring  Avoid all other qt prolonging mediations besides pheobarb.   Attempt to downgrade to benzos with improved mental status    Pulm: No acute issues. Encourage IS/SMI.    GI: NPO    Renal:   Hyponatremia and hypomagnesemia. UOP 430mL  Replace magnesium.   Monitor UOP & Electrolytes.    Endocrine: No acute issues. Monitor glucose. Goal < 180    MSK: No acute issues. PT/OT.    Heme: No 
[facility] - [facility] Medicine Inpatient   Resident Progress Note    S:  Hospital day: 8   Brief Synopsis: Diane Stuart is a 64 y.o.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 02/03/2014    Osteoarthritis of left knee 11/10/2010    Osteoarthritis of right knee 11/10/2010       Past Medical History:   Diagnosis Date    Alcohol abuse 12/06/2023    Asthma     CAD (coronary artery disease)     Closed fracture of proximal end of left humerus with routine healing 06/11/2019    Degeneration 
Treatment Time Out: 1225             Treatment Charges: Mins Units   Ther Ex  19114     Manual Therapy 36859     Thera Activities 94728 15 1   ADL/Home Mgt 40507 15 1   Neuro Re-ed 03646     Group Therapy      Orthotic manage/training  47622     Non-Billable Time     Total Timed Treatment 30 2     [facility] [place], [place] RAMÍREZ/L [id number]   Nyasia Mayen OTR/L; [id number]    
restrictive measures regarding activity/athletics.    Thank you for allowing me to participate in your patient's care.  Please call me if there are any questions.      Zeferino Loya, DO   Cardiac Electrophysiology  [facility]  [facility]  
other providers, reviewing records/tests, counseling/education of the patient, ordering medications/tests/procedures, coordinating care, and documenting clinical information in the EHR.       Thank you for allowing me to participate in your patient's care.  Please call me if there are any questions or concerns.      Discussed with Dr. Schuster.   Anam Fink, APRN - CNP  Cardiac Electrophysiology  [facility]  [facility] [place]: [facility]  8:10 AM  5/20/2025    Attending Physician's Statement    Patient seen with the ANP. Agree with the findings, assessment and plan. Management plan was discussed. I have personally interviewed the patient, independently performed a focused cardiac examination, reviewed the pertinent laboratory and diagnostic testing with the patient and directly participated in the medical decision-making as noted above with the following additions:     Confused and combative required restraint. Qtc improved to 499 ms this AM. Will discontinue IV Lidocaine. Continue Propanolol. Would likely benefit from cMRI and ICD implantation once stable.    I have spent a total of 30 CCT minutes with the patient and the family reviewing the above stated recommendations.  And a total of >50% of that time involved face-to-face time providing counseling and/or coordination of care with the other providers, reviewing records/tests, counseling/education of the patient, ordering medications/tests/procedures, coordinating care, and documenting clinical information in the EHR.     Tamiko Schuster MD  Cardiac Electrophysiology  [facility]  [facility] [place]  7:17 PM  5/20/2025                
QT and polymorphic VT rarely  History of syncope in the past which was felt to be secondary to EtOH use, amitriptyline  Polymorphic VT has been documented in the past.   - She is off all potential offending agents and continue to have QTc prolongation.   - Some EKG in 12/2023 showed QTc prolongation.   No further episodes of VT while being treated for alcohol withdrawal, on phenobarbital, IV Lidocaine stopped 05/20 without recurrent TdP.   - QTc shortened after propranolol (498ms 5/28/25)  - Children (Emily Mcdaniel) notified that there is possible congenital long QT  - Mag 1.5 today replacement ordered.     2.  Substance abuse--history of cocaine use in the past  EtOH excess in the past, her family informed me on the telephone that she continues to be dependent on alcohol and drinks every day  Smoker--5 to 10 cigarettes/day.   - Chronic back pain with multiple surgical intervention and ongoing use of opioids.     3.  Hypertension.  The patient however denies previous history and has not been on any medications    4. Altered mental status   - Out of proportion to withdrawal alone, MRI of brain showed no acute intracranial abnormality. Mild chronic microvascular disease.   - LP likely negative for meningitis, and MRI with contrast showing no acute findings.     Recommendations:    Avoid QT prolonging medications.  Keep potassium and magnesium at high normal levels  Attempt to resume PO Propanolol and if unable to take Propanolol give IV doses as needed.   Daily EKGs  Increase Mag replacement to BID  Possible ICD prior to discharge pending recovery of mentation.       I have spent a total of 35 minutes with the patient and the family reviewing the above stated recommendations.  And a total of >50% of that time involved face-to-face time providing counseling and/or coordination of care with the other providers, reviewing records/tests, counseling/education of the patient, ordering medications/tests/procedures, 
few weeks  Episode occurred at night , associated with diaphoresis but normal troponin levels  Normal coronary angiography as noted above    3.  Substance abuse--history of cocaine use in the past  EtOH excess in the past, her family informed me on the telephone that she continues to be dependent on alcohol and drinks every day  Smoker--5 to 10 cigarettes/day.   - Chronic back pain with multiple surgical intervention and ongoing use of opioids.     4.  Hypertension.  The patient however denies previous history and has not been on any medications    5. Altered mental status   - Out of proportion to withdrawal alone, MRI of brain ordered.   - Now on IV [place].     Recommendations:    Keep off all QT prolonging drugs  Continue IV Propanolol with holding premiers ensuring the patient receives as many doses as possible.   Daily EKGs at 0600.   Possible ICD prior to discharge pending recovery of mentation.     Thank you for allowing me to participate in your patient's care.  Please call me if there are any questions or concerns. '    I have spent a total of 10 minutes with the patient and the family reviewing the above stated recommendations.  And a total of >50% of that time involved face-to-face time providing counseling and or coordination of care with the other providers, reviewing records/tests, counseling/education of the patient, ordering medications/tests/procedures, coordinating care, and documenting clinical information in the EHR.        Seen  with Dr. Schuster.   RONAN Irving - CNP  Cardiac Electrophysiology  [facility]  [facility] [place]: [facility]  9:11 AM  5/24/2025    Attending Physician's Statement    Patient seen with the ANP. Agree with the findings, assessment and plan. Management plan was discussed. I have personally interviewed the patient, independently performed a focused cardiac examination, reviewed the pertinent laboratory and diagnostic testing

## 2025-06-04 NOTE — CARE COORDINATION
Transition of care update. Per rounds today, orders for IR removal of PICC in fluoroscopy.   S/P ICD placement 6/6/25. Discharge plan is [place] when medically stable. Precert was started yesterday per CM dept. Per liaison, can take patient back precert pending. 7000 and ambulette form is in the envelope on the soft chart. HORACIO and destination were updated. CM will follow.  Electronically signed by Addison Decker RN on 6/4/2025 at 10:51 AM    Addendum: Discharge order noted to discharge after PVR. Per RN, ok to set up after 700pm. Call to [facility] who will transport at 830 -1030 PM to [facility]. Updated pt, RN and liaison. Patient will update her own family. MB

## 2025-06-04 NOTE — ANESTHESIA POSTPROCEDURE EVALUATION
Department of Anesthesiology  Postprocedure Note    Patient: Diane Stuart  MRN: [id number]  YOB: 1960  Date of evaluation: 6/4/2025    Procedure Summary       Date: 06/03/25 [place] [facility] [place] [facility]ATH [facility]    Anesthesia Start: 1523 Anesthesia Stop: 1640    Procedure: Insert ICD dual Diagnosis:       Paroxysmal atrial fibrillation (HCC)      (Paroxysmal atrial fibrillation (HCC) [I48.0])    Providers: Tamiko Schuster MD Responsible Provider: Micaela Souza MD    Anesthesia Type: MAC ASA Status: 4            Anesthesia Type: No value filed.    Marija Phase I:      Marija Phase II:      Anesthesia Post Evaluation    Patient location during evaluation: PACU  Patient participation: complete - patient participated  Level of consciousness: awake and alert  Airway patency: patent  Nausea & Vomiting: no nausea and no vomiting  Cardiovascular status: blood pressure returned to baseline and hemodynamically stable  Respiratory status: acceptable and spontaneous ventilation  Hydration status: euvolemic  Multimodal analgesia pain management approach  Pain management: adequate    There were no known notable events for this encounter.

## 2025-06-05 NOTE — PLAN OF CARE
Problem: Discharge Planning  Goal: Discharge to home or other facility with appropriate resources  5/15/2025 2143 by Celeste Welch RN  Outcome: Progressing     Problem: Safety - Adult  Goal: Free from fall injury  5/15/2025 2143 by Celeste Welch RN  Outcome: Progressing     Problem: Pain  Goal: Verbalizes/displays adequate comfort level or baseline comfort level  5/15/2025 2143 by Celeste Welch RN  Outcome: Progressing     Problem: Nutrition Deficit:  Goal: Optimize nutritional status  5/15/2025 2143 by Celeste Welch RN  Outcome: Progressing     Problem: Cardiovascular - Adult  Goal: Maintains optimal cardiac output and hemodynamic stability  Outcome: Progressing     Problem: Skin/Tissue Integrity - Adult  Goal: Skin integrity remains intact  Outcome: Progressing  Flowsheets  Taken 5/15/2025 2142 by Celeste Welch RN  Skin Integrity Remains Intact:   Monitor for areas of redness and/or skin breakdown   Assess vascular access sites hourly   Every 4-6 hours minimum:  Change oxygen saturation probe site  Taken 5/15/2025 0800 by Susan Adams RN  Skin Integrity Remains Intact:   Monitor for areas of redness and/or skin breakdown   Assess vascular access sites hourly   Every 4-6 hours minimum:  Change oxygen saturation probe site   Every 4-6 hours:  If on nasal continuous positive airway pressure, assess nares and determine need for appliance change or resting period   Turn and reposition as indicated   Assess need for specialty bed   Positioning devices   Check visual cues for pain   Monitor skin under medical devices   Pressure redistribution bed/mattress (bed type)     Problem: Skin/Tissue Integrity - Adult  Goal: Oral mucous membranes remain intact  Outcome: Progressing     Problem: Metabolic/Fluid and Electrolytes - Adult  Goal: Electrolytes maintained within normal limits  Outcome: Progressing     Problem: Metabolic/Fluid and Electrolytes - Adult  Goal: Hemodynamic stability and optimal renal function 
  Problem: Discharge Planning  Goal: Discharge to home or other facility with appropriate resources  5/18/2025 0415 by Dominga Banda RN  Outcome: Progressing  5/17/2025 1525 by Devan Galdamez RN  Outcome: Progressing     Problem: Safety - Adult  Goal: Free from fall injury  5/18/2025 0415 by Dominga Banda RN  Outcome: Progressing  5/17/2025 1525 by Devan Galdamez RN  Outcome: Progressing     Problem: Pain  Goal: Verbalizes/displays adequate comfort level or baseline comfort level  5/18/2025 0415 by Dominga Banda RN  Outcome: Progressing  5/17/2025 1525 by Devan Galdamez RN  Outcome: Progressing     Problem: Nutrition Deficit:  Goal: Optimize nutritional status  5/18/2025 0415 by Dominga Banda RN  Outcome: Progressing  5/17/2025 1525 by Devan Galdamez RN  Outcome: Progressing     Problem: Cardiovascular - Adult  Goal: Maintains optimal cardiac output and hemodynamic stability  5/18/2025 0415 by Dominga Banda RN  Outcome: Progressing  5/17/2025 1525 by Devan Galdamez RN  Outcome: Progressing  Goal: Absence of cardiac dysrhythmias or at baseline  5/18/2025 0415 by Dominga Banda RN  Outcome: Progressing  5/17/2025 1525 by Devan Galdamez RN  Outcome: Progressing     Problem: Skin/Tissue Integrity - Adult  Goal: Skin integrity remains intact  Description: 1.  Monitor for areas of redness and/or skin breakdown2.  Assess vascular access sites hourly3.  Every 4-6 hours minimum:  Change oxygen saturation probe site4.  Every 4-6 hours:  If on nasal continuous positive airway pressure, respiratory therapy assess nares and determine need for appliance change or resting period  5/18/2025 0415 by Dominga Banda RN  Outcome: Progressing  5/17/2025 1525 by Devan Galdamez RN  Outcome: Progressing  Flowsheets (Taken 5/17/2025 0026 by Hollobaugh, Goldie, RN)  Skin Integrity Remains Intact:   Monitor for areas of redness and/or skin breakdown   Assess vascular 
  Problem: Discharge Planning  Goal: Discharge to home or other facility with appropriate resources  5/18/2025 1013 by Devan Galadmez RN  Outcome: Progressing     Problem: Pain  Goal: Verbalizes/displays adequate comfort level or baseline comfort level  5/18/2025 1013 by Devan Galdamez RN  Outcome: Progressing  Flowsheets (Taken 5/18/2025 1013)  Verbalizes/displays adequate comfort level or baseline comfort level:   Encourage patient to monitor pain and request assistance   Assess pain using appropriate pain scale     Problem: Cardiovascular - Adult  Goal: Maintains optimal cardiac output and hemodynamic stability  5/18/2025 2024 by Ulisses Church RN  Outcome: Progressing  Flowsheets (Taken 5/18/2025 2024)  Maintains optimal cardiac output and hemodynamic stability:   Monitor blood pressure and heart rate   Assess for signs of decreased cardiac output   Monitor urine output and notify Licensed Independent Practitioner for values outside of normal range   Administer fluid and/or volume expanders as ordered   Administer vasoactive medications as ordered  5/18/2025 1013 by Devan Galdamez RN  Outcome: Progressing  Flowsheets (Taken 5/18/2025 1013)  Maintains optimal cardiac output and hemodynamic stability:   Monitor blood pressure and heart rate   Monitor urine output and notify Licensed Independent Practitioner for values outside of normal range     Problem: Chronic Conditions and Co-morbidities  Goal: Patient's chronic conditions and co-morbidity symptoms are monitored and maintained or improved  5/18/2025 2024 by Ulisses Church RN  Outcome: Progressing  Flowsheets (Taken 5/18/2025 2024)  Care Plan - Patient's Chronic Conditions and Co-Morbidity Symptoms are Monitored and Maintained or Improved:   Monitor and assess patient's chronic conditions and comorbid symptoms for stability, deterioration, or improvement   Update acute care plan with appropriate goals if chronic or comorbid symptoms are exacerbated and 
  Problem: Discharge Planning  Goal: Discharge to home or other facility with appropriate resources  5/18/2025 1013 by Devan Galdamez RN  Outcome: Progressing  5/18/2025 0415 by Dominga Banda RN  Outcome: Progressing     Problem: Safety - Adult  Goal: Free from fall injury  5/18/2025 1013 by Devan Galdamez RN  Outcome: Progressing  Flowsheets (Taken 5/18/2025 1013)  Free From Fall Injury: Instruct family/caregiver on patient safety  5/18/2025 0415 by Dominga Banda RN  Outcome: Progressing     Problem: Pain  Goal: Verbalizes/displays adequate comfort level or baseline comfort level  5/18/2025 1013 by Devan Galdamez RN  Outcome: Progressing  Flowsheets (Taken 5/18/2025 1013)  Verbalizes/displays adequate comfort level or baseline comfort level:   Encourage patient to monitor pain and request assistance   Assess pain using appropriate pain scale  5/18/2025 0415 by Dominga Banda RN  Outcome: Progressing     Problem: Nutrition Deficit:  Goal: Optimize nutritional status  5/18/2025 1013 by Devan Galdamez RN  Outcome: Progressing  Flowsheets (Taken 5/17/2025 0026 by Goldie Alfredo, RN)  Nutrient intake appropriate for improving, restoring, or maintaining nutritional needs:   Assess nutritional status and recommend course of action   Recommend appropriate diets, oral nutritional supplements, and vitamin/mineral supplements   Monitor oral intake, labs, and treatment plans   Order, calculate, and assess calorie counts as needed   Recommend, monitor, and adjust tube feedings and TPN/PPN based on assessed needs   Provide specific nutrition education to patient or family as appropriate  5/18/2025 0415 by Dominga Banda RN  Outcome: Progressing     Problem: Cardiovascular - Adult  Goal: Maintains optimal cardiac output and hemodynamic stability  5/18/2025 1013 by Devan Galdamez RN  Outcome: Progressing  Flowsheets (Taken 5/18/2025 1013)  Maintains optimal cardiac output and 
  Problem: Discharge Planning  Goal: Discharge to home or other facility with appropriate resources  5/19/2025 1132 by Mana Amador RN  Outcome: Progressing  5/19/2025 1132 by Mana Amador RN  Outcome: Progressing  5/19/2025 1131 by Mana Amador RN  Outcome: Progressing     Problem: Safety - Adult  Goal: Free from fall injury  5/19/2025 1132 by Mana Amador RN  Outcome: Progressing  5/19/2025 1132 by Mana Amador RN  Outcome: Progressing  5/19/2025 1131 by Mana Amador RN  Outcome: Progressing     Problem: Pain  Goal: Verbalizes/displays adequate comfort level or baseline comfort level  5/19/2025 1132 by Mana Amador RN  Outcome: Progressing  5/19/2025 1132 by Mana Amador RN  Outcome: Progressing     Problem: Nutrition Deficit:  Goal: Optimize nutritional status  Outcome: Progressing     Problem: Cardiovascular - Adult  Goal: Maintains optimal cardiac output and hemodynamic stability  5/19/2025 1132 by Mana Amador RN  Outcome: Progressing  5/19/2025 1132 by Mana Amador RN  Outcome: Progressing  Flowsheets (Taken 5/19/2025 0800)  Maintains optimal cardiac output and hemodynamic stability: Monitor blood pressure and heart rate  Goal: Absence of cardiac dysrhythmias or at baseline  Outcome: Progressing  Flowsheets (Taken 5/19/2025 0800)  Absence of cardiac dysrhythmias or at baseline: Monitor cardiac rate and rhythm     Problem: Skin/Tissue Integrity - Adult  Goal: Skin integrity remains intact  Description: 1.  Monitor for areas of redness and/or skin breakdown2.  Assess vascular access sites hourly3.  Every 4-6 hours minimum:  Change oxygen saturation probe site4.  Every 4-6 hours:  If on nasal continuous positive airway pressure, respiratory therapy assess nares and determine need for appliance change or resting period  Outcome: Progressing  Flowsheets  Taken 5/19/2025 1130  Skin Integrity Remains Intact: Monitor for areas of redness and/or skin breakdown  Taken 5/19/2025 
  Problem: Discharge Planning  Goal: Discharge to home or other facility with appropriate resources  5/24/2025 0152 by Jodi Garner RN  Outcome: Progressing  5/23/2025 1900 by Thanh Romero RN  Outcome: Progressing     Problem: Safety - Adult  Goal: Free from fall injury  5/24/2025 0152 by Jodi Garner RN  Outcome: Progressing  5/23/2025 1900 by Thanh Romero RN  Outcome: Progressing     Problem: Pain  Goal: Verbalizes/displays adequate comfort level or baseline comfort level  5/24/2025 0152 by Jodi Garner RN  Outcome: Progressing  5/23/2025 1900 by Thanh Romero RN  Outcome: Progressing     Problem: Nutrition Deficit:  Goal: Optimize nutritional status  Outcome: Progressing     Problem: Cardiovascular - Adult  Goal: Maintains optimal cardiac output and hemodynamic stability  Outcome: Progressing  Goal: Absence of cardiac dysrhythmias or at baseline  Outcome: Progressing     Problem: Skin/Tissue Integrity - Adult  Goal: Skin integrity remains intact  Description: 1.  Monitor for areas of redness and/or skin breakdown2.  Assess vascular access sites hourly3.  Every 4-6 hours minimum:  Change oxygen saturation probe site4.  Every 4-6 hours:  If on nasal continuous positive airway pressure, respiratory therapy assess nares and determine need for appliance change or resting period  Outcome: Progressing  Goal: Incisions, wounds, or drain sites healing without S/S of infection  Outcome: Progressing  Goal: Oral mucous membranes remain intact  Outcome: Progressing     Problem: Metabolic/Fluid and Electrolytes - Adult  Goal: Electrolytes maintained within normal limits  Outcome: Progressing  Goal: Hemodynamic stability and optimal renal function maintained  Outcome: Progressing     Problem: Skin/Tissue Integrity  Goal: Skin integrity remains intact  Description: 1.  Monitor for areas of redness and/or skin breakdown2.  Assess vascular access sites hourly3.  Every 4-6 hours minimum:  Change oxygen 
  Problem: Discharge Planning  Goal: Discharge to home or other facility with appropriate resources  5/25/2025 2257 by Jodi Garner RN  Outcome: Progressing  5/25/2025 1845 by Analia Flores RN  Outcome: Progressing     Problem: Safety - Adult  Goal: Free from fall injury  5/25/2025 2257 by Jodi Garner RN  Outcome: Progressing  5/25/2025 1845 by Analia Flores RN  Outcome: Progressing     Problem: Pain  Goal: Verbalizes/displays adequate comfort level or baseline comfort level  5/25/2025 2257 by Jodi Garner RN  Outcome: Progressing  5/25/2025 1845 by Analia Flores RN  Outcome: Progressing     Problem: Nutrition Deficit:  Goal: Optimize nutritional status  Outcome: Progressing     Problem: Cardiovascular - Adult  Goal: Maintains optimal cardiac output and hemodynamic stability  5/25/2025 2257 by Jodi Garner RN  Outcome: Progressing  5/25/2025 1845 by Analia Flores RN  Outcome: Progressing  Goal: Absence of cardiac dysrhythmias or at baseline  5/25/2025 2257 by Jodi Garner RN  Outcome: Progressing  5/25/2025 1845 by Analia Flores RN  Outcome: Progressing     Problem: Skin/Tissue Integrity - Adult  Goal: Skin integrity remains intact  Description: 1.  Monitor for areas of redness and/or skin breakdown2.  Assess vascular access sites hourly3.  Every 4-6 hours minimum:  Change oxygen saturation probe site4.  Every 4-6 hours:  If on nasal continuous positive airway pressure, respiratory therapy assess nares and determine need for appliance change or resting period  5/25/2025 2257 by Jodi Garner RN  Outcome: Progressing  5/25/2025 1845 by Analia Flores RN  Outcome: Progressing  Goal: Incisions, wounds, or drain sites healing without S/S of infection  Outcome: Progressing  Goal: Oral mucous membranes remain intact  Outcome: Progressing     Problem: Metabolic/Fluid and Electrolytes - Adult  Goal: Electrolytes maintained within normal limits  5/25/2025 2257 by Jodi Garner RN  Outcome: 
  Problem: Discharge Planning  Goal: Discharge to home or other facility with appropriate resources  5/26/2025 2236 by Thanh Romero RN  Outcome: Progressing  5/26/2025 1411 by Analia Flores RN  Outcome: Progressing     Problem: Safety - Adult  Goal: Free from fall injury  5/26/2025 2236 by Thanh Romero RN  Outcome: Progressing  5/26/2025 1411 by Analia Flores RN  Outcome: Progressing     Problem: Pain  Goal: Verbalizes/displays adequate comfort level or baseline comfort level  5/26/2025 2236 by Thanh Romero RN  Outcome: Progressing  5/26/2025 1411 by Aanlia Flores RN  Outcome: Progressing     
  Problem: Discharge Planning  Goal: Discharge to home or other facility with appropriate resources  5/27/2025 0504 by Benjy Samaniego RN  Outcome: Progressing  5/26/2025 2236 by Thanh Romero RN  Outcome: Progressing     Problem: Safety - Adult  Goal: Free from fall injury  5/27/2025 0504 by Benjy Samaniego RN  Outcome: Progressing  5/26/2025 2236 by Thanh Romero RN  Outcome: Progressing     Problem: Pain  Goal: Verbalizes/displays adequate comfort level or baseline comfort level  5/27/2025 0504 by Benjy Samaniego RN  Outcome: Progressing  5/26/2025 2236 by Thanh Romero RN  Outcome: Progressing     Problem: Nutrition Deficit:  Goal: Optimize nutritional status  Outcome: Progressing     Problem: Cardiovascular - Adult  Goal: Maintains optimal cardiac output and hemodynamic stability  Outcome: Progressing  Goal: Absence of cardiac dysrhythmias or at baseline  Outcome: Progressing     Problem: Skin/Tissue Integrity - Adult  Goal: Skin integrity remains intact  Description: 1.  Monitor for areas of redness and/or skin breakdown2.  Assess vascular access sites hourly3.  Every 4-6 hours minimum:  Change oxygen saturation probe site4.  Every 4-6 hours:  If on nasal continuous positive airway pressure, respiratory therapy assess nares and determine need for appliance change or resting period  Outcome: Progressing  Goal: Incisions, wounds, or drain sites healing without S/S of infection  Outcome: Progressing  Goal: Oral mucous membranes remain intact  Outcome: Progressing     Problem: Metabolic/Fluid and Electrolytes - Adult  Goal: Electrolytes maintained within normal limits  Outcome: Progressing  Goal: Hemodynamic stability and optimal renal function maintained  Outcome: Progressing     Problem: Neurosensory - Adult  Goal: Achieves stable or improved neurological status  Outcome: Progressing  Goal: Achieves maximal functionality and self care  Outcome: Progressing     Problem: Gastrointestinal - Adult  Goal: 
  Problem: Discharge Planning  Goal: Discharge to home or other facility with appropriate resources  5/28/2025 1059 by Kris Winkler, RN  Outcome: Progressing     Problem: Safety - Adult  Goal: Free from fall injury  5/28/2025 1059 by Kris Winkler, RN  Outcome: Progressing     Problem: Pain  Goal: Verbalizes/displays adequate comfort level or baseline comfort level  5/28/2025 1059 by Kris Winkler, RN  Outcome: Progressing     Problem: Nutrition Deficit:  Goal: Optimize nutritional status  5/28/2025 1059 by Kris Winkler, RN  Outcome: Progressing     
  Problem: Discharge Planning  Goal: Discharge to home or other facility with appropriate resources  5/29/2025 1702 by Magalie Arauz RN  Outcome: Progressing  5/29/2025 0406 by Era Porras RN  Outcome: Progressing     Problem: Safety - Adult  Goal: Free from fall injury  5/29/2025 1702 by Magalie Arauz RN  Outcome: Progressing  5/29/2025 0406 by Era Porras RN  Outcome: Progressing     Problem: Pain  Goal: Verbalizes/displays adequate comfort level or baseline comfort level  5/29/2025 1702 by Magalie Arauz RN  Outcome: Progressing  5/29/2025 0406 by Era Porras RN  Outcome: Progressing     Problem: Nutrition Deficit:  Goal: Optimize nutritional status  5/29/2025 1702 by Magalie Arauz RN  Outcome: Progressing  5/29/2025 0406 by Era Porrsa RN  Outcome: Progressing     Problem: Cardiovascular - Adult  Goal: Maintains optimal cardiac output and hemodynamic stability  5/29/2025 1702 by Magalie Arauz RN  Outcome: Progressing  5/29/2025 0406 by Era Porras RN  Outcome: Progressing  Goal: Absence of cardiac dysrhythmias or at baseline  5/29/2025 1702 by Magalie Arauz RN  Outcome: Progressing  5/29/2025 0406 by Era Porras RN  Outcome: Progressing     Problem: Skin/Tissue Integrity - Adult  Goal: Skin integrity remains intact  Description: 1.  Monitor for areas of redness and/or skin breakdown2.  Assess vascular access sites hourly3.  Every 4-6 hours minimum:  Change oxygen saturation probe site4.  Every 4-6 hours:  If on nasal continuous positive airway pressure, respiratory therapy assess nares and determine need for appliance change or resting period  5/29/2025 1702 by Magalie Arauz RN  Outcome: Progressing  5/29/2025 0406 by Era Porras RN  Outcome: Progressing  Goal: Incisions, wounds, or drain sites healing without S/S of infection  5/29/2025 1702 by Magalie Arauz RN  Outcome: Progressing  5/29/2025 0406 by Era Porras RN  Outcome: Progressing  Goal: Oral 
  Problem: Discharge Planning  Goal: Discharge to home or other facility with appropriate resources  5/29/2025 2341 by Era Porras RN  Outcome: Progressing  5/29/2025 1702 by Magalie Arauz RN  Outcome: Progressing     Problem: Safety - Adult  Goal: Free from fall injury  5/29/2025 2341 by Era Porras RN  Outcome: Progressing  5/29/2025 1702 by Magalie Arauz RN  Outcome: Progressing     Problem: Pain  Goal: Verbalizes/displays adequate comfort level or baseline comfort level  5/29/2025 2341 by Era Porras RN  Outcome: Progressing  5/29/2025 1702 by Magalie Arauz RN  Outcome: Progressing     Problem: Nutrition Deficit:  Goal: Optimize nutritional status  5/29/2025 2341 by Era Porras RN  Outcome: Progressing  5/29/2025 1702 by Magalie Arauz RN  Outcome: Progressing     Problem: Cardiovascular - Adult  Goal: Maintains optimal cardiac output and hemodynamic stability  5/29/2025 2341 by Era Porras RN  Outcome: Progressing  5/29/2025 1702 by Magalie Arauz RN  Outcome: Progressing  Goal: Absence of cardiac dysrhythmias or at baseline  5/29/2025 2341 by Era Porras RN  Outcome: Progressing  5/29/2025 1702 by Magalie Arauz RN  Outcome: Progressing     Problem: Skin/Tissue Integrity - Adult  Goal: Skin integrity remains intact  Description: 1.  Monitor for areas of redness and/or skin breakdown2.  Assess vascular access sites hourly3.  Every 4-6 hours minimum:  Change oxygen saturation probe site4.  Every 4-6 hours:  If on nasal continuous positive airway pressure, respiratory therapy assess nares and determine need for appliance change or resting period  5/29/2025 2341 by Era Porras RN  Outcome: Progressing  5/29/2025 1702 by Magalie Arauz RN  Outcome: Progressing  Goal: Incisions, wounds, or drain sites healing without S/S of infection  5/29/2025 2341 by Era Porras RN  Outcome: Progressing  5/29/2025 1702 by Magalie Aruaz RN  Outcome: Progressing  Goal: Oral 
  Problem: Discharge Planning  Goal: Discharge to home or other facility with appropriate resources  5/30/2025 1219 by Laly Sanchez RN  Outcome: Progressing  5/29/2025 2341 by Era Porras RN  Outcome: Progressing     Problem: Safety - Adult  Goal: Free from fall injury  5/30/2025 1219 by Laly Sanchez RN  Outcome: Progressing  5/29/2025 2341 by Era Porras RN  Outcome: Progressing     Problem: Pain  Goal: Verbalizes/displays adequate comfort level or baseline comfort level  5/30/2025 1219 by Laly Sanchez RN  Outcome: Progressing  5/29/2025 2341 by Era Porras RN  Outcome: Progressing     Problem: Nutrition Deficit:  Goal: Optimize nutritional status  5/30/2025 1219 by Laly Sanchez RN  Outcome: Progressing  5/29/2025 2341 by Era Porras RN  Outcome: Progressing     Problem: Cardiovascular - Adult  Goal: Maintains optimal cardiac output and hemodynamic stability  5/30/2025 1219 by Laly Sanchez RN  Outcome: Progressing  5/29/2025 2341 by Era Porras RN  Outcome: Progressing  Goal: Absence of cardiac dysrhythmias or at baseline  5/30/2025 1219 by Laly Sanchez RN  Outcome: Progressing  5/29/2025 2341 by Era Porras RN  Outcome: Progressing     Problem: Skin/Tissue Integrity - Adult  Goal: Skin integrity remains intact  Description: 1.  Monitor for areas of redness and/or skin breakdown2.  Assess vascular access sites hourly3.  Every 4-6 hours minimum:  Change oxygen saturation probe site4.  Every 4-6 hours:  If on nasal continuous positive airway pressure, respiratory therapy assess nares and determine need for appliance change or resting period  5/30/2025 1219 by Laly Sanchez RN  Outcome: Progressing  5/29/2025 2341 by Era Porras RN  Outcome: Progressing  Goal: Incisions, wounds, or drain sites healing without S/S of infection  5/30/2025 1219 by Laly Sanchez RN  Outcome: Progressing  5/29/2025 2341 by Era Porras RN  Outcome: Progressing  Goal: Oral 
  Problem: Discharge Planning  Goal: Discharge to home or other facility with appropriate resources  5/31/2025 0218 by Benjy Samaniego RN  Outcome: Progressing  5/30/2025 1219 by Laly Sanchez RN  Outcome: Progressing     Problem: Safety - Adult  Goal: Free from fall injury  5/31/2025 0218 by Benjy Samaniego RN  Outcome: Progressing  5/30/2025 1219 by Laly Sanchez RN  Outcome: Progressing     Problem: Pain  Goal: Verbalizes/displays adequate comfort level or baseline comfort level  5/31/2025 0218 by Benjy Samaniego RN  Outcome: Progressing  5/30/2025 1219 by Laly Sanchez RN  Outcome: Progressing     Problem: Nutrition Deficit:  Goal: Optimize nutritional status  5/31/2025 0218 by Benjy Samaniego RN  Outcome: Progressing  5/30/2025 1219 by Laly Sanchez RN  Outcome: Progressing     Problem: Cardiovascular - Adult  Goal: Maintains optimal cardiac output and hemodynamic stability  5/31/2025 0218 by Benjy Samaniego RN  Outcome: Progressing  5/30/2025 1219 by Laly Sanchez RN  Outcome: Progressing  Goal: Absence of cardiac dysrhythmias or at baseline  5/31/2025 0218 by Benjy Samaniego RN  Outcome: Progressing  5/30/2025 1219 by Laly Sanchez RN  Outcome: Progressing     Problem: Skin/Tissue Integrity - Adult  Goal: Skin integrity remains intact  Description: 1.  Monitor for areas of redness and/or skin breakdown2.  Assess vascular access sites hourly3.  Every 4-6 hours minimum:  Change oxygen saturation probe site4.  Every 4-6 hours:  If on nasal continuous positive airway pressure, respiratory therapy assess nares and determine need for appliance change or resting period  5/31/2025 0218 by Benjy Samaniego RN  Outcome: Progressing  5/30/2025 1219 by Laly Sanchez RN  Outcome: Progressing  Goal: Incisions, wounds, or drain sites healing without S/S of infection  5/31/2025 0218 by Benjy Samaniego RN  Outcome: Progressing  5/30/2025 1219 by Laly Sanchez RN  Outcome: Progressing  Goal: Oral mucous 
  Problem: Discharge Planning  Goal: Discharge to home or other facility with appropriate resources  5/31/2025 2352 by Benjy Samaniego RN  Outcome: Progressing  5/31/2025 1337 by Fatmata Martinez RN  Outcome: Progressing     Problem: Safety - Adult  Goal: Free from fall injury  5/31/2025 2352 by Benjy Samaniego RN  Outcome: Progressing  5/31/2025 1337 by Fatmata Martinez RN  Outcome: Progressing     Problem: Pain  Goal: Verbalizes/displays adequate comfort level or baseline comfort level  5/31/2025 2352 by Benjy Samaniego RN  Outcome: Progressing  5/31/2025 1337 by Fatmata Martinez RN  Outcome: Progressing     Problem: Nutrition Deficit:  Goal: Optimize nutritional status  5/31/2025 2352 by Benjy Samaniego RN  Outcome: Progressing  5/31/2025 1337 by Fatmata Martinez RN  Outcome: Progressing     Problem: Cardiovascular - Adult  Goal: Maintains optimal cardiac output and hemodynamic stability  5/31/2025 2352 by Benjy Samaniego RN  Outcome: Progressing  5/31/2025 1337 by Fatmata Martinez RN  Outcome: Progressing  Goal: Absence of cardiac dysrhythmias or at baseline  5/31/2025 2352 by Benjy Samaniego RN  Outcome: Progressing  5/31/2025 1337 by Fatmata Martinez RN  Outcome: Progressing     Problem: Skin/Tissue Integrity - Adult  Goal: Skin integrity remains intact  Description: 1.  Monitor for areas of redness and/or skin breakdown2.  Assess vascular access sites hourly3.  Every 4-6 hours minimum:  Change oxygen saturation probe site4.  Every 4-6 hours:  If on nasal continuous positive airway pressure, respiratory therapy assess nares and determine need for appliance change or resting period  5/31/2025 2352 by Benjy Samaniego RN  Outcome: Progressing  5/31/2025 1337 by Fatmata Martinez RN  Outcome: Progressing  Goal: Incisions, wounds, or drain sites healing without S/S of infection  5/31/2025 2352 by Benjy Samaniego RN  Outcome: Progressing  5/31/2025 1337 by Fatmata Martinez RN  Outcome: Progressing  Goal: Oral mucous 
  Problem: Discharge Planning  Goal: Discharge to home or other facility with appropriate resources  6/1/2025 2120 by Pebbles Goss, RN  Outcome: Progressing  6/1/2025 1356 by Nanette Saba, RN  Outcome: Progressing     
  Problem: Discharge Planning  Goal: Discharge to home or other facility with appropriate resources  6/2/2025 1042 by Nanette Saba RN  Outcome: Progressing  6/1/2025 2120 by Pebbles Goss RN  Outcome: Progressing     Problem: Safety - Adult  Goal: Free from fall injury  6/2/2025 1042 by Nanette Saba RN  Outcome: Progressing  6/1/2025 2120 by Pebbles Goss RN  Outcome: Progressing     Problem: Pain  Goal: Verbalizes/displays adequate comfort level or baseline comfort level  6/2/2025 1042 by Nanette Saba RN  Outcome: Progressing  6/1/2025 2120 by Pebbles Goss RN  Outcome: Progressing     Problem: Nutrition Deficit:  Goal: Optimize nutritional status  6/2/2025 1042 by Nanette Saba RN  Outcome: Progressing  6/1/2025 2120 by Pebbles Goss RN  Outcome: Progressing     Problem: Cardiovascular - Adult  Goal: Maintains optimal cardiac output and hemodynamic stability  6/2/2025 1042 by Nanette Saba RN  Outcome: Progressing  6/1/2025 2120 by Pebbles Goss RN  Outcome: Progressing  Goal: Absence of cardiac dysrhythmias or at baseline  6/2/2025 1042 by Nanette Saba RN  Outcome: Progressing  6/1/2025 2120 by Pebbles Goss RN  Outcome: Progressing     Problem: Skin/Tissue Integrity - Adult  Goal: Skin integrity remains intact  Description: 1.  Monitor for areas of redness and/or skin breakdown2.  Assess vascular access sites hourly3.  Every 4-6 hours minimum:  Change oxygen saturation probe site4.  Every 4-6 hours:  If on nasal continuous positive airway pressure, respiratory therapy assess nares and determine need for appliance change or resting period  6/2/2025 1042 by Nanette Saba RN  Outcome: Progressing  6/1/2025 2120 by Pebbles Goss RN  Outcome: Progressing  Goal: Incisions, wounds, or drain sites healing without S/S of infection  6/2/2025 1042 by Nanette Saba RN  Outcome: Progressing  6/1/2025 2120 by Pebbles Goss RN  Outcome: Progressing  Goal: Oral mucous 
  Problem: Discharge Planning  Goal: Discharge to home or other facility with appropriate resources  6/3/2025 0735 by Magalie Arauz RN  Outcome: Progressing  6/3/2025 0138 by Alli Rivera RN  Outcome: Progressing  Flowsheets (Taken 6/3/2025 0138)  Discharge to home or other facility with appropriate resources:   Identify barriers to discharge with patient and caregiver   Arrange for needed discharge resources and transportation as appropriate     Problem: Safety - Adult  Goal: Free from fall injury  6/3/2025 0735 by Magalie Arauz RN  Outcome: Progressing  6/3/2025 0138 by Alli Rievra RN  Outcome: Progressing  Flowsheets (Taken 6/3/2025 0138)  Free From Fall Injury: Based on caregiver fall risk screen, instruct family/caregiver to ask for assistance with transferring infant if caregiver noted to have fall risk factors     Problem: Pain  Goal: Verbalizes/displays adequate comfort level or baseline comfort level  6/3/2025 0735 by Magalie Arauz RN  Outcome: Progressing  6/3/2025 0138 by Alli Rivera RN  Outcome: Progressing  Flowsheets (Taken 5/20/2025 0000 by Lorrie Briscoe, RN)  Verbalizes/displays adequate comfort level or baseline comfort level:   Encourage patient to monitor pain and request assistance   Assess pain using appropriate pain scale   Administer analgesics based on type and severity of pain and evaluate response   Implement non-pharmacological measures as appropriate and evaluate response   Consider cultural and social influences on pain and pain management   Notify Licensed Independent Practitioner if interventions unsuccessful or patient reports new pain     Problem: Nutrition Deficit:  Goal: Optimize nutritional status  Outcome: Progressing     Problem: Cardiovascular - Adult  Goal: Maintains optimal cardiac output and hemodynamic stability  Outcome: Progressing  Goal: Absence of cardiac dysrhythmias or at baseline  Outcome: Progressing     Problem: Skin/Tissue Integrity - Adult  Goal: Skin 
  Problem: Discharge Planning  Goal: Discharge to home or other facility with appropriate resources  6/3/2025 1906 by Magalie Arauz RN  Outcome: Progressing  6/3/2025 0735 by Magalie Arauz RN  Outcome: Progressing     Problem: Safety - Adult  Goal: Free from fall injury  6/3/2025 1906 by Magalie Arauz RN  Outcome: Progressing  6/3/2025 0735 by Magalie Arauz RN  Outcome: Progressing     Problem: Pain  Goal: Verbalizes/displays adequate comfort level or baseline comfort level  6/3/2025 1906 by Magalie Arauz RN  Outcome: Progressing  6/3/2025 0735 by Magalie Arauz RN  Outcome: Progressing     Problem: Nutrition Deficit:  Goal: Optimize nutritional status  6/3/2025 1906 by Magalie Arauz RN  Outcome: Progressing  6/3/2025 0735 by Magalie Arauz RN  Outcome: Progressing     Problem: Cardiovascular - Adult  Goal: Maintains optimal cardiac output and hemodynamic stability  6/3/2025 1906 by Magalie Arauz RN  Outcome: Progressing  6/3/2025 0735 by Magalie Arauz RN  Outcome: Progressing  Goal: Absence of cardiac dysrhythmias or at baseline  6/3/2025 1906 by Magalie Arauz RN  Outcome: Progressing  6/3/2025 0735 by Magalie Arauz RN  Outcome: Progressing     Problem: Skin/Tissue Integrity - Adult  Goal: Skin integrity remains intact  Description: 1.  Monitor for areas of redness and/or skin breakdown2.  Assess vascular access sites hourly3.  Every 4-6 hours minimum:  Change oxygen saturation probe site4.  Every 4-6 hours:  If on nasal continuous positive airway pressure, respiratory therapy assess nares and determine need for appliance change or resting period  6/3/2025 1906 by Magalie Arauz RN  Outcome: Progressing  6/3/2025 0735 by Magalie Arauz RN  Outcome: Progressing  Goal: Incisions, wounds, or drain sites healing without S/S of infection  6/3/2025 1906 by Magalie Arauz RN  Outcome: Progressing  6/3/2025 0735 by Magalie Arauz RN  Outcome: Progressing  Goal: Oral mucous membranes remain 
  Problem: Discharge Planning  Goal: Discharge to home or other facility with appropriate resources  Outcome: Progressing     
  Problem: Discharge Planning  Goal: Discharge to home or other facility with appropriate resources  Outcome: Progressing     Problem: Safety - Adult  Goal: Free from fall injury  Outcome: Progressing     Problem: Pain  Goal: Verbalizes/displays adequate comfort level or baseline comfort level  Outcome: Progressing     Problem: Cardiovascular - Adult  Goal: Maintains optimal cardiac output and hemodynamic stability  Outcome: Progressing  Goal: Absence of cardiac dysrhythmias or at baseline  Outcome: Progressing     Problem: Skin/Tissue Integrity - Adult  Goal: Skin integrity remains intact  Description: 1.  Monitor for areas of redness and/or skin breakdown2.  Assess vascular access sites hourly3.  Every 4-6 hours minimum:  Change oxygen saturation probe site4.  Every 4-6 hours:  If on nasal continuous positive airway pressure, respiratory therapy assess nares and determine need for appliance change or resting period  Outcome: Progressing     
  Problem: Discharge Planning  Goal: Discharge to home or other facility with appropriate resources  Outcome: Progressing     Problem: Safety - Adult  Goal: Free from fall injury  Outcome: Progressing     Problem: Pain  Goal: Verbalizes/displays adequate comfort level or baseline comfort level  Outcome: Progressing     Problem: Nutrition Deficit:  Goal: Optimize nutritional status  Outcome: Progressing     Problem: Cardiovascular - Adult  Goal: Maintains optimal cardiac output and hemodynamic stability  Outcome: Progressing     
  Problem: Discharge Planning  Goal: Discharge to home or other facility with appropriate resources  Outcome: Progressing     Problem: Safety - Adult  Goal: Free from fall injury  Outcome: Progressing     Problem: Pain  Goal: Verbalizes/displays adequate comfort level or baseline comfort level  Outcome: Progressing     Problem: Nutrition Deficit:  Goal: Optimize nutritional status  Outcome: Progressing     Problem: Cardiovascular - Adult  Goal: Maintains optimal cardiac output and hemodynamic stability  Outcome: Progressing  Goal: Absence of cardiac dysrhythmias or at baseline  Outcome: Progressing     Problem: Skin/Tissue Integrity - Adult  Goal: Skin integrity remains intact  Description: 1.  Monitor for areas of redness and/or skin breakdown2.  Assess vascular access sites hourly3.  Every 4-6 hours minimum:  Change oxygen saturation probe site4.  Every 4-6 hours:  If on nasal continuous positive airway pressure, respiratory therapy assess nares and determine need for appliance change or resting period  Outcome: Progressing  Goal: Incisions, wounds, or drain sites healing without S/S of infection  Outcome: Progressing  Goal: Oral mucous membranes remain intact  Outcome: Progressing     Problem: Metabolic/Fluid and Electrolytes - Adult  Goal: Electrolytes maintained within normal limits  Outcome: Progressing  Goal: Hemodynamic stability and optimal renal function maintained  Outcome: Progressing     Problem: Neurosensory - Adult  Goal: Achieves stable or improved neurological status  Outcome: Progressing  Goal: Achieves maximal functionality and self care  Outcome: Progressing     Problem: Gastrointestinal - Adult  Goal: Maintains adequate nutritional intake  Outcome: Progressing     Problem: Skin/Tissue Integrity  Goal: Skin integrity remains intact  Description: 1.  Monitor for areas of redness and/or skin breakdown2.  Assess vascular access sites hourly3.  Every 4-6 hours minimum:  Change oxygen saturation probe 
  Problem: Discharge Planning  Goal: Discharge to home or other facility with appropriate resources  Outcome: Progressing     Problem: Safety - Adult  Goal: Free from fall injury  Outcome: Progressing     Problem: Pain  Goal: Verbalizes/displays adequate comfort level or baseline comfort level  Outcome: Progressing     Problem: Nutrition Deficit:  Goal: Optimize nutritional status  Outcome: Progressing     Problem: Cardiovascular - Adult  Goal: Maintains optimal cardiac output and hemodynamic stability  Outcome: Progressing  Goal: Absence of cardiac dysrhythmias or at baseline  Outcome: Progressing     Problem: Skin/Tissue Integrity - Adult  Goal: Skin integrity remains intact  Description: 1.  Monitor for areas of redness and/or skin breakdown2.  Assess vascular access sites hourly3.  Every 4-6 hours minimum:  Change oxygen saturation probe site4.  Every 4-6 hours:  If on nasal continuous positive airway pressure, respiratory therapy assess nares and determine need for appliance change or resting period  Outcome: Progressing  Goal: Incisions, wounds, or drain sites healing without S/S of infection  Outcome: Progressing  Goal: Oral mucous membranes remain intact  Outcome: Progressing     Problem: Metabolic/Fluid and Electrolytes - Adult  Goal: Electrolytes maintained within normal limits  Outcome: Progressing  Goal: Hemodynamic stability and optimal renal function maintained  Outcome: Progressing     Problem: Neurosensory - Adult  Goal: Achieves stable or improved neurological status  Outcome: Progressing  Goal: Achieves maximal functionality and self care  Outcome: Progressing     Problem: Gastrointestinal - Adult  Goal: Maintains adequate nutritional intake  Outcome: Progressing     Problem: Skin/Tissue Integrity  Goal: Skin integrity remains intact  Description: 1.  Monitor for areas of redness and/or skin breakdown2.  Assess vascular access sites hourly3.  Every 4-6 hours minimum:  Change oxygen saturation probe 
  Problem: Discharge Planning  Goal: Discharge to home or other facility with appropriate resources  Outcome: Progressing     Problem: Safety - Adult  Goal: Free from fall injury  Outcome: Progressing     Problem: Pain  Goal: Verbalizes/displays adequate comfort level or baseline comfort level  Outcome: Progressing     Problem: Nutrition Deficit:  Goal: Optimize nutritional status  Outcome: Progressing     Problem: Cardiovascular - Adult  Goal: Maintains optimal cardiac output and hemodynamic stability  Outcome: Progressing  Goal: Absence of cardiac dysrhythmias or at baseline  Outcome: Progressing     Problem: Skin/Tissue Integrity - Adult  Goal: Skin integrity remains intact  Description: 1.  Monitor for areas of redness and/or skin breakdown2.  Assess vascular access sites hourly3.  Every 4-6 hours minimum:  Change oxygen saturation probe site4.  Every 4-6 hours:  If on nasal continuous positive airway pressure, respiratory therapy assess nares and determine need for appliance change or resting period  Outcome: Progressing  Goal: Incisions, wounds, or drain sites healing without S/S of infection  Outcome: Progressing  Goal: Oral mucous membranes remain intact  Outcome: Progressing     Problem: Metabolic/Fluid and Electrolytes - Adult  Goal: Electrolytes maintained within normal limits  Outcome: Progressing  Goal: Hemodynamic stability and optimal renal function maintained  Outcome: Progressing     Problem: Skin/Tissue Integrity  Goal: Skin integrity remains intact  Description: 1.  Monitor for areas of redness and/or skin breakdown2.  Assess vascular access sites hourly3.  Every 4-6 hours minimum:  Change oxygen saturation probe site4.  Every 4-6 hours:  If on nasal continuous positive airway pressure, respiratory therapy assess nares and determine need for appliance change or resting period  Outcome: Progressing     Problem: Chronic Conditions and Co-morbidities  Goal: Patient's chronic conditions and 
  Problem: Discharge Planning  Goal: Discharge to home or other facility with appropriate resources  Outcome: Progressing     Problem: Safety - Adult  Goal: Free from fall injury  Outcome: Progressing     Problem: Pain  Goal: Verbalizes/displays adequate comfort level or baseline comfort level  Outcome: Progressing     Problem: Nutrition Deficit:  Goal: Optimize nutritional status  Outcome: Progressing  Flowsheets (Taken 5/21/2025 1426 by Baltazar Linda, RD, LD)  Nutrient intake appropriate for improving, restoring, or maintaining nutritional needs: Recommend, monitor, and adjust tube feedings and TPN/PPN based on assessed needs     Problem: Cardiovascular - Adult  Goal: Maintains optimal cardiac output and hemodynamic stability  Outcome: Progressing  Goal: Absence of cardiac dysrhythmias or at baseline  Outcome: Progressing     Problem: Skin/Tissue Integrity - Adult  Goal: Skin integrity remains intact  5/22/2025 0055 by Soraya Granado RN  Outcome: Progressing  5/21/2025 1608 by Juli Hein RN  Outcome: Progressing  Goal: Incisions, wounds, or drain sites healing without S/S of infection  Outcome: Progressing  Goal: Oral mucous membranes remain intact  Outcome: Progressing     Problem: Metabolic/Fluid and Electrolytes - Adult  Goal: Electrolytes maintained within normal limits  Outcome: Progressing  Goal: Hemodynamic stability and optimal renal function maintained  Outcome: Progressing     Problem: Skin/Tissue Integrity  Goal: Skin integrity remains intact  5/22/2025 0055 by Soraya Granado RN  Outcome: Progressing  5/21/2025 1608 by Juli Hein RN  Outcome: Progressing     Problem: Chronic Conditions and Co-morbidities  Goal: Patient's chronic conditions and co-morbidity symptoms are monitored and maintained or improved  Outcome: Progressing     Problem: Neurosensory - Adult  Goal: Achieves stable or improved neurological status  5/22/2025 0055 by Soraya Granado RN  Outcome: 
  Problem: Discharge Planning  Goal: Discharge to home or other facility with appropriate resources  Outcome: Progressing     Problem: Safety - Adult  Goal: Free from fall injury  Outcome: Progressing  Flowsheets (Taken 5/24/2025 2130)  Free From Fall Injury: Instruct family/caregiver on patient safety     Problem: Pain  Goal: Verbalizes/displays adequate comfort level or baseline comfort level  Outcome: Progressing     Problem: Nutrition Deficit:  Goal: Optimize nutritional status  Outcome: Progressing     Problem: Cardiovascular - Adult  Goal: Maintains optimal cardiac output and hemodynamic stability  Outcome: Progressing  Goal: Absence of cardiac dysrhythmias or at baseline  Outcome: Progressing     Problem: Skin/Tissue Integrity - Adult  Goal: Skin integrity remains intact  Description: 1.  Monitor for areas of redness and/or skin breakdown2.  Assess vascular access sites hourly3.  Every 4-6 hours minimum:  Change oxygen saturation probe site4.  Every 4-6 hours:  If on nasal continuous positive airway pressure, respiratory therapy assess nares and determine need for appliance change or resting period  Outcome: Progressing  Flowsheets (Taken 5/24/2025 2130)  Skin Integrity Remains Intact: Monitor for areas of redness and/or skin breakdown  Goal: Incisions, wounds, or drain sites healing without S/S of infection  Outcome: Progressing  Goal: Oral mucous membranes remain intact  Outcome: Progressing     Problem: Metabolic/Fluid and Electrolytes - Adult  Goal: Electrolytes maintained within normal limits  Outcome: Progressing  Goal: Hemodynamic stability and optimal renal function maintained  Outcome: Progressing     Problem: Skin/Tissue Integrity  Goal: Skin integrity remains intact  Description: 1.  Monitor for areas of redness and/or skin breakdown2.  Assess vascular access sites hourly3.  Every 4-6 hours minimum:  Change oxygen saturation probe site4.  Every 4-6 hours:  If on nasal continuous positive airway 
  Problem: Discharge Planning  Goal: Discharge to home or other facility with appropriate resources  Outcome: Progressing  Flowsheets  Taken 5/22/2025 2000 by Brandin Carrillo RN  Discharge to home or other facility with appropriate resources: Identify barriers to discharge with patient and caregiver  Taken 5/22/2025 1900 by Fay Lowe RN  Discharge to home or other facility with appropriate resources: Identify barriers to discharge with patient and caregiver     Problem: Safety - Adult  Goal: Free from fall injury  Outcome: Progressing     Problem: Pain  Goal: Verbalizes/displays adequate comfort level or baseline comfort level  Outcome: Progressing     Problem: Nutrition Deficit:  Goal: Optimize nutritional status  Outcome: Progressing     Problem: Cardiovascular - Adult  Goal: Maintains optimal cardiac output and hemodynamic stability  Outcome: Progressing  Flowsheets  Taken 5/22/2025 2000 by Brandin Carrillo RN  Maintains optimal cardiac output and hemodynamic stability: Monitor blood pressure and heart rate  Taken 5/22/2025 1900 by Fay Lowe RN  Maintains optimal cardiac output and hemodynamic stability: Monitor blood pressure and heart rate     Problem: Cardiovascular - Adult  Goal: Absence of cardiac dysrhythmias or at baseline  Outcome: Progressing  Flowsheets  Taken 5/22/2025 2000 by Brandin Carrillo RN  Absence of cardiac dysrhythmias or at baseline: Monitor cardiac rate and rhythm  Taken 5/22/2025 1900 by Fay Lowe RN  Absence of cardiac dysrhythmias or at baseline: Monitor cardiac rate and rhythm     Problem: Skin/Tissue Integrity - Adult  Goal: Skin integrity remains intact  Description: 1.  Monitor for areas of redness and/or skin breakdown2.  Assess vascular access sites hourly3.  Every 4-6 hours minimum:  Change oxygen saturation probe site4.  Every 4-6 hours:  If on nasal continuous positive airway pressure, respiratory therapy assess nares and determine need for 
  Problem: Discharge Planning  Goal: Discharge to home or other facility with appropriate resources  Outcome: Progressing  Flowsheets (Taken 5/15/2025 0800)  Discharge to home or other facility with appropriate resources:   Identify barriers to discharge with patient and caregiver   Arrange for needed discharge resources and transportation as appropriate   Identify discharge learning needs (meds, wound care, etc)   Arrange for interpreters to assist at discharge as needed   Refer to discharge planning if patient needs post-hospital services based on physician order or complex needs related to functional status, cognitive ability or social support system     Problem: Safety - Adult  Goal: Free from fall injury  Outcome: Progressing     Problem: Pain  Goal: Verbalizes/displays adequate comfort level or baseline comfort level  Outcome: Progressing  Flowsheets  Taken 5/15/2025 1615  Verbalizes/displays adequate comfort level or baseline comfort level: Assess pain using appropriate pain scale  Taken 5/15/2025 1345  Verbalizes/displays adequate comfort level or baseline comfort level: Assess pain using appropriate pain scale  Taken 5/15/2025 1316  Verbalizes/displays adequate comfort level or baseline comfort level: Administer analgesics based on type and severity of pain and evaluate response  Taken 5/15/2025 1200  Verbalizes/displays adequate comfort level or baseline comfort level: Assess pain using appropriate pain scale  Taken 5/15/2025 0950  Verbalizes/displays adequate comfort level or baseline comfort level: Assess pain using appropriate pain scale  Taken 5/15/2025 0921  Verbalizes/displays adequate comfort level or baseline comfort level: Administer analgesics based on type and severity of pain and evaluate response     Problem: Nutrition Deficit:  Goal: Optimize nutritional status  Outcome: Progressing     
  Problem: Discharge Planning  Goal: Discharge to home or other facility with appropriate resources  Outcome: Progressing  Flowsheets (Taken 5/20/2025 1200)  Discharge to home or other facility with appropriate resources:   Identify barriers to discharge with patient and caregiver   Arrange for needed discharge resources and transportation as appropriate   Identify discharge learning needs (meds, wound care, etc)   Refer to discharge planning if patient needs post-hospital services based on physician order or complex needs related to functional status, cognitive ability or social support system     Problem: Safety - Adult  Goal: Free from fall injury  Outcome: Progressing     Problem: Pain  Goal: Verbalizes/displays adequate comfort level or baseline comfort level  Outcome: Progressing     Problem: Cardiovascular - Adult  Goal: Maintains optimal cardiac output and hemodynamic stability  Outcome: Progressing  Flowsheets (Taken 5/20/2025 1200)  Maintains optimal cardiac output and hemodynamic stability:   Monitor blood pressure and heart rate   Monitor urine output and notify Licensed Independent Practitioner for values outside of normal range   Assess for signs of decreased cardiac output  Goal: Absence of cardiac dysrhythmias or at baseline  Outcome: Progressing  Flowsheets (Taken 5/20/2025 1200)  Absence of cardiac dysrhythmias or at baseline:   Monitor cardiac rate and rhythm   Assess for signs of decreased cardiac output     Problem: Skin/Tissue Integrity - Adult  Goal: Skin integrity remains intact  Description: 1.  Monitor for areas of redness and/or skin breakdown2.  Assess vascular access sites hourly3.  Every 4-6 hours minimum:  Change oxygen saturation probe site4.  Every 4-6 hours:  If on nasal continuous positive airway pressure, respiratory therapy assess nares and determine need for appliance change or resting period  Outcome: Progressing  Flowsheets (Taken 5/20/2025 1200)  Skin Integrity Remains 
  Problem: Discharge Planning  Goal: Discharge to home or other facility with appropriate resources  Outcome: Progressing  Flowsheets (Taken 6/3/2025 0138)  Discharge to home or other facility with appropriate resources:   Identify barriers to discharge with patient and caregiver   Arrange for needed discharge resources and transportation as appropriate     Problem: Safety - Adult  Goal: Free from fall injury  Outcome: Progressing  Flowsheets (Taken 6/3/2025 0138)  Free From Fall Injury: Based on caregiver fall risk screen, instruct family/caregiver to ask for assistance with transferring infant if caregiver noted to have fall risk factors     Problem: Pain  Goal: Verbalizes/displays adequate comfort level or baseline comfort level  Outcome: Progressing  Flowsheets (Taken 5/20/2025 0000 by Lorrie Briscoe RN)  Verbalizes/displays adequate comfort level or baseline comfort level:   Encourage patient to monitor pain and request assistance   Assess pain using appropriate pain scale   Administer analgesics based on type and severity of pain and evaluate response   Implement non-pharmacological measures as appropriate and evaluate response   Consider cultural and social influences on pain and pain management   Notify Licensed Independent Practitioner if interventions unsuccessful or patient reports new pain     
  Problem: Nutrition Deficit:  Goal: Optimize nutritional status  Outcome: Progressing  Flowsheets (Taken 5/17/2025 0026)  Nutrient intake appropriate for improving, restoring, or maintaining nutritional needs:   Assess nutritional status and recommend course of action   Recommend appropriate diets, oral nutritional supplements, and vitamin/mineral supplements   Monitor oral intake, labs, and treatment plans   Order, calculate, and assess calorie counts as needed   Recommend, monitor, and adjust tube feedings and TPN/PPN based on assessed needs   Provide specific nutrition education to patient or family as appropriate     Problem: Skin/Tissue Integrity - Adult  Goal: Skin integrity remains intact  Description: 1.  Monitor for areas of redness and/or skin breakdown2.  Assess vascular access sites hourly3.  Every 4-6 hours minimum:  Change oxygen saturation probe site4.  Every 4-6 hours:  If on nasal continuous positive airway pressure, respiratory therapy assess nares and determine need for appliance change or resting period  Outcome: Progressing  Flowsheets  Taken 5/17/2025 0026 by Goldie Alfredo RN  Skin Integrity Remains Intact:   Monitor for areas of redness and/or skin breakdown   Assess vascular access sites hourly   Every 4-6 hours minimum:  Change oxygen saturation probe site   Every 4-6 hours:  If on nasal continuous positive airway pressure, assess nares and determine need for appliance change or resting period   Turn and reposition as indicated   Assess need for specialty bed   Positioning devices   Pressure redistribution bed/mattress (bed type)   Check visual cues for pain   Monitor skin under medical devices  Taken 5/16/2025 1200 by Ines Kaplan, RN  Skin Integrity Remains Intact: Monitor for areas of redness and/or skin breakdown  Goal: Oral mucous membranes remain intact  Outcome: Progressing  Flowsheets (Taken 5/17/2025 0026)  Oral Mucous Membranes Remain Intact:   Assess oral mucosa and 
  Problem: Nutrition Deficit:  Goal: Optimize nutritional status  Recent Flowsheet Documentation  Taken 5/21/2025 1426 by Baltazar Linda RD, LD  Nutrient intake appropriate for improving, restoring, or maintaining nutritional needs: Recommend, monitor, and adjust tube feedings and TPN/PPN based on assessed needs     Problem: Skin/Tissue Integrity - Adult  Goal: Skin integrity remains intact  Description: 1.  Monitor for areas of redness and/or skin breakdown2.  Assess vascular access sites hourly3.  Every 4-6 hours minimum:  Change oxygen saturation probe site4.  Every 4-6 hours:  If on nasal continuous positive airway pressure, respiratory therapy assess nares and determine need for appliance change or resting period  Outcome: Progressing     Problem: Neurosensory - Adult  Goal: Achieves stable or improved neurological status  Outcome: Progressing     Problem: Skin/Tissue Integrity  Goal: Skin integrity remains intact  Description: 1.  Monitor for areas of redness and/or skin breakdown2.  Assess vascular access sites hourly3.  Every 4-6 hours minimum:  Change oxygen saturation probe site4.  Every 4-6 hours:  If on nasal continuous positive airway pressure, respiratory therapy assess nares and determine need for appliance change or resting period  Outcome: Progressing     Problem: Safety - Medical Restraint  Goal: Remains free of injury from restraints (Restraint for Interference with Medical Device)  Description: INTERVENTIONS:1. Determine that other, less restrictive measures have been tried or would not be effective before applying the restraint2. Evaluate the patient's condition at the time of restraint application3. Inform patient/family regarding the reason for restraint4. Q2H: Monitor safety, psychosocial status, comfort, nutrition and hydration  Outcome: Progressing     
  Problem: Safety - Adult  Goal: Free from fall injury  5/27/2025 0504 by Benjy Samaniego RN  Outcome: Progressing  5/26/2025 2236 by Thanh Romero RN  Outcome: Progressing     Problem: Cardiovascular - Adult  Goal: Maintains optimal cardiac output and hemodynamic stability  5/27/2025 1049 by Kathie Schwartz RN  Outcome: Progressing  5/27/2025 0504 by Benjy Samaniego RN  Outcome: Progressing  Goal: Absence of cardiac dysrhythmias or at baseline  5/27/2025 0504 by Benjy Samaniego RN  Outcome: Progressing     Problem: Skin/Tissue Integrity - Adult  Goal: Skin integrity remains intact  Description: 1.  Monitor for areas of redness and/or skin breakdown2.  Assess vascular access sites hourly3.  Every 4-6 hours minimum:  Change oxygen saturation probe site4.  Every 4-6 hours:  If on nasal continuous positive airway pressure, respiratory therapy assess nares and determine need for appliance change or resting period  5/27/2025 1049 by Kathie Schwartz RN  Outcome: Progressing  5/27/2025 0504 by Benjy Samaniego RN  Outcome: Progressing  Goal: Incisions, wounds, or drain sites healing without S/S of infection  5/27/2025 0504 by Benjy Samaniego RN  Outcome: Progressing  Goal: Oral mucous membranes remain intact  5/27/2025 0504 by Benjy Samaniego RN  Outcome: Progressing     
  Problem: Safety - Adult  Goal: Free from fall injury  6/4/2025 1153 by Kathie Schwartz RN  Outcome: Progressing  6/4/2025 0035 by Brent Sloan RN  Outcome: Progressing     Problem: Pain  Goal: Verbalizes/displays adequate comfort level or baseline comfort level  6/4/2025 1153 by Kathie Schwartz RN  Outcome: Progressing  6/4/2025 0035 by Brent Sloan, RN  Outcome: Progressing     Problem: Nutrition Deficit:  Goal: Optimize nutritional status  Outcome: Progressing  Flowsheets (Taken 6/4/2025 1146 by Melanie Daniel, MS, RD, LD)  Nutrient intake appropriate for improving, restoring, or maintaining nutritional needs: Recommend appropriate diets, oral nutritional supplements, and vitamin/mineral supplements     Problem: Cardiovascular - Adult  Goal: Maintains optimal cardiac output and hemodynamic stability  Outcome: Progressing     Problem: Metabolic/Fluid and Electrolytes - Adult  Goal: Electrolytes maintained within normal limits  Outcome: Progressing     Problem: Gastrointestinal - Adult  Goal: Maintains adequate nutritional intake  Outcome: Progressing     Problem: Genitourinary - Adult  Goal: Absence of urinary retention  Outcome: Progressing     Problem: Skin/Tissue Integrity  Goal: Skin integrity remains intact  Description: 1.  Monitor for areas of redness and/or skin breakdown2.  Assess vascular access sites hourly3.  Every 4-6 hours minimum:  Change oxygen saturation probe site4.  Every 4-6 hours:  If on nasal continuous positive airway pressure, respiratory therapy assess nares and determine need for appliance change or resting period  Outcome: Progressing     
  Problem: Safety - Medical Restraint  Goal: Remains free of injury from restraints (Restraint for Interference with Medical Device)  Description: INTERVENTIONS:1. Determine that other, less restrictive measures have been tried or would not be effective before applying the restraint2. Evaluate the patient's condition at the time of restraint application3. Inform patient/family regarding the reason for restraint4. Q2H: Monitor safety, psychosocial status, comfort, nutrition and hydration  5/24/2025 0152 by Jodi Garner RN  Outcome: Progressing  5/23/2025 1900 by Thanh Romero, RN  Flowsheets (Taken 5/23/2025 1900)  Remains free of injury from restraints (restraint for interference with medical device):   Every 2 hours: Monitor safety, psychosocial status, comfort, nutrition and hydration   Inform patient/family regarding the reason for restraint   Evaluate the patient's condition at the time of restraint application     
  Problem: Safety - Medical Restraint  Goal: Remains free of injury from restraints (Restraint for Interference with Medical Device)  Description: INTERVENTIONS:1. Determine that other, less restrictive measures have been tried or would not be effective before applying the restraint2. Evaluate the patient's condition at the time of restraint application3. Inform patient/family regarding the reason for restraint4. Q2H: Monitor safety, psychosocial status, comfort, nutrition and hydration  5/31/2025 0658 by Millie Boles RN  Outcome: Progressing     
Called and spoke to Emily (primary decision maker) to discuss the need for a central line.     Asked it she or any other relative would be able to come down to speak to the patient and redirect her to place the central line. Emily states she is currently in [place] and won't be able to come by. States that her sister will come by some time today but it is unlikely it would be of any help. Emily states that we have her consent to place a central line however is possible.     Plan: Midazolam 1 mg IM to calm the patient down prior to CVC insertion and LP by IR. At this time, it will be difficult to treat patient without IV catheter so it is crucial that a central catcher be placed.  
Received message that patient had a syncopal episode while in the ED.  Had several beats of nonsustained V. tach as well as a run of torsades de point.  Patient seen and evaluated bedside.  States that before her episode felt lightheaded and was diaphoretic.  Also felt as though everything went \"dark\".  Patient states that symptoms were similar to previous syncopal episode that she she had months before.  Unsure of how long she was down.  Patient received lidocaine and magnesium infusion in the ED. States that symptoms have currently resolved.     O  Vitals:    05/14/25 2245   BP: 131/88   Pulse: 66   Resp: 18   Temp:    SpO2: 97%     GEN: AOX3  RR: CTAB   CVS: RR with 2/6 systolic ejection murmur   ABDO: Soft mildy tender to palpation without rebound or guarding  EXT: No peripheral edema or cyanosis noted     Assessment and Plan   Syncope 2/2 arrhythmia   Plan:  STAT consult placed to EP, appreciate recommendations. EKG and strip readings uploaded into patients chart   STAT BMP, Mg, Phos, ordered. Plan to keep Potassium > 4, Phos > 2. Monitor Mg     Electronically signed by Stephie Ramsay MD on 5/15/2025 at 12:18 AM   
Received perfect serve message that patient has no IV access.     I came to the unit and directly spoke with nursing staff.   Nursing staff informed me that the patient is extremely combative and they are unable to get vitals, administer medications, etc.   I also spoke with IV Team via phone on the unit requesting for another attempt at IV placement even if the patient is given a sedative to alleviate some of the aggressive combativeness.   IV Team informed me that they are unable to do so and recommended IR procedure request.   Per chart review, IR was consulted earlier this morning for IV placement; however, I later received a message from nursing staff that IR will not be able to complete this today.     Patient evaluated at bedside.  Patient's daughter and nursing staff are present at bedside as well.   Patient appears to have limited insight, is combative, agitated, and non redirectable.   1 mg Ativan IM was administered to calm the patient's agitation as she is a risk of hurting herself and others.     At this time, it is imperative that the patient obtain IV access.   I contacted IR to determine if they are able to assist.   IR informed me that they do have the procedure request; however, they will not be able to do the procedure until tomorrow.     Job Tavera MD   
Spoke with [facility] admitting neurologist regarding transfer.    Discussed patient case with him. At this time, he stated that there is no further testing that they would perform at [facility] that has not been performed. Additional consideration would be to perform an EEG although there is low probability for seizure activity. If there is no improvement in the next few days or there is deterioration of patient's mental status, we may contact [facility] again for transfer.    
Spoke with patient's daughter who states that her confusion has worsened today. She states that she was doing better when she received the Norco yesterday and is requesting that it be switched back to scheduled q8 hours as it was before.    Explained that there had still be considerable agitation overnight and that the patient was biting people. Daughter states that her agitation had been improving and was worse today having not received Norco.  Daughter states that she took her PO medication well this AM, confirmed on MAR that was true.     After discussion with daughter, re-ordered Norco. Suspect uncontrolled pain is contributory factor to patient's delirium. Advised daughter that if patient refuses to take PO meds or if this is not enough to help with her delirium, will have to switch to IV. Daughter verbalized understanding and is agreeable to plan.   
0950  Verbalizes/displays adequate comfort level or baseline comfort level: Assess pain using appropriate pain scale  Taken 5/15/2025 0921  Verbalizes/displays adequate comfort level or baseline comfort level: Administer analgesics based on type and severity of pain and evaluate response     Problem: Nutrition Deficit:  Goal: Optimize nutritional status  5/16/2025 0850 by Kemar gNuyen RN  Outcome: Progressing  5/15/2025 2143 by Celeste Welch RN  Outcome: Progressing  5/15/2025 2115 by Susan Adams RN  Outcome: Progressing     Problem: Cardiovascular - Adult  Goal: Maintains optimal cardiac output and hemodynamic stability  5/16/2025 0850 by Kemar Nguyen RN  Outcome: Progressing  5/15/2025 2143 by Celeste Welch RN  Outcome: Progressing  Goal: Absence of cardiac dysrhythmias or at baseline  5/16/2025 0850 by Kemar Nguyen RN  Outcome: Progressing  5/15/2025 2143 by Celeste Welch RN  Outcome: Progressing     Problem: Skin/Tissue Integrity - Adult  Goal: Skin integrity remains intact  5/16/2025 0850 by Kemar Nguyen RN  Outcome: Progressing  5/15/2025 2143 by Celeste Welch RN  Outcome: Progressing  Flowsheets  Taken 5/15/2025 2142 by Celeste Welch RN  Skin Integrity Remains Intact:   Monitor for areas of redness and/or skin breakdown   Assess vascular access sites hourly   Every 4-6 hours minimum:  Change oxygen saturation probe site  Taken 5/15/2025 0800 by Susan Adams RN  Skin Integrity Remains Intact:   Monitor for areas of redness and/or skin breakdown   Assess vascular access sites hourly   Every 4-6 hours minimum:  Change oxygen saturation probe site   Every 4-6 hours:  If on nasal continuous positive airway pressure, assess nares and determine need for appliance change or resting period   Turn and reposition as indicated   Assess need for specialty bed   Positioning devices   Check visual cues for pain   Monitor skin under medical devices   Pressure redistribution 
DAILY: Assess and document skin integrity   TWICE DAILY: Assess and document dressing/incision, wound bed, drain sites and surrounding tissue  Goal: Oral mucous membranes remain intact  Outcome: Progressing  Flowsheets (Taken 5/17/2025 0026 by Goldie Alfredo, RN)  Oral Mucous Membranes Remain Intact:   Assess oral mucosa and hygiene practices   Implement preventative oral hygiene regimen   Implement oral medicated treatments as ordered     Problem: Metabolic/Fluid and Electrolytes - Adult  Goal: Electrolytes maintained within normal limits  Outcome: Progressing  Flowsheets (Taken 5/17/2025 1525)  Electrolytes maintained within normal limits:   Monitor labs and assess patient for signs and symptoms of electrolyte imbalances   Administer electrolyte replacement as ordered   Monitor response to electrolyte replacements, including repeat lab results as appropriate  Goal: Hemodynamic stability and optimal renal function maintained  Outcome: Progressing  Flowsheets (Taken 5/17/2025 1525)  Hemodynamic stability and optimal renal function maintained:   Monitor labs and assess for signs and symptoms of volume excess or deficit   Monitor intake, output and patient weight  Goal: Glucose maintained within prescribed range  Outcome: Progressing     Problem: Hematologic - Adult  Goal: Maintains hematologic stability  Outcome: Progressing     Problem: Skin/Tissue Integrity  Goal: Skin integrity remains intact  Description: 1.  Monitor for areas of redness and/or skin breakdown2.  Assess vascular access sites hourly3.  Every 4-6 hours minimum:  Change oxygen saturation probe site4.  Every 4-6 hours:  If on nasal continuous positive airway pressure, respiratory therapy assess nares and determine need for appliance change or resting period  Outcome: Progressing  Flowsheets (Taken 5/17/2025 0026 by Goldie Alfredo, RN)  Skin Integrity Remains Intact:   Monitor for areas of redness and/or skin breakdown   Assess vascular 
5/19/2025 2200  Incisions, Wounds, or Drain Sites Healing Without Sign and Symptoms of Infection:   ADMISSION and DAILY: Assess and document risk factors for pressure ulcer development   TWICE DAILY: Assess and document dressing/incision, wound bed, drain sites and surrounding tissue   TWICE DAILY: Assess and document skin integrity   Implement wound care per orders   Initiate isolation precautions as appropriate   Initiate pressure ulcer prevention bundle as indicated  Taken 5/19/2025 2000  Incisions, Wounds, or Drain Sites Healing Without Sign and Symptoms of Infection:   ADMISSION and DAILY: Assess and document risk factors for pressure ulcer development   TWICE DAILY: Assess and document skin integrity   TWICE DAILY: Assess and document dressing/incision, wound bed, drain sites and surrounding tissue   Implement wound care per orders   Initiate isolation precautions as appropriate   Initiate pressure ulcer prevention bundle as indicated     Problem: Skin/Tissue Integrity - Adult  Goal: Oral mucous membranes remain intact  Outcome: Progressing  Flowsheets  Taken 5/19/2025 2200  Oral Mucous Membranes Remain Intact:   Assess oral mucosa and hygiene practices   Implement preventative oral hygiene regimen   Implement oral medicated treatments as ordered  Taken 5/19/2025 2000  Oral Mucous Membranes Remain Intact:   Assess oral mucosa and hygiene practices   Implement preventative oral hygiene regimen   Implement oral medicated treatments as ordered     Problem: Metabolic/Fluid and Electrolytes - Adult  Goal: Electrolytes maintained within normal limits  5/19/2025 2208 by Lorrie Briscoe, RN  Outcome: Progressing  Flowsheets (Taken 5/19/2025 2000)  Electrolytes maintained within normal limits:   Monitor labs and assess patient for signs and symptoms of electrolyte imbalances   Administer electrolyte replacement as ordered   Monitor response to electrolyte replacements, including repeat lab results as appropriate   
Progressing     Problem: ABCDS Injury Assessment  Goal: Absence of physical injury  5/31/2025 1337 by Fatmata Martinez, RN  Outcome: Progressing  5/31/2025 0218 by Benjy Samaniego RN  Outcome: Progressing     Problem: Safety - Medical Restraint  Goal: Remains free of injury from restraints (Restraint for Interference with Medical Device)  Description: INTERVENTIONS:1. Determine that other, less restrictive measures have been tried or would not be effective before applying the restraint2. Evaluate the patient's condition at the time of restraint application3. Inform patient/family regarding the reason for restraint4. Q2H: Monitor safety, psychosocial status, comfort, nutrition and hydration  5/31/2025 1337 by Fatmata Martinez RN  Outcome: Progressing  5/31/2025 0658 by Millie Boles RN  Outcome: Progressing  5/31/2025 0657 by Millie Boles RN  Outcome: Progressing  5/31/2025 0218 by Benjy Samaniego RN  Outcome: Progressing  Flowsheets (Taken 5/30/2025 1723 by Laly Sanchez, RN)  Remains free of injury from restraints (restraint for interference with medical device):   Every 2 hours: Monitor safety, psychosocial status, comfort, nutrition and hydration   Inform patient/family regarding the reason for restraint   Evaluate the patient's condition at the time of restraint application   Determine that other, less restrictive measures have been tried or would not be effective before applying the restraint     
Gastrointestinal - Adult  Goal: Maintains adequate nutritional intake  Outcome: Progressing     Problem: Respiratory - Adult  Goal: Achieves optimal ventilation and oxygenation  5/20/2025 2202 by Valentina Alvarado RN  Outcome: Progressing  5/20/2025 1840 by Laurita Jain  Outcome: Progressing     Problem: Genitourinary - Adult  Goal: Absence of urinary retention  5/20/2025 2202 by Valentina Alvarado RN  Outcome: Progressing  5/20/2025 1840 by Laurita Jain  Outcome: Progressing     Problem: Skin/Tissue Integrity  Goal: Skin integrity remains intact  Description: 1.  Monitor for areas of redness and/or skin breakdown2.  Assess vascular access sites hourly3.  Every 4-6 hours minimum:  Change oxygen saturation probe site4.  Every 4-6 hours:  If on nasal continuous positive airway pressure, respiratory therapy assess nares and determine need for appliance change or resting period  5/20/2025 2202 by Valentina Alvarado RN  Outcome: Progressing  5/20/2025 1840 by Laurita Jain  Outcome: Progressing  Flowsheets (Taken 5/20/2025 1200)  Skin Integrity Remains Intact:   Monitor for areas of redness and/or skin breakdown   Assess vascular access sites hourly   Every 4-6 hours minimum:  Change oxygen saturation probe site   Pressure redistribution bed/mattress (bed type)   Check visual cues for pain   Monitor skin under medical devices     Problem: ABCDS Injury Assessment  Goal: Absence of physical injury  Outcome: Progressing     Problem: Safety - Medical Restraint  Goal: Remains free of injury from restraints (Restraint for Interference with Medical Device)  Description: INTERVENTIONS:1. Determine that other, less restrictive measures have been tried or would not be effective before applying the restraint2. Evaluate the patient's condition at the time of restraint application3. Inform patient/family regarding the reason for restraint4. Q2H: Monitor safety, psychosocial status, comfort, nutrition and hydration  5/20/2025 2202 by

## 2025-06-06 LAB
EKG ATRIAL RATE: 60 BPM
EKG P AXIS: -4 DEGREES
EKG P-R INTERVAL: 186 MS
EKG Q-T INTERVAL: 468 MS
EKG QRS DURATION: 86 MS
EKG QTC CALCULATION (BAZETT): 468 MS
EKG R AXIS: 18 DEGREES
EKG T AXIS: 33 DEGREES
EKG VENTRICULAR RATE: 60 BPM

## 2025-06-18 ENCOUNTER — CLINICAL SUPPORT (OUTPATIENT)
Dept: NON INVASIVE DIAGNOSTICS | Age: 65
End: 2025-06-18

## 2025-06-18 DIAGNOSIS — R94.31 PROLONGED Q-T INTERVAL ON ECG: ICD-10-CM

## 2025-06-18 DIAGNOSIS — I47.29 NSVT (NONSUSTAINED VENTRICULAR TACHYCARDIA) (HCC): ICD-10-CM

## 2025-06-18 DIAGNOSIS — Z95.810 ICD (IMPLANTABLE CARDIOVERTER-DEFIBRILLATOR) IN PLACE: ICD-10-CM

## 2025-06-18 DIAGNOSIS — I47.21 TORSADES DE POINTES (HCC): Primary | ICD-10-CM

## 2025-06-18 NOTE — PROGRESS NOTES
Continue left arm restrictions until 07/15/2025  You may shower starting today  Clean incision daily with soap & water, pat dry. No lotions or powders on incision      Call if any signs or symptoms of infection [phone]  Fevers, chills, redness, swelling or drainage.       Hook up home  Monitor at bedside  [employer] Stay connected: [phone] for monitor questions    If you receive a shock    1 shock and you feel fine send a home transmission and call the office [phone]   1 shock and you feel poorly after the shock go to the emergency room and do not drive.     Multiple shocks report to the emergency room and do no drive.      Keep Follow up appointment as scheduled

## 2025-06-20 ENCOUNTER — TELEPHONE (OUTPATIENT)
Dept: CARDIOLOGY CLINIC | Age: 65
End: 2025-06-20

## 2025-06-20 DIAGNOSIS — I34.0 NONRHEUMATIC MITRAL VALVE REGURGITATION: Primary | ICD-10-CM

## 2025-06-20 NOTE — TELEPHONE ENCOUNTER
Micaela from [facility] is calling to schedule pt for a hospital follow up from her 05/15 consultation.  She can be reached at [phone] for scheduling.

## 2025-06-20 NOTE — TELEPHONE ENCOUNTER
Follow-up with the EP for the torsades, VT, long QT, syncope  Follow-up with me in 1 year for the mild aortic regurgitation and mild to moderate mitral regurgitation.

## 2025-07-07 ENCOUNTER — TELEPHONE (OUTPATIENT)
Age: 65
End: 2025-07-07

## 2025-07-07 NOTE — TELEPHONE ENCOUNTER
Patient called and left a message that she needed to schedule an injection. I returned the call to [phone] and the number just rings busy. Tried to call 4 times

## 2025-07-14 ENCOUNTER — TELEPHONE (OUTPATIENT)
Dept: FAMILY MEDICINE CLINIC | Age: 65
End: 2025-07-14

## 2025-07-14 NOTE — TELEPHONE ENCOUNTER
Home care practitioner called in to report they did testing for PVD and patient showed positive. They have policy to notify PCP of positive results. Patient has appointment on 7-21-25

## 2025-07-16 ENCOUNTER — HOSPITAL ENCOUNTER (OUTPATIENT)
Dept: NEUROLOGY | Age: 65
Discharge: HOME | End: 2025-07-16
Payer: MEDICARE

## 2025-07-16 ENCOUNTER — CLINICAL SUPPORT (OUTPATIENT)
Dept: NON INVASIVE DIAGNOSTICS | Age: 65
End: 2025-07-16

## 2025-07-16 DIAGNOSIS — Z95.810 ICD (IMPLANTABLE CARDIOVERTER-DEFIBRILLATOR) IN PLACE: ICD-10-CM

## 2025-07-16 DIAGNOSIS — I47.29 NSVT (NONSUSTAINED VENTRICULAR TACHYCARDIA) (HCC): Primary | ICD-10-CM

## 2025-07-16 PROBLEM — R29.818 TRANSIENT NEUROLOGICAL SYMPTOMS: Status: ACTIVE | Noted: 2025-07-16

## 2025-07-16 PROCEDURE — 95819 EEG AWAKE AND ASLEEP: CPT | Performed by: PSYCHIATRY & NEUROLOGY

## 2025-07-16 PROCEDURE — 95819 EEG AWAKE AND ASLEEP: CPT

## 2025-07-16 NOTE — PROCEDURES
[facility] Neurodiagnostic Report    MRN: [id number]  PATIENT NAME: Diane Stuart  DATE OF REPORT: 2025    DATE OF SERVICE: 2025  PHYSICIAN NAME: Baltazar Hernandez DO  STUDY ORDERED BY: Dr Stephie Trinidad      Patient's : 1960  Patient's Age: 65 y.o.  Gender: female    PROCEDURE: Routine EEG with video      Clinical Interpretation:   This was a normal study during waking, drowsiness, and sleep.    No seizures or epileptiform discharges were noted during this study.     __________________________  Electronically signed by: Baltazar Hernandez DO, 2025 9:08 AM      Patient Clinical Information   Reason for Study: The patient is undergoing evaluation for altered mental status  Patient State: Awake  Primary neurological diagnosis: Spell of uncertain etiology  Primary indication for monitoring: Characterization of spell    Pertinent Medications and Treatments    divalproex     prochlorperazine     melatonin     thiamine     Sedatives administered: No  Intubated: No  Pharmacological paralytic: No    Reporting Period  Start of Study: 843, 2025   End of Study:  911, 2025       EEG Description  Digital video and scalp EEG monitoring was performed using the standard protocol for this laboratory. Scalp electrodes were applied in the international 10/20 system. Multiple digital montage arrangements were utilized for evaluation. EKG and video were recorded.     Background:      Occipital rhythm (posterior dominant rhythm or PDR): Present  Frequency: 10.5 Hz  Voltage: Medium  Organization: good   Reactivity to eye opening/closure: Good    Drowsiness: Present - normal  Sleep: Stage 2 present - normal    Technical and Activation Procedures:  Hyperventilation: Not done  Photic stimulation: Done - physiologic driving noted  Reactivity to stimulation: Present    Abnormalities:    I. Seizures?  None    II. Rhythmic or Periodic Patterns?  None    III. Other

## 2025-07-18 DIAGNOSIS — M19.172 POST-TRAUMATIC OSTEOARTHRITIS OF LEFT ANKLE: Primary | ICD-10-CM

## 2025-07-23 ENCOUNTER — OFFICE VISIT (OUTPATIENT)
Age: 65
End: 2025-07-23
Payer: MEDICARE

## 2025-07-23 ENCOUNTER — HOSPITAL ENCOUNTER (OUTPATIENT)
Dept: GENERAL RADIOLOGY | Age: 65
Discharge: HOME | End: 2025-07-25
Payer: MEDICARE

## 2025-07-23 VITALS
SYSTOLIC BLOOD PRESSURE: 134 MMHG | OXYGEN SATURATION: 99 % | RESPIRATION RATE: 16 BRPM | DIASTOLIC BLOOD PRESSURE: 101 MMHG | TEMPERATURE: 98.3 F | HEART RATE: 75 BPM

## 2025-07-23 DIAGNOSIS — M19.172 POST-TRAUMATIC OSTEOARTHRITIS OF LEFT ANKLE: ICD-10-CM

## 2025-07-23 DIAGNOSIS — M19.172 POST-TRAUMATIC OSTEOARTHRITIS OF LEFT ANKLE: Primary | ICD-10-CM

## 2025-07-23 DIAGNOSIS — Z98.890 S/P HARDWARE REMOVAL: ICD-10-CM

## 2025-07-23 PROCEDURE — 73610 X-RAY EXAM OF ANKLE: CPT

## 2025-07-23 RX ORDER — TRIAMCINOLONE ACETONIDE 40 MG/ML
40 INJECTION, SUSPENSION INTRA-ARTICULAR; INTRAMUSCULAR ONCE
Status: COMPLETED | OUTPATIENT
Start: 2025-07-23 | End: 2025-07-23

## 2025-07-23 RX ORDER — BUPIVACAINE HYDROCHLORIDE 2.5 MG/ML
1 INJECTION, SOLUTION INFILTRATION; PERINEURAL ONCE
Status: COMPLETED | OUTPATIENT
Start: 2025-07-23 | End: 2025-07-23

## 2025-07-23 RX ADMIN — BUPIVACAINE HYDROCHLORIDE 2.5 MG: 2.5 INJECTION, SOLUTION INFILTRATION; PERINEURAL at 13:56

## 2025-07-23 RX ADMIN — TRIAMCINOLONE ACETONIDE 40 MG: 40 INJECTION, SUSPENSION INTRA-ARTICULAR; INTRAMUSCULAR at 13:57

## 2025-07-23 NOTE — PATIENT INSTRUCTIONS
Weightbearing as tolerated left lower extremity.    Follow-up in 3 months for reevaluation possible repeat left ankle CSI.    Call if any questions or concerns

## 2025-07-23 NOTE — PROGRESS NOTES
No chief complaint on file.      SUBJECTIVE: Diane Stuart is a 65-year-old female who presents for follow-up for her left ankle.  She states that her left ankle tibiotalar CSI done in April 2024 gave her good relief and she would like another injection today.  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  She is s/p left ankle ASHLEY in August 2023.  She describes diffuse aching pain across the front of her ankle joint.  Denies any pain in the heel or foot.  Denies numbness, tingling or paresthesias.  No other orthopedic complaints at this time.    Review of Systems -   General ROS: negative for - chills, fatigue, fever or night sweats  Respiratory ROS: no cough, shortness of breath, or wheezing  Cardiovascular ROS: no chest pain or dyspnea on exertion  Gastrointestinal ROS: no abdominal pain, change in bowel habits, or black or bloody stools  Genitourinary: no hematuria, dysuria, or incontinence   Musculoskeletal ROS:see above  Neurological ROS: no TIA or stroke symptoms     OBJECTIVE:   Alert and oriented X 3, no acute distress, respirations easy and unlabored with no audible wheezes, skin warm and dry, speech and dress appropriate for noted age, affect euthymic.    Extremity:  Left Lower Extremity  Skin clean dry and intact, without signs of infection  Incision well healed  Mild edema noted  Moderate TTP over the anterior tibiotalar joint  Compartments supple throughout thigh and leg  Calf supple and nontender  Demonstrates active knee flexion/extension, ankle plantar/dorsiflexion/great toe extension.   States sensation intact to touch in sural/deep peroneal/superficial peroneal/saphenous/posterior tibial nerve distributions to foot/ankle.   Palpable dorsalis pedis and posterior tibialis pulses, cap refill brisk in toes, foot warm/perfused.     XR: 7/23/25   3 views left ankle demonstrate no acute osseous abnormality.  Changes consistent with her previous ankle fracture with hardware removal.  She does have some

## (undated) DEVICE — APPLICATOR PREP 26ML 0.7% IOD POVACRYLEX 74% ISO ALC ST

## (undated) DEVICE — KIT INTRO 9FR L13CM DIA0.118IN SPLITTABLE HEMSTAT ROBUST

## (undated) DEVICE — GOWN,SIRUS,FABRNF,XL,20/CS: Brand: MEDLINE

## (undated) DEVICE — BONE BIOPSY DEVICE F05A BBD SIZE 3: Brand: MEDTRONIC REUSABLE INSTRUMENTS

## (undated) DEVICE — SKIN AFFIX SURG ADHESIVE 72/CS 0.55ML: Brand: MEDLINE

## (undated) DEVICE — PADDING,UNDERCAST,COTTON, 4"X4YD STERILE: Brand: MEDLINE

## (undated) DEVICE — GLIDESHEATH SLENDER ACCESS KIT: Brand: GLIDESHEATH SLENDER

## (undated) DEVICE — BIT DRL L200MM DIA2.8MM CALIB L100MM FOR 3.5MM VA LCP PROX

## (undated) DEVICE — 3M™ IOBAN™ 2 ANTIMICROBIAL INCISE DRAPE 6650EZ: Brand: IOBAN™ 2

## (undated) DEVICE — ANGIOGRAPHIC CATHETER: Brand: EXPO™

## (undated) DEVICE — DRAPE 24X23IN RADIOLOGY CASS ADHES STRIP

## (undated) DEVICE — ZIMMER® A.T.S. CYCLINDRICAL TOURNIQUET CUFF, SINGLE PORT, SINGLE BLADDER 24 IN. 61 CM)

## (undated) DEVICE — GLOVE ORTHO 8   MSG9480

## (undated) DEVICE — E-Z CLEAN, NON-STICK, PTFE COATED, ELECTROSURGICAL BLADE ELECTRODE, MODIFIED EXTENDED INSULATION, 2.5 INCH (6.35 CM): Brand: MEGADYNE

## (undated) DEVICE — MARKER,SKIN,WI/RULER AND LABELS: Brand: MEDLINE

## (undated) DEVICE — TUBING SUCT 12FR MAL ALUM SHFT FN CAP VENT UNIV CONN W/ OBT

## (undated) DEVICE — GUIDEWIRE VASC J 0.035 INX300 CM INTERMED SHAPEABLE TIP WHLY

## (undated) DEVICE — GLOVE SURG SZ 75 L12IN FNGR THK79MIL GRN LTX FREE

## (undated) DEVICE — CLOTH SURG PREP PREOPERATIVE CHLORHEXIDINE GLUC 2% READYPREP

## (undated) DEVICE — MEDIA CONTRAST RX ISOVUE-300 61% 30ML VIALS

## (undated) DEVICE — GAUZE,SPONGE,4"X4",16PLY,XRAY,STRL,LF: Brand: MEDLINE

## (undated) DEVICE — DRAPE C ARM UNIV W41XL74IN CLR PLAS XR VELC CLSR POLY STRP

## (undated) DEVICE — GOWN,AURORA,BRTHSLV,2XL,18/CS: Brand: MEDLINE

## (undated) DEVICE — GLOVE ORANGE PI 7 1/2   MSG9075

## (undated) DEVICE — DOUBLE BASIN SET: Brand: MEDLINE INDUSTRIES, INC.

## (undated) DEVICE — SYRINGE MED 10ML LUERLOCK TIP W/O SFTY DISP

## (undated) DEVICE — ELECTRODE PT RET AD L9FT HI MOIST COND ADH HYDRGEL CORDED

## (undated) DEVICE — TRAP,MUCUS SPECIMEN,40CC: Brand: MEDLINE

## (undated) DEVICE — BIT DRL L110MM DIA1.8MM QUIK CPL CALIB W/O STP REUSE

## (undated) DEVICE — CANNULA NSL CANN NSL L25FT TBNG AD O2 SUP SFT UC

## (undated) DEVICE — KIT KPT1505 PAKTRY 15/3 FF W/ONESTEP-RB: Brand: KYPHOPAK™ TRAY

## (undated) DEVICE — NEPTUNE E-SEP SMOKE EVACUATION PENCIL, COATED, 70MM BLADE, PUSH BUTTON SWITCH: Brand: NEPTUNE E-SEP

## (undated) DEVICE — CATHETER DIAG 5FR L100CM LUMN ID0.047IN JR4 CRV 0 SIDE H

## (undated) DEVICE — DRESSING,GAUZE,XEROFORM,CURAD,5"X9",ST: Brand: CURAD

## (undated) DEVICE — BANDAGE COMPR W4INXL10YD WHITE/BEIGE E MTRX HK LOOP CLSR

## (undated) DEVICE — Device

## (undated) DEVICE — COUNTER NDL 30 COUNT DBL MAG

## (undated) DEVICE — BAND COMPR L21CM SHT CLR PLAS HEMSTAT EXT HK AND LOOP RETEN

## (undated) DEVICE — SCREW BNE L16MM DIA3.5MM CORT S STL ST NONCANNULATED LOK
Type: IMPLANTABLE DEVICE | Site: ANKLE | Status: NON-FUNCTIONAL
Removed: 2023-01-21

## (undated) DEVICE — CHLORAPREP 26ML ORANGE

## (undated) DEVICE — CATHETER DIAG 5FR L100CM LUMN ID0.047IN JL3.5 CRV 0 SIDE H

## (undated) DEVICE — GAMMEX® NON-LATEX PI ORTHO SIZE 8, STERILE POLYISOPRENE POWDER-FREE SURGICAL GLOVE: Brand: GAMMEX

## (undated) DEVICE — GLOVE ORANGE PI 8   MSG9080

## (undated) DEVICE — JACKSON TABLE POSITIONER KIT: Brand: MEDLINE INDUSTRIES, INC.

## (undated) DEVICE — STANDARD HYPODERMIC NEEDLE,POLYPROPYLENE HUB: Brand: MONOJECT

## (undated) DEVICE — READY WET SKIN SCRUB TRAY-LF: Brand: MEDLINE INDUSTRIES, INC.

## (undated) DEVICE — SOLUTION IRRIG 1000ML 0.9% SOD CHL USP POUR PLAS BTL

## (undated) DEVICE — AGENT HEMOSTATIC SURGIFLOW MATRIX KIT W/THROMBIN

## (undated) DEVICE — BIT DRL L110MM DIA2.5MM ST G QUIK CPL NONRADIOPAQUE W/O STP

## (undated) DEVICE — LABEL MED 4 IN SURG PANEL W/ PEN STRL

## (undated) DEVICE — ELECTROSURGICAL PENCIL BUTTON SWITCH E-Z CLEAN COATED BLADE ELECTRODE 10 FT (3 M) CORD HOLSTER: Brand: MEGADYNE

## (undated) DEVICE — DRAPE THER FLUID WARMING 66X44 IN FLAT SLUSH DBL DISC ORS

## (undated) DEVICE — 3M™ MEDIPORE™ + PAD 3564: Brand: 3M™ MEDIPORE™

## (undated) DEVICE — KIT MFLD ISOLATN NACL CNTRST PRT TBNG SPIK W/ PRSS TRNSDUC

## (undated) DEVICE — GOWN,SIRUS,FABRNF,L,20/CS: Brand: MEDLINE

## (undated) DEVICE — TOWEL,OR,DSP,ST,BLUE,DLX,10/PK,8PK/CS: Brand: MEDLINE

## (undated) DEVICE — PACK,LAPAROTOMY,NO GOWNS: Brand: MEDLINE

## (undated) DEVICE — INTENDED FOR TISSUE SEPARATION, AND OTHER PROCEDURES THAT REQUIRE A SHARP SURGICAL BLADE TO PUNCTURE OR CUT.: Brand: BARD-PARKER ® STAINLESS STEEL BLADES

## (undated) DEVICE — DRAPE,REIN 53X77,STERILE: Brand: MEDLINE

## (undated) DEVICE — IMPLANTABLE DEVICE
Type: IMPLANTABLE DEVICE | Site: ANKLE | Status: NON-FUNCTIONAL
Removed: 2023-01-21

## (undated) DEVICE — PAD, DEFIB, ADULT, RADIOTRAN, PHYSIO, LO: Brand: MEDLINE

## (undated) DEVICE — DRAPE EQUIP CARM 72X42 IN RUBBER BND CLP

## (undated) DEVICE — LOWER EXT KNEE DRAPE: Brand: MEDLINE INDUSTRIES, INC.

## (undated) DEVICE — KIT ANGIO W/ AT P65 PREM HND CTRL FOR CNTRST DEL ANGIOTOUCH

## (undated) DEVICE — INTRODUCER SHTH L13CM OD7FR SH ORNG HUB SEAMLESS SAFSHTH